# Patient Record
Sex: FEMALE | Employment: OTHER | URBAN - METROPOLITAN AREA
[De-identification: names, ages, dates, MRNs, and addresses within clinical notes are randomized per-mention and may not be internally consistent; named-entity substitution may affect disease eponyms.]

---

## 2018-03-21 ENCOUNTER — HOSPITAL ENCOUNTER (OUTPATIENT)
Dept: LAB | Age: 68
Discharge: HOME | End: 2018-03-21

## 2018-03-21 PROCEDURE — 99001 SPECIMEN HANDLING PT-LAB: CPT | Performed by: FAMILY MEDICINE

## 2020-04-22 ENCOUNTER — APPOINTMENT (OUTPATIENT)
Dept: GENERAL RADIOLOGY | Age: 70
DRG: 291 | End: 2020-04-22
Attending: EMERGENCY MEDICINE
Payer: MEDICARE

## 2020-04-22 ENCOUNTER — HOSPITAL ENCOUNTER (INPATIENT)
Age: 70
LOS: 3 days | Discharge: HOME HEALTH | DRG: 291 | End: 2020-04-28
Attending: EMERGENCY MEDICINE | Admitting: EMERGENCY MEDICINE
Payer: MEDICARE

## 2020-04-22 DIAGNOSIS — I50.9 ACUTE ON CHRONIC CONGESTIVE HEART FAILURE, UNSPECIFIED HEART FAILURE TYPE (HCC): Primary | ICD-10-CM

## 2020-04-22 DIAGNOSIS — E11.9 TYPE 2 DIABETES MELLITUS WITHOUT COMPLICATION, WITHOUT LONG-TERM CURRENT USE OF INSULIN (HCC): Chronic | ICD-10-CM

## 2020-04-22 DIAGNOSIS — Z20.822 SUSPECTED COVID-19 VIRUS INFECTION: ICD-10-CM

## 2020-04-22 LAB
ALBUMIN SERPL-MCNC: 3.5 G/DL (ref 3.4–5)
ALBUMIN/GLOB SERPL: 0.9 {RATIO} (ref 0.8–1.7)
ALP SERPL-CCNC: 99 U/L (ref 45–117)
ALT SERPL-CCNC: 18 U/L (ref 13–56)
ANION GAP SERPL CALC-SCNC: 9 MMOL/L (ref 3–18)
APPEARANCE UR: CLEAR
AST SERPL-CCNC: 24 U/L (ref 10–38)
BACTERIA URNS QL MICRO: NEGATIVE /HPF
BASOPHILS # BLD: 0 K/UL (ref 0–0.1)
BASOPHILS NFR BLD: 0 % (ref 0–2)
BILIRUB SERPL-MCNC: 0.5 MG/DL (ref 0.2–1)
BILIRUB UR QL: NEGATIVE
BNP SERPL-MCNC: 6916 PG/ML (ref 0–900)
BUN SERPL-MCNC: 43 MG/DL (ref 7–18)
BUN/CREAT SERPL: 18 (ref 12–20)
CALCIUM SERPL-MCNC: 8.2 MG/DL (ref 8.5–10.1)
CHLORIDE SERPL-SCNC: 104 MMOL/L (ref 100–111)
CO2 SERPL-SCNC: 27 MMOL/L (ref 21–32)
COLOR UR: YELLOW
CREAT SERPL-MCNC: 2.35 MG/DL (ref 0.6–1.3)
DIFFERENTIAL METHOD BLD: ABNORMAL
EOSINOPHIL # BLD: 0 K/UL (ref 0–0.4)
EOSINOPHIL NFR BLD: 0 % (ref 0–5)
ERYTHROCYTE [DISTWIDTH] IN BLOOD BY AUTOMATED COUNT: 14.3 % (ref 11.6–14.5)
GLOBULIN SER CALC-MCNC: 4.1 G/DL (ref 2–4)
GLUCOSE SERPL-MCNC: 210 MG/DL (ref 74–99)
GLUCOSE UR STRIP.AUTO-MCNC: NEGATIVE MG/DL
HCT VFR BLD AUTO: 30.8 % (ref 35–45)
HGB BLD-MCNC: 9.4 G/DL (ref 12–16)
HGB UR QL STRIP: ABNORMAL
KETONES UR QL STRIP.AUTO: NEGATIVE MG/DL
LEUKOCYTE ESTERASE UR QL STRIP.AUTO: ABNORMAL
LYMPHOCYTES # BLD: 0.7 K/UL (ref 0.9–3.6)
LYMPHOCYTES NFR BLD: 7 % (ref 21–52)
MAGNESIUM SERPL-MCNC: 2.7 MG/DL (ref 1.6–2.6)
MCH RBC QN AUTO: 27.6 PG (ref 24–34)
MCHC RBC AUTO-ENTMCNC: 30.5 G/DL (ref 31–37)
MCV RBC AUTO: 90.6 FL (ref 74–97)
MONOCYTES # BLD: 0.5 K/UL (ref 0.05–1.2)
MONOCYTES NFR BLD: 5 % (ref 3–10)
NEUTS SEG # BLD: 9.1 K/UL (ref 1.8–8)
NEUTS SEG NFR BLD: 88 % (ref 40–73)
NITRITE UR QL STRIP.AUTO: NEGATIVE
PH UR STRIP: 6 [PH] (ref 5–8)
PLATELET # BLD AUTO: 203 K/UL (ref 135–420)
PMV BLD AUTO: 10.8 FL (ref 9.2–11.8)
POTASSIUM SERPL-SCNC: 4.6 MMOL/L (ref 3.5–5.5)
PROT SERPL-MCNC: 7.6 G/DL (ref 6.4–8.2)
PROT UR STRIP-MCNC: 300 MG/DL
RBC # BLD AUTO: 3.4 M/UL (ref 4.2–5.3)
RBC #/AREA URNS HPF: NORMAL /HPF (ref 0–5)
SODIUM SERPL-SCNC: 140 MMOL/L (ref 136–145)
SP GR UR REFRACTOMETRY: 1.01 (ref 1–1.03)
TROPONIN I SERPL-MCNC: 0.03 NG/ML (ref 0–0.04)
UROBILINOGEN UR QL STRIP.AUTO: 0.2 EU/DL (ref 0.2–1)
WBC # BLD AUTO: 10.3 K/UL (ref 4.6–13.2)
WBC URNS QL MICRO: NORMAL /HPF (ref 0–4)

## 2020-04-22 PROCEDURE — 87449 NOS EACH ORGANISM AG IA: CPT

## 2020-04-22 PROCEDURE — 82550 ASSAY OF CK (CPK): CPT

## 2020-04-22 PROCEDURE — 86140 C-REACTIVE PROTEIN: CPT

## 2020-04-22 PROCEDURE — 85379 FIBRIN DEGRADATION QUANT: CPT

## 2020-04-22 PROCEDURE — 80053 COMPREHEN METABOLIC PANEL: CPT

## 2020-04-22 PROCEDURE — 83880 ASSAY OF NATRIURETIC PEPTIDE: CPT

## 2020-04-22 PROCEDURE — 87450 LEGIONELLA PNEUMOPHILA AG, URINE: CPT

## 2020-04-22 PROCEDURE — 85025 COMPLETE CBC W/AUTO DIFF WBC: CPT

## 2020-04-22 PROCEDURE — 84484 ASSAY OF TROPONIN QUANT: CPT

## 2020-04-22 PROCEDURE — 81001 URINALYSIS AUTO W/SCOPE: CPT

## 2020-04-22 PROCEDURE — 82728 ASSAY OF FERRITIN: CPT

## 2020-04-22 PROCEDURE — 83615 LACTATE (LD) (LDH) ENZYME: CPT

## 2020-04-22 PROCEDURE — 99285 EMERGENCY DEPT VISIT HI MDM: CPT

## 2020-04-22 PROCEDURE — 71045 X-RAY EXAM CHEST 1 VIEW: CPT

## 2020-04-22 PROCEDURE — 84145 PROCALCITONIN (PCT): CPT

## 2020-04-22 PROCEDURE — 83735 ASSAY OF MAGNESIUM: CPT

## 2020-04-22 PROCEDURE — 93005 ELECTROCARDIOGRAM TRACING: CPT

## 2020-04-22 PROCEDURE — 85610 PROTHROMBIN TIME: CPT

## 2020-04-23 ENCOUNTER — APPOINTMENT (OUTPATIENT)
Dept: GENERAL RADIOLOGY | Age: 70
DRG: 291 | End: 2020-04-23
Attending: STUDENT IN AN ORGANIZED HEALTH CARE EDUCATION/TRAINING PROGRAM
Payer: MEDICARE

## 2020-04-23 PROBLEM — I44.4 LEFT ANTERIOR FASCICULAR BLOCK: Status: ACTIVE | Noted: 2018-07-06

## 2020-04-23 PROBLEM — I25.10 CORONARY ARTERY DISEASE INVOLVING NATIVE CORONARY ARTERY OF NATIVE HEART WITHOUT ANGINA PECTORIS: Status: ACTIVE | Noted: 2018-07-06

## 2020-04-23 PROBLEM — E11.9 TYPE 2 DIABETES MELLITUS WITHOUT COMPLICATION, WITHOUT LONG-TERM CURRENT USE OF INSULIN (HCC): Chronic | Status: ACTIVE | Noted: 2018-07-06

## 2020-04-23 PROBLEM — I50.33 ACUTE ON CHRONIC DIASTOLIC CONGESTIVE HEART FAILURE (HCC): Status: ACTIVE | Noted: 2020-04-23

## 2020-04-23 PROBLEM — I10 HTN (HYPERTENSION), BENIGN: Chronic | Status: ACTIVE | Noted: 2018-07-06

## 2020-04-23 PROBLEM — I10 HTN (HYPERTENSION), BENIGN: Status: ACTIVE | Noted: 2018-07-06

## 2020-04-23 PROBLEM — E11.9 TYPE 2 DIABETES MELLITUS WITHOUT COMPLICATION, WITHOUT LONG-TERM CURRENT USE OF INSULIN (HCC): Status: ACTIVE | Noted: 2018-07-06

## 2020-04-23 PROBLEM — I50.32 CHRONIC DIASTOLIC CONGESTIVE HEART FAILURE (HCC): Status: ACTIVE | Noted: 2018-07-06

## 2020-04-23 PROBLEM — Z20.822 SUSPECTED COVID-19 VIRUS INFECTION: Status: ACTIVE | Noted: 2020-04-23

## 2020-04-23 PROBLEM — I50.9 CHF (CONGESTIVE HEART FAILURE) (HCC): Status: ACTIVE | Noted: 2020-04-23

## 2020-04-23 PROBLEM — R60.0 BILATERAL LOWER EXTREMITY EDEMA: Status: ACTIVE | Noted: 2018-07-06

## 2020-04-23 LAB
ALBUMIN SERPL-MCNC: 3.4 G/DL (ref 3.4–5)
ALBUMIN/GLOB SERPL: 0.9 {RATIO} (ref 0.8–1.7)
ALP SERPL-CCNC: 89 U/L (ref 45–117)
ALT SERPL-CCNC: 15 U/L (ref 13–56)
ANION GAP SERPL CALC-SCNC: 7 MMOL/L (ref 3–18)
APTT PPP: 35 SEC (ref 23–36.4)
AST SERPL-CCNC: 17 U/L (ref 10–38)
B PERT DNA SPEC QL NAA+PROBE: NOT DETECTED
BASOPHILS # BLD: 0 K/UL (ref 0–0.1)
BASOPHILS NFR BLD: 0 % (ref 0–2)
BILIRUB DIRECT SERPL-MCNC: 0.1 MG/DL (ref 0–0.2)
BILIRUB SERPL-MCNC: 0.6 MG/DL (ref 0.2–1)
BNP SERPL-MCNC: ABNORMAL PG/ML (ref 0–900)
BORDETELLA PARAPERTUSSIS PCR, BORPAR: NOT DETECTED
BUN SERPL-MCNC: 41 MG/DL (ref 7–18)
BUN/CREAT SERPL: 19 (ref 12–20)
C PNEUM DNA SPEC QL NAA+PROBE: NOT DETECTED
CALCIUM SERPL-MCNC: 8.3 MG/DL (ref 8.5–10.1)
CHLORIDE SERPL-SCNC: 103 MMOL/L (ref 100–111)
CK MB CFR SERPL CALC: 2 % (ref 0–4)
CK MB SERPL-MCNC: 1.3 NG/ML (ref 5–25)
CK SERPL-CCNC: 64 U/L (ref 26–192)
CO2 SERPL-SCNC: 28 MMOL/L (ref 21–32)
CREAT SERPL-MCNC: 2.15 MG/DL (ref 0.6–1.3)
CRP SERPL-MCNC: 0.4 MG/DL (ref 0–0.3)
CRP SERPL-MCNC: 0.5 MG/DL (ref 0–0.3)
D DIMER PPP FEU-MCNC: 2.93 UG/ML(FEU)
D DIMER PPP FEU-MCNC: 3.18 UG/ML(FEU)
DIFFERENTIAL METHOD BLD: ABNORMAL
EOSINOPHIL # BLD: 0 K/UL (ref 0–0.4)
EOSINOPHIL NFR BLD: 0 % (ref 0–5)
ERYTHROCYTE [DISTWIDTH] IN BLOOD BY AUTOMATED COUNT: 14 % (ref 11.6–14.5)
FERRITIN SERPL-MCNC: 93 NG/ML (ref 8–388)
FERRITIN SERPL-MCNC: 93 NG/ML (ref 8–388)
FLUAV H1 2009 PAND RNA SPEC QL NAA+PROBE: NOT DETECTED
FLUAV H1 RNA SPEC QL NAA+PROBE: NOT DETECTED
FLUAV H3 RNA SPEC QL NAA+PROBE: NOT DETECTED
FLUAV SUBTYP SPEC NAA+PROBE: NOT DETECTED
FLUBV RNA SPEC QL NAA+PROBE: NOT DETECTED
GLOBULIN SER CALC-MCNC: 3.9 G/DL (ref 2–4)
GLUCOSE BLD STRIP.AUTO-MCNC: 178 MG/DL (ref 70–110)
GLUCOSE BLD STRIP.AUTO-MCNC: 180 MG/DL (ref 70–110)
GLUCOSE BLD STRIP.AUTO-MCNC: 231 MG/DL (ref 70–110)
GLUCOSE SERPL-MCNC: 128 MG/DL (ref 74–99)
HADV DNA SPEC QL NAA+PROBE: NOT DETECTED
HBA1C MFR BLD: 6.1 % (ref 4.2–5.6)
HCOV 229E RNA SPEC QL NAA+PROBE: NOT DETECTED
HCOV HKU1 RNA SPEC QL NAA+PROBE: NOT DETECTED
HCOV NL63 RNA SPEC QL NAA+PROBE: NOT DETECTED
HCOV OC43 RNA SPEC QL NAA+PROBE: NOT DETECTED
HCT VFR BLD AUTO: 28.5 % (ref 35–45)
HGB BLD-MCNC: 8.8 G/DL (ref 12–16)
HMPV RNA SPEC QL NAA+PROBE: NOT DETECTED
HPIV1 RNA SPEC QL NAA+PROBE: NOT DETECTED
HPIV2 RNA SPEC QL NAA+PROBE: NOT DETECTED
HPIV3 RNA SPEC QL NAA+PROBE: NOT DETECTED
HPIV4 RNA SPEC QL NAA+PROBE: NOT DETECTED
INR PPP: 1.1 (ref 0.8–1.2)
INR PPP: 1.2 (ref 0.8–1.2)
L PNEUMO AG UR QL IA: NEGATIVE
LACTATE SERPL-SCNC: 1.8 MMOL/L (ref 0.4–2)
LDH SERPL L TO P-CCNC: 346 U/L (ref 81–234)
LYMPHOCYTES # BLD: 1.5 K/UL (ref 0.9–3.6)
LYMPHOCYTES NFR BLD: 16 % (ref 21–52)
M PNEUMO DNA SPEC QL NAA+PROBE: NOT DETECTED
MAGNESIUM SERPL-MCNC: 2.7 MG/DL (ref 1.6–2.6)
MCH RBC QN AUTO: 27.6 PG (ref 24–34)
MCHC RBC AUTO-ENTMCNC: 30.9 G/DL (ref 31–37)
MCV RBC AUTO: 89.3 FL (ref 74–97)
MONOCYTES # BLD: 0.8 K/UL (ref 0.05–1.2)
MONOCYTES NFR BLD: 9 % (ref 3–10)
NEUTS SEG # BLD: 6.9 K/UL (ref 1.8–8)
NEUTS SEG NFR BLD: 75 % (ref 40–73)
PHOSPHATE SERPL-MCNC: 3.5 MG/DL (ref 2.5–4.9)
PLATELET # BLD AUTO: 192 K/UL (ref 135–420)
PMV BLD AUTO: 10.6 FL (ref 9.2–11.8)
POTASSIUM SERPL-SCNC: 4 MMOL/L (ref 3.5–5.5)
PROCALCITONIN SERPL-MCNC: <0.05 NG/ML
PROT SERPL-MCNC: 7.3 G/DL (ref 6.4–8.2)
PROTHROMBIN TIME: 14.3 SEC (ref 11.5–15.2)
PROTHROMBIN TIME: 14.6 SEC (ref 11.5–15.2)
RBC # BLD AUTO: 3.19 M/UL (ref 4.2–5.3)
RSV RNA SPEC QL NAA+PROBE: NOT DETECTED
RV+EV RNA SPEC QL NAA+PROBE: NOT DETECTED
S PNEUM AG UR QL: NEGATIVE
SODIUM SERPL-SCNC: 138 MMOL/L (ref 136–145)
TROPONIN I SERPL-MCNC: 0.02 NG/ML (ref 0–0.04)
TROPONIN I SERPL-MCNC: 0.04 NG/ML (ref 0–0.04)
TROPONIN I SERPL-MCNC: NORMAL NG/ML (ref 0–0.04)
WBC # BLD AUTO: 9.2 K/UL (ref 4.6–13.2)

## 2020-04-23 PROCEDURE — 74011250637 HC RX REV CODE- 250/637: Performed by: PHYSICIAN ASSISTANT

## 2020-04-23 PROCEDURE — 83880 ASSAY OF NATRIURETIC PEPTIDE: CPT

## 2020-04-23 PROCEDURE — 71045 X-RAY EXAM CHEST 1 VIEW: CPT

## 2020-04-23 PROCEDURE — 83735 ASSAY OF MAGNESIUM: CPT

## 2020-04-23 PROCEDURE — 83605 ASSAY OF LACTIC ACID: CPT

## 2020-04-23 PROCEDURE — 96374 THER/PROPH/DIAG INJ IV PUSH: CPT

## 2020-04-23 PROCEDURE — 85379 FIBRIN DEGRADATION QUANT: CPT

## 2020-04-23 PROCEDURE — 99218 HC RM OBSERVATION: CPT

## 2020-04-23 PROCEDURE — 74011000250 HC RX REV CODE- 250: Performed by: EMERGENCY MEDICINE

## 2020-04-23 PROCEDURE — 96372 THER/PROPH/DIAG INJ SC/IM: CPT

## 2020-04-23 PROCEDURE — 82728 ASSAY OF FERRITIN: CPT

## 2020-04-23 PROCEDURE — 74011250636 HC RX REV CODE- 250/636: Performed by: STUDENT IN AN ORGANIZED HEALTH CARE EDUCATION/TRAINING PROGRAM

## 2020-04-23 PROCEDURE — 87040 BLOOD CULTURE FOR BACTERIA: CPT

## 2020-04-23 PROCEDURE — 96376 TX/PRO/DX INJ SAME DRUG ADON: CPT

## 2020-04-23 PROCEDURE — 85025 COMPLETE CBC W/AUTO DIFF WBC: CPT

## 2020-04-23 PROCEDURE — 74011250637 HC RX REV CODE- 250/637: Performed by: INTERNAL MEDICINE

## 2020-04-23 PROCEDURE — 80048 BASIC METABOLIC PNL TOTAL CA: CPT

## 2020-04-23 PROCEDURE — 86140 C-REACTIVE PROTEIN: CPT

## 2020-04-23 PROCEDURE — 82962 GLUCOSE BLOOD TEST: CPT

## 2020-04-23 PROCEDURE — 74011636637 HC RX REV CODE- 636/637: Performed by: PHYSICIAN ASSISTANT

## 2020-04-23 PROCEDURE — 74011000250 HC RX REV CODE- 250: Performed by: HOSPITALIST

## 2020-04-23 PROCEDURE — 93005 ELECTROCARDIOGRAM TRACING: CPT

## 2020-04-23 PROCEDURE — 84484 ASSAY OF TROPONIN QUANT: CPT

## 2020-04-23 PROCEDURE — 80076 HEPATIC FUNCTION PANEL: CPT

## 2020-04-23 PROCEDURE — 0100U RESPIRATORY PANEL,PCR,NASOPHARYNGEAL: CPT

## 2020-04-23 PROCEDURE — 85730 THROMBOPLASTIN TIME PARTIAL: CPT

## 2020-04-23 PROCEDURE — 36415 COLL VENOUS BLD VENIPUNCTURE: CPT

## 2020-04-23 PROCEDURE — 74011250637 HC RX REV CODE- 250/637: Performed by: HOSPITALIST

## 2020-04-23 PROCEDURE — 83036 HEMOGLOBIN GLYCOSYLATED A1C: CPT

## 2020-04-23 PROCEDURE — 96375 TX/PRO/DX INJ NEW DRUG ADDON: CPT

## 2020-04-23 PROCEDURE — 84100 ASSAY OF PHOSPHORUS: CPT

## 2020-04-23 PROCEDURE — 74011250636 HC RX REV CODE- 250/636: Performed by: HOSPITALIST

## 2020-04-23 PROCEDURE — 74011000250 HC RX REV CODE- 250: Performed by: PHYSICIAN ASSISTANT

## 2020-04-23 RX ORDER — DEXTROSE 50 % IN WATER (D50W) INTRAVENOUS SYRINGE
25-50 AS NEEDED
Status: DISCONTINUED | OUTPATIENT
Start: 2020-04-23 | End: 2020-04-28 | Stop reason: HOSPADM

## 2020-04-23 RX ORDER — FUROSEMIDE 10 MG/ML
40 INJECTION INTRAMUSCULAR; INTRAVENOUS ONCE
Status: COMPLETED | OUTPATIENT
Start: 2020-04-23 | End: 2020-04-23

## 2020-04-23 RX ORDER — ASCORBIC ACID 250 MG
500 TABLET ORAL 2 TIMES DAILY
Status: DISCONTINUED | OUTPATIENT
Start: 2020-04-23 | End: 2020-04-23

## 2020-04-23 RX ORDER — FUROSEMIDE 40 MG/1
40 TABLET ORAL 2 TIMES DAILY
Status: ON HOLD | COMMUNITY
End: 2020-04-27 | Stop reason: SDUPTHER

## 2020-04-23 RX ORDER — NITROGLYCERIN 40 MG/100ML
0-20 INJECTION INTRAVENOUS
Status: DISCONTINUED | OUTPATIENT
Start: 2020-04-23 | End: 2020-04-24

## 2020-04-23 RX ORDER — MORPHINE SULFATE 2 MG/ML
2 INJECTION, SOLUTION INTRAMUSCULAR; INTRAVENOUS
Status: DISCONTINUED | OUTPATIENT
Start: 2020-04-23 | End: 2020-04-28 | Stop reason: HOSPADM

## 2020-04-23 RX ORDER — BUMETANIDE 0.25 MG/ML
1 INJECTION INTRAMUSCULAR; INTRAVENOUS 2 TIMES DAILY
Status: DISCONTINUED | OUTPATIENT
Start: 2020-04-23 | End: 2020-04-23

## 2020-04-23 RX ORDER — CARVEDILOL 25 MG/1
25 TABLET ORAL 2 TIMES DAILY WITH MEALS
Status: DISCONTINUED | OUTPATIENT
Start: 2020-04-23 | End: 2020-04-28 | Stop reason: HOSPADM

## 2020-04-23 RX ORDER — SODIUM CHLORIDE 0.9 % (FLUSH) 0.9 %
5-40 SYRINGE (ML) INJECTION AS NEEDED
Status: DISCONTINUED | OUTPATIENT
Start: 2020-04-23 | End: 2020-04-28 | Stop reason: HOSPADM

## 2020-04-23 RX ORDER — ENALAPRILAT 1.25 MG/ML
0.26 INJECTION INTRAVENOUS
Status: COMPLETED | OUTPATIENT
Start: 2020-04-23 | End: 2020-04-23

## 2020-04-23 RX ORDER — LISINOPRIL 5 MG/1
5 TABLET ORAL DAILY
COMMUNITY
End: 2020-04-28

## 2020-04-23 RX ORDER — NALOXONE HYDROCHLORIDE 0.4 MG/ML
0.4 INJECTION, SOLUTION INTRAMUSCULAR; INTRAVENOUS; SUBCUTANEOUS AS NEEDED
Status: DISCONTINUED | OUTPATIENT
Start: 2020-04-23 | End: 2020-04-28 | Stop reason: HOSPADM

## 2020-04-23 RX ORDER — SODIUM CHLORIDE 0.9 % (FLUSH) 0.9 %
5-40 SYRINGE (ML) INJECTION EVERY 8 HOURS
Status: DISCONTINUED | OUTPATIENT
Start: 2020-04-23 | End: 2020-04-28 | Stop reason: HOSPADM

## 2020-04-23 RX ORDER — HYDRALAZINE HYDROCHLORIDE 50 MG/1
50 TABLET, FILM COATED ORAL 2 TIMES DAILY
Status: DISCONTINUED | OUTPATIENT
Start: 2020-04-23 | End: 2020-04-23

## 2020-04-23 RX ORDER — CITALOPRAM 10 MG/1
20 TABLET ORAL DAILY
Status: DISCONTINUED | OUTPATIENT
Start: 2020-04-23 | End: 2020-04-28 | Stop reason: HOSPADM

## 2020-04-23 RX ORDER — ASCORBIC ACID 250 MG
500 TABLET ORAL 2 TIMES DAILY
Status: DISCONTINUED | OUTPATIENT
Start: 2020-04-23 | End: 2020-04-28 | Stop reason: HOSPADM

## 2020-04-23 RX ORDER — AMLODIPINE BESYLATE 5 MG/1
5 TABLET ORAL DAILY
Status: DISCONTINUED | OUTPATIENT
Start: 2020-04-23 | End: 2020-04-27

## 2020-04-23 RX ORDER — PANTOPRAZOLE SODIUM 40 MG/1
40 TABLET, DELAYED RELEASE ORAL
Status: DISCONTINUED | OUTPATIENT
Start: 2020-04-23 | End: 2020-04-28 | Stop reason: HOSPADM

## 2020-04-23 RX ORDER — HYDRALAZINE HYDROCHLORIDE 50 MG/1
50 TABLET, FILM COATED ORAL 3 TIMES DAILY
Status: DISCONTINUED | OUTPATIENT
Start: 2020-04-23 | End: 2020-04-28

## 2020-04-23 RX ORDER — BUMETANIDE 0.25 MG/ML
1 INJECTION INTRAMUSCULAR; INTRAVENOUS 2 TIMES DAILY
Status: COMPLETED | OUTPATIENT
Start: 2020-04-23 | End: 2020-04-24

## 2020-04-23 RX ORDER — HEPARIN SODIUM 5000 [USP'U]/ML
5000 INJECTION, SOLUTION INTRAVENOUS; SUBCUTANEOUS EVERY 8 HOURS
Status: DISCONTINUED | OUTPATIENT
Start: 2020-04-23 | End: 2020-04-28 | Stop reason: HOSPADM

## 2020-04-23 RX ORDER — MAGNESIUM SULFATE 100 %
16 CRYSTALS MISCELLANEOUS AS NEEDED
Status: DISCONTINUED | OUTPATIENT
Start: 2020-04-23 | End: 2020-04-28 | Stop reason: HOSPADM

## 2020-04-23 RX ORDER — GABAPENTIN 100 MG/1
100 CAPSULE ORAL 2 TIMES DAILY
Status: ON HOLD | COMMUNITY
End: 2020-04-28 | Stop reason: SDUPTHER

## 2020-04-23 RX ORDER — ACETAMINOPHEN 325 MG/1
650 TABLET ORAL
Status: DISCONTINUED | OUTPATIENT
Start: 2020-04-23 | End: 2020-04-28 | Stop reason: HOSPADM

## 2020-04-23 RX ORDER — INSULIN LISPRO 100 [IU]/ML
INJECTION, SOLUTION INTRAVENOUS; SUBCUTANEOUS
Status: DISCONTINUED | OUTPATIENT
Start: 2020-04-23 | End: 2020-04-28 | Stop reason: HOSPADM

## 2020-04-23 RX ADMIN — BUMETANIDE 1 MG: 0.25 INJECTION INTRAMUSCULAR; INTRAVENOUS at 07:09

## 2020-04-23 RX ADMIN — HYDRALAZINE HYDROCHLORIDE 50 MG: 50 TABLET, FILM COATED ORAL at 15:51

## 2020-04-23 RX ADMIN — HEPARIN SODIUM 5000 UNITS: 5000 INJECTION INTRAVENOUS; SUBCUTANEOUS at 23:00

## 2020-04-23 RX ADMIN — HYDRALAZINE HYDROCHLORIDE 50 MG: 50 TABLET, FILM COATED ORAL at 08:28

## 2020-04-23 RX ADMIN — HEPARIN SODIUM 5000 UNITS: 5000 INJECTION INTRAVENOUS; SUBCUTANEOUS at 08:29

## 2020-04-23 RX ADMIN — ACETAMINOPHEN 650 MG: 325 TABLET ORAL at 15:51

## 2020-04-23 RX ADMIN — ACETAMINOPHEN 650 MG: 325 TABLET ORAL at 11:43

## 2020-04-23 RX ADMIN — FUROSEMIDE 40 MG: 10 INJECTION, SOLUTION INTRAMUSCULAR; INTRAVENOUS at 00:15

## 2020-04-23 RX ADMIN — INSULIN LISPRO 2 UNITS: 100 INJECTION, SOLUTION INTRAVENOUS; SUBCUTANEOUS at 16:30

## 2020-04-23 RX ADMIN — Medication 500 MG: at 21:25

## 2020-04-23 RX ADMIN — INSULIN LISPRO 2 UNITS: 100 INJECTION, SOLUTION INTRAVENOUS; SUBCUTANEOUS at 11:38

## 2020-04-23 RX ADMIN — CARVEDILOL 25 MG: 25 TABLET, FILM COATED ORAL at 08:29

## 2020-04-23 RX ADMIN — HEPARIN SODIUM 5000 UNITS: 5000 INJECTION INTRAVENOUS; SUBCUTANEOUS at 15:50

## 2020-04-23 RX ADMIN — ENALAPRILAT 0.26 MG: 1.25 INJECTION INTRAVENOUS at 05:19

## 2020-04-23 RX ADMIN — HYDRALAZINE HYDROCHLORIDE 50 MG: 50 TABLET, FILM COATED ORAL at 21:25

## 2020-04-23 RX ADMIN — AZITHROMYCIN MONOHYDRATE 500 MG: 500 INJECTION, POWDER, LYOPHILIZED, FOR SOLUTION INTRAVENOUS at 01:34

## 2020-04-23 RX ADMIN — PANTOPRAZOLE SODIUM 40 MG: 40 TABLET, DELAYED RELEASE ORAL at 15:51

## 2020-04-23 RX ADMIN — Medication 500 MG: at 11:38

## 2020-04-23 RX ADMIN — MULTIPLE VITAMINS W/ MINERALS TAB 1 TABLET: TAB at 11:38

## 2020-04-23 RX ADMIN — INSULIN LISPRO 4 UNITS: 100 INJECTION, SOLUTION INTRAVENOUS; SUBCUTANEOUS at 21:25

## 2020-04-23 RX ADMIN — CITALOPRAM HYDROBROMIDE 20 MG: 20 TABLET ORAL at 08:29

## 2020-04-23 RX ADMIN — Medication 10 ML: at 14:00

## 2020-04-23 RX ADMIN — Medication 10 ML: at 21:27

## 2020-04-23 RX ADMIN — PANTOPRAZOLE SODIUM 40 MG: 40 TABLET, DELAYED RELEASE ORAL at 08:29

## 2020-04-23 RX ADMIN — AMLODIPINE BESYLATE 5 MG: 5 TABLET ORAL at 11:46

## 2020-04-23 RX ADMIN — NITROGLYCERIN 10 MCG/MIN: 40 INJECTION INTRAVENOUS at 12:34

## 2020-04-23 RX ADMIN — BUMETANIDE 1 MG: 0.25 INJECTION INTRAMUSCULAR; INTRAVENOUS at 15:51

## 2020-04-23 RX ADMIN — Medication 10 ML: at 09:08

## 2020-04-23 RX ADMIN — CARVEDILOL 25 MG: 25 TABLET, FILM COATED ORAL at 15:51

## 2020-04-23 NOTE — PROGRESS NOTES
Reason for Admission:  Suspected Covid-19 Virus Infection [R68.89]  CHF (congestive heart failure) ([facility] [I50.9]                 RRAT Score:    N/A            Plan for utilizing home health:    TBD                      Likelihood of Readmission:   LOW                         Transition of Care Plan:  Return home            Initial assessment completed with patient. Cognitive status of patient: oriented to time, place, person and situation. Face sheet information confirmed:  yes. The patient designates friend, Luke Rubio [phone] to participate in her discharge plan and to receive any needed information. This patient lives in a single family home alone. Patient is able to navigate steps as needed. Prior to hospitalization, patient was considered to be independent with ADLs/IADLS : yes . Patient has a current ACP document on file: yes, on file with [facility]  The friend will be available to transport patient home upon discharge. The patient already has Orval Fuss, and  medical equipment available in the home. Patient is not currently active with home health. Patient has not stayed in a skilled nursing facility or rehab. This patient is on dialysis :no    Currently, the discharge plan is Home. The patient states that she can obtain her medications from the pharmacy, and take her medications as directed. Patient's current insurance is [employer]. Pt lives alone;  passed away a couple of years ago, has no children. Friend/neighbor, Luke Rubio [phone] will give her a ride home. Pt is being ruled out for COVID-19 so face to face assessment was not done; spoke via phone.                 Eber Banks RN - Outcomes Manager  [phone]

## 2020-04-23 NOTE — CONSULTS
[facility] - Consult Note    Consultation request by Joan Marinelli DO for advice/opinion related to evaluating Suspected Covid-19 Virus Infection [R68.89]  CHF (congestive heart failure) ([facility] [I50.9]    Date of  Admission: 4/22/2020 10:12 PM   Primary Care Physician:  Alexey Connell MD     Assessment:     Patient Active Problem List   Diagnosis Code    Acute on chronic diastolic congestive heart failure ([facility] I50.33    Suspected Covid-19 Virus Infection R68.89    Type 2 diabetes mellitus without complication, without long-term current use of insulin ([facility] E11.9    Left anterior fascicular block I44.4    HTN (hypertension), benign I10    Coronary artery disease involving native coronary artery of native heart without angina pectoris I25.10    Chronic diastolic congestive heart failure ([facility] I50.32    Bilateral lower extremity edema R60.0    BMI 35.0-35.9,adult Z68.35       -Presented with worsening shortness of breath in setting of HFpEF and possible Covid viral pneumonia.  -Acute on chronic HFpEF in setting of poorly controlled hypertension. EF 55-60% with aortic sclerosis, trace AI, trace MR, PASP 40 mmHg by echo 8/2019.  -Concern for Covid viral pneumonia, covid testing pending.  -Hypertensive urgency with SBP >200. Reports compliance but does not check her BP at home.  -Renal insult, unknown baseline. Suspect the patient does have a component of chronic kidney disease.  -Coronary disease reportedly diffuse medically managed, nuclear stress 3/2018 with small area of ischemia with EF 53%. -Diabetes mellitus. HgbA1c 6.1.  -Dyslipidemia. Previously on crestor.  -Chronic atypical LBBB. No acute EKG changes this admission. Primary cardiologist Dr. Morgan Adams at [facility]. Plan:     Continue diuresis as renal function allows, currently on bumex 1 mg IV BID. Needs further BP control. Will start nitro drip. Coreg resumed. Hydralazine resumed.    Hold home ace until renal function trended out. Can start norvasc if further BP control is needed. Will review echocardiogram once Covid ruled out. EF normal 8/2019. Continue infectious coverage and Covid rule out. Would resume ASA and statin as previously taking. History of Present Illness: This is a 71 y.o. female admitted for Suspected Covid-19 Virus Infection [R68.89]  CH[facility]congest[facility]) [facility] [I50.9]. Patient complains of:  SOB. Patient reports she has had progressively worsening SOB, orthopnea, VALENTE over the past month. She called PCP and took extra lasix yesterday. She reports good urine output but no improvement in symptoms. Patient reports recent chills but denies fever. Patient denies recent sick contacts. Patient has been trying to stay at home as much as possible over the past few weeks. Patient denies any CP, palp, syncope. Patient reports chronic orthopnea. Patients SBP >  200 on arrival. Patient reports compliance with medical therapy but galeano not check her BP at home. Cardiac risk factors: dyslipidemia, diabetes mellitus, hypertension      Review of Symptoms:  Except as stated above include:  Constitutional:  negative  Respiratory:  negative  Cardiovascular:  Reports SOB, orthopnea, VALENTE. Denies CP, palp, syncope.   Gastrointestinal: negative  Genitourinary:  negative  Musculoskeletal:  Negative  Neurological:  Negative  Dermatological:  Negative  Endocrinological: Negative  Psychological:  Negative       Past Medical History:     Past Medical History:   Diagnosis Date    Arthritis     Cancer ([facility]     [facility]     Diabetes ([facility]     Fracture of neck ([facility])     GERD (gastroesophageal reflux disease)     Goiter     Hiatal hernia     Hypertension     Uterine cancer ([facility])          Social History:     Social History     Socioeconomic History    Marital status:      Spouse name: Not on file    Number of children: Not on file    Years of education: Not on file    Highest education level: Not on file   Tobacco Use    Smoking status: Never Smoker   Substance and Sexual Activity    Alcohol use: No    Drug use: No        Family History:   No family history on file. Medications: Allergies   Allergen Reactions    Augmentin [Amoxicillin-Pot Clavulanate] Diarrhea    Clavulanic Acid Diarrhea    Levaquin [Levofloxacin] Other (comments)     Severe hypoglycemia          Current Facility-Administered Medications   Medication Dose Route Frequency    azithromycin (ZITHROMAX) 500 mg in  mL  500 mg IntraVENous Q24H    sodium chloride (NS) flush 5-40 mL  5-40 mL IntraVENous Q8H    sodium chloride (NS) flush 5-40 mL  5-40 mL IntraVENous PRN    acetaminophen (TYLENOL) tablet 650 mg  650 mg Oral Q4H PRN    morphine injection 2 mg  2 mg IntraVENous Q2H PRN    naloxone (NARCAN) injection 0.4 mg  0.4 mg IntraVENous PRN    heparin (porcine) injection 5,000 Units  5,000 Units SubCUTAneous Q8H    hydrALAZINE (APRESOLINE) tablet 50 mg  50 mg Oral BID    carvediloL (COREG) tablet 25 mg  25 mg Oral BID WITH MEALS    citalopram (CELEXA) tablet 20 mg  20 mg Oral DAILY    pantoprazole (PROTONIX) tablet 40 mg  40 mg Oral ACB&D    bumetanide (BUMEX) injection 1 mg  1 mg IntraVENous BID    multivitamin, tx-iron-ca-min (THERA-M w/ IRON) tablet 1 Tab  1 Tab Oral DAILY    insulin lispro (HUMALOG) injection   SubCUTAneous AC&HS    glucose chewable tablet 16 g  16 g Oral PRN    glucagon (GLUCAGEN) injection 1 mg  1 mg IntraMUSCular PRN    dextrose (D50W) injection syrg 12.5-25 g  25-50 mL IntraVENous PRN    ascorbic acid (vitamin C) (VITAMIN C) tablet 500 mg  500 mg Oral BID     Current Outpatient Medications   Medication Sig    furosemide (LASIX) 40 mg tablet Take 40 mg by mouth two (2) times a day.  gabapentin (NEURONTIN) 100 mg capsule Take 100 mg by mouth two (2) times a day.  lisinopriL (PRINIVIL, ZESTRIL) 5 mg tablet Take 5 mg by mouth daily.     empagliflozin-metFORMIN (SYNJARDY) 12.5-500 mg per tablet Take 1 Tab by mouth two (2) times daily (with meals).  esomeprazole (NEXIUM) 40 mg capsule Take 40 mg by mouth daily.  hydrALAZINE (APRESOLINE) 50 mg tablet Take 50 mg by mouth two (2) times a day.  carvedilol (COREG) 25 mg tablet Take 25 mg by mouth two (2) times daily (with meals).  citalopram (CELEXA) 20 mg tablet Take 20 mg by mouth daily.  cranberry extract (AZO CRANBERRY) 450 mg tab Take 2 Tabs by mouth daily.  calcium-cholecalciferol, d3, (CALCIUM 600 + D) 600-125 mg-unit tab Take 1 Tab by mouth two (2) times a day.  cholecalciferol (VITAMIN D3) 1,000 unit cap Take 5,000 Units by mouth daily.  cyanocobalamin (VITAMIN B-12) 1,000 mcg tablet Take 1,000 mcg by mouth two (2) times a day.  Biotin 2,500 mcg cap Take 1 Tab by mouth two (2) times a day.  vitamin a-vitamin c-vit e-min (OCUVITE) tablet Take 1 Tab by mouth daily.  loratadine (CLARITIN) 10 mg tablet Take 10 mg by mouth daily.  B.infantis-B.ani-B.long-B.bifi (PROBIOTIC 4X) 10-15 mg TbEC Take 1 Tab by mouth daily.  HYDROcodone-acetaminophen (NORCO) 5-325 mg per tablet Take 1-2 tablets PO every 4-6 hours as needed for pain control. If over the counter ibuprofen or acetaminophen was suggested, then only take the vicodin for pain not well controlled with the over the counter medication.          Physical Exam:     Visit Vitals  BP (!) 206/66   Pulse 63   Temp 98 °F (36.7 °C)   Resp 19   Ht 5' 5\" (1.651 m)   Wt 104.3 kg (230 lb)   SpO2 95%   BMI 38.27 kg/m²     BP Readings from Last 3 Encounters:   04/23/20 (!) 206/66   08/18/16 167/63   06/02/16 (!) 162/98     Pulse Readings from Last 3 Encounters:   04/23/20 63   08/18/16 60   06/02/16 62     Wt Readings from Last 3 Encounters:   04/22/20 104.3 kg (230 lb)   08/18/16 99.3 kg (219 lb)   06/02/16 103.4 kg (228 lb)       General:  alert, cooperative, no distress, appears stated age  Neck:  no JVD  Lungs:  Diminished breath sounds, bilateral rales and rhonchi heard lower third  Heart:  regular rate and rhythm, no appreciable murmur, distant heart sounds  Abdomen:  abdomen is soft edema  Extremities:   2-3+ bilateral pitting edema to the knees. Skin: Warm and dry.    Neuro: alert, oriented x3, affect appropriate  Psych: non focal     Data Review:     Recent Labs     04/23/20  0826 04/22/20  2309   WBC 9.2 10.3   HGB 8.8* 9.4*   HCT 28.5* 30.8*    203     Recent Labs     04/23/20  0826 04/22/20  2309    140   K 4.0 4.6    104   CO2 28 27   * 210*   BUN 41* 43*   CREA 2.15* 2.35*   CA 8.3* 8.2*   MG 2.7* 2.7*   PHOS 3.5  --    ALB 3.4 3.5   SGOT 17 24   ALT 15 18   INR 1.2 1.1       Results for orders placed or performed during the hospital encounter of 04/22/20   EKG, 12 LEAD, INITIAL   Result Value Ref Range    Ventricular Rate 73 BPM    Atrial Rate 73 BPM    P-R Interval 164 ms    QRS Duration 116 ms    Q-T Interval 416 ms    QTC Calculation (Bezet) 458 ms    Calculated P Axis 14 degrees    Calculated R Axis -33 degrees    Calculated T Axis 40 degrees    Diagnosis       Normal sinus rhythm  Left axis deviation  Left ventricular hypertrophy with QRS widening  Abnormal ECG  When compared with ECG of 12-AUG-2008 15:31,  No significant change was found         All Cardiac Markers in the last 24 hours:    Lab Results   Component Value Date/Time    CPK 64 04/22/2020 11:09 PM    CKMB 1.3 04/22/2020 11:09 PM    CKND1 2.0 04/22/2020 11:09 PM    TROIQ 0.04 04/23/2020 08:26 AM    TROIQ 0.03 04/22/2020 32:28 PM    TROIQ DUPLICATE REQUEST 81/26/5074 11:09 PM       Last Lipid:  No results found for: CHOL, CHOLX, CHLST, CHOLV, HDL, HDLP, LDL, LDLC, DLDLP, TGLX, TRIGL, TRIGP, CHHD, CHHDX    Signed By: MICHAEL Arzola     April 23, 2020      Yvette Hayward MD

## 2020-04-23 NOTE — ED NOTES
TRANSFER - OUT REPORT:    Verbal report given  on Rudy Silva  being transferred to  (unit) for routine progression of care       Report consisted of patients Situation, Background, Assessment and   Recommendations(SBAR). Information from the following report(s) SBAR was reviewed with the receiving nurse. Lines:   Peripheral IV 04/22/20 Right Antecubital (Active)   Site Assessment Clean, dry, & intact 4/22/2020 11:27 PM   Phlebitis Assessment 0 4/22/2020 11:27 PM   Infiltration Assessment 0 4/22/2020 11:27 PM   Dressing Status Clean, dry, & intact 4/22/2020 11:27 PM   Dressing Type Transparent 4/22/2020 11:27 PM   Hub Color/Line Status Pink;Flushed;Patent 4/22/2020 11:27 PM   Action Taken Blood drawn 4/22/2020 11:27 PM        Opportunity for questions and clarification was provided.       Patient transported with:   Registered Nurse

## 2020-04-23 NOTE — ACP (ADVANCE CARE PLANNING)
Chart reviewed, spoke via phone with patient. She states she has an ACD on file with [address] passed away a couple years ago. Does not have anyone to bring a copy here.   Hector Fitzpatrick RN - Outcomes Manager  [phone]

## 2020-04-23 NOTE — PROGRESS NOTES
[facility] Hospitalist Group  Progress Note    Patient: Casey Land Age: 71 y.o. : 1950 MR#: [id number] SSN: [id number]  Date: 2020     Subjective:   Patient seen in ED BED 16 in the presence of RN. She feels uncomfortable in bed. She endorses SOB at rest worse than usual, dry cough that has been going on for months worse than usual, LE edema bilaterally. Denies chest pain. She states she sleeps in a recliner at home. If she lays flat she gets SOB. Her cardiologist is Dr. Manfred Orourke at [facility]. Assessment/Plan:   Ms. Moise Orr came with SOB at rest and exertion and worsening LE edema and cough more than usual.     Consults- cardiology     1. Acute on chronic diastolic heart failure (ECHO is  EF 50%)  - probnp [id number]  - chest xray: Moderate diffuse increased interstitial markings bilaterally   - repeat chest xray: Slightly improved interstitial edema. 2. Hypertensive urgency   3. Acute on chronic SOB   4. Concern for COVID 19 viral pneumonia   - normal wbc, lymphopenia, ddimer 2.93, , lactic acid normal, procal <0.05, CK 64, ferritin 93, CRP 0.4  5. Acute renal insufficiency on CKD, unclear what is her baseline Cr   6. Type 2 DM   7. HTN   8. GERD     microbio- only one set of blood culture collected. resp PCR pending, emergent disease panel pending, urine Ags negative. - ED sent for emergent disease panel. Admit to 2S on keep on droplet plus precautions. On azithromycin. Can also start zinc and vitamin C. Monitor inflammatory markers. - Cardiology consulted. On bumex 1 mg IV BID now. Monitor I and O, daily wts, electrolytes. Fluid restrict. Repeat ECHO. monitor on tele. Resume home coreg and hydralazine. May need nitro drip if BP does not improve. - monitor renal function closely in the setting of elevated BUN Cr. Consider renal consult if no improvement. - ISS . addon A1c   - on home celexa   - she needs pt ot after covid ruled out.      Full code  dvt ppx- heparin sq   Gi ppx- PPI     Additional Notes:      Case discussed with:  [x]Patient  []Family  [x]Nursing  []Case Management  DVT Prophylaxis:  []Lovenox  [x]Hep SQ  []SCDs  []Coumadin   []On Heparin gtt    Objective:   VS:   Visit Vitals  /90   Pulse 67   Temp 98 °F (36.7 °C)   Resp 21   Ht 5' 5\" (1.651 m)   Wt 104.3 kg (230 lb)   SpO2 95%   BMI 38.27 kg/m²      Tmax/24hrs: Temp (24hrs), Av °F (36.7 °C), Min:98 °F (36.7 °C), Max:98 °F (36.7 °C)      Intake/Output Summary (Last 24 hours) at 2020 0930  Last data filed at 2020 8994  Gross per 24 hour   Intake    Output 800 ml   Net -800 ml       General: obese  woman  Sitting up in bed, mask on chin not covering mouth or nose,   NAD  Cardiovascular:  RRR  Pulmonary: on room air, no accessory muscle use, no  Cyanosis, respiratory effort WNL. There was no disposable stethoscope in the room - did not listen to lungs. GI:  +BS in all four quadrants, soft, non-tender  Extremities:  Bilateral LE edema pitting, no redness nor extra warmth   Neuro: awake, alert, ox3, moves all extremities, follows commands       Labs:    Recent Results (from the past 24 hour(s))   CBC WITH AUTOMATED DIFF    Collection Time: 20 11:09 PM   Result Value Ref Range    WBC 10.3 4.6 - 13.2 K/uL    RBC 3.40 (L) 4.20 - 5.30 M/uL    HGB 9.4 (L) 12.0 - 16.0 g/dL    HCT 30.8 (L) 35.0 - 45.0 %    MCV 90.6 74.0 - 97.0 FL    MCH 27.6 24.0 - 34.0 PG    MCHC 30.5 (L) 31.0 - 37.0 g/dL    RDW 14.3 11.6 - 14.5 %    PLATELET 279 417 - 418 K/uL    MPV 10.8 9.2 - 11.8 FL    NEUTROPHILS 88 (H) 40 - 73 %    LYMPHOCYTES 7 (L) 21 - 52 %    MONOCYTES 5 3 - 10 %    EOSINOPHILS 0 0 - 5 %    BASOPHILS 0 0 - 2 %    ABS. NEUTROPHILS 9.1 (H) 1.8 - 8.0 K/UL    ABS. LYMPHOCYTES 0.7 (L) 0.9 - 3.6 K/UL    ABS. MONOCYTES 0.5 0.05 - 1.2 K/UL    ABS. EOSINOPHILS 0.0 0.0 - 0.4 K/UL    ABS.  BASOPHILS 0.0 0.0 - 0.1 K/UL    DF AUTOMATED     METABOLIC PANEL, COMPREHENSIVE Collection Time: 04/22/20 11:09 PM   Result Value Ref Range    Sodium 140 136 - 145 mmol/L    Potassium 4.6 3.5 - 5.5 mmol/L    Chloride 104 100 - 111 mmol/L    CO2 27 21 - 32 mmol/L    Anion gap 9 3.0 - 18 mmol/L    Glucose 210 (H) 74 - 99 mg/dL    BUN 43 (H) 7.0 - 18 MG/DL    Creatinine 2.35 (H) 0.6 - 1.3 MG/DL    BUN/Creatinine ratio 18 12 - 20      GFR est AA 25 (L) >60 ml/min/1.73m2    GFR est non-AA 21 (L) >60 ml/min/1.73m2    Calcium 8.2 (L) 8.5 - 10.1 MG/DL    Bilirubin, total 0.5 0.2 - 1.0 MG/DL    ALT (SGPT) 18 13 - 56 U/L    AST (SGOT) 24 10 - 38 U/L    Alk.  phosphatase 99 45 - 117 U/L    Protein, total 7.6 6.4 - 8.2 g/dL    Albumin 3.5 3.4 - 5.0 g/dL    Globulin 4.1 (H) 2.0 - 4.0 g/dL    A-G Ratio 0.9 0.8 - 1.7     NT-PRO BNP    Collection Time: 04/22/20 11:09 PM   Result Value Ref Range    NT pro-BNP 6,916 (H) 0 - 900 PG/ML   TROPONIN I    Collection Time: 04/22/20 11:09 PM   Result Value Ref Range    Troponin-I, QT 0.03 0.0 - 0.045 NG/ML   MAGNESIUM    Collection Time: 04/22/20 11:09 PM   Result Value Ref Range    Magnesium 2.7 (H) 1.6 - 2.6 mg/dL   URINALYSIS W/ RFLX MICROSCOPIC    Collection Time: 04/22/20 11:09 PM   Result Value Ref Range    Color YELLOW      Appearance CLEAR      Specific gravity 1.014 1.005 - 1.030      pH (UA) 6.0 5.0 - 8.0      Protein 300 (A) NEG mg/dL    Glucose Negative NEG mg/dL    Ketone Negative NEG mg/dL    Bilirubin Negative NEG      Blood TRACE (A) NEG      Urobilinogen 0.2 0.2 - 1.0 EU/dL    Nitrites Negative NEG      Leukocyte Esterase TRACE (A) NEG     URINE MICROSCOPIC ONLY    Collection Time: 04/22/20 11:09 PM   Result Value Ref Range    WBC 5 to 10 0 - 4 /hpf    RBC 0 to 5 0 - 5 /hpf    Bacteria Negative NEG /hpf   CARDIAC PANEL,(CK, CKMB & TROPONIN)    Collection Time: 04/22/20 11:09 PM   Result Value Ref Range    CK 64 26 - 192 U/L    CK - MB 1.3 <3.6 ng/ml    CK-MB Index 2.0 0.0 - 4.0 %    Troponin-I, QT DUPLICATE REQUEST NG/ML   LEGIONELLA PNEUMOPHILA AG, URINE    Collection Time: 04/22/20 11:09 PM   Result Value Ref Range    Legionella Ag, urine Negative NEG     STREP PNEUMO AG, URINE    Collection Time: 04/22/20 11:09 PM   Result Value Ref Range    Strep pneumo Ag, urine Negative NEG     PROCALCITONIN    Collection Time: 04/22/20 11:09 PM   Result Value Ref Range    Procalcitonin <0.05 ng/mL   PROTHROMBIN TIME + INR    Collection Time: 04/22/20 11:09 PM   Result Value Ref Range    Prothrombin time 14.3 11.5 - 15.2 sec    INR 1.1 0.8 - 1.2     C REACTIVE PROTEIN, QT    Collection Time: 04/22/20 11:09 PM   Result Value Ref Range    C-Reactive protein 0.5 (H) 0 - 0.3 mg/dL   LD    Collection Time: 04/22/20 11:09 PM   Result Value Ref Range     (H) 81 - 234 U/L   D DIMER    Collection Time: 04/22/20 11:09 PM   Result Value Ref Range    D DIMER 2.93 (H) <0.46 ug/ml(FEU)   FERRITIN    Collection Time: 04/22/20 11:09 PM   Result Value Ref Range    Ferritin 93 8 - 388 NG/ML   EKG, 12 LEAD, INITIAL    Collection Time: 04/22/20 11:32 PM   Result Value Ref Range    Ventricular Rate 73 BPM    Atrial Rate 73 BPM    P-R Interval 164 ms    QRS Duration 116 ms    Q-T Interval 416 ms    QTC Calculation (Bezet) 458 ms    Calculated P Axis 14 degrees    Calculated R Axis -33 degrees    Calculated T Axis 40 degrees    Diagnosis       Normal sinus rhythm  Left axis deviation  Left ventricular hypertrophy with QRS widening  Abnormal ECG  When compared with ECG of 12-AUG-2008 15:31,  No significant change was found     CULTURE, BLOOD    Collection Time: 04/23/20  1:10 AM   Result Value Ref Range    Special Requests: NO SPECIAL REQUESTS      Culture result: NO GROWTH AFTER 7 HOURS     LACTIC ACID    Collection Time: 04/23/20  1:30 AM   Result Value Ref Range    Lactic acid 1.8 0.4 - 2.0 MMOL/L   METABOLIC PANEL, BASIC    Collection Time: 04/23/20  8:26 AM   Result Value Ref Range    Sodium 138 136 - 145 mmol/L    Potassium 4.0 3.5 - 5.5 mmol/L    Chloride 103 100 - 111 mmol/L    CO2 28 21 - 32 mmol/L    Anion gap 7 3.0 - 18 mmol/L    Glucose 128 (H) 74 - 99 mg/dL    BUN 41 (H) 7.0 - 18 MG/DL    Creatinine 2.15 (H) 0.6 - 1.3 MG/DL    BUN/Creatinine ratio 19 12 - 20      GFR est AA 28 (L) >60 ml/min/1.73m2    GFR est non-AA 23 (L) >60 ml/min/1.73m2    Calcium 8.3 (L) 8.5 - 10.1 MG/DL   C REACTIVE PROTEIN, QT    Collection Time: 04/23/20  8:26 AM   Result Value Ref Range    C-Reactive protein 0.4 (H) 0 - 0.3 mg/dL   HEPATIC FUNCTION PANEL    Collection Time: 04/23/20  8:26 AM   Result Value Ref Range    Protein, total 7.3 6.4 - 8.2 g/dL    Albumin 3.4 3.4 - 5.0 g/dL    Globulin 3.9 2.0 - 4.0 g/dL    A-G Ratio 0.9 0.8 - 1.7      Bilirubin, total 0.6 0.2 - 1.0 MG/DL    Bilirubin, direct 0.1 0.0 - 0.2 MG/DL    Alk. phosphatase 89 45 - 117 U/L    AST (SGOT) 17 10 - 38 U/L    ALT (SGPT) 15 13 - 56 U/L   FERRITIN    Collection Time: 04/23/20  8:26 AM   Result Value Ref Range    Ferritin 93 8 - 388 NG/ML   NT-PRO BNP    Collection Time: 04/23/20  8:26 AM   Result Value Ref Range    NT pro-BNP 14,264 (H) 0 - 900 PG/ML   PHOSPHORUS    Collection Time: 04/23/20  8:26 AM   Result Value Ref Range    Phosphorus 3.5 2.5 - 4.9 MG/DL   MAGNESIUM    Collection Time: 04/23/20  8:26 AM   Result Value Ref Range    Magnesium 2.7 (H) 1.6 - 2.6 mg/dL   TROPONIN I    Collection Time: 04/23/20  8:26 AM   Result Value Ref Range    Troponin-I, QT 0.04 0.0 - 0.045 NG/ML   CBC WITH AUTOMATED DIFF    Collection Time: 04/23/20  8:26 AM   Result Value Ref Range    WBC 9.2 4.6 - 13.2 K/uL    RBC 3.19 (L) 4.20 - 5.30 M/uL    HGB 8.8 (L) 12.0 - 16.0 g/dL    HCT 28.5 (L) 35.0 - 45.0 %    MCV 89.3 74.0 - 97.0 FL    MCH 27.6 24.0 - 34.0 PG    MCHC 30.9 (L) 31.0 - 37.0 g/dL    RDW 14.0 11.6 - 14.5 %    PLATELET 629 428 - 176 K/uL    MPV 10.6 9.2 - 11.8 FL    NEUTROPHILS 75 (H) 40 - 73 %    LYMPHOCYTES 16 (L) 21 - 52 %    MONOCYTES 9 3 - 10 %    EOSINOPHILS 0 0 - 5 %    BASOPHILS 0 0 - 2 %    ABS. NEUTROPHILS 6.9 1.8 - 8.0 K/UL    ABS. LYMPHOCYTES 1.5 0.9 - 3.6 K/UL    ABS. MONOCYTES 0.8 0.05 - 1.2 K/UL    ABS. EOSINOPHILS 0.0 0.0 - 0.4 K/UL    ABS.  BASOPHILS 0.0 0.0 - 0.1 K/UL    DF AUTOMATED         Signed By: MICHAEL Justin     April 23, 2020

## 2020-04-23 NOTE — H&P
History & Physical    Patient: Bernabe Belle MRN: [id number]  [facility]: [id number]    YOB: 1950  Age: 71 y.o. Sex: female      DOA: 4/22/2020       HPI:     Bernabe Belle is a 71 y.o. female with a history of type 2 diabetes without use of insulin, hypertension, hypercholesterolemia, chronic diastolic congestive heart failure, CAD, chronic lower extremity edema, and GERD. Patient has chronic shortness of breath and orthopnea. She is developed increased leg edema over her chronic swelling and has been taking extra Lasix per PCP. On 4/22/2020 the patient reported to [facility] emergency room with complaints of progressive shortness of breath and bilateral leg edema. In the emergency room, patient was afebrile 98, heart rate regular 64 elevated systolic blood pressure 181/10, respiratory rate 19 with a pulse ox of 95% on room air. WBC 10.3, hemoglobin 9.4, hematocrit 30.8, platelets 837 (patient's hemoglobin was 10.4 hematocrit 32.1 on 8/18/2016), sodium 140, potassium 4.6, chloride 104, CO2 27, blood glucose 210, BUN 43, creatinine 2.35, GFR 21, , procalcitonin less than 0.05, troponin 0 0.03, BNP 6916, ferritin 93, CRP 0.5. EKG with sinus rhythm and left ventricular hypertrophy with no acute ischemic changes. Chest x-ray with moderate to severe cardiomegaly and increased interstitial markings bilaterally. Patient received 40 mg of IV Lasix in the emergency room. ER with concern for elevated inflammatory markers and  bilateral interstitial markings on chest film, COVID-19 specimen was sent from the emergency room patient was placed on droplet +  precautions. Reviewed patient's status with Resident at 5 AM.   Patient presents as acute on chronic diastolic congestive heart failure not improved from 40 mg IV Lasix after 5 hours. Patient with persistent systolic hypertension [phone]   Additional therapeutics such as nitroglycerin infusion or paste with an Ace discussed with Resident and Dr Stormy Loja, who was not comfortable with adding nitrates at this time due to variable diastolic and administered 0.26 IV enalapril. Patient accepted for observation to telemetry unit, pending response to parenteral diuretics and nitrates. Past Medical History:   Diagnosis Date    Arthritis     Cancer ([facility] [address]     CHF (congestive heart failure) (HCC)     Diabetes ([facility] [address]     Fracture of neck (HCC)     GERD (gastroesophageal reflux disease)     Goiter     Hiatal hernia     Hypertension     Uterine cancer (HCC)        Past Surgical History:   Procedure Laterality Date    HX APPENDECTOMY      HX HYSTERECTOMY      HX KNEE REPLACEMENT Right     HX ORTHOPAEDIC      odontoid fracture repair with hardware placement.  HX PARTIAL THYROIDECTOMY         No family history on file. Social History     Socioeconomic History    Marital status:      Spouse name: Not on file    Number of children: Not on file    Years of education: Not on file    Highest education level: Not on file   Tobacco Use    Smoking status: Never Smoker   Substance and Sexual Activity    Alcohol use: No    Drug use: No       Prior to Admission medications    Medication Sig Start Date End Date Taking? Authorizing Provider   furosemide (LASIX) 40 mg tablet Take 40 mg by mouth two (2) times a day. Provider, Historical   gabapentin (NEURONTIN) 100 mg capsule Take 100 mg by mouth two (2) times a day. Provider, Historical   lisinopriL (PRINIVIL, ZESTRIL) 5 mg tablet Take 5 mg by mouth daily. Provider, Historical   empagliflozin-metFORMIN (SYNJARDY) 12.5-500 mg per tablet Take 1 Tab by mouth two (2) times daily (with meals). Larry Hall MD   esomeprazole (NEXIUM) 40 mg capsule Take 40 mg by mouth daily. Larry Hall MD   hydrALAZINE (APRESOLINE) 50 mg tablet Take 50 mg by mouth two (2) times a day.     Larry Hall MD   carvedilol (COREG) 25 mg tablet Take 25 mg by mouth two (2) times daily (with meals). Larry Hall MD   citalopram (CELEXA) 20 mg tablet Take 20 mg by mouth daily. Larry Hall MD   cranberry extract (AZO CRANBERRY) 450 mg tab Take 2 Tabs by mouth daily. Larry Hall MD   calcium-cholecalciferol, d3, (CALCIUM 600 + D) 600-125 mg-unit tab Take 1 Tab by mouth two (2) times a day. Larry Hall MD   cholecalciferol (VITAMIN D3) 1,000 unit cap Take 5,000 Units by mouth daily. Larry Hall MD   cyanocobalamin (VITAMIN B-12) 1,000 mcg tablet Take 1,000 mcg by mouth two (2) times a day. Larry Hall MD   Biotin 2,500 mcg cap Take 1 Tab by mouth two (2) times a day. Larry Hall MD   vitamin a-vitamin c-vit e-min (OCUVITE) tablet Take 1 Tab by mouth daily. Larry Hall MD   loratadine (CLARITIN) 10 mg tablet Take 10 mg by mouth daily. Larry Hall MD   B.infantis-B.ani-B.long-B.bifi (PROBIOTIC 4X) 10-15 mg TbEC Take 1 Tab by mouth daily. Larry Hall MD   HYDROcodone-acetaminophen (NORCO) 5-325 mg per tablet Take 1-2 tablets PO every 4-6 hours as needed for pain control. If over the counter ibuprofen or acetaminophen was suggested, then only take the vicodin for pain not well controlled with the over the counter medication.  8/18/16   Magalie Casarez, DO       Allergies   Allergen Reactions    Augmentin [Amoxicillin-Pot Clavulanate] Diarrhea    Clavulanic Acid Diarrhea    Levaquin [Levofloxacin] Other (comments)     Severe hypoglycemia       Review of systems  A 9 point review of systems was performed with pertinent positives as listed in the HPI  Additionally, patient denies fever chills or headache, denies increased cough or sputum  Denies nausea vomiting or diarrhea         Physical Exam:      Visit Vitals  /73   Pulse 62   Temp 98 °F (36.7 °C)   Resp 17   Ht 5' 5\" (1.651 m)   Wt 104.3 kg (230 lb)   SpO2 95%   BMI 38.27 kg/m²     Patient seen and examined in room 16 in the emergency room  Physical Exam:  Patient sitting up in bedside chair on arrival  Reporting orthopnea and increased arthritic pains when lays flat  GENERAL: fatigued, cooperative, mild distress  HEENT head atraumatic, pallor, facial symmetry, speech clear and goal-directed, no shortness of breath on conversation, conjunctiva clear, anicteric, no oral lesions, no JVD, no cervical adenopathy, cicatrix anterior lower neck-patient states partial thyroidectomy for goiter in the past; cicatrix posterior C-spine-patient states prior cervical fracture stabilization. Chest good airflow anteriorly, bilaterally, no audible wheezes, no rhonchi, fine bibasilar crackles  Heart rate 62, regular, no murmur detected  No supraclavicular adenopathy, no chest wall tenderness  Abdomen patient declines getting back up onto the litter from for evaluation of abdomen, limited evaluation demonstrates abdomen to be soft, bowel sounds positive, no tenderness elicited  Extremities patient moving all 4 extremities well,  Pitting edema ankles to knees, calves are nontender without areas of erythema or increased warmth, no cords detected, feet warm, no cyanosis or erythema, positive pulses    Lab/Data Review:  Labs: Results:       Chemistry Recent Labs     04/22/20 2309   *      K 4.6      CO2 27   BUN 43*   CREA 2.35*   CA 8.2*   AGAP 9   BUCR 18   AP 99   TP 7.6   ALB 3.5   GLOB 4.1*   AGRAT 0.9      CBC w/Diff Recent Labs     04/22/20 2309   WBC 10.3   RBC 3.40*   HGB 9.4*   HCT 30.8*      GRANS 88*   LYMPH 7*   EOS 0      Coagulation Recent Labs     04/22/20 2309   PTP 14.3   INR 1.1       Iron/Ferritin No results for input(s): IRON in the last 72 hours. No lab exists for component: TIBCCALC   BNP No results for input(s): BNPP in the last 72 hours.    Cardiac Enzymes Recent Labs     04/22/20 2309   CPK 64   CKND1 2.0      Liver Enzymes Recent Labs     04/22/20 2309   TP 7.6   ALB 3.5   AP 99   SGOT 24      Thyroid Studies No results found for: T4, T3U, TSH, TSHEXT       All Micro Results     Procedure Component Value Units Date/Time    CULTURE, BLOOD [[id number]] Collected:  04/23/20 0110    Order Status:  Completed Specimen:  Blood Updated:  04/23/20 0658     Special Requests: NO SPECIAL REQUESTS        Culture result: NO GROWTH AFTER 5 HOURS       RESPIRATORY PANEL,PCR,NASOPHARYNGEAL [[id number]] Collected:  04/23/20 0151    Order Status:  Completed Specimen:  NASOPHARYNGEAL SWAB Updated:  04/23/20 0158    EMERGENT DISEASE PANEL [[id number]] Collected:  04/23/20 0130    Order Status:  Completed Updated:  04/23/20 0141    STREP PNEUMO AG, URINE [[id number]] Collected:  04/22/20 2309    Order Status:  Completed Specimen:  Urine, random Updated:  04/23/20 0115     Strep pneumo Ag, urine Negative       LEGIONELLA PNEUMOPHILA AG, URINE [[id number]] Collected:  04/22/20 2309    Order Status:  Completed Specimen:  Urine, random Updated:  04/23/20 0115     Legionella Ag, urine Negative             Imaging Reviewed:  Status: Final result (Exam End: 4/22/2020 23:31) Provider Status: Open   Study Result        Examination: Portable AP chest      History: Shortness of breath     Comparison: August 12, 2008     Findings: Increased interstitial markings bilaterally. No significant pleural  effusion or pneumothorax. Progressive worsening cardiomegaly which is moderate  to severe in degree. Atherosclerosis. Partially C posterior fusion of the  cervical spine.     IMPRESSION  Impression:  1. Moderate diffuse increased interstitial markings bilaterally may represent  moderate interstitial edema given the significant cardiomegaly which has  progressed from prior comparison. Infection or fibrosis is within the  differential but not favored.      EKG 4/22/2020  Normal sinus rhythm   Left axis deviation   Left ventricular hypertrophy with QRS widening   Abnormal ECG   When compared with ECG of 12-AUG-2008 15:31,   No significant change was found       Assessment:   Principal Problem:    Acute on chronic diastolic congestive heart failure ([facility] [address] (4/23/2020)    Active Problems:    Suspected Covid-19 Virus Infection (4/23/2020)      Type 2 diabetes mellitus without complication, without long-term current use of insulin ([facility] [address] (7/6/2018)      HTN (hypertension), benign (7/6/2018)      Overview: Last Assessment & Plan:       Normotensive today in office      Bilateral lower extremity edema (7/6/2018)       Azotemia    Plan:   Place in observation in telemetry Covid 19 Unit on droplet plus precautions pending results  Daily weight  Hold Vasotec secondary to azotemia  Bumex 1 mg IV twice daily  Continue home Coreg and hydralazine  Cardiology was consulted from the emergency room  Add nitroglycerin if okay with cardiology  POC glucose with coverage    Advanced directives were discussed with the patient who wants full aggressive measures  Full code    Doron Saab DO  4/23/2020, 7:08 AM

## 2020-04-23 NOTE — ED PROVIDER NOTES
EMERGENCY DEPARTMENT HISTORY AND PHYSICAL EXAM    11:22 PM      Date: 4/22/2020  Patient Name: Clarence Jimenez    History of Presenting Illness     Chief Complaint   Patient presents with    Shortness of Breath       History Provided By: Patient  Location/Duration/Severity/Modifying factors   Ms Jc Alarcon has a history of HTN, CHF, T2DM, CKD and acid reflux. She comes in to the ED after about 1 week of worsening SOB on top of her baseline SOB. She says over the last week, she has had SOB even when walking around inside her house. It has worsened gradually and she denies any associated chest or back pain. She called her primary care doctor today who told her to take an extra lasix and since then she has had increased urinary frequency. She does report worsening leg swelling recently and denies any leg pain or skin changes. She denies fevers but says she maybe had some chills last night. She reports medication compliance with her diuretics and antihypertensives. She has had a mild cough over the past few months that has worsened but she attributed that to seasonal allergies as she was also having worsening rhinorrhea over that time. She has been to [address] [facility] recently but tried to keep a good distance from everyone while there. PCP: Jorge Lang MD    Current Facility-Administered Medications   Medication Dose Route Frequency Provider Last Rate Last Dose    azithromycin (ZITHROMAX) 500 mg in  mL  500 mg IntraVENous Q24H Pilar Perry MD   500 mg at 04/23/20 0134     Current Outpatient Medications   Medication Sig Dispense Refill    empagliflozin-metFORMIN (SYNJARDY) 12.5-500 mg per tablet Take 1 Tab by mouth two (2) times daily (with meals).  esomeprazole (NEXIUM) 40 mg capsule Take 40 mg by mouth daily.  hydrALAZINE (APRESOLINE) 50 mg tablet Take 50 mg by mouth two (2) times a day.  carvedilol (COREG) 25 mg tablet Take 25 mg by mouth two (2) times daily (with meals).       furosemide (LASIX) 40 mg tablet Take 40 mg by mouth daily.  citalopram (CELEXA) 20 mg tablet Take 20 mg by mouth daily.  magnesium oxide (MAG-OX) 400 mg tablet Take 800 mg by mouth daily.  cranberry extract (AZO CRANBERRY) 450 mg tab Take 2 Tabs by mouth daily.  calcium-cholecalciferol, d3, (CALCIUM 600 + D) 600-125 mg-unit tab Take 1 Tab by mouth two (2) times a day.  cholecalciferol (VITAMIN D3) 1,000 unit cap Take 5,000 Units by mouth daily.  cyanocobalamin (VITAMIN B-12) 1,000 mcg tablet Take 1,000 mcg by mouth two (2) times a day.  Biotin 2,500 mcg cap Take 1 Tab by mouth two (2) times a day.  vitamin a-vitamin c-vit e-min (OCUVITE) tablet Take 1 Tab by mouth daily.  loratadine (CLARITIN) 10 mg tablet Take 10 mg by mouth daily.  B.infantis-B.ani-B.long-B.bifi (PROBIOTIC 4X) 10-15 mg TbEC Take 1 Tab by mouth daily.  GLUCOSAMINE HCL/CHONDR WILSON A NA (OSTEO BI-FLEX PO) Take 1 Tab by mouth two (2) times a day.  HYDROcodone-acetaminophen (NORCO) 5-325 mg per tablet Take 1-2 tablets PO every 4-6 hours as needed for pain control. If over the counter ibuprofen or acetaminophen was suggested, then only take the vicodin for pain not well controlled with the over the counter medication. 10 Tab 0       Past History     Past Medical History:  Past Medical History:   Diagnosis Date    Arthritis     [facility] [address]     CHF (congestive heart failure) (HCC)     Diabetes ([facility] [address] [facility]     Fracture of neck (HCC)     GERD (gastroesophageal reflux disease)     Goiter     Hiatal hernia     Hypertension     Uterine cancer (HCC)        Past Surgical History:  Past Surgical History:   Procedure Laterality Date    HX APPENDECTOMY      HX HYSTERECTOMY      HX KNEE REPLACEMENT Right     HX ORTHOPAEDIC      odontoid fracture repair with hardware placement.  HX PARTIAL THYROIDECTOMY         Family History:  No family history on file.     Social History:  Social History Tobacco Use    Smoking status: Never Smoker   Substance Use Topics    Alcohol use: No    Drug use: No       Allergies: Allergies   Allergen Reactions    Augmentin [Amoxicillin-Pot Clavulanate] Diarrhea    Levaquin [Levofloxacin] Other (comments)     Severe hypoglycemia           Review of Systems     Review of Systems   Constitutional: Positive for chills. Negative for fever. HENT: Positive for rhinorrhea. Negative for sinus pain and sore throat. Eyes: Positive for redness. Negative for visual disturbance. Respiratory: Positive for cough and shortness of breath. Cardiovascular: Positive for leg swelling. Negative for chest pain. Gastrointestinal: Negative for abdominal distention, abdominal pain, constipation and diarrhea. Endocrine: Positive for polyuria. Negative for polydipsia. Musculoskeletal: Negative for joint swelling and myalgias. Skin: Negative for color change and rash. Allergic/Immunologic: Positive for environmental allergies. Neurological: Negative for dizziness and headaches. Physical Exam     Visit Vitals  /78   Pulse 64   Temp 98 °F (36.7 °C)   Resp 19   Ht 5' 5\" (1.651 m)   Wt 104.3 kg (230 lb)   SpO2 95%   BMI 38.27 kg/m²     Physical Exam  Constitutional:       General: She is not in acute distress. Appearance: She is obese. Cardiovascular:      Rate and Rhythm: Normal rate and regular rhythm. Pulmonary:      Comments: Slightly decreased aeration bilateral bases, faint crackles left lower lung field. No increased respiratory effort  Abdominal:      Palpations: Abdomen is soft. Tenderness: There is no abdominal tenderness. Musculoskeletal:      Right lower leg: She exhibits no tenderness. Edema present. Left lower leg: She exhibits no tenderness. Edema present. Skin:     Coloration: Skin is pale. Findings: No ecchymosis or erythema. Neurological:      Mental Status: She is alert.    Psychiatric:         Mood and Affect: Mood normal.         Behavior: Behavior normal.           Diagnostic Study Results     Labs -  Recent Results (from the past 12 hour(s))   CBC WITH AUTOMATED DIFF    Collection Time: 04/22/20 11:09 PM   Result Value Ref Range    WBC 10.3 4.6 - 13.2 K/uL    RBC 3.40 (L) 4.20 - 5.30 M/uL    HGB 9.4 (L) 12.0 - 16.0 g/dL    HCT 30.8 (L) 35.0 - 45.0 %    MCV 90.6 74.0 - 97.0 FL    MCH 27.6 24.0 - 34.0 PG    MCHC 30.5 (L) 31.0 - 37.0 g/dL    RDW 14.3 11.6 - 14.5 %    PLATELET 563 550 - 018 K/uL    MPV 10.8 9.2 - 11.8 FL    NEUTROPHILS 88 (H) 40 - 73 %    LYMPHOCYTES 7 (L) 21 - 52 %    MONOCYTES 5 3 - 10 %    EOSINOPHILS 0 0 - 5 %    BASOPHILS 0 0 - 2 %    ABS. NEUTROPHILS 9.1 (H) 1.8 - 8.0 K/UL    ABS. LYMPHOCYTES 0.7 (L) 0.9 - 3.6 K/UL    ABS. MONOCYTES 0.5 0.05 - 1.2 K/UL    ABS. EOSINOPHILS 0.0 0.0 - 0.4 K/UL    ABS. BASOPHILS 0.0 0.0 - 0.1 K/UL    DF AUTOMATED     METABOLIC PANEL, COMPREHENSIVE    Collection Time: 04/22/20 11:09 PM   Result Value Ref Range    Sodium 140 136 - 145 mmol/L    Potassium 4.6 3.5 - 5.5 mmol/L    Chloride 104 100 - 111 mmol/L    CO2 27 21 - 32 mmol/L    Anion gap 9 3.0 - 18 mmol/L    Glucose 210 (H) 74 - 99 mg/dL    BUN 43 (H) 7.0 - 18 MG/DL    Creatinine 2.35 (H) 0.6 - 1.3 MG/DL    BUN/Creatinine ratio 18 12 - 20      GFR est AA 25 (L) >60 ml/min/1.73m2    GFR est non-AA 21 (L) >60 ml/min/1.73m2    Calcium 8.2 (L) 8.5 - 10.1 MG/DL    Bilirubin, total 0.5 0.2 - 1.0 MG/DL    ALT (SGPT) 18 13 - 56 U/L    AST (SGOT) 24 10 - 38 U/L    Alk.  phosphatase 99 45 - 117 U/L    Protein, total 7.6 6.4 - 8.2 g/dL    Albumin 3.5 3.4 - 5.0 g/dL    Globulin 4.1 (H) 2.0 - 4.0 g/dL    A-G Ratio 0.9 0.8 - 1.7     NT-PRO BNP    Collection Time: 04/22/20 11:09 PM   Result Value Ref Range    NT pro-BNP 6,916 (H) 0 - 900 PG/ML   TROPONIN I    Collection Time: 04/22/20 11:09 PM   Result Value Ref Range    Troponin-I, QT 0.03 0.0 - 0.045 NG/ML   MAGNESIUM    Collection Time: 04/22/20 11:09 PM   Result Value Ref Range Magnesium 2.7 (H) 1.6 - 2.6 mg/dL   URINALYSIS W/ RFLX MICROSCOPIC    Collection Time: 04/22/20 11:09 PM   Result Value Ref Range    Color YELLOW      Appearance CLEAR      Specific gravity 1.014 1.005 - 1.030      pH (UA) 6.0 5.0 - 8.0      Protein 300 (A) NEG mg/dL    Glucose Negative NEG mg/dL    Ketone Negative NEG mg/dL    Bilirubin Negative NEG      Blood TRACE (A) NEG      Urobilinogen 0.2 0.2 - 1.0 EU/dL    Nitrites Negative NEG      Leukocyte Esterase TRACE (A) NEG     URINE MICROSCOPIC ONLY    Collection Time: 04/22/20 11:09 PM   Result Value Ref Range    WBC 5 to 10 0 - 4 /hpf    RBC 0 to 5 0 - 5 /hpf    Bacteria Negative NEG /hpf   CARDIAC PANEL,(CK, CKMB & TROPONIN)    Collection Time: 04/22/20 11:09 PM   Result Value Ref Range    CK 64 26 - 192 U/L    CK - MB 1.3 <3.6 ng/ml    CK-MB Index 2.0 0.0 - 4.0 %    Troponin-I, QT DUPLICATE REQUEST NG/ML   LEGIONELLA PNEUMOPHILA AG, URINE    Collection Time: 04/22/20 11:09 PM   Result Value Ref Range    Legionella Ag, urine Negative NEG     STREP PNEUMO AG, URINE    Collection Time: 04/22/20 11:09 PM   Result Value Ref Range    Strep pneumo Ag, urine Negative NEG     PROCALCITONIN    Collection Time: 04/22/20 11:09 PM   Result Value Ref Range    Procalcitonin <0.05 ng/mL   PROTHROMBIN TIME + INR    Collection Time: 04/22/20 11:09 PM   Result Value Ref Range    Prothrombin time 14.3 11.5 - 15.2 sec    INR 1.1 0.8 - 1.2     C REACTIVE PROTEIN, QT    Collection Time: 04/22/20 11:09 PM   Result Value Ref Range    C-Reactive protein 0.5 (H) 0 - 0.3 mg/dL   LD    Collection Time: 04/22/20 11:09 PM   Result Value Ref Range     (H) 81 - 234 U/L   D DIMER    Collection Time: 04/22/20 11:09 PM   Result Value Ref Range    D DIMER 2.93 (H) <0.46 ug/ml(FEU)   FERRITIN    Collection Time: 04/22/20 11:09 PM   Result Value Ref Range    Ferritin 93 8 - 388 NG/ML   EKG, 12 LEAD, INITIAL    Collection Time: 04/22/20 11:32 PM   Result Value Ref Range    Ventricular Rate 73 BPM    Atrial Rate 73 BPM    P-R Interval 164 ms    QRS Duration 116 ms    Q-T Interval 416 ms    QTC Calculation (Bezet) 458 ms    Calculated P Axis 14 degrees    Calculated R Axis -33 degrees    Calculated T Axis 40 degrees    Diagnosis       Normal sinus rhythm  Left axis deviation  Left ventricular hypertrophy with QRS widening  Abnormal ECG  When compared with ECG of 12-AUG-2008 15:31,  No significant change was found     LACTIC ACID    Collection Time: 04/23/20  1:30 AM   Result Value Ref Range    Lactic acid 1.8 0.4 - 2.0 MMOL/L       Radiologic Studies -   XR CHEST PORT    (Results Pending)   XR CHEST PORT    (Results Pending)         Medical Decision Making   I am the first provider for this patient. I reviewed the vital signs, available nursing notes, past medical history, past surgical history, family history and social history. Vital Signs-Reviewed the patient's vital signs. EKG: LV hypertrophy present on 2013 EKG    Records Reviewed: Nursing Notes (Time of Review: 11:22 PM)    ED Course: Progress Notes, Reevaluation, and Consults:  Came in with worsening of chronic SOB with exertion  BP went from ~350 systolic on admission to ~236 systolic  Pt was given home hydralazine and carvedilol  Pt was given 40 IV lasix  CXR showed diffuse bilateral opacity, Called [facility] liaison, ordered COVID testing and orders  Pt often found sitting up to help her breathing  Paged hospitalist for admission   Discussed case with Dr. Sarah George who accepted pt for observation     Provider Notes (Medical Decision Making):   MDM  Number of Diagnoses or Management Options  Diagnosis management comments: Likely CHF exacerbation, likely 2/2 poor BP control with BPs here close to 329 systolic. 1 week worsening SOB and leg swelling with hx of heart failure on lasix daily. Elevated BNP ~7,000, CXR showing diffuse bilateral opacity, afebrile, No CP, faint crackles on exam. Pt breathing better when sitting up.      Viral/bacterial pneumonia possible with bilateral opacity on chest XR, 88% neutrophils and vauge history of chills x1, less likely with no fevers, chonicity of cough. Will order COVID testing given cough, SOB and opacity on CXR    ACS possible given hx of HTN and CHF and worsening heart failure, less likely with no acute findings on EKG and negative cardiac panel x1. Low risk of PE given chronicity of symptoms, good oxygenation, no pain and no asymmetric extremity swelling. indicative of DVT. Procedures    Critical Care Time:       Diagnosis     Clinical Impression:   1. Acute on chronic congestive heart failure, unspecified heart failure type ([facility] [address]    2. Suspected Covid-19 Virus Infection        Disposition: Admit for observation    Follow-up Information    None          Patient's Medications   Start Taking    No medications on file   Continue Taking    B. INFANTIS-B. ANI-B. LONG-B.BIFI (PROBIOTIC 4X) 10-15 MG TBEC    Take 1 Tab by mouth daily. BIOTIN 2,500 MCG CAP    Take 1 Tab by mouth two (2) times a day. CALCIUM-CHOLECALCIFEROL, D3, (CALCIUM 600 + D) 600-125 MG-UNIT TAB    Take 1 Tab by mouth two (2) times a day. CARVEDILOL (COREG) 25 MG TABLET    Take 25 mg by mouth two (2) times daily (with meals). CHOLECALCIFEROL (VITAMIN D3) 1,000 UNIT CAP    Take 5,000 Units by mouth daily. CITALOPRAM (CELEXA) 20 MG TABLET    Take 20 mg by mouth daily. CRANBERRY EXTRACT (AZO CRANBERRY) 450 MG TAB    Take 2 Tabs by mouth daily. CYANOCOBALAMIN (VITAMIN B-12) 1,000 MCG TABLET    Take 1,000 mcg by mouth two (2) times a day. EMPAGLIFLOZIN-METFORMIN (SYNJARDY) 12.5-500 MG PER TABLET    Take 1 Tab by mouth two (2) times daily (with meals). ESOMEPRAZOLE (NEXIUM) 40 MG CAPSULE    Take 40 mg by mouth daily. FUROSEMIDE (LASIX) 40 MG TABLET    Take 40 mg by mouth daily. GLUCOSAMINE HCL/CHONDR WILSON A NA (OSTEO BI-FLEX PO)    Take 1 Tab by mouth two (2) times a day.     HYDRALAZINE (APRESOLINE) 50 MG TABLET    Take 50 mg by mouth two (2) times a day. HYDROCODONE-ACETAMINOPHEN (NORCO) 5-325 MG PER TABLET    Take 1-2 tablets PO every 4-6 hours as needed for pain control. If over the counter ibuprofen or acetaminophen was suggested, then only take the vicodin for pain not well controlled with the over the counter medication. LORATADINE (CLARITIN) 10 MG TABLET    Take 10 mg by mouth daily. MAGNESIUM OXIDE (MAG-OX) 400 MG TABLET    Take 800 mg by mouth daily. VITAMIN A-VITAMIN C-VIT E-MIN (OCUVITE) TABLET    Take 1 Tab by mouth daily. These Medications have changed    No medications on file   Stop Taking    No medications on file     Sia Manuel MD, PGY1  [address] [facility] Emergency Department        I have reviewed the chart and agree with the documentation recorded by the resident, including the assessment, treatment plan, and disposition. I performed a history and physical examination of the patient and discussed the management with the resident.        Jf Barrios MD

## 2020-04-23 NOTE — ROUTINE PROCESS
Bedside shift change report given to Betzy Long RN (oncoming nurse) by Tripp Bustamante RN (offgoing nurse). Report included the following information SBAR, Kardex, Intake/Output and Recent Results.

## 2020-04-23 NOTE — PROGRESS NOTES
Observation notice provided in writing to patient and/or caregiver as well as verbal explanation of the policy. Patients who are in outpatient status also receive the Observation notice. Pt is on droplet precautions so no face to face allowed. Copies of obs forms given to ER staff to bring in to patients room. Forms discussed via phone with patient.   Rudi Vital RN - Outcomes Manager  [phone]

## 2020-04-23 NOTE — PROGRESS NOTES
conducted an initial consultation and Spiritual Assessment for Rosalind Lockwood, who is a 71 y.o.,female. Patient's Primary Language is: [place]. According to the patient's EMR Shinto Affiliation is: Baldev [place].     The reason the Patient came to the hospital is:   Patient Active Problem List    Diagnosis Date Noted    Acute on chronic diastolic congestive heart failure ([facility] [address] 04/23/2020    Suspected Covid-19 Virus Infection 04/23/2020    Type 2 diabetes mellitus without complication, without long-term current use of insulin ([facility] [address] 07/06/2018    Left anterior fascicular block 07/06/2018    HTN (hypertension), benign 07/06/2018    Coronary artery disease involving native coronary artery of native heart without angina pectoris 07/06/2018    Chronic diastolic congestive heart failure ([facility] [address] 07/06/2018    Bilateral lower extremity edema 07/06/2018    BMI 35.0-35.9,adult 07/06/2018        The  provided the following Interventions:   was unable to visit with patient due to COVID-19 (rule out).  prayed outside of patient's room. Plan:  Chaplains will continue to follow and will provide pastoral care on an as needed/requested basis.  recommends bedside caregivers page  on duty if patient shows signs of acute spiritual or emotional distress.     [id number]   [phone]

## 2020-04-24 LAB
ALBUMIN SERPL-MCNC: 3.1 G/DL (ref 3.4–5)
ALBUMIN/GLOB SERPL: 0.9 {RATIO} (ref 0.8–1.7)
ALP SERPL-CCNC: 73 U/L (ref 45–117)
ALT SERPL-CCNC: 13 U/L (ref 13–56)
ANION GAP SERPL CALC-SCNC: 8 MMOL/L (ref 3–18)
AST SERPL-CCNC: 18 U/L (ref 10–38)
ATRIAL RATE: 70 BPM
ATRIAL RATE: 73 BPM
BASOPHILS # BLD: 0 K/UL (ref 0–0.1)
BASOPHILS NFR BLD: 0 % (ref 0–2)
BILIRUB DIRECT SERPL-MCNC: 0.1 MG/DL (ref 0–0.2)
BILIRUB SERPL-MCNC: 0.6 MG/DL (ref 0.2–1)
BNP SERPL-MCNC: ABNORMAL PG/ML (ref 0–900)
BUN SERPL-MCNC: 49 MG/DL (ref 7–18)
BUN/CREAT SERPL: 19 (ref 12–20)
CALCIUM SERPL-MCNC: 8 MG/DL (ref 8.5–10.1)
CALCULATED P AXIS, ECG09: -12 DEGREES
CALCULATED P AXIS, ECG09: 14 DEGREES
CALCULATED R AXIS, ECG10: -30 DEGREES
CALCULATED R AXIS, ECG10: -33 DEGREES
CALCULATED T AXIS, ECG11: 20 DEGREES
CALCULATED T AXIS, ECG11: 40 DEGREES
CHLORIDE SERPL-SCNC: 102 MMOL/L (ref 100–111)
CHOLEST SERPL-MCNC: 182 MG/DL
CK SERPL-CCNC: 58 U/L (ref 26–192)
CO2 SERPL-SCNC: 26 MMOL/L (ref 21–32)
CREAT SERPL-MCNC: 2.54 MG/DL (ref 0.6–1.3)
CRP SERPL-MCNC: 0.5 MG/DL (ref 0–0.3)
D DIMER PPP FEU-MCNC: 2.16 UG/ML(FEU)
DIAGNOSIS, 93000: NORMAL
DIAGNOSIS, 93000: NORMAL
DIFFERENTIAL METHOD BLD: ABNORMAL
EOSINOPHIL # BLD: 0 K/UL (ref 0–0.4)
EOSINOPHIL NFR BLD: 0 % (ref 0–5)
ERYTHROCYTE [DISTWIDTH] IN BLOOD BY AUTOMATED COUNT: 14.3 % (ref 11.6–14.5)
FERRITIN SERPL-MCNC: 87 NG/ML (ref 8–388)
GLOBULIN SER CALC-MCNC: 3.5 G/DL (ref 2–4)
GLUCOSE BLD STRIP.AUTO-MCNC: 131 MG/DL (ref 70–110)
GLUCOSE BLD STRIP.AUTO-MCNC: 185 MG/DL (ref 70–110)
GLUCOSE BLD STRIP.AUTO-MCNC: 202 MG/DL (ref 70–110)
GLUCOSE BLD STRIP.AUTO-MCNC: 237 MG/DL (ref 70–110)
GLUCOSE SERPL-MCNC: 118 MG/DL (ref 74–99)
HCT VFR BLD AUTO: 25.6 % (ref 35–45)
HDLC SERPL-MCNC: 41 MG/DL (ref 40–60)
HDLC SERPL: 4.4 {RATIO} (ref 0–5)
HGB BLD-MCNC: 7.9 G/DL (ref 12–16)
LDH SERPL L TO P-CCNC: 297 U/L (ref 81–234)
LDLC SERPL CALC-MCNC: 112.8 MG/DL (ref 0–100)
LIPID PROFILE,FLP: ABNORMAL
LYMPHOCYTES # BLD: 1.3 K/UL (ref 0.9–3.6)
LYMPHOCYTES NFR BLD: 17 % (ref 21–52)
MAGNESIUM SERPL-MCNC: 2.6 MG/DL (ref 1.6–2.6)
MCH RBC QN AUTO: 27.7 PG (ref 24–34)
MCHC RBC AUTO-ENTMCNC: 30.9 G/DL (ref 31–37)
MCV RBC AUTO: 89.8 FL (ref 74–97)
MONOCYTES # BLD: 0.6 K/UL (ref 0.05–1.2)
MONOCYTES NFR BLD: 8 % (ref 3–10)
NEUTS SEG # BLD: 5.7 K/UL (ref 1.8–8)
NEUTS SEG NFR BLD: 75 % (ref 40–73)
P-R INTERVAL, ECG05: 146 MS
P-R INTERVAL, ECG05: 164 MS
PLATELET # BLD AUTO: 189 K/UL (ref 135–420)
PMV BLD AUTO: 11.5 FL (ref 9.2–11.8)
POTASSIUM SERPL-SCNC: 4 MMOL/L (ref 3.5–5.5)
PROCALCITONIN SERPL-MCNC: <0.05 NG/ML
PROT SERPL-MCNC: 6.6 G/DL (ref 6.4–8.2)
Q-T INTERVAL, ECG07: 416 MS
Q-T INTERVAL, ECG07: 422 MS
QRS DURATION, ECG06: 114 MS
QRS DURATION, ECG06: 116 MS
QTC CALCULATION (BEZET), ECG08: 455 MS
QTC CALCULATION (BEZET), ECG08: 458 MS
RBC # BLD AUTO: 2.85 M/UL (ref 4.2–5.3)
SODIUM SERPL-SCNC: 136 MMOL/L (ref 136–145)
TRIGL SERPL-MCNC: 141 MG/DL (ref ?–150)
TROPONIN I SERPL-MCNC: <0.02 NG/ML (ref 0–0.04)
VENTRICULAR RATE, ECG03: 70 BPM
VENTRICULAR RATE, ECG03: 73 BPM
VLDLC SERPL CALC-MCNC: 28.2 MG/DL
WBC # BLD AUTO: 7.7 K/UL (ref 4.6–13.2)

## 2020-04-24 PROCEDURE — 74011636637 HC RX REV CODE- 636/637: Performed by: EMERGENCY MEDICINE

## 2020-04-24 PROCEDURE — 74011250636 HC RX REV CODE- 250/636: Performed by: HOSPITALIST

## 2020-04-24 PROCEDURE — 82550 ASSAY OF CK (CPK): CPT

## 2020-04-24 PROCEDURE — 83735 ASSAY OF MAGNESIUM: CPT

## 2020-04-24 PROCEDURE — 86140 C-REACTIVE PROTEIN: CPT

## 2020-04-24 PROCEDURE — 80076 HEPATIC FUNCTION PANEL: CPT

## 2020-04-24 PROCEDURE — 83880 ASSAY OF NATRIURETIC PEPTIDE: CPT

## 2020-04-24 PROCEDURE — 36415 COLL VENOUS BLD VENIPUNCTURE: CPT

## 2020-04-24 PROCEDURE — 74011250637 HC RX REV CODE- 250/637

## 2020-04-24 PROCEDURE — 74011636637 HC RX REV CODE- 636/637: Performed by: PHYSICIAN ASSISTANT

## 2020-04-24 PROCEDURE — 96372 THER/PROPH/DIAG INJ SC/IM: CPT

## 2020-04-24 PROCEDURE — 74011250637 HC RX REV CODE- 250/637: Performed by: PHYSICIAN ASSISTANT

## 2020-04-24 PROCEDURE — 80061 LIPID PANEL: CPT

## 2020-04-24 PROCEDURE — 84145 PROCALCITONIN (PCT): CPT

## 2020-04-24 PROCEDURE — 82728 ASSAY OF FERRITIN: CPT

## 2020-04-24 PROCEDURE — 99218 HC RM OBSERVATION: CPT

## 2020-04-24 PROCEDURE — 83615 LACTATE (LD) (LDH) ENZYME: CPT

## 2020-04-24 PROCEDURE — 74011250637 HC RX REV CODE- 250/637: Performed by: HOSPITALIST

## 2020-04-24 PROCEDURE — 85025 COMPLETE CBC W/AUTO DIFF WBC: CPT

## 2020-04-24 PROCEDURE — 85379 FIBRIN DEGRADATION QUANT: CPT

## 2020-04-24 PROCEDURE — 82962 GLUCOSE BLOOD TEST: CPT

## 2020-04-24 PROCEDURE — 74011250637 HC RX REV CODE- 250/637: Performed by: EMERGENCY MEDICINE

## 2020-04-24 PROCEDURE — 96376 TX/PRO/DX INJ SAME DRUG ADON: CPT

## 2020-04-24 PROCEDURE — 74011250637 HC RX REV CODE- 250/637: Performed by: INTERNAL MEDICINE

## 2020-04-24 PROCEDURE — 80048 BASIC METABOLIC PNL TOTAL CA: CPT

## 2020-04-24 PROCEDURE — 74011000250 HC RX REV CODE- 250: Performed by: HOSPITALIST

## 2020-04-24 RX ORDER — GUAIFENESIN 100 MG/5ML
LIQUID (ML) ORAL
Status: COMPLETED
Start: 2020-04-24 | End: 2020-04-24

## 2020-04-24 RX ORDER — ROSUVASTATIN CALCIUM 10 MG/1
5 TABLET, COATED ORAL
Status: DISCONTINUED | OUTPATIENT
Start: 2020-04-24 | End: 2020-04-28 | Stop reason: HOSPADM

## 2020-04-24 RX ORDER — GUAIFENESIN 100 MG/5ML
81 LIQUID (ML) ORAL DAILY
Status: DISCONTINUED | OUTPATIENT
Start: 2020-04-24 | End: 2020-04-28 | Stop reason: HOSPADM

## 2020-04-24 RX ORDER — ZINC SULFATE 50(220)MG
1 CAPSULE ORAL DAILY
Status: DISCONTINUED | OUTPATIENT
Start: 2020-04-25 | End: 2020-04-24

## 2020-04-24 RX ORDER — ASPIRIN 325 MG
TABLET ORAL
Status: DISPENSED
Start: 2020-04-24 | End: 2020-04-25

## 2020-04-24 RX ORDER — ZINC SULFATE 50(220)MG
1 CAPSULE ORAL DAILY
Status: DISCONTINUED | OUTPATIENT
Start: 2020-04-25 | End: 2020-04-26

## 2020-04-24 RX ORDER — CYANOCOBALAMIN (VITAMIN B-12) 500 MCG
2000 TABLET ORAL DAILY
Status: DISCONTINUED | OUTPATIENT
Start: 2020-04-25 | End: 2020-04-28 | Stop reason: HOSPADM

## 2020-04-24 RX ORDER — INSULIN GLARGINE 100 [IU]/ML
4 INJECTION, SOLUTION SUBCUTANEOUS
Status: DISCONTINUED | OUTPATIENT
Start: 2020-04-24 | End: 2020-04-28 | Stop reason: HOSPADM

## 2020-04-24 RX ORDER — ISOSORBIDE MONONITRATE 30 MG/1
30 TABLET, EXTENDED RELEASE ORAL DAILY
Status: DISCONTINUED | OUTPATIENT
Start: 2020-04-24 | End: 2020-04-28 | Stop reason: HOSPADM

## 2020-04-24 RX ADMIN — ROSUVASTATIN CALCIUM 5 MG: 10 TABLET, COATED ORAL at 22:04

## 2020-04-24 RX ADMIN — CARVEDILOL 25 MG: 25 TABLET, FILM COATED ORAL at 09:08

## 2020-04-24 RX ADMIN — PANTOPRAZOLE SODIUM 40 MG: 40 TABLET, DELAYED RELEASE ORAL at 09:08

## 2020-04-24 RX ADMIN — ACETAMINOPHEN 650 MG: 325 TABLET ORAL at 01:54

## 2020-04-24 RX ADMIN — Medication 10 ML: at 22:05

## 2020-04-24 RX ADMIN — HEPARIN SODIUM 5000 UNITS: 5000 INJECTION INTRAVENOUS; SUBCUTANEOUS at 17:06

## 2020-04-24 RX ADMIN — Medication 500 MG: at 22:04

## 2020-04-24 RX ADMIN — CITALOPRAM HYDROBROMIDE 20 MG: 20 TABLET ORAL at 09:08

## 2020-04-24 RX ADMIN — ACETAMINOPHEN 650 MG: 325 TABLET ORAL at 22:38

## 2020-04-24 RX ADMIN — MULTIPLE VITAMINS W/ MINERALS TAB 1 TABLET: TAB at 09:08

## 2020-04-24 RX ADMIN — BUMETANIDE 1 MG: 0.25 INJECTION INTRAMUSCULAR; INTRAVENOUS at 17:12

## 2020-04-24 RX ADMIN — AZITHROMYCIN MONOHYDRATE 500 MG: 500 INJECTION, POWDER, LYOPHILIZED, FOR SOLUTION INTRAVENOUS at 01:18

## 2020-04-24 RX ADMIN — PANTOPRAZOLE SODIUM 40 MG: 40 TABLET, DELAYED RELEASE ORAL at 17:06

## 2020-04-24 RX ADMIN — Medication: at 18:00

## 2020-04-24 RX ADMIN — HYDRALAZINE HYDROCHLORIDE 50 MG: 50 TABLET, FILM COATED ORAL at 22:05

## 2020-04-24 RX ADMIN — ISOSORBIDE MONONITRATE 30 MG: 30 TABLET, EXTENDED RELEASE ORAL at 11:35

## 2020-04-24 RX ADMIN — CARVEDILOL 25 MG: 25 TABLET, FILM COATED ORAL at 17:06

## 2020-04-24 RX ADMIN — AMLODIPINE BESYLATE 5 MG: 5 TABLET ORAL at 09:08

## 2020-04-24 RX ADMIN — HEPARIN SODIUM 5000 UNITS: 5000 INJECTION INTRAVENOUS; SUBCUTANEOUS at 06:15

## 2020-04-24 RX ADMIN — HYDRALAZINE HYDROCHLORIDE 50 MG: 50 TABLET, FILM COATED ORAL at 17:06

## 2020-04-24 RX ADMIN — HYDRALAZINE HYDROCHLORIDE 50 MG: 50 TABLET, FILM COATED ORAL at 09:08

## 2020-04-24 RX ADMIN — INSULIN LISPRO 4 UNITS: 100 INJECTION, SOLUTION INTRAVENOUS; SUBCUTANEOUS at 11:30

## 2020-04-24 RX ADMIN — BUMETANIDE 1 MG: 0.25 INJECTION INTRAMUSCULAR; INTRAVENOUS at 09:08

## 2020-04-24 RX ADMIN — HEPARIN SODIUM 5000 UNITS: 5000 INJECTION INTRAVENOUS; SUBCUTANEOUS at 22:06

## 2020-04-24 RX ADMIN — INSULIN LISPRO 2 UNITS: 100 INJECTION, SOLUTION INTRAVENOUS; SUBCUTANEOUS at 16:30

## 2020-04-24 RX ADMIN — INSULIN GLARGINE 4 UNITS: 100 INJECTION, SOLUTION SUBCUTANEOUS at 22:05

## 2020-04-24 RX ADMIN — Medication 10 ML: at 05:20

## 2020-04-24 RX ADMIN — Medication 500 MG: at 09:08

## 2020-04-24 RX ADMIN — ASPIRIN 81 MG CHEWABLE TABLET 81 MG: 81 TABLET CHEWABLE at 17:12

## 2020-04-24 RX ADMIN — INSULIN LISPRO 4 UNITS: 100 INJECTION, SOLUTION INTRAVENOUS; SUBCUTANEOUS at 22:23

## 2020-04-24 NOTE — PROGRESS NOTES
[facility] Hospitalist Group  Progress Note    Patient: Rudy Silva Age: 71 y.o. : 1950 MR#: [id number] SSN: [id number]  Date/Time: 2020    Subjective:     Patient sitting in bed in no apparent distress, awake, denies chest pain or shortness of breath, states that she feels better    Assessment/Plan:     Acute on chronic diastolic congestive heart failure exacerbation  COVID-19 PUI  Hypertension  Chronic kidney disease stage III  Type 2 diabetes    Plan  Continue IV Bumex, I's and O's  Cardiology on case  COVID-19 test is pending  Monitor renal function, nephrology consulted  Monitor sugars, sliding scale insulin  Resume aspirin and statin  Discussed with patient, tried to call patient's spouse at phone number listed in the chart, the number does not work    Case discussed with:  [x]Patient  []Family  [x]Nursing  []Case Management  DVT Prophylaxis:  []Lovenox  [x]Hep SQ  []SCDs  []Coumadin   []On Heparin gtt    Objective:   VS:   Visit Vitals  /65 (BP 1 Location: Left arm, BP Patient Position: At rest)   Pulse 64   Temp 97.9 °F (36.6 °C)   Resp 18   Ht 5' 5\" (1.651 m)   Wt 104.3 kg (230 lb)   SpO2 96%   BMI 38.27 kg/m²      Tmax/24hrs: Temp (24hrs), Av.7 °F (36.5 °C), Min:96.8 °F (36 °C), Max:98.6 °F (37 °C)    Input/Output:     Intake/Output Summary (Last 24 hours) at 2020 1643  Last data filed at 2020 0000  Gross per 24 hour   Intake 8.6 ml   Output 125 ml   Net -116.4 ml       General:  Awake, alert  Cardiovascular:  S1S2+, RRR  Pulmonary: Decreased breath sounds bilateral bases  GI:  Soft, BS+, NT, ND  Extremities:  + edema      Labs:    Recent Results (from the past 24 hour(s))   GLUCOSE, POC    Collection Time: 20  8:49 PM   Result Value Ref Range    Glucose (POC) 231 (H) 70 - 110 mg/dL   TROPONIN I    Collection Time: 20 11:48 PM   Result Value Ref Range    Troponin-I, QT <0.02 0.0 - 0.045 NG/ML   CBC WITH AUTOMATED DIFF    Collection Time: 04/24/20  2:34 AM   Result Value Ref Range    WBC 7.7 4.6 - 13.2 K/uL    RBC 2.85 (L) 4.20 - 5.30 M/uL    HGB 7.9 (L) 12.0 - 16.0 g/dL    HCT 25.6 (L) 35.0 - 45.0 %    MCV 89.8 74.0 - 97.0 FL    MCH 27.7 24.0 - 34.0 PG    MCHC 30.9 (L) 31.0 - 37.0 g/dL    RDW 14.3 11.6 - 14.5 %    PLATELET 605 235 - 428 K/uL    MPV 11.5 9.2 - 11.8 FL    NEUTROPHILS 75 (H) 40 - 73 %    LYMPHOCYTES 17 (L) 21 - 52 %    MONOCYTES 8 3 - 10 %    EOSINOPHILS 0 0 - 5 %    BASOPHILS 0 0 - 2 %    ABS. NEUTROPHILS 5.7 1.8 - 8.0 K/UL    ABS. LYMPHOCYTES 1.3 0.9 - 3.6 K/UL    ABS. MONOCYTES 0.6 0.05 - 1.2 K/UL    ABS. EOSINOPHILS 0.0 0.0 - 0.4 K/UL    ABS.  BASOPHILS 0.0 0.0 - 0.1 K/UL    DF AUTOMATED     METABOLIC PANEL, BASIC    Collection Time: 04/24/20  2:34 AM   Result Value Ref Range    Sodium 136 136 - 145 mmol/L    Potassium 4.0 3.5 - 5.5 mmol/L    Chloride 102 100 - 111 mmol/L    CO2 26 21 - 32 mmol/L    Anion gap 8 3.0 - 18 mmol/L    Glucose 118 (H) 74 - 99 mg/dL    BUN 49 (H) 7.0 - 18 MG/DL    Creatinine 2.54 (H) 0.6 - 1.3 MG/DL    BUN/Creatinine ratio 19 12 - 20      GFR est AA 23 (L) >60 ml/min/1.73m2    GFR est non-AA 19 (L) >60 ml/min/1.73m2    Calcium 8.0 (L) 8.5 - 10.1 MG/DL   MAGNESIUM    Collection Time: 04/24/20  2:34 AM   Result Value Ref Range    Magnesium 2.6 1.6 - 2.6 mg/dL   FERRITIN    Collection Time: 04/24/20  2:34 AM   Result Value Ref Range    Ferritin 87 8 - 388 NG/ML   CK    Collection Time: 04/24/20  2:34 AM   Result Value Ref Range    CK 58 26 - 192 U/L   D DIMER    Collection Time: 04/24/20  2:34 AM   Result Value Ref Range    D DIMER 2.16 (H) <0.46 ug/ml(FEU)   PROCALCITONIN    Collection Time: 04/24/20  2:34 AM   Result Value Ref Range    Procalcitonin <0.05 ng/mL   HEPATIC FUNCTION PANEL    Collection Time: 04/24/20  2:34 AM   Result Value Ref Range    Protein, total 6.6 6.4 - 8.2 g/dL    Albumin 3.1 (L) 3.4 - 5.0 g/dL    Globulin 3.5 2.0 - 4.0 g/dL    A-G Ratio 0.9 0.8 - 1.7      Bilirubin, total 0.6 0.2 - 1.0 MG/DL    Bilirubin, direct 0.1 0.0 - 0.2 MG/DL    Alk.  phosphatase 73 45 - 117 U/L    AST (SGOT) 18 10 - 38 U/L    ALT (SGPT) 13 13 - 56 U/L   LD    Collection Time: 04/24/20  2:34 AM   Result Value Ref Range     (H) 81 - 234 U/L   C REACTIVE PROTEIN, QT    Collection Time: 04/24/20  2:34 AM   Result Value Ref Range    C-Reactive protein 0.5 (H) 0 - 0.3 mg/dL   GLUCOSE, POC    Collection Time: 04/24/20  8:28 AM   Result Value Ref Range    Glucose (POC) 131 (H) 70 - 110 mg/dL   GLUCOSE, POC    Collection Time: 04/24/20 12:13 PM   Result Value Ref Range    Glucose (POC) 237 (H) 70 - 110 mg/dL     Additional Data Reviewed:      Signed By: Augustine Oliva MD     April 24, 2020

## 2020-04-24 NOTE — DIABETES MGMT
GLYCEMIC CONTROL PLAN OF CARE     Assessment/Recommendations:  Blood glucose fluctuating above targets. Consider addition of Lantus insulin 5 units daily. Addition of no concentrated sweets to current diet order. Will continue inpatient monitoring and intervention. Most recent blood glucose values:      Current A1C of 6.1% is equivalent to average blood glucose of 128 mg/dl over the past 2-3 months.     Current hospital diabetes medications:   Correctional Lispro insulin 4 times daily ACHS    Previous day's insulin requirements:   8 units of Lispro insulin     Home diabetes medications:  Synjardy two times daily with meals    Diet:  Cardiac, FR 1200 mL    Education:  ____Refer to Diabetes Education Record             __x__Education not indicated at this time      Latrell Morrell RD, CDE

## 2020-04-24 NOTE — ROUTINE PROCESS
Bedside and Verbal shift change report given to Nathan Vivas RN (oncoming nurse) by Pauline Espana RN 
 (offgoing nurse). Report included the following information SBAR, Kardex and MAR.

## 2020-04-24 NOTE — PROGRESS NOTES
[facility] - Progress Note  Admit Date: 4/22/2020    Assessment:     Hospital Problems  Date Reviewed: 4/23/2020          Codes Class Noted POA    * (Principal) Acute on chronic diastolic congestive heart failure (HCC) ICD-10-CM: I50.33  ICD-9-CM: 428.33, 428.0  4/23/2020 Yes        Suspected Covid-19 Virus Infection ICD-10-CM: R68.89  4/23/2020 Yes        Type 2 diabetes mellitus without complication, without long-term current use of insulin (HCC) (Chronic) ICD-10-CM: E11.9  ICD-9-CM: 250.00  7/6/2018 Yes        HTN (hypertension), benign (Chronic) ICD-10-CM: I10  ICD-9-CM: 401.1  7/6/2018 Yes    Overview Signed 4/23/2020  7:02 AM by Gabriella Osuna DO     Last Assessment & Plan:   Normotensive today in office             Bilateral lower extremity edema ICD-10-CM: R60.0  ICD-9-CM: 782.3  7/6/2018 Yes              -Presented with worsening shortness of breath in setting of HFpEF and possible Covid viral pneumonia.  -Acute on chronic HFpEF in setting of poorly controlled hypertension. EF 55-60% with aortic sclerosis, trace AI, trace MR, PASP 40 mmHg by echo 8/2019. Can check follow up echo once Covid ruled out.  -Concern for Covid viral pneumonia, covid testing pending.  -Hypertensive urgency with SBP >200. Reports compliance but does not check her BP at home.  -Renal insult, unknown baseline. Suspect the patient does have a component of chronic kidney disease.  -Coronary disease reportedly diffuse medically managed, nuclear stress 3/2018 with small area of ischemia with EF 53%. -Diabetes mellitus. HgbA1c 6.1.  -Dyslipidemia. Previously on crestor.  -Chronic atypical LBBB. No acute EKG changes this admission.  -Acute on chronic anemia.     Primary cardiologist Dr. Saintclair Bones at [facility]:     BP improving. Continue coreg, norvasc (started yesterday), hydralazine (increased). Start imdur and wean off nitro drip. Home ace on hold given renal function.   Need to follow I/Os closely, continued on bumex 1 mg BID today, will need to reassess renal function in AM.  Will review echocardiogram once Covid ruled out. EF normal 8/2019. Continue infectious coverage and Covid rule out. Continue to trend Hgb. Subjective:     Temp 100 noted overnight.     Objective:      Patient Vitals for the past 8 hrs:   Temp Pulse Resp BP SpO2   04/24/20 0848 98.6 °F (37 °C) 67 19 165/75 95 %   04/24/20 0400 98.6 °F (37 °C) 61 18 163/66 98 %         Patient Vitals for the past 96 hrs:   Weight   04/22/20 2300 104.3 kg (230 lb)                    Intake/Output Summary (Last 24 hours) at 4/24/2020 0953  Last data filed at 4/24/2020 0000  Gross per 24 hour   Intake 8.6 ml   Output 125 ml   Net -116.4 ml       Physical Exam:    Data Review:     Labs: Results:       Chemistry Recent Labs     04/24/20  0234 04/23/20  0826 04/22/20  2309   * 128* 210*    138 140   K 4.0 4.0 4.6    103 104   CO2 26 28 27   BUN 49* 41* 43*   CREA 2.54* 2.15* 2.35*   CA 8.0* 8.3* 8.2*   MG 2.6 2.7* 2.7*   PHOS  --  3.5  --    AGAP 8 7 9   BUCR 19 19 18   AP 73 89 99   TP 6.6 7.3 7.6   ALB 3.1* 3.4 3.5   GLOB 3.5 3.9 4.1*   AGRAT 0.9 0.9 0.9      CBC w/Diff Recent Labs     04/24/20  0234 04/23/20  0826 04/22/20  2309   WBC 7.7 9.2 10.3   RBC 2.85* 3.19* 3.40*   HGB 7.9* 8.8* 9.4*   HCT 25.6* 28.5* 30.8*    192 203   GRANS 75* 75* 88*   LYMPH 17* 16* 7*   EOS 0 0 0      Cardiac Enzymes Lab Results   Component Value Date/Time    CPK 58 04/24/2020 02:34 AM    TROIQ <0.02 04/23/2020 11:48 PM    TROIQ 0.02 04/23/2020 03:17 PM      Coagulation Recent Labs     04/23/20  0826 04/22/20  2309   PTP 14.6 14.3   INR 1.2 1.1   APTT 35.0  --        Lipid Panel No results found for: CHOL, CHOLPOCT, CHOLX, CHLST, CHOLV, 485209, HDL, HDLP, LDL, LDLC, DLDLP, 558252, VLDLC, VLDL, TGLX, TRIGL, TRIGP, TGLPOCT, CHHD, CHHDX   BNP No results found for: BNP, BNPP, XBNPT   Liver Enzymes Recent Labs     04/24/20  0234   TP 6.6   ALB 3.1*   AP 73   SGOT 18      Digoxin    Thyroid Studies No results found for: T4, T3U, TSH, TSHEXT       Signed By: MICHAEL Mirza     April 24, 2020

## 2020-04-24 NOTE — ROUTINE PROCESS
Bedside shift change report given to Scott Nova RN (oncoming nurse) by Maria Ines Valerio RN (offgoing nurse). Report included the following information SBAR, Kardex, Intake/Output and Recent Results.

## 2020-04-24 NOTE — CONSULTS
Consult Note      Consult requested by: Zackery Madrigal MD    ADMIT DATE: 4/22/2020    CONSULT DATE: April 24, 2020           Admission diagnosis: Acute on chronic diastolic congestive heart failure [facility])   Reason for Nephrology Consultation: OFE      Assessment:     #1 acute kidney injury on chronic kidney disease stage III/IV, unclear what patient's baseline is, but patient has been told by her primary that she does have chronic kidney disease. She also has significant proteinuria which goes in the favor of diabetic nephropathy as her underlying disease. Creatinine appears to be trending up in the setting of cardiorenal syndrome type I and II. Appears to have been diuresed and does seem to be comfortable at rest.  But has a lot of leg edema, I do feel she is still fluid overloaded. Her increase in creatinine today could be related with Vasotec given yesterday which I would try to avoid. Strict ins and outs have not been recorded. #2.  Acute on chronic diastolic CHF, has been diuresed with Bumex for the last 2 days, improved in terms of her breathing but still overall edematous. Echocardiogram once colitis ruled out.   #3 hypertensive urgency, blood pressures much better controlled, off nitro drip and on for p.o. meds, avoid Vasotec and ACE inhibitor held  #4 diabetes mellitus with complications, does have significant proteinuria  #5 acute on chronic anemia, check iron studies  #6 hypoalbuminemia    Recommendations:    #1 continue Bumex 1 mg twice daily  #2 strict ins and outs needed, daily weights  #3 avoid using ACE inhibitor's, has been held and avoid using Vasotec  #4 check renal ultrasound, check post void residuals   #5 quantify proteinuria  #6 avoid IV contrast or other nephrotoxins, NSAID's    Please call with questions,    Aimee Edmondson MD [facility]  Cell [phone]  Pager: [phone]      HPI: Stephanie Fay is a 71 y.o. female with history of diastolic congestive heart failure, diabetes mellitus type 2, hypertension which appears to be uncontrolled, history of coronary artery disease, bilateral lower extremity edema, obesity who presents with worsening shortness of breath, orthopnea, worsening leg swelling over the past 1 month. Patient presently was taking about 40 mg of Lasix every day, she increased her dose but did not seem to help. No history suggestive of chest pain palpitations syncope. On presentation to the emergency room patient was afebrile, no tachycardia, had hypertensive urgency with blood pressures in 167H systolic, saturating 06% on room air. Labs suggested white count of 10,300, hemoglobin of 9.4, platelets of 659,057, sodium of 140, potassium 4.6, chloride of 104, CO2 of 27, BUN of 43 and creatinine of 2.35, LDH of 346, procalcitonin less than 0.05, troponins of 0.03, BNP of 6916, ferritin of 93, CRP of 0.5. Chest x-ray showed moderate to severe cardiomegaly with increasing interstitial markings. Patient was started on nitro drip and diuresed with Bumex 1 mg IV twice daily. Her home medications Coreg and hydralazine were continued. ACE inhibitor was held. Patient did get a dose of Vasotec yesterday. COVID was signed out and is pending. Nephrology was consulted because with diuresis patient's creatinine appears to be trending up. Past Medical History:   Diagnosis Date    Arthritis     [facility] ([facility] [address]     CHF (congestive heart failure) (HCC)     Diabetes ([facility] [address]     Fracture of neck (HCC)     GERD (gastroesophageal reflux disease)     Goiter     Hiatal hernia     Hypertension     Uterine cancer (HCC)       Past Surgical History:   Procedure Laterality Date    HX APPENDECTOMY      HX HYSTERECTOMY      HX KNEE REPLACEMENT Right     HX ORTHOPAEDIC      odontoid fracture repair with hardware placement.      HX PARTIAL THYROIDECTOMY         Social History     Socioeconomic History    Marital status:      Spouse name: Not on file    Number of children: Not on file    Years of education: Not on file    Highest education level: Not on file   Occupational History    Not on file   Social Needs    Financial resource strain: Not on file    Food insecurity     Worry: Not on file     Inability: Not on file    Transportation needs     Medical: Not on file     Non-medical: Not on file   Tobacco Use    Smoking status: Never Smoker   Substance and Sexual Activity    Alcohol use: No    Drug use: No    Sexual activity: Not on file   Lifestyle    Physical activity     Days per week: Not on file     Minutes per session: Not on file    Stress: Not on file   Relationships    Social connections     Talks on phone: Not on file     Gets together: Not on file     Attends Muslim service: Not on file     Active member of club or organization: Not on file     Attends meetings of clubs or organizations: Not on file     Relationship status: Not on file    Intimate partner violence     Fear of current or ex partner: Not on file     Emotionally abused: Not on file     Physically abused: Not on file     Forced sexual activity: Not on file   Other Topics Concern    Not on file   Social History Narrative    Not on file       No family history on file. Allergies   Allergen Reactions    Augmentin [Amoxicillin-Pot Clavulanate] Diarrhea    Clavulanic Acid Diarrhea    Levaquin [Levofloxacin] Other (comments)     Severe hypoglycemia          Home Medications:     Medications Prior to Admission   Medication Sig    furosemide (LASIX) 40 mg tablet Take 40 mg by mouth two (2) times a day.  gabapentin (NEURONTIN) 100 mg capsule Take 100 mg by mouth two (2) times a day.  lisinopriL (PRINIVIL, ZESTRIL) 5 mg tablet Take 5 mg by mouth daily.  empagliflozin-metFORMIN (SYNJARDY) 12.5-500 mg per tablet Take 1 Tab by mouth two (2) times daily (with meals).  esomeprazole (NEXIUM) 40 mg capsule Take 40 mg by mouth daily.     hydrALAZINE (APRESOLINE) 50 mg tablet Take 50 mg by mouth two (2) times a day.  carvedilol (COREG) 25 mg tablet Take 25 mg by mouth two (2) times daily (with meals).  citalopram (CELEXA) 20 mg tablet Take 20 mg by mouth daily.  cranberry extract (AZO CRANBERRY) 450 mg tab Take 2 Tabs by mouth daily.  calcium-cholecalciferol, d3, (CALCIUM 600 + D) 600-125 mg-unit tab Take 1 Tab by mouth two (2) times a day.  cholecalciferol (VITAMIN D3) 1,000 unit cap Take 5,000 Units by mouth daily.  cyanocobalamin (VITAMIN B-12) 1,000 mcg tablet Take 1,000 mcg by mouth two (2) times a day.  Biotin 2,500 mcg cap Take 1 Tab by mouth two (2) times a day.  vitamin a-vitamin c-vit e-min (OCUVITE) tablet Take 1 Tab by mouth daily.  loratadine (CLARITIN) 10 mg tablet Take 10 mg by mouth daily.  B.infantis-B.ani-B.long-B.bifi (PROBIOTIC 4X) 10-15 mg TbEC Take 1 Tab by mouth daily.  HYDROcodone-acetaminophen (NORCO) 5-325 mg per tablet Take 1-2 tablets PO every 4-6 hours as needed for pain control. If over the counter ibuprofen or acetaminophen was suggested, then only take the vicodin for pain not well controlled with the over the counter medication.        Current Inpatient Medications:     Current Facility-Administered Medications   Medication Dose Route Frequency    isosorbide mononitrate ER (IMDUR) tablet 30 mg  30 mg Oral DAILY    [START ON 4/25/2020] cholecalciferol (VITAMIN D3) (400 Units /10 mcg) tablet 5 Tab  2,000 Units Oral DAILY    [START ON 4/25/2020] zinc sulfate (ZINCATE) 220 (50) mg capsule 1 Cap  1 Cap Oral DAILY    azithromycin (ZITHROMAX) 500 mg in  mL  500 mg IntraVENous Q24H    sodium chloride (NS) flush 5-40 mL  5-40 mL IntraVENous Q8H    sodium chloride (NS) flush 5-40 mL  5-40 mL IntraVENous PRN    acetaminophen (TYLENOL) tablet 650 mg  650 mg Oral Q4H PRN    morphine injection 2 mg  2 mg IntraVENous Q2H PRN    naloxone (NARCAN) injection 0.4 mg  0.4 mg IntraVENous PRN    heparin (porcine) injection 5,000 Units 5,000 Units SubCUTAneous Q8H    carvediloL (COREG) tablet 25 mg  25 mg Oral BID WITH MEALS    citalopram (CELEXA) tablet 20 mg  20 mg Oral DAILY    pantoprazole (PROTONIX) tablet 40 mg  40 mg Oral ACB&D    bumetanide (BUMEX) injection 1 mg  1 mg IntraVENous BID    insulin lispro (HUMALOG) injection   SubCUTAneous AC&HS    glucose chewable tablet 16 g  16 g Oral PRN    glucagon (GLUCAGEN) injection 1 mg  1 mg IntraMUSCular PRN    dextrose (D50W) injection syrg 12.5-25 g  25-50 mL IntraVENous PRN    ascorbic acid (vitamin C) (VITAMIN C) tablet 500 mg  500 mg Oral BID    hydrALAZINE (APRESOLINE) tablet 50 mg  50 mg Oral TID    amLODIPine (NORVASC) tablet 5 mg  5 mg Oral DAILY       Review of Systems:     No fever or chills. No sore throat. No cough or hemoptysis. No shortness of breath or chest pain. No nausea, vomiting, abdominal pain, melena or hematochezia. No constipation or diarrhea. No dysuria, no gross hematuria of voiding difficulties. No muscle aches. No skin changes. No dizziness or lightheadedness. No headaches. Physical Assessment:     Vitals:    04/24/20 0000 04/24/20 0400 04/24/20 0848 04/24/20 1229   BP: 175/70 163/66 165/75 148/65   Pulse: 68 61 67 64   Resp: 18 18 19 18   Temp: 96.8 °F (36 °C) 98.6 °F (37 °C) 98.6 °F (37 °C) 97.9 °F (36.6 °C)   SpO2: 97% 98% 95% 96%   Weight:       Height:         Last 3 Recorded Weights in this Encounter    04/22/20 2300   Weight: 104.3 kg (230 lb)     Admission weight: Weight: 104.3 kg (230 lb) (04/22/20 2300)      Intake/Output Summary (Last 24 hours) at 4/24/2020 1446  Last data filed at 4/24/2020 0000  Gross per 24 hour   Intake 8.6 ml   Output 125 ml   Net -116.4 ml       Patient is in no apparent distress. HEENT: mmm  Neck: no cervical lymphadenopathy or thyromegaly. Lungs: good air entry, clear to auscultation bilaterally. Cardiovascular system: S1, S2, regular rate and rhythm. No JVD  Abdomen: soft, non tender, non distended. BS+  Extremities: 2+ LE edema   Integumentary: skin is grossly intact. Neurologic: Alert, oriented time three. Data Review:    Labs: Results:       Chemistry Recent Labs     04/24/20 0234 04/23/20  0826 04/22/20  2309   * 128* 210*    138 140   K 4.0 4.0 4.6    103 104   CO2 26 28 27   BUN 49* 41* 43*   CREA 2.54* 2.15* 2.35*   CA 8.0* 8.3* 8.2*   AGAP 8 7 9   BUCR 19 19 18   AP 73 89 99   TP 6.6 7.3 7.6   ALB 3.1* 3.4 3.5   GLOB 3.5 3.9 4.1*   AGRAT 0.9 0.9 0.9   PHOS  --  3.5  --          CBC w/Diff Recent Labs     04/24/20 0234 04/23/20  0826 04/22/20 2309   WBC 7.7 9.2 10.3   RBC 2.85* 3.19* 3.40*   HGB 7.9* 8.8* 9.4*   HCT 25.6* 28.5* 30.8*    192 203   GRANS 75* 75* 88*   LYMPH 17* 16* 7*   EOS 0 0 0         Iron/Ferritin No results for input(s): IRON in the last 72 hours. No lab exists for component: TIBCCALC   PTH/VIT D No results for input(s): PTH in the last 72 hours.     No lab exists for component: VITD           Christy Beaver MD  4/24/2020  2:46 PM      April 24, 2020

## 2020-04-25 ENCOUNTER — APPOINTMENT (OUTPATIENT)
Dept: GENERAL RADIOLOGY | Age: 70
DRG: 291 | End: 2020-04-25
Attending: INTERNAL MEDICINE
Payer: MEDICARE

## 2020-04-25 ENCOUNTER — APPOINTMENT (OUTPATIENT)
Dept: NON INVASIVE DIAGNOSTICS | Age: 70
DRG: 291 | End: 2020-04-25
Attending: PHYSICIAN ASSISTANT
Payer: MEDICARE

## 2020-04-25 PROBLEM — I50.9 CHF (CONGESTIVE HEART FAILURE) (HCC): Status: ACTIVE | Noted: 2020-04-25

## 2020-04-25 LAB
ALBUMIN SERPL-MCNC: 3.2 G/DL (ref 3.4–5)
ALBUMIN/GLOB SERPL: 0.9 {RATIO} (ref 0.8–1.7)
ALP SERPL-CCNC: 67 U/L (ref 45–117)
ALT SERPL-CCNC: 13 U/L (ref 13–56)
ANION GAP SERPL CALC-SCNC: 10 MMOL/L (ref 3–18)
AST SERPL-CCNC: 14 U/L (ref 10–38)
BILIRUB DIRECT SERPL-MCNC: <0.1 MG/DL (ref 0–0.2)
BILIRUB SERPL-MCNC: 0.5 MG/DL (ref 0.2–1)
BNP SERPL-MCNC: 8527 PG/ML (ref 0–900)
BUN SERPL-MCNC: 55 MG/DL (ref 7–18)
BUN/CREAT SERPL: 19 (ref 12–20)
CALCIUM SERPL-MCNC: 7.7 MG/DL (ref 8.5–10.1)
CHLORIDE SERPL-SCNC: 102 MMOL/L (ref 100–111)
CK SERPL-CCNC: 49 U/L (ref 26–192)
CO2 SERPL-SCNC: 24 MMOL/L (ref 21–32)
CREAT SERPL-MCNC: 2.84 MG/DL (ref 0.6–1.3)
CRP SERPL-MCNC: 0.4 MG/DL (ref 0–0.3)
D DIMER PPP FEU-MCNC: 2 UG/ML(FEU)
EMERGENT DISEASE PANEL, EDPR: NOT DETECTED
FERRITIN SERPL-MCNC: 93 NG/ML (ref 8–388)
GLOBULIN SER CALC-MCNC: 3.4 G/DL (ref 2–4)
GLUCOSE BLD STRIP.AUTO-MCNC: 121 MG/DL (ref 70–110)
GLUCOSE BLD STRIP.AUTO-MCNC: 173 MG/DL (ref 70–110)
GLUCOSE BLD STRIP.AUTO-MCNC: 177 MG/DL (ref 70–110)
GLUCOSE BLD STRIP.AUTO-MCNC: 236 MG/DL (ref 70–110)
GLUCOSE SERPL-MCNC: 114 MG/DL (ref 74–99)
LDH SERPL L TO P-CCNC: 210 U/L (ref 81–234)
MAGNESIUM SERPL-MCNC: 2.6 MG/DL (ref 1.6–2.6)
POTASSIUM SERPL-SCNC: 4.2 MMOL/L (ref 3.5–5.5)
PROT SERPL-MCNC: 6.6 G/DL (ref 6.4–8.2)
SODIUM SERPL-SCNC: 136 MMOL/L (ref 136–145)

## 2020-04-25 PROCEDURE — 93308 TTE F-UP OR LMTD: CPT

## 2020-04-25 PROCEDURE — 36415 COLL VENOUS BLD VENIPUNCTURE: CPT

## 2020-04-25 PROCEDURE — 85379 FIBRIN DEGRADATION QUANT: CPT

## 2020-04-25 PROCEDURE — 83880 ASSAY OF NATRIURETIC PEPTIDE: CPT

## 2020-04-25 PROCEDURE — 74011250637 HC RX REV CODE- 250/637: Performed by: EMERGENCY MEDICINE

## 2020-04-25 PROCEDURE — 82550 ASSAY OF CK (CPK): CPT

## 2020-04-25 PROCEDURE — 74011250637 HC RX REV CODE- 250/637: Performed by: INTERNAL MEDICINE

## 2020-04-25 PROCEDURE — 74011250637 HC RX REV CODE- 250/637: Performed by: PHYSICIAN ASSISTANT

## 2020-04-25 PROCEDURE — 74011636637 HC RX REV CODE- 636/637: Performed by: EMERGENCY MEDICINE

## 2020-04-25 PROCEDURE — 83615 LACTATE (LD) (LDH) ENZYME: CPT

## 2020-04-25 PROCEDURE — 82728 ASSAY OF FERRITIN: CPT

## 2020-04-25 PROCEDURE — 71045 X-RAY EXAM CHEST 1 VIEW: CPT

## 2020-04-25 PROCEDURE — 80048 BASIC METABOLIC PNL TOTAL CA: CPT

## 2020-04-25 PROCEDURE — 82962 GLUCOSE BLOOD TEST: CPT

## 2020-04-25 PROCEDURE — 74011250637 HC RX REV CODE- 250/637: Performed by: HOSPITALIST

## 2020-04-25 PROCEDURE — 74011250636 HC RX REV CODE- 250/636: Performed by: HOSPITALIST

## 2020-04-25 PROCEDURE — 74011636637 HC RX REV CODE- 636/637: Performed by: PHYSICIAN ASSISTANT

## 2020-04-25 PROCEDURE — 74011000250 HC RX REV CODE- 250: Performed by: INTERNAL MEDICINE

## 2020-04-25 PROCEDURE — 86140 C-REACTIVE PROTEIN: CPT

## 2020-04-25 PROCEDURE — 83735 ASSAY OF MAGNESIUM: CPT

## 2020-04-25 PROCEDURE — 96376 TX/PRO/DX INJ SAME DRUG ADON: CPT

## 2020-04-25 PROCEDURE — 65660000000 HC RM CCU STEPDOWN

## 2020-04-25 PROCEDURE — 96372 THER/PROPH/DIAG INJ SC/IM: CPT

## 2020-04-25 PROCEDURE — 99218 HC RM OBSERVATION: CPT

## 2020-04-25 PROCEDURE — 80076 HEPATIC FUNCTION PANEL: CPT

## 2020-04-25 RX ORDER — BUMETANIDE 0.25 MG/ML
1 INJECTION INTRAMUSCULAR; INTRAVENOUS EVERY 12 HOURS
Status: DISCONTINUED | OUTPATIENT
Start: 2020-04-25 | End: 2020-04-27

## 2020-04-25 RX ADMIN — INSULIN LISPRO 4 UNITS: 100 INJECTION, SOLUTION INTRAVENOUS; SUBCUTANEOUS at 21:49

## 2020-04-25 RX ADMIN — AMLODIPINE BESYLATE 5 MG: 5 TABLET ORAL at 09:02

## 2020-04-25 RX ADMIN — Medication 10 ML: at 06:16

## 2020-04-25 RX ADMIN — ISOSORBIDE MONONITRATE 30 MG: 30 TABLET, EXTENDED RELEASE ORAL at 09:02

## 2020-04-25 RX ADMIN — ASPIRIN 81 MG CHEWABLE TABLET 81 MG: 81 TABLET CHEWABLE at 09:01

## 2020-04-25 RX ADMIN — HYDRALAZINE HYDROCHLORIDE 50 MG: 50 TABLET, FILM COATED ORAL at 17:03

## 2020-04-25 RX ADMIN — INSULIN GLARGINE 4 UNITS: 100 INJECTION, SOLUTION SUBCUTANEOUS at 21:48

## 2020-04-25 RX ADMIN — Medication 1 CAPSULE: at 09:02

## 2020-04-25 RX ADMIN — PANTOPRAZOLE SODIUM 40 MG: 40 TABLET, DELAYED RELEASE ORAL at 09:02

## 2020-04-25 RX ADMIN — CITALOPRAM HYDROBROMIDE 20 MG: 20 TABLET ORAL at 09:02

## 2020-04-25 RX ADMIN — CARVEDILOL 25 MG: 25 TABLET, FILM COATED ORAL at 09:02

## 2020-04-25 RX ADMIN — Medication 10 ML: at 21:59

## 2020-04-25 RX ADMIN — ROSUVASTATIN CALCIUM 5 MG: 10 TABLET, COATED ORAL at 22:01

## 2020-04-25 RX ADMIN — HEPARIN SODIUM 5000 UNITS: 5000 INJECTION INTRAVENOUS; SUBCUTANEOUS at 17:03

## 2020-04-25 RX ADMIN — AZITHROMYCIN MONOHYDRATE 500 MG: 500 INJECTION, POWDER, LYOPHILIZED, FOR SOLUTION INTRAVENOUS at 01:09

## 2020-04-25 RX ADMIN — INSULIN LISPRO 2 UNITS: 100 INJECTION, SOLUTION INTRAVENOUS; SUBCUTANEOUS at 12:23

## 2020-04-25 RX ADMIN — CHOLECALCIFEROL (VITAMIN D3) 10 MCG (400 UNIT) TABLET 5 TABLET: at 09:02

## 2020-04-25 RX ADMIN — PANTOPRAZOLE SODIUM 40 MG: 40 TABLET, DELAYED RELEASE ORAL at 17:03

## 2020-04-25 RX ADMIN — HEPARIN SODIUM 5000 UNITS: 5000 INJECTION INTRAVENOUS; SUBCUTANEOUS at 23:41

## 2020-04-25 RX ADMIN — BUMETANIDE 1 MG: 0.25 INJECTION INTRAMUSCULAR; INTRAVENOUS at 17:13

## 2020-04-25 RX ADMIN — ACETAMINOPHEN 650 MG: 325 TABLET ORAL at 17:15

## 2020-04-25 RX ADMIN — INSULIN LISPRO 2 UNITS: 100 INJECTION, SOLUTION INTRAVENOUS; SUBCUTANEOUS at 17:03

## 2020-04-25 RX ADMIN — HYDRALAZINE HYDROCHLORIDE 50 MG: 50 TABLET, FILM COATED ORAL at 21:47

## 2020-04-25 RX ADMIN — Medication 500 MG: at 21:47

## 2020-04-25 RX ADMIN — HEPARIN SODIUM 5000 UNITS: 5000 INJECTION INTRAVENOUS; SUBCUTANEOUS at 09:03

## 2020-04-25 RX ADMIN — Medication 500 MG: at 09:02

## 2020-04-25 RX ADMIN — CARVEDILOL 25 MG: 25 TABLET, FILM COATED ORAL at 17:02

## 2020-04-25 RX ADMIN — HYDRALAZINE HYDROCHLORIDE 50 MG: 50 TABLET, FILM COATED ORAL at 09:02

## 2020-04-25 NOTE — PROGRESS NOTES
[facility]  -  Progress Note      Patient: Tamra Chavez MRN: [id number]  SSN: [id number]    YOB: 1950  Age: 71 y.o. Sex: female      Admit Date: 4/22/2020    Assessment:     -Acute on chronic HFpEF in setting of poorly controlled hypertension. EF 55-60% with aortic sclerosis, trace AI, trace MR, PASP 40 mmHg by echo 8/2019. Patient still appears to be volume overloaded on my exam today. However, her NT proBNP level has improved. -Concern for Covid viral pneumonia, covid testing pending.  -Hypertensive urgency with SBP >200. Reports compliance but does not check her BP at home. Patient was initially started on IV nitroglycerin infusion but this has since been stopped. Her oral medications have been adjusted.  -Chronic kidney disease. Stage III-IV at baseline. Her renal indices have been worsening this admission. Nephrology is following.  -Coronary disease reportedly diffuse medically managed, nuclear stress 3/2018 with small area of ischemia with EF 53%. -Diabetes mellitus. HgbA1c 6.1.  -Dyslipidemia. Previously on crestor.  -Chronic atypical LBBB.  No acute EKG changes this admission.  -Acute on chronic anemia.     Primary cardiologist Dr. Fatou Flores at [facility]. Plan:      Diuretics were apparently stopped due to worsening renal failure. She still appears to be volume overloaded with significant leg swelling and bilateral pleural effusions. I will defer diuretic management to nephrology for now. Continue current blood pressure medication. Awaiting COVID-19 testing. Subjective:     Patient still with dyspnea on exertion, orthopnea and leg swelling.     Objective:      Patient Vitals for the past 8 hrs:   Temp Pulse Resp BP SpO2   04/25/20 1136 97.8 °F (36.6 °C) 60 20 118/50 96 %   04/25/20 0759 97.8 °F (36.6 °C) 67 19 154/66 96 %         Patient Vitals for the past 96 hrs:   Weight   04/25/20 0400 114.1 kg (251 lb 8 oz)   04/22/20 2300 104.3 kg (230 lb) Intake/Output Summary (Last 24 hours) at 4/25/2020 1258  Last data filed at 4/25/2020 0700  Gross per 24 hour   Intake 590 ml   Output 800 ml   Net -210 ml       Physical Exam:  General:  fatigued, cooperative, no distress, appears older than stated age  Neck: Unable to assess JVP  Lungs:  diminished breath sounds R base, L base  Heart:  regular rate and rhythm  Abdomen:  abdomen is soft without significant tenderness, masses, organomegaly or guarding  Extremities:  edema 2+ bilateral extremity to the knees    Data Review:     Labs: Results:       Chemistry Recent Labs     04/25/20  0308 04/24/20  0234 04/23/20  0826   * 118* 128*    136 138   K 4.2 4.0 4.0    102 103   CO2 24 26 28   BUN 55* 49* 41*   CREA 2.84* 2.54* 2.15*   CA 7.7* 8.0* 8.3*   MG 2.6 2.6 2.7*   PHOS  --   --  3.5   AGAP 10 8 7   BUCR 19 19 19   AP 67 73 89   TP 6.6 6.6 7.3   ALB 3.2* 3.1* 3.4   GLOB 3.4 3.5 3.9   AGRAT 0.9 0.9 0.9      CBC w/Diff Recent Labs     04/24/20  0234 04/23/20  0826 04/22/20  2309   WBC 7.7 9.2 10.3   RBC 2.85* 3.19* 3.40*   HGB 7.9* 8.8* 9.4*   HCT 25.6* 28.5* 30.8*    192 203   GRANS 75* 75* 88*   LYMPH 17* 16* 7*   EOS 0 0 0      Cardiac Enzymes Lab Results   Component Value Date/Time    CPK 49 04/25/2020 03:08 AM      Coagulation Recent Labs     04/23/20  0826 04/22/20  2309   PTP 14.6 14.3   INR 1.2 1.1   APTT 35.0  --        Lipid Panel Lab Results   Component Value Date/Time    Cholesterol, total 182 04/24/2020 02:34 AM    HDL Cholesterol 41 04/24/2020 02:34 AM    LDL, calculated 112.8 (H) 04/24/2020 02:34 AM    VLDL, calculated 28.2 04/24/2020 02:34 AM    Triglyceride 141 04/24/2020 02:34 AM    CHOL/HDL Ratio 4.4 04/24/2020 02:34 AM      BNP No results found for: BNP, BNPP, XBNPT   Liver Enzymes Recent Labs     04/25/20  0308   TP 6.6   ALB 3.2*   AP 67   SGOT 14      Digoxin    Thyroid Studies No results found for: T4, T3U, TSH, TSHEXT

## 2020-04-25 NOTE — ROUTINE PROCESS
Bedside and verbal report received from Adria Short (offgoing nurse). Report included the following information; SBAR, MAR, LABS, Intake/output, Kardex, and summary of care.

## 2020-04-25 NOTE — PROGRESS NOTES
In Patient Progress note      Admit Date: 4/22/2020    Impression:     #1 acute kidney injury on chronic kidney disease stage III/IV, unclear what patient's baseline is, but patient has been told by her primary that she does have chronic kidney disease. She also has significant proteinuria which goes in the favor of diabetic nephropathy as her underlying disease. Creatinine appears to be trending up in the setting of cardiorenal syndrome type I and II. Volume status appears to have improved   #2. Acute on chronic diastolic CHF, has been diuresed with Bumex for the last 2 days, improved in terms of her breathing but still overall edematous. Echocardiogram once colitis ruled out.   #3 hypertensive urgency, blood pressures much better controlled, off nitro drip and on for p.o. meds, avoid Vasotec and ACE inhibitor held  #4 diabetes mellitus with complications, does have significant proteinuria  #5 acute on chronic anemia, check iron studies  #6 hypoalbuminemia     Recommendations:     #1 continue Bumex 1 mg IV BID , anticipate switching to PO later today if CXR improved  #2 strict ins and outs needed, daily weights  #3 avoid using ACE inhibitor's, has been held and avoid using Vasotec  #4 quantify proteinuria  #5 avoid IV contrast or other nephrotoxins, NSAID's    F/U on CXR      Please call with questions,     Kimberly Stringer MD FASN  Cell [phone]  Pager: [phone]     Subjective:           Current Facility-Administered Medications:     isosorbide mononitrate ER (IMDUR) tablet 30 mg, 30 mg, Oral, DAILY, Clarissa Garcia PA, 30 mg at 04/25/20 0902    cholecalciferol (VITAMIN D3) (400 Units /10 mcg) tablet 5 Tab, 2,000 Units, Oral, DAILY, Geovany Carlos MD, 5 Tab at 04/25/20 0902    zinc sulfate (ZINCATE) 220 (50) mg capsule 1 Cap, 1 Cap, Oral, DAILY, January Valenzuela MD, 1 Cap at 04/25/20 0902    aspirin chewable tablet 81 mg, 81 mg, Oral, DAILY, Geovany Carlos MD, 81 mg at 04/25/20 0901   rosuvastatin (CRESTOR) tablet 5 mg, 5 mg, Oral, QHS, Geovany Carlos MD, 5 mg at 04/24/20 2204    insulin glargine (LANTUS) injection 4 Units, 4 Units, SubCUTAneous, QHS, Geovany Carlos MD, 4 Units at 04/24/20 2205    azithromycin (ZITHROMAX) 500 mg in  mL, 500 mg, IntraVENous, Q24H, Fifi Mt A, DO, 500 mg at 04/25/20 0109    sodium chloride (NS) flush 5-40 mL, 5-40 mL, IntraVENous, Q8H, Fifi Mt A, DO, 10 mL at 04/25/20 7119    sodium chloride (NS) flush 5-40 mL, 5-40 mL, IntraVENous, PRN, Reynaldo Salty, DO    acetaminophen (TYLENOL) tablet 650 mg, 650 mg, Oral, Q4H PRN, Katherene Salty, DO, 650 mg at 04/24/20 2238    morphine injection 2 mg, 2 mg, IntraVENous, Q2H PRN, Katherene Salty, DO    naloxone [facility]) injection 0.4 mg, 0.4 mg, IntraVENous, PRN, Shaggybert Mt A, DO    heparin (porcine) injection 5,000 Units, 5,000 Units, SubCUTAneous, Q8H, Katelynnherene Salty, DO, 5,000 Units at 04/25/20 5789    carvediloL (COREG) tablet 25 mg, 25 mg, Oral, BID WITH MEALS, Reynaldo Juarezs, DO, 25 mg at 04/25/20 0902    citalopram (CELEXA) tablet 20 mg, 20 mg, Oral, DAILY, Fifi Mt A, DO, 20 mg at 04/25/20 0902    pantoprazole (PROTONIX) tablet 40 mg, 40 mg, Oral, ACB&D, [facility], DO, 40 mg at 04/25/20 0902    insulin lispro (HUMALOG) injection, , SubCUTAneous, AC&HS, [place], [place], Stopped at 04/25/20 0730    glucose chewable tablet 16 g, 16 g, Oral, PRN, Nahed Tatum, Ani WYNN, PA    glucagon (GLUCAGEN) injection 1 mg, 1 mg, IntraMUSCular, PRN, Ani Serrano PA    dextrose (D50W) injection syrg 12.5-25 g, 25-50 mL, IntraVENous, PRN, Ani Serrano [place]    ascorbic acid (vitamin C) (VITAMIN C) tablet 500 mg, 500 mg, Oral, BID, Ani Rahman [place], 500 mg at 04/25/20 0902    hydrALAZINE (APRESOLINE) tablet 50 mg, 50 mg, Oral, TID, Isidro Pineda MD, 50 mg at 04/25/20 0902    amLODIPine (NORVASC) tablet 5 mg, 5 mg, Oral, DAILY, Isidro Pineda MD, 5 mg at 04/25/20 0902        Objective:     Visit Vitals  /66 (BP 1 Location: Left arm, BP Patient Position: At rest)   Pulse 67   Temp 97.8 °F (36.6 °C)   Resp 19   Ht 5' 5\" (1.651 m)   Wt 114.1 kg (251 lb 8 oz)   SpO2 96%   BMI 41.85 kg/m²         Intake/Output Summary (Last 24 hours) at 4/25/2020 1129  Last data filed at 4/25/2020 0700  Gross per 24 hour   Intake 590 ml   Output 1100 ml   Net -510 ml       Physical Exam:     Patient is in no apparent distress. HEENT: mmm  Neck: no cervical lymphadenopathy or thyromegaly. Lungs: good air entry, clear to auscultation bilaterally. Cardiovascular system: S1, S2, regular rate and rhythm. No JVD  Abdomen: soft, non tender, non distended. BS+  Extremities: 2+ LE edema   Integumentary: skin is grossly intact. Neurologic: Alert, oriented time three.     Data Review:    Recent Labs     04/24/20  0234   WBC 7.7   RBC 2.85*   HCT 25.6*   MCV 89.8   MCH 27.7   MCHC 30.9*   RDW 14.3     Recent Labs     04/25/20  0308 04/24/20  0234 04/23/20  0826 04/22/20  2309   BUN 55* 49* 41* 43*   CREA 2.84* 2.54* 2.15* 2.35*   CA 7.7* 8.0* 8.3* 8.2*   ALB 3.2* 3.1* 3.4 3.5   K 4.2 4.0 4.0 4.6    136 138 140    102 103 104   CO2 24 26 28 27   PHOS  --   --  3.5  --    * 118* 128* 210*       Giovany Andrew MD

## 2020-04-25 NOTE — PROGRESS NOTES
[facility]  Progress Note    Patient: Linsey Gupta Age: 71 y.o. : 1950 MR#: [id number] SSN: [id number]  Date/Time: 2020    Subjective:     Patient is sitting in bed in no apparent distress, denies chest pain, has some dyspnea on exertion    Assessment/Plan:     Acute on chronic diastolic congestive heart failure exacerbation  COVID-19 PUI  Hypertension  Chronic kidney disease stage III  Type 2 diabetes    Plan  IV Bumex, I's and O's  Cardiology and nephrology are following  COVID-19 test is negative, discontinue droplet plus precautions  Monitor renal function  Sliding scale insulin  Resume aspirin and statin  Discussed with patient, discussed with RN    Case discussed with:  [x]Patient  []Family  [x]Nursing  []Case Management  DVT Prophylaxis:  []Lovenox  [x]Hep SQ  []SCDs  []Coumadin   []On Heparin gtt    Objective:   VS:   Visit Vitals  /57 (BP 1 Location: Left arm, BP Patient Position: At rest)   Pulse 65   Temp 97.8 °F (36.6 °C)   Resp 18   Ht 5' 5\" (1.651 m)   Wt 113.9 kg (251 lb)   SpO2 98%   BMI 41.77 kg/m²      Tmax/24hrs: Temp (24hrs), Av.7 °F (36.5 °C), Min:96.3 °F (35.7 °C), Max:98.9 °F (37.2 °C)    Input/Output:     Intake/Output Summary (Last 24 hours) at 2020 1743  Last data filed at 2020 1340  Gross per 24 hour   Intake 830 ml   Output 800 ml   Net 30 ml       General:  Awake, alert  Cardiovascular:  S1S2+, RRR  Pulmonary: Decreased breath sounds bilateral bases  GI:  Soft, BS+, NT, ND  Extremities:  + edema        Labs:    Recent Results (from the past 24 hour(s))   GLUCOSE, POC    Collection Time: 20 10:17 PM   Result Value Ref Range    Glucose (POC) 202 (H) 70 - 559 mg/dL   METABOLIC PANEL, BASIC    Collection Time: 20  3:08 AM   Result Value Ref Range    Sodium 136 136 - 145 mmol/L    Potassium 4.2 3.5 - 5.5 mmol/L    Chloride 102 100 - 111 mmol/L    CO2 24 21 - 32 mmol/L    Anion gap 10 3.0 - 18 mmol/L Glucose 114 (H) 74 - 99 mg/dL    BUN 55 (H) 7.0 - 18 MG/DL    Creatinine 2.84 (H) 0.6 - 1.3 MG/DL    BUN/Creatinine ratio 19 12 - 20      GFR est AA 20 (L) >60 ml/min/1.73m2    GFR est non-AA 16 (L) >60 ml/min/1.73m2    Calcium 7.7 (L) 8.5 - 10.1 MG/DL   MAGNESIUM    Collection Time: 04/25/20  3:08 AM   Result Value Ref Range    Magnesium 2.6 1.6 - 2.6 mg/dL   CK    Collection Time: 04/25/20  3:08 AM   Result Value Ref Range    CK 49 26 - 192 U/L   D DIMER    Collection Time: 04/25/20  3:08 AM   Result Value Ref Range    D DIMER 2.00 (H) <0.46 ug/ml(FEU)   HEPATIC FUNCTION PANEL    Collection Time: 04/25/20  3:08 AM   Result Value Ref Range    Protein, total 6.6 6.4 - 8.2 g/dL    Albumin 3.2 (L) 3.4 - 5.0 g/dL    Globulin 3.4 2.0 - 4.0 g/dL    A-G Ratio 0.9 0.8 - 1.7      Bilirubin, total 0.5 0.2 - 1.0 MG/DL    Bilirubin, direct <0.1 0.0 - 0.2 MG/DL    Alk.  phosphatase 67 45 - 117 U/L    AST (SGOT) 14 10 - 38 U/L    ALT (SGPT) 13 13 - 56 U/L   LD    Collection Time: 04/25/20  3:08 AM   Result Value Ref Range     81 - 234 U/L   C REACTIVE PROTEIN, QT    Collection Time: 04/25/20  3:08 AM   Result Value Ref Range    C-Reactive protein 0.4 (H) 0 - 0.3 mg/dL   NT-PRO BNP    Collection Time: 04/25/20  3:08 AM   Result Value Ref Range    NT pro-BNP 8,527 (H) 0 - 900 PG/ML   FERRITIN    Collection Time: 04/25/20  3:08 AM   Result Value Ref Range    Ferritin 93 8 - 388 NG/ML   GLUCOSE, POC    Collection Time: 04/25/20  7:52 AM   Result Value Ref Range    Glucose (POC) 121 (H) 70 - 110 mg/dL   GLUCOSE, POC    Collection Time: 04/25/20 11:30 AM   Result Value Ref Range    Glucose (POC) 177 (H) 70 - 110 mg/dL   ECHO ADULT FOLLOW-UP OR LIMITED    Collection Time: 04/25/20  4:07 PM   Result Value Ref Range    Ao Root D 3.44 cm    LVIDd 5.03 3.9 - 5.3 cm    LVPWd 1.05 (A) 0.6 - 0.9 cm    LVIDs 3.74 cm    IVSd 1.11 (A) 0.6 - 0.9 cm    LV ES Vol A4C 48.6 mL    LV Ejection Fraction MOD 4C 62 %    LV Mass .3 (A) 67 - 162 g    LV Mass AL Index 110.8 43 - 95 g/m2    LV ED Vol A4C 126.3 mL    Left Atrium Major Axis 4.23 cm    Triscuspid Valve Regurgitation Peak Gradient 28.9 mmHg    TR Max Velocity 268.68 cm/s    LVED Vol Index A4C 58.0 mL/m2    LVES Vol Index A4C 22.3 mL/m2    Left Ventricular Fractional Shortening by 2D 88.316531394 %   GLUCOSE, POC    Collection Time: 04/25/20  4:29 PM   Result Value Ref Range    Glucose (POC) 173 (H) 70 - 110 mg/dL     Additional Data Reviewed:      Signed By: Paradise Peña MD     April 25, 2020

## 2020-04-25 NOTE — ROUTINE PROCESS
Bedside and Verbal shift change report given to Fabian Garrison RN (oncoming nurse) by Brittnee Virk RN 
 (offgoing nurse). Report included the following information SBAR, Kardex, Intake/Output and MAR.

## 2020-04-25 NOTE — PROGRESS NOTES
CXR looks much better  Still lots of edema , Continue IV bumex 1mg q12hrs     Please call with questions    Conrado Yu MD FASN  Cell [phone]  Pager: [phone]

## 2020-04-26 LAB
ALBUMIN SERPL-MCNC: 3.4 G/DL (ref 3.4–5)
ALBUMIN/GLOB SERPL: 1.1 {RATIO} (ref 0.8–1.7)
ALP SERPL-CCNC: 75 U/L (ref 45–117)
ALT SERPL-CCNC: 16 U/L (ref 13–56)
ANION GAP SERPL CALC-SCNC: 9 MMOL/L (ref 3–18)
AST SERPL-CCNC: 17 U/L (ref 10–38)
BASOPHILS # BLD: 0 K/UL (ref 0–0.1)
BASOPHILS NFR BLD: 0 % (ref 0–2)
BILIRUB DIRECT SERPL-MCNC: <0.1 MG/DL (ref 0–0.2)
BILIRUB SERPL-MCNC: 0.3 MG/DL (ref 0.2–1)
BNP SERPL-MCNC: 6496 PG/ML (ref 0–900)
BUN SERPL-MCNC: 60 MG/DL (ref 7–18)
BUN/CREAT SERPL: 19 (ref 12–20)
CALCIUM SERPL-MCNC: 7.7 MG/DL (ref 8.5–10.1)
CHLORIDE SERPL-SCNC: 103 MMOL/L (ref 100–111)
CK SERPL-CCNC: 49 U/L (ref 26–192)
CO2 SERPL-SCNC: 24 MMOL/L (ref 21–32)
CREAT SERPL-MCNC: 3.21 MG/DL (ref 0.6–1.3)
CRP SERPL-MCNC: 0.3 MG/DL (ref 0–0.3)
D DIMER PPP FEU-MCNC: 1.71 UG/ML(FEU)
DIFFERENTIAL METHOD BLD: ABNORMAL
ECHO AO ROOT DIAM: 3.44 CM
ECHO LA MAJOR AXIS: 4.23 CM
ECHO LV EDV A4C: 126.3 ML
ECHO LV EDV INDEX A4C: 58 ML/M2
ECHO LV EJECTION FRACTION A4C: 62 %
ECHO LV ESV A4C: 48.6 ML
ECHO LV ESV INDEX A4C: 22.3 ML/M2
ECHO LV INTERNAL DIMENSION DIASTOLIC: 5.03 CM (ref 3.9–5.3)
ECHO LV INTERNAL DIMENSION SYSTOLIC: 3.74 CM
ECHO LV IVSD: 1.11 CM (ref 0.6–0.9)
ECHO LV MASS 2D: 241.3 G (ref 67–162)
ECHO LV MASS INDEX 2D: 110.8 G/M2 (ref 43–95)
ECHO LV POSTERIOR WALL DIASTOLIC: 1.05 CM (ref 0.6–0.9)
ECHO TV REGURGITANT MAX VELOCITY: 268.68 CM/S
ECHO TV REGURGITANT PEAK GRADIENT: 28.9 MMHG
EOSINOPHIL # BLD: 0 K/UL (ref 0–0.4)
EOSINOPHIL NFR BLD: 0 % (ref 0–5)
ERYTHROCYTE [DISTWIDTH] IN BLOOD BY AUTOMATED COUNT: 14.3 % (ref 11.6–14.5)
FERRITIN SERPL-MCNC: 96 NG/ML (ref 8–388)
GLOBULIN SER CALC-MCNC: 3 G/DL (ref 2–4)
GLUCOSE BLD STRIP.AUTO-MCNC: 133 MG/DL (ref 70–110)
GLUCOSE BLD STRIP.AUTO-MCNC: 170 MG/DL (ref 70–110)
GLUCOSE BLD STRIP.AUTO-MCNC: 181 MG/DL (ref 70–110)
GLUCOSE BLD STRIP.AUTO-MCNC: 194 MG/DL (ref 70–110)
GLUCOSE SERPL-MCNC: 125 MG/DL (ref 74–99)
HCT VFR BLD AUTO: 25 % (ref 35–45)
HGB BLD-MCNC: 7.8 G/DL (ref 12–16)
LDH SERPL L TO P-CCNC: 227 U/L (ref 81–234)
LVFS 2D: 25.66 %
LYMPHOCYTES # BLD: 1.2 K/UL (ref 0.9–3.6)
LYMPHOCYTES NFR BLD: 20 % (ref 21–52)
MAGNESIUM SERPL-MCNC: 2.7 MG/DL (ref 1.6–2.6)
MCH RBC QN AUTO: 27.7 PG (ref 24–34)
MCHC RBC AUTO-ENTMCNC: 31.2 G/DL (ref 31–37)
MCV RBC AUTO: 88.7 FL (ref 74–97)
MONOCYTES # BLD: 0.4 K/UL (ref 0.05–1.2)
MONOCYTES NFR BLD: 7 % (ref 3–10)
NEUTS SEG # BLD: 4.3 K/UL (ref 1.8–8)
NEUTS SEG NFR BLD: 73 % (ref 40–73)
PLATELET # BLD AUTO: 173 K/UL (ref 135–420)
PMV BLD AUTO: 11.2 FL (ref 9.2–11.8)
POTASSIUM SERPL-SCNC: 4.4 MMOL/L (ref 3.5–5.5)
PROT SERPL-MCNC: 6.4 G/DL (ref 6.4–8.2)
RBC # BLD AUTO: 2.82 M/UL (ref 4.2–5.3)
SODIUM SERPL-SCNC: 136 MMOL/L (ref 136–145)
WBC # BLD AUTO: 5.9 K/UL (ref 4.6–13.2)

## 2020-04-26 PROCEDURE — 86140 C-REACTIVE PROTEIN: CPT

## 2020-04-26 PROCEDURE — 74011250637 HC RX REV CODE- 250/637: Performed by: PHYSICIAN ASSISTANT

## 2020-04-26 PROCEDURE — 85379 FIBRIN DEGRADATION QUANT: CPT

## 2020-04-26 PROCEDURE — 74011250636 HC RX REV CODE- 250/636: Performed by: HOSPITALIST

## 2020-04-26 PROCEDURE — 74011250637 HC RX REV CODE- 250/637: Performed by: INTERNAL MEDICINE

## 2020-04-26 PROCEDURE — 74011000250 HC RX REV CODE- 250: Performed by: INTERNAL MEDICINE

## 2020-04-26 PROCEDURE — 80048 BASIC METABOLIC PNL TOTAL CA: CPT

## 2020-04-26 PROCEDURE — 74011636637 HC RX REV CODE- 636/637: Performed by: EMERGENCY MEDICINE

## 2020-04-26 PROCEDURE — 85025 COMPLETE CBC W/AUTO DIFF WBC: CPT

## 2020-04-26 PROCEDURE — 83735 ASSAY OF MAGNESIUM: CPT

## 2020-04-26 PROCEDURE — 83615 LACTATE (LD) (LDH) ENZYME: CPT

## 2020-04-26 PROCEDURE — 74011250637 HC RX REV CODE- 250/637: Performed by: HOSPITALIST

## 2020-04-26 PROCEDURE — 74011250637 HC RX REV CODE- 250/637: Performed by: EMERGENCY MEDICINE

## 2020-04-26 PROCEDURE — 36415 COLL VENOUS BLD VENIPUNCTURE: CPT

## 2020-04-26 PROCEDURE — 82962 GLUCOSE BLOOD TEST: CPT

## 2020-04-26 PROCEDURE — 82550 ASSAY OF CK (CPK): CPT

## 2020-04-26 PROCEDURE — 83880 ASSAY OF NATRIURETIC PEPTIDE: CPT

## 2020-04-26 PROCEDURE — 82728 ASSAY OF FERRITIN: CPT

## 2020-04-26 PROCEDURE — 74011636637 HC RX REV CODE- 636/637: Performed by: PHYSICIAN ASSISTANT

## 2020-04-26 PROCEDURE — 65660000000 HC RM CCU STEPDOWN

## 2020-04-26 PROCEDURE — 80076 HEPATIC FUNCTION PANEL: CPT

## 2020-04-26 RX ORDER — SENNOSIDES 8.6 MG/1
1 TABLET ORAL DAILY
Status: DISCONTINUED | OUTPATIENT
Start: 2020-04-27 | End: 2020-04-26

## 2020-04-26 RX ORDER — SENNOSIDES 8.6 MG/1
1 TABLET ORAL DAILY
Status: DISCONTINUED | OUTPATIENT
Start: 2020-04-26 | End: 2020-04-28 | Stop reason: HOSPADM

## 2020-04-26 RX ORDER — POLYETHYLENE GLYCOL 3350 17 G/17G
17 POWDER, FOR SOLUTION ORAL DAILY
Status: DISCONTINUED | OUTPATIENT
Start: 2020-04-27 | End: 2020-04-28 | Stop reason: HOSPADM

## 2020-04-26 RX ADMIN — PANTOPRAZOLE SODIUM 40 MG: 40 TABLET, DELAYED RELEASE ORAL at 16:11

## 2020-04-26 RX ADMIN — Medication 10 ML: at 16:10

## 2020-04-26 RX ADMIN — HYDRALAZINE HYDROCHLORIDE 50 MG: 50 TABLET, FILM COATED ORAL at 16:11

## 2020-04-26 RX ADMIN — CARVEDILOL 25 MG: 25 TABLET, FILM COATED ORAL at 09:29

## 2020-04-26 RX ADMIN — AMLODIPINE BESYLATE 5 MG: 5 TABLET ORAL at 09:29

## 2020-04-26 RX ADMIN — SENNOSIDES 8.6 MG: 8.6 TABLET, FILM COATED ORAL at 17:15

## 2020-04-26 RX ADMIN — Medication 10 ML: at 10:14

## 2020-04-26 RX ADMIN — BUMETANIDE 1 MG: 0.25 INJECTION INTRAMUSCULAR; INTRAVENOUS at 09:30

## 2020-04-26 RX ADMIN — INSULIN GLARGINE 4 UNITS: 100 INJECTION, SOLUTION SUBCUTANEOUS at 22:23

## 2020-04-26 RX ADMIN — CARVEDILOL 25 MG: 25 TABLET, FILM COATED ORAL at 16:11

## 2020-04-26 RX ADMIN — ISOSORBIDE MONONITRATE 30 MG: 30 TABLET, EXTENDED RELEASE ORAL at 09:29

## 2020-04-26 RX ADMIN — CITALOPRAM HYDROBROMIDE 20 MG: 20 TABLET ORAL at 09:29

## 2020-04-26 RX ADMIN — PANTOPRAZOLE SODIUM 40 MG: 40 TABLET, DELAYED RELEASE ORAL at 09:29

## 2020-04-26 RX ADMIN — AZITHROMYCIN MONOHYDRATE 500 MG: 500 INJECTION, POWDER, LYOPHILIZED, FOR SOLUTION INTRAVENOUS at 00:51

## 2020-04-26 RX ADMIN — HEPARIN SODIUM 5000 UNITS: 5000 INJECTION INTRAVENOUS; SUBCUTANEOUS at 22:10

## 2020-04-26 RX ADMIN — ROSUVASTATIN CALCIUM 5 MG: 10 TABLET, COATED ORAL at 22:02

## 2020-04-26 RX ADMIN — HYDRALAZINE HYDROCHLORIDE 50 MG: 50 TABLET, FILM COATED ORAL at 09:29

## 2020-04-26 RX ADMIN — HYDRALAZINE HYDROCHLORIDE 50 MG: 50 TABLET, FILM COATED ORAL at 22:01

## 2020-04-26 RX ADMIN — HEPARIN SODIUM 5000 UNITS: 5000 INJECTION INTRAVENOUS; SUBCUTANEOUS at 09:30

## 2020-04-26 RX ADMIN — ACETAMINOPHEN 650 MG: 325 TABLET ORAL at 11:29

## 2020-04-26 RX ADMIN — INSULIN LISPRO 2 UNITS: 100 INJECTION, SOLUTION INTRAVENOUS; SUBCUTANEOUS at 11:28

## 2020-04-26 RX ADMIN — Medication 10 ML: at 22:02

## 2020-04-26 RX ADMIN — ASPIRIN 81 MG CHEWABLE TABLET 81 MG: 81 TABLET CHEWABLE at 09:29

## 2020-04-26 RX ADMIN — Medication 500 MG: at 22:07

## 2020-04-26 RX ADMIN — Medication 1 CAPSULE: at 09:29

## 2020-04-26 RX ADMIN — Medication 500 MG: at 09:29

## 2020-04-26 RX ADMIN — CHOLECALCIFEROL (VITAMIN D3) 10 MCG (400 UNIT) TABLET 5 TABLET: at 09:29

## 2020-04-26 RX ADMIN — ACETAMINOPHEN 650 MG: 325 TABLET ORAL at 19:47

## 2020-04-26 RX ADMIN — INSULIN LISPRO 2 UNITS: 100 INJECTION, SOLUTION INTRAVENOUS; SUBCUTANEOUS at 22:24

## 2020-04-26 RX ADMIN — HEPARIN SODIUM 5000 UNITS: 5000 INJECTION INTRAVENOUS; SUBCUTANEOUS at 16:10

## 2020-04-26 RX ADMIN — INSULIN LISPRO 2 UNITS: 100 INJECTION, SOLUTION INTRAVENOUS; SUBCUTANEOUS at 17:15

## 2020-04-26 NOTE — ROUTINE PROCESS
Received report on pt.from off going RN. Resting quietly in bed on rounds. Denies c/o pain or SOB at this time. No acute distress noted. Call bell at side. Will cont to monitor for any changes in status. 0930 sitting up in bed. Denies c/o pain. Still c/o some SOB with activity. 1030 c/o constipation. Will obtain order from MD.  
 
1300 remains in recliner. Encouraged pt to try to keep legs elevated. 1920 Bedside and Verbal shift change report given to Heide Mcfarlane (oncoming nurse) by Rosas Pleitez RN (offgoing nurse). Report given with Myriam SINGH and MAR.

## 2020-04-26 NOTE — PROGRESS NOTES
[facility] Hospitalist Group  Progress Note    Patient: Amado Martin Age: 71 y.o. : 1950 MR#: [id number] SSN: [id number]  Date/Time: 2020     Subjective:     Review of systems**    No CP   NO NVD  No SOB  NO Cough     Assessment/Plan:   1. Acute on chronic diastolic heart failure  2 CKD 3  3 pedal edema  4 anemia of chronic illness  5 COVID 19 infection ruled out  6 hypertension    COVID Negative - DC isolation / D?w nursing       Plan    -Diuretic is on hold due to worsening renal failure, management of diuretics per renal  -Follow-up with cardiology  -Monitor H&H periodically  -Monitor renal function while patient is on diuretics      Case discussed with:  []Patient  [] Family ( In room with patient )    []Nursing  []Case Management  DVT Prophylaxis:  []Lovenox  []Hep SQ  []SCDs  []Coumadin   []On Heparin gtt          Objective:   VS:   Visit Vitals  /59 (BP 1 Location: Left arm, BP Patient Position: Sitting)   Pulse 62   Temp 97.6 °F (36.4 °C)   Resp 20   Ht 5' 5\" (1.651 m)   Wt 114.4 kg (252 lb 4.8 oz)   SpO2 97%   BMI 41.98 kg/m²      Tmax/24hrs: Temp (24hrs), Av.5 °F (36.4 °C), Min:96.8 °F (36 °C), Max:98.5 °F (36.9 °C)  IOBRIEF    Intake/Output Summary (Last 24 hours) at 2020 1200  Last data filed at 2020 0321  Gross per 24 hour   Intake 240 ml   Output 400 ml   Net -160 ml       General:  Alert, cooperative, no acute distress . Cardiovascular: S1S2 - regular , No Murmur   Pulmonary: Equal expansion , No Use of accessory muscles , No Rales No Rhonchi    GI:  +BS in all four quadrants, soft, non-tender  Extremities:  2 + bilateral  edema; Neuro: Alert and oriented X 2.        Medications:   Current Facility-Administered Medications   Medication Dose Route Frequency    [Held by provider] bumetanide (BUMEX) injection 1 mg  1 mg IntraVENous Q12H    isosorbide mononitrate ER (IMDUR) tablet 30 mg  30 mg Oral DAILY    cholecalciferol (VITAMIN D3) (400 Units /10 mcg) tablet 5 Tab  2,000 Units Oral DAILY    zinc sulfate (ZINCATE) 220 (50) mg capsule 1 Cap  1 Cap Oral DAILY    aspirin chewable tablet 81 mg  81 mg Oral DAILY    rosuvastatin (CRESTOR) tablet 5 mg  5 mg Oral QHS    insulin glargine (LANTUS) injection 4 Units  4 Units SubCUTAneous QHS    azithromycin (ZITHROMAX) 500 mg in  mL  500 mg IntraVENous Q24H    sodium chloride (NS) flush 5-40 mL  5-40 mL IntraVENous Q8H    sodium chloride (NS) flush 5-40 mL  5-40 mL IntraVENous PRN    acetaminophen (TYLENOL) tablet 650 mg  650 mg Oral Q4H PRN    morphine injection 2 mg  2 mg IntraVENous Q2H PRN    naloxone (NARCAN) injection 0.4 mg  0.4 mg IntraVENous PRN    heparin (porcine) injection 5,000 Units  5,000 Units SubCUTAneous Q8H    carvediloL (COREG) tablet 25 mg  25 mg Oral BID WITH MEALS    citalopram (CELEXA) tablet 20 mg  20 mg Oral DAILY    pantoprazole (PROTONIX) tablet 40 mg  40 mg Oral ACB&D    insulin lispro (HUMALOG) injection   SubCUTAneous AC&HS    glucose chewable tablet 16 g  16 g Oral PRN    glucagon (GLUCAGEN) injection 1 mg  1 mg IntraMUSCular PRN    dextrose (D50W) injection syrg 12.5-25 g  25-50 mL IntraVENous PRN    ascorbic acid (vitamin C) (VITAMIN C) tablet 500 mg  500 mg Oral BID    hydrALAZINE (APRESOLINE) tablet 50 mg  50 mg Oral TID    amLODIPine (NORVASC) tablet 5 mg  5 mg Oral DAILY       Labs:    Recent Labs     04/26/20  0218 04/24/20  0234   WBC 5.9 7.7   HGB 7.8* 7.9*   HCT 25.0* 25.6*    189     Recent Labs     04/26/20  0218 04/25/20  0308 04/24/20  0234    136 136   K 4.4 4.2 4.0    102 102   CO2 24 24 26   * 114* 118*   BUN 60* 55* 49*   CREA 3.21* 2.84* 2.54*   CA 7.7* 7.7* 8.0*   MG 2.7* 2.6 2.6   ALB 3.4 3.2* 3.1*   SGOT 17 14 18   ALT 16 13 13         Disclaimer: Sections of this note are dictated utilizing voice recognition software, which may have resulted in some phonetic based errors in grammar and contents. Even though attempts were made to correct all the mistakes, some may have been missed, and remained in the body of the document. If questions arise, please contact our department.     Signed By: Karla Mckeon MD     April 26, 2020

## 2020-04-26 NOTE — ROUTINE PROCESS
Bedside and Verbal shift change report given to Raya Matos RN (oncoming nurse) by Zaire Kraus RN (offgoing nurse). Report included the following information SBAR, Kardex, Intake/Output, MAR and Recent Results. Patient alert and resting quietly in bed. Call bell and phone in reach.

## 2020-04-26 NOTE — PROGRESS NOTES
[facility]  -  Progress Note      Patient: Clara Escalona MRN: [id number]  SSN: [id number]    YOB: 1950  Age: 71 y.o. Sex: female      Admit Date: 4/22/2020    Assessment:     Hospital Problems  Date Reviewed: 4/23/2020          Codes Class Noted POA    CHF (congestive heart failure) ([facility] ICD-10-CM: I50.9  ICD-9-CM: 428.0  4/25/2020 Unknown        * (Principal) Acute on chronic diastolic congest[facility] ICD-10-CM: I50.33  ICD-9-CM: 428.33, 428.0  4/23/2020 Yes        Suspected Covid-19 Virus Infection ICD-10-CM: R68.89  4/23/2020 Yes        Type 2 diabetes mellitus without complication, without long-term current use of insulin (HCC) (Chronic) ICD-10-CM: E11.9  ICD-9-CM: 250.00  7/6/2018 Yes        HTN (hypertension), benign (Chronic) ICD-10-CM: I10  ICD-9-CM: 401.1  7/6/2018 Yes    Overview Signed 4/23/2020  7:02 AM by Jf Wick DO     Last Assessment & Plan:   Normotensive today in office             Bilateral lower extremity edema ICD-10-CM: R60.0  ICD-9-CM: 782.3  7/6/2018 Yes            -Presented with worsening shortness of breath in setting of HFpEF. Covid negative.  -Acute on chronic HFpEF in setting of poorly controlled hypertension. EF 55-60% with aortic sclerosis, trace AI, trace MR, PASP 40 mmHg by echo 8/2019.   -Hypertensive urgency with SBP >200. Reports compliance but does not check her BP at home.  -Chronic kidney disease Stage III-IV. Renal following and with Bumex 1mg q12 hour dosing.  -Coronary disease reportedly diffuse medically managed, nuclear stress 3/2018 with small area of ischemia with EF 53%. -Diabetes mellitus. HgbA1c 6.1.  -Dyslipidemia. Previously on Crestor.  -Chronic atypical LBBB.  No acute EKG changes this admission.  -Acute on chronic anemia.     Primary cardiologist Dr. Claudine Adams at [facility]. Plan:     Feeling well and breathing easier with less complaints despite only being -160cc's from yesterday.  Her edema to her legs is about the same  Creatinine up to 3.21 this am.  Renal following and will defer Bumex management to them. COVID negative  Continue with current BP management, which is stable. Subjective:     No new complaints. Objective:      Patient Vitals for the past 8 hrs:   Temp Pulse Resp BP SpO2   04/26/20 0904 98.5 °F (36.9 °C) 71 18 145/71 91 %   04/26/20 0401 96.8 °F (36 °C) 70 20 155/66 97 %         Patient Vitals for the past 96 hrs:   Weight   04/26/20 0425 114.4 kg (252 lb 4.8 oz)   04/25/20 1606 113.9 kg (251 lb)   04/25/20 0400 114.1 kg (251 lb 8 oz)   04/22/20 2300 104.3 kg (230 lb)         Intake/Output Summary (Last 24 hours) at 4/26/2020 1059  Last data filed at 4/26/2020 0321  Gross per 24 hour   Intake 240 ml   Output 400 ml   Net -160 ml       Physical Exam:  General:  alert, cooperative, no distress, appears stated age  Neck:  nontender, no JVD  Lungs:  clear to auscultation bilaterally  Heart:  regular rate and rhythm, S1, S2 normal, no murmur, click, rub or gallop  Extremities:  extremities normal, atraumatic, no cyanosis, 1-2+ edema    Data Review:     Labs: Results:       Chemistry Recent Labs     04/26/20 0218 04/25/20  0308 04/24/20  0234   * 114* 118*    136 136   K 4.4 4.2 4.0    102 102   CO2 24 24 26   BUN 60* 55* 49*   CREA 3.21* 2.84* 2.54*   CA 7.7* 7.7* 8.0*   MG 2.7* 2.6 2.6   AGAP 9 10 8   BUCR 19 19 19   AP 75 67 73   TP 6.4 6.6 6.6   ALB 3.4 3.2* 3.1*   GLOB 3.0 3.4 3.5   AGRAT 1.1 0.9 0.9      CBC w/Diff Recent Labs     04/26/20 0218 04/24/20  0234   WBC 5.9 7.7   RBC 2.82* 2.85*   HGB 7.8* 7.9*   HCT 25.0* 25.6*    189   GRANS 73 75*   LYMPH 20* 17*   EOS 0 0      Cardiac Enzymes Lab Results   Component Value Date/Time    CPK 49 04/26/2020 02:18 AM      Coagulation No results for input(s): PTP, INR, APTT, INREXT in the last 72 hours.     Lipid Panel Lab Results   Component Value Date/Time    Cholesterol, total 182 04/24/2020 02:34 AM    HDL Cholesterol 41 04/24/2020 02:34 AM    LDL, calculated 112.8 (H) 04/24/2020 02:34 AM    VLDL, calculated 28.2 04/24/2020 02:34 AM    Triglyceride 141 04/24/2020 02:34 AM    CHOL/HDL Ratio 4.4 04/24/2020 02:34 AM      BNP No results found for: BNP, BNPP, XBNPT   Liver Enzymes Recent Labs     04/26/20  0218   TP 6.4   ALB 3.4   AP 75   SGOT 17      Digoxin    Thyroid Studies No results found for: T4, T3U, TSH, TSHEXT

## 2020-04-26 NOTE — PROGRESS NOTES
In Patient Progress note      Admit Date: 4/22/2020    Impression:     #1 acute kidney injury on chronic kidney disease stage III/IV, baseline unclear, She also has significant proteinuria which goes in the favor of diabetic nephropathy as her underlying disease. Creatinine appears to be trending up in the setting of cardiorenal syndrome type I and II. Volume status appears to have improved and patient feels breathing back to baseline  #2.   Acute on chronic diastolic CHF, improved , CXR has improved , volume status better  #3 hypertensive urgency, blood pressures better , no need to control very aggressively in acute setting  #4 diabetes mellitus with complications, does have significant proteinuria  #5 acute on chronic anemia, check iron studies  #6 hypoalbuminemia  #7 constipation , wants a laxative , will add mirilax      Recommendations:  #1 hold diuretics as renal parameters slightly worse   #2 for lower extremity swelling needs local measures, have discussed with nursing about   Compression stockings   #2 strict ins and outs needed, daily weights, continue fluid restriction 1200 ml daily  #3 hold off  ACE inhibitor's, check renal panel in AM   #4 quantify proteinuria( still not send)  #5 avoid IV contrast or other nephrotoxins, NSAID's     Please call with questions,     Jazzy Jha MD FASN  Cell [phone]  Pager: [phone]     Subjective:     Denies SOB  Feels better  Does have exertional dyspnea   BP controlled       Current Facility-Administered Medications:     [Held by provider] bumetanide (BUMEX) injection 1 mg, 1 mg, IntraVENous, Q12H, Maxwell Vitale MD, 1 mg at 04/26/20 0930    isosorbide mononitrate ER (IMDUR) tablet 30 mg, 30 mg, Oral, DAILY, Clarissa Garcia PA, 30 mg at 04/26/20 0929    cholecalciferol (VITAMIN D3) (400 Units /10 mcg) tablet 5 Tab, 2,000 Units, Oral, DAILY, Geovany Carlos MD, 5 Tab at 04/26/20 0929    zinc sulfate (ZINCATE) 220 (50) mg capsule 1 Cap, 1 Cap, Oral, DAILY, Latesha Hollis MD, 1 Cap at 04/26/20 9043    aspirin chewable tablet 81 mg, 81 mg, Oral, DAILY, Geovany Carlos MD, 81 mg at 04/26/20 0929    rosuvastatin (CRESTOR) tablet 5 mg, 5 mg, Oral, QHS, Geovany Carlos MD, 5 mg at 04/25/20 2201    insulin glargine (LANTUS) injection 4 Units, 4 Units, SubCUTAneous, QHS, Geovany Carlos MD, 4 Units at 04/25/20 2148    azithromycin (ZITHROMAX) 500 mg in  mL, 500 mg, IntraVENous, Q24H, Lorretta Ion A, DO, 500 mg at 04/26/20 0051    sodium chloride (NS) flush 5-40 mL, 5-40 mL, IntraVENous, Q8H, VelCasandra Galdamez A, DO, 10 mL at 04/26/20 1014    sodium chloride (NS) flush 5-40 mL, 5-40 mL, IntraVENous, PRN, Aden Ash DO    acetaminophen (TYLENOL) tablet 650 mg, 650 mg, Oral, Q4H PRN, Aden Ash DO, 650 mg at 04/26/20 1129    morphine injection 2 mg, 2 mg, IntraVENous, Q2H PRN, Aden Ash DO    naloxone [facility]) injection 0.4 mg, 0.4 mg, IntraVENous, PRN, Lorretta Ion A, DO    heparin (porcine) injection 5,000 Units, 5,000 Units, SubCUTAneous, Q8H, Aden Ash DO, 5,000 Units at 04/26/20 0930    carvediloL (COREG) tablet 25 mg, 25 mg, Oral, BID WITH MEALS, Lorretta Ion A, DO, 25 mg at 04/26/20 6875    citalopram (CELEXA) tablet 20 mg, 20 mg, Oral, DAILY, Lorretta Ion A, DO, 20 mg at 04/26/20 8819    pantoprazole (PROTONIX) tablet 40 mg, 40 mg, Oral, ACB&D, Lorretta Ion A, DO, 40 mg at 04/26/20 0929    insulin lispro (HUMALOG) injection, , SubCUTAneous, AC&HS, Serenity Islas, 2 Units at 04/26/20 1128    glucose chewable tablet 16 g, 16 g, Oral, PRN, Ani Brower PA    glucagon (GLUCAGEN) injection 1 mg, 1 mg, IntraMUSCular, PRN, Ani Brower PA    dextrose (D50W) injection syrg 12.5-25 g, 25-50 mL, IntraVENous, PRN, Ani Brower [place]    ascorbic acid (vitamin C) (VITAMIN C) tablet 500 mg, 500 mg, Oral, BID, Ani Brower [place], 500 mg at 04/26/20 0929    hydrALAZINE (APRESOLINE) tablet 50 mg, 50 mg, Oral, TID, José Marks MD, 50 mg at 04/26/20 0929    amLODIPine (NORVASC) tablet 5 mg, 5 mg, Oral, DAILY, José Marks MD, 5 mg at 04/26/20 0084        Objective:     Visit Vitals  /59 (BP 1 Location: Left arm, BP Patient Position: Sitting)   Pulse 62   Temp 97.6 °F (36.4 °C)   Resp 20   Ht 5' 5\" (1.651 m)   Wt 114.4 kg (252 lb 4.8 oz)   SpO2 97%   BMI 41.98 kg/m²         Intake/Output Summary (Last 24 hours) at 4/26/2020 1224  Last data filed at 4/26/2020 0321  Gross per 24 hour   Intake 240 ml   Output 400 ml   Net -160 ml       Physical Exam:     Patient is in no apparent distress. HEENT: mmm  Neck: no cervical lymphadenopathy or thyromegaly. Lungs: good air entry, clear to auscultation bilaterally. Cardiovascular system: S1, S2, regular rate and rhythm. No JVD  Abdomen: soft, non tender, non distended. BS+  Extremities: 2+ LE edema   Neurologic: Alert, oriented time three.     Data Review:    Recent Labs     04/26/20  0218   WBC 5.9   RBC 2.82*   HCT 25.0*   MCV 88.7   MCH 27.7   MCHC 31.2   RDW 14.3     Recent Labs     04/26/20  0218 04/25/20  0308 04/24/20  0234   BUN 60* 55* 49*   CREA 3.21* 2.84* 2.54*   CA 7.7* 7.7* 8.0*   ALB 3.4 3.2* 3.1*   K 4.4 4.2 4.0    136 136    102 102   CO2 24 24 26   * 114* 118*       Nguyễn Thomas MD

## 2020-04-26 NOTE — PROGRESS NOTES
Problem: Falls - Risk of  Goal: *Absence of Falls  Description: Document Danevang Led Fall Risk and appropriate interventions in the flowsheet.   Note: Fall Risk Interventions:            Medication Interventions: Patient to call before getting OOB, Teach patient to arise slowly

## 2020-04-27 ENCOUNTER — HOSPITAL ENCOUNTER (OUTPATIENT)
Dept: ULTRASOUND IMAGING | Age: 70
Discharge: HOME | DRG: 291 | End: 2020-04-27
Attending: INTERNAL MEDICINE
Payer: MEDICARE

## 2020-04-27 ENCOUNTER — HOME HEALTH ADMISSION (OUTPATIENT)
Dept: HOME HEALTH SERVICES | Facility: HOME HEALTH | Age: 70
End: 2020-04-27
Payer: MEDICARE

## 2020-04-27 LAB
ALBUMIN SERPL-MCNC: 3.4 G/DL (ref 3.4–5)
ALBUMIN/GLOB SERPL: 1 {RATIO} (ref 0.8–1.7)
ALP SERPL-CCNC: 71 U/L (ref 45–117)
ALT SERPL-CCNC: 19 U/L (ref 13–56)
ANION GAP SERPL CALC-SCNC: 13 MMOL/L (ref 3–18)
AST SERPL-CCNC: 21 U/L (ref 10–38)
BILIRUB DIRECT SERPL-MCNC: 0.1 MG/DL (ref 0–0.2)
BILIRUB SERPL-MCNC: 0.3 MG/DL (ref 0.2–1)
BNP SERPL-MCNC: 4833 PG/ML (ref 0–900)
BUN SERPL-MCNC: 64 MG/DL (ref 7–18)
BUN/CREAT SERPL: 19 (ref 12–20)
CALCIUM SERPL-MCNC: 8.1 MG/DL (ref 8.5–10.1)
CHLORIDE SERPL-SCNC: 102 MMOL/L (ref 100–111)
CK SERPL-CCNC: 47 U/L (ref 26–192)
CO2 SERPL-SCNC: 20 MMOL/L (ref 21–32)
CREAT SERPL-MCNC: 3.45 MG/DL (ref 0.6–1.3)
CRP SERPL-MCNC: <0.3 MG/DL (ref 0–0.3)
D DIMER PPP FEU-MCNC: 1.64 UG/ML(FEU)
FERRITIN SERPL-MCNC: 93 NG/ML (ref 8–388)
GLOBULIN SER CALC-MCNC: 3.3 G/DL (ref 2–4)
GLUCOSE BLD STRIP.AUTO-MCNC: 124 MG/DL (ref 70–110)
GLUCOSE BLD STRIP.AUTO-MCNC: 169 MG/DL (ref 70–110)
GLUCOSE BLD STRIP.AUTO-MCNC: 180 MG/DL (ref 70–110)
GLUCOSE BLD STRIP.AUTO-MCNC: 197 MG/DL (ref 70–110)
GLUCOSE SERPL-MCNC: 140 MG/DL (ref 74–99)
LDH SERPL L TO P-CCNC: 210 U/L (ref 81–234)
MAGNESIUM SERPL-MCNC: 2.8 MG/DL (ref 1.6–2.6)
PHOSPHATE SERPL-MCNC: 6 MG/DL (ref 2.5–4.9)
POTASSIUM SERPL-SCNC: 4.5 MMOL/L (ref 3.5–5.5)
PROT SERPL-MCNC: 6.7 G/DL (ref 6.4–8.2)
SODIUM SERPL-SCNC: 135 MMOL/L (ref 136–145)

## 2020-04-27 PROCEDURE — 74011250637 HC RX REV CODE- 250/637: Performed by: INTERNAL MEDICINE

## 2020-04-27 PROCEDURE — 82550 ASSAY OF CK (CPK): CPT

## 2020-04-27 PROCEDURE — 97161 PT EVAL LOW COMPLEX 20 MIN: CPT

## 2020-04-27 PROCEDURE — 2W1LX6Z COMPRESSION OF RIGHT LOWER EXTREMITY USING PRESSURE DRESSING: ICD-10-PCS | Performed by: EMERGENCY MEDICINE

## 2020-04-27 PROCEDURE — 65660000000 HC RM CCU STEPDOWN

## 2020-04-27 PROCEDURE — 97165 OT EVAL LOW COMPLEX 30 MIN: CPT

## 2020-04-27 PROCEDURE — 76770 US EXAM ABDO BACK WALL COMP: CPT

## 2020-04-27 PROCEDURE — 29580 STRAPPING UNNA BOOT: CPT

## 2020-04-27 PROCEDURE — 82728 ASSAY OF FERRITIN: CPT

## 2020-04-27 PROCEDURE — 80048 BASIC METABOLIC PNL TOTAL CA: CPT

## 2020-04-27 PROCEDURE — 74011636637 HC RX REV CODE- 636/637: Performed by: PHYSICIAN ASSISTANT

## 2020-04-27 PROCEDURE — 84100 ASSAY OF PHOSPHORUS: CPT

## 2020-04-27 PROCEDURE — 74011250637 HC RX REV CODE- 250/637: Performed by: EMERGENCY MEDICINE

## 2020-04-27 PROCEDURE — 82962 GLUCOSE BLOOD TEST: CPT

## 2020-04-27 PROCEDURE — 2W1MX6Z COMPRESSION OF LEFT LOWER EXTREMITY USING PRESSURE DRESSING: ICD-10-PCS | Performed by: EMERGENCY MEDICINE

## 2020-04-27 PROCEDURE — 74011636637 HC RX REV CODE- 636/637: Performed by: EMERGENCY MEDICINE

## 2020-04-27 PROCEDURE — 85379 FIBRIN DEGRADATION QUANT: CPT

## 2020-04-27 PROCEDURE — 83735 ASSAY OF MAGNESIUM: CPT

## 2020-04-27 PROCEDURE — 74011250637 HC RX REV CODE- 250/637: Performed by: HOSPITALIST

## 2020-04-27 PROCEDURE — 74011250636 HC RX REV CODE- 250/636: Performed by: HOSPITALIST

## 2020-04-27 PROCEDURE — 86140 C-REACTIVE PROTEIN: CPT

## 2020-04-27 PROCEDURE — 74011250637 HC RX REV CODE- 250/637: Performed by: PHYSICIAN ASSISTANT

## 2020-04-27 PROCEDURE — 77010033678 HC OXYGEN DAILY

## 2020-04-27 PROCEDURE — 80076 HEPATIC FUNCTION PANEL: CPT

## 2020-04-27 PROCEDURE — 83880 ASSAY OF NATRIURETIC PEPTIDE: CPT

## 2020-04-27 PROCEDURE — 83615 LACTATE (LD) (LDH) ENZYME: CPT

## 2020-04-27 PROCEDURE — 36415 COLL VENOUS BLD VENIPUNCTURE: CPT

## 2020-04-27 RX ORDER — AMLODIPINE BESYLATE 10 MG/1
10 TABLET ORAL DAILY
Qty: 30 TAB | Refills: 0 | Status: SHIPPED
Start: 2020-04-28 | End: 2020-04-28 | Stop reason: SDUPTHER

## 2020-04-27 RX ORDER — GUAIFENESIN 100 MG/5ML
81 LIQUID (ML) ORAL DAILY
Qty: 30 TAB | Refills: 0 | Status: SHIPPED
Start: 2020-04-28

## 2020-04-27 RX ORDER — INSULIN GLARGINE 100 [IU]/ML
INJECTION, SOLUTION SUBCUTANEOUS
Qty: 1 VIAL | Refills: 0 | Status: ON HOLD
Start: 2020-04-27 | End: 2020-06-20

## 2020-04-27 RX ORDER — AZITHROMYCIN 250 MG/1
500 TABLET, FILM COATED ORAL DAILY
Status: DISCONTINUED | OUTPATIENT
Start: 2020-04-28 | End: 2020-04-28 | Stop reason: HOSPADM

## 2020-04-27 RX ORDER — ISOSORBIDE MONONITRATE 30 MG/1
30 TABLET, EXTENDED RELEASE ORAL DAILY
Qty: 30 TAB | Refills: 0 | Status: SHIPPED
Start: 2020-04-28

## 2020-04-27 RX ORDER — ROSUVASTATIN CALCIUM 5 MG/1
5 TABLET, COATED ORAL
Qty: 30 TAB | Refills: 0 | Status: SHIPPED
Start: 2020-04-27 | End: 2020-04-28 | Stop reason: SDUPTHER

## 2020-04-27 RX ORDER — AMLODIPINE BESYLATE 10 MG/1
10 TABLET ORAL DAILY
Status: DISCONTINUED | OUTPATIENT
Start: 2020-04-27 | End: 2020-04-28 | Stop reason: HOSPADM

## 2020-04-27 RX ORDER — FUROSEMIDE 40 MG/1
20 TABLET ORAL 2 TIMES DAILY
Qty: 30 TAB | Refills: 0 | Status: SHIPPED
Start: 2020-04-27 | End: 2020-04-28 | Stop reason: SDUPTHER

## 2020-04-27 RX ORDER — POLYETHYLENE GLYCOL 3350 17 G/17G
17 POWDER, FOR SOLUTION ORAL DAILY
Qty: 10 PACKET | Refills: 0 | Status: SHIPPED
Start: 2020-04-28 | End: 2020-04-28 | Stop reason: SDUPTHER

## 2020-04-27 RX ADMIN — CARVEDILOL 25 MG: 25 TABLET, FILM COATED ORAL at 16:45

## 2020-04-27 RX ADMIN — HYDRALAZINE HYDROCHLORIDE 50 MG: 50 TABLET, FILM COATED ORAL at 16:46

## 2020-04-27 RX ADMIN — ACETAMINOPHEN 650 MG: 325 TABLET ORAL at 21:56

## 2020-04-27 RX ADMIN — PANTOPRAZOLE SODIUM 40 MG: 40 TABLET, DELAYED RELEASE ORAL at 07:48

## 2020-04-27 RX ADMIN — INSULIN LISPRO 2 UNITS: 100 INJECTION, SOLUTION INTRAVENOUS; SUBCUTANEOUS at 21:59

## 2020-04-27 RX ADMIN — Medication 10 ML: at 16:41

## 2020-04-27 RX ADMIN — INSULIN LISPRO 2 UNITS: 100 INJECTION, SOLUTION INTRAVENOUS; SUBCUTANEOUS at 13:40

## 2020-04-27 RX ADMIN — ROSUVASTATIN CALCIUM 5 MG: 10 TABLET, COATED ORAL at 21:57

## 2020-04-27 RX ADMIN — HYDRALAZINE HYDROCHLORIDE 50 MG: 50 TABLET, FILM COATED ORAL at 10:18

## 2020-04-27 RX ADMIN — POLYETHYLENE GLYCOL 3350 17 G: 17 POWDER, FOR SOLUTION ORAL at 10:18

## 2020-04-27 RX ADMIN — AZITHROMYCIN MONOHYDRATE 500 MG: 500 INJECTION, POWDER, LYOPHILIZED, FOR SOLUTION INTRAVENOUS at 01:19

## 2020-04-27 RX ADMIN — Medication 500 MG: at 10:18

## 2020-04-27 RX ADMIN — PANTOPRAZOLE SODIUM 40 MG: 40 TABLET, DELAYED RELEASE ORAL at 16:46

## 2020-04-27 RX ADMIN — HEPARIN SODIUM 5000 UNITS: 5000 INJECTION INTRAVENOUS; SUBCUTANEOUS at 07:47

## 2020-04-27 RX ADMIN — HYDRALAZINE HYDROCHLORIDE 50 MG: 50 TABLET, FILM COATED ORAL at 22:02

## 2020-04-27 RX ADMIN — Medication 10 ML: at 22:05

## 2020-04-27 RX ADMIN — Medication 500 MG: at 21:57

## 2020-04-27 RX ADMIN — ISOSORBIDE MONONITRATE 30 MG: 30 TABLET, EXTENDED RELEASE ORAL at 10:18

## 2020-04-27 RX ADMIN — CITALOPRAM HYDROBROMIDE 20 MG: 20 TABLET ORAL at 10:18

## 2020-04-27 RX ADMIN — HEPARIN SODIUM 5000 UNITS: 5000 INJECTION INTRAVENOUS; SUBCUTANEOUS at 22:03

## 2020-04-27 RX ADMIN — INSULIN LISPRO 2 UNITS: 100 INJECTION, SOLUTION INTRAVENOUS; SUBCUTANEOUS at 16:40

## 2020-04-27 RX ADMIN — SENNOSIDES 8.6 MG: 8.6 TABLET, FILM COATED ORAL at 10:18

## 2020-04-27 RX ADMIN — INSULIN GLARGINE 4 UNITS: 100 INJECTION, SOLUTION SUBCUTANEOUS at 21:56

## 2020-04-27 RX ADMIN — ASPIRIN 81 MG CHEWABLE TABLET 81 MG: 81 TABLET CHEWABLE at 10:18

## 2020-04-27 RX ADMIN — CHOLECALCIFEROL (VITAMIN D3) 10 MCG (400 UNIT) TABLET 5 TABLET: at 10:18

## 2020-04-27 RX ADMIN — CARVEDILOL 25 MG: 25 TABLET, FILM COATED ORAL at 07:48

## 2020-04-27 RX ADMIN — AMLODIPINE BESYLATE 10 MG: 10 TABLET ORAL at 10:18

## 2020-04-27 NOTE — PROGRESS NOTES
[facility]  -  Progress Note      Patient: Rudy Silva MRN: [id number]  SSN: [id number]    YOB: 1950  Age: 71 y.o. Sex: female      Admit Date: 4/22/2020    Assessment:     Hospital Problems  Date Reviewed: 4/23/2020          Codes Class Noted POA    CHF (congestive heart failure) ([facility] ICD-10-CM: I50.9  ICD-9-CM: 428.0  4/25/2020 Unknown        * (Principal) Acute on chronic diastolic congestive heart failure ([facility] ICD-10-CM: I50.33  ICD-9-CM: 428.33, 428.0  4/23/2020 Yes        Suspected Covid-19 Virus Infection ICD-10-CM: R68.89  4/23/2020 Yes        Type 2 diabetes mellitus without complication, without long-term current use of insulin (HCC) (Chronic) ICD-10-CM: E11.9  ICD-9-CM: 250.00  7/6/2018 Yes        HTN (hypertension), benign (Chronic) ICD-10-CM: I10  ICD-9-CM: 401.1  7/6/2018 Yes    Overview Signed 4/23/2020  7:02 AM by Sekou Black DO     Last Assessment & Plan:   Normotensive today in office             Bilateral lower extremity edema ICD-10-CM: R60.0  ICD-9-CM: 782.3  7/6/2018 Yes            -Presented with worsening shortness of breath in setting of HFpEF. Covid negative.  -Acute on chronic HFpEF in setting of poorly controlled hypertension. EF 55-60% with aortic sclerosis, trace AI, trace MR, PASP 40 mmHg by echo 8/2019.   -Hypertensive urgency with SBP >200. Reports compliance but does not check her BP at home.  -Chronic kidney disease Stage III-IV. Renal following and with Bumex 1mg q12 hour dosing.  -Coronary disease reportedly diffuse medically managed, nuclear stress 3/2018 with small area of ischemia with EF 53%. -Diabetes mellitus. HgbA1c 6.1.  -Dyslipidemia. Previously on Crestor.  -Chronic atypical LBBB.  No acute EKG changes this admission.  -Acute on chronic anemia.     Primary cardiologist Dr. Maria Luz Lane at [facility]. Plan:     Her volume status is stable and continues to have the chronic leg edema.    Diuretics have been stopped and her creatinine has risen some. Renal following and will defer med management to them. No new recommendations at this time. Subjective:     Felt that her breathing is improving and near baseline.]  No pain reported    Objective:      Patient Vitals for the past 8 hrs:   Temp Pulse Resp BP SpO2   04/27/20 0741 98 °F (36.7 °C) 68 19 154/67 96 %   04/27/20 0421 97.2 °F (36.2 °C) 64 20 146/65 97 %         Patient Vitals for the past 96 hrs:   Weight   04/26/20 0425 114.4 kg (252 lb 4.8 oz)   04/25/20 1606 113.9 kg (251 lb)   04/25/20 0400 114.1 kg (251 lb 8 oz)         Intake/Output Summary (Last 24 hours) at 4/27/2020 1127  Last data filed at 4/27/2020 0900  Gross per 24 hour   Intake 320 ml   Output 500 ml   Net -180 ml       Physical Exam:  General:  alert, cooperative, no distress, appears stated age  Neck:  nontender, no JVD  Lungs:  clear to auscultation bilaterally  Heart:  regular rate and rhythm, S1, S2 normal, no murmur, click, rub or gallop  Extremities:  extremities normal, atraumatic, no cyanosis or edema    Data Review:     Labs: Results:       Chemistry Recent Labs     04/27/20  0052 04/26/20  0218 04/25/20  0308   * 125* 114*   * 136 136   K 4.5 4.4 4.2    103 102   CO2 20* 24 24   BUN 64* 60* 55*   CREA 3.45* 3.21* 2.84*   CA 8.1* 7.7* 7.7*   MG 2.8* 2.7* 2.6   PHOS 6.0*  --   --    AGAP 13 9 10   BUCR 19 19 19   AP 71 75 67   TP 6.7 6.4 6.6   ALB 3.4 3.4 3.2*   GLOB 3.3 3.0 3.4   AGRAT 1.0 1.1 0.9      CBC w/Diff Recent Labs     04/26/20  0218   WBC 5.9   RBC 2.82*   HGB 7.8*   HCT 25.0*      GRANS 73   LYMPH 20*   EOS 0      Cardiac Enzymes Lab Results   Component Value Date/Time    CPK 47 04/27/2020 12:52 AM      Coagulation No results for input(s): PTP, INR, APTT, INREXT in the last 72 hours.     Lipid Panel Lab Results   Component Value Date/Time    Cholesterol, total 182 04/24/2020 02:34 AM    HDL Cholesterol 41 04/24/2020 02:34 AM    LDL, calculated 112.8 (H) 04/24/2020 02:34 AM    VLDL, calculated 28.2 04/24/2020 02:34 AM    Triglyceride 141 04/24/2020 02:34 AM    CHOL/HDL Ratio 4.4 04/24/2020 02:34 AM      BNP No results found for: BNP, BNPP, XBNPT   Liver Enzymes Recent Labs     04/27/20  0052   TP 6.7   ALB 3.4   AP 71   SGOT 21      Digoxin    Thyroid Studies No results found for: T4, T3U, TSH, TSHEXT

## 2020-04-27 NOTE — PROGRESS NOTES
Problem: General Medical Care Plan  Goal: *Vital signs within specified parameters  Outcome: Progressing Towards Goal  Goal: *Labs within defined limits  Outcome: Progressing Towards Goal  Goal: *Absence of infection signs and symptoms  Outcome: Progressing Towards Goal  Goal: *Optimal pain control at patient's stated goal  Outcome: Progressing Towards Goal  Goal: *Skin integrity maintained  Outcome: Progressing Towards Goal  Goal: *Fluid volume balance  Outcome: Progressing Towards Goal  Goal: *Optimize nutritional status  Outcome: Progressing Towards Goal  Goal: *Anxiety reduced or absent  Outcome: Progressing Towards Goal  Goal: *Progressive mobility and function (eg: ADL's)  Outcome: Progressing Towards Goal     Problem: Patient Education: Go to Patient Education Activity  Goal: Patient/Family Education  Outcome: Progressing Towards Goal     Problem: Falls - Risk of  Goal: *Absence of Falls  Description: Document Elian Fall Risk and appropriate interventions in the flowsheet. Outcome: Progressing Towards Goal  Note: Fall Risk Interventions:            Medication Interventions: Evaluate medications/consider consulting pharmacy         History of Falls Interventions:  Investigate reason for fall         Problem: Patient Education: Go to Patient Education Activity  Goal: Patient/Family Education  Outcome: Progressing Towards Goal     Problem: Pain  Goal: *Control of Pain  Outcome: Progressing Towards Goal  Goal: *PALLIATIVE CARE:  Alleviation of Pain  Outcome: Progressing Towards Goal     Problem: Patient Education: Go to Patient Education Activity  Goal: Patient/Family Education  Outcome: Progressing Towards Goal     Problem: Patient Education: Go to Patient Education Activity  Goal: Patient/Family Education  Outcome: Progressing Towards Goal     Problem: Patient Education: Go to Patient Education Activity  Goal: Patient/Family Education  Outcome: Progressing Towards Goal     Problem: Heart Failure: Day 1  Goal: Off Pathway (Use only if patient is Off Pathway)  Outcome: Progressing Towards Goal  Goal: Activity/Safety  Outcome: Progressing Towards Goal  Goal: Consults, if ordered  Outcome: Progressing Towards Goal  Goal: Diagnostic Test/Procedures  Outcome: Progressing Towards Goal  Goal: Nutrition/Diet  Outcome: Progressing Towards Goal  Goal: Discharge Planning  Outcome: Progressing Towards Goal  Goal: Medications  Outcome: Progressing Towards Goal  Goal: Respiratory  Outcome: Progressing Towards Goal  Goal: Treatments/Interventions/Procedures  Outcome: Progressing Towards Goal  Goal: Psychosocial  Outcome: Progressing Towards Goal  Goal: *Oxygen saturation within defined limits  Outcome: Progressing Towards Goal  Goal: *Hemodynamically stable  Outcome: Progressing Towards Goal  Goal: *Optimal pain control at patient's stated goal  Outcome: Progressing Towards Goal  Goal: *Anxiety reduced or absent  Outcome: Progressing Towards Goal     Problem: Heart Failure: Day 2  Goal: Off Pathway (Use only if patient is Off Pathway)  Outcome: Progressing Towards Goal  Goal: Activity/Safety  Outcome: Progressing Towards Goal  Goal: Consults, if ordered  Outcome: Progressing Towards Goal  Goal: Diagnostic Test/Procedures  Outcome: Progressing Towards Goal  Goal: Nutrition/Diet  Outcome: Progressing Towards Goal  Goal: Discharge Planning  Outcome: Progressing Towards Goal  Goal: Medications  Outcome: Progressing Towards Goal  Goal: Respiratory  Outcome: Progressing Towards Goal  Goal: Treatments/Interventions/Procedures  Outcome: Progressing Towards Goal  Goal: Psychosocial  Outcome: Progressing Towards Goal  Goal: *Oxygen saturation within defined limits  Outcome: Progressing Towards Goal  Goal: *Hemodynamically stable  Outcome: Progressing Towards Goal  Goal: *Optimal pain control at patient's stated goal  Outcome: Progressing Towards Goal  Goal: *Anxiety reduced or absent  Outcome: Progressing Towards Goal  Goal: *Demonstrates progressive activity  Outcome: Progressing Towards Goal     Problem: Heart Failure: Day 3  Goal: Off Pathway (Use only if patient is Off Pathway)  Outcome: Progressing Towards Goal  Goal: Activity/Safety  Outcome: Progressing Towards Goal  Goal: Diagnostic Test/Procedures  Outcome: Progressing Towards Goal  Goal: Nutrition/Diet  Outcome: Progressing Towards Goal  Goal: Discharge Planning  Outcome: Progressing Towards Goal  Goal: Medications  Outcome: Progressing Towards Goal  Goal: Respiratory  Outcome: Progressing Towards Goal  Goal: Treatments/Interventions/Procedures  Outcome: Progressing Towards Goal  Goal: Psychosocial  Outcome: Progressing Towards Goal  Goal: *Oxygen saturation within defined limits  Outcome: Progressing Towards Goal  Goal: *Hemodynamically stable  Outcome: Progressing Towards Goal  Goal: *Optimal pain control at patient's stated goal  Outcome: Progressing Towards Goal  Goal: *Anxiety reduced or absent  Outcome: Progressing Towards Goal  Goal: *Demonstrates progressive activity  Outcome: Progressing Towards Goal     Problem: Heart Failure: Day 4  Goal: Off Pathway (Use only if patient is Off Pathway)  Outcome: Progressing Towards Goal  Goal: Activity/Safety  Outcome: Progressing Towards Goal  Goal: Diagnostic Test/Procedures  Outcome: Progressing Towards Goal  Goal: Nutrition/Diet  Outcome: Progressing Towards Goal  Goal: Discharge Planning  Outcome: Progressing Towards Goal  Goal: Medications  Outcome: Progressing Towards Goal  Goal: Respiratory  Outcome: Progressing Towards Goal  Goal: Treatments/Interventions/Procedures  Outcome: Progressing Towards Goal  Goal: Psychosocial  Outcome: Progressing Towards Goal  Goal: *Oxygen saturation within defined limits  Outcome: Progressing Towards Goal  Goal: *Hemodynamically stable  Outcome: Progressing Towards Goal  Goal: *Optimal pain control at patient's stated goal  Outcome: Progressing Towards Goal  Goal: *Anxiety reduced or absent  Outcome: Progressing Towards Goal  Goal: *Demonstrates progressive activity  Outcome: Progressing Towards Goal     Problem: Heart Failure: Day 5  Goal: Off Pathway (Use only if patient is Off Pathway)  Outcome: Progressing Towards Goal  Goal: Activity/Safety  Outcome: Progressing Towards Goal  Goal: Diagnostic Test/Procedures  Outcome: Progressing Towards Goal  Goal: Nutrition/Diet  Outcome: Progressing Towards Goal  Goal: Discharge Planning  Outcome: Progressing Towards Goal  Goal: Medications  Outcome: Progressing Towards Goal  Goal: Respiratory  Outcome: Progressing Towards Goal  Goal: Treatments/Interventions/Procedures  Outcome: Progressing Towards Goal  Goal: Psychosocial  Outcome: Progressing Towards Goal     Problem: Heart Failure: Discharge Outcomes  Goal: *Demonstrates ability to perform prescribed activity without shortness of breath or discomfort  Outcome: Progressing Towards Goal  Goal: *Left ventricular function assessment completed prior to or during stay, or planned for post-discharge  Outcome: Progressing Towards Goal  Goal: *ACEI prescribed if LVEF less than 40% and no contraindications or ARB prescribed  Outcome: Progressing Towards Goal  Goal: *Verbalizes understanding and describes prescribed diet  Outcome: Progressing Towards Goal  Goal: *Verbalizes understanding/describes prescribed medications  Outcome: Progressing Towards Goal  Goal: *Describes available resources and support systems  Description: (eg: Home Health, Palliative Care, Advanced Medical Directive)  Outcome: Progressing Towards Goal  Goal: *Describes smoking cessation resources  Outcome: Progressing Towards Goal  Goal: *Understands and describes signs and symptoms to report to providers(Stroke Metric)  Outcome: Progressing Towards Goal  Goal: *Describes/verbalizes understanding of follow-up/return appt  Description: (eg: to physicians, diabetes treatment coordinator, and other resources  Outcome: Progressing Towards Goal  Goal: *Describes importance of continuing daily weights and changes to report to physician  Outcome: Progressing Towards Goal

## 2020-04-27 NOTE — PROGRESS NOTES
Discharge planning    Met with patient sitting in recliner in room concerning discharge plan. The Plan for Transition of Care is related to the following treatment goals: Home with home health    The Patient and was provided with a choice of provider and agrees   with the discharge plan. [x] Yes [] No    Freedom of choice list was provided with basic dialogue that supports the patient's individualized plan of care/goals, treatment preferences and shares the quality data associated with the providers. [x] Yes [] No  Freedom choice obtained with verbal consent for [address] [place] [address] and referral sent and spoke with Nelson. Oxygen orders and clinical information faxed to [address] via [employer] and oxygen form manually faxed to [phone]. Spoke with Jemima at [address] ([phone])    Discharge order noted for today. [facility] health referral sent to [facility] agency. Met with patient and  agreeable to the transition plan today. Transport has been arranged with a friend. Patient's discharge summary and home health  orders have been forwarded to  [address] [place] [address] via [employer] Updated bedside RN, Adonay Silva,  to the transition plan.   Discharge information has been documented on the AVS.       BO Sanchez, RN  Pager # [phone]  Care Manager

## 2020-04-27 NOTE — PROGRESS NOTES
Discharge planning    Writer met with patient in room sitting in recliner. Patient stated \" When I leave here. I plan to work on moving back to [place]. All of my friends are there. I have no children. \"  Corey Cleveland will monitor for PT/OT notes to assist with discharge planning.     BO Light, RN  Pager # [phone]  Care Manager

## 2020-04-27 NOTE — ROUTINE PROCESS
Shift change report given to [facility], RN. Report included SBAR, Kardex, MAR, Recent Results, and Plan of Care.

## 2020-04-27 NOTE — WOUND CARE
Maya Gill, RN Registered Nurse Wound Care Wound Care Signed Date of Service:  04/27/20 1113 []Hide copied text []Airam for details Physical Exam 
Musculoskeletal:  
     Legs: 
 
  
Seen by wound care with bedside nurse,  per consulted request, bi-lateral lower legs assessed and compression application performed at this time. Lower extremity condition listed above noted during skin assessment. Treatment plan explained to bedside nurse and Pt agreeable to continue current treatment. At this time multi-layer compression wraps applied from mpj to popliteal space in compression manner. Lower extremity edema control and care education provided to pt at this time. Patient to elevate lower legs as much as possible. Plan of care reviewed with nursing. Will turn over care to nursing staff at this time. Wound care will manage compression wraps per protocol.  
Daniel November, Wound Care Department

## 2020-04-27 NOTE — DISCHARGE SUMMARY
Discharge Summary     Patient ID:  Rudy Silva  [id number]  71 y.o.  1950  Body mass index is 41.98 kg/m². PCP on record: Dorita Garzon MD    Admit date: 4/22/2020  Discharge date and time: 4/28/2020    Discharge Diagnoses:                                           1.  Acute on chronic diastolic heart failure  2 CKD 3  3 pedal edema  4 anemia of chronic illness  5 COVID 19 infection ruled out  6 hypertension  7 Morbid obesity       Patient was hypoxic on exertion , O2 prescribed . Consults: Cardiology and Nephrology          Hospital Course by problems:  Patient with history of shortness of breath leg edema, acute on chronic diastolic heart failure worsening renal function, further deterioration secondary to diuretic use, renal consultation Dr. Clara Cloud was consulted who suggested to hold off diuretics, patient will have some compromised renal function due to dehydration and Lasix use. Patient is being discharged home on oxygen for the same reason she will follow-up with Dr. Jody Rosa from dental spine point of view and follow-up with cardiology. Further dose adjustment of medication as an outpatient        Patient seen and examined by me on discharge day. Pertinent Findings:  Patient is Alert Awake and oriented   HEENT - NAD    RS - Clear , no rales no rhonchi   CVS - regular rhythm and rate acceptable    abd - benign, BS present , no Distension   EXT - 2 + edema , no calf tenderness   Neuro - alert and awake , grossly motor and sensory intact         Significant Diagnostic Studies:  Results   US RETROPERITONEUM COMP (Accession [id number]) (Order [id number])   Allergies      Not Specified: Augmentin [Amoxicillin-pot Clavulanate];   Clavulanic Acid;  Levaquin [Levofloxacin]   Exam Information     Status Exam Begun  Exam Ended    Final [99] 4/27/2020 12:16 4/27/2020 12:34   Result Information     Status: Final result (Exam End: 4/27/2020 12:34) Provider Status: Open   Study Result EXAM: ULTRASOUND RETROPERITONEAL COMPLETE/ KIDNEYS AND BLADDER     CLINICAL HISTORY/INDICATION:  Acute kidney injury on chronic kidney disease  stage III/4  , suspected Covid - 19, PCR test 2020 COVID 19 negative     COMPARISON: None.     TECHNIQUE: Real time transverse and sagittal images have been obtained. Color  flow used as needed.     FINDINGS:     The right kidney measures 10.7 x 5.2 x 5.3 cm. The echogenicity of the right  kidney is normal. There is no hydronephrosis. The left kidney measures 9.7 x 5.6 x 3.9 cm. The echogenicity of the left  kidney is normal. There is no hydronephrosis. Portions of the liver and spleen are demonstrated and are normal. The urinary  bladder is not adequately visualized.     IMPRESSION  IMPRESSION:     Negative. Imaging       [facility]   ECHO ADULT LIMITED W DOPPLER & COLOR   Order# [id number]   Reading physician: Viviana Hughes MD Ordering physician: Geremias Mitchell [place] Study date: 20   Patient Information     Patient Name  [facility] MRN  [id number] Sex  Female     1950 (75 y.o.)   Reason for Exam   Priority: Routine   Heart Failure, Left   Comments: covid rule out   Vitals     Weight Height BSA (calculated - sq m) BP Pulse (Heart Rate)   113.9 kg (251 lb) 5' 5\" (1.651 m) 2.28 sq meters     Interpretation Summary     Result status: Final result   · Normal cavity size, wall thickness and systolic function (ejection fraction normal). Estimated left ventricular ejection fraction is 55 - 60%. Visually measured ejection fraction. · Dilated left atrium. · Dilated right atrium. · Pulmonary arterial systolic pressure is 32 mmHg. Comparison Study Information     Prior Study     There is a prior study available for comparison. Prior study date: 2019. LVEF was 60%. Echo Findings     Left Ventricle Normal cavity size, wall thickness and systolic function (ejection fraction normal). The estimated ejection fraction is 55 - 60%. Visually measured ejection fraction. Unable to assess diastolic function. Left Atrium Dilated left atrium. Right Ventricle Normal cavity size and global systolic function. Right Atrium Dilated right atrium. Aortic Valve Trileaflet valve structure and no stenosis. Mild aortic valve sclerosis. Trace aortic valve regurgitation. Mitral Valve No stenosis. Mitral annular calcification. Trace regurgitation. Tricuspid Valve Normal valve structure and no stenosis. Trace regurgitation. Pulmonic Valve The pulmonic valve was not assessed. Pulmonic valve not well visualized. Aorta Normal aortic root, ascending aortic, and aortic arch. Pulmonary Artery Pulmonary arterial systolic pressure (PASP) is 32 mmHg. Pulmonary hypertension not suggested by Doppler findings. IVC/Hepatic Veins Inferior vena cava not well visualized. Normal central venous pressure (0-5 mmHg); IVC diameter is less than 21 mm and collapses more than 50% with respiration. Pericardium Normal pericardium and no evidence of pericardial effusion. TTE procedure Findings     TTE Procedure Information Image quality: adequate. The view(s) performed were parasternal, apical and subcostal. Color flow Doppler was performed and pulse wave and/or continuous wave Doppler was performed. No contrast was given.    Procedure Staff     Technologist/Clinician: Elena Keepers  Supporting Staff: None  Performing Physician/Midlevel: None     Exam Completion Date/Time: 4/25/20  4:14 PM   2D Volume Measurements      ESV ESV Index EDV EDV Index EF   LV A4C 48.6 mL       22.3 mL/m2       126.3 mL       58 mL/m2       62 %               2D/M-Mode Measurements     Dimensions   Measurement Value (Range)   LVIDs 3.74 cm      LVIDd 5.03 cm (3.9 - 5.3)      IVSd 1.11 cm (0.6 - 0.9)      LVPWd 1.05 cm (0.6 - 0.9)      LV Mass .3 g (67 - 162)      LV Mass AL Index 110.8 g/m2 (43 - 95)      Left Atrium Major Axis 4.23 cm                Pertinent Lab Data:  Recent Labs 04/26/20  0218   WBC 5.9   HGB 7.8*   HCT 25.0*        Recent Labs     04/28/20  0346 04/27/20  0052 04/26/20  0218   * 135* 136   K 4.0 4.5 4.4   CL 97* 102 103   CO2 22 20* 24   * 140* 125*   BUN 66* 64* 60*   CREA 3.31* 3.45* 3.21*   CA 8.2* 8.1* 7.7*   MG 2.5 2.8* 2.7*   PHOS  --  6.0*  --    ALB 3.2* 3.4 3.4   SGOT 23 21 17   ALT 20 19 16       DISCHARGE MEDICATIONS:   @  Current Discharge Medication List      START taking these medications    Details   amLODIPine (NORVASC) 10 mg tablet Take 1 Tab by mouth daily. Qty: 30 Tab, Refills: 0      polyethylene glycol (MIRALAX) 17 gram packet Take 1 Packet by mouth daily. Qty: 10 Packet, Refills: 0      rosuvastatin (CRESTOR) 5 mg tablet Take 1 Tab by mouth nightly. Qty: 30 Tab, Refills: 0      aspirin 81 mg chewable tablet Take 1 Tab by mouth daily. Qty: 30 Tab, Refills: 0      insulin glargine (LANTUS) 100 unit/mL injection 4 units SQ daily- watch for Hypoglycemia adjust dose per patient's blood glucose level  Qty: 1 Vial, Refills: 0      isosorbide mononitrate ER (IMDUR) 30 mg tablet Take 1 Tab by mouth daily. Qty: 30 Tab, Refills: 0         CONTINUE these medications which have CHANGED    Details   furosemide (LASIX) 40 mg tablet Take 1 Tab by mouth daily. Qty: 30 Tab, Refills: 0      gabapentin (NEURONTIN) 100 mg capsule Take 1 Cap by mouth two (2) times a day. Max Daily Amount: 200 mg. Qty: 60 Cap, Refills: 0    Associated Diagnoses: Type 2 diabetes mellitus without complication, without long-term current use of insulin (HCC)      hydrALAZINE (APRESOLINE) 100 mg tablet Take 1 Tab by mouth three (3) times daily. Qty: 90 Tab, Refills: 0         CONTINUE these medications which have NOT CHANGED    Details   esomeprazole (NEXIUM) 40 mg capsule Take 40 mg by mouth daily. carvedilol (COREG) 25 mg tablet Take 25 mg by mouth two (2) times daily (with meals). citalopram (CELEXA) 20 mg tablet Take 20 mg by mouth daily. cranberry extract (AZO CRANBERRY) 450 mg tab Take 2 Tabs by mouth daily. cholecalciferol (VITAMIN D3) 1,000 unit cap Take 5,000 Units by mouth daily. cyanocobalamin (VITAMIN B-12) 1,000 mcg tablet Take 1,000 mcg by mouth two (2) times a day. Biotin 2,500 mcg cap Take 1 Tab by mouth two (2) times a day. vitamin a-vitamin c-vit e-min (OCUVITE) tablet Take 1 Tab by mouth daily. loratadine (CLARITIN) 10 mg tablet Take 10 mg by mouth daily. B.infantis-B.ani-B.long-B.bifi (PROBIOTIC 4X) 10-15 mg TbEC Take 1 Tab by mouth daily. STOP taking these medications       lisinopriL (PRINIVIL, ZESTRIL) 5 mg tablet Comments:   Reason for Stopping:         empagliflozin-metFORMIN (SYNJARDY) 12.5-500 mg per tablet Comments:   Reason for Stopping:         calcium-cholecalciferol, d3, (CALCIUM 600 + D) 600-125 mg-unit tab Comments:   Reason for Stopping:         HYDROcodone-acetaminophen (NORCO) 5-325 mg per tablet Comments:   Reason for Stopping:                 My Recommended Diet, Activity, Wound Care, and follow-up labs are listed in the patient's Discharge Insturctions which I have personally completed and reviewed. Disposition:     [] Home with family     [x] HH PT/RN   [] SNF/NH   [] Inpatient Rehab/[facility]AR  Condition at Discharge:  Stable    Follow up with:   PCP : Janet Mota MD      Please follow-up tests/labs that are still pendin. None  2.    >30 minutes spent coordinating this discharge (review instructions/follow-up, prescriptions, preparing report for sign off)    Disclaimer: Sections of this note are dictated utilizing voice recognition software, which may have resulted in some phonetic based errors in grammar and contents. Even though attempts were made to correct all the mistakes, some may have been missed, and remained in the body of the document. If questions arise, please contact our department.     Signed:  Broderick Smith MD  2020  12:31 PM

## 2020-04-27 NOTE — PROGRESS NOTES
Problem: Mobility Impaired (Adult and Pediatric)  Goal: *Acute Goals and Plan of Care (Insert Text)  4/27/2020 1247 by Emily Pandey  Outcome: Resolved/Met     PHYSICAL THERAPY EVALUATION AND DISCHARGE    Patient: Alicja Porter (89 y.o. female)  Date: 4/27/2020  Primary Diagnosis: Suspected Covid-19 Virus Infection [R68.89]  CHF (congestive heart failure) (HCC) [I50.9]  CHF (congestive heart failure) (HCC) [I50.9]        Precautions:      WBAT  PLOF: Pt lives alone in a one story home with 3 JAMES, has multiple canes and a walker if needed, was mod ind PTA with ADLs and mobility, has a friend available to help if needed. ASSESSMENT :  Based on the objective data described below, the patient presents in recliner in [facility], cleared by nursing to participate. Pt seen with OT to maximize functional mobility and safety. Pt lives alone and was mod ind PTA. Her ROM is WFL and strength generally decreased. Pt is able to stand from her low recliner and ambulates to her toilet in her room where she is able to void and management her clothing, pericare, and hand hygiene all with supervision for safety. She is noted to reach for obstacles for support throughout the room as she walks and encourage her to use an AD when she returns home to encourage energy conservation as well as decrease risk of falls. Pt is currently at her functional baseline and does not have any acute PT needs nor does she have discharge needs at this time and will thus sign-off. Nursing notified and agreeable. Patient does not require further skilled intervention at this level of care. PLAN :  Recommendations and Planned Interventions:   No formal PT needs identified at this time. Discharge Recommendations: None  Further Equipment Recommendations for Discharge: N/A     SUBJECTIVE:   Patient stated I live with my dog.     OBJECTIVE DATA SUMMARY:     Past Medical History:   Diagnosis Date    Arthritis     Cancer ([facility] [address]     CHF (congestive heart failure) (HCC)     Diabetes ([facility] [address]     Fracture of neck (HCC)     GERD (gastroesophageal reflux disease)     Goiter     Hiatal hernia     Hypertension     Uterine cancer ([facility] [address]      Past Surgical History:   Procedure Laterality Date    HX APPENDECTOMY      HX HYSTERECTOMY      HX KNEE REPLACEMENT Right     HX ORTHOPAEDIC      odontoid fracture repair with hardware placement. HX PARTIAL THYROIDECTOMY       Barriers to Learning/Limitations: None  Compensate with: N/A  Home Situation:   Home Situation  Home Environment: Private residence  One/Two Story Residence: One story  Living Alone: No  Support Systems: Family member(s)  Patient Expects to be Discharged to[de-identified] Private residence  Current DME Used/Available at Home: None  Critical Behavior:  Neurologic State: Alert  Orientation Level: Appropriate for age  Cognition: Follows commands  Safety/Judgement: Fall prevention  Psychosocial  Patient Behaviors: Calm; Cooperative  Purposeful Interaction: Yes  Pt Identified Daily Priority: Clinical issues (comment)  Caritas Process: Nurture loving kindness;Enable yaw/hope;Establish trust;Nurture spiritual self;Teaching/learning; Attend basic human needs;Create healing environment;Supportive expression;Creative use of self  Caring Interventions: Reassure; Therapeutic modalities  Reassure: Caring rounds; Therapeutic listening; Informing; Acceptance; Instilling yaw and hope  Therapeutic Modalities: Humor; Intentional therapeutic touch                 Strength:    Strength: Generally decreased, functional    Tone & Sensation:   Tone: Normal    Sensation: Intact        Range Of Motion:  AROM: Within functional limits        Functional Mobility:  Bed Mobility:  Rolling: (NT up in chair )    Transfers:  Sit to Stand: Supervision  Stand to Sit: Supervision    Balance:   Sitting: Intact  Standing: With support  Standing - Static: Good  Standing - Dynamic : Fair    Ambulation/Gait Training:  Distance (ft): 30 Feet (ft)  Assistive Device: Other (comment)(none )  Ambulation - Level of Assistance: Supervision        Gait Abnormalities: Decreased step clearance        Base of Support: Widened     Speed/Madelaine: Slow  Step Length: Left shortened;Right shortened    Pain:  Pain level pre-treatment: 0/10   Pain level post-treatment: 0/10    Activity Tolerance:   Good    Please refer to the flowsheet for vital signs taken during this treatment. After treatment:   [x]         Patient left in no apparent distress sitting up in chair  []         Patient left in no apparent distress in bed  [x]         Call bell left within reach  [x]         Nursing notified  []         Caregiver present  []         Bed alarm activated  []         SCDs applied    COMMUNICATION/EDUCATION:   [x]         Role of Physical Therapy in the acute care setting. [x]         Fall prevention education was provided and the patient/caregiver indicated understanding. [x]         Patient/family have participated as able in goal setting and plan of care. [x]         Patient/family agree to work toward stated goals and plan of care. []         Patient understands intent and goals of therapy, but is neutral about his/her participation. []         Patient is unable to participate in goal setting/plan of care: ongoing with therapy staff.  []         Other:     Thank you for this referral.  Akash Shoulder   Time Calculation: 10 mins      Eval Complexity: History: LOW Complexity : Zero comorbidities / personal factors that will impact the outcome / POCExam:LOW Complexity : 1-2 Standardized tests and measures addressing body structure, function, activity limitation and / or participation in recreation  Presentation: LOW Complexity : Stable, uncomplicated  Clinical Decision Making:Low Complexity    Overall Complexity:LOW

## 2020-04-27 NOTE — ROUTINE PROCESS
[facility]. [facility] included SB[facility], Kardex, MAR, Recent Results, and Plan of Care. Pt in chair watching tv with call light in reach.

## 2020-04-27 NOTE — PROGRESS NOTES
In Patient Progress note      Admit Date: 4/22/2020    Impression:     #1 acute kidney injury on chronic kidney disease stage III/IV, baseline unclear,   She also has significant proteinuria which goes in the favor of diabetic nephropathy as her   underlying disease. OFE d/t cardiorenal syndrome type I and II. Volume status appears to have  improved and patient feels breathing back to baseline  #2. Acute on chronic diastolic CHF, improved , CXR has improved , volume status better  #3 hypertensive urgency, blood pressures better , no need to control very aggressively in acute setting  #4 diabetes mellitus with complications, does have significant proteinuria  #5 acute on chronic anemia, check iron studies  #6 hypoalbuminemia     Recommendations:  #1 hold diuretics, restart PO 20 mg daily in 48hrs ( takes 40 mg lasix at home) and   up titrate outpatient   #2 for lower extremity swelling needs local measures, have discussed with nursing about   Compression stockings /concepcion boots   #2 strict ins and outs needed, daily weights, continue fluid restriction 1200 ml daily  #3 hold off  ACE inhibitor's, check renal panel in AM   #4 quantify proteinuria( still not send?)  #5 avoid IV contrast or other nephrotoxins, NSAID's    Patient feels fine , slight uptrend in creatinine awaiting equilibration in her fluid shift   but will level off in 24-48 hrs . Schedule to start 20 mg PO lasix in 48 hrs , will get labs in  1 week and f/u in [place] office soon ( 3-4 weeks).  Ok to discharge from my standpoint  as risk for infections In the hospital outweighs the benefit     Please call with questions,     Lyn Camarillo MD [facility]  Cell [phone]  Pager: [phone]     Subjective:     Denies SOB  Feels better  Leg swelling , needs concepcion boots   BP controlled       Current Facility-Administered Medications:     amLODIPine (NORVASC) tablet 10 mg, 10 mg, Oral, DAILY, Maxwell Vitale MD, 10 mg at 04/27/20 1018    polyethylene glycol (MIRALAX) packet 17 g, 17 g, Oral, DAILY, Maxwell Vitale MD, 17 g at 04/27/20 1018    senna (SENOKOT) tablet 8.6 mg, 1 Tab, Oral, DAILY, Erika Miller MD, 8.6 mg at 04/27/20 1018    isosorbide mononitrate ER (IMDUR) tablet 30 mg, 30 mg, Oral, DAILY, Clarissa Garcia PA, 30 mg at 04/27/20 1018    cholecalciferol (VITAMIN D3) (400 Units /10 mcg) tablet 5 Tab, 2,000 Units, Oral, DAILY, Geovany Carlos MD, 5 Tab at 04/27/20 1018    aspirin chewable tablet 81 mg, 81 mg, Oral, DAILY, Geovany Carlos MD, 81 mg at 04/27/20 1018    rosuvastatin (CRESTOR) tablet 5 mg, 5 mg, Oral, QHS, Geovany Carlos MD, 5 mg at 04/26/20 2202    insulin glargine (LANTUS) injection 4 Units, 4 Units, SubCUTAneous, QHS, Geovany Carlos MD, 4 Units at 04/26/20 2223    azithromycin (ZITHROMAX) 500 mg in  mL, 500 mg, IntraVENous, Q24H, Casandra Be DO, 500 mg at 04/27/20 0119    sodium chloride (NS) flush 5-40 mL, 5-40 mL, IntraVENous, Q8H, Casandra Be DO, 10 mL at 04/26/20 2202    sodium chloride (NS) flush 5-40 mL, 5-40 mL, IntraVENous, PRN, Harley Ferro, DO    acetaminophen (TYLENOL) tablet 650 mg, 650 mg, Oral, Q4H PRN, Juan Lincoln A, DO, 650 mg at 04/26/20 1947    morphine injection 2 mg, 2 mg, IntraVENous, Q2H PRN, Harley Ferro, DO    naloxone [facility]) injection 0.4 mg, 0.4 mg, IntraVENous, PRN, Juan Salazar A, DO    heparin (porcine) injection 5,000 Units, 5,000 Units, SubCUTAneous, Q8H, Harley Ferro, DO, 5,000 Units at 04/27/20 0747    carvediloL (COREG) tablet 25 mg, 25 mg, Oral, BID WITH MEALS, Lynett Salazar A, DO, 25 mg at 04/27/20 0748    citalopram (CELEXA) tablet 20 mg, 20 mg, Oral, DAILY, Lynett Salazar A, DO, 20 mg at 04/27/20 1018    pantoprazole (PROTONIX) tablet 40 mg, 40 mg, Oral, ACB&D, Lynett [place] A, DO, 40 mg at 04/27/20 0748    insulin lispro (HUMALOG) injection, , SubCUTAneous, AC&HS, Rebekah WYNN, [address], Stopped at 04/27/20 0748    glucose chewable tablet 16 g, 16 g, Oral, PRN, Ani Luna PA    glucagon (GLUCAGEN) injection 1 mg, 1 mg, IntraMUSCular, PRN, Ani Luna PA    dextrose (D50W) injection syrg 12.5-25 g, 25-50 mL, IntraVENous, PRN, Ani Luna [place]    ascorbic acid (vitamin C) (VITAMIN C) tablet 500 mg, 500 mg, Oral, BID, Ani Luna PA, 500 mg at 04/27/20 1018    hydrALAZINE (APRESOLINE) tablet 50 mg, 50 mg, Oral, TID, Ronald Ramos MD, 50 mg at 04/27/20 1018        Objective:     Visit Vitals  /62 (BP 1 Location: Left arm, BP Patient Position: At rest)   Pulse 65   Temp 97.6 °F (36.4 °C)   Resp 20   Ht 5' 5\" (1.651 m)   Wt 114.4 kg (252 lb 4.8 oz)   SpO2 97%   BMI 41.98 kg/m²         Intake/Output Summary (Last 24 hours) at 4/27/2020 1147  Last data filed at 4/27/2020 0900  Gross per 24 hour   Intake 320 ml   Output 500 ml   Net -180 ml       Physical Exam:     Patient is in no apparent distress. HEENT: mmm  Neck: no cervical lymphadenopathy or thyromegaly. Lungs: good air entry, clear to auscultation bilaterally. Cardiovascular system: S1, S2, regular rate and rhythm. No JVD  Abdomen: soft, non tender, non distended. BS+  Extremities: 2+ LE edema   Neurologic: Alert, oriented time three.       Data Review:    Recent Labs     04/26/20 0218   WBC 5.9   RBC 2.82*   HCT 25.0*   MCV 88.7   MCH 27.7   MCHC 31.2   RDW 14.3     Recent Labs     04/27/20  0052 04/26/20  0218 04/25/20  0308   BUN 64* 60* 55*   CREA 3.45* 3.21* 2.84*   CA 8.1* 7.7* 7.7*   ALB 3.4 3.4 3.2*   K 4.5 4.4 4.2   * 136 136    103 102   CO2 20* 24 24   PHOS 6.0*  --   --    * 125* 114*       Yusra Peterson MD

## 2020-04-27 NOTE — PROGRESS NOTES
Patient unable to understand and/or complete the \"Important Message from [employer]". Reviewed document with patient's representative telephonically and addressed questions. A copy of the IMM letter left at bedside with patient. Original, with verbal signature noted, placed in patient's chart.

## 2020-04-27 NOTE — PROGRESS NOTES
Problem: Self Care Deficits Care Plan (Adult)  Goal: *Acute Goals and Plan of Care (Insert Text)  Outcome: Resolved/Met   OCCUPATIONAL THERAPY EVALUATION/DISCHARGE    Patient: Skye New (78 y.o. female)  Date: 4/27/2020  Primary Diagnosis: Suspected Covid-19 Virus Infection [R68.89]  CHF (congestive heart failure) ([facility]) [I50.9]  CHF (congestive heart failure) ([facility] [I50.9]        Precautions: Falls     PLOF: Pt reports she lives alone. Pt was (I) with basic self-care/ADLs, IADLs, and functional mobility without AD PTA. Pt has a walker, cane, shower chair, and grab bar in bathroom at home. ASSESSMENT AND RECOMMENDATIONS:  Pt cleared to participate in OT evaluation by Rn. Upon entering room, pt seated in recliner, alert, and agreeable to therapy session. Based on the objective data described below, the patient presents she is Mod(I) with most basic self-care/ADLs in sitting, requiring supervision for standing activities, including toilet transfers and grooming at sink level. Pt ambulated without AD, however pt seen grabbing onto bedrail and door for support. Pt may benefit from using an AD during functional transfers to decrease the risk of falls. Will defer to PT for further functional balance and mobility tasks. Pt reports she has a shower chair and grab bars at home. As pt presents she is at or near her baseline with basic self-care/ADLs, skilled acute occupational therapy is not indicated at this time. Discharge Recommendations: Home Health safety evaluation  Further Equipment Recommendations for Discharge: N/A; Pt reports she has a walker at home. SUBJECTIVE:   Patient stated Yanci Gulling I go back to the chair? \"    OBJECTIVE DATA SUMMARY:     Past Medical History:   Diagnosis Date    Arthritis     Cancer ([facility] [address]     CHF (congestive heart failure) ([facility] [address]     Diabetes ([facility] [address]     Fracture of neck (HCC)     GERD (gastroesophageal reflux disease)     Goiter     Hiatal hernia     Hypertension     Uterine cancer [facility])      Past Surgical History:   Procedure Laterality Date    HX APPENDECTOMY      HX HYSTERECTOMY      HX KNEE REPLACEMENT Right     HX ORTHOPAEDIC      odontoid fracture repair with hardware placement. HX PARTIAL THYROIDECTOMY       Barriers to Learning/Limitations: None  Compensate with: visual, verbal, tactile, kinesthetic cues/model    Home Situation:   Home Situation  Home Environment: Private residence  One/Two Story Residence: One story  Living Alone: No  Support Systems: Family member(s)  Patient Expects to be Discharged to[de-identified] Private residence  Current DME Used/Available at Home: None  []     Right hand dominant   []     Left hand dominant    Cognitive/Behavioral Status:  Neurologic State: Alert  Orientation Level: Appropriate for age  Cognition: Follows commands  Safety/Judgement: Fall prevention    Skin: Visible skin appeared intact  Edema: None noted      Coordination: BUE  Coordination: Within functional limits  Fine Motor Skills-Upper: Left Intact; Right Intact    Gross Motor Skills-Upper: Left Intact; Right Intact    Balance:  Sitting: Intact  Standing: Without support  Standing - Static: Good  Standing - Dynamic : Fair    Strength: BUE  Strength: Generally decreased, functional    Tone & Sensation: BUE  Tone: Normal  Sensation: Intact    Range of Motion: BUE  AROM: Within functional limits         Functional Mobility and Transfers for ADLs:  Bed Mobility:  Pt sitting in chair upon arrival.  Transfers:  Sit to Stand: Supervision  Stand to Sit: Supervision   Toilet Transfer : Supervision    ADL Assessment:  Feeding: Modified independent    Oral Facial Hygiene/Grooming: Modified Independent    Bathing: Supervision    Upper Body Dressing: Modified independent    Lower Body Dressing: Supervision    Toileting: Supervision      ADL Intervention:  Grooming  Grooming Assistance: Supervision  Washing Hands: Supervision    Toileting  Toileting Assistance: Supervision    Cognitive Retraining  Safety/Judgement: Fall prevention    Pain:  Pain level pre-treatment: 0/10   Pain level post-treatment: 0/10   Pain Intervention(s): Medication (see MAR); Rest, Ice, Repositioning  Response to intervention: Nurse notified, See doc flow    Activity Tolerance:   Good    Please refer to the flowsheet for vital signs taken during this treatment. After treatment:   [x]  Patient left in no apparent distress sitting up in chair  []  Patient left in no apparent distress in bed  [x]  Call bell left within reach  [x]  Nursing notified  []  Caregiver present  []  Bed alarm activated    COMMUNICATION/EDUCATION:   [x]      Role of Occupational Therapy in the acute care setting  [x]      Home safety education was provided and the patient/caregiver indicated understanding. [x]      Patient/family have participated as able and agree with findings and recommendations. []      Patient is unable to participate in plan of care at this time. Thank you for this referral.  Zhang , MS, OTR/L  Time Calculation: 10 mins      Eval Complexity: History: MEDIUM Complexity : Expanded review of history including physical, cognitive and psychosocial  history ; Examination: LOW Complexity : 1-3 performance deficits relating to physical, cognitive , or psychosocial skils that result in activity limitations and / or participation restrictions ;    Decision Making:LOW Complexity : No comorbidities that affect functional and no verbal or physical assistance needed to complete eval tasks

## 2020-04-27 NOTE — ROUTINE PROCESS
Received patient in a chair, sitting awake, alert and oriented. No complaints made, will continue to monitor. Report  Given by Merryl Baumgarten RN 
 
6:39 PM 
Patient still sitting in a chair, waiting for the oxygen . Patient complaints of abdominal discomfort. Patient was given miralax this am and scheduled protonix given. O2 applied at 2 lpm via nasal cannula. Di=ue medsications given. Gwen Kaplan

## 2020-04-28 ENCOUNTER — HOME CARE VISIT (OUTPATIENT)
Dept: HOME HEALTH SERVICES | Facility: HOME HEALTH | Age: 70
End: 2020-04-28
Payer: MEDICARE

## 2020-04-28 VITALS
BODY MASS INDEX: 42.03 KG/M2 | WEIGHT: 252.3 LBS | DIASTOLIC BLOOD PRESSURE: 68 MMHG | OXYGEN SATURATION: 100 % | RESPIRATION RATE: 18 BRPM | HEART RATE: 66 BPM | HEIGHT: 65 IN | TEMPERATURE: 98 F | SYSTOLIC BLOOD PRESSURE: 166 MMHG

## 2020-04-28 LAB
ALBUMIN SERPL-MCNC: 3.2 G/DL (ref 3.4–5)
ALBUMIN/GLOB SERPL: 0.9 {RATIO} (ref 0.8–1.7)
ALP SERPL-CCNC: 67 U/L (ref 45–117)
ALT SERPL-CCNC: 20 U/L (ref 13–56)
ANION GAP SERPL CALC-SCNC: 10 MMOL/L (ref 3–18)
AST SERPL-CCNC: 23 U/L (ref 10–38)
BILIRUB DIRECT SERPL-MCNC: 0.1 MG/DL (ref 0–0.2)
BILIRUB SERPL-MCNC: 0.6 MG/DL (ref 0.2–1)
BNP SERPL-MCNC: 6054 PG/ML (ref 0–900)
BUN SERPL-MCNC: 66 MG/DL (ref 7–18)
BUN/CREAT SERPL: 20 (ref 12–20)
CALCIUM SERPL-MCNC: 8.2 MG/DL (ref 8.5–10.1)
CHLORIDE SERPL-SCNC: 97 MMOL/L (ref 100–111)
CK SERPL-CCNC: 51 U/L (ref 26–192)
CO2 SERPL-SCNC: 22 MMOL/L (ref 21–32)
CREAT SERPL-MCNC: 3.31 MG/DL (ref 0.6–1.3)
CRP SERPL-MCNC: <0.3 MG/DL (ref 0–0.3)
D DIMER PPP FEU-MCNC: 1.59 UG/ML(FEU)
FERRITIN SERPL-MCNC: 95 NG/ML (ref 8–388)
GLOBULIN SER CALC-MCNC: 3.4 G/DL (ref 2–4)
GLUCOSE BLD STRIP.AUTO-MCNC: 129 MG/DL (ref 70–110)
GLUCOSE BLD STRIP.AUTO-MCNC: 160 MG/DL (ref 70–110)
GLUCOSE SERPL-MCNC: 112 MG/DL (ref 74–99)
LDH SERPL L TO P-CCNC: 205 U/L (ref 81–234)
MAGNESIUM SERPL-MCNC: 2.5 MG/DL (ref 1.6–2.6)
POTASSIUM SERPL-SCNC: 4 MMOL/L (ref 3.5–5.5)
PROT SERPL-MCNC: 6.6 G/DL (ref 6.4–8.2)
SODIUM SERPL-SCNC: 129 MMOL/L (ref 136–145)

## 2020-04-28 PROCEDURE — 80076 HEPATIC FUNCTION PANEL: CPT

## 2020-04-28 PROCEDURE — 82550 ASSAY OF CK (CPK): CPT

## 2020-04-28 PROCEDURE — 74011250637 HC RX REV CODE- 250/637: Performed by: EMERGENCY MEDICINE

## 2020-04-28 PROCEDURE — 80048 BASIC METABOLIC PNL TOTAL CA: CPT

## 2020-04-28 PROCEDURE — 74011636637 HC RX REV CODE- 636/637: Performed by: PHYSICIAN ASSISTANT

## 2020-04-28 PROCEDURE — 83615 LACTATE (LD) (LDH) ENZYME: CPT

## 2020-04-28 PROCEDURE — 74011250637 HC RX REV CODE- 250/637: Performed by: PHYSICIAN ASSISTANT

## 2020-04-28 PROCEDURE — 83735 ASSAY OF MAGNESIUM: CPT

## 2020-04-28 PROCEDURE — 82962 GLUCOSE BLOOD TEST: CPT

## 2020-04-28 PROCEDURE — 86140 C-REACTIVE PROTEIN: CPT

## 2020-04-28 PROCEDURE — 85379 FIBRIN DEGRADATION QUANT: CPT

## 2020-04-28 PROCEDURE — 74011250637 HC RX REV CODE- 250/637: Performed by: INTERNAL MEDICINE

## 2020-04-28 PROCEDURE — 83880 ASSAY OF NATRIURETIC PEPTIDE: CPT

## 2020-04-28 PROCEDURE — 74011250636 HC RX REV CODE- 250/636: Performed by: HOSPITALIST

## 2020-04-28 PROCEDURE — 82728 ASSAY OF FERRITIN: CPT

## 2020-04-28 PROCEDURE — 74011250637 HC RX REV CODE- 250/637: Performed by: HOSPITALIST

## 2020-04-28 PROCEDURE — 36415 COLL VENOUS BLD VENIPUNCTURE: CPT

## 2020-04-28 PROCEDURE — 77010033678 HC OXYGEN DAILY

## 2020-04-28 RX ORDER — ROSUVASTATIN CALCIUM 5 MG/1
5 TABLET, COATED ORAL
Qty: 30 TAB | Refills: 0 | Status: SHIPPED | OUTPATIENT
Start: 2020-04-28

## 2020-04-28 RX ORDER — AMLODIPINE BESYLATE 10 MG/1
10 TABLET ORAL DAILY
Qty: 30 TAB | Refills: 0 | Status: SHIPPED | OUTPATIENT
Start: 2020-04-28

## 2020-04-28 RX ORDER — POLYETHYLENE GLYCOL 3350 17 G/17G
17 POWDER, FOR SOLUTION ORAL DAILY
Qty: 10 PACKET | Refills: 0 | Status: SHIPPED | OUTPATIENT
Start: 2020-04-28

## 2020-04-28 RX ORDER — GABAPENTIN 100 MG/1
100 CAPSULE ORAL 2 TIMES DAILY
Qty: 60 CAP | Refills: 0 | Status: ON HOLD | OUTPATIENT
Start: 2020-04-28 | End: 2020-05-13 | Stop reason: SDUPTHER

## 2020-04-28 RX ORDER — HYDRALAZINE HYDROCHLORIDE 100 MG/1
100 TABLET, FILM COATED ORAL 3 TIMES DAILY
Qty: 90 TAB | Refills: 0 | Status: SHIPPED | OUTPATIENT
Start: 2020-04-28 | End: 2020-06-26

## 2020-04-28 RX ORDER — FUROSEMIDE 40 MG/1
40 TABLET ORAL DAILY
Qty: 30 TAB | Refills: 0 | Status: SHIPPED | OUTPATIENT
Start: 2020-04-28 | End: 2020-05-14

## 2020-04-28 RX ADMIN — CITALOPRAM HYDROBROMIDE 20 MG: 20 TABLET ORAL at 08:50

## 2020-04-28 RX ADMIN — HYDRALAZINE HYDROCHLORIDE 75 MG: 50 TABLET ORAL at 09:00

## 2020-04-28 RX ADMIN — AZITHROMYCIN MONOHYDRATE 500 MG: 250 TABLET ORAL at 08:50

## 2020-04-28 RX ADMIN — ASPIRIN 81 MG CHEWABLE TABLET 81 MG: 81 TABLET CHEWABLE at 08:50

## 2020-04-28 RX ADMIN — PANTOPRAZOLE SODIUM 40 MG: 40 TABLET, DELAYED RELEASE ORAL at 08:50

## 2020-04-28 RX ADMIN — Medication 500 MG: at 08:50

## 2020-04-28 RX ADMIN — ISOSORBIDE MONONITRATE 30 MG: 30 TABLET, EXTENDED RELEASE ORAL at 08:50

## 2020-04-28 RX ADMIN — ACETAMINOPHEN 650 MG: 325 TABLET ORAL at 04:15

## 2020-04-28 RX ADMIN — SENNOSIDES 8.6 MG: 8.6 TABLET, FILM COATED ORAL at 08:50

## 2020-04-28 RX ADMIN — CHOLECALCIFEROL (VITAMIN D3) 10 MCG (400 UNIT) TABLET 5 TABLET: at 08:50

## 2020-04-28 RX ADMIN — CARVEDILOL 25 MG: 25 TABLET, FILM COATED ORAL at 08:50

## 2020-04-28 RX ADMIN — HEPARIN SODIUM 5000 UNITS: 5000 INJECTION INTRAVENOUS; SUBCUTANEOUS at 08:50

## 2020-04-28 RX ADMIN — INSULIN LISPRO 2 UNITS: 100 INJECTION, SOLUTION INTRAVENOUS; SUBCUTANEOUS at 11:28

## 2020-04-28 RX ADMIN — POLYETHYLENE GLYCOL 3350 17 G: 17 POWDER, FOR SOLUTION ORAL at 08:51

## 2020-04-28 RX ADMIN — AMLODIPINE BESYLATE 10 MG: 10 TABLET ORAL at 08:50

## 2020-04-28 NOTE — PROGRESS NOTES
Bedside and Verbal shift change report given to Jimmie Goss (oncoming nurse) by Sabrina River   (offgoing nurse). Report included the following information SBAR, Kardex, Intake/Output and MAR.

## 2020-04-28 NOTE — PROGRESS NOTES
In Patient Progress note      Admit Date: 4/22/2020    Impression:     #1 acute kidney injury on chronic kidney disease stage III/IV, baseline unclear,   She also has significant proteinuria which goes in the favor of diabetic nephropathy as her   underlying disease. OFE d/t cardiorenal syndrome type I and II. Volume status appears to have  improved and patient feels breathing back to baseline  #2. Acute on chronic diastolic CHF, improved , CXR has improved , volume status better  #3 hypertensive urgency, blood pressures better , no need to control very aggressively in acute setting  #4 diabetes mellitus with complications, does have significant proteinuria  #5 acute on chronic anemia, check iron studies  #6 hypoalbuminemia     Recommendations:  #1 restart lasix 20 mg PO daily starting tomorrow. DIscussed with patient , she has 40 mg tablets   At home , she will take 1/2 tab daily until she sees me.    #2 for lower extremity swelling needs local measures, have discussed with nursing about   Compression stockings /concepcion boots   #2 strict ins and outs needed, daily weights, continue fluid restriction 1200 ml daily  #3 hold off  ACE inhibitor's, check renal panel in AM   #4 quantify proteinuria( still not send?)  #5 avoid IV contrast or other nephrotoxins, NSAID's     Ok to discharge from renal standpoint   Patient to f/u with Nephro     Please call with questions,     Kirt Jason MD FASN  Cell [phone]  Pager: [phone]     Subjective:     Denies SOB  Feels better  Leg swelling , needs concepcion boots   BP controlled       Current Facility-Administered Medications:     hydrALAZINE (APRESOLINE) tablet 75 mg, 75 mg, Oral, TID, Maxwell Vitale MD, 75 mg at 04/28/20 0900    amLODIPine (NORVASC) tablet 10 mg, 10 mg, Oral, DAILY, Maxwell Vitale MD, 10 mg at 04/28/20 0850    azithromycin (ZITHROMAX) tablet 500 mg, 500 mg, Oral, DAILY, Elena Hope MD, 500 mg at 04/28/20 0850    polyethylene glycol (MIRALAX) packet 17 g, 17 g, Oral, DAILY, Maxwell Vitale MD, 17 g at 04/28/20 0851    senna (SENOKOT) tablet 8.6 mg, 1 Tab, Oral, DAILY, Tomeka Tyler MD, 8.6 mg at 04/28/20 0850    isosorbide mononitrate ER (IMDUR) tablet 30 mg, 30 mg, Oral, DAILY, Clarissa Garcia PA, 30 mg at 04/28/20 0850    cholecalciferol (VITAMIN D3) (400 Units /10 mcg) tablet 5 Tab, 2,000 Units, Oral, DAILY, Geovany Carlos MD, 5 Tab at 04/28/20 0850    aspirin chewable tablet 81 mg, 81 mg, Oral, DAILY, Geovany Carlos MD, 81 mg at 04/28/20 0850    rosuvastatin (CRESTOR) tablet 5 mg, 5 mg, Oral, QHS, Geovany Carlos MD, 5 mg at 04/27/20 2157    insulin glargine (LANTUS) injection 4 Units, 4 Units, SubCUTAneous, QHS, Geovany Carlos MD, 4 Units at 04/27/20 2156    sodium chloride (NS) flush 5-40 mL, 5-40 mL, IntraVENous, Q8H, Casandra James DO, 10 mL at 04/27/20 2205    sodium chloride (NS) flush 5-40 mL, 5-40 mL, IntraVENous, PRN, Manoj April, DO    acetaminophen (TYLENOL) tablet 650 mg, 650 mg, Oral, Q4H PRN, Joanne Lakeshia A, DO, 650 mg at 04/28/20 0415    morphine injection 2 mg, 2 mg, IntraVENous, Q2H PRN, Manoj April, DO    naloxone [facility]) injection 0.4 mg, 0.4 mg, IntraVENous, PRN, Joanne Lakeshia A, DO    heparin (porcine) injection 5,000 Units, 5,000 Units, SubCUTAneous, Q8H, Manoj April, DO, 5,000 Units at 04/28/20 0850    carvediloL (COREG) tablet 25 mg, 25 mg, Oral, BID WITH MEALS, Joanne Glendale A, DO, 25 mg at 04/28/20 0850    citalopram (CELEXA) tablet 20 mg, 20 mg, Oral, DAILY, Joanne Glendale A, DO, 20 mg at 04/28/20 0850    pantoprazole (PROTONIX) tablet 40 mg, 40 mg, Oral, ACB&D, Joanne Glendale A, DO, 40 mg at 04/28/20 0850    insulin lispro (HUMALOG) injection, , SubCUTAneous, AC&HS, Ani Tiwari PA, 2 Units at 04/28/20 1128    glucose chewable tablet 16 g, 16 g, Oral, PRN, Ani Tiwari PA    glucagon (GLUCAGEN) injection 1 mg, 1 mg, IntraMUSCular, PRN, Serenity Sanders   dextrose (D50W) injection syrg 12.5-25 g, 25-50 mL, IntraVENous, PRN, Ani Moeller [place]    ascorbic acid (vitamin C) (VITAMIN C) tablet 500 mg, 500 mg, Oral, BID, Ani Moeller PA, 500 mg at 04/28/20 0850        Objective:     Visit Vitals  /68 (BP 1 Location: Left arm, BP Patient Position: At rest)   Pulse 66   Temp 98 °F (36.7 °C)   Resp 18   Ht 5' 5\" (1.651 m)   Wt 114.4 kg (252 lb 4.8 oz)   SpO2 100%   BMI 41.98 kg/m²         Intake/Output Summary (Last 24 hours) at 4/28/2020 1548  Last data filed at 4/28/2020 1212  Gross per 24 hour   Intake 1020 ml   Output 1600 ml   Net -580 ml       Physical Exam:     Patient is in no apparent distress. HEENT: mmm  Neck: no cervical lymphadenopathy or thyromegaly. Lungs: good air entry, clear to auscultation bilaterally. Cardiovascular system: S1, S2, regular rate and rhythm. No JVD  Abdomen: soft, non tender, non distended. BS+  Extremities: 2+ LE edema   Neurologic: Alert, oriented time three.       Data Review:    Recent Labs     04/26/20  0218   WBC 5.9   RBC 2.82*   HCT 25.0*   MCV 88.7   MCH 27.7   MCHC 31.2   RDW 14.3     Recent Labs     04/28/20  0346 04/27/20  0052 04/26/20  0218   BUN 66* 64* 60*   CREA 3.31* 3.45* 3.21*   CA 8.2* 8.1* 7.7*   ALB 3.2* 3.4 3.4   K 4.0 4.5 4.4   * 135* 136   CL 97* 102 103   CO2 22 20* 24   PHOS  --  6.0*  --    * 140* 125*       Rosanna Diaz MD

## 2020-04-28 NOTE — PROGRESS NOTES
[facility]  -  Progress Note      Patient: Rosalind Lockwood MRN: [id number]  SSN: [id number]    YOB: 1950  Age: 71 y.o. Sex: female      Admit Date: 4/22/2020    Assessment:     Hospital Problems  Date Reviewed: 4/23/2020          Codes Class Noted POA    CHF (congestive heart failure) ([facility] ICD-10-CM: I50.9  ICD-9-CM: 428.0  4/25/2020 Unknown        * (Principal) Acute on chronic diastolic congestive heart failure ([facility] ICD-10-CM: I50.33  ICD-9-CM: 428.33, 428.0  4/23/2020 Yes        Suspected Covid-19 Virus Infection ICD-10-CM: R68.89  4/23/2020 Yes        Type 2 diabetes mellitus without complication, without long-term current use of insulin (HCC) (Chronic) ICD-10-CM: E11.9  ICD-9-CM: 250.00  7/6/2018 Yes        HTN (hypertension), benign (Chronic) ICD-10-CM: I10  ICD-9-CM: 401.1  7/6/2018 Yes    Overview Signed 4/23/2020  7:02 AM by Hunter Price DO     Last Assessment & Plan:   Normotensive today in office             Bilateral lower extremity edema ICD-10-CM: R60.0  ICD-9-CM: 782.3  7/6/2018 Yes            -Presented with worsening shortness of breath in setting of HFpEF. Covid negative.  -Acute on chronic HFpEF in setting of poorly controlled hypertension. EF 55-60% with aortic sclerosis, trace AI, trace MR, PASP 40 mmHg by echo 8/2019.   -Hypertensive urgency with SBP >200. Reports compliance but does not check her BP at home. Blood pressure improved with increasing medical therapy. Initially on IV nitroglycerin infusion which has since been stopped. -Chronic kidney disease Stage III-IV. Renal following. Due to worsening renal function, diuretics have been stopped. -Coronary disease reportedly diffuse medically managed, nuclear stress 3/2018 with small area of ischemia with EF 53%.   -Diabetes mellitus. HgbA1c 6.1.  -Dyslipidemia. Previously on Crestor.  -Chronic atypical LBBB.  No acute EKG changes this admission.  -Acute on chronic anemia.     Primary cardiologist Dr. Twan De Anda at [facility]. Plan:     Agree with plan to restart furosemide at a lower dose tomorrow. Would continue her current antihypertensive regimen. Stable from a cardiac standpoint for discharge later today. She wishes to follow-up with me in the office. We will arrange for outpatient follow-up in 3 to 4 weeks. Subjective:     Patient still with chronic leg swelling and shortness of breath, though she is feeling a little better.     Objective:      Patient Vitals for the past 8 hrs:   Temp Pulse Resp BP SpO2   04/28/20 0834 98 °F (36.7 °C) 66 18 166/68 100 %   04/28/20 0407 98.2 °F (36.8 °C) 71 19 169/68 97 %         Patient Vitals for the past 96 hrs:   Weight   04/26/20 0425 114.4 kg (252 lb 4.8 oz)   04/25/20 1606 113.9 kg (251 lb)   04/25/20 0400 114.1 kg (251 lb 8 oz)         Intake/Output Summary (Last 24 hours) at 4/28/2020 1012  Last data filed at 4/28/2020 0847  Gross per 24 hour   Intake 700 ml   Output 1475 ml   Net -775 ml       Physical Exam:  General:  alert, cooperative, no distress, appears stated age  Neck:  no JVD  Lungs:  clear to auscultation bilaterally  Heart:  regular rate and rhythm  Abdomen:  abdomen is soft without significant tenderness, masses, organomegaly or guarding  Extremities:   2+ chronic appearing edema to the knees    Data Review:     Labs: Results:       Chemistry Recent Labs     04/28/20  0346 04/27/20  0052 04/26/20  0218   * 140* 125*   * 135* 136   K 4.0 4.5 4.4   CL 97* 102 103   CO2 22 20* 24   BUN 66* 64* 60*   CREA 3.31* 3.45* 3.21*   CA 8.2* 8.1* 7.7*   MG 2.5 2.8* 2.7*   PHOS  --  6.0*  --    AGAP 10 13 9   BUCR 20 19 19   AP 67 71 75   TP 6.6 6.7 6.4   ALB 3.2* 3.4 3.4   GLOB 3.4 3.3 3.0   AGRAT 0.9 1.0 1.1      CBC w/Diff Recent Labs     04/26/20  0218   WBC 5.9   RBC 2.82*   HGB 7.8*   HCT 25.0*      GRANS 73   LYMPH 20*   EOS 0      Cardiac Enzymes Lab Results   Component Value Date/Time    CPK 51 04/28/2020 03:46 AM Coagulation No results for input(s): PTP, INR, APTT, INREXT in the last 72 hours.     Lipid Panel Lab Results   Component Value Date/Time    Cholesterol, total 182 04/24/2020 02:34 AM    HDL Cholesterol 41 04/24/2020 02:34 AM    LDL, calculated 112.8 (H) 04/24/2020 02:34 AM    VLDL, calculated 28.2 04/24/2020 02:34 AM    Triglyceride 141 04/24/2020 02:34 AM    CHOL/HDL Ratio 4.4 04/24/2020 02:34 AM      BNP No results found for: BNP, BNPP, XBNPT   Liver Enzymes Recent Labs     04/28/20  0346   TP 6.6   ALB 3.2*   AP 67   SGOT 23      Digoxin    Thyroid Studies No results found for: T4, T3U, TSH, TSHEXT

## 2020-04-28 NOTE — HOME CARE
Discharge noted for today. [facility] will follow for SN, PT, and OT. Per , Gorge Michele, patient received oxygen prior to discharge.      Lyubov Sewell LPN   [facility] Liaison  [phone]
Discharge noted for today. Received home health referral for [facility] for SN, PT, and OT. Spoke with patient, explained services and answered all questions. Demographics verified. Order processed and emailed to central office. Patient has the following DME: cane, bath chair and rolling walker. Oxygen ordered by LENORA Coker [facility] Liaison
Noted that patient did not discharge yesterday. [id number] will continue to follow.     Venkat Cueva LPN   [facility] Liaison  [phone]
WBAT total hip protocol   protected weight bearing with walker at all times

## 2020-04-28 NOTE — DISCHARGE INSTRUCTIONS
DISCHARGE SUMMARY from Nurse    PATIENT INSTRUCTIONS:    After general anesthesia or intravenous sedation, for 24 hours or while taking prescription Narcotics:  · Limit your activities  · Do not drive and operate hazardous machinery  · Do not make important personal or business decisions  · Do  not drink alcoholic beverages  · If you have not urinated within 8 hours after discharge, please contact your surgeon on call. Report the following to your surgeon:  · Excessive pain, swelling, redness or odor of or around the surgical area  · Temperature over 100.5  · Nausea and vomiting lasting longer than 4 hours or if unable to take medications  · Any signs of decreased circulation or nerve impairment to extremity: change in color, persistent  numbness, tingling, coldness or increase pain  · Any questions    What to do at Home:  Recommended activity: Activity as tolerated,     If you experience any of the following symptoms Fever greater than 100.4,Nausea/Vomiting lasting more than 4 hours, Fainting , Dizziness , Chest Pain please follow up with Primary Care Provider or go to the nearest Emergency room. *  Please give a list of your current medications to your Primary Care Provider. *  Please update this list whenever your medications are discontinued, doses are      changed, or new medications (including over-the-counter products) are added. *  Please carry medication information at all times in case of emergency situations. These are general instructions for a healthy lifestyle:    No smoking/ No tobacco products/ Avoid exposure to second hand smoke  Surgeon General's Warning:  Quitting smoking now greatly reduces serious risk to your health.     Obesity, smoking, and sedentary lifestyle greatly increases your risk for illness    A healthy diet, regular physical exercise & weight monitoring are important for maintaining a healthy lifestyle    You may be retaining fluid if you have a history of heart failure or if you experience any of the following symptoms:  Weight gain of 3 pounds or more overnight or 5 pounds in a week, increased swelling in our hands or feet or shortness of breath while lying flat in bed. Please call your doctor as soon as you notice any of these symptoms; do not wait until your next office visit. The discharge information has been reviewed with the patient. The patient verbalized understanding. Discharge medications reviewed with the patient and appropriate educational materials and side effects teaching were provided. ____Patient armband removed and shredded  _______________________________________________________________________________________________________________________________  DISCHARGE SUMMARY from Nurse    PATIENT INSTRUCTIONS:    After general anesthesia or intravenous sedation, for 24 hours or while taking prescription Narcotics:  · Limit your activities  · Do not drive and operate hazardous machinery  · Do not make important personal or business decisions  · Do  not drink alcoholic beverages  · If you have not urinated within 8 hours after discharge, please contact your surgeon on call. Report the following to your surgeon:  · Excessive pain, swelling, redness or odor of or around the surgical area  · Temperature over 100.5  · Nausea and vomiting lasting longer than 4 hours or if unable to take medications  · Any signs of decreased circulation or nerve impairment to extremity: change in color, persistent  numbness, tingling, coldness or increase pain  · Any questions    What to do at Home:  Recommended activity: Activity as tolerated. If you experience any of the following symptoms fever, swelling, shortness of breath, chest pain or any other concerns, please follow up with PCP or go to the nearest ED. *  Please give a list of your current medications to your Primary Care Provider.     *  Please update this list whenever your medications are discontinued, doses are      changed, or new medications (including over-the-counter products) are added. *  Please carry medication information at all times in case of emergency situations. These are general instructions for a healthy lifestyle:    No smoking/ No tobacco products/ Avoid exposure to second hand smoke  Surgeon General's Warning:  Quitting smoking now greatly reduces serious risk to your health. Obesity, smoking, and sedentary lifestyle greatly increases your risk for illness    A healthy diet, regular physical exercise & weight monitoring are important for maintaining a healthy lifestyle    You may be retaining fluid if you have a history of heart failure or if you experience any of the following symptoms:  Weight gain of 3 pounds or more overnight or 5 pounds in a week, increased swelling in our hands or feet or shortness of breath while lying flat in bed. Please call your doctor as soon as you notice any of these symptoms; do not wait until your next office visit. The discharge information has been reviewed with the patient. The patient verbalized understanding. Discharge medications reviewed with the patient and appropriate educational materials and side effects teaching were provided. [employer] Activation    Thank you for requesting access to [employer] Please follow the instructions below to securely access and download your online medical record. [employer] allows you to send messages to your doctor, view your test results, renew your prescriptions, schedule appointments, and more. How Do I Sign Up? 1. In your internet browser, go to [email or web address]  2. Click on the First Time User? Click Here link in the Sign In box. You will be redirect to the New Member Sign Up page. 3. Enter your [employer] Access Code exactly as it appears below. You will not need to use this code after youve completed the sign-up process.  If you do not sign up before the expiration date, you must request a new code.    [employer] Access Code: [email or web address]  Expires: 2020  8:25 AM (This is the date your [employer] access code will )    4. Enter the last four digits of your Social Security Number (xxxx) and Date of Birth (mm/dd/yyyy) as indicated and click Submit. You will be taken to the next sign-up page. 5. Create a [employer] ID. This will be your [employer] login ID and cannot be changed, so think of one that is secure and easy to remember. 6. Create a [employer] password. You can change your password at any time. 7. Enter your Password Reset Question and Answer. This can be used at a later time if you forget your password. 8. Enter your e-mail address. You will receive e-mail notification when new information is available in [address] 9. Click Sign Up. You can now view and download portions of your medical record. 10. Click the Download Summary menu link to download a portable copy of your medical information. Additional Information    If you have questions, please visit the Frequently Asked Questions section of the [employer] website at [email or web address] Remember, [employer] is NOT to be used for urgent needs. For medical emergencies, dial 911. Patient armband removed and shreddedPatient Education   Patient Education        Acute Kidney Injury: Care Instructions  Your Care Instructions    Acute kidney injury (OFE) is a sudden decrease in kidney function. This can happen over a period of hours, days or, in some cases, weeks. OFE used to be called acute renal failure, but kidney failure doesn't always happen with OFE. Common causes of OFE are dehydration, blood loss, and medicines. When OFE happens, the kidneys have trouble removing waste and excess fluids from the body. The waste and fluids build up and become harmful. Kidney function may return to normal if the cause of OFE is treated quickly.  Your chance of a full recovery depends on what caused the problem, how quickly the cause was treated, and what other medical problems you have. A machine may be used to help your kidneys remove waste and fluids for a short period of time. This is called dialysis. Follow-up care is a key part of your treatment and safety. Be sure to make and go to all appointments, and call your doctor if you are having problems. It's also a good idea to know your test results and keep a list of the medicines you take. How can you care for yourself at home? · Talk to your doctor about how much fluid you should drink. · Eat a balanced diet. Talk to your doctor or a dietitian about what type of diet may be best for you. You may need to limit sodium, potassium, and phosphorus. · If you need dialysis, follow the instructions and schedule for dialysis that your doctor gives you. · Do not smoke. Smoking can make your condition worse. If you need help quitting, talk to your doctor about stop-smoking programs and medicines. These can increase your chances of quitting for good. · Do not drink alcohol. · Review all of your medicines with your doctor. Do not take any medicines, including nonsteroidal anti-inflammatory drugs (NSAIDs) such as ibuprofen (Advil, Motrin) or naproxen (Aleve), unless your doctor says it is safe for you to do so. · Make sure that anyone treating you for any health problem knows that you have had OFE. When should you call for help? Call 911 anytime you think you may need emergency care.  For example, call if:    · You passed out (lost consciousness).    Call your doctor now or seek immediate medical care if:    · You have new or worse nausea and vomiting.     · You have much less urine than normal, or you have no urine.     · You are feeling confused or cannot think clearly.     · You have new or more blood in your urine.     · You have new swelling.     · You are dizzy or lightheaded, or feel like you may faint.    Watch closely for changes in your health, and be sure to contact your doctor if:    · You do not get better as expected. Where can you learn more? Go to http://mechelle-darline.info/  Enter H997 in the search box to learn more about \"Acute Kidney Injury: Care Instructions. \"  Current as of: August 11, 2019Content Version: 12.4  © 7507-8230 [employer]. Care instructions adapted under license by [employer] (which disclaims liability or warranty for this information). If you have questions about a medical condition or this instruction, always ask your healthcare professional. [address] any warranty or liability for your use of this information. Advance Care Planning for Heart Failure: Care Instructions  Your Care Instructions    If you have heart failure, taking care of yourself will help you feel better and live longer. But the disease often gets worse over time. So it may be a good idea to plan for the future now while you are active and able to communicate your wishes. If you do this kind of planning, it does not mean that you are giving up. It's simply the best way to make sure that you get the care and treatment that you want. It can also make things much easier for your loved ones. What can you do to plan for the end of life? · Talk openly and honestly with your family and doctor. This is the best way to understand the decisions you will need to make as your health changes. Know that you can always change your mind. · Ask your doctor about common life-support treatments. These include tube feedings, breathing machines, and fluids given through a vein (IV). It may be easier to decide if you want them after you are sure you understand what they are. · Think about preparing written papers that state your wishes. These papers are called advance directives. If you do this, there will not be any confusion about your wishes.   · If you are near the end of your life and you have a heart device such as an implantable cardioverter defibrillator (ICD) or pacemaker, talk to your doctor about turning it off. Include this in your advance directive. · Ask a friend or family member to make decisions for you when you no longer can. Choose someone who knows you well and understands what makes life meaningful for you. Talk to this person about the kinds of treatments you want or don't want. Make sure this person understands your values and wishes. · You may decide to try life-supporting treatments for a limited time. This can help your doctor see if they will help. You may also decide that you want your doctor to do only certain things to keep you alive. It's important to be very clear about what you do and don't want. · Ask your doctor for an estimate of how long you may live. Your doctor may not always know. But he or she can tell you what usually happens with heart failure. Which papers should you prepare? Advance directives are legal papers that tell doctors how to care for you at the end of your life. They may include a living will and a durable power of . You don't need a  to write these papers. If you prepare these papers, be sure to give a copy to your doctor. It's also important to give a copy to a family member or close friend. · Consider a do-not-resuscitate order (DNR). This order asks that no extra treatments be done if your heart stops or you stop breathing. Extra treatments may include cardiopulmonary resuscitation (CPR), electrical shock to restart your heart, or a machine to breathe for you. If you decide to have a DNR order, ask your doctor to explain and write it. Place the order in your home where everyone can easily see it. · Think about a living will. It explains your wishes in case you are in a coma or cannot communicate. Living raya tell doctors to use or not use treatments to keep you alive.  You must have one or two witnesses or a notary in the room when you sign this form.  · Think about naming a person to make decisions about your care if you are not able to. This paper is called a durable power of . Most people ask a close friend or family member. · Ask your doctor or your state health department about advance directives. They may have forms for each of these types of papers. · All of these papers are simple to change. Tell your doctor what you want to change. Ask him or her to make a note in your file. Then give your family updated copies. Where can you learn more? Go to http://mechelle-darline.info/  Enter L823 in the search box to learn more about \"Advance Care Planning for Heart Failure: Care Instructions. \"  Current as of: December 15, 2019Content Version: 12.4  © 9911-4633 [employer]. Care instructions adapted under license by [employer] (which disclaims liability or warranty for this information). If you have questions about a medical condition or this instruction, always ask your healthcare professional. [address] any warranty or liability for your use of this information.            ___________________________________________________________________________________________________________________________________

## 2020-04-28 NOTE — PROGRESS NOTES
Discharge order noted for today. Pt has been accepted to [facility] agency. CM called and updated Eliane of pt's discharge today. Met with patient and is agreeable to the transition plan today. CM spoke with RN Princess Parents and confirmed oxygen delivery. Instructed nurse to call CM if pt cannot find a ride home. Patient's discharge summary and home health orders have been forwarded to [facility] via [facility]. Updated bedside RN, Princess Parents,  to the transition plan.   Discharge information has been documented on the AVS.       [address]  [phone]

## 2020-04-28 NOTE — SENIOR SERVICES NOTE
Spoke to [facility] staff regarding oxygen issue. The request has not been processed/under prrocessed. MD  and on call  are informed. Order to hold discharge order. Supervisor informed.

## 2020-04-28 NOTE — PROGRESS NOTES
CM received call from [address] rep who has been unsuccessful at reaching pt to discuss oxygen co-pay. CM provided rep with [phone] [place] phone number and suggested she reach out to her nurse to get in contact with the pt.      Aylin Javed RN BSN  Care Manager  [phone]

## 2020-04-28 NOTE — PROGRESS NOTES
CM re-faxed paperwork for home oxygen to [address] at [phone]. CM called [address] [phone] to check on the status and it is still being processed. They are unable to give an ETA currently, contact info was given to call CM once processed.     Gifty Rhoades RN BSN  Care Manager  [phone]

## 2020-04-29 ENCOUNTER — HOSPITAL ENCOUNTER (EMERGENCY)
Age: 70
Discharge: HOME | End: 2020-04-29
Attending: EMERGENCY MEDICINE
Payer: MEDICARE

## 2020-04-29 ENCOUNTER — TELEPHONE (OUTPATIENT)
Dept: CASE MANAGEMENT | Age: 70
End: 2020-04-29

## 2020-04-29 ENCOUNTER — APPOINTMENT (OUTPATIENT)
Dept: GENERAL RADIOLOGY | Age: 70
End: 2020-04-29
Attending: EMERGENCY MEDICINE
Payer: MEDICARE

## 2020-04-29 VITALS
SYSTOLIC BLOOD PRESSURE: 167 MMHG | RESPIRATION RATE: 19 BRPM | OXYGEN SATURATION: 97 % | WEIGHT: 252 LBS | DIASTOLIC BLOOD PRESSURE: 32 MMHG | TEMPERATURE: 96.9 F | BODY MASS INDEX: 41.93 KG/M2 | HEART RATE: 68 BPM

## 2020-04-29 DIAGNOSIS — I50.33 ACUTE ON CHRONIC DIASTOLIC CONGESTIVE HEART FAILURE (HCC): Primary | ICD-10-CM

## 2020-04-29 LAB
ALBUMIN SERPL-MCNC: 3.6 G/DL (ref 3.4–5)
ALBUMIN/GLOB SERPL: 0.9 {RATIO} (ref 0.8–1.7)
ALP SERPL-CCNC: 70 U/L (ref 45–117)
ALT SERPL-CCNC: 22 U/L (ref 13–56)
ANION GAP SERPL CALC-SCNC: 8 MMOL/L (ref 3–18)
AST SERPL-CCNC: 25 U/L (ref 10–38)
ATRIAL RATE: 69 BPM
BACTERIA SPEC CULT: NORMAL
BASOPHILS # BLD: 0 K/UL (ref 0–0.1)
BASOPHILS NFR BLD: 0 % (ref 0–2)
BILIRUB SERPL-MCNC: 0.6 MG/DL (ref 0.2–1)
BNP SERPL-MCNC: 8025 PG/ML (ref 0–900)
BUN SERPL-MCNC: 77 MG/DL (ref 7–18)
BUN/CREAT SERPL: 24 (ref 12–20)
CALCIUM SERPL-MCNC: 8.2 MG/DL (ref 8.5–10.1)
CALCULATED P AXIS, ECG09: 8 DEGREES
CALCULATED R AXIS, ECG10: -26 DEGREES
CALCULATED T AXIS, ECG11: 82 DEGREES
CHLORIDE SERPL-SCNC: 99 MMOL/L (ref 100–111)
CK MB CFR SERPL CALC: 1.3 % (ref 0–4)
CK MB SERPL-MCNC: 1.9 NG/ML (ref 5–25)
CK SERPL-CCNC: 151 U/L (ref 26–192)
CO2 SERPL-SCNC: 23 MMOL/L (ref 21–32)
CREAT SERPL-MCNC: 3.2 MG/DL (ref 0.6–1.3)
DIAGNOSIS, 93000: NORMAL
DIFFERENTIAL METHOD BLD: ABNORMAL
EOSINOPHIL # BLD: 0 K/UL (ref 0–0.4)
EOSINOPHIL NFR BLD: 0 % (ref 0–5)
ERYTHROCYTE [DISTWIDTH] IN BLOOD BY AUTOMATED COUNT: 14.2 % (ref 11.6–14.5)
GLOBULIN SER CALC-MCNC: 3.8 G/DL (ref 2–4)
GLUCOSE SERPL-MCNC: 166 MG/DL (ref 74–99)
HCT VFR BLD AUTO: 26.1 % (ref 35–45)
HGB BLD-MCNC: 8.5 G/DL (ref 12–16)
LYMPHOCYTES # BLD: 0.9 K/UL (ref 0.9–3.6)
LYMPHOCYTES NFR BLD: 10 % (ref 21–52)
MCH RBC QN AUTO: 27.8 PG (ref 24–34)
MCHC RBC AUTO-ENTMCNC: 32.6 G/DL (ref 31–37)
MCV RBC AUTO: 85.3 FL (ref 74–97)
MONOCYTES # BLD: 0.4 K/UL (ref 0.05–1.2)
MONOCYTES NFR BLD: 5 % (ref 3–10)
NEUTS SEG # BLD: 7.2 K/UL (ref 1.8–8)
NEUTS SEG NFR BLD: 85 % (ref 40–73)
P-R INTERVAL, ECG05: 166 MS
PLATELET # BLD AUTO: 198 K/UL (ref 135–420)
PMV BLD AUTO: 10.7 FL (ref 9.2–11.8)
POTASSIUM SERPL-SCNC: 4.1 MMOL/L (ref 3.5–5.5)
PROT SERPL-MCNC: 7.4 G/DL (ref 6.4–8.2)
Q-T INTERVAL, ECG07: 452 MS
QRS DURATION, ECG06: 132 MS
QTC CALCULATION (BEZET), ECG08: 484 MS
RBC # BLD AUTO: 3.06 M/UL (ref 4.2–5.3)
SERVICE CMNT-IMP: NORMAL
SODIUM SERPL-SCNC: 130 MMOL/L (ref 136–145)
TROPONIN I SERPL-MCNC: 0.02 NG/ML (ref 0–0.04)
TROPONIN I SERPL-MCNC: 0.02 NG/ML (ref 0–0.04)
VENTRICULAR RATE, ECG03: 69 BPM
WBC # BLD AUTO: 8.5 K/UL (ref 4.6–13.2)

## 2020-04-29 PROCEDURE — 71045 X-RAY EXAM CHEST 1 VIEW: CPT

## 2020-04-29 PROCEDURE — 96374 THER/PROPH/DIAG INJ IV PUSH: CPT

## 2020-04-29 PROCEDURE — 74011250636 HC RX REV CODE- 250/636: Performed by: EMERGENCY MEDICINE

## 2020-04-29 PROCEDURE — 80053 COMPREHEN METABOLIC PANEL: CPT

## 2020-04-29 PROCEDURE — 93005 ELECTROCARDIOGRAM TRACING: CPT

## 2020-04-29 PROCEDURE — 82550 ASSAY OF CK (CPK): CPT

## 2020-04-29 PROCEDURE — 83880 ASSAY OF NATRIURETIC PEPTIDE: CPT

## 2020-04-29 PROCEDURE — 99285 EMERGENCY DEPT VISIT HI MDM: CPT

## 2020-04-29 PROCEDURE — 84484 ASSAY OF TROPONIN QUANT: CPT

## 2020-04-29 PROCEDURE — 85025 COMPLETE CBC W/AUTO DIFF WBC: CPT

## 2020-04-29 RX ORDER — FUROSEMIDE 10 MG/ML
40 INJECTION INTRAMUSCULAR; INTRAVENOUS
Status: COMPLETED | OUTPATIENT
Start: 2020-04-29 | End: 2020-04-29

## 2020-04-29 RX ADMIN — FUROSEMIDE 40 MG: 10 INJECTION, SOLUTION INTRAMUSCULAR; INTRAVENOUS at 14:22

## 2020-04-29 NOTE — ED PROVIDER NOTES
EMERGENCY DEPARTMENT HISTORY AND PHYSICAL EXAM  This was created with voice recognition software and transcription errors may be present. 1:08 PM  Date: 4/29/2020  Patient Name: Mekhi Echols    History of Presenting Illness     Chief Complaint:    History Provided By:     HPI: Mekhi Echols is a 71 y.o. female with a past medical history of arthritis cancer CHF diabetes goiter hiatal hernia hypertension who presents with shortness of breath. Patient was recently discharged from the hospital on oxygen for her CHF. She states she ran out of her oxygen yesterday she became more short of breath today around 10 AM.  No associated chest pain she does have worsening leg swelling. No fever no chills no cough. No nausea or vomiting. PCP: Marli Perea MD      Past History     Past Medical History:  Past Medical History:   Diagnosis Date    Arthritis     Cancer ([address]     CHF (congestive heart failure) ([address]     Diabetes ([address]     Fracture of neck ([address]     GERD (gastroesophageal reflux disease)     Goiter     Hiatal hernia     Hypertension     Uterine cancer ([address]        Past Surgical History:  Past Surgical History:   Procedure Laterality Date    HX APPENDECTOMY      HX HYSTERECTOMY      HX KNEE REPLACEMENT Right     HX ORTHOPAEDIC      odontoid fracture repair with hardware placement.  HX PARTIAL THYROIDECTOMY         Family History:  No family history on file. Social History:  Social History     Tobacco Use    Smoking status: Never Smoker   Substance Use Topics    Alcohol use: No    Drug use: No       Allergies: Allergies   Allergen Reactions    Augmentin [Amoxicillin-Pot Clavulanate] Diarrhea    Clavulanic Acid Diarrhea    Levaquin [Levofloxacin] Other (comments)     Severe hypoglycemia         Review of Systems     Review of Systems   Respiratory: Positive for shortness of breath. Negative for wheezing and stridor. Cardiovascular: Negative for chest pain.    All other systems reviewed and are negative. 10 point review of systems otherwise negative unless noted in HPI. Physical Exam       Physical Exam  Constitutional:       Appearance: She is well-developed. HENT:      Head: Normocephalic and atraumatic. Eyes:      Pupils: Pupils are equal, round, and reactive to light. Neck:      Musculoskeletal: Normal range of motion and neck supple. Cardiovascular:      Rate and Rhythm: Normal rate and regular rhythm. Heart sounds: Normal heart sounds. No murmur. No friction rub. Pulmonary:      Effort: Pulmonary effort is normal. No respiratory distress. Breath sounds: Normal breath sounds. No wheezing. Abdominal:      General: There is no distension. Palpations: Abdomen is soft. Tenderness: There is no abdominal tenderness. There is no guarding or rebound. Musculoskeletal: Normal range of motion. Skin:     General: Skin is warm and dry. Neurological:      Mental Status: She is alert and oriented to person, place, and time. Psychiatric:         Behavior: Behavior normal.         Thought Content: Thought content normal.         Diagnostic Study Results     Vital Signs   Visit Vitals  /61 (BP 1 Location: Left arm, BP Patient Position: At rest;Sitting)   Pulse 72   Temp 96.9 °F (36.1 °C)   Resp 24   Wt 114.3 kg (252 lb)   SpO2 97%   BMI 41.93 kg/m²      EKG: Left axis nonspecific interventricular conduction delay no ST elevation or depression no hypertrophy. Unchanged from prior per Dr. Blaire Tierney   labs: trop neg x2.  baseline  Creatinine  Imaging: Pulmonary edema    Medical Decision Making     ED Course: Progress Notes, Reevaluation, and Consults:      Provider Notes (Medical Decision Making): Triage notes appreciated she was not wheezing for my examination. She has have increased leg swelling and a history of CHF. Her shortness of breath is improved now that she is on her baseline oxygen.   We will look for fluid overload given her history of heart failure her elevated blood pressure and her leg swelling. Has been given Lasix she is resting comfortably on her baseline 2 L of oxygen. She did not get a home concentrator. This was arranged today by Shemar Perla from case management. They will meet her there at 830 we will discharge her to meet them there. She understands the plan       Diagnosis     Clinical Impression: No diagnosis found. Disposition:    Patient's Medications   Start Taking    No medications on file   Continue Taking    AMLODIPINE (NORVASC) 10 MG TABLET    Take 1 Tab by mouth daily. ASPIRIN 81 MG CHEWABLE TABLET    Take 1 Tab by mouth daily. B.INFANTIS-B. ANI-B. LONG-B.BIFI (PROBIOTIC 4X) 10-15 MG TBEC    Take 1 Tab by mouth daily. BIOTIN 2,500 MCG CAP    Take 1 Tab by mouth two (2) times a day. CARVEDILOL (COREG) 25 MG TABLET    Take 25 mg by mouth two (2) times daily (with meals). CHOLECALCIFEROL (VITAMIN D3) 1,000 UNIT CAP    Take 5,000 Units by mouth daily. CITALOPRAM (CELEXA) 20 MG TABLET    Take 20 mg by mouth daily. CRANBERRY EXTRACT (AZO CRANBERRY) 450 MG TAB    Take 2 Tabs by mouth daily. CYANOCOBALAMIN (VITAMIN B-12) 1,000 MCG TABLET    Take 1,000 mcg by mouth two (2) times a day. ESOMEPRAZOLE (NEXIUM) 40 MG CAPSULE    Take 40 mg by mouth daily. FUROSEMIDE (LASIX) 40 MG TABLET    Take 1 Tab by mouth daily. GABAPENTIN (NEURONTIN) 100 MG CAPSULE    Take 1 Cap by mouth two (2) times a day. Max Daily Amount: 200 mg. HYDRALAZINE (APRESOLINE) 100 MG TABLET    Take 1 Tab by mouth three (3) times daily. INSULIN GLARGINE (LANTUS) 100 UNIT/ML INJECTION    4 units SQ daily- watch for Hypoglycemia adjust dose per patient's blood glucose level    ISOSORBIDE MONONITRATE ER (IMDUR) 30 MG TABLET    Take 1 Tab by mouth daily. LORATADINE (CLARITIN) 10 MG TABLET    Take 10 mg by mouth daily. POLYETHYLENE GLYCOL (MIRALAX) 17 GRAM PACKET    Take 1 Packet by mouth daily.     ROSUVASTATIN (CRESTOR) 5 MG TABLET    Take 1 Tab by mouth nightly. VITAMIN A-VITAMIN C-VIT E-MIN (OCUVITE) TABLET    Take 1 Tab by mouth daily.    These Medications have changed    No medications on file   Stop Taking    No medications on file

## 2020-04-29 NOTE — ED TRIAGE NOTES
Per EMS, Patient is from home. She is c/o sob after her 02 tank ran out. She was given a breathing treatment in route. Still has wheezing.

## 2020-04-29 NOTE — PROGRESS NOTES
Received call from ED, Dr. Valentine Bull to assist with getting pt oxygen. Pt received oxygen yesterday at discharge but apparently did not call [facility], to have concentrator delivered at discharge. Spoke with Delfino with [facility] and [address] can go to home this evening @ 8:30 to set up concentrator and also first bring pt a tank to the ED for pt to go home with. Spoke with pt's neighbor who will transport pt home and assist in [address] at the home.   Updated bedside RN, Rogelio Chan MSW, AC- [phone]

## 2020-04-29 NOTE — DISCHARGE INSTRUCTIONS
Patient Education        Heart Failure: Care Instructions  Your Care Instructions    Heart failure occurs when your heart does not pump as much blood as the body needs. Failure does not mean that the heart has stopped pumping but rather that it is not pumping as well as it should. Over time, this causes fluid buildup in your lungs and other parts of your body. Fluid buildup can cause shortness of breath, fatigue, swollen ankles, and other problems. By taking medicines regularly, reducing sodium (salt) in your diet, checking your weight every day, and making lifestyle changes, you can feel better and live longer. Follow-up care is a key part of your treatment and safety. Be sure to make and go to all appointments, and call your doctor if you are having problems. It's also a good idea to know your test results and keep a list of the medicines you take. How can you care for yourself at home? Medicines    · Be safe with medicines. Take your medicines exactly as prescribed. Call your doctor if you think you are having a problem with your medicine.     · Do not take any vitamins, over-the-counter medicine, or herbal products without talking to your doctor first. David Siuner not take ibuprofen (Advil or Motrin) and naproxen (Aleve) without talking to your doctor first. They could make your heart failure worse.     · You may take some of the following medicine. ? Angiotensin-converting enzyme inhibitors (ACEIs) or angiotensin II receptor blockers (ARBs) reduce the heart's workload, lower blood pressure, and reduce swelling. Taking an ACEI or ARB may lower your chance of needing to be hospitalized. ? Beta-blockers can slow heart rate, decrease blood pressure, and improve your condition. Taking a beta-blocker may lower your chance of needing to be hospitalized. ? Diuretics, also called water pills, reduce swelling.    You will get more details on the specific medicines your doctor prescribes.   Diet    · Your doctor may suggest that you limit sodium. Your doctor can tell you how much sodium is right for you. An example is less than 3,000 mg a day. This includes all the salt you eat in cooking or in packaged foods. People get most of their sodium from processed foods. Fast food and restaurant meals also tend to be very high in sodium.     · Ask your doctor how much liquid you can drink each day. You may have to limit liquids.    Weight    · Weigh yourself without clothing at the same time each day. Record your weight. Call your doctor if you have a sudden weight gain, such as more than 2 to 3 pounds in a day or 5 pounds in a week. (Your doctor may suggest a different range of weight gain.) A sudden weight gain may mean that your heart failure is getting worse.    Activity level    · Start light exercise (if your doctor says it is okay). Even if you can only do a small amount, exercise will help you get stronger, have more energy, and manage your weight and your stress. Walking is an easy way to get exercise. Start out by walking a little more than you did before. Bit by bit, increase the amount you walk.     · When you exercise, watch for signs that your heart is working too hard. You are pushing yourself too hard if you cannot talk while you are exercising. If you become short of breath or dizzy or have chest pain, stop, sit down, and rest.     · If you feel \"wiped out\" the day after you exercise, walk slower or for a shorter distance until you can work up to a better pace.     · Get enough rest at night. Sleeping with 1 or 2 pillows under your upper body and head may help you breathe easier.    Lifestyle changes    · Do not smoke. Smoking can make a heart condition worse. If you need help quitting, talk to your doctor about stop-smoking programs and medicines. These can increase your chances of quitting for good.  Quitting smoking may be the most important step you can take to protect your heart.     · Limit alcohol to 2 drinks a day for men and 1 drink a day for women. Too much alcohol can cause health problems.     · Avoid getting sick from colds and the flu. Get a pneumococcal vaccine shot. If you have had one before, ask your doctor whether you need another dose. Get a flu shot each year. If you must be around people with colds or the flu, wash your hands often. When should you call for help? Call 911 if you have symptoms of sudden heart failure such as:    · You have severe trouble breathing.     · You cough up pink, foamy mucus.     · You have a new irregular or rapid heartbeat.    Call your doctor now or seek immediate medical care if:    · You have new or increased shortness of breath.     · You are dizzy or lightheaded, or you feel like you may faint.     · You have sudden weight gain, such as more than 2 to 3 pounds in a day or 5 pounds in a week. (Your doctor may suggest a different range of weight gain.)     · You have increased swelling in your legs, ankles, or feet.     · You are suddenly so tired or weak that you cannot do your usual activities.    Watch closely for changes in your health, and be sure to contact your doctor if you develop new symptoms. Where can you learn more? Go to http://mechelle-darline.info/  Enter N273 in the search box to learn more about \"Heart Failure: Care Instructions. \"  Current as of: December 15, 2019Content Version: 12.4  © 4286-2174 [employer]. Care instructions adapted under license by [employer] (which disclaims liability or warranty for this information). If you have questions about a medical condition or this instruction, always ask your healthcare professional. [address] any warranty or liability for your use of this information.

## 2020-04-29 NOTE — ED NOTES
Patient is going to call neighbor to bring her home and I will update Shirley Cruz with case management.

## 2020-04-30 ENCOUNTER — HOME CARE VISIT (OUTPATIENT)
Dept: SCHEDULING | Facility: HOME HEALTH | Age: 70
End: 2020-04-30
Payer: MEDICARE

## 2020-04-30 ENCOUNTER — HOME CARE VISIT (OUTPATIENT)
Dept: HOME HEALTH SERVICES | Facility: HOME HEALTH | Age: 70
End: 2020-04-30
Payer: MEDICARE

## 2020-04-30 ENCOUNTER — APPOINTMENT (OUTPATIENT)
Dept: GENERAL RADIOLOGY | Age: 70
DRG: 291 | End: 2020-04-30
Attending: STUDENT IN AN ORGANIZED HEALTH CARE EDUCATION/TRAINING PROGRAM
Payer: MEDICARE

## 2020-04-30 ENCOUNTER — HOSPITAL ENCOUNTER (INPATIENT)
Age: 70
LOS: 14 days | Discharge: NURSING, REHAB OR LONG-TERM CARE | DRG: 291 | End: 2020-05-14
Attending: EMERGENCY MEDICINE | Admitting: INTERNAL MEDICINE
Payer: MEDICARE

## 2020-04-30 DIAGNOSIS — T68.XXXA HYPOTHERMIA, INITIAL ENCOUNTER: ICD-10-CM

## 2020-04-30 DIAGNOSIS — A41.9 SEPSIS WITH ACUTE ORGAN DYSFUNCTION, DUE TO UNSPECIFIED ORGANISM, UNSPECIFIED TYPE, UNSPECIFIED WHETHER SEPTIC SHOCK PRESENT (HCC): ICD-10-CM

## 2020-04-30 DIAGNOSIS — Z20.822 SUSPECTED COVID-19 VIRUS INFECTION: Primary | ICD-10-CM

## 2020-04-30 DIAGNOSIS — R65.20 SEPSIS WITH ACUTE ORGAN DYSFUNCTION, DUE TO UNSPECIFIED ORGANISM, UNSPECIFIED TYPE, UNSPECIFIED WHETHER SEPTIC SHOCK PRESENT (HCC): ICD-10-CM

## 2020-04-30 DIAGNOSIS — N18.6 END STAGE CHRONIC KIDNEY DISEASE (HCC): ICD-10-CM

## 2020-04-30 DIAGNOSIS — I50.9 ACUTE ON CHRONIC CONGESTIVE HEART FAILURE, UNSPECIFIED HEART FAILURE TYPE (HCC): ICD-10-CM

## 2020-04-30 DIAGNOSIS — E11.9 TYPE 2 DIABETES MELLITUS WITHOUT COMPLICATION, WITHOUT LONG-TERM CURRENT USE OF INSULIN (HCC): Chronic | ICD-10-CM

## 2020-04-30 PROBLEM — R65.10 SIRS (SYSTEMIC INFLAMMATORY RESPONSE SYNDROME) (HCC): Status: ACTIVE | Noted: 2020-04-30

## 2020-04-30 PROBLEM — J96.90 RESPIRATORY FAILURE (HCC): Status: ACTIVE | Noted: 2020-04-30

## 2020-04-30 LAB
ALBUMIN SERPL-MCNC: 3.4 G/DL (ref 3.4–5)
ALBUMIN/GLOB SERPL: 1 {RATIO} (ref 0.8–1.7)
ALP SERPL-CCNC: 74 U/L (ref 45–117)
ALT SERPL-CCNC: 22 U/L (ref 13–56)
ANION GAP SERPL CALC-SCNC: 9 MMOL/L (ref 3–18)
AST SERPL-CCNC: 20 U/L (ref 10–38)
BASOPHILS # BLD: 0 K/UL (ref 0–0.1)
BASOPHILS NFR BLD: 0 % (ref 0–2)
BILIRUB SERPL-MCNC: 0.5 MG/DL (ref 0.2–1)
BNP SERPL-MCNC: 9903 PG/ML (ref 0–900)
BUN SERPL-MCNC: 84 MG/DL (ref 7–18)
BUN/CREAT SERPL: 31 (ref 12–20)
CALCIUM SERPL-MCNC: 8 MG/DL (ref 8.5–10.1)
CHLORIDE SERPL-SCNC: 101 MMOL/L (ref 100–111)
CK MB CFR SERPL CALC: 2.7 % (ref 0–4)
CK MB SERPL-MCNC: 2.2 NG/ML (ref 5–25)
CK SERPL-CCNC: 82 U/L (ref 26–192)
CO2 SERPL-SCNC: 24 MMOL/L (ref 21–32)
CREAT SERPL-MCNC: 2.74 MG/DL (ref 0.6–1.3)
CRP SERPL-MCNC: <0.3 MG/DL (ref 0–0.3)
D DIMER PPP FEU-MCNC: 1.53 UG/ML(FEU)
DIFFERENTIAL METHOD BLD: ABNORMAL
EOSINOPHIL # BLD: 0 K/UL (ref 0–0.4)
EOSINOPHIL NFR BLD: 0 % (ref 0–5)
ERYTHROCYTE [DISTWIDTH] IN BLOOD BY AUTOMATED COUNT: 14.2 % (ref 11.6–14.5)
FERRITIN SERPL-MCNC: 116 NG/ML (ref 8–388)
GLOBULIN SER CALC-MCNC: 3.5 G/DL (ref 2–4)
GLUCOSE BLD STRIP.AUTO-MCNC: 164 MG/DL (ref 70–110)
GLUCOSE BLD STRIP.AUTO-MCNC: 193 MG/DL (ref 70–110)
GLUCOSE SERPL-MCNC: 211 MG/DL (ref 74–99)
HCT VFR BLD AUTO: 25.3 % (ref 35–45)
HGB BLD-MCNC: 8.4 G/DL (ref 12–16)
IRON SATN MFR SERPL: 13 % (ref 20–50)
IRON SERPL-MCNC: 42 UG/DL (ref 50–175)
LACTATE SERPL-SCNC: 0.6 MMOL/L (ref 0.4–2)
LDH SERPL L TO P-CCNC: 207 U/L (ref 81–234)
LYMPHOCYTES # BLD: 0.4 K/UL (ref 0.9–3.6)
LYMPHOCYTES NFR BLD: 5 % (ref 21–52)
MCH RBC QN AUTO: 28.3 PG (ref 24–34)
MCHC RBC AUTO-ENTMCNC: 33.2 G/DL (ref 31–37)
MCV RBC AUTO: 85.2 FL (ref 74–97)
MONOCYTES # BLD: 0.4 K/UL (ref 0.05–1.2)
MONOCYTES NFR BLD: 5 % (ref 3–10)
NEUTS SEG # BLD: 7.2 K/UL (ref 1.8–8)
NEUTS SEG NFR BLD: 90 % (ref 40–73)
PLATELET # BLD AUTO: 190 K/UL (ref 135–420)
PMV BLD AUTO: 11.1 FL (ref 9.2–11.8)
POTASSIUM SERPL-SCNC: 4.2 MMOL/L (ref 3.5–5.5)
PROCALCITONIN SERPL-MCNC: 0.1 NG/ML
PROT SERPL-MCNC: 6.9 G/DL (ref 6.4–8.2)
RBC # BLD AUTO: 2.97 M/UL (ref 4.2–5.3)
SODIUM SERPL-SCNC: 134 MMOL/L (ref 136–145)
TIBC SERPL-MCNC: 319 UG/DL (ref 250–450)
TROPONIN I SERPL-MCNC: 0.03 NG/ML (ref 0–0.04)
WBC # BLD AUTO: 7.9 K/UL (ref 4.6–13.2)

## 2020-04-30 PROCEDURE — 0100U RESPIRATORY PANEL,PCR,NASOPHARYNGEAL: CPT

## 2020-04-30 PROCEDURE — 82550 ASSAY OF CK (CPK): CPT

## 2020-04-30 PROCEDURE — 74011250637 HC RX REV CODE- 250/637: Performed by: PHYSICIAN ASSISTANT

## 2020-04-30 PROCEDURE — 99285 EMERGENCY DEPT VISIT HI MDM: CPT

## 2020-04-30 PROCEDURE — 400013 HH SOC

## 2020-04-30 PROCEDURE — 74011250636 HC RX REV CODE- 250/636: Performed by: PHYSICIAN ASSISTANT

## 2020-04-30 PROCEDURE — 93005 ELECTROCARDIOGRAM TRACING: CPT

## 2020-04-30 PROCEDURE — 83615 LACTATE (LD) (LDH) ENZYME: CPT

## 2020-04-30 PROCEDURE — 82962 GLUCOSE BLOOD TEST: CPT

## 2020-04-30 PROCEDURE — 74011636637 HC RX REV CODE- 636/637: Performed by: PHYSICIAN ASSISTANT

## 2020-04-30 PROCEDURE — 82728 ASSAY OF FERRITIN: CPT

## 2020-04-30 PROCEDURE — 74011250636 HC RX REV CODE- 250/636: Performed by: STUDENT IN AN ORGANIZED HEALTH CARE EDUCATION/TRAINING PROGRAM

## 2020-04-30 PROCEDURE — 74011000258 HC RX REV CODE- 258: Performed by: STUDENT IN AN ORGANIZED HEALTH CARE EDUCATION/TRAINING PROGRAM

## 2020-04-30 PROCEDURE — 80053 COMPREHEN METABOLIC PANEL: CPT

## 2020-04-30 PROCEDURE — 84145 PROCALCITONIN (PCT): CPT

## 2020-04-30 PROCEDURE — 85025 COMPLETE CBC W/AUTO DIFF WBC: CPT

## 2020-04-30 PROCEDURE — 87040 BLOOD CULTURE FOR BACTERIA: CPT

## 2020-04-30 PROCEDURE — 86140 C-REACTIVE PROTEIN: CPT

## 2020-04-30 PROCEDURE — 77030040830 HC CATH URETH FOL MDII -A

## 2020-04-30 PROCEDURE — 87450 LEGIONELLA PNEUMOPHILA AG, URINE: CPT

## 2020-04-30 PROCEDURE — 94762 N-INVAS EAR/PLS OXIMTRY CONT: CPT

## 2020-04-30 PROCEDURE — 83605 ASSAY OF LACTIC ACID: CPT

## 2020-04-30 PROCEDURE — G0299 HHS/HOSPICE OF RN EA 15 MIN: HCPCS

## 2020-04-30 PROCEDURE — 87449 NOS EACH ORGANISM AG IA: CPT

## 2020-04-30 PROCEDURE — 81001 URINALYSIS AUTO W/SCOPE: CPT

## 2020-04-30 PROCEDURE — 71045 X-RAY EXAM CHEST 1 VIEW: CPT

## 2020-04-30 PROCEDURE — 87086 URINE CULTURE/COLONY COUNT: CPT

## 2020-04-30 PROCEDURE — 77010033678 HC OXYGEN DAILY

## 2020-04-30 PROCEDURE — 3331090001 HH PPS REVENUE CREDIT

## 2020-04-30 PROCEDURE — 65660000000 HC RM CCU STEPDOWN

## 2020-04-30 PROCEDURE — 85379 FIBRIN DEGRADATION QUANT: CPT

## 2020-04-30 PROCEDURE — 96365 THER/PROPH/DIAG IV INF INIT: CPT

## 2020-04-30 PROCEDURE — 83540 ASSAY OF IRON: CPT

## 2020-04-30 PROCEDURE — 3331090002 HH PPS REVENUE DEBIT

## 2020-04-30 PROCEDURE — 96375 TX/PRO/DX INJ NEW DRUG ADDON: CPT

## 2020-04-30 PROCEDURE — 83880 ASSAY OF NATRIURETIC PEPTIDE: CPT

## 2020-04-30 PROCEDURE — 74011250636 HC RX REV CODE- 250/636: Performed by: EMERGENCY MEDICINE

## 2020-04-30 RX ORDER — INSULIN LISPRO 100 [IU]/ML
INJECTION, SOLUTION INTRAVENOUS; SUBCUTANEOUS
Status: DISCONTINUED | OUTPATIENT
Start: 2020-04-30 | End: 2020-05-14 | Stop reason: HOSPADM

## 2020-04-30 RX ORDER — AMLODIPINE BESYLATE 10 MG/1
10 TABLET ORAL DAILY
Status: DISCONTINUED | OUTPATIENT
Start: 2020-05-01 | End: 2020-05-14 | Stop reason: HOSPADM

## 2020-04-30 RX ORDER — MAGNESIUM SULFATE 100 %
16 CRYSTALS MISCELLANEOUS AS NEEDED
Status: DISCONTINUED | OUTPATIENT
Start: 2020-04-30 | End: 2020-05-14 | Stop reason: HOSPADM

## 2020-04-30 RX ORDER — FUROSEMIDE 10 MG/ML
40 INJECTION INTRAMUSCULAR; INTRAVENOUS EVERY 12 HOURS
Status: DISCONTINUED | OUTPATIENT
Start: 2020-04-30 | End: 2020-05-04

## 2020-04-30 RX ORDER — DEXTROSE 50 % IN WATER (D50W) INTRAVENOUS SYRINGE
25-50 AS NEEDED
Status: DISCONTINUED | OUTPATIENT
Start: 2020-04-30 | End: 2020-05-14 | Stop reason: HOSPADM

## 2020-04-30 RX ORDER — FUROSEMIDE 10 MG/ML
40 INJECTION INTRAMUSCULAR; INTRAVENOUS
Status: COMPLETED | OUTPATIENT
Start: 2020-04-30 | End: 2020-04-30

## 2020-04-30 RX ORDER — CITALOPRAM 20 MG/1
20 TABLET, FILM COATED ORAL DAILY
Status: DISCONTINUED | OUTPATIENT
Start: 2020-05-01 | End: 2020-05-14 | Stop reason: HOSPADM

## 2020-04-30 RX ORDER — ZINC SULFATE 50(220)MG
1 CAPSULE ORAL DAILY
Status: DISCONTINUED | OUTPATIENT
Start: 2020-05-01 | End: 2020-05-09

## 2020-04-30 RX ORDER — HEPARIN SODIUM 5000 [USP'U]/ML
5000 INJECTION, SOLUTION INTRAVENOUS; SUBCUTANEOUS EVERY 8 HOURS
Status: DISCONTINUED | OUTPATIENT
Start: 2020-04-30 | End: 2020-05-02

## 2020-04-30 RX ORDER — GABAPENTIN 100 MG/1
100 CAPSULE ORAL 2 TIMES DAILY
Status: DISCONTINUED | OUTPATIENT
Start: 2020-04-30 | End: 2020-04-30

## 2020-04-30 RX ORDER — LANOLIN ALCOHOL/MO/W.PET/CERES
1 CREAM (GRAM) TOPICAL EVERY OTHER DAY
Status: DISCONTINUED | OUTPATIENT
Start: 2020-05-01 | End: 2020-05-14 | Stop reason: HOSPADM

## 2020-04-30 RX ORDER — INSULIN GLARGINE 100 [IU]/ML
4 INJECTION, SOLUTION SUBCUTANEOUS
Status: DISCONTINUED | OUTPATIENT
Start: 2020-04-30 | End: 2020-05-09

## 2020-04-30 RX ORDER — POLYETHYLENE GLYCOL 3350 17 G/17G
17 POWDER, FOR SOLUTION ORAL
Status: DISCONTINUED | OUTPATIENT
Start: 2020-04-30 | End: 2020-05-12

## 2020-04-30 RX ORDER — ROSUVASTATIN CALCIUM 10 MG/1
5 TABLET, COATED ORAL
Status: DISCONTINUED | OUTPATIENT
Start: 2020-04-30 | End: 2020-05-14 | Stop reason: HOSPADM

## 2020-04-30 RX ORDER — CHOLECALCIFEROL (VITAMIN D3) 125 MCG
5000 CAPSULE ORAL DAILY
Status: DISCONTINUED | OUTPATIENT
Start: 2020-05-01 | End: 2020-05-07

## 2020-04-30 RX ORDER — AZITHROMYCIN 250 MG/1
500 TABLET, FILM COATED ORAL
Status: DISCONTINUED | OUTPATIENT
Start: 2020-04-30 | End: 2020-04-30

## 2020-04-30 RX ORDER — CARVEDILOL 25 MG/1
25 TABLET ORAL 2 TIMES DAILY WITH MEALS
Status: DISCONTINUED | OUTPATIENT
Start: 2020-04-30 | End: 2020-05-11

## 2020-04-30 RX ORDER — SODIUM CHLORIDE 0.9 % (FLUSH) 0.9 %
5-10 SYRINGE (ML) INJECTION AS NEEDED
Status: DISCONTINUED | OUTPATIENT
Start: 2020-04-30 | End: 2020-05-14 | Stop reason: HOSPADM

## 2020-04-30 RX ORDER — POLYETHYLENE GLYCOL 3350 17 G/17G
17 POWDER, FOR SOLUTION ORAL DAILY
Status: DISCONTINUED | OUTPATIENT
Start: 2020-05-01 | End: 2020-04-30

## 2020-04-30 RX ORDER — GUAIFENESIN 100 MG/5ML
81 LIQUID (ML) ORAL DAILY
Status: DISCONTINUED | OUTPATIENT
Start: 2020-05-01 | End: 2020-05-14 | Stop reason: HOSPADM

## 2020-04-30 RX ORDER — LANOLIN ALCOHOL/MO/W.PET/CERES
1000 CREAM (GRAM) TOPICAL 2 TIMES DAILY
Status: DISCONTINUED | OUTPATIENT
Start: 2020-04-30 | End: 2020-05-14 | Stop reason: HOSPADM

## 2020-04-30 RX ORDER — ASCORBIC ACID 250 MG
500 TABLET ORAL 2 TIMES DAILY
Status: DISCONTINUED | OUTPATIENT
Start: 2020-04-30 | End: 2020-05-09

## 2020-04-30 RX ORDER — PANTOPRAZOLE SODIUM 40 MG/1
40 TABLET, DELAYED RELEASE ORAL
Status: DISCONTINUED | OUTPATIENT
Start: 2020-05-01 | End: 2020-05-14 | Stop reason: HOSPADM

## 2020-04-30 RX ORDER — HYDRALAZINE HYDROCHLORIDE 50 MG/1
100 TABLET, FILM COATED ORAL 3 TIMES DAILY
Status: DISCONTINUED | OUTPATIENT
Start: 2020-04-30 | End: 2020-05-14 | Stop reason: HOSPADM

## 2020-04-30 RX ORDER — ACETAMINOPHEN 325 MG/1
650 TABLET ORAL
Status: DISCONTINUED | OUTPATIENT
Start: 2020-04-30 | End: 2020-05-14 | Stop reason: HOSPADM

## 2020-04-30 RX ADMIN — PIPERACILLIN AND TAZOBACTAM 4.5 G: 4; .5 INJECTION, POWDER, LYOPHILIZED, FOR SOLUTION INTRAVENOUS; PARENTERAL at 13:09

## 2020-04-30 RX ADMIN — HYDRALAZINE HYDROCHLORIDE 100 MG: 50 TABLET, FILM COATED ORAL at 17:07

## 2020-04-30 RX ADMIN — INSULIN LISPRO 2 UNITS: 100 INJECTION, SOLUTION INTRAVENOUS; SUBCUTANEOUS at 17:34

## 2020-04-30 RX ADMIN — HEPARIN SODIUM 5000 UNITS: 5000 INJECTION INTRAVENOUS; SUBCUTANEOUS at 17:03

## 2020-04-30 RX ADMIN — Medication 500 MG: at 17:35

## 2020-04-30 RX ADMIN — HEPARIN SODIUM 5000 UNITS: 5000 INJECTION INTRAVENOUS; SUBCUTANEOUS at 22:50

## 2020-04-30 RX ADMIN — HYDRALAZINE HYDROCHLORIDE 100 MG: 50 TABLET, FILM COATED ORAL at 22:48

## 2020-04-30 RX ADMIN — INSULIN LISPRO 2 UNITS: 100 INJECTION, SOLUTION INTRAVENOUS; SUBCUTANEOUS at 22:47

## 2020-04-30 RX ADMIN — FUROSEMIDE 40 MG: 10 INJECTION, SOLUTION INTRAMUSCULAR; INTRAVENOUS at 22:48

## 2020-04-30 RX ADMIN — CARVEDILOL 25 MG: 25 TABLET, FILM COATED ORAL at 17:07

## 2020-04-30 RX ADMIN — SODIUM CHLORIDE 2500 MG: 9 INJECTION, SOLUTION INTRAVENOUS at 15:21

## 2020-04-30 RX ADMIN — CYANOCOBALAMIN TAB 1000 MCG 1000 MCG: 1000 TAB at 17:35

## 2020-04-30 RX ADMIN — INSULIN GLARGINE 4 UNITS: 100 INJECTION, SOLUTION SUBCUTANEOUS at 22:47

## 2020-04-30 RX ADMIN — FUROSEMIDE 40 MG: 10 INJECTION, SOLUTION INTRAMUSCULAR; INTRAVENOUS at 13:05

## 2020-04-30 RX ADMIN — ROSUVASTATIN CALCIUM 5 MG: 10 TABLET, FILM COATED ORAL at 22:48

## 2020-04-30 NOTE — ROUTINE PROCESS
Assumed patient care. Report received from [facility]. 11:50 PM - Noted patient has not voided since start of shift. Pure wick in place but no output. Patient stated she feels like need to urinate but hard to come out. Bladder scan done with 775 cc noted. Call placed to hospitalist, received call from Dr. Patricia Lundberg, orders received for ramos catheter insertion. Ramos catheter placed using sterile technique. Obtained yellow colored urine that is hazy in appearance. All ordered urine studies collected and sent to lab.  
 
7:55 AM - Bedside shift change report given to [facility] (oncoming nurse) by Fortino Nelson RN (offgoing nurse). Report given with SBAR, Kardex, Intake/Output, MAR and Recent Results.

## 2020-04-30 NOTE — ROUTINE PROCESS
TRANSFER - IN REPORT: 
 
Verbal report received from Clarissa RN(name) on Yessica Balls  being received from ED(unit) for routine progression of care Report consisted of patients Situation, Background, Assessment and  
Recommendations(SBAR). Information from the following report(s) Kardex, ED Summary and MAR was reviewed with the receiving nurse. Opportunity for questions and clarification was provided. Assessment will be completed upon patients arrival to unit and care will be assumed.

## 2020-04-30 NOTE — Clinical Note
TRANSFER - OUT REPORT:     Verbal report given to: Lucy Gordon RN. Report consisted of patient's Situation, Background, Assessment and   Recommendations(SBAR). Opportunity for questions and clarification was provided. Patient transported with a Registered Nurse. Patient transported to: [address]

## 2020-04-30 NOTE — PROGRESS NOTES
Liaison for COVID Testing    Received call from Dr. Zeinab Stout regarding requested outreach to [address] [place] [address]) given concern for coronavirus infection. Online [facility] request form completed. Winifred Conrad does meet criteria for priority testing through [address] [place] [address]. In accordance with these recommendations coronavirus testing sent to [address] [place] [address] results under send out test emergent disease panel and associated reference number for test completion is [id number]. [employer] form completed and faxed to [facility], receipt confirmed at 1402.     Ashleigh Reynolds [facility]

## 2020-04-30 NOTE — ED NOTES
TRANSFER - OUT REPORT:    Verbal report given to Urszula RN(name) on [employer]  being transferred to [address] [place] [address](unit) for routine progression of care       Report consisted of patients Situation, Background, Assessment and   Recommendations(SBAR). Information from the following report(s) SBAR was reviewed with the receiving nurse. Lines:   Peripheral IV 04/30/20 Left Hand (Active)        Opportunity for questions and clarification was provided.       Patient transported with:   O2 @ 4 liters

## 2020-04-30 NOTE — ROUTINE PROCESS
Patient admitted from ED  per  stretcher with chief complaints of shortness of breATH accompanied by transporter. Geneva Powell Patient isoriented to time, place, person and situation. Oxygen on at 4 lpm via nasal cannula. Vanco infusing Not in acute distress, will monitor. 8:27 PM 
Bedside shift change report given to [address] (oncoming nurse) by [address] Lloyd (offgoing nurse). Report included the following information Kardex, ED Summary, Intake/Output, MAR, Med Rec Status and Cardiac Rhythm sb.

## 2020-04-30 NOTE — H&P
Hospitalist Admission History and Physical    NAME:  Terrie Toth   :   1950   MRN:   [id number]     PCP:  Kyrie Melissa MD  Date/Time:  2020 2:23 PM  Subjective:   CHIEF COMPLAINT:  SOB    HISTORY OF PRESENT ILLNESS:     Ms. Haydee Callaway is a 70 yo  female with a past medical history of CKD stage 3/4, diastolic congestive heart failure, type 2 DM, HTN who presents from home with SOB. The history/ROS is very limited as pt is weak, tired and still SOB at the time of my interview; she nods or shakes her heads to questions, is not very verbal at the moment. Per chart review, she was worsening SOB and has been using 4L NC which is more than her usual which is 2L NC. She denies fever, chills, headaches, myalgias, chest pain, cough, abd pain, n/v, diarrhea, dysuria. She currently is SOB per pt but does not appear to be in resp distress when I saw her and with good oxygen saturation. I spoke to her neighbor on the phone Ms. Odin Ybarra, who is the only contact in our system- for the past 2 days since discharge from [facility], pt has been weak, not eating, and saying \"I dont feel good\". The home health nurse was at the patient's home this morning and decided to call EMS. To note, pt was recently admitted to [facility] on  for SOB and LE edema consistent with acute on chronic diastolic CHF and OFE on CKD. She was followed by Dr Ferny Manzo and Dr Sammye Curling. She was discharged to home on  with oxygen. The next day, , she came back to ED for SOB. The  arranged for home concentrator and she was discharged from the ED. She is back to ED today. ED course:   Vital signs: 95.5 F, HR 58, RR 33, 96% on 4L, /105   Labs remarkable for: hgb 8.4, proBNP 9903, troponin neg    Imaging: chest xray Mildly enlarged cardiac silhouette with suspected interstitial infiltrate/edema.   EKG:   Medications received lasix 40 mg IV, vanc, zosyn   Procedures performed:  None     REVIEW OF SYSTEMS:     [x] Unable to obtain  ROS due to patient weak, tired, SOB, not very communicative,   []mental status change  []sedated   []intubated   []Total of 12 systems reviewed as follows: She denies fever, chills, headaches, myalgias, chest pain, cough, abd pain, n/v, diarrhea, dysuria. # Past medical history: see above in HPI  # Past surgical history:defer  # Family history: defer   # Medications: I have reviewed medications from most recent discharge summary on 4/28/2020  # Allergies:  Per chart review - augmentin, clavulanic acid, levaquin. # Social history: patient lives alone with dog. She is a  and has no children. Her neighbor says she may have family in [place]. # Specialists:   # Family Contact: [phone] neighbor Ms. Halie Amaya     Past Medical History:   Diagnosis Date    Arthritis     Cancer [facility])     [facility]     Diabetes ([facility]     Fracture of neck (HCC)     GERD (gastroesophageal reflux disease)     Goiter     Hiatal hernia     Hypertension     Uterine cancer (HCC)         Past Surgical History:   Procedure Laterality Date    HX APPENDECTOMY      HX HYSTERECTOMY      HX KNEE REPLACEMENT Right     HX ORTHOPAEDIC      odontoid fracture repair with hardware placement.  HX PARTIAL THYROIDECTOMY         Social History     Tobacco Use    Smoking status: Never Smoker   Substance Use Topics    Alcohol use: No        No family history on file. Allergies   Allergen Reactions    Augmentin [Amoxicillin-Pot Clavulanate] Diarrhea    Clavulanic Acid Diarrhea    Levaquin [Levofloxacin] Other (comments)     Severe hypoglycemia          Prior to Admission Medications   Prescriptions Last Dose Informant Patient Reported? Taking? B.infantis-B.ani-B.long-B.bifi (PROBIOTIC 4X) 10-15 mg TbEC   Yes No   Sig: Take 1 Tab by mouth daily. Biotin 2,500 mcg cap   Yes No   Sig: Take 1 Tab by mouth two (2) times a day.    amLODIPine (NORVASC) 10 mg tablet   No No   Sig: Take 1 Tab by mouth daily. aspirin 81 mg chewable tablet   No No   Sig: Take 1 Tab by mouth daily. carvedilol (COREG) 25 mg tablet   Yes No   Sig: Take 25 mg by mouth two (2) times daily (with meals). cholecalciferol (VITAMIN D3) 1,000 unit cap   Yes No   Sig: Take 5,000 Units by mouth daily. citalopram (CELEXA) 20 mg tablet   Yes No   Sig: Take 20 mg by mouth daily. cranberry extract (AZO CRANBERRY) 450 mg tab   Yes No   Sig: Take 2 Tabs by mouth daily. cyanocobalamin (VITAMIN B-12) 1,000 mcg tablet   Yes No   Sig: Take 1,000 mcg by mouth two (2) times a day. esomeprazole (NEXIUM) 40 mg capsule   Yes No   Sig: Take 40 mg by mouth daily. furosemide (LASIX) 40 mg tablet   No No   Sig: Take 1 Tab by mouth daily. gabapentin (NEURONTIN) 100 mg capsule   No No   Sig: Take 1 Cap by mouth two (2) times a day. Max Daily Amount: 200 mg.   hydrALAZINE (APRESOLINE) 100 mg tablet   No No   Sig: Take 1 Tab by mouth three (3) times daily. insulin glargine (LANTUS) 100 unit/mL injection   No No   Si units SQ daily- watch for Hypoglycemia adjust dose per patient's blood glucose level   isosorbide mononitrate ER (IMDUR) 30 mg tablet   No No   Sig: Take 1 Tab by mouth daily. loratadine (CLARITIN) 10 mg tablet   Yes No   Sig: Take 10 mg by mouth daily. polyethylene glycol (MIRALAX) 17 gram packet   No No   Sig: Take 1 Packet by mouth daily. rosuvastatin (CRESTOR) 5 mg tablet   No No   Sig: Take 1 Tab by mouth nightly. vitamin a-vitamin c-vit e-min (OCUVITE) tablet   Yes No   Sig: Take 1 Tab by mouth daily.       Facility-Administered Medications: None       Objective:   VITALS:    Visit Vitals  Pulse (!) 58   Temp 95.5 °F (35.3 °C)   Resp (!) 33   Ht 5' 5\" (1.651 m)   Wt 118.3 kg (260 lb 14.4 oz)   SpO2 96%   BMI 43.42 kg/m²     Temp (24hrs), Av.5 °F (35.3 °C), Min:95.5 °F (35.3 °C), Max:95.5 °F (35.3 °C)      PHYSICAL EXAM:   General:    Obese  woman laying in bed with face mask on, no acute distress, appears stated age. Head:   Normocephalic, without obvious abnormality  Eyes:   Conjunctivae clear, anicteric sclerae, pupils are equal  Nose:  Defer   Throat:    Defer   Back:    Defer   Lungs: On nasal cannula. Defer auscultation, I could not find disposable stethoscope in pt's room. Will speak to ED MD   Chest wall:  No tenderness or deformity. No Accessory muscle use. Heart:   Regular rate and rhythm;  no murmur, rub or gallop. Abdomen:   Soft, non-tender. Not distended. Bowel sounds normal. No masses  Extremities: Bilateral LE pitting edema, there are ACE bandage wrapped around both legs   Skin:     No rashes or lesions. Not Jaundiced  Psych:  Good insight. Not depressed, anxious or agitated. Neurologic: Awake. Nods yes or no. Follows commands. Moving arms and legs. + bear hugger, skin feels warm all over. LAB DATA REVIEWED:    No components found for: [place]  Recent Labs     04/30/20  1241 04/29/20  1236 04/28/20  0346   * 130* 129*   K 4.2 4.1 4.0    99* 97*   CO2 24 23 22   BUN 84* 77* 66*   CREA 2.74* 3.20* 3.31*   * 166* 112*   CA 8.0* 8.2* 8.2*   ALB 3.4 3.6 3.2*   WBC 7.9 8.5  --    HGB 8.4* 8.5*  --    HCT 25.3* 26.1*  --     198  --        IMAGING RESULTS:   []  I have personally reviewed the actual   []CXR  []CT scan    CXR:   XR Results (most recent):  Results from Hospital Encounter encounter on 04/30/20   XR CHEST PORT    Narrative EXAMINATION: Chest single view    INDICATION: Shortness of breath    COMPARISON: 4/29/2020    FINDINGS: Single frontal view of the chest obtained. Mildly enlarged cardiac  silhouette. Prominent perihilar and beyond pulmonary interstitium. No confluent  consolidation. No evidence of pneumothorax. No obvious acute osseous findings. Cervical spine fixation hardware noted. Impression IMPRESSION:    Mildly enlarged cardiac silhouette with suspected interstitial infiltrate/edema.              Assessment/Plan:      Consults: Nephrology Dr Amisha Luis, Cardiology     1. Acute on chronic hypoxic respiratory failure with tachypnea (uses 2LPM NC at home)   - continue supplemental oxygen. Keep HOB elevated. Aspiration precautions. 2. SIRS POA (Hypothermia and tachypnea)   - lactic acid normal   - resume empiric abxs vanc and zosyn for now. - Follow blood cultures. Check UA and urine culture. 3. Hypothermia  - for hypothermia, continue bear hugger while in ED. If pt continues to be hypothermic, consider ICU bed because bear hugger can not be on medical floor nor stepdown. 4. Acute on chronic diastolic congestive heart failure - proBNP 9903, Estimated left ventricular ejection fraction is 55 - 60%. Visually measured ejection fraction per ECHO 4/25/2020  - will consult Cardiology- Dr Samara Valerio team will see in AM   - gentle diuresis with IV lasix. - continue compression stockings   - fluid restriction 1200 cc daily. Monitor electrolytes and renal function closely. Daily wts. Strict I/Os   - resume home coreg  5. Concern for COVID 19 pneumonia - normal WBC, lymphopenia, CK normal   - patient was tested for COVID 19 on 4/23- she was NEGATIVE.   - ED has sent for repeat emergent disease panel. Admit to 2S and keep on droplet plus precautions  - I have low suspicion for covid 19 but she will need to be tested if she is to be considered a candidate for rehab  - addon inflammatory markers. Check resp pcr panel. addon procal.   - start zinc and vitamin C.   6. CKD stage 3/4: last Cr was 3.20 on 4/29, on admission today Cr 2.74  - will consult Nephrology Dr Amisha Luis . Avoid nephrotoxic agents. 7. Type 2 DM with neuropathy A1c 6.1% in 4/23/2020  - ISS. resume home dose of lantus 4 units nightly and gabapentin  8.  chronic anemia   -  addon iron levels. 9. HTN  - resume home norvasc, hydralazine   10. HLD   - resume home crestor   11. Chronic atypical LBBB   12. CAD   - resume home asa and statin   12.  Deconditioned  - neighbor says she thinks pt needs rehab. She lives home alone, no support system except for the neighbor, too weak to care for self    - will need PT OT when covid ruled out.    13. Obesity   - lifestyle modification and nutrition consult when covid ruled out     Code status: FULL CODE   DVT/GI prophylaxis: heparin SQ, PPI   Diet: diabetic diet, fluid restrict   Disposition: tbd   __________________________________________________  Risk of deterioration:  []Low    [x]Moderate  []High    Care Plan discussed with:    [x]Patient   []Family    []ED Care Manager  []ED Doc   [x]Specialist :Dr James Hernandez, Dr Loraine Sin   Total Time Coordinating Admission:  60    minutes    []Total Critical Care Time:     ___________________________________________________  Admitting Physician: MICHAEL Uriostegui

## 2020-04-30 NOTE — Clinical Note
Right chest and right neck prepped with ChloraPrep and draped. Wet prep solution applied at: 1122. Wet prep solution dried at: 1125. Wet prep elapsed drying time: 3 mins.

## 2020-04-30 NOTE — ED PROVIDER NOTES
EMERGENCY DEPARTMENT HISTORY AND PHYSICAL EXAM    12:00 PM      Date: (Not on file)  Patient Name: Sarah Lang    History of Presenting Illness     No chief complaint on file. History Provided By: Patient  Location/Duration/Severity/Modifying factors   HPI    Patient is a 71year old F with PMH of HTN, HFpEF, Diabetes who presents with shortness of breath. History is limited given patients dyspnea. States she was admitted to the hospital recently for CHF exacerbation. She came to the ED yesterday for shortness of breath however was discharged home after she improved. Pt states she has been using home oxygen sometimes 3-4 L. She has been taking her lasix as prescribed. When she felt short of breath this morning she took lasix 40 mg. Patient denies any chest pain, fevers, chills, coughing, or abdominal pain. She is unable to say if she has had worsening lower extremity edema. PCP: Kristy Flores MD    Current Outpatient Medications   Medication Sig Dispense Refill    amLODIPine (NORVASC) 10 mg tablet Take 1 Tab by mouth daily. 30 Tab 0    polyethylene glycol (MIRALAX) 17 gram packet Take 1 Packet by mouth daily. 10 Packet 0    rosuvastatin (CRESTOR) 5 mg tablet Take 1 Tab by mouth nightly. 30 Tab 0    furosemide (LASIX) 40 mg tablet Take 1 Tab by mouth daily. 30 Tab 0    gabapentin (NEURONTIN) 100 mg capsule Take 1 Cap by mouth two (2) times a day. Max Daily Amount: 200 mg. 60 Cap 0    hydrALAZINE (APRESOLINE) 100 mg tablet Take 1 Tab by mouth three (3) times daily. 90 Tab 0    aspirin 81 mg chewable tablet Take 1 Tab by mouth daily. 30 Tab 0    insulin glargine (LANTUS) 100 unit/mL injection 4 units SQ daily- watch for Hypoglycemia adjust dose per patient's blood glucose level 1 Vial 0    isosorbide mononitrate ER (IMDUR) 30 mg tablet Take 1 Tab by mouth daily. 30 Tab 0    esomeprazole (NEXIUM) 40 mg capsule Take 40 mg by mouth daily.       carvedilol (COREG) 25 mg tablet Take 25 mg by mouth two (2) times daily (with meals).  citalopram (CELEXA) 20 mg tablet Take 20 mg by mouth daily.  cranberry extract (AZO CRANBERRY) 450 mg tab Take 2 Tabs by mouth daily.  cholecalciferol (VITAMIN D3) 1,000 unit cap Take 5,000 Units by mouth daily.  cyanocobalamin (VITAMIN B-12) 1,000 mcg tablet Take 1,000 mcg by mouth two (2) times a day.  Biotin 2,500 mcg cap Take 1 Tab by mouth two (2) times a day.  vitamin a-vitamin c-vit e-min (OCUVITE) tablet Take 1 Tab by mouth daily.  loratadine (CLARITIN) 10 mg tablet Take 10 mg by mouth daily.  B.infantis-B.ani-B.long-B.bifi (PROBIOTIC 4X) 10-15 mg TbEC Take 1 Tab by mouth daily. Past History     Past Medical History:  Past Medical History:   Diagnosis Date    Arthritis     [facility]     CHF [facility]congestive heart failure) [facility]     Diabetes ([facility] [address] [facility]     Fracture of neck (HCC)     GERD (gastroesophageal reflux disease)     Goiter     Hiatal hernia     Hypertension     Uterine cancer (HCC)        Past Surgical History:  Past Surgical History:   Procedure Laterality Date    HX APPENDECTOMY      HX HYSTERECTOMY      HX KNEE REPLACEMENT Right     HX ORTHOPAEDIC      odontoid fracture repair with hardware placement.  HX PARTIAL THYROIDECTOMY         Family History:  No family history on file. Social History:  Social History     Tobacco Use    Smoking status: Never Smoker   Substance Use Topics    Alcohol use: No    Drug use: No       Allergies: Allergies   Allergen Reactions    Augmentin [Amoxicillin-Pot Clavulanate] Diarrhea    Clavulanic Acid Diarrhea    Levaquin [Levofloxacin] Other (comments)     Severe hypoglycemia           Review of Systems     Review of Systems   Unable to perform ROS: Severe respiratory distress         Physical Exam   There were no vitals taken for this visit.     Physical Exam  Constitutional:       Comments: Chronically ill appearing obese female, dyspneic    HENT:      Head: Normocephalic and atraumatic. Eyes:      General: No scleral icterus. Extraocular Movements: Extraocular movements intact. Cardiovascular:      Rate and Rhythm: Bradycardia present. Heart sounds: No murmur. No gallop. Pulmonary:      Effort: Respiratory distress present. Comments: Speaking in short sentences, rales in bases to mid lung fields bilaterally, no wheezes or rhonchi   Abdominal:      General: Bowel sounds are normal.      Palpations: Abdomen is soft. Tenderness: There is no abdominal tenderness. There is no guarding or rebound. Hernia: No hernia is present. Musculoskeletal:      Right lower leg: Edema present. Left lower leg: Edema present. Skin:     General: Skin is warm. Capillary Refill: Capillary refill takes less than 2 seconds. Neurological:      General: No focal deficit present. Psychiatric:         Mood and Affect: Mood normal.           Diagnostic Study Results     Labs -  No results found for this or any previous visit (from the past 12 hour(s)). Radiologic Studies -   No orders to display         Medical Decision Making   I am the first provider for this patient. I reviewed the vital signs, available nursing notes, past medical history, past surgical history, family history and social history. Vital Signs-Reviewed the patient's vital signs. EKG: sinus rhythm, without ST elevations or depressions    Records Reviewed: Old Medical Records and Previous electrocardiograms (Time of Review: 12:00 PM)    ED Course: Progress Notes, Reevaluation, and Consults:         Provider Notes (Medical Decision Making):   MDM    DDx: sepsis, ACS, CHF, pneumonia, UTI, bronchitis     Patient is a 71year old F with PMH of HTN, HFpEF, Diabetes who presents with shortness of breath. Patient had recent admission for acute on chronic CHF exacerbation discharged on 4/22/20. Presented to ED yesterday for sob however was stable for d/c home.  She reports feeling more short of breath this am. Upon EMS arrival she was hypoxic to 80s on her O2 2 L. Sats improved to 96-97% when she was placed on 10 L non-re breather which is where she was on arrival to ED. Her vital signs showed rectal temperature of 95.2. Given hypoxia and tachypnea sepsis bundle started with exception of fluids. Patient has known history of CHF and discussed with her that benefits and risks of fluid administration and prefers not to receive fluids. Patient was placed on Fox hugger. Emergent disease panel ordered and droplet plus isolation ordered. She was dyspneic on examination with rales in bilateral lung fields. EXG showed sinus rhythm. Troponin 0.03. BNP 9k. CXR showed bilateral edema. She was given 40 IV lasix. 2:30 PM Sepsis reassessment performed  Patient was weaned to 5 L NC and sating well. She reported improvement in her breathing. Still with crackles in bases bilaterally. Decision was made for admission given concern for sepsis and CHF exacerbation. Spoke with Dr. Sean Maza who accepts patient for admission to step down with ICU bed given Fox huggar requirement. ATTENDING ATTESTATION:  Patient with hypothermia, possible sepsis and CHF exacerbation. I personally saw and examined the patient. I have reviewed and agree with the residents findings, including all diagnostic interpretations, and plans as written. I was present during the key portions of separately billed procedures. Felisha Villarreal MD      Procedures    Critical Care Time: >40 minutes       Diagnosis     Clinical Impression:   1) Sepsis   2) Acute on chronic CHF exacerbation     Disposition: Admission     Patient's Medications   Start Taking    No medications on file   Continue Taking    AMLODIPINE (NORVASC) 10 MG TABLET    Take 1 Tab by mouth daily. ASPIRIN 81 MG CHEWABLE TABLET    Take 1 Tab by mouth daily. B.INFANTIS-B. ANI-B. LONG-B.BIFI (PROBIOTIC 4X) 10-15 MG TBEC    Take 1 Tab by mouth daily.     BIOTIN 2,500 MCG CAP    Take 1 Tab by mouth two (2) times a day. CARVEDILOL (COREG) 25 MG TABLET    Take 25 mg by mouth two (2) times daily (with meals). CHOLECALCIFEROL (VITAMIN D3) 1,000 UNIT CAP    Take 5,000 Units by mouth daily. CITALOPRAM (CELEXA) 20 MG TABLET    Take 20 mg by mouth daily. CRANBERRY EXTRACT (AZO CRANBERRY) 450 MG TAB    Take 2 Tabs by mouth daily. CYANOCOBALAMIN (VITAMIN B-12) 1,000 MCG TABLET    Take 1,000 mcg by mouth two (2) times a day. ESOMEPRAZOLE (NEXIUM) 40 MG CAPSULE    Take 40 mg by mouth daily. FUROSEMIDE (LASIX) 40 MG TABLET    Take 1 Tab by mouth daily. GABAPENTIN (NEURONTIN) 100 MG CAPSULE    Take 1 Cap by mouth two (2) times a day. Max Daily Amount: 200 mg. HYDRALAZINE (APRESOLINE) 100 MG TABLET    Take 1 Tab by mouth three (3) times daily. INSULIN GLARGINE (LANTUS) 100 UNIT/ML INJECTION    4 units SQ daily- watch for Hypoglycemia adjust dose per patient's blood glucose level    ISOSORBIDE MONONITRATE ER (IMDUR) 30 MG TABLET    Take 1 Tab by mouth daily. LORATADINE (CLARITIN) 10 MG TABLET    Take 10 mg by mouth daily. POLYETHYLENE GLYCOL (MIRALAX) 17 GRAM PACKET    Take 1 Packet by mouth daily. ROSUVASTATIN (CRESTOR) 5 MG TABLET    Take 1 Tab by mouth nightly. VITAMIN A-VITAMIN C-VIT E-MIN (OCUVITE) TABLET    Take 1 Tab by mouth daily. These Medications have changed    No medications on file   Stop Taking    No medications on file     Disclaimer: Sections of this note are dictated using utilizing voice recognition software. Minor typographical errors may be present. If questions arise, please do not hesitate to contact me or call our department.

## 2020-04-30 NOTE — Clinical Note
TRANSFER - IN REPORT:     Verbal report received from: Gerald Preciado RN. Report consisted of patient's Situation, Background, Assessment and   Recommendations(SBAR). Opportunity for questions and clarification was provided. Assessment completed upon patient's arrival to unit and care assumed. Patient transported with a Registered Nurse.

## 2020-05-01 ENCOUNTER — HOME CARE VISIT (OUTPATIENT)
Dept: SCHEDULING | Facility: HOME HEALTH | Age: 70
End: 2020-05-01
Payer: MEDICARE

## 2020-05-01 ENCOUNTER — HOME CARE VISIT (OUTPATIENT)
Dept: HOME HEALTH SERVICES | Facility: HOME HEALTH | Age: 70
End: 2020-05-01
Payer: MEDICARE

## 2020-05-01 VITALS
SYSTOLIC BLOOD PRESSURE: 146 MMHG | HEART RATE: 70 BPM | TEMPERATURE: 97.2 F | DIASTOLIC BLOOD PRESSURE: 78 MMHG | OXYGEN SATURATION: 88 % | RESPIRATION RATE: 22 BRPM

## 2020-05-01 LAB
ALBUMIN SERPL-MCNC: 3.4 G/DL (ref 3.4–5)
ALBUMIN/GLOB SERPL: 1 {RATIO} (ref 0.8–1.7)
ALP SERPL-CCNC: 62 U/L (ref 45–117)
ALT SERPL-CCNC: 21 U/L (ref 13–56)
ANION GAP SERPL CALC-SCNC: 9 MMOL/L (ref 3–18)
APPEARANCE UR: CLEAR
AST SERPL-CCNC: 22 U/L (ref 10–38)
ATRIAL RATE: 66 BPM
B PERT DNA SPEC QL NAA+PROBE: NOT DETECTED
BACTERIA URNS QL MICRO: NEGATIVE /HPF
BASOPHILS # BLD: 0 K/UL (ref 0–0.1)
BASOPHILS NFR BLD: 0 % (ref 0–2)
BILIRUB DIRECT SERPL-MCNC: 0.2 MG/DL (ref 0–0.2)
BILIRUB SERPL-MCNC: 0.5 MG/DL (ref 0.2–1)
BILIRUB UR QL: NEGATIVE
BORDETELLA PARAPERTUSSIS PCR, BORPAR: NOT DETECTED
BUN SERPL-MCNC: 85 MG/DL (ref 7–18)
BUN/CREAT SERPL: 31 (ref 12–20)
C PNEUM DNA SPEC QL NAA+PROBE: NOT DETECTED
CALCIUM SERPL-MCNC: 8.1 MG/DL (ref 8.5–10.1)
CALCULATED P AXIS, ECG09: 2 DEGREES
CALCULATED R AXIS, ECG10: -23 DEGREES
CALCULATED T AXIS, ECG11: 44 DEGREES
CHLORIDE SERPL-SCNC: 103 MMOL/L (ref 100–111)
CK SERPL-CCNC: 60 U/L (ref 26–192)
CO2 SERPL-SCNC: 24 MMOL/L (ref 21–32)
COLOR UR: YELLOW
CREAT SERPL-MCNC: 2.78 MG/DL (ref 0.6–1.3)
CRP SERPL-MCNC: <0.3 MG/DL (ref 0–0.3)
D DIMER PPP FEU-MCNC: 1.25 UG/ML(FEU)
DIAGNOSIS, 93000: NORMAL
DIFFERENTIAL METHOD BLD: ABNORMAL
EOSINOPHIL # BLD: 0 K/UL (ref 0–0.4)
EOSINOPHIL NFR BLD: 0 % (ref 0–5)
EPITH CASTS URNS QL MICRO: ABNORMAL /LPF (ref 0–5)
ERYTHROCYTE [DISTWIDTH] IN BLOOD BY AUTOMATED COUNT: 14.5 % (ref 11.6–14.5)
FERRITIN SERPL-MCNC: 109 NG/ML (ref 8–388)
FLUAV H1 2009 PAND RNA SPEC QL NAA+PROBE: NOT DETECTED
FLUAV H1 RNA SPEC QL NAA+PROBE: NOT DETECTED
FLUAV H3 RNA SPEC QL NAA+PROBE: NOT DETECTED
FLUAV SUBTYP SPEC NAA+PROBE: NOT DETECTED
FLUBV RNA SPEC QL NAA+PROBE: NOT DETECTED
GLOBULIN SER CALC-MCNC: 3.3 G/DL (ref 2–4)
GLUCOSE BLD STRIP.AUTO-MCNC: 116 MG/DL (ref 70–110)
GLUCOSE BLD STRIP.AUTO-MCNC: 150 MG/DL (ref 70–110)
GLUCOSE BLD STRIP.AUTO-MCNC: 187 MG/DL (ref 70–110)
GLUCOSE BLD STRIP.AUTO-MCNC: 233 MG/DL (ref 70–110)
GLUCOSE SERPL-MCNC: 106 MG/DL (ref 74–99)
GLUCOSE UR STRIP.AUTO-MCNC: NEGATIVE MG/DL
HADV DNA SPEC QL NAA+PROBE: NOT DETECTED
HCOV 229E RNA SPEC QL NAA+PROBE: NOT DETECTED
HCOV HKU1 RNA SPEC QL NAA+PROBE: NOT DETECTED
HCOV NL63 RNA SPEC QL NAA+PROBE: NOT DETECTED
HCOV OC43 RNA SPEC QL NAA+PROBE: NOT DETECTED
HCT VFR BLD AUTO: 24.4 % (ref 35–45)
HGB BLD-MCNC: 7.8 G/DL (ref 12–16)
HGB UR QL STRIP: NEGATIVE
HMPV RNA SPEC QL NAA+PROBE: NOT DETECTED
HPIV1 RNA SPEC QL NAA+PROBE: NOT DETECTED
HPIV2 RNA SPEC QL NAA+PROBE: NOT DETECTED
HPIV3 RNA SPEC QL NAA+PROBE: NOT DETECTED
HPIV4 RNA SPEC QL NAA+PROBE: NOT DETECTED
HYALINE CASTS URNS QL MICRO: ABNORMAL /LPF (ref 0–2)
KETONES UR QL STRIP.AUTO: NEGATIVE MG/DL
L PNEUMO AG UR QL IA: NEGATIVE
LDH SERPL L TO P-CCNC: 186 U/L (ref 81–234)
LEUKOCYTE ESTERASE UR QL STRIP.AUTO: NEGATIVE
LYMPHOCYTES # BLD: 0.8 K/UL (ref 0.9–3.6)
LYMPHOCYTES NFR BLD: 11 % (ref 21–52)
M PNEUMO DNA SPEC QL NAA+PROBE: NOT DETECTED
MAGNESIUM SERPL-MCNC: 2.7 MG/DL (ref 1.6–2.6)
MCH RBC QN AUTO: 27.6 PG (ref 24–34)
MCHC RBC AUTO-ENTMCNC: 32 G/DL (ref 31–37)
MCV RBC AUTO: 86.2 FL (ref 74–97)
MONOCYTES # BLD: 0.7 K/UL (ref 0.05–1.2)
MONOCYTES NFR BLD: 10 % (ref 3–10)
MUCOUS THREADS URNS QL MICRO: ABNORMAL /LPF
NEUTS SEG # BLD: 5.7 K/UL (ref 1.8–8)
NEUTS SEG NFR BLD: 79 % (ref 40–73)
NITRITE UR QL STRIP.AUTO: NEGATIVE
P-R INTERVAL, ECG05: 166 MS
PH UR STRIP: 5 [PH] (ref 5–8)
PLATELET # BLD AUTO: 188 K/UL (ref 135–420)
PMV BLD AUTO: 10.9 FL (ref 9.2–11.8)
POTASSIUM SERPL-SCNC: 4.3 MMOL/L (ref 3.5–5.5)
PROT SERPL-MCNC: 6.7 G/DL (ref 6.4–8.2)
PROT UR STRIP-MCNC: 100 MG/DL
Q-T INTERVAL, ECG07: 472 MS
QRS DURATION, ECG06: 130 MS
QTC CALCULATION (BEZET), ECG08: 494 MS
RBC # BLD AUTO: 2.83 M/UL (ref 4.2–5.3)
RBC #/AREA URNS HPF: ABNORMAL /HPF (ref 0–5)
RSV RNA SPEC QL NAA+PROBE: NOT DETECTED
RV+EV RNA SPEC QL NAA+PROBE: NOT DETECTED
S PNEUM AG UR QL: NEGATIVE
SODIUM SERPL-SCNC: 136 MMOL/L (ref 136–145)
SP GR UR REFRACTOMETRY: 1.01 (ref 1–1.03)
UROBILINOGEN UR QL STRIP.AUTO: 0.2 EU/DL (ref 0.2–1)
VANCOMYCIN SERPL-MCNC: 20.7 UG/ML (ref 5–40)
VENTRICULAR RATE, ECG03: 66 BPM
WBC # BLD AUTO: 7.2 K/UL (ref 4.6–13.2)
WBC URNS QL MICRO: ABNORMAL /HPF (ref 0–4)

## 2020-05-01 PROCEDURE — 83615 LACTATE (LD) (LDH) ENZYME: CPT

## 2020-05-01 PROCEDURE — 97165 OT EVAL LOW COMPLEX 30 MIN: CPT

## 2020-05-01 PROCEDURE — 80202 ASSAY OF VANCOMYCIN: CPT

## 2020-05-01 PROCEDURE — 85379 FIBRIN DEGRADATION QUANT: CPT

## 2020-05-01 PROCEDURE — 74011636637 HC RX REV CODE- 636/637: Performed by: PHYSICIAN ASSISTANT

## 2020-05-01 PROCEDURE — 82962 GLUCOSE BLOOD TEST: CPT

## 2020-05-01 PROCEDURE — 82728 ASSAY OF FERRITIN: CPT

## 2020-05-01 PROCEDURE — 36592 COLLECT BLOOD FROM PICC: CPT

## 2020-05-01 PROCEDURE — 36415 COLL VENOUS BLD VENIPUNCTURE: CPT

## 2020-05-01 PROCEDURE — 97162 PT EVAL MOD COMPLEX 30 MIN: CPT

## 2020-05-01 PROCEDURE — 3331090002 HH PPS REVENUE DEBIT

## 2020-05-01 PROCEDURE — 82550 ASSAY OF CK (CPK): CPT

## 2020-05-01 PROCEDURE — 77030027138 HC INCENT SPIROMETER -A

## 2020-05-01 PROCEDURE — 74011250636 HC RX REV CODE- 250/636: Performed by: STUDENT IN AN ORGANIZED HEALTH CARE EDUCATION/TRAINING PROGRAM

## 2020-05-01 PROCEDURE — 74011250637 HC RX REV CODE- 250/637: Performed by: PHYSICIAN ASSISTANT

## 2020-05-01 PROCEDURE — 97535 SELF CARE MNGMENT TRAINING: CPT

## 2020-05-01 PROCEDURE — 65660000000 HC RM CCU STEPDOWN

## 2020-05-01 PROCEDURE — 85025 COMPLETE CBC W/AUTO DIFF WBC: CPT

## 2020-05-01 PROCEDURE — 83735 ASSAY OF MAGNESIUM: CPT

## 2020-05-01 PROCEDURE — 74011250637 HC RX REV CODE- 250/637: Performed by: INTERNAL MEDICINE

## 2020-05-01 PROCEDURE — 74011250636 HC RX REV CODE- 250/636: Performed by: PHYSICIAN ASSISTANT

## 2020-05-01 PROCEDURE — 80076 HEPATIC FUNCTION PANEL: CPT

## 2020-05-01 PROCEDURE — 3331090001 HH PPS REVENUE CREDIT

## 2020-05-01 PROCEDURE — 86140 C-REACTIVE PROTEIN: CPT

## 2020-05-01 PROCEDURE — 77010033678 HC OXYGEN DAILY

## 2020-05-01 PROCEDURE — 80048 BASIC METABOLIC PNL TOTAL CA: CPT

## 2020-05-01 RX ORDER — VANCOMYCIN HYDROCHLORIDE
1250 ONCE
Status: COMPLETED | OUTPATIENT
Start: 2020-05-01 | End: 2020-05-02

## 2020-05-01 RX ADMIN — INSULIN LISPRO 2 UNITS: 100 INJECTION, SOLUTION INTRAVENOUS; SUBCUTANEOUS at 16:22

## 2020-05-01 RX ADMIN — CYANOCOBALAMIN TAB 1000 MCG 1000 MCG: 1000 TAB at 10:03

## 2020-05-01 RX ADMIN — AMLODIPINE BESYLATE 10 MG: 10 TABLET ORAL at 10:02

## 2020-05-01 RX ADMIN — HYDRALAZINE HYDROCHLORIDE 100 MG: 50 TABLET, FILM COATED ORAL at 21:51

## 2020-05-01 RX ADMIN — INSULIN LISPRO 4 UNITS: 100 INJECTION, SOLUTION INTRAVENOUS; SUBCUTANEOUS at 11:30

## 2020-05-01 RX ADMIN — ASPIRIN 81 MG CHEWABLE TABLET 81 MG: 81 TABLET CHEWABLE at 10:02

## 2020-05-01 RX ADMIN — INSULIN GLARGINE 4 UNITS: 100 INJECTION, SOLUTION SUBCUTANEOUS at 22:00

## 2020-05-01 RX ADMIN — CARVEDILOL 25 MG: 25 TABLET, FILM COATED ORAL at 16:24

## 2020-05-01 RX ADMIN — Medication 5000 UNITS: at 10:02

## 2020-05-01 RX ADMIN — HEPARIN SODIUM 5000 UNITS: 5000 INJECTION INTRAVENOUS; SUBCUTANEOUS at 22:34

## 2020-05-01 RX ADMIN — FUROSEMIDE 40 MG: 10 INJECTION, SOLUTION INTRAMUSCULAR; INTRAVENOUS at 10:02

## 2020-05-01 RX ADMIN — HYDRALAZINE HYDROCHLORIDE 100 MG: 50 TABLET, FILM COATED ORAL at 10:01

## 2020-05-01 RX ADMIN — HYDRALAZINE HYDROCHLORIDE 100 MG: 50 TABLET, FILM COATED ORAL at 16:23

## 2020-05-01 RX ADMIN — PANTOPRAZOLE SODIUM 40 MG: 40 TABLET, DELAYED RELEASE ORAL at 16:24

## 2020-05-01 RX ADMIN — CYANOCOBALAMIN TAB 1000 MCG 1000 MCG: 1000 TAB at 17:29

## 2020-05-01 RX ADMIN — Medication 1 CAPSULE: at 10:02

## 2020-05-01 RX ADMIN — CARVEDILOL 25 MG: 25 TABLET, FILM COATED ORAL at 10:01

## 2020-05-01 RX ADMIN — VANCOMYCIN HYDROCHLORIDE 1250 MG: 10 INJECTION, POWDER, LYOPHILIZED, FOR SOLUTION INTRAVENOUS at 22:28

## 2020-05-01 RX ADMIN — HEPARIN SODIUM 5000 UNITS: 5000 INJECTION INTRAVENOUS; SUBCUTANEOUS at 16:22

## 2020-05-01 RX ADMIN — Medication 500 MG: at 17:29

## 2020-05-01 RX ADMIN — FERROUS SULFATE TAB 325 MG (65 MG ELEMENTAL FE) 325 MG: 325 (65 FE) TAB at 10:02

## 2020-05-01 RX ADMIN — FUROSEMIDE 40 MG: 10 INJECTION, SOLUTION INTRAMUSCULAR; INTRAVENOUS at 21:50

## 2020-05-01 RX ADMIN — Medication 500 MG: at 10:02

## 2020-05-01 RX ADMIN — CITALOPRAM HYDROBROMIDE 20 MG: 20 TABLET ORAL at 10:02

## 2020-05-01 RX ADMIN — PANTOPRAZOLE SODIUM 40 MG: 40 TABLET, DELAYED RELEASE ORAL at 10:01

## 2020-05-01 RX ADMIN — ROSUVASTATIN CALCIUM 5 MG: 10 TABLET, FILM COATED ORAL at 21:51

## 2020-05-01 RX ADMIN — HEPARIN SODIUM 5000 UNITS: 5000 INJECTION INTRAVENOUS; SUBCUTANEOUS at 10:01

## 2020-05-01 NOTE — PROGRESS NOTES
Patient is not available to be assessed at this time. Due to COVID-19 (Rule Out).       [id number]   [phone]

## 2020-05-01 NOTE — PROGRESS NOTES
Problem: Falls - Risk of  Goal: *Absence of Falls  Description: Document Lida Ortiz Fall Risk and appropriate interventions in the flowsheet. Outcome: Progressing Towards Goal  Note: Fall Risk Interventions:  Mobility Interventions: Bed/chair exit alarm, Communicate number of staff needed for ambulation/transfer, OT consult for ADLs, PT Consult for mobility concerns, PT Consult for assist device competence         Medication Interventions: Bed/chair exit alarm, Evaluate medications/consider consulting pharmacy, Patient to call before getting OOB, Teach patient to arise slowly    Elimination Interventions: Bed/chair exit alarm, Call light in reach, Patient to call for help with toileting needs              Problem: Patient Education: Go to Patient Education Activity  Goal: Patient/Family Education  Outcome: Progressing Towards Goal     Problem: Pressure Injury - Risk of  Goal: *Prevention of pressure injury  Description: Document Toney Scale and appropriate interventions in the flowsheet. Outcome: Progressing Towards Goal  Note: Pressure Injury Interventions:  Sensory Interventions: Assess need for specialty bed, Discuss PT/OT consult with provider, Keep linens dry and wrinkle-free, Turn and reposition approx. every two hours (pillows and wedges if needed)    Moisture Interventions: Apply protective barrier, creams and emollients, Assess need for specialty bed    Activity Interventions: Assess need for specialty bed    Mobility Interventions: PT/OT evaluation, Pressure redistribution bed/mattress (bed type), HOB 30 degrees or less, Turn and reposition approx.  every two hours(pillow and wedges)    Nutrition Interventions: Document food/fluid/supplement intake    Friction and Shear Interventions: Apply protective barrier, creams and emollients, HOB 30 degrees or less, Lift team/patient mobility team                Problem: Patient Education: Go to Patient Education Activity  Goal: Patient/Family Education  Outcome: Progressing Towards Goal     Problem: Patient Education: Go to Patient Education Activity  Goal: Patient/Family Education  Outcome: Progressing Towards Goal     Problem: Heart Failure: Day 2  Goal: Off Pathway (Use only if patient is Off Pathway)  Outcome: Progressing Towards Goal  Goal: Activity/Safety  Outcome: Progressing Towards Goal  Goal: Consults, if ordered  Outcome: Progressing Towards Goal  Goal: Diagnostic Test/Procedures  Outcome: Progressing Towards Goal  Goal: Nutrition/Diet  Outcome: Progressing Towards Goal  Goal: Discharge Planning  Outcome: Progressing Towards Goal  Goal: Medications  Outcome: Progressing Towards Goal  Goal: Respiratory  Outcome: Progressing Towards Goal  Goal: Treatments/Interventions/Procedures  Outcome: Progressing Towards Goal  Goal: Psychosocial  Outcome: Progressing Towards Goal  Goal: *Oxygen saturation within defined limits  Outcome: Progressing Towards Goal  Goal: *Hemodynamically stable  Outcome: Progressing Towards Goal  Goal: *Optimal pain control at patient's stated goal  Outcome: Progressing Towards Goal  Goal: *Anxiety reduced or absent  Outcome: Progressing Towards Goal  Goal: *Demonstrates progressive activity  Outcome: Progressing Towards Goal     Problem: Heart Failure: Day 3  Goal: Off Pathway (Use only if patient is Off Pathway)  Outcome: Progressing Towards Goal  Goal: Activity/Safety  Outcome: Progressing Towards Goal  Goal: Diagnostic Test/Procedures  Outcome: Progressing Towards Goal  Goal: Nutrition/Diet  Outcome: Progressing Towards Goal  Goal: Discharge Planning  Outcome: Progressing Towards Goal  Goal: Medications  Outcome: Progressing Towards Goal  Goal: Respiratory  Outcome: Progressing Towards Goal  Goal: Treatments/Interventions/Procedures  Outcome: Progressing Towards Goal  Goal: Psychosocial  Outcome: Progressing Towards Goal  Goal: *Oxygen saturation within defined limits  Outcome: Progressing Towards Goal  Goal: *Hemodynamically stable  Outcome: Progressing Towards Goal  Goal: *Optimal pain control at patient's stated goal  Outcome: Progressing Towards Goal  Goal: *Anxiety reduced or absent  Outcome: Progressing Towards Goal  Goal: *Demonstrates progressive activity  Outcome: Progressing Towards Goal     Problem: Heart Failure: Day 4  Goal: Off Pathway (Use only if patient is Off Pathway)  Outcome: Progressing Towards Goal  Goal: Activity/Safety  Outcome: Progressing Towards Goal  Goal: Diagnostic Test/Procedures  Outcome: Progressing Towards Goal  Goal: Nutrition/Diet  Outcome: Progressing Towards Goal  Goal: Discharge Planning  Outcome: Progressing Towards Goal  Goal: Medications  Outcome: Progressing Towards Goal  Goal: Respiratory  Outcome: Progressing Towards Goal  Goal: Treatments/Interventions/Procedures  Outcome: Progressing Towards Goal  Goal: Psychosocial  Outcome: Progressing Towards Goal  Goal: *Oxygen saturation within defined limits  Outcome: Progressing Towards Goal  Goal: *Hemodynamically stable  Outcome: Progressing Towards Goal  Goal: *Optimal pain control at patient's stated goal  Outcome: Progressing Towards Goal  Goal: *Anxiety reduced or absent  Outcome: Progressing Towards Goal  Goal: *Demonstrates progressive activity  Outcome: Progressing Towards Goal     Problem: Heart Failure: Day 5  Goal: Off Pathway (Use only if patient is Off Pathway)  Outcome: Progressing Towards Goal  Goal: Activity/Safety  Outcome: Progressing Towards Goal  Goal: Diagnostic Test/Procedures  Outcome: Progressing Towards Goal  Goal: Nutrition/Diet  Outcome: Progressing Towards Goal  Goal: Discharge Planning  Outcome: Progressing Towards Goal  Goal: Medications  Outcome: Progressing Towards Goal  Goal: Respiratory  Outcome: Progressing Towards Goal  Goal: Treatments/Interventions/Procedures  Outcome: Progressing Towards Goal  Goal: Psychosocial  Outcome: Progressing Towards Goal     Problem: Heart Failure: Discharge Outcomes  Goal: *Demonstrates ability to perform prescribed activity without shortness of breath or discomfort  Outcome: Progressing Towards Goal  Goal: *Left ventricular function assessment completed prior to or during stay, or planned for post-discharge  Outcome: Progressing Towards Goal  Goal: *ACEI prescribed if LVEF less than 40% and no contraindications or ARB prescribed  Outcome: Progressing Towards Goal  Goal: *Verbalizes understanding and describes prescribed diet  Outcome: Progressing Towards Goal  Goal: *Verbalizes understanding/describes prescribed medications  Outcome: Progressing Towards Goal  Goal: *Describes available resources and support systems  Description: (eg: Home Health, Palliative Care, [employer] Directive)  Outcome: Progressing Towards Goal  Goal: *Describes smoking cessation resources  Outcome: Progressing Towards Goal  Goal: *Understands and describes signs and symptoms to report to providers(Stroke Metric)  Outcome: Progressing Towards Goal  Goal: *Describes/verbalizes understanding of follow-up/return appt  Description: (eg: to physicians, diabetes treatment coordinator, and other resources  Outcome: Progressing Towards Goal  Goal: *Describes importance of continuing daily weights and changes to report to physician  Outcome: Progressing Towards Goal

## 2020-05-01 NOTE — CONSULTS
Consult Note    Consult requested by: Rohith Mccoy MD    ADMIT DATE: 4/30/2020    CONSULT DATE: May 1, 2020             Admission diagnosis: SOB  Reason for Nephrology Consultation: OFE, CKD    Assessment and plan:    #1 Acute kidney injury on chronic kidney disease stage III. Patient's creatinine appears to be stabilized around 2.7. Volume status does look overloaded. Agree with diuresing with 40 mg IV twice daily, fluid restriction of 1200 mL   #2 acute on chronic diastolic CHF. This may be exacerbated by placement of Unna boots and mobilization of lot of lower extremity fluid into her intravascular space. Patient's 40 mg p.o. daily of Lasix did not appear to be enough for her exacerbation, therefore she had significant symptoms. Diuresis should help  #3 hypertension  #4 dyslipidemia  #5 hypothermia/SIRS on empirical antibiotics per internal medicine. #6 pending COVID-19    Plan:   #1  Continue Lasix 40 mg IV twice daily  #2 strict ins and outs, fluid restrict 1200 mm  #3 follow cardiology recommendations  #4 monitor Vanco levels  #5 avoid nephrotoxins renally dose medications    Please call with questions,    Oscar Liao MD [facility]  Cell [phone]  Pager: [phone]    HPI:    Patient is a 77-year-old female with chronic kidney disease stage III, diastolic congestive heart failure, type 2 diabetes, hypertension presented to the hospital with shortness of breath. Patient was recently discharged from the hospital on the 22nd for shortness of breath and lower extremity edema consistent with acute on chronic diastolic CHF. Patient also had a OFE and therefore her diastolic and her diuretics were held for a few days. Patient was recommended to start her diuretics the next day after discharge at half the dose. On discharge she was also fitted Unna boots for her lower extremity swelling.   Subsequently the next day patient had shortness of breath came to the emergency room where she had she was given breathing treatments and sent home. Patient took 40 mg daily of Lasix for the next day continued to have shortness of breath therefore came into the emergency room. On presentation patient was hypothermic, normotensive, labs suggestive of elevated BNP at 9900, hemoglobin of 8.4, troponins were negative. Chest x-ray was indicative of cardiomegaly possible interstitial edema. .  Patient was diuresed with 40 mg IV twice daily. Feels better today         Past Medical History:   Diagnosis Date    Arthritis     Cancer ([facility] [address]     CHF (congestive heart failure) (HCC)     Diabetes ([facility] [address]     Fracture of neck (HCC)     GERD (gastroesophageal reflux disease)     Goiter     Hiatal hernia     Hypertension     Uterine cancer (HCC)       Past Surgical History:   Procedure Laterality Date    HX APPENDECTOMY      HX HYSTERECTOMY      HX KNEE REPLACEMENT Right     HX ORTHOPAEDIC      odontoid fracture repair with hardware placement.      HX PARTIAL THYROIDECTOMY         Social History     Socioeconomic History    Marital status:      Spouse name: Not on file    Number of children: Not on file    Years of education: Not on file    Highest education level: Not on file   Occupational History    Not on file   Social Needs    Financial resource strain: Not on file    Food insecurity     Worry: Not on file     Inability: Not on file    Transportation needs     Medical: Not on file     Non-medical: Not on file   Tobacco Use    Smoking status: Never Smoker   Substance and Sexual Activity    Alcohol use: No    Drug use: No    Sexual activity: Not on file   Lifestyle    Physical activity     Days per week: Not on file     Minutes per session: Not on file    Stress: Not on file   Relationships    Social connections     Talks on phone: Not on file     Gets together: Not on file     Attends Congregational service: Not on file     Active member of club or organization: Not on file     Attends meetings of clubs or organizations: Not on file     Relationship status: Not on file    Intimate partner violence     Fear of current or ex partner: Not on file     Emotionally abused: Not on file     Physically abused: Not on file     Forced sexual activity: Not on file   Other Topics Concern    Not on file   Social History Narrative    Not on file       No family history on file. Allergies   Allergen Reactions    Augmentin [Amoxicillin-Pot Clavulanate] Diarrhea    Clavulanic Acid Diarrhea    Levaquin [Levofloxacin] Other (comments)     Severe hypoglycemia          Home Medications:     Medications Prior to Admission   Medication Sig    amLODIPine (NORVASC) 10 mg tablet Take 1 Tab by mouth daily.  polyethylene glycol (MIRALAX) 17 gram packet Take 1 Packet by mouth daily.  rosuvastatin (CRESTOR) 5 mg tablet Take 1 Tab by mouth nightly.  furosemide (LASIX) 40 mg tablet Take 1 Tab by mouth daily.  gabapentin (NEURONTIN) 100 mg capsule Take 1 Cap by mouth two (2) times a day. Max Daily Amount: 200 mg.  hydrALAZINE (APRESOLINE) 100 mg tablet Take 1 Tab by mouth three (3) times daily.  aspirin 81 mg chewable tablet Take 1 Tab by mouth daily.  isosorbide mononitrate ER (IMDUR) 30 mg tablet Take 1 Tab by mouth daily.  esomeprazole (NEXIUM) 40 mg capsule Take 40 mg by mouth daily.  carvedilol (COREG) 25 mg tablet Take 25 mg by mouth two (2) times daily (with meals).  citalopram (CELEXA) 20 mg tablet Take 20 mg by mouth daily.  cranberry extract (AZO CRANBERRY) 450 mg tab Take 2 Tabs by mouth daily.  cholecalciferol (VITAMIN D3) 1,000 unit cap Take 5,000 Units by mouth daily.  cyanocobalamin (VITAMIN B-12) 1,000 mcg tablet Take 1,000 mcg by mouth two (2) times a day.  Biotin 2,500 mcg cap Take 1 Tab by mouth two (2) times a day.  vitamin a-vitamin c-vit e-min (OCUVITE) tablet Take 1 Tab by mouth daily.  loratadine (CLARITIN) 10 mg tablet Take 10 mg by mouth daily.     B.infantis-B.ani-B.long-B.bifi (PROBIOTIC 4X) 10-15 mg TbEC Take 1 Tab by mouth daily.  insulin glargine (LANTUS) 100 unit/mL injection 4 units SQ daily- watch for Hypoglycemia adjust dose per patient's blood glucose level       Current Inpatient Medications:     Current Facility-Administered Medications   Medication Dose Route Frequency    sodium chloride (NS) flush 5-10 mL  5-10 mL IntraVENous PRN    VANCOMYCIN INFORMATION NOTE   Other Rx Dosing/Monitoring    amLODIPine (NORVASC) tablet 10 mg  10 mg Oral DAILY    aspirin chewable tablet 81 mg  81 mg Oral DAILY    carvediloL (COREG) tablet 25 mg  25 mg Oral BID WITH MEALS    cholecalciferol (VITAMIN D3) capsule 5,000 Units  5,000 Units Oral DAILY    citalopram (CELEXA) tablet 20 mg  20 mg Oral DAILY    cyanocobalamin tablet 1,000 mcg  1,000 mcg Oral BID    pantoprazole (PROTONIX) tablet 40 mg  40 mg Oral ACB&D    hydrALAZINE (APRESOLINE) tablet 100 mg  100 mg Oral TID    rosuvastatin (CRESTOR) tablet 5 mg  5 mg Oral QHS    furosemide (LASIX) injection 40 mg  40 mg IntraVENous Q12H    heparin (porcine) injection 5,000 Units  5,000 Units SubCUTAneous Q8H    ascorbic acid (vitamin C) (VITAMIN C) tablet 500 mg  500 mg Oral BID    zinc sulfate (ZINCATE) 220 (50) mg capsule 1 Cap  1 Cap Oral DAILY    insulin lispro (HUMALOG) injection   SubCUTAneous AC&HS    glucose chewable tablet 16 g  16 g Oral PRN    glucagon (GLUCAGEN) injection 1 mg  1 mg IntraMUSCular PRN    dextrose (D50W) injection syrg 12.5-25 g  25-50 mL IntraVENous PRN    insulin glargine (LANTUS) injection 4 Units  4 Units SubCUTAneous QHS    polyethylene glycol (MIRALAX) packet 17 g  17 g Oral DAILY PRN    acetaminophen (TYLENOL) tablet 650 mg  650 mg Oral Q4H PRN    ferrous sulfate tablet 325 mg  1 Tab Oral EVERY OTHER DAY       Review of Systems:   No sore throat. No cough or hemoptysis. No nausea, vomiting, abdominal pain, melena or hematochezia. No constipation or diarrhea. No dysuria, no gross hematuria of voiding difficulties. No ankle swelling, no joint paints. No muscle aches. No skin changes. No dizziness  or lightheadedness. No headaches. Physical Assessment:     Vitals:    05/01/20 0400 05/01/20 0556 05/01/20 0740 05/01/20 1255   BP: 157/62  165/64 154/68   Pulse: 64  64 64   Resp: 20 21 20   Temp: 97.1 °F (36.2 °C)  98 °F (36.7 °C) 98.2 °F (36.8 °C)   SpO2: 97%  98% 98%   Weight:  114.5 kg (252 lb 6.4 oz)     Height:         Last 3 Recorded Weights in this Encounter    04/30/20 1210 05/01/20 0556   Weight: 118.3 kg (260 lb 14.4 oz) 114.5 kg (252 lb 6.4 oz)     Admission weight: Weight: 118.3 kg (260 lb 14.4 oz) (04/30/20 1210)      Intake/Output Summary (Last 24 hours) at 5/1/2020 1417  Last data filed at 5/1/2020 1100  Gross per 24 hour   Intake 1260 ml   Output 1100 ml   Net 160 ml       Patient is in no apparent distress. HEENT: mmm  Neck: no cervical lymphadenopathy or thyromegaly. Lungs: good air entry, clear to auscultation bilaterally. Cardiovascular system: S1, S2, regular rate and rhythm. Abdomen: soft, non tender, non distended. Extremities: no edema  Integumentary: skin is grossly intact. Neurologic: Alert, oriented time three.      Data Review:    Labs: Results:       Chemistry Recent Labs     05/01/20  0320 04/30/20  1241 04/29/20  1236   * 211* 166*    134* 130*   K 4.3 4.2 4.1    101 99*   CO2 24 24 23   BUN 85* 84* 77*   CREA 2.78* 2.74* 3.20*   CA 8.1* 8.0* 8.2*   AGAP 9 9 8   BUCR 31* 31* 24*   AP 62 74 70   TP 6.7 6.9 7.4   ALB 3.4 3.4 3.6   GLOB 3.3 3.5 3.8   AGRAT 1.0 1.0 0.9         CBC w/Diff Recent Labs     05/01/20  0320 04/30/20  1241 04/29/20  1236   WBC 7.2 7.9 8.5   RBC 2.83* 2.97* 3.06*   HGB 7.8* 8.4* 8.5*   HCT 24.4* 25.3* 26.1*    190 198   GRANS 79* 90* 85*   LYMPH 11* 5* 10*   EOS 0 0 0         Iron/Ferritin Recent Labs     04/30/20  1241   IRON 42*      PTH/VIT D No results for input(s): PTH in the last 72 hours.     No lab exists for component: ANASTACIO Diaz MD  5/1/2020  2:17 PM      May 1, 2020

## 2020-05-01 NOTE — PROGRESS NOTES
Problem: Mobility Impaired (Adult and Pediatric)  Goal: *Acute Goals and Plan of Care (Insert Text)  Description: Physical Therapy Goals  Initiated 5/1/2020 and to be accomplished within 7 day(s)  1. Patient will move from supine to sit and sit to supine , scoot up and down and roll side to side in bed with supervision/set-up. 2.  Patient will transfer from bed to chair and chair to bed with SBA  using the least restrictive device. 3.  Patient will perform sit to stand with SBA. 4.  Patient will ambulate with supervision/set-up for 25 feet with the least restrictive device. 5.  Patient will ascend/descend 3 stairs with 1 handrail(s) with minimal assistance/contact guard assist.    PLOF: Pt lives alone in a one story home with 3 JAMES, has canes and a walker if needed, can ask a neighbor for assistance if needed. Pt was recently eval/dc from acute pt due to being mod ind/ind but pt reports that it was hard to take care of herself on her own before she came to the hospital this admission. Outcome: Progressing Towards Goal     PHYSICAL THERAPY EVALUATION    Patient: Shelli Dominguez (54 y.o. female)  Date: 5/1/2020  Primary Diagnosis: Sepsis ([facility] [address] [A41.9]        Precautions:   Aspiration, Fall, Skin  WBAT      ASSESSMENT :  Based on the objective data described below, the patient presents up in chair, cleared by nursing to participate. Pt had previously worked with OT who assisted her to chair and now pt declining to even just stand up from chair 2/2 being too fatigued. Pt was on 4L of O2 at 96% with HR of 66 bpm at rest. Pt reports that prior to coming back to the hospital (she was discharged the previous day) it was getting increasingly difficult to take care of her self due to having a hard time breathing. Pt shows decreased ROM and strength at chair level evaluation due to declining any further mobility.  Pt is a good candidate for skilled PT in the acute setting for maximizing functional mobility and strength and will continue per POC. Recommend HH PT with 24/7 assist vs SNF pending progress. Pt left in chair with all needs met. Patient will benefit from skilled intervention to address the above impairments. Patient's rehabilitation potential is considered to be Good  Factors which may influence rehabilitation potential include:   []         None noted  []         Mental ability/status  []         Medical condition  [x]         Home/family situation and support systems  []         Safety awareness  []         Pain tolerance/management  []         Other:      PLAN :  Recommendations and Planned Interventions:   [x]           Bed Mobility Training             [x]    Neuromuscular Re-Education  [x]           Transfer Training                   []    Orthotic/Prosthetic Training  [x]           Gait Training                          []    Modalities  [x]           Therapeutic Exercises           []    Edema Management/Control  [x]           Therapeutic Activities            [x]    Family Training/Education  [x]           Patient Education  []           Other (comment):    Frequency/Duration: Patient will be followed by physical therapy 1-2 times per day/4-7 days per week to address goals. Discharge Recommendations: Home Health vs [facility]  Further Equipment Recommendations for Discharge: N/A     SUBJECTIVE:   Patient stated Hershal Louie we do this another day? I'm too tired.     OBJECTIVE DATA SUMMARY:     Past Medical History:   Diagnosis Date    Arthritis     Cancer ([facility] [address]     CHF (congestive heart failure) ([facility] [address]     Diabetes ([facility] [address]     Fracture of neck (HCC)     GERD (gastroesophageal reflux disease)     Goiter     Hiatal hernia     Hypertension     Uterine cancer ([facility] [address]      Past Surgical History:   Procedure Laterality Date    HX APPENDECTOMY      HX HYSTERECTOMY      HX KNEE REPLACEMENT Right     HX ORTHOPAEDIC      odontoid fracture repair with hardware placement.      HX PARTIAL THYROIDECTOMY Barriers to Learning/Limitations: None  Compensate with: N/A  Home Situation:  Home Situation  Home Environment: Private residence  # Steps to Enter: 3  One/Two Story Residence: One story  Living Alone: Yes  Support Systems: Friends \ neighbors  Patient Expects to be Discharged to[de-identified] Private residence  Current DME Used/Available at Home: [place], straight, Walker  Tub or Shower Type: Tub/Shower combination  Critical Behavior:  Neurologic State: Alert  Orientation Level: Oriented to person;Oriented to place;Oriented to situation  Cognition: Follows commands  Safety/Judgement: Fall prevention  Psychosocial  Patient Behaviors: Calm; Cooperative                 Strength:    Strength: Generally decreased, functional       Tone & Sensation:   Tone: Normal    Sensation: Intact        Range Of Motion:  AROM: Within functional limits    Functional Mobility:  Bed Mobility:  Rolling: (NT up in chair )  Supine to Sit: Minimum assistance     Scooting: Contact guard assistance  Transfers:  Sit to Stand: (unable to assess 2/2 pt refusing )  Stand to Sit: Contact guard assistance(patient observed plopping down, vcs given for safety)    Balance:   Sitting: Intact  Standing: Impaired; With support  Standing - Static: Fair  Standing - Dynamic : Fair    Pain:  Pain level pre-treatment: 0/10   Pain level post-treatment: 0/10       Activity Tolerance:   Fair     Please refer to the flowsheet for vital signs taken during this treatment. After treatment:   [x]         Patient left in no apparent distress sitting up in chair  []         Patient left in no apparent distress in bed  [x]         Call bell left within reach  [x]         Nursing notified  []         Caregiver present  []         Bed alarm activated  []         SCDs applied    COMMUNICATION/EDUCATION:   [x]         Role of Physical Therapy in the acute care setting. [x]         Fall prevention education was provided and the patient/caregiver indicated understanding.   [x] Patient/family have participated as able in goal setting and plan of care. []         Patient/family agree to work toward stated goals and plan of care. []         Patient understands intent and goals of therapy, but is neutral about his/her participation. []         Patient is unable to participate in goal setting/plan of care: ongoing with therapy staff.  []         Other:     Thank you for this referral.  Walt Fink   Time Calculation: 12 mins      Eval Complexity: History: MEDIUM  Complexity : 1-2 comorbidities / personal factors will impact the outcome/ POC Exam:MEDIUM Complexity : 3 Standardized tests and measures addressing body structure, function, activity limitation and / or participation in recreation  Presentation: MEDIUM Complexity : Evolving with changing characteristics  Clinical Decision Making:Medium Complexity    Overall Complexity:MEDIUM

## 2020-05-01 NOTE — PROGRESS NOTES
[facility]  Progress Note    Patient: Tamra Chavez Age: 71 y.o. : 1950 MR#: [id number] SSN: [id number]  Date/Time: 2020    Subjective:     Pt reports having some improvement in her SOB. Assessment/Plan:   71 y o female w/ h/o CKD and CHF re admitted soon after discharge after presenting w/ cc of SOB.     -Acute on chronic respiratory failure  -Acute on chronic diastolic heart failure on lasix IV  -COVID PUI, low suspicion, nl inflammatory markers    HISTORY OF:  -HFpEF  -T2DM A1c of 6.1 on lantus, corrective insulin  -CAD  -HTN  -CKD III-stable    PLAN:  -Diuresis per cardiology  -Monitor COVID   -Continue to monitor renal function and electrolytes    Additional Notes:      Case discussed with:  [x]Patient  []Family  []Nursing  []Case Management  DVT Prophylaxis:  []Lovenox  [x]Hep SQ  []SCDs  []Coumadin   []On Heparin gtt    Objective:   VS:   Visit Vitals  /58 (BP 1 Location: Right arm, BP Patient Position: At rest)   Pulse 60   Temp 98.9 °F (37.2 °C)   Resp 22   Ht 5' 5\" (1.651 m)   Wt 114.5 kg (252 lb 6.4 oz)   SpO2 97%   Breastfeeding No   BMI 42.00 kg/m²      Tmax/24hrs: Temp (24hrs), Av.9 °F (36.6 °C), Min:97.1 °F (36.2 °C), Max:98.9 °F (37.2 °C)    Input/Output:     Intake/Output Summary (Last 24 hours) at 2020 1633  Last data filed at 2020 1100  Gross per 24 hour   Intake 760 ml   Output 1100 ml   Net -340 ml       General:  Alert, awake, in NAD  Cardiovascular:  2+edema  Pulmonary:  No increased WOB  GI:  Soft, nt, nd  Extremities:No edema    Additional:      Labs:    Recent Results (from the past 24 hour(s))   GLUCOSE, POC    Collection Time: 20  5:18 PM   Result Value Ref Range    Glucose (POC) 193 (H) 70 - 110 mg/dL   GLUCOSE, POC    Collection Time: 20  9:35 PM   Result Value Ref Range    Glucose (POC) 164 (H) 70 - 110 mg/dL   RESPIRATORY PANEL,PCR,NASOPHARYNGEAL    Collection Time: 20 11:12 PM   Result Value Ref Range    Adenovirus Not detected NOTD      Coronavirus 229E Not detected NOTD      Coronavirus HKU1 Not detected NOTD      Coronavirus CVNL63 Not detected NOTD      Coronavirus OC43 Not detected NOTD      Metapneumovirus Not detected NOTD      Rhinovirus and Enterovirus Not detected NOTD      Influenza A Not detected NOTD      Influenza A, subtype H1 Not detected NOTD      Influenza A, subtype H3 Not detected NOTD      INFLUENZA A H1N1 PCR Not detected NOTD      Influenza B Not detected NOTD      Parainfluenza 1 Not detected NOTD      Parainfluenza 2 Not detected NOTD      Parainfluenza 3 Not detected NOTD      Parainfluenza virus 4 Not detected NOTD      RSV by PCR Not detected NOTD      B. parapertussis, PCR Not detected NOTD      Bordetella pertussis - PCR Not detected NOTD      Chlamydophila pneumoniae DNA, QL, PCR Not detected NOTD      Mycoplasma pneumoniae DNA, QL, PCR Not detected NOTD     URINALYSIS W/ RFLX MICROSCOPIC    Collection Time: 04/30/20 11:38 PM   Result Value Ref Range    Color YELLOW      Appearance CLEAR      Specific gravity 1.014 1.005 - 1.030      pH (UA) 5.0 5.0 - 8.0      Protein 100 (A) NEG mg/dL    Glucose Negative NEG mg/dL    Ketone Negative NEG mg/dL    Bilirubin Negative NEG      Blood Negative NEG      Urobilinogen 0.2 0.2 - 1.0 EU/dL    Nitrites Negative NEG      Leukocyte Esterase Negative NEG     LEGIONELLA PNEUMOPHILA AG, URINE    Collection Time: 04/30/20 11:38 PM   Result Value Ref Range    Legionella Ag, urine Negative NEG     STREP PNEUMO AG, URINE    Collection Time: 04/30/20 11:38 PM   Result Value Ref Range    Strep pneumo Ag, urine Negative NEG     URINE MICROSCOPIC ONLY    Collection Time: 04/30/20 11:38 PM   Result Value Ref Range    WBC 0 to 3 0 - 4 /hpf    RBC NONE 0 - 5 /hpf    Epithelial cells 1+ 0 - 5 /lpf    Bacteria Negative NEG /hpf    Mucus 1+ (A) NEG /lpf    Hyaline cast 0 to 3 0 - 2 /lpf   CBC WITH AUTOMATED DIFF    Collection Time: 05/01/20  3:20 AM   Result Value Ref Range    WBC 7.2 4.6 - 13.2 K/uL    RBC 2.83 (L) 4.20 - 5.30 M/uL    HGB 7.8 (L) 12.0 - 16.0 g/dL    HCT 24.4 (L) 35.0 - 45.0 %    MCV 86.2 74.0 - 97.0 FL    MCH 27.6 24.0 - 34.0 PG    MCHC 32.0 31.0 - 37.0 g/dL    RDW 14.5 11.6 - 14.5 %    PLATELET 822 855 - 021 K/uL    MPV 10.9 9.2 - 11.8 FL    NEUTROPHILS 79 (H) 40 - 73 %    LYMPHOCYTES 11 (L) 21 - 52 %    MONOCYTES 10 3 - 10 %    EOSINOPHILS 0 0 - 5 %    BASOPHILS 0 0 - 2 %    ABS. NEUTROPHILS 5.7 1.8 - 8.0 K/UL    ABS. LYMPHOCYTES 0.8 (L) 0.9 - 3.6 K/UL    ABS. MONOCYTES 0.7 0.05 - 1.2 K/UL    ABS. EOSINOPHILS 0.0 0.0 - 0.4 K/UL    ABS. BASOPHILS 0.0 0.0 - 0.1 K/UL    DF AUTOMATED     METABOLIC PANEL, BASIC    Collection Time: 05/01/20  3:20 AM   Result Value Ref Range    Sodium 136 136 - 145 mmol/L    Potassium 4.3 3.5 - 5.5 mmol/L    Chloride 103 100 - 111 mmol/L    CO2 24 21 - 32 mmol/L    Anion gap 9 3.0 - 18 mmol/L    Glucose 106 (H) 74 - 99 mg/dL    BUN 85 (H) 7.0 - 18 MG/DL    Creatinine 2.78 (H) 0.6 - 1.3 MG/DL    BUN/Creatinine ratio 31 (H) 12 - 20      GFR est AA 20 (L) >60 ml/min/1.73m2    GFR est non-AA 17 (L) >60 ml/min/1.73m2    Calcium 8.1 (L) 8.5 - 10.1 MG/DL   MAGNESIUM    Collection Time: 05/01/20  3:20 AM   Result Value Ref Range    Magnesium 2.7 (H) 1.6 - 2.6 mg/dL   HEPATIC FUNCTION PANEL    Collection Time: 05/01/20  3:20 AM   Result Value Ref Range    Protein, total 6.7 6.4 - 8.2 g/dL    Albumin 3.4 3.4 - 5.0 g/dL    Globulin 3.3 2.0 - 4.0 g/dL    A-G Ratio 1.0 0.8 - 1.7      Bilirubin, total 0.5 0.2 - 1.0 MG/DL    Bilirubin, direct 0.2 0.0 - 0.2 MG/DL    Alk.  phosphatase 62 45 - 117 U/L    AST (SGOT) 22 10 - 38 U/L    ALT (SGPT) 21 13 - 56 U/L   D DIMER    Collection Time: 05/01/20  3:20 AM   Result Value Ref Range    D DIMER 1.25 (H) <0.46 ug/ml(FEU)   FERRITIN    Collection Time: 05/01/20  3:20 AM   Result Value Ref Range    Ferritin 109 8 - 388 NG/ML   CK    Collection Time: 05/01/20  3:20 AM   Result Value Ref Range    CK 60 26 - 192 U/L   LD    Collection Time: 05/01/20  3:20 AM   Result Value Ref Range     81 - 234 U/L   C REACTIVE PROTEIN, QT    Collection Time: 05/01/20  3:20 AM   Result Value Ref Range    C-Reactive protein <0.3 0 - 0.3 mg/dL   VANCOMYCIN, RANDOM    Collection Time: 05/01/20  3:20 AM   Result Value Ref Range    Vancomycin, random 20.7 5.0 - 40.0 UG/ML   GLUCOSE, POC    Collection Time: 05/01/20  7:45 AM   Result Value Ref Range    Glucose (POC) 116 (H) 70 - 110 mg/dL   GLUCOSE, POC    Collection Time: 05/01/20 12:52 PM   Result Value Ref Range    Glucose (POC) 233 (H) 70 - 110 mg/dL   GLUCOSE, POC    Collection Time: 05/01/20  4:03 PM   Result Value Ref Range    Glucose (POC) 187 (H) 70 - 110 mg/dL     Additional Data Reviewed:      Signed By: Scott Valladares MD     May 1, 2020 4:33 PM

## 2020-05-01 NOTE — CONSULTS
[facility] - Consult Note    Consultation request by Anneliese Savage MD for advice/opinion related to evaluating Sepsis [facility]) [A41.9]    Date of  Admission: 4/30/2020 12:00 PM   Primary Care Physician:  Hugo Layton MD     Assessment:     Patient Active Problem List   Diagnosis Code    Acute on chronic diastolic congestive heart failure (HCC) I50.33    Suspected Covid-19 Virus Infection R68.89    Type 2 diabetes mellitus without complication, without long-term current use of insulin ([address] E11.9    Left anterior fascicular block I44.4    HTN (hypertension), benign I10    Coronary artery disease involving native coronary artery of native heart without angina pectoris I25.10    Chronic diastolic congestive heart failure (HCC) I50.32    Bilateral lower extremity edema R60.0    BMI 35.0-35.9,adult Z68.35    CHF (congestive heart failure) ([facility]) I50.9    Sepsis ([address] A41.9    Respiratory failure ([address] J96.90    SIRS (systemic inflammatory response syndrome) ([facility]) R65.10       -Acute on chronic respiratory failure. Multifactorial in setting of below. Second ER visit since discharge home on oxygen with similar presentation from [facility] 4/28/20.  -Sepsis/SIRS, presented with hypothermia.  -Concern for Covid pneumonia, Covid testing pending.  -Acute on chronic HFpEF. EF 55-60% by echo 4/25/20.  -Chronic kidney disease. Stable.  -Acute on chronic anemia. Continue to trend.  -Hypertension. Elevated on admission.  -Diabetes mellitus. HgbA1c 6.1.  -Dyslipidemia. On statin.  -Coronary disease reportedly diffuse medically managed, nuclear stress 3/2018 with small area of ischemia with EF 53%  -Chronic atypical LBBB. Primary cardiologist Dr. Bert Whitney at [facility], now plans to follow up with Dr. Alexandra Self. Plan:     Continue supportive care, antibiotics and Covid rule out. Continue diuresis with IV lasix as renal function allows.   Will consider renal artery doppler once Covid ruled out if not done recently. Continue BP control with coreg, hydralazine, norvasc. Restart imdur. Continue ASA, statin. History of Present Illness: This is a 71 y.o. female admitted for Sepsis ([facility] [A41.9]. Patient complains of:  SOB. Patient recently discharged home after admission for CHF and PNA. Patient was ruled out for Covid during previous admission. Patient had two ER visits since discharge. Patient is admitted with PNA and CHF. Patient was hypothermic. Patient is being ruled out for Covid and sepsis. Patient without CP or palpitations. Patient with chronic edema. Cardiac risk factors: dyslipidemia, diabetes mellitus, hypertension      Review of Symptoms:  Except as stated above include:  Constitutional:  negative  Respiratory:  negative  Cardiovascular:  C/o SOB and VALENTE. Gastrointestinal: negative  Genitourinary:  negative  Musculoskeletal:  Negative  Neurological:  Negative  Dermatological:  Negative  Endocrinological: Negative  Psychological:  Negative         Past Medical History:     Past Medical History:   Diagnosis Date    Arthritis     Cancer ([facility]     CHF (congestive heart failure) (HCC)     Diabetes ([facility]     [facility] of [facility]     GERD (gastroesophageal reflux disease)     Goiter     Hiatal hernia     Hypertension     Uterine cancer ([facility]          Social History:     Social History     Socioeconomic History    Marital status:      Spouse name: Not on file    Number of children: Not on file    Years of education: Not on file    Highest education level: Not on file   Tobacco Use    Smoking status: Never Smoker   Substance and Sexual Activity    Alcohol use: No    Drug use: No        Family History:   No family history on file. Medications:      Allergies   Allergen Reactions    Augmentin [Amoxicillin-Pot Clavulanate] Diarrhea    Clavulanic Acid Diarrhea    Levaquin [Levofloxacin] Other (comments)     Severe hypoglycemia          Current Facility-Administered Medications   Medication Dose Route Frequency    sodium chloride (NS) flush 5-10 mL  5-10 mL IntraVENous PRN    VANCOMYCIN INFORMATION NOTE   Other Rx Dosing/Monitoring    amLODIPine (NORVASC) tablet 10 mg  10 mg Oral DAILY    aspirin chewable tablet 81 mg  81 mg Oral DAILY    carvediloL (COREG) tablet 25 mg  25 mg Oral BID WITH MEALS    cholecalciferol (VITAMIN D3) capsule 5,000 Units  5,000 Units Oral DAILY    citalopram (CELEXA) tablet 20 mg  20 mg Oral DAILY    cyanocobalamin tablet 1,000 mcg  1,000 mcg Oral BID    pantoprazole (PROTONIX) tablet 40 mg  40 mg Oral ACB&D    hydrALAZINE (APRESOLINE) tablet 100 mg  100 mg Oral TID    rosuvastatin (CRESTOR) tablet 5 mg  5 mg Oral QHS    furosemide (LASIX) injection 40 mg  40 mg IntraVENous Q12H    heparin (porcine) injection 5,000 Units  5,000 Units SubCUTAneous Q8H    ascorbic acid (vitamin C) (VITAMIN C) tablet 500 mg  500 mg Oral BID    zinc sulfate (ZINCATE) 220 (50) mg capsule 1 Cap  1 Cap Oral DAILY    insulin lispro (HUMALOG) injection   SubCUTAneous AC&HS    glucose chewable tablet 16 g  16 g Oral PRN    glucagon (GLUCAGEN) injection 1 mg  1 mg IntraMUSCular PRN    dextrose (D50W) injection syrg 12.5-25 g  25-50 mL IntraVENous PRN    insulin glargine (LANTUS) injection 4 Units  4 Units SubCUTAneous QHS    polyethylene glycol (MIRALAX) packet 17 g  17 g Oral DAILY PRN    acetaminophen (TYLENOL) tablet 650 mg  650 mg Oral Q4H PRN    ferrous sulfate tablet 325 mg  1 Tab Oral EVERY OTHER DAY         Physical Exam:     Visit Vitals  /62 (BP 1 Location: Right arm, BP Patient Position: At rest)   Pulse 64   Temp 97.1 °F (36.2 °C)   Resp 20   Ht 5' 5\" (1.651 m)   Wt 114.5 kg (252 lb 6.4 oz)   SpO2 97%   Breastfeeding No   BMI 42.00 kg/m²     BP Readings from Last 3 Encounters:   05/01/20 157/62   04/29/20 (!) 167/32   04/28/20 166/68     Pulse Readings from Last 3 Encounters:   05/01/20 64   04/29/20 68   04/28/20 66     Wt Readings from Last 3 Encounters:   05/01/20 114.5 kg (252 lb 6.4 oz)   04/29/20 114.3 kg (252 lb)   04/26/20 114.4 kg (252 lb 4.8 oz)       General:  alert, cooperative, no distress, appears stated age  Neck:  no JVD  Lungs:  clear to auscultation bilaterally  Heart:  regular rate and rhythm  Abdomen:  abdomen is soft without significant tenderness, masses, organomegaly or guarding  Extremities:  extremities normal, atraumatic, no cyanosis 2+ edema  Skin: Warm and dry.  no hyperpigmentation, vitiligo, or suspicious lesions  Neuro: alert, oriented x3, affect appropriate  Psych: non focal     Data Review:     Recent Labs     05/01/20  0320 04/30/20  1241 04/29/20  1236   WBC 7.2 7.9 8.5   HGB 7.8* 8.4* 8.5*   HCT 24.4* 25.3* 26.1*    190 198     Recent Labs     05/01/20  0320 04/30/20  1241 04/29/20  1236    134* 130*   K 4.3 4.2 4.1    101 99*   CO2 24 24 23   * 211* 166*   BUN 85* 84* 77*   CREA 2.78* 2.74* 3.20*   CA 8.1* 8.0* 8.2*   MG 2.7*  --   --    ALB 3.4 3.4 3.6   SGOT 22 20 25   ALT 21 22 22       Results for orders placed or performed during the hospital encounter of 04/30/20   EKG, 12 LEAD, INITIAL   Result Value Ref Range    Ventricular Rate 66 BPM    Atrial Rate 66 BPM    P-R Interval 166 ms    QRS Duration 130 ms    Q-T Interval 472 ms    QTC Calculation (Bezet) 494 ms    Calculated P Axis 2 degrees    Calculated R Axis -23 degrees    Calculated T Axis 44 degrees    Diagnosis       Sinus rhythm with occasional premature ventricular complexes  Left bundle branch block  Abnormal ECG  When compared with ECG of 29-APR-2020 12:46,  premature ventricular complexes are now present         All Cardiac Markers in the last 24 hours:    Lab Results   Component Value Date/Time    CPK 60 05/01/2020 03:20 AM    CPK 82 04/30/2020 12:41 PM    CKMB 2.2 04/30/2020 12:41 PM    CKND1 2.7 04/30/2020 12:41 PM    TROIQ 0.03 04/30/2020 12:41 PM       Last Lipid:    Lab Results   Component Value Date/Time Cholesterol, total 182 04/24/2020 02:34 AM    HDL Cholesterol 41 04/24/2020 02:34 AM    LDL, calculated 112.8 (H) 04/24/2020 02:34 AM    Triglyceride 141 04/24/2020 02:34 AM    CHOL/HDL Ratio 4.4 04/24/2020 02:34 AM       Signed By: Bernice Gutiérrez PA     May 1, 2020

## 2020-05-01 NOTE — PROGRESS NOTES
Problem: Falls - Risk of  Goal: *Absence of Falls  Description: Document Zachery Hopkins Fall Risk and appropriate interventions in the flowsheet. Outcome: Progressing Towards Goal  Note: Fall Risk Interventions:  Mobility Interventions: Assess mobility with egress test, Communicate number of staff needed for ambulation/transfer, Patient to call before getting OOB         Medication Interventions: Evaluate medications/consider consulting pharmacy, Patient to call before getting OOB    Elimination Interventions: Patient to call for help with toileting needs              Problem: Patient Education: Go to Patient Education Activity  Goal: Patient/Family Education  Outcome: Progressing Towards Goal     Problem: Pressure Injury - Risk of  Goal: *Prevention of pressure injury  Description: Document Toney Scale and appropriate interventions in the flowsheet. Outcome: Progressing Towards Goal  Note: Pressure Injury Interventions:  Sensory Interventions: Assess changes in LOC, Assess need for specialty bed, Avoid rigorous massage over bony prominences, Keep linens dry and wrinkle-free, Maintain/enhance activity level, Minimize linen layers, Pressure redistribution bed/mattress (bed type), Use 30-degree side-lying position    Moisture Interventions: Maintain skin hydration (lotion/cream), Moisture barrier, Internal/External urinary devices, Absorbent underpads    Activity Interventions: Pressure redistribution bed/mattress(bed type)    Mobility Interventions: HOB 30 degrees or less, Pressure redistribution bed/mattress (bed type), Turn and reposition approx.  every two hours(pillow and wedges)    Nutrition Interventions: Document food/fluid/supplement intake    Friction and Shear Interventions: HOB 30 degrees or less, Sit at 90-degree angle                Problem: Patient Education: Go to Patient Education Activity  Goal: Patient/Family Education  Outcome: Progressing Towards Goal     Problem: Patient Education: Go to Patient Education Activity  Goal: Patient/Family Education  Outcome: Progressing Towards Goal     Problem: Heart Failure: Day 1  Goal: Off Pathway (Use only if patient is Off Pathway)  Outcome: Progressing Towards Goal  Goal: Activity/Safety  Outcome: Progressing Towards Goal  Goal: Consults, if ordered  Outcome: Progressing Towards Goal  Goal: Diagnostic Test/Procedures  Outcome: Progressing Towards Goal  Goal: Nutrition/Diet  Outcome: Progressing Towards Goal  Goal: Discharge Planning  Outcome: Progressing Towards Goal  Goal: Medications  Outcome: Progressing Towards Goal  Goal: Respiratory  Outcome: Progressing Towards Goal  Goal: Treatments/Interventions/Procedures  Outcome: Progressing Towards Goal  Goal: Psychosocial  Outcome: Progressing Towards Goal  Goal: *Oxygen saturation within defined limits  Outcome: Progressing Towards Goal  Goal: *Hemodynamically stable  Outcome: Progressing Towards Goal  Goal: *Optimal pain control at patient's stated goal  Outcome: Progressing Towards Goal  Goal: *Anxiety reduced or absent  Outcome: Progressing Towards Goal     Problem: Pain  Goal: *Control of Pain  Outcome: Progressing Towards Goal     Problem: Patient Education: Go to Patient Education Activity  Goal: Patient/Family Education  Outcome: Progressing Towards Goal     Problem: Diabetes Self-Management  Goal: *Disease process and treatment process  Description: Define diabetes and identify own type of diabetes; list 3 options for treating diabetes. Outcome: Progressing Towards Goal  Goal: *Incorporating nutritional management into lifestyle  Description: Describe effect of type, amount and timing of food on blood glucose; list 3 methods for planning meals. Outcome: Progressing Towards Goal  Goal: *Incorporating physical activity into lifestyle  Description: State effect of exercise on blood glucose levels.   Outcome: Progressing Towards Goal  Goal: *Developing strategies to promote health/change behavior  Description: Define the ABC's of diabetes; identify appropriate screenings, schedule and personal plan for screenings. Outcome: Progressing Towards Goal  Goal: *Using medications safely  Description: State effect of diabetes medications on diabetes; name diabetes medication taking, action and side effects. Outcome: Progressing Towards Goal  Goal: *Monitoring blood glucose, interpreting and using results  Description: Identify recommended blood glucose targets  and personal targets. Outcome: Progressing Towards Goal  Goal: *Prevention, detection, treatment of acute complications  Description: List symptoms of hyper- and hypoglycemia; describe how to treat low blood sugar and actions for lowering  high blood glucose level. Outcome: Progressing Towards Goal  Goal: *Prevention, detection and treatment of chronic complications  Description: Define the natural course of diabetes and describe the relationship of blood glucose levels to long term complications of diabetes.   Outcome: Progressing Towards Goal  Goal: *Developing strategies to address psychosocial issues  Description: Describe feelings about living with diabetes; identify support needed and support network  Outcome: Progressing Towards Goal  Goal: *Sick day guidelines  Outcome: Progressing Towards Goal  Goal: *Patient Specific Goal (EDIT GOAL, INSERT TEXT)  Outcome: Progressing Towards Goal     Problem: Patient Education: Go to Patient Education Activity  Goal: Patient/Family Education  Outcome: Progressing Towards Goal     Problem: Breathing Pattern - Ineffective  Goal: *Absence of hypoxia  Outcome: Progressing Towards Goal  Goal: *Use of effective breathing techniques  Outcome: Progressing Towards Goal     Problem: Patient Education: Go to Patient Education Activity  Goal: Patient/Family Education  Outcome: Progressing Towards Goal

## 2020-05-01 NOTE — PROGRESS NOTES
This writer called patient's room several times to assess. Patient not answering phone.     BO Gutierrez, RN  Pager # [phone]  Care Manager

## 2020-05-01 NOTE — PROGRESS NOTES
Problem: Self Care Deficits Care Plan (Adult)  Goal: *Acute Goals and Plan of Care (Insert Text)  Description: Occupational Therapy Goals  Initiated 5/1/2020 within 7 day(s). 1.  Patient will perform bed mobility in preparation for selfcare with supervision/set-up. 2.  Patient will perform grooming standing for 4-7 minutes with modified independence and F+ balance. 3.  Patient will perform lower body dressing with supervision/set-up using AE prn.  4.  Patient will perform toilet transfers with supervision/set-up. 5.  Patient will perform all aspects of toileting with supervision/set-up. 6.  Patient will participate in upper extremity therapeutic exercise/activities with modified independence for 8 minutes. 7.  Patient will utilize energy conservation techniques during functional activities with verbal cues. Prior Level of Function: Patient was modified independent with self-care and used a RW for functional mobility PTA. Outcome: Progressing Towards Goal      OCCUPATIONAL THERAPY EVALUATION    Patient: Duglas Hough (61 y.o. female)  Date: 5/1/2020  Primary Diagnosis: Sepsis ([facility] [A41.9]  Precautions:  Aspiration, Fall, Skin      ASSESSMENT :  Patient cleared to participate in OT evaluation by RN. Upon entering the room, patient was supine in bed, alert, and agreeable to participate in OT evaluation on 4L O2. Patient educated on energy conservation techniques, pursed lip breathing, and self pacing during daily activities as she was observed fatigued this session s/p bed mobility. Patient O2 97%. Patient will benefit from AE training to conserve energy and energy conservation handout next session. During the evaluation, the patient presented with decreased BUE strength, however BUEs present to be still functional. Patient demonstrated fair dynamic standing balance and verbalized understanding of not standing without a RW for safety, which was used during the evaluation.  Based on the objective data described below, the patient presented with decreased independence, decreased strength, decreased endurance, decreased functional balance, and decreased functional mobility, which impede pt's function with basic self-care/ADL tasks. Patient will benefit from skilled intervention to address the above impairments. Patient's rehabilitation potential is considered to be Good  Factors which may influence rehabilitation potential include:   []             None noted  [x]             Mental ability/status  [x]             Medical condition  [x]             Home/family situation and support systems  [x]             Safety awareness  []             Pain tolerance/management  []             Other:      PLAN :  Recommendations and Planned Interventions:   [x]               Self Care Training                  [x]      Therapeutic Activities  [x]               Functional Mobility Training   []      Cognitive Retraining  [x]               Therapeutic Exercises           [x]      Endurance Activities  [x]               Balance Training                    [x]      Neuromuscular Re-Education  []               Visual/Perceptual Training     [x]      Home Safety Training  [x]               Patient Education                   [x]      Family Training/Education  []               Other (comment):    Frequency/Duration: Patient will be followed by occupational therapy 1-2 times per day/4-7 days per week to address goals.   Discharge Recommendations: Home Health with 24/7 Assistance  Further Equipment Recommendations for Discharge: Patient has all DME     SUBJECTIVE:   Patient stated I haven't been up yet    OBJECTIVE DATA SUMMARY:     Past Medical History:   Diagnosis Date    Arthritis     [facility]     CHF (congestive heart failure) ([facility]     Diabetes ([facility]     Fracture of neck ([facility]     GERD (gastroesophageal reflux disease)     Goiter     Hiatal hernia     Hypertension     Uterine cancer ([facility]      Past Surgical History:   Procedure Laterality Date    HX APPENDECTOMY      HX HYSTERECTOMY      HX KNEE REPLACEMENT Right     HX ORTHOPAEDIC      odontoid fracture repair with hardware placement.  HX PARTIAL THYROIDECTOMY       Barriers to Learning/Limitations: None  Compensate with: visual, verbal, tactile, kinesthetic cues/model    Home Situation:   Home Situation  Home Environment: Private residence  # Steps to Enter: 3  One/Two Story Residence: One story  Living Alone: Yes  Support Systems: Friends \ neighbors  Patient Expects to be Discharged to[de-identified] Private residence  Current DME Used/Available at Home: None  Tub or Shower Type: Tub/Shower combination  []  Right hand dominant   []  Left hand dominant    Cognitive/Behavioral Status:  Neurologic State: Alert  Orientation Level: Oriented to person;Oriented to place;Oriented to situation  Cognition: Follows commands  Safety/Judgement: Fall prevention    Skin: Intact  Edema: None noted    Vision/Perceptual:      Acuity: Within Defined Limits         Coordination: BUE     Fine Motor Skills-Upper: Left Intact; Right Intact    Gross Motor Skills-Upper: Left Intact; Right Intact    Balance:  Sitting: Intact  Standing: Impaired; With support  Standing - Static: Fair  Standing - Dynamic : Fair    Strength: BUE  Strength: Generally decreased, functional       Tone & Sensation: BUE  Tone: Normal  Sensation: Intact    Range of Motion: BUE  AROM: Within functional limits      Functional Mobility and Transfers for ADLs:  Bed Mobility:  Supine to Sit: Minimum assistance  Scooting: Contact guard assistance    Transfers:  Sit to Stand: Contact guard assistance  Stand to Sit: Contact guard assistance(patient observed plopping down, vcs given for safety)    ADL Assessment:   Feeding: Modified independent    Oral Facial Hygiene/Grooming: Modified Independent    Bathing: Moderate assistance    Upper Body Dressing: Stand-by assistance    Lower Body Dressing:  Moderate assistance    Toileting: Minimum assistance       ADL Intervention:    Upper Body Dressing Assistance  Dressing Assistance: Stand-by assistance  Hospital Gown: Stand-by assistance    Lower Body Dressing Assistance  Dressing Assistance: Moderate assistance  Socks: Moderate assistance(patient reports fatigue)  Position Performed: Seated edge of bed      Cognitive Retraining  Safety/Judgement: Fall prevention    Pain:  Pain level pre-treatment: 0/10   Pain level post-treatment: 0/10   Pain Intervention(s): Medication (see MAR); Response to intervention: Nurse notified, See doc flow    Activity Tolerance:   Fair    Please refer to the flowsheet for vital signs taken during this treatment. After treatment:   [x] Patient left in no apparent distress sitting up in chair  [] Patient left in no apparent distress in bed  [x] Call bell left within reach  [x] Nursing notified  [] Caregiver present  [] Bed alarm activated    COMMUNICATION/EDUCATION:   [x] Role of Occupational Therapy in the acute care setting  [x] Home safety education was provided and the patient/caregiver indicated understanding. [x] Patient/family have participated as able in goal setting and plan of care. [x] Patient/family agree to work toward stated goals and plan of care. [] Patient understands intent and goals of therapy, but is neutral about his/her participation. [] Patient is unable to participate in goal setting and plan of care. Thank you for this referral.  Sam Smith OTR/L  Time Calculation: 23 mins    Eval Complexity: History: MEDIUM Complexity : Expanded review of history including physical, cognitive and psychosocial  history ; Examination: MEDIUM Complexity : 3-5 performance deficits relating to physical, cognitive , or psychosocial skils that result in activity limitations and / or participation restrictions; Decision Making:MEDIUM Complexity : Patient may present with comorbidities that affect occupational performnce. Miniml to moderate modification of tasks or assistance (eg, physical or verbal ) with assesment(s) is necessary to enable patient to complete evaluation

## 2020-05-01 NOTE — HOME CARE
Patient active to [facility] for SN, PT, and OT - wound care/unna boots. Resumption of care order needed prior to discharge. [facility] will continue to follow.      Will Lee LPN   [facility] Liaison  [phone]

## 2020-05-01 NOTE — ROUTINE PROCESS
Received patient in a chair, sitting, awake, alert and oriented. oxygen on at 4 lpm via nasal cannula No complaints made, will continue to monitor. Report  Given by Claudia Chavarria RN.

## 2020-05-02 ENCOUNTER — HOME CARE VISIT (OUTPATIENT)
Dept: HOME HEALTH SERVICES | Facility: HOME HEALTH | Age: 70
End: 2020-05-02
Payer: MEDICARE

## 2020-05-02 LAB
ANION GAP SERPL CALC-SCNC: 6 MMOL/L (ref 3–18)
BACTERIA SPEC CULT: NORMAL
BUN SERPL-MCNC: 88 MG/DL (ref 7–18)
BUN/CREAT SERPL: 31 (ref 12–20)
CALCIUM SERPL-MCNC: 7.9 MG/DL (ref 8.5–10.1)
CHLORIDE SERPL-SCNC: 102 MMOL/L (ref 100–111)
CK SERPL-CCNC: 52 U/L (ref 26–192)
CO2 SERPL-SCNC: 25 MMOL/L (ref 21–32)
CREAT SERPL-MCNC: 2.88 MG/DL (ref 0.6–1.3)
CREAT UR-MCNC: 121 MG/DL (ref 30–125)
CRP SERPL-MCNC: <0.3 MG/DL (ref 0–0.3)
D DIMER PPP FEU-MCNC: 0.97 UG/ML(FEU)
FERRITIN SERPL-MCNC: 105 NG/ML (ref 8–388)
GLUCOSE BLD STRIP.AUTO-MCNC: 130 MG/DL (ref 70–110)
GLUCOSE BLD STRIP.AUTO-MCNC: 189 MG/DL (ref 70–110)
GLUCOSE BLD STRIP.AUTO-MCNC: 191 MG/DL (ref 70–110)
GLUCOSE BLD STRIP.AUTO-MCNC: 208 MG/DL (ref 70–110)
GLUCOSE SERPL-MCNC: 159 MG/DL (ref 74–99)
LDH SERPL L TO P-CCNC: 193 U/L (ref 81–234)
MICROALBUMIN UR-MCNC: 50.5 MG/DL (ref 0–3)
MICROALBUMIN/CREAT UR-RTO: 417 MG/G (ref 0–30)
POTASSIUM SERPL-SCNC: 3.9 MMOL/L (ref 3.5–5.5)
SERVICE CMNT-IMP: NORMAL
SODIUM SERPL-SCNC: 133 MMOL/L (ref 136–145)
VANCOMYCIN SERPL-MCNC: 31.2 UG/ML (ref 5–40)

## 2020-05-02 PROCEDURE — 82550 ASSAY OF CK (CPK): CPT

## 2020-05-02 PROCEDURE — 82043 UR ALBUMIN QUANTITATIVE: CPT

## 2020-05-02 PROCEDURE — 74011636637 HC RX REV CODE- 636/637: Performed by: PHYSICIAN ASSISTANT

## 2020-05-02 PROCEDURE — 74011250637 HC RX REV CODE- 250/637: Performed by: PHYSICIAN ASSISTANT

## 2020-05-02 PROCEDURE — 74011250636 HC RX REV CODE- 250/636: Performed by: HOSPITALIST

## 2020-05-02 PROCEDURE — 86140 C-REACTIVE PROTEIN: CPT

## 2020-05-02 PROCEDURE — 83615 LACTATE (LD) (LDH) ENZYME: CPT

## 2020-05-02 PROCEDURE — 74011250636 HC RX REV CODE- 250/636: Performed by: PHYSICIAN ASSISTANT

## 2020-05-02 PROCEDURE — 80048 BASIC METABOLIC PNL TOTAL CA: CPT

## 2020-05-02 PROCEDURE — 82962 GLUCOSE BLOOD TEST: CPT

## 2020-05-02 PROCEDURE — 65660000000 HC RM CCU STEPDOWN

## 2020-05-02 PROCEDURE — 80202 ASSAY OF VANCOMYCIN: CPT

## 2020-05-02 PROCEDURE — 85379 FIBRIN DEGRADATION QUANT: CPT

## 2020-05-02 PROCEDURE — 3331090002 HH PPS REVENUE DEBIT

## 2020-05-02 PROCEDURE — 77010033678 HC OXYGEN DAILY

## 2020-05-02 PROCEDURE — 82728 ASSAY OF FERRITIN: CPT

## 2020-05-02 PROCEDURE — 36415 COLL VENOUS BLD VENIPUNCTURE: CPT

## 2020-05-02 PROCEDURE — 3331090001 HH PPS REVENUE CREDIT

## 2020-05-02 RX ORDER — HEPARIN SODIUM 5000 [USP'U]/ML
5000 INJECTION, SOLUTION INTRAVENOUS; SUBCUTANEOUS EVERY 8 HOURS
Status: DISCONTINUED | OUTPATIENT
Start: 2020-05-02 | End: 2020-05-14 | Stop reason: HOSPADM

## 2020-05-02 RX ADMIN — CITALOPRAM HYDROBROMIDE 20 MG: 20 TABLET ORAL at 09:43

## 2020-05-02 RX ADMIN — HYDRALAZINE HYDROCHLORIDE 100 MG: 50 TABLET, FILM COATED ORAL at 21:40

## 2020-05-02 RX ADMIN — CARVEDILOL 25 MG: 25 TABLET, FILM COATED ORAL at 09:43

## 2020-05-02 RX ADMIN — Medication 5000 UNITS: at 09:43

## 2020-05-02 RX ADMIN — CYANOCOBALAMIN TAB 1000 MCG 1000 MCG: 1000 TAB at 21:40

## 2020-05-02 RX ADMIN — CARVEDILOL 25 MG: 25 TABLET, FILM COATED ORAL at 21:40

## 2020-05-02 RX ADMIN — FUROSEMIDE 40 MG: 10 INJECTION, SOLUTION INTRAMUSCULAR; INTRAVENOUS at 21:42

## 2020-05-02 RX ADMIN — ROSUVASTATIN CALCIUM 5 MG: 10 TABLET, FILM COATED ORAL at 21:40

## 2020-05-02 RX ADMIN — INSULIN LISPRO 2 UNITS: 100 INJECTION, SOLUTION INTRAVENOUS; SUBCUTANEOUS at 13:07

## 2020-05-02 RX ADMIN — Medication 500 MG: at 21:40

## 2020-05-02 RX ADMIN — FUROSEMIDE 40 MG: 10 INJECTION, SOLUTION INTRAMUSCULAR; INTRAVENOUS at 09:43

## 2020-05-02 RX ADMIN — ASPIRIN 81 MG CHEWABLE TABLET 81 MG: 81 TABLET CHEWABLE at 09:43

## 2020-05-02 RX ADMIN — INSULIN GLARGINE 4 UNITS: 100 INJECTION, SOLUTION SUBCUTANEOUS at 21:42

## 2020-05-02 RX ADMIN — HEPARIN SODIUM 5000 UNITS: 5000 INJECTION INTRAVENOUS; SUBCUTANEOUS at 21:40

## 2020-05-02 RX ADMIN — ACETAMINOPHEN 650 MG: 325 TABLET ORAL at 10:35

## 2020-05-02 RX ADMIN — CYANOCOBALAMIN TAB 1000 MCG 1000 MCG: 1000 TAB at 09:43

## 2020-05-02 RX ADMIN — PANTOPRAZOLE SODIUM 40 MG: 40 TABLET, DELAYED RELEASE ORAL at 09:43

## 2020-05-02 RX ADMIN — INSULIN LISPRO 2 UNITS: 100 INJECTION, SOLUTION INTRAVENOUS; SUBCUTANEOUS at 16:05

## 2020-05-02 RX ADMIN — Medication 1 CAPSULE: at 09:43

## 2020-05-02 RX ADMIN — HYDRALAZINE HYDROCHLORIDE 100 MG: 50 TABLET, FILM COATED ORAL at 09:43

## 2020-05-02 RX ADMIN — INSULIN LISPRO 4 UNITS: 100 INJECTION, SOLUTION INTRAVENOUS; SUBCUTANEOUS at 21:48

## 2020-05-02 RX ADMIN — Medication 500 MG: at 09:43

## 2020-05-02 RX ADMIN — HEPARIN SODIUM 5000 UNITS: 5000 INJECTION INTRAVENOUS; SUBCUTANEOUS at 16:05

## 2020-05-02 RX ADMIN — AMLODIPINE BESYLATE 10 MG: 10 TABLET ORAL at 09:43

## 2020-05-02 RX ADMIN — HEPARIN SODIUM 5000 UNITS: 5000 INJECTION INTRAVENOUS; SUBCUTANEOUS at 09:43

## 2020-05-02 RX ADMIN — ACETAMINOPHEN 650 MG: 325 TABLET ORAL at 01:24

## 2020-05-02 RX ADMIN — HYDRALAZINE HYDROCHLORIDE 100 MG: 50 TABLET, FILM COATED ORAL at 16:05

## 2020-05-02 RX ADMIN — PANTOPRAZOLE SODIUM 40 MG: 40 TABLET, DELAYED RELEASE ORAL at 21:40

## 2020-05-02 NOTE — PROGRESS NOTES
[facility] - Progress Note  Admit Date: 4/30/2020    Assessment:     -Acute on chronic respiratory failure. Multifactorial in setting of below. Recurrent ER visits since last discharge.    -Sepsis/SIRS, presented with hypothermia.  -Concern for Covid pneumonia, Covid testing pending.  -Acute on chronic HFpEF. EF 55-60% by echo 4/25/20.  -Chronic kidney disease. Stable.  -Acute on chronic anemia. Continue to trend.  -Hypertension. Elevated on admission.  -Diabetes mellitus. HgbA1c 6.1.  -Dyslipidemia. On statin.  -Coronary disease reportedly diffuse medically managed, nuclear stress 3/2018 with small area of ischemia with EF 53%  -Chronic atypical LBBB.     Primary cardiologist Dr. Mike Navas at [facility], now plans to follow up with Dr. Johnny Buenrostro. Plan:     Continue lasix IV today, Cr slightly up to 2.8 but reasonable from my standpoint if ok with nephrology. Subjective:     No new complaints.      Objective:      Patient Vitals for the past 8 hrs:   Temp Pulse Resp BP SpO2   05/02/20 0753 97.1 °F (36.2 °C) (!) 56 20 149/61 100 %   05/02/20 0500 96.8 °F (36 °C) (!) 58 20 139/60 99 %   05/02/20 0109 96.5 °F (35.8 °C) 62 20 154/61 98 %         Patient Vitals for the past 96 hrs:   Weight   05/02/20 0555 115.1 kg (253 lb 12.8 oz)   05/01/20 0556 114.5 kg (252 lb 6.4 oz)   04/30/20 1210 118.3 kg (260 lb 14.4 oz)                    Intake/Output Summary (Last 24 hours) at 5/2/2020 0835  Last data filed at 5/2/2020 0551  Gross per 24 hour   Intake 600 ml   Output 1225 ml   Net -625 ml       Physical Exam:  General:  alert, cooperative, no distress, appears stated age  Neck:  nontender  Lungs:  clear to auscultation bilaterally  Heart:  regular rate and rhythm, S1, S2 normal, no murmur, click, rub or gallop  Abdomen:  abdomen is soft without significant tenderness, masses, organomegaly or guarding  Extremities:  extremities LE edema    Data Review:     Labs: Results:       Chemistry Recent Labs 05/02/20  0139 05/01/20  0320 04/30/20  1241 04/29/20  1236   * 106* 211* 166*   * 136 134* 130*   K 3.9 4.3 4.2 4.1    103 101 99*   CO2 25 24 24 23   BUN 88* 85* 84* 77*   CREA 2.88* 2.78* 2.74* 3.20*   CA 7.9* 8.1* 8.0* 8.2*   MG  --  2.7*  --   --    AGAP 6 9 9 8   BUCR 31* 31* 31* 24*   AP  --  62 74 70   TP  --  6.7 6.9 7.4   ALB  --  3.4 3.4 3.6   GLOB  --  3.3 3.5 3.8   AGRAT  --  1.0 1.0 0.9      CBC w/Diff Recent Labs     05/01/20 0320 04/30/20  1241 04/29/20  1236   WBC 7.2 7.9 8.5   RBC 2.83* 2.97* 3.06*   HGB 7.8* 8.4* 8.5*   HCT 24.4* 25.3* 26.1*    190 198   GRANS 79* 90* 85*   LYMPH 11* 5* 10*   EOS 0 0 0      Cardiac Enzymes Lab Results   Component Value Date/Time    CPK 52 05/02/2020 01:39 AM      Coagulation No results for input(s): PTP, INR, APTT, INREXT in the last 72 hours.     Lipid Panel Lab Results   Component Value Date/Time    Cholesterol, total 182 04/24/2020 02:34 AM    HDL Cholesterol 41 04/24/2020 02:34 AM    LDL, calculated 112.8 (H) 04/24/2020 02:34 AM    VLDL, calculated 28.2 04/24/2020 02:34 AM    Triglyceride 141 04/24/2020 02:34 AM    CHOL/HDL Ratio 4.4 04/24/2020 02:34 AM      BNP No results found for: BNP, BNPP, XBNPT   Liver Enzymes Recent Labs     05/01/20  0320   TP 6.7   ALB 3.4   AP 62   SGOT 22      Digoxin    Thyroid Studies No results found for: T4, T3U, TSH, TSHEXT       Signed By: Allegra Wall MD     May 2, 2020

## 2020-05-02 NOTE — PROGRESS NOTES
RENAL PROGRESS NOTE        Skye New         Assessment/Plan:     · OFE on CKD 3-4 in a setting of ADHFpEF. Scr is about the same, continue to monitor with diuresis. · CKD 3-4 is presumably due to dm. Check urine protein. Check pth. · ADHFpEF/volume overload. Continue lasix. · HTN, stable. · Anemia of ckd/tra. On oral iron. Subjective:  Patient complaints off: Feels week. SOB is better but hasn't been out of bed. No CP/N/V. Appetite is fair.        Patient Active Problem List   Diagnosis Code    Acute on chronic diastolic congestive heart failure ([facility] I50.33    Suspected Covid-19 Virus Infection R68.89    Type 2 diabetes mellitus without complication, without long-term current use of insulin ([facility] [address] [facility] E11.9    Left anterior fascicular block I44.4    HTN (hypertension), benign I10    Coronary artery disease involving native coronary artery of native heart without angina pectoris I25.10    Chronic diastolic congestive heart failure (HCC) I50.32    Bilateral lower extremity edema R60.0    BMI 35.0-35.9,adult Z68.35    CHF (congestive heart failure) ([facility]) I50.9    Sepsis ([facility] [address] A41.9    Respiratory failure ([facility]) J96.90    SIRS (systemic inflammatory response syndrome) ([facility] [address] R65.10       Current Facility-Administered Medications   Medication Dose Route Frequency Provider Last Rate Last Dose    heparin (porcine) injection 5,000 Units  5,000 Units SubCUTAneous Q8H Nela Alarcon MD   5,000 Units at 05/02/20 1605    sodium chloride (NS) flush 5-10 mL  5-10 mL IntraVENous PRN Bethanie Norwood MD        VANCOMYCIN INFORMATION NOTE   Other Rx Dosing/Monitoring Bethanie Norwood MD        amLODIPine (NORVASC) tablet 10 mg  10 mg Oral DAILY Oriana WYNN [place]   10 mg at 05/02/20 5351    aspirin chewable tablet 81 mg  81 mg Oral DAILY Serenity Sanders   33 mg at 05/02/20 0943    carvediloL (COREG) tablet 25 mg  25 mg Oral BID WITH MEALS Serenity Maurice   25 mg at 05/02/20 1292    cholecalciferol (VITAMIN D3) capsule 5,000 Units  5,000 Units Oral DAILY MICHAEL Maurice   5,000 Units at 05/02/20 3529    citalopram (CELEXA) tablet 20 mg  20 mg Oral DAILY Serenity Maurice   20 mg at 05/02/20 4629    cyanocobalamin tablet 1,000 mcg  1,000 mcg Oral BID MICHAEL Maurice   1,000 mcg at 05/02/20 1158    pantoprazole (PROTONIX) tablet 40 mg  40 mg Oral ACB&D MICHAEL Maurice   40 mg at 05/02/20 3657    hydrALAZINE (APRESOLINE) tablet 100 mg  100 mg Oral TID MICHAEL Maurice   100 mg at 05/02/20 1605    rosuvastatin (CRESTOR) tablet 5 mg  5 mg Oral QHS Serenity Maurice   5 mg at 05/01/20 2151    furosemide (LASIX) injection 40 mg  40 mg IntraVENous Q12H Serenity Maurice   40 mg at 05/02/20 4120    ascorbic acid (vitamin C) (VITAMIN C) tablet 500 mg  500 mg Oral BID MICHAEL Maurice   500 mg at 05/02/20 5933    zinc sulfate (ZINCATE) 220 (50) mg capsule 1 Cap  1 Cap Oral DAILY Serenity Maurice   1 Cap at 05/02/20 1087    insulin lispro (HUMALOG) injection   SubCUTAneous AC&HS Serenity Maurice   2 Units at 05/02/20 1605    glucose chewable tablet 16 g  16 g Oral PRN MICHAEL Maurice        glucagon (GLUCAGEN) injection 1 mg  1 mg IntraMUSCular PRN MICHAEL Maurice        dextrose (D50W) injection syrg 12.5-25 g  25-50 mL IntraVENous PRN Serenity Maurice        insulin glargine (LANTUS) injection 4 Units  4 Units SubCUTAneous QHS Serenity Maurice   4 Units at 05/01/20 2200    polyethylene glycol (MIRALAX) packet 17 g  17 g Oral DAILY PRN MICHAEL Maurice        acetaminophen (TYLENOL) tablet 650 mg  650 mg Oral Q4H PRN MICHAEL Maurice   650 mg at 05/02/20 1035    ferrous sulfate tablet 325 mg  1 Tab Oral EVERY OTHER DAY Orlando Rogers MD   325 mg at 05/01/20 1002       Objective  Vitals:    05/02/20 0753 05/02/20 8340 05/02/20 1157 05/02/20 1541   BP: 149/61  157/65 122/54   Pulse: (!) 56  60 (!) 54   Resp: 20 20 20   Temp: 97.1 °F (36.2 °C)  97 °F (36.1 °C) 97.6 °F (36.4 °C)   SpO2: 100% 98% 97% 96%   Weight:       Height:             Intake/Output Summary (Last 24 hours) at 5/2/2020 1617  Last data filed at 5/2/2020 0943  Gross per 24 hour   Intake 520 ml   Output 1225 ml   Net -705 ml           Admission weight: Weight: 118.3 kg (260 lb 14.4 oz) (04/30/20 1210)  Last Weight Metrics:  Weight Loss Metrics 5/2/2020 4/30/2020 4/29/2020 4/26/2020 8/18/2016 6/2/2016   Today's Wt 253 lb 12.8 oz - 252 lb 252 lb 4.8 oz 219 lb 228 lb   BMI - 42.23 kg/m2 41.93 kg/m2 41.98 kg/m2 36.44 kg/m2 37.94 kg/m2             Physical Assessment:     General: NAD, alert and oriented. Neck: No jvd. LUNGS: Clear to Auscultation, No rales, rhonchi or wheezes. CVS EXM: S1, S2  RRR, no murmurs/gallops/rubs. Abdomen: soft, non tender. Lower Extremities:  1+ edema. Lab    CBC w/Diff Recent Labs     05/01/20  0320 04/30/20  1241   WBC 7.2 7.9   RBC 2.83* 2.97*   HGB 7.8* 8.4*   HCT 24.4* 25.3*    190   GRANS 79* 90*   LYMPH 11* 5*   EOS 0 0        Chemistry Recent Labs     05/02/20  0139 05/01/20  0320 04/30/20  1241   * 106* 211*   * 136 134*   K 3.9 4.3 4.2    103 101   CO2 25 24 24   BUN 88* 85* 84*   CREA 2.88* 2.78* 2.74*   CA 7.9* 8.1* 8.0*   AGAP 6 9 9   BUCR 31* 31* 31*   AP  --  62 74   TP  --  6.7 6.9   ALB  --  3.4 3.4   GLOB  --  3.3 3.5   AGRAT  --  1.0 1.0         Lab Results   Component Value Date/Time    Iron 42 (L) 04/30/2020 12:41 PM    TIBC 319 04/30/2020 12:41 PM    Iron % saturation 13 (L) 04/30/2020 12:41 PM    Ferritin 105 05/02/2020 01:39 AM      Lab Results   Component Value Date/Time    Calcium 7.9 (L) 05/02/2020 01:39 AM    Phosphorus 6.0 (H) 04/27/2020 12:52 AM        Hui Barker M.D.   [facility]  Phone

## 2020-05-02 NOTE — PROGRESS NOTES
CM tried to call patient several times in her room to complete an initial assessment, no answer. Patient on Isolation Precautions. Emergency contact listed is a neighbor. Per CM Chart Review:  Patient lives alone in a single family home with 3 steps to enter. Patient has a [address], [address], and [employer] equipment at home. Patient will need Home Health Resumption of Care order for [facility]. Patient may need a ride home at time of discharge. Patient's insurance is [employer].     Morgan Saunders RN  Case Management [phone]

## 2020-05-02 NOTE — PROGRESS NOTES
6044  Verbal bedside report rec'd from [facility]. Patient resting quietly in bed. Resp even & unlabored. No acute distress noted. [address] per Dr Elizabeth Jung  Bedside and Verbal shift change report given to Clarissa GAO (oncoming nurse) by Leland Saucedo (offgoing nurse). Report included the following information SBAR, Kardex, MAR and Recent Results.

## 2020-05-02 NOTE — ROUTINE PROCESS
Assumed patient care. Report received from Obinna Tomas. 7:56 AM - Bedside shift change report given to Obinna Marin (oncoming nurse) by Janet Menjivar RN (offgoing nurse). Report given with SBAR, Kardex, Intake/Output, MAR and Recent Results.

## 2020-05-03 ENCOUNTER — HOME CARE VISIT (OUTPATIENT)
Dept: HOME HEALTH SERVICES | Facility: HOME HEALTH | Age: 70
End: 2020-05-03
Payer: MEDICARE

## 2020-05-03 LAB
CALCIUM SERPL-MCNC: 8 MG/DL (ref 8.5–10.1)
CK SERPL-CCNC: 37 U/L (ref 26–192)
CRP SERPL-MCNC: <0.3 MG/DL (ref 0–0.3)
D DIMER PPP FEU-MCNC: 0.93 UG/ML(FEU)
FERRITIN SERPL-MCNC: 97 NG/ML (ref 8–388)
GLUCOSE BLD STRIP.AUTO-MCNC: 127 MG/DL (ref 70–110)
GLUCOSE BLD STRIP.AUTO-MCNC: 188 MG/DL (ref 70–110)
GLUCOSE BLD STRIP.AUTO-MCNC: 193 MG/DL (ref 70–110)
LDH SERPL L TO P-CCNC: 168 U/L (ref 81–234)
PTH-INTACT SERPL-MCNC: 147.7 PG/ML (ref 18.4–88)
VANCOMYCIN SERPL-MCNC: 20.9 UG/ML (ref 5–40)

## 2020-05-03 PROCEDURE — 82728 ASSAY OF FERRITIN: CPT

## 2020-05-03 PROCEDURE — 80202 ASSAY OF VANCOMYCIN: CPT

## 2020-05-03 PROCEDURE — 74011250637 HC RX REV CODE- 250/637: Performed by: PHYSICIAN ASSISTANT

## 2020-05-03 PROCEDURE — 74011250636 HC RX REV CODE- 250/636: Performed by: PHYSICIAN ASSISTANT

## 2020-05-03 PROCEDURE — 86140 C-REACTIVE PROTEIN: CPT

## 2020-05-03 PROCEDURE — 85379 FIBRIN DEGRADATION QUANT: CPT

## 2020-05-03 PROCEDURE — 74011250636 HC RX REV CODE- 250/636: Performed by: HOSPITALIST

## 2020-05-03 PROCEDURE — 65660000000 HC RM CCU STEPDOWN

## 2020-05-03 PROCEDURE — 82962 GLUCOSE BLOOD TEST: CPT

## 2020-05-03 PROCEDURE — 74011250637 HC RX REV CODE- 250/637: Performed by: INTERNAL MEDICINE

## 2020-05-03 PROCEDURE — 3331090002 HH PPS REVENUE DEBIT

## 2020-05-03 PROCEDURE — 82550 ASSAY OF CK (CPK): CPT

## 2020-05-03 PROCEDURE — 36415 COLL VENOUS BLD VENIPUNCTURE: CPT

## 2020-05-03 PROCEDURE — 74011636637 HC RX REV CODE- 636/637: Performed by: PHYSICIAN ASSISTANT

## 2020-05-03 PROCEDURE — 77030038269 HC DRN EXT URIN PURWCK BARD -A

## 2020-05-03 PROCEDURE — 83970 ASSAY OF PARATHORMONE: CPT

## 2020-05-03 PROCEDURE — 83615 LACTATE (LD) (LDH) ENZYME: CPT

## 2020-05-03 PROCEDURE — 3331090001 HH PPS REVENUE CREDIT

## 2020-05-03 RX ORDER — VANCOMYCIN HYDROCHLORIDE
1250
Status: DISCONTINUED | OUTPATIENT
Start: 2020-05-03 | End: 2020-05-04

## 2020-05-03 RX ADMIN — ROSUVASTATIN CALCIUM 5 MG: 10 TABLET, FILM COATED ORAL at 20:45

## 2020-05-03 RX ADMIN — FUROSEMIDE 40 MG: 10 INJECTION, SOLUTION INTRAMUSCULAR; INTRAVENOUS at 10:05

## 2020-05-03 RX ADMIN — POLYETHYLENE GLYCOL 3350 17 G: 17 POWDER, FOR SOLUTION ORAL at 12:05

## 2020-05-03 RX ADMIN — CYANOCOBALAMIN TAB 1000 MCG 1000 MCG: 1000 TAB at 10:05

## 2020-05-03 RX ADMIN — CARVEDILOL 25 MG: 25 TABLET, FILM COATED ORAL at 20:42

## 2020-05-03 RX ADMIN — PANTOPRAZOLE SODIUM 40 MG: 40 TABLET, DELAYED RELEASE ORAL at 10:05

## 2020-05-03 RX ADMIN — Medication 5000 UNITS: at 10:05

## 2020-05-03 RX ADMIN — FUROSEMIDE 40 MG: 10 INJECTION, SOLUTION INTRAMUSCULAR; INTRAVENOUS at 20:43

## 2020-05-03 RX ADMIN — INSULIN LISPRO 2 UNITS: 100 INJECTION, SOLUTION INTRAVENOUS; SUBCUTANEOUS at 12:05

## 2020-05-03 RX ADMIN — INSULIN LISPRO 2 UNITS: 100 INJECTION, SOLUTION INTRAVENOUS; SUBCUTANEOUS at 17:49

## 2020-05-03 RX ADMIN — HYDRALAZINE HYDROCHLORIDE 100 MG: 50 TABLET, FILM COATED ORAL at 10:05

## 2020-05-03 RX ADMIN — ACETAMINOPHEN 650 MG: 325 TABLET ORAL at 10:05

## 2020-05-03 RX ADMIN — Medication 1 CAPSULE: at 10:05

## 2020-05-03 RX ADMIN — HEPARIN SODIUM 5000 UNITS: 5000 INJECTION INTRAVENOUS; SUBCUTANEOUS at 10:05

## 2020-05-03 RX ADMIN — HYDRALAZINE HYDROCHLORIDE 100 MG: 50 TABLET, FILM COATED ORAL at 20:44

## 2020-05-03 RX ADMIN — FERROUS SULFATE TAB 325 MG (65 MG ELEMENTAL FE) 325 MG: 325 (65 FE) TAB at 10:05

## 2020-05-03 RX ADMIN — CARVEDILOL 25 MG: 25 TABLET, FILM COATED ORAL at 10:05

## 2020-05-03 RX ADMIN — HYDRALAZINE HYDROCHLORIDE 100 MG: 50 TABLET, FILM COATED ORAL at 15:26

## 2020-05-03 RX ADMIN — HEPARIN SODIUM 5000 UNITS: 5000 INJECTION INTRAVENOUS; SUBCUTANEOUS at 15:26

## 2020-05-03 RX ADMIN — Medication 500 MG: at 20:42

## 2020-05-03 RX ADMIN — AMLODIPINE BESYLATE 10 MG: 10 TABLET ORAL at 10:05

## 2020-05-03 RX ADMIN — CITALOPRAM HYDROBROMIDE 20 MG: 20 TABLET ORAL at 10:05

## 2020-05-03 RX ADMIN — ACETAMINOPHEN 650 MG: 325 TABLET ORAL at 01:14

## 2020-05-03 RX ADMIN — PANTOPRAZOLE SODIUM 40 MG: 40 TABLET, DELAYED RELEASE ORAL at 20:44

## 2020-05-03 RX ADMIN — HEPARIN SODIUM 5000 UNITS: 5000 INJECTION INTRAVENOUS; SUBCUTANEOUS at 20:43

## 2020-05-03 RX ADMIN — ASPIRIN 81 MG CHEWABLE TABLET 81 MG: 81 TABLET CHEWABLE at 10:05

## 2020-05-03 RX ADMIN — VANCOMYCIN HYDROCHLORIDE 1250 MG: 10 INJECTION, POWDER, LYOPHILIZED, FOR SOLUTION INTRAVENOUS at 10:06

## 2020-05-03 RX ADMIN — CYANOCOBALAMIN TAB 1000 MCG 1000 MCG: 1000 TAB at 20:43

## 2020-05-03 RX ADMIN — Medication 500 MG: at 10:05

## 2020-05-03 NOTE — PROGRESS NOTES
Problem: Falls - Risk of  Goal: *Absence of Falls  Description: Document Vianca Cunningham Fall Risk and appropriate interventions in the flowsheet. Outcome: Progressing Towards Goal  Note: Fall Risk Interventions:  Mobility Interventions: Communicate number of staff needed for ambulation/transfer, PT Consult for mobility concerns, PT Consult for assist device competence, OT consult for ADLs         Medication Interventions: Evaluate medications/consider consulting pharmacy, Utilize gait belt for transfers/ambulation    Elimination Interventions: Call light in reach, Patient to call for help with toileting needs              Problem: Patient Education: Go to Patient Education Activity  Goal: Patient/Family Education  Outcome: Progressing Towards Goal     Problem: Pressure Injury - Risk of  Goal: *Prevention of pressure injury  Description: Document Toney Scale and appropriate interventions in the flowsheet. Outcome: Progressing Towards Goal  Note: Pressure Injury Interventions:  Sensory Interventions: Maintain/enhance activity level, Keep linens dry and wrinkle-free, Assess changes in LOC, Minimize linen layers, Pressure redistribution bed/mattress (bed type), Sit a 90-degree angle/use footstool if needed, Turn and reposition approx.  every two hours (pillows and wedges if needed), Use 30-degree side-lying position    Moisture Interventions: Absorbent underpads, Maintain skin hydration (lotion/cream)    Activity Interventions: Pressure redistribution bed/mattress(bed type), Increase time out of bed, PT/OT evaluation    Mobility Interventions: HOB 30 degrees or less, Pressure redistribution bed/mattress (bed type), PT/OT evaluation    Nutrition Interventions: Document food/fluid/supplement intake    Friction and Shear Interventions: HOB 30 degrees or less, Minimize layers                Problem: Patient Education: Go to Patient Education Activity  Goal: Patient/Family Education  Outcome: Progressing Towards Goal Problem: Patient Education: Go to Patient Education Activity  Goal: Patient/Family Education  Outcome: Progressing Towards Goal     Problem: Heart Failure: Day 3  Goal: Off Pathway (Use only if patient is Off Pathway)  Outcome: Progressing Towards Goal  Goal: Activity/Safety  Outcome: Progressing Towards Goal  Goal: Diagnostic Test/Procedures  Outcome: Progressing Towards Goal  Goal: Nutrition/Diet  Outcome: Progressing Towards Goal  Goal: Discharge Planning  Outcome: Progressing Towards Goal  Goal: Medications  Outcome: Progressing Towards Goal  Goal: Respiratory  Outcome: Progressing Towards Goal  Goal: Treatments/Interventions/Procedures  Outcome: Progressing Towards Goal  Goal: Psychosocial  Outcome: Progressing Towards Goal  Goal: *Oxygen saturation within defined limits  Outcome: Progressing Towards Goal  Goal: *Hemodynamically stable  Outcome: Progressing Towards Goal  Goal: *Optimal pain control at patient's stated goal  Outcome: Progressing Towards Goal  Goal: *Anxiety reduced or absent  Outcome: Progressing Towards Goal  Goal: *Demonstrates progressive activity  Outcome: Progressing Towards Goal     Problem: Pain  Goal: *Control of Pain  Outcome: Progressing Towards Goal     Problem: Patient Education: Go to Patient Education Activity  Goal: Patient/Family Education  Outcome: Progressing Towards Goal     Problem: Diabetes Self-Management  Goal: *Disease process and treatment process  Description: Define diabetes and identify own type of diabetes; list 3 options for treating diabetes. Outcome: Progressing Towards Goal  Goal: *Incorporating nutritional management into lifestyle  Description: Describe effect of type, amount and timing of food on blood glucose; list 3 methods for planning meals. Outcome: Progressing Towards Goal  Goal: *Incorporating physical activity into lifestyle  Description: State effect of exercise on blood glucose levels.   Outcome: Progressing Towards Goal  Goal: *Developing strategies to promote health/change behavior  Description: Define the ABC's of diabetes; identify appropriate screenings, schedule and personal plan for screenings. Outcome: Progressing Towards Goal  Goal: *Using medications safely  Description: State effect of diabetes medications on diabetes; name diabetes medication taking, action and side effects. Outcome: Progressing Towards Goal  Goal: *Monitoring blood glucose, interpreting and using results  Description: Identify recommended blood glucose targets  and personal targets. Outcome: Progressing Towards Goal  Goal: *Prevention, detection, treatment of acute complications  Description: List symptoms of hyper- and hypoglycemia; describe how to treat low blood sugar and actions for lowering  high blood glucose level. Outcome: Progressing Towards Goal  Goal: *Prevention, detection and treatment of chronic complications  Description: Define the natural course of diabetes and describe the relationship of blood glucose levels to long term complications of diabetes.   Outcome: Progressing Towards Goal  Goal: *Developing strategies to address psychosocial issues  Description: Describe feelings about living with diabetes; identify support needed and support network  Outcome: Progressing Towards Goal  Goal: *Sick day guidelines  Outcome: Progressing Towards Goal  Goal: *Patient Specific Goal (EDIT GOAL, INSERT TEXT)  Outcome: Progressing Towards Goal     Problem: Patient Education: Go to Patient Education Activity  Goal: Patient/Family Education  Outcome: Progressing Towards Goal     Problem: Breathing Pattern - Ineffective  Goal: *Absence of hypoxia  Outcome: Progressing Towards Goal  Goal: *Use of effective breathing techniques  Outcome: Progressing Towards Goal     Problem: Patient Education: Go to Patient Education Activity  Goal: Patient/Family Education  Outcome: Progressing Towards Goal     Problem: Patient Education: Go to Patient Education Activity  Goal: Patient/Family Education  Outcome: Progressing Towards Goal     Problem: Patient Education: Go to Patient Education Activity  Goal: Patient/Family Education  Outcome: Progressing Towards Goal

## 2020-05-03 NOTE — ROUTINE PROCESS
Received report from [place]. Pt AAOx3, NAD, breathing non labored, on O2 NC at 2L, HOB up. IV site clean, dry and intact. Pt's  due to void; ramos cath removed at 1530 per report. pt w/ externa cath on. Bed at the lowest level on lock position, call bell w/i reach. Bed alarm on. Bladder scan done when pt has not voided for 8H; result 389 cc. Referred to MD and ordered ramos cath in-done. Pt claimed of having some difficulty breathing and some palpitations. VSS. Lungs diminished at the bases on auscultation. 12 lead ECG done. MD saw pt-see orders. PCXR done. Extra Lasix dose given per order. ABG ordered. Pt's for  Doppler study of BLE in the morning. Bedside and Verbal shift change report given to [facility] (oncoming nurse) by me (offgoing nurse).  Report included the following information SBAR, Kardex, Intake/Output, MAR, Recent Results and Cardiac Rhythm SR.

## 2020-05-03 NOTE — PROGRESS NOTES
[facility] Hospitalist Group  Progress Note    Patient: Tamra Chavez Age: 71 y.o. : 1950 MR#: [id number] SSN: [id number]  Date/Time: 2020    Subjective:     Pt seen & evaluated , lying in bed, NAD     Assessment/Plan:   71 y o female w/ h/o CKD and CHF re admitted soon after discharge after presenting w/ cc of SOB.     -Acute on chronic respiratory failure  -Acute on chronic diastolic heart failure on lasix IV  -COVID negative     HISTORY OF:   -HFpEF  -T2DM A1c of 6.1 on lantus, corrective insulin  -CAD  -HTN  -CKD III-stable     PLAN:  -Diuresis per cardiology - continue IV lasix , creatinine slightly elevated to 2.8 , Nephrology on board   - COVID 19 negative   -Continue to monitor renal function and electrolytes    Additional Notes:      Case discussed with:  [x]Patient  []Family  []Nursing  []Case Management  DVT Prophylaxis:  []Lovenox  [x]Hep SQ  []SCDs  []Coumadin   []On Heparin gtt    Objective:   VS:   Visit Vitals  /59 (BP 1 Location: Left arm, BP Patient Position: At rest)   Pulse 60   Temp 97.2 °F (36.2 °C)   Resp 20   Ht 5' 5\" (1.651 m)   Wt 115.1 kg (253 lb 12.8 oz)   SpO2 97%   Breastfeeding No   BMI 42.23 kg/m²      Tmax/24hrs: Temp (24hrs), Av °F (36.1 °C), Min:96.5 °F (35.8 °C), Max:97.6 °F (36.4 °C)    Input/Output:     Intake/Output Summary (Last 24 hours) at 20208  Last data filed at 2020 1655  Gross per 24 hour   Intake 480 ml   Output 750 ml   Net -270 ml       General:  Alert, awake, in NAD  Cardiovascular:  2+edema  Pulmonary:  No increased WOB  GI:  Soft, nt, nd  Extremities:No edema    Additional:      Labs:    Recent Results (from the past 24 hour(s))   GLUCOSE, POC    Collection Time: 20 10:26 PM   Result Value Ref Range    Glucose (POC) 150 (H) 70 - 110 mg/dL   D DIMER    Collection Time: 05/02/20  1:39 AM   Result Value Ref Range    D DIMER 0.97 (H) <0.46 ug/ml(FEU)   FERRITIN    Collection Time: 20  1:39 AM Result Value Ref Range    Ferritin 105 8 - 388 NG/ML   CK    Collection Time: 05/02/20  1:39 AM   Result Value Ref Range    CK 52 26 - 192 U/L   LD    Collection Time: 05/02/20  1:39 AM   Result Value Ref Range     81 - 234 U/L   C REACTIVE PROTEIN, QT    Collection Time: 05/02/20  1:39 AM   Result Value Ref Range    C-Reactive protein <0.3 0 - 0.3 mg/dL   VANCOMYCIN, RANDOM    Collection Time: 05/02/20  1:39 AM   Result Value Ref Range    Vancomycin, random 31.2 5.0 - 69.3 UG/ML   METABOLIC PANEL, BASIC    Collection Time: 05/02/20  1:39 AM   Result Value Ref Range    Sodium 133 (L) 136 - 145 mmol/L    Potassium 3.9 3.5 - 5.5 mmol/L    Chloride 102 100 - 111 mmol/L    CO2 25 21 - 32 mmol/L    Anion gap 6 3.0 - 18 mmol/L    Glucose 159 (H) 74 - 99 mg/dL    BUN 88 (H) 7.0 - 18 MG/DL    Creatinine 2.88 (H) 0.6 - 1.3 MG/DL    BUN/Creatinine ratio 31 (H) 12 - 20      GFR est AA 20 (L) >60 ml/min/1.73m2    GFR est non-AA 16 (L) >60 ml/min/1.73m2    Calcium 7.9 (L) 8.5 - 10.1 MG/DL   GLUCOSE, POC    Collection Time: 05/02/20  7:56 AM   Result Value Ref Range    Glucose (POC) 130 (H) 70 - 110 mg/dL   GLUCOSE, POC    Collection Time: 05/02/20 11:59 AM   Result Value Ref Range    Glucose (POC) 189 (H) 70 - 110 mg/dL   GLUCOSE, POC    Collection Time: 05/02/20  3:46 PM   Result Value Ref Range    Glucose (POC) 191 (H) 70 - 110 mg/dL   MICROALBUMIN, UR, RAND W/ MICROALB/CREAT RATIO    Collection Time: 05/02/20  4:55 PM   Result Value Ref Range    Microalbumin,urine random 50.50 (H) 0 - 3.0 MG/DL    Creatinine, urine 121.00 30 - 125 mg/dL    Microalbumin/Creat ratio (mg/g creat) 417 (H) 0 - 30 mg/g   GLUCOSE, POC    Collection Time: 05/02/20  9:35 PM   Result Value Ref Range    Glucose (POC) 208 (H) 70 - 110 mg/dL     Additional Data Reviewed:      Signed By: Rossy Ventura MD     May 2, 2020

## 2020-05-03 NOTE — PROGRESS NOTES
[facility] - Progress Note  Admit Date: 4/30/2020    Assessment:     -Acute on chronic respiratory failure. Multifactorial in setting of below. Recurrent ER visits since last discharge.    -Sepsis/SIRS, presented with hypothermia.  -Concern for Covid pneumonia, Covid testing pending.  -Acute on chronic HFpEF. EF 55-60% by echo 4/25/20.  -Chronic kidney disease. Stable.  -Acute on chronic anemia. Continue to trend.  -Hypertension. Elevated on admission.  -Diabetes mellitus. HgbA1c 6.1.  -Dyslipidemia. On statin.  -Coronary disease reportedly diffuse medically managed, nuclear stress 3/2018 with small area of ischemia with EF 53%  -Chronic atypical LBBB.     Primary cardiologist Dr. Morgan Adams at [facility], now plans to follow up with Dr. Juan Wilkinson. Plan:     Continue IV lasix as Cr tolerates, encourage OOB. Subjective:     Back pain, minimal dyspnea today but not out of bed.     Objective:      Patient Vitals for the past 8 hrs:   Temp Pulse Resp BP SpO2   05/03/20 0759 97.6 °F (36.4 °C) 63 20 146/59 97 %   05/03/20 0444 97.8 °F (36.6 °C) 61 20 149/59 96 %         Patient Vitals for the past 96 hrs:   Weight   05/03/20 0444 117.2 kg (258 lb 6.4 oz)   05/02/20 0555 115.1 kg (253 lb 12.8 oz)   05/01/20 0556 114.5 kg (252 lb 6.4 oz)   04/30/20 1210 118.3 kg (260 lb 14.4 oz)                    Intake/Output Summary (Last 24 hours) at 5/3/2020 1031  Last data filed at 5/3/2020 0453  Gross per 24 hour   Intake 600 ml   Output 650 ml   Net -50 ml       Physical Exam:  General:  alert, cooperative, no distress, appears stated age  Neck:  nontender  Lungs:  Decreased bases  Heart:  regular rate and rhythm, S1, S2 normal, no murmur, click, rub or gallop  Abdomen:  abdomen is soft without significant tenderness, masses, organomegaly or guarding  Extremities:  extremities + LE edema    Data Review:     Labs: Results:       Chemistry Recent Labs     05/03/20  0337 05/02/20  0139 05/01/20  0320 04/30/20  1249 GLU  --  159* 106* 211*   NA  --  133* 136 134*   K  --  3.9 4.3 4.2   CL  --  102 103 101   CO2  --  25 24 24   BUN  --  88* 85* 84*   CREA  --  2.88* 2.78* 2.74*   CA 8.0* 7.9* 8.1* 8.0*   MG  --   --  2.7*  --    AGAP  --  6 9 9   BUCR  --  31* 31* 31*   AP  --   --  62 74   TP  --   --  6.7 6.9   ALB  --   --  3.4 3.4   GLOB  --   --  3.3 3.5   AGRAT  --   --  1.0 1.0      CBC w/Diff Recent Labs     05/01/20  0320 04/30/20  1241   WBC 7.2 7.9   RBC 2.83* 2.97*   HGB 7.8* 8.4*   HCT 24.4* 25.3*    190   GRANS 79* 90*   LYMPH 11* 5*   EOS 0 0      Cardiac Enzymes Lab Results   Component Value Date/Time    CPK 37 05/03/2020 03:37 AM      Coagulation No results for input(s): PTP, INR, APTT, INREXT in the last 72 hours.     Lipid Panel Lab Results   Component Value Date/Time    Cholesterol, total 182 04/24/2020 02:34 AM    HDL Cholesterol 41 04/24/2020 02:34 AM    LDL, calculated 112.8 (H) 04/24/2020 02:34 AM    VLDL, calculated 28.2 04/24/2020 02:34 AM    Triglyceride 141 04/24/2020 02:34 AM    CHOL/HDL Ratio 4.4 04/24/2020 02:34 AM      BNP No results found for: BNP, BNPP, XBNPT   Liver Enzymes Recent Labs     05/01/20  0320   TP 6.7   ALB 3.4   AP 62   SGOT 22      Digoxin    Thyroid Studies No results found for: T4, T3U, TSH, TSHEXT       Signed By: Claudetta Batter, MD     May 3, 2020

## 2020-05-03 NOTE — PROGRESS NOTES
[facility]  Progress Note    Patient: Mekhi Echols Age: 71 y.o. : 1950 MR#: [id number] SSN: [id number]  Date/Time: 5/3/2020    Subjective:     Pt seen & evaluated , lying in bed, NAD     Assessment/Plan:   71 y o female w/ h/o CKD and CHF re admitted soon after discharge after presenting w/ cc of SOB.     -Acute on chronic respiratory failure  -Acute on chronic diastolic heart failure on lasix IV  -COVID negative     HISTORY OF:   -HFpEF  -T2DM A1c of 6.1 on lantus, corrective insulin  -CAD  -HTN  -CKD III-stable     PLAN:  -Diuresis per cardiology - continue IV lasix , creatinine slightly elevated to 2.8 , Nephrology on board  , check labs in AM   - COVID 19 negative   -Continue to monitor renal function and electrolytes    Additional Notes:      Case discussed with:  [x]Patient  []Family  []Nursing  []Case Management  DVT Prophylaxis:  []Lovenox  [x]Hep SQ  []SCDs  []Coumadin   []On Heparin gtt    Objective:   VS:   Visit Vitals  /60 (BP 1 Location: Left arm, BP Patient Position: At rest)   Pulse 62   Temp 96.7 °F (35.9 °C)   Resp 20   Ht 5' 5\" (1.651 m)   Wt 117.2 kg (258 lb 6.4 oz)   SpO2 96%   Breastfeeding No   BMI 43.00 kg/m²      Tmax/24hrs: Temp (24hrs), Av.3 °F (36.3 °C), Min:96.7 °F (35.9 °C), Max:97.8 °F (36.6 °C)    Input/Output:     Intake/Output Summary (Last 24 hours) at 5/3/2020 1515  Last data filed at 5/3/2020 1356  Gross per 24 hour   Intake 730 ml   Output 1150 ml   Net -420 ml       General:  Alert, awake, in NAD  Cardiovascular:  2+edema  Pulmonary:  No increased WOB  GI:  Soft, nt, nd  Extremities:No edema    Additional:      Labs:    Recent Results (from the past 24 hour(s))   GLUCOSE, POC    Collection Time: 20  3:46 PM   Result Value Ref Range    Glucose (POC) 191 (H) 70 - 110 mg/dL   MICROALBUMIN, UR, RAND W/ MICROALB/CREAT RATIO    Collection Time: 20  4:55 PM   Result Value Ref Range    Microalbumin,urine random 50.50 (H) 0 - 3.0 MG/DL    Creatinine, urine 121.00 30 - 125 mg/dL    Microalbumin/Creat ratio (mg/g creat) 417 (H) 0 - 30 mg/g   GLUCOSE, POC    Collection Time: 05/02/20  9:35 PM   Result Value Ref Range    Glucose (POC) 208 (H) 70 - 110 mg/dL   D DIMER    Collection Time: 05/03/20  3:37 AM   Result Value Ref Range    D DIMER 0.93 (H) <0.46 ug/ml(FEU)   FERRITIN    Collection Time: 05/03/20  3:37 AM   Result Value Ref Range    Ferritin 97 8 - 388 NG/ML   CK    Collection Time: 05/03/20  3:37 AM   Result Value Ref Range    CK 37 26 - 192 U/L   LD    Collection Time: 05/03/20  3:37 AM   Result Value Ref Range     81 - 234 U/L   C REACTIVE PROTEIN, QT    Collection Time: 05/03/20  3:37 AM   Result Value Ref Range    C-Reactive protein <0.3 0 - 0.3 mg/dL   VANCOMYCIN, RANDOM    Collection Time: 05/03/20  3:37 AM   Result Value Ref Range    Vancomycin, random 20.9 5.0 - 40.0 UG/ML   PTH INTACT    Collection Time: 05/03/20  3:37 AM   Result Value Ref Range    Calcium 8.0 (L) 8.5 - 10.1 MG/DL    PTH, Intact 147.7 (H) 18.4 - 88.0 pg/mL   GLUCOSE, POC    Collection Time: 05/03/20  7:37 AM   Result Value Ref Range    Glucose (POC) 127 (H) 70 - 110 mg/dL   GLUCOSE, POC    Collection Time: 05/03/20 11:41 AM   Result Value Ref Range    Glucose (POC) 193 (H) 70 - 110 mg/dL     Additional Data Reviewed:      Signed By: Sandrita Llanos MD     May 3, 2020

## 2020-05-03 NOTE — PROGRESS NOTES
RENAL PROGRESS NOTE        Tamra Mahajanomid         Assessment/Plan:     · OFE on CKD 3-4 in a setting of ADHFpEF. Scr is up slightly, continue to monitor with diuresis. · CKD 3-4 with non nephrotic proteinuria  is presumably due to dm. PTH is pending. · ADHFpEF/volume overload. Continue lasix. · HTN, stable. · Anemia of ckd/tar. On oral iron. Subjective:  Patient complaints off: Feels week. SOB is better but hasn't been out of bed. Not getting pt. No CP/N/V. Appetite is fair.        Patient Active Problem List   Diagnosis Code    Acute on chronic diastolic congestive heart failure ([facility] I50.33    Suspected Covid-19 Virus Infection R68.89    Type 2 diabetes mellitus without complication, without long-term current use of insulin ([facility] [facility] E11.9    Left anterior fascicular block I44.4    HTN (hypertension), benign I10    Coronary artery disease involving native coronary artery of native heart without angina pectoris I25.10    Chronic diastolic congestive heart failure (HCC) I50.32    Bilateral lower extremity edema R60.0    BMI 35.0-35.9,adult Z68.35    CHF (congestive heart failure) ([facility]) I50.9    Sepsis ([facility] [address] [facility] A41.9    Respiratory failure ([facility]) J96.90    SIRS (systemic inflammatory response syndrome) ([facility]) R65.10       Current Facility-Administered Medications   Medication Dose Route Frequency Provider Last Rate Last Dose    vancomycin (VANCOCIN) 1250 mg in  ml infusion  1,250 mg IntraVENous Q36H Aaliyah Zee .7 mL/hr at 05/03/20 1006 1,250 mg at 05/03/20 1006    heparin (porcine) injection 5,000 Units  5,000 Units SubCUTAneous Q8H Aaliyah Zee MD   5,000 Units at 05/03/20 1005    sodium chloride (NS) flush 5-10 mL  5-10 mL IntraVENous PRN Ana Lilia Smith MD        amLODIPine (NORVASC) tablet 10 mg  10 mg Oral DAILY [place], [place] 10 mg at 05/03/20 1005    aspirin chewable tablet 81 mg  81 mg Oral DAILY Quinn Brownema   81 mg at 05/03/20 1005    carvediloL (COREG) tablet 25 mg  25 mg Oral BID WITH MEALS Serenity Browne   25 mg at 05/03/20 1005    cholecalciferol (VITAMIN D3) capsule 5,000 Units  5,000 Units Oral DAILY MICHAEL Browne   5,000 Units at 05/03/20 1005    citalopram (CELEXA) tablet 20 mg  20 mg Oral DAILY Ani Christianson PA   20 mg at 05/03/20 1005    cyanocobalamin tablet 1,000 mcg  1,000 mcg Oral BID MICHAEL Browne   1,000 mcg at 05/03/20 1005    pantoprazole (PROTONIX) tablet 40 mg  40 mg Oral ACB&D MICHAEL Browne   40 mg at 05/03/20 1005    hydrALAZINE (APRESOLINE) tablet 100 mg  100 mg Oral TID MICHAEL Browne   100 mg at 05/03/20 1005    rosuvastatin (CRESTOR) tablet 5 mg  5 mg Oral QHS Ani Christianson [place]   5 mg at 05/02/20 2140    furosemide (LASIX) injection 40 mg  40 mg IntraVENous Q12H Ani Christianson [place]   40 mg at 05/03/20 1005    ascorbic acid (vitamin C) (VITAMIN C) tablet 500 mg  500 mg Oral BID Ani Christianson PA   500 mg at 05/03/20 1005    zinc sulfate (ZINCATE) 220 (50) mg capsule 1 Cap  1 Cap Oral DAILY MICHAEL Browne   1 Cap at 05/03/20 1005    insulin lispro (HUMALOG) injection   SubCUTAneous AC&HS Serenity Browne   2 Units at 05/03/20 1205    glucose chewable tablet 16 g  16 g Oral PRN MICHAEL Browne        glucagon (GLUCAGEN) injection 1 mg  1 mg IntraMUSCular PRN MICHAEL Browne        dextrose (D50W) injection syrg 12.5-25 g  25-50 mL IntraVENous PRN Ani Christianson [place]        insulin glargine (LANTUS) injection 4 Units  4 Units SubCUTAneous QHS Serenity Browne   4 Units at 05/02/20 2142    polyethylene glycol (MIRALAX) packet 17 g  17 g Oral DAILY PRN MICHAEL Browne   17 g at 05/03/20 1205    acetaminophen (TYLENOL) tablet 650 mg  650 mg Oral Q4H PRN MICHAEL Browne   650 mg at 05/03/20 1005    ferrous sulfate tablet 325 mg 1 Tab Oral EVERY OTHER DAY Carol Ruiz MD   325 mg at 05/03/20 1005       Objective  Vitals:    05/03/20 0007 05/03/20 0444 05/03/20 0759 05/03/20 1142   BP: 133/61 149/59 146/59 138/60   Pulse: (!) 58 61 63 62   Resp: 20 20 20 20   Temp: 97.1 °F (36.2 °C) 97.8 °F (36.6 °C) 97.6 °F (36.4 °C) 96.7 °F (35.9 °C)   SpO2: 97% 96% 97% 96%   Weight:  117.2 kg (258 lb 6.4 oz)     Height:             Intake/Output Summary (Last 24 hours) at 5/3/2020 1348  Last data filed at 5/3/2020 0453  Gross per 24 hour   Intake 360 ml   Output 650 ml   Net -290 ml           Admission weight: Weight: 118.3 kg (260 lb 14.4 oz) (04/30/20 1210)  Last Weight Metrics:  Weight Loss Metrics 5/3/2020 4/30/2020 4/29/2020 4/26/2020 8/18/2016 6/2/2016   Today's Wt 258 lb 6.4 oz - 252 lb 252 lb 4.8 oz 219 lb 228 lb   BMI - 43 kg/m2 41.93 kg/m2 41.98 kg/m2 36.44 kg/m2 37.94 kg/m2             Physical Assessment:     General: NAD, alert and oriented. Neck: No jvd. LUNGS: Clear to Auscultation, No rales, rhonchi or wheezes. CVS EXM: S1, S2  RRR, no murmurs/gallops/rubs. Abdomen: soft, non tender. Lower Extremities:  1+ edema.        Lab    CBC w/Diff Recent Labs     05/01/20  0320   WBC 7.2   RBC 2.83*   HGB 7.8*   HCT 24.4*      GRANS 79*   LYMPH 11*   EOS 0        Chemistry Recent Labs     05/03/20  0337 05/02/20  0139 05/01/20  0320   GLU  --  159* 106*   NA  --  133* 136   K  --  3.9 4.3   CL  --  102 103   CO2  --  25 24   BUN  --  88* 85*   CREA  --  2.88* 2.78*   CA 8.0* 7.9* 8.1*   AGAP  --  6 9   BUCR  --  31* 31*   AP  --   --  62   TP  --   --  6.7   ALB  --   --  3.4   GLOB  --   --  3.3   AGRAT  --   --  1.0         Lab Results   Component Value Date/Time    Iron 42 (L) 04/30/2020 12:41 PM    TIBC 319 04/30/2020 12:41 PM    Iron % saturation 13 (L) 04/30/2020 12:41 PM    Ferritin 97 05/03/2020 03:37 AM      Lab Results   Component Value Date/Time    Calcium 8.0 (L) 05/03/2020 03:37 AM    Phosphorus 6.0 (H) 04/27/2020 12:52 AM Alexis Swenson M.D.   [facility]  Phone

## 2020-05-03 NOTE — ROUTINE PROCESS
Bedside and Verbal shift change report given to Valerie Sotelo RN (oncoming nurse) by SIMA Roman (offgoing nurse). Report included the following information SBAR, Kardex, ED Summary, Procedure Summary, Intake/Output, MAR, Recent Results and Cardiac Rhythm NSR.

## 2020-05-03 NOTE — PROGRESS NOTES
0745 
Bedside verbal report rec'd form Clarissa GAO. Patient resting quietly in bed. No acute distress noted. [address] Patient to be transferred to #[id number] due to COVID-. Attempted  to call report. Nurse in other patient room and will return call. [id number] Telephone report given to Heidi Shaw (oncoming nurse) by Geni Looney (offgoing nurse). Report included the following information SBAR, Kardex, MAR and Recent Results.

## 2020-05-04 ENCOUNTER — APPOINTMENT (OUTPATIENT)
Dept: VASCULAR SURGERY | Age: 70
DRG: 291 | End: 2020-05-04
Attending: HOSPITALIST
Payer: MEDICARE

## 2020-05-04 ENCOUNTER — APPOINTMENT (OUTPATIENT)
Dept: GENERAL RADIOLOGY | Age: 70
DRG: 291 | End: 2020-05-04
Attending: HOSPITALIST
Payer: MEDICARE

## 2020-05-04 ENCOUNTER — HOME CARE VISIT (OUTPATIENT)
Dept: HOME HEALTH SERVICES | Facility: HOME HEALTH | Age: 70
End: 2020-05-04
Payer: MEDICARE

## 2020-05-04 LAB
ANION GAP SERPL CALC-SCNC: 10 MMOL/L (ref 3–18)
ARTERIAL PATENCY WRIST A: YES
ATRIAL RATE: 65 BPM
BASE DEFICIT BLD-SCNC: 1 MMOL/L
BASOPHILS # BLD: 0 K/UL (ref 0–0.1)
BASOPHILS NFR BLD: 0 % (ref 0–2)
BDY SITE: ABNORMAL
BUN SERPL-MCNC: 86 MG/DL (ref 7–18)
BUN/CREAT SERPL: 29 (ref 12–20)
CALCIUM SERPL-MCNC: 8 MG/DL (ref 8.5–10.1)
CALCULATED P AXIS, ECG09: 27 DEGREES
CALCULATED R AXIS, ECG10: -25 DEGREES
CALCULATED T AXIS, ECG11: 49 DEGREES
CHLORIDE SERPL-SCNC: 99 MMOL/L (ref 100–111)
CK MB CFR SERPL CALC: 2.7 % (ref 0–4)
CK MB CFR SERPL CALC: 3.1 % (ref 0–4)
CK MB CFR SERPL CALC: 3.9 % (ref 0–4)
CK MB SERPL-MCNC: 1.3 NG/ML (ref 5–25)
CK MB SERPL-MCNC: 1.5 NG/ML (ref 5–25)
CK MB SERPL-MCNC: 1.9 NG/ML (ref 5–25)
CK SERPL-CCNC: 48 U/L (ref 26–192)
CK SERPL-CCNC: 49 U/L (ref 26–192)
CK SERPL-CCNC: 49 U/L (ref 26–192)
CO2 SERPL-SCNC: 23 MMOL/L (ref 21–32)
CREAT SERPL-MCNC: 2.98 MG/DL (ref 0.6–1.3)
CRP SERPL-MCNC: <0.3 MG/DL (ref 0–0.3)
D DIMER PPP FEU-MCNC: 1.12 UG/ML(FEU)
DIAGNOSIS, 93000: NORMAL
DIFFERENTIAL METHOD BLD: ABNORMAL
EMERGENT DISEASE PANEL, EDPR: NOT DETECTED
EOSINOPHIL # BLD: 0 K/UL (ref 0–0.4)
EOSINOPHIL NFR BLD: 0 % (ref 0–5)
ERYTHROCYTE [DISTWIDTH] IN BLOOD BY AUTOMATED COUNT: 14 % (ref 11.6–14.5)
FERRITIN SERPL-MCNC: 102 NG/ML (ref 8–388)
GAS FLOW.O2 O2 DELIVERY SYS: ABNORMAL L/MIN
GAS FLOW.O2 SETTING OXYMISER: 2.5 L/M
GLUCOSE BLD STRIP.AUTO-MCNC: 139 MG/DL (ref 70–110)
GLUCOSE BLD STRIP.AUTO-MCNC: 147 MG/DL (ref 70–110)
GLUCOSE BLD STRIP.AUTO-MCNC: 158 MG/DL (ref 70–110)
GLUCOSE BLD STRIP.AUTO-MCNC: 158 MG/DL (ref 70–110)
GLUCOSE SERPL-MCNC: 116 MG/DL (ref 74–99)
HCO3 BLD-SCNC: 24.3 MMOL/L (ref 22–26)
HCT VFR BLD AUTO: 23.7 % (ref 35–45)
HGB BLD-MCNC: 7.9 G/DL (ref 12–16)
LDH SERPL L TO P-CCNC: 184 U/L (ref 81–234)
LYMPHOCYTES # BLD: 0.9 K/UL (ref 0.9–3.6)
LYMPHOCYTES NFR BLD: 13 % (ref 21–52)
MAGNESIUM SERPL-MCNC: 2.3 MG/DL (ref 1.6–2.6)
MCH RBC QN AUTO: 28.5 PG (ref 24–34)
MCHC RBC AUTO-ENTMCNC: 33.3 G/DL (ref 31–37)
MCV RBC AUTO: 85.6 FL (ref 74–97)
MONOCYTES # BLD: 0.3 K/UL (ref 0.05–1.2)
MONOCYTES NFR BLD: 4 % (ref 3–10)
NEUTS SEG # BLD: 5.4 K/UL (ref 1.8–8)
NEUTS SEG NFR BLD: 83 % (ref 40–73)
P-R INTERVAL, ECG05: 186 MS
PCO2 BLD: 43 MMHG (ref 35–45)
PH BLD: 7.36 [PH] (ref 7.35–7.45)
PHOSPHATE SERPL-MCNC: 5.2 MG/DL (ref 2.5–4.9)
PLATELET # BLD AUTO: 178 K/UL (ref 135–420)
PMV BLD AUTO: 11.4 FL (ref 9.2–11.8)
PO2 BLD: 72 MMHG (ref 80–100)
POTASSIUM SERPL-SCNC: 3.9 MMOL/L (ref 3.5–5.5)
PROCALCITONIN SERPL-MCNC: <0.05 NG/ML
Q-T INTERVAL, ECG07: 446 MS
QRS DURATION, ECG06: 128 MS
QTC CALCULATION (BEZET), ECG08: 463 MS
RBC # BLD AUTO: 2.77 M/UL (ref 4.2–5.3)
SAO2 % BLD: 94 % (ref 92–97)
SERVICE CMNT-IMP: ABNORMAL
SODIUM SERPL-SCNC: 132 MMOL/L (ref 136–145)
SPECIMEN TYPE: ABNORMAL
TROPONIN I SERPL-MCNC: 0.02 NG/ML (ref 0–0.04)
TROPONIN I SERPL-MCNC: 0.03 NG/ML (ref 0–0.04)
TROPONIN I SERPL-MCNC: 0.03 NG/ML (ref 0–0.04)
VENTRICULAR RATE, ECG03: 65 BPM
WBC # BLD AUTO: 6.6 K/UL (ref 4.6–13.2)

## 2020-05-04 PROCEDURE — 86140 C-REACTIVE PROTEIN: CPT

## 2020-05-04 PROCEDURE — 84100 ASSAY OF PHOSPHORUS: CPT

## 2020-05-04 PROCEDURE — 74011250637 HC RX REV CODE- 250/637: Performed by: PHYSICIAN ASSISTANT

## 2020-05-04 PROCEDURE — 65660000000 HC RM CCU STEPDOWN

## 2020-05-04 PROCEDURE — 71045 X-RAY EXAM CHEST 1 VIEW: CPT

## 2020-05-04 PROCEDURE — 74011250636 HC RX REV CODE- 250/636: Performed by: PHYSICIAN ASSISTANT

## 2020-05-04 PROCEDURE — 93970 EXTREMITY STUDY: CPT

## 2020-05-04 PROCEDURE — 85379 FIBRIN DEGRADATION QUANT: CPT

## 2020-05-04 PROCEDURE — 74011250636 HC RX REV CODE- 250/636: Performed by: HOSPITALIST

## 2020-05-04 PROCEDURE — 36600 WITHDRAWAL OF ARTERIAL BLOOD: CPT

## 2020-05-04 PROCEDURE — 82962 GLUCOSE BLOOD TEST: CPT

## 2020-05-04 PROCEDURE — 74011636637 HC RX REV CODE- 636/637: Performed by: PHYSICIAN ASSISTANT

## 2020-05-04 PROCEDURE — 80048 BASIC METABOLIC PNL TOTAL CA: CPT

## 2020-05-04 PROCEDURE — 82550 ASSAY OF CK (CPK): CPT

## 2020-05-04 PROCEDURE — 74011250636 HC RX REV CODE- 250/636: Performed by: INTERNAL MEDICINE

## 2020-05-04 PROCEDURE — 85025 COMPLETE CBC W/AUTO DIFF WBC: CPT

## 2020-05-04 PROCEDURE — 82803 BLOOD GASES ANY COMBINATION: CPT

## 2020-05-04 PROCEDURE — 83735 ASSAY OF MAGNESIUM: CPT

## 2020-05-04 PROCEDURE — 74011250637 HC RX REV CODE- 250/637: Performed by: INTERNAL MEDICINE

## 2020-05-04 PROCEDURE — 84145 PROCALCITONIN (PCT): CPT

## 2020-05-04 PROCEDURE — 36415 COLL VENOUS BLD VENIPUNCTURE: CPT

## 2020-05-04 PROCEDURE — 93005 ELECTROCARDIOGRAM TRACING: CPT

## 2020-05-04 PROCEDURE — 77030040830 HC CATH URETH FOL MDII -A

## 2020-05-04 PROCEDURE — 82728 ASSAY OF FERRITIN: CPT

## 2020-05-04 PROCEDURE — 77010033678 HC OXYGEN DAILY

## 2020-05-04 PROCEDURE — 3331090001 HH PPS REVENUE CREDIT

## 2020-05-04 PROCEDURE — 3331090002 HH PPS REVENUE DEBIT

## 2020-05-04 PROCEDURE — 83615 LACTATE (LD) (LDH) ENZYME: CPT

## 2020-05-04 RX ORDER — LORATADINE 10 MG/1
10 TABLET ORAL
Status: DISCONTINUED | OUTPATIENT
Start: 2020-05-04 | End: 2020-05-14 | Stop reason: HOSPADM

## 2020-05-04 RX ORDER — FUROSEMIDE 40 MG/1
40 TABLET ORAL 2 TIMES DAILY
Status: DISCONTINUED | OUTPATIENT
Start: 2020-05-04 | End: 2020-05-05

## 2020-05-04 RX ORDER — FUROSEMIDE 10 MG/ML
40 INJECTION INTRAMUSCULAR; INTRAVENOUS ONCE
Status: COMPLETED | OUTPATIENT
Start: 2020-05-04 | End: 2020-05-04

## 2020-05-04 RX ORDER — ONDANSETRON 2 MG/ML
4 INJECTION INTRAMUSCULAR; INTRAVENOUS
Status: DISCONTINUED | OUTPATIENT
Start: 2020-05-04 | End: 2020-05-14 | Stop reason: HOSPADM

## 2020-05-04 RX ADMIN — LORATADINE 10 MG: 10 TABLET ORAL at 11:34

## 2020-05-04 RX ADMIN — CYANOCOBALAMIN TAB 1000 MCG 1000 MCG: 1000 TAB at 08:35

## 2020-05-04 RX ADMIN — FUROSEMIDE 40 MG: 10 INJECTION, SOLUTION INTRAMUSCULAR; INTRAVENOUS at 04:10

## 2020-05-04 RX ADMIN — ACETAMINOPHEN 650 MG: 325 TABLET ORAL at 09:06

## 2020-05-04 RX ADMIN — HYDRALAZINE HYDROCHLORIDE 100 MG: 50 TABLET, FILM COATED ORAL at 15:00

## 2020-05-04 RX ADMIN — HEPARIN SODIUM 5000 UNITS: 5000 INJECTION INTRAVENOUS; SUBCUTANEOUS at 08:36

## 2020-05-04 RX ADMIN — AMLODIPINE BESYLATE 10 MG: 10 TABLET ORAL at 08:35

## 2020-05-04 RX ADMIN — FUROSEMIDE 40 MG: 10 INJECTION, SOLUTION INTRAMUSCULAR; INTRAVENOUS at 08:36

## 2020-05-04 RX ADMIN — ACETAMINOPHEN 650 MG: 325 TABLET ORAL at 15:00

## 2020-05-04 RX ADMIN — HEPARIN SODIUM 5000 UNITS: 5000 INJECTION INTRAVENOUS; SUBCUTANEOUS at 15:00

## 2020-05-04 RX ADMIN — INSULIN GLARGINE 4 UNITS: 100 INJECTION, SOLUTION SUBCUTANEOUS at 01:05

## 2020-05-04 RX ADMIN — CARVEDILOL 25 MG: 25 TABLET, FILM COATED ORAL at 08:35

## 2020-05-04 RX ADMIN — ASPIRIN 81 MG CHEWABLE TABLET 81 MG: 81 TABLET CHEWABLE at 08:35

## 2020-05-04 RX ADMIN — Medication 500 MG: at 08:35

## 2020-05-04 RX ADMIN — PANTOPRAZOLE SODIUM 40 MG: 40 TABLET, DELAYED RELEASE ORAL at 08:35

## 2020-05-04 RX ADMIN — INSULIN LISPRO 2 UNITS: 100 INJECTION, SOLUTION INTRAVENOUS; SUBCUTANEOUS at 01:04

## 2020-05-04 RX ADMIN — ONDANSETRON 4 MG: 2 INJECTION INTRAMUSCULAR; INTRAVENOUS at 11:34

## 2020-05-04 RX ADMIN — INSULIN LISPRO 2 UNITS: 100 INJECTION, SOLUTION INTRAVENOUS; SUBCUTANEOUS at 12:30

## 2020-05-04 RX ADMIN — FUROSEMIDE 40 MG: 40 TABLET ORAL at 17:58

## 2020-05-04 RX ADMIN — Medication 1 CAPSULE: at 08:35

## 2020-05-04 RX ADMIN — CITALOPRAM HYDROBROMIDE 20 MG: 20 TABLET ORAL at 08:35

## 2020-05-04 RX ADMIN — HYDRALAZINE HYDROCHLORIDE 100 MG: 50 TABLET, FILM COATED ORAL at 08:35

## 2020-05-04 RX ADMIN — Medication 5000 UNITS: at 09:00

## 2020-05-04 NOTE — ROUTINE PROCESS
Patient arrived by bed to vascular lab on 3 L /min via nasal cannula. Before I could even begin the exam she c/o increasing SOB and nausea. Transportation requested to take her back to her room.

## 2020-05-04 NOTE — PROGRESS NOTES
Bedside shift change report given to [address] [place] [address] [address] (oncoming nurse) by Annika Fraga RN (offgoing nurse). Report included the following information SBAR, Kardex and MAR.

## 2020-05-04 NOTE — PROGRESS NOTES
Problem: Falls - Risk of  Goal: *Absence of Falls  Description: Document Danne Lobe Fall Risk and appropriate interventions in the flowsheet. Outcome: Progressing Towards Goal  Note: Fall Risk Interventions:  Mobility Interventions: Assess mobility with egress test, Bed/chair exit alarm, Communicate number of staff needed for ambulation/transfer, OT consult for ADLs, Patient to call before getting OOB, PT Consult for mobility concerns, PT Consult for assist device competence, Strengthening exercises (ROM-active/passive)         Medication Interventions: Assess postural VS orthostatic hypotension, Bed/chair exit alarm, Evaluate medications/consider consulting pharmacy, Patient to call before getting OOB    Elimination Interventions: Bed/chair exit alarm, Call light in reach, Patient to call for help with toileting needs, Toileting schedule/hourly rounds              Problem: Patient Education: Go to Patient Education Activity  Goal: Patient/Family Education  Outcome: Progressing Towards Goal     Problem: Pressure Injury - Risk of  Goal: *Prevention of pressure injury  Description: Document Toney Scale and appropriate interventions in the flowsheet. Outcome: Progressing Towards Goal  Note: Pressure Injury Interventions:  Sensory Interventions: Assess changes in LOC, Assess need for specialty bed, Discuss PT/OT consult with provider, Float heels, Keep linens dry and wrinkle-free, Maintain/enhance activity level, Minimize linen layers, Monitor skin under medical devices, Pad between skin to skin, Pressure redistribution bed/mattress (bed type), Turn and reposition approx.  every two hours (pillows and wedges if needed)    Moisture Interventions: Absorbent underpads, Apply protective barrier, creams and emollients, Assess need for specialty bed, Check for incontinence Q2 hours and as needed, Internal/External urinary devices, Limit adult briefs, Maintain skin hydration (lotion/cream), Minimize layers, Moisture barrier, Offer toileting Q_hr    Activity Interventions: Assess need for specialty bed, Pressure redistribution bed/mattress(bed type), PT/OT evaluation    Mobility Interventions: Assess need for specialty bed, Chair cushion, HOB 30 degrees or less, Pressure redistribution bed/mattress (bed type), PT/OT evaluation, Turn and reposition approx.  every two hours(pillow and wedges)    Nutrition Interventions: Document food/fluid/supplement intake    Friction and Shear Interventions: Apply protective barrier, creams and emollients, Foam dressings/transparent film/skin sealants, HOB 30 degrees or less, Minimize layers, Transferring/repositioning devices                Problem: Patient Education: Go to Patient Education Activity  Goal: Patient/Family Education  Outcome: Progressing Towards Goal     Problem: Patient Education: Go to Patient Education Activity  Goal: Patient/Family Education  Outcome: Progressing Towards Goal     Problem: Heart Failure: Day 4  Goal: Off Pathway (Use only if patient is Off Pathway)  Outcome: Progressing Towards Goal  Goal: Activity/Safety  Outcome: Progressing Towards Goal  Goal: Diagnostic Test/Procedures  Outcome: Progressing Towards Goal  Goal: Nutrition/Diet  Outcome: Progressing Towards Goal  Goal: Discharge Planning  Outcome: Progressing Towards Goal  Goal: Medications  Outcome: Progressing Towards Goal  Goal: Respiratory  Outcome: Progressing Towards Goal  Goal: Treatments/Interventions/Procedures  Outcome: Progressing Towards Goal  Goal: Psychosocial  Outcome: Progressing Towards Goal  Goal: *Oxygen saturation within defined limits  Outcome: Progressing Towards Goal  Goal: *Hemodynamically stable  Outcome: Progressing Towards Goal  Goal: *Optimal pain control at patient's stated goal  Outcome: Progressing Towards Goal  Goal: *Anxiety reduced or absent  Outcome: Progressing Towards Goal  Goal: *Demonstrates progressive activity  Outcome: Progressing Towards Goal     Problem: Pain  Goal: *Control of Pain  Outcome: Progressing Towards Goal     Problem: Patient Education: Go to Patient Education Activity  Goal: Patient/Family Education  Outcome: Progressing Towards Goal     Problem: Diabetes Self-Management  Goal: *Disease process and treatment process  Description: Define diabetes and identify own type of diabetes; list 3 options for treating diabetes. Outcome: Progressing Towards Goal  Goal: *Incorporating nutritional management into lifestyle  Description: Describe effect of type, amount and timing of food on blood glucose; list 3 methods for planning meals. Outcome: Progressing Towards Goal  Goal: *Incorporating physical activity into lifestyle  Description: State effect of exercise on blood glucose levels. Outcome: Progressing Towards Goal  Goal: *Developing strategies to promote health/change behavior  Description: Define the ABC's of diabetes; identify appropriate screenings, schedule and personal plan for screenings. Outcome: Progressing Towards Goal  Goal: *Using medications safely  Description: State effect of diabetes medications on diabetes; name diabetes medication taking, action and side effects. Outcome: Progressing Towards Goal  Goal: *Monitoring blood glucose, interpreting and using results  Description: Identify recommended blood glucose targets  and personal targets. Outcome: Progressing Towards Goal  Goal: *Prevention, detection, treatment of acute complications  Description: List symptoms of hyper- and hypoglycemia; describe how to treat low blood sugar and actions for lowering  high blood glucose level. Outcome: Progressing Towards Goal  Goal: *Prevention, detection and treatment of chronic complications  Description: Define the natural course of diabetes and describe the relationship of blood glucose levels to long term complications of diabetes.   Outcome: Progressing Towards Goal  Goal: *Developing strategies to address psychosocial issues  Description: Describe feelings about living with diabetes; identify support needed and support network  Outcome: Progressing Towards Goal  Goal: *Sick day guidelines  Outcome: Progressing Towards Goal  Goal: *Patient Specific Goal (EDIT GOAL, INSERT TEXT)  Outcome: Progressing Towards Goal     Problem: Patient Education: Go to Patient Education Activity  Goal: Patient/Family Education  Outcome: Progressing Towards Goal     Problem: Breathing Pattern - Ineffective  Goal: *Absence of hypoxia  Outcome: Progressing Towards Goal  Goal: *Use of effective breathing techniques  Outcome: Progressing Towards Goal  Goal: *PALLIATIVE CARE:  Alleviation of Dyspnea  Outcome: Progressing Towards Goal     Problem: Patient Education: Go to Patient Education Activity  Goal: Patient/Family Education  Outcome: Progressing Towards Goal     Problem: Patient Education: Go to Patient Education Activity  Goal: Patient/Family Education  Outcome: Progressing Towards Goal     Problem: Patient Education: Go to Patient Education Activity  Goal: Patient/Family Education  Outcome: Progressing Towards Goal

## 2020-05-04 NOTE — PROGRESS NOTES
C/c Short of breath  \" palpitations and short of breath\"  Patient Vitals for the past 12 hrs:   Temp Pulse Resp BP SpO2   05/04/20 0403 97.7 °F (36.5 °C) 64 18 144/55 96 %   05/04/20 0046 97.6 °F (36.4 °C) 63 20 152/62 95 %   05/03/20 2040 97.6 °F (36.4 °C) 63 20 145/64 94 %   05/03/20 1654 97.3 °F (36.3 °C) (!) 57 20 124/64 96 %   Patient has been seen and examined  She is alert and cooperative  Heart rate regular, coarse breath sounds  No edema    pneumonia vs PE  CKD- hold off on CT  Check dopplers and ABG  Called Dr Seema Preciado, Pulmonary, to request consult today

## 2020-05-04 NOTE — PROGRESS NOTES
In Patient Progress note    Admit Date: 4/30/2020    Impression:     #1 Acute kidney injury on chronic kidney disease stage III. Creatinine slowly trending up , agree with switching to PO lasix tomorrow    #2 Acute on chronic diastolic CHF. Improved breathing , lying flat no distress  ABG noted , CXR noted improved pulm congestion   #3 hypertension, stable   #4 dyslipidemia  #5 hypothermia/SIRS on empirical antibiotics per internal medicine. #6 pending COVID-19  #7 leg edema , getting duplex studies today      Plan:   #1 switch to PO 40 mg PO BID   #2 strict ins and outs, fluid restrict 1200 mm  #3 follow cardiology recommendations  #4 AB per primary   #5 avoid nephrotoxins renally dose medications     Please call with questions,     Sonal Gamboa MD FASN  Cell [phone]  Pager: [phone]    Subjective:     Post nasal drainage   Allergies ?   Says breathing not optimal   Lying flat no distress        Current Facility-Administered Medications:     ondansetron (ZOFRAN) injection 4 mg, 4 mg, IntraVENous, Q6H PRN, Poornima Bliss MD    loratadine (CLARITIN) tablet 10 mg, 10 mg, Oral, DAILY PRN, Poornima Bliss MD    vancomycin (VANCOCIN) 1250 mg in  ml infusion, 1,250 mg, IntraVENous, Q36H, Jaskaran Mittal MD, Last Rate: 166.7 mL/hr at 05/03/20 1006, 1,250 mg at 05/03/20 1006    heparin (porcine) injection 5,000 Units, 5,000 Units, SubCUTAneous, Q8H, Jaskaran Mittal MD, 5,000 Units at 05/04/20 0836    sodium chloride (NS) flush 5-10 mL, 5-10 mL, IntraVENous, PRN, Suzanna Blankenship MD    amLODIPine (NORVASC) tablet 10 mg, 10 mg, Oral, DAILY, Ani Gerber, [address], 10 mg at 05/04/20 0199    aspirin chewable tablet 81 mg, 81 mg, Oral, DAILY, MICHAEL Wing 81 mg at 05/04/20 0835    carvediloL (COREG) tablet 25 mg, 25 mg, Oral, BID WITH MEALS, Ani Gerber, [address], 25 mg at 05/04/20 7228    cholecalciferol (VITAMIN D3) capsule 5,000 Units, 5,000 Units, Oral, DAILY, Shreya WYNN, [address], 5,000 Units at 05/04/20 0900    citalopram (CELEXA) tablet 20 mg, 20 mg, Oral, DAILY, Ani Barr [place], 20 mg at 05/04/20 9121    cyanocobalamin tablet 1,000 mcg, 1,000 mcg, Oral, BID, YuvalAni Haq [place], 1,000 mcg at 05/04/20 0835    pantoprazole (PROTONIX) tablet 40 mg, 40 mg, Oral, ACB&D, Ani Barr, PA, 40 mg at 05/04/20 1738    hydrALAZINE (APRESOLINE) tablet 100 mg, 100 mg, Oral, TID, Ani Barr PA, 100 mg at 05/04/20 2549    rosuvastatin (CRESTOR) tablet 5 mg, 5 mg, Oral, QHS, RahmanAni [place], 5 mg at 05/03/20 2045    furosemide (LASIX) injection 40 mg, 40 mg, IntraVENous, Q12H, Ani Barr [place], 40 mg at 05/04/20 1727    ascorbic acid (vitamin C) (VITAMIN C) tablet 500 mg, 500 mg, Oral, BID, Ani Barr PA, 500 mg at 05/04/20 0835    zinc sulfate (ZINCATE) 220 (50) mg capsule 1 Cap, 1 Cap, Oral, DAILY, [facility], 1 Cap at 05/04/20 0835    insulin lispro (HUMALOG) injection, , SubCUTAneous, AC&HS, Carmell Vaughan, [place], Stopped at 05/04/20 0730    glucose chewable tablet 16 g, 16 g, Oral, PRN, Ani Barr PA    glucagon (GLUCAGEN) injection 1 mg, 1 mg, IntraMUSCular, PRN, Ani Barr PA    dextrose (D50W) injection syrg 12.5-25 g, 25-50 mL, IntraVENous, PRN, Ani Barr PA    insulin glargine (LANTUS) injection 4 Units, 4 Units, SubCUTAneous, QHS, Ani Rahman [place], 4 Units at 05/04/20 0105    polyethylene glycol (MIRALAX) packet 17 g, 17 g, Oral, DAILY PRN, Ani Barr PA, 17 g at 05/03/20 1205    acetaminophen (TYLENOL) tablet 650 mg, 650 mg, Oral, Q4H PRN, Ani Barr PA, 650 mg at 05/04/20 0906    ferrous sulfate tablet 325 mg, 1 Tab, Oral, EVERY OTHER DAY, Selina Meadows MD, 325 mg at 05/03/20 1005        Objective:     Visit Vitals  /60   Pulse 65   Temp 97.6 °F (36.4 °C)   Resp 18   Ht 5' 5\" (1.651 m)   Wt 116 kg (255 lb 12.8 oz)   SpO2 95%   Breastfeeding No   BMI 42.57 kg/m²         Intake/Output Summary (Last 24 hours) at 5/4/2020 1053  Last data filed at 5/4/2020 0926  Gross per 24 hour   Intake 460 ml   Output 1750 ml   Net -1290 ml       Physical Exam:     General: NAD, alert and oriented. Neck: No jvd. LUNGS: Clear to Auscultation, No rales, rhonchi or wheezes. CVS EXM: S1, S2  RRR, no murmurs/gallops/rubs. Abdomen: soft, non tender. Lower Extremities:  1+ edema.        Data Review:    Recent Labs     05/04/20  0515   WBC 6.6   RBC 2.77*   HCT 23.7*   MCV 85.6   MCH 28.5   MCHC 33.3   RDW 14.0     Recent Labs     05/04/20  0515 05/03/20  0337 05/02/20  0139   BUN 86*  --  88*   CREA 2.98*  --  2.88*   CA 8.0* 8.0* 7.9*   K 3.9  --  3.9   *  --  133*   CL 99*  --  102   CO2 23  --  25   PHOS 5.2*  --   --    *  --  159*       Giovany Andrew MD

## 2020-05-04 NOTE — PROGRESS NOTES
Problem: Falls - Risk of  Goal: *Absence of Falls  Description: Document Darrian Hernandez Fall Risk and appropriate interventions in the flowsheet. Outcome: Progressing Towards Goal  Note: Fall Risk Interventions:  Mobility Interventions: Bed/chair exit alarm         Medication Interventions: Teach patient to arise slowly    Elimination Interventions: Bed/chair exit alarm, Call light in reach              Problem: Patient Education: Go to Patient Education Activity  Goal: Patient/Family Education  Outcome: Progressing Towards Goal     Problem: Pressure Injury - Risk of  Goal: *Prevention of pressure injury  Description: Document Toney Scale and appropriate interventions in the flowsheet.   Outcome: Progressing Towards Goal  Note: Pressure Injury Interventions:  Sensory Interventions: Assess changes in LOC    Moisture Interventions: Internal/External urinary devices    Activity Interventions: Pressure redistribution bed/mattress(bed type)    Mobility Interventions: Pressure redistribution bed/mattress (bed type)    Nutrition Interventions: Document food/fluid/supplement intake    Friction and Shear Interventions: Minimize layers                Problem: Patient Education: Go to Patient Education Activity  Goal: Patient/Family Education  Outcome: Progressing Towards Goal     Problem: Patient Education: Go to Patient Education Activity  Goal: Patient/Family Education  Outcome: Progressing Towards Goal     Problem: Heart Failure: Day 4  Goal: Off Pathway (Use only if patient is Off Pathway)  Outcome: Progressing Towards Goal  Goal: Activity/Safety  Outcome: Progressing Towards Goal  Goal: Diagnostic Test/Procedures  Outcome: Progressing Towards Goal  Goal: Nutrition/Diet  Outcome: Progressing Towards Goal  Goal: Discharge Planning  Outcome: Progressing Towards Goal  Goal: Medications  Outcome: Progressing Towards Goal  Goal: Respiratory  Outcome: Progressing Towards Goal  Goal: Treatments/Interventions/Procedures  Outcome: Progressing Towards Goal  Goal: Psychosocial  Outcome: Progressing Towards Goal  Goal: *Oxygen saturation within defined limits  Outcome: Progressing Towards Goal  Goal: *Hemodynamically stable  Outcome: Progressing Towards Goal  Goal: *Optimal pain control at patient's stated goal  Outcome: Progressing Towards Goal  Goal: *Anxiety reduced or absent  Outcome: Progressing Towards Goal  Goal: *Demonstrates progressive activity  Outcome: Progressing Towards Goal     Problem: Heart Failure: Day 5  Goal: Off Pathway (Use only if patient is Off Pathway)  Outcome: Progressing Towards Goal  Goal: Activity/Safety  Outcome: Progressing Towards Goal  Goal: Diagnostic Test/Procedures  Outcome: Progressing Towards Goal  Goal: Nutrition/Diet  Outcome: Progressing Towards Goal  Goal: Discharge Planning  Outcome: Progressing Towards Goal  Goal: Medications  Outcome: Progressing Towards Goal  Goal: Respiratory  Outcome: Progressing Towards Goal  Goal: Treatments/Interventions/Procedures  Outcome: Progressing Towards Goal  Goal: Psychosocial  Outcome: Progressing Towards Goal     Problem: Heart Failure: Discharge Outcomes  Goal: *Demonstrates ability to perform prescribed activity without shortness of breath or discomfort  Outcome: Progressing Towards Goal  Goal: *Left ventricular function assessment completed prior to or during stay, or planned for post-discharge  Outcome: Progressing Towards Goal  Goal: *ACEI prescribed if LVEF less than 40% and no contraindications or ARB prescribed  Outcome: Progressing Towards Goal  Goal: *Verbalizes understanding and describes prescribed diet  Outcome: Progressing Towards Goal  Goal: *Verbalizes understanding/describes prescribed medications  Outcome: Progressing Towards Goal  Goal: *Describes available resources and support systems  Description: (eg: Home Health, Palliative Care, Advanced Medical Directive)  Outcome: Progressing Towards Goal  Goal: *Describes smoking cessation resources  Outcome: Progressing Towards Goal  Goal: *Understands and describes signs and symptoms to report to providers(Stroke Metric)  Outcome: Progressing Towards Goal  Goal: *Describes/verbalizes understanding of follow-up/return appt  Description: (eg: to physicians, diabetes treatment coordinator, and other resources  Outcome: Progressing Towards Goal  Goal: *Describes importance of continuing daily weights and changes to report to physician  Outcome: Progressing Towards Goal     Problem: Pain  Goal: *Control of Pain  Outcome: Progressing Towards Goal     Problem: Patient Education: Go to Patient Education Activity  Goal: Patient/Family Education  Outcome: Progressing Towards Goal     Problem: Diabetes Self-Management  Goal: *Disease process and treatment process  Description: Define diabetes and identify own type of diabetes; list 3 options for treating diabetes. Outcome: Progressing Towards Goal  Goal: *Incorporating nutritional management into lifestyle  Description: Describe effect of type, amount and timing of food on blood glucose; list 3 methods for planning meals. Outcome: Progressing Towards Goal  Goal: *Incorporating physical activity into lifestyle  Description: State effect of exercise on blood glucose levels. Outcome: Progressing Towards Goal  Goal: *Developing strategies to promote health/change behavior  Description: Define the ABC's of diabetes; identify appropriate screenings, schedule and personal plan for screenings. Outcome: Progressing Towards Goal  Goal: *Using medications safely  Description: State effect of diabetes medications on diabetes; name diabetes medication taking, action and side effects. Outcome: Progressing Towards Goal  Goal: *Monitoring blood glucose, interpreting and using results  Description: Identify recommended blood glucose targets  and personal targets.   Outcome: Progressing Towards Goal  Goal: *Prevention, detection, treatment of acute complications  Description: List symptoms of hyper- and hypoglycemia; describe how to treat low blood sugar and actions for lowering  high blood glucose level. Outcome: Progressing Towards Goal  Goal: *Prevention, detection and treatment of chronic complications  Description: Define the natural course of diabetes and describe the relationship of blood glucose levels to long term complications of diabetes.   Outcome: Progressing Towards Goal  Goal: *Developing strategies to address psychosocial issues  Description: Describe feelings about living with diabetes; identify support needed and support network  Outcome: Progressing Towards Goal  Goal: *Sick day guidelines  Outcome: Progressing Towards Goal  Goal: *Patient Specific Goal (EDIT GOAL, INSERT TEXT)  Outcome: Progressing Towards Goal     Problem: Patient Education: Go to Patient Education Activity  Goal: Patient/Family Education  Outcome: Progressing Towards Goal     Problem: Breathing Pattern - Ineffective  Goal: *Absence of hypoxia  Outcome: Progressing Towards Goal  Goal: *Use of effective breathing techniques  Outcome: Progressing Towards Goal  Goal: *PALLIATIVE CARE:  Alleviation of Dyspnea  Outcome: Progressing Towards Goal     Problem: Patient Education: Go to Patient Education Activity  Goal: Patient/Family Education  Outcome: Progressing Towards Goal     Problem: Patient Education: Go to Patient Education Activity  Goal: Patient/Family Education  Outcome: Progressing Towards Goal     Problem: Patient Education: Go to Patient Education Activity  Goal: Patient/Family Education  Outcome: Progressing Towards Goal

## 2020-05-04 NOTE — CONSULTS
[facility]. Pulmonary, Critical Care, and Sleep Medicine    Initial Patient Consult    Name: Winifred Conrad MRN: [id number]   : 1950 Hospital: [facility]   Date: 2020        IMPRESSION:   · Acute on chronic hypoxic respiratory failure - baseline oxygen use 2-3 L NC  · Acute on chronic diastolic CHF exacerbation  · DMT2  · CAD  · HTN  · OFE on CKD IIIb - likely cardiorenal - nephrology following       Patient Active Problem List   Diagnosis Code    Acute on chronic diastolic congestive heart failure (HCC) I50.33    Suspected Covid-19 Virus Infection R68.89    Type 2 diabetes mellitus without complication, without long-term current use of insulin ([address] E11.9    Left anterior fascicular block I44.4    HTN (hypertension), benign I10    Coronary artery disease involving native coronary artery of native heart without angina pectoris I25.10    Chronic diastolic congestive heart failure (HCC) I50.32    Bilateral lower extremity edema R60.0    BMI 35.0-35.9,adult Z68.35    CHF (congestive heart failure) ([facility]) I50.9    Sepsis ([address] A41.9    Respiratory failure ([address] J96.90    SIRS (systemic inflammatory response syndrome) ([address] R65.10      RECOMMENDATIONS:   · Wean oxygen as able  · Continue diuresis, may need PO diuretics on DC. A baseline level of oxygen use  · Aspiration precautions  · Assess home Oxygen needs at discharge. Cards followup. · OT, PT, OOB and ambulate  · DVT, PUD prophylaxis     Subjective: This patient has been seen and evaluated at the request of Dr. Julian Manzanares for hypoxic respiratory failure. Patient is a 71 y.o. female with h/o dCHF, DMT2, CKD, obesity CAD, HTN who presented with acute on chronic shortness of breath. She was addmited on  and has been followed by cardiology and nephrology and treated with aggressive intravenous diuresis. She was tested for COVId and was found to be negative.  Pulmonary is asked to evaluated for shortness of breath. Past Medical History:   Diagnosis Date    Arthritis     Cancer ([facility]     CHF (congestive heart failure) (HCC)     Diabetes ([facility]     Fracture of neck (HCC)     GERD (gastroesophageal reflux disease)     Goiter     Hiatal hernia     Hypertension     Uterine cancer (HCC)       Past Surgical History:   Procedure Laterality Date    HX APPENDECTOMY      HX HYSTERECTOMY      HX KNEE REPLACEMENT Right     HX ORTHOPAEDIC      odontoid fracture repair with hardware placement.  HX PARTIAL THYROIDECTOMY        Prior to Admission medications    Medication Sig Start Date End Date Taking? Authorizing Provider   amLODIPine (NORVASC) 10 mg tablet Take 1 Tab by mouth daily. 4/28/20  Yes Sherrine Baumgarten, MD   polyethylene glycol (MIRALAX) 17 gram packet Take 1 Packet by mouth daily. 4/28/20  Yes Sherrine Baumgarten, MD   rosuvastatin (CRESTOR) 5 mg tablet Take 1 Tab by mouth nightly. 4/28/20  Yes Sherrine Baumgarten, MD   furosemide (LASIX) 40 mg tablet Take 1 Tab by mouth daily. 4/28/20  Yes Sherrine Baumgarten, MD   gabapentin (NEURONTIN) 100 mg capsule Take 1 Cap by mouth two (2) times a day. Max Daily Amount: 200 mg. 4/28/20  Yes Sherrine Baumgarten, MD   hydrALAZINE (APRESOLINE) 100 mg tablet Take 1 Tab by mouth three (3) times daily. 4/28/20  Yes Sherrine Baumgarten, MD   aspirin 81 mg chewable tablet Take 1 Tab by mouth daily. 4/28/20  Yes Sherrine Baumgarten, MD   isosorbide mononitrate ER (IMDUR) 30 mg tablet Take 1 Tab by mouth daily. 4/28/20  Yes Sherrine Baumgarten, MD   esomeprazole (NEXIUM) 40 mg capsule Take 40 mg by mouth daily. Yes Isabel, MD Larry   carvedilol (COREG) 25 mg tablet Take 25 mg by mouth two (2) times daily (with meals). Yes Isabel, MD Larry   citalopram (CELEXA) 20 mg tablet Take 20 mg by mouth daily. Yes Isabel, MD Larry   cranberry extract (AZO CRANBERRY) 450 mg tab Take 2 Tabs by mouth daily.    Yes Isabel, MD Larry   cholecalciferol (VITAMIN D3) 1,000 unit cap Take 5,000 Units by mouth daily. Yes Isabel, MD Larry   cyanocobalamin (VITAMIN B-12) 1,000 mcg tablet Take 1,000 mcg by mouth two (2) times a day. Yes Isabel, MD Larry   Biotin 2,500 mcg cap Take 1 Tab by mouth two (2) times a day. Yes Other, MD Larry   vitamin a-vitamin c-vit e-min (OCUVITE) tablet Take 1 Tab by mouth daily. Yes Other, MD Larry   loratadine (CLARITIN) 10 mg tablet Take 10 mg by mouth daily. Yes Isabel, MD Larry   B.infantis-B.ani-B.long-B.bifi (PROBIOTIC 4X) 10-15 mg TbEC Take 1 Tab by mouth daily. Yes Isabel, MD Larry   insulin glargine (LANTUS) 100 unit/mL injection 4 units SQ daily- watch for Hypoglycemia adjust dose per patient's blood glucose level 4/27/20   Jada Torres MD     Allergies   Allergen Reactions    Augmentin [Amoxicillin-Pot Clavulanate] Diarrhea    Clavulanic Acid Diarrhea    Levaquin [Levofloxacin] Other (comments)     Severe hypoglycemia        Social History     Tobacco Use    Smoking status: Never Smoker   Substance Use Topics    Alcohol use: No      No family history on file.      Current Facility-Administered Medications   Medication Dose Route Frequency    loratadine (CLARITIN) tablet 10 mg  10 mg Oral DAILY    vancomycin (VANCOCIN) 1250 mg in  ml infusion  1,250 mg IntraVENous Q36H    heparin (porcine) injection 5,000 Units  5,000 Units SubCUTAneous Q8H    amLODIPine (NORVASC) tablet 10 mg  10 mg Oral DAILY    aspirin chewable tablet 81 mg  81 mg Oral DAILY    carvediloL (COREG) tablet 25 mg  25 mg Oral BID WITH MEALS    cholecalciferol (VITAMIN D3) capsule 5,000 Units  5,000 Units Oral DAILY    citalopram (CELEXA) tablet 20 mg  20 mg Oral DAILY    cyanocobalamin tablet 1,000 mcg  1,000 mcg Oral BID    pantoprazole (PROTONIX) tablet 40 mg  40 mg Oral ACB&D    hydrALAZINE (APRESOLINE) tablet 100 mg  100 mg Oral TID    rosuvastatin (CRESTOR) tablet 5 mg  5 mg Oral QHS    furosemide (LASIX) injection 40 mg  40 mg IntraVENous Q12H    ascorbic acid (vitamin C) (VITAMIN C) tablet 500 mg  500 mg Oral BID    zinc sulfate (ZINCATE) 220 (50) mg capsule 1 Cap  1 Cap Oral DAILY    insulin lispro (HUMALOG) injection   SubCUTAneous AC&HS    insulin glargine (LANTUS) injection 4 Units  4 Units SubCUTAneous QHS    ferrous sulfate tablet 325 mg  1 Tab Oral EVERY OTHER DAY       Review of Systems:  Pertinent items are noted in HPI. ROS    Objective:   Vital Signs:    Visit Vitals  /60   Pulse 65   Temp 97.6 °F (36.4 °C)   Resp 18   Ht 5' 5\" (1.651 m)   Wt 116 kg (255 lb 12.8 oz)   SpO2 95%   Breastfeeding No   BMI 42.57 kg/m²       O2 Device: Nasal cannula   O2 Flow Rate (L/min): 2 l/min   Temp (24hrs), Av.4 °F (36.3 °C), Min:96.7 °F (35.9 °C), Max:97.7 °F (36.5 °C)       Intake/Output:   Last shift:       07 -  190  In: -   Out: 450 [Urine:450]  Last 3 shifts:  190 -  0700  In: 6528 [P.O.:820; I.V.:250]  Out: 1550 [Urine:1550]    Intake/Output Summary (Last 24 hours) at 2020 5507  Last data filed at 2020 0910  Gross per 24 hour   Intake 710 ml   Output 1750 ml   Net -1040 ml      Physical Exam:   General:  Alert, cooperative, no distress, appears stated age. Head:  Normocephalic, without obvious abnormality, atraumatic. Eyes:  Conjunctivae/corneas clear. ANicteric   Nose: Nares normal. Mucosa normal. No drainage or sinus tenderness. Throat: Lips, mucosa dry. NO thrush;    Neck: Supple, symmetrical, trachea midline, no adenopathy, thyroid: no enlargment/tenderness/nodules, no carotid bruit and no JVD. No crepitus   Back:   Symmetric, no curvature, no spine tenderness or flank pain   Lungs:   Bilateral auscultation with inspiratory and exp crackles   Chest wall:  No tenderness or deformity. NO CREPITUS   Heart:  Regular rate and rhythm, S1, S2 normal, no murmur, click, rub or gallop. Abdomen:   Soft, non-tender. Bowel sounds normal. No masses,  No organomegaly. No paradox   Extremities: normal, atraumatic, no cyanosis or edema. Pulses: 1-2+ and symmetric all extremities. Skin: Skin color, texture, turgor normal. No rashes or lesions   Lymph nodes: Cervical, supraclavicular, and axillary nodes normal.   Neurologic: Grossly nonfocal          Data review:   Labs:  Recent Results (from the past 24 hour(s))   GLUCOSE, POC    Collection Time: 05/03/20 11:41 AM   Result Value Ref Range    Glucose (POC) 193 (H) 70 - 110 mg/dL   GLUCOSE, POC    Collection Time: 05/03/20  4:48 PM   Result Value Ref Range    Glucose (POC) 188 (H) 70 - 110 mg/dL   GLUCOSE, POC    Collection Time: 05/04/20  1:03 AM   Result Value Ref Range    Glucose (POC) 158 (H) 70 - 110 mg/dL   EKG, 12 LEAD, SUBSEQUENT    Collection Time: 05/04/20  1:18 AM   Result Value Ref Range    Ventricular Rate 65 BPM    Atrial Rate 65 BPM    P-R Interval 186 ms    QRS Duration 128 ms    Q-T Interval 446 ms    QTC Calculation (Bezet) 463 ms    Calculated P Axis 27 degrees    Calculated R Axis -25 degrees    Calculated T Axis 49 degrees    Diagnosis       Sinus rhythm with occasional premature ventricular complexes  Left ventricular hypertrophy with QRS widening  Abnormal ECG  When compared with ECG of 30-APR-2020 13:21,  Left bundle branch block is no longer present     D DIMER    Collection Time: 05/04/20  5:15 AM   Result Value Ref Range    D DIMER 1.12 (H) <0.46 ug/ml(FEU)   FERRITIN    Collection Time: 05/04/20  5:15 AM   Result Value Ref Range    Ferritin 102 8 - 388 NG/ML   CBC WITH AUTOMATED DIFF    Collection Time: 05/04/20  5:15 AM   Result Value Ref Range    WBC 6.6 4.6 - 13.2 K/uL    RBC 2.77 (L) 4.20 - 5.30 M/uL    HGB 7.9 (L) 12.0 - 16.0 g/dL    HCT 23.7 (L) 35.0 - 45.0 %    MCV 85.6 74.0 - 97.0 FL    MCH 28.5 24.0 - 34.0 PG    MCHC 33.3 31.0 - 37.0 g/dL    RDW 14.0 11.6 - 14.5 %    PLATELET 915 112 - 607 K/uL    MPV 11.4 9.2 - 11.8 FL    NEUTROPHILS 83 (H) 40 - 73 %    LYMPHOCYTES 13 (L) 21 - 52 %    MONOCYTES 4 3 - 10 %    EOSINOPHILS 0 0 - 5 %    BASOPHILS 0 0 - 2 %    ABS. NEUTROPHILS 5.4 1.8 - 8.0 K/UL    ABS. LYMPHOCYTES 0.9 0.9 - 3.6 K/UL    ABS. MONOCYTES 0.3 0.05 - 1.2 K/UL    ABS. EOSINOPHILS 0.0 0.0 - 0.4 K/UL    ABS.  BASOPHILS 0.0 0.0 - 0.1 K/UL    DF AUTOMATED     CARDIAC PANEL,(CK, CKMB & TROPONIN)    Collection Time: 05/04/20  5:15 AM   Result Value Ref Range    CK 49 26 - 192 U/L    CK - MB 1.5 <3.6 ng/ml    CK-MB Index 3.1 0.0 - 4.0 %    Troponin-I, QT 0.02 0.0 - 5.807 NG/ML   METABOLIC PANEL, BASIC    Collection Time: 05/04/20  5:15 AM   Result Value Ref Range    Sodium 132 (L) 136 - 145 mmol/L    Potassium 3.9 3.5 - 5.5 mmol/L    Chloride 99 (L) 100 - 111 mmol/L    CO2 23 21 - 32 mmol/L    Anion gap 10 3.0 - 18 mmol/L    Glucose 116 (H) 74 - 99 mg/dL    BUN 86 (H) 7.0 - 18 MG/DL    Creatinine 2.98 (H) 0.6 - 1.3 MG/DL    BUN/Creatinine ratio 29 (H) 12 - 20      GFR est AA 19 (L) >60 ml/min/1.73m2    GFR est non-AA 16 (L) >60 ml/min/1.73m2    Calcium 8.0 (L) 8.5 - 10.1 MG/DL   C REACTIVE PROTEIN, QT    Collection Time: 05/04/20  5:15 AM   Result Value Ref Range    C-Reactive protein <0.3 0 - 0.3 mg/dL   LD    Collection Time: 05/04/20  5:15 AM   Result Value Ref Range     81 - 234 U/L   MAGNESIUM    Collection Time: 05/04/20  5:15 AM   Result Value Ref Range    Magnesium 2.3 1.6 - 2.6 mg/dL   PHOSPHORUS    Collection Time: 05/04/20  5:15 AM   Result Value Ref Range    Phosphorus 5.2 (H) 2.5 - 4.9 MG/DL   POC G3    Collection Time: 05/04/20  7:39 AM   Result Value Ref Range    Device: NASAL CANNULA      Flow rate (POC) 2.5 L/M    pH (POC) 7.361 7.35 - 7.45      pCO2 (POC) 43.0 35.0 - 45.0 MMHG    pO2 (POC) 72 (L) 80 - 100 MMHG    HCO3 (POC) 24.3 22 - 26 MMOL/L    sO2 (POC) 94 92 - 97 %    Base deficit (POC) 1 mmol/L    Allens test (POC) YES      Site RIGHT RADIAL      Specimen type (POC) ARTERIAL      Performed by [address] [place] [address], [facility]    Collection Time: 05/04/20  8:20 AM   Result Value Ref Range    Glucose (POC) 139 (H) 70 - 110 mg/dL       PFT Results (Last 48 hours)    None        Echo Results  (Last 48 hours)    None        Imaging:  I have personally reviewed the patients radiographs and have reviewed the reports:  CXR Results  (Last 48 hours)               05/04/20 0356  XR CHEST PORT Final result    Impression:  IMPRESSION:       1. Improvement in aeration with interval resolution of the pulmonary   interstitial edema with vascular engorgement pattern seen on the 04/30/20 study. 2.  Subtle residual hazy appearance at the left lung base region and in the   right infrahilar region, suggestive of atelectatic changes vs. infiltrate. 3.  Moderate cardiac silhouette enlargement. Narrative:  EXAM:  Chest Portable. INDICATION:  Shortness of breath, CHF        COMPARISON:  04/30/20        TECHNIQUE:  Portable AP chest study       FINDINGS:        - The inspiratory lung volumes remain decreased. - Minimal subtle hazy appearance is noted in the left lower lung zone. - Streaky densities in the right infrahilar region involving the retrocardiac   area. Suggestive of residual infiltrate or atelectatic changes. - The pulmonary interstitial edema with vascular engorgement demonstrated on the   04/30/20 study has cleared in the interval.   - No pneumothorax is detected. - Redemonstration of moderate cardiac silhouette enlargement. CT Results  (Last 48 hours)    None            High complexity decision making was performed during the evaluation of this patient at high risk for decompensation with multiple organ involvement     Above mentioned total time spent on reviewing the case/medical record/data/notes/EMR/patient examination/documentation/coordinating care with nurse/consultants, exclusive of procedures with complex decision making performed and > 50% time spent in face to face evaluation.      Eduar Guerrero MD

## 2020-05-04 NOTE — PROGRESS NOTES
[facility] - Progress Note  Admit Date: 4/30/2020    Assessment:     -Acute on chronic respiratory failure. Multifactorial in setting of below. Recurrent ER visits since last discharge.    -Sepsis/SIRS, presented with hypothermia.  -Concern for Covid pneumonia, Covid testing pending.  -Acute on chronic HFpEF. EF 55-60% by echo 4/25/20.  -Chronic kidney disease. Stable.  -Acute on chronic anemia. Continue to trend.  -Hypertension. Elevated on admission.  -Diabetes mellitus. HgbA1c 6.1.  -Dyslipidemia. On statin.  -Coronary disease reportedly diffuse medically managed, nuclear stress 3/2018 with small area of ischemia with EF 53%  -Chronic atypical LBBB.     Primary cardiologist Dr. Marlin Cain at [facility], now plans to follow up with Dr. Beltran Elliott.       Plan:     Cr continues to rise, 2.9 today from 2.8 yesterday, switch to oral tomorrow if ok with nephrology. Consider PT/OT to see. Subjective:     No new complaints.      Objective:      Patient Vitals for the past 8 hrs:   Temp Pulse Resp BP SpO2   05/04/20 0800 97.6 °F (36.4 °C) 65 18 153/60 95 %   05/04/20 0403 97.7 °F (36.5 °C) 64 18 144/55 96 %         Patient Vitals for the past 96 hrs:   Weight   05/04/20 0403 116 kg (255 lb 12.8 oz)   05/03/20 0444 117.2 kg (258 lb 6.4 oz)   05/02/20 0555 115.1 kg (253 lb 12.8 oz)   05/01/20 0556 114.5 kg (252 lb 6.4 oz)   04/30/20 1210 118.3 kg (260 lb 14.4 oz)                    Intake/Output Summary (Last 24 hours) at 5/4/2020 1001  Last data filed at 5/4/2020 1427  Gross per 24 hour   Intake 830 ml   Output 1750 ml   Net -920 ml       Physical Exam:  General:  alert, cooperative, no distress, appears stated age  Neck:  nontender  Lungs:  clear to auscultation bilaterally  Heart:  regular rate and rhythm, S1, S2 normal, no murmur, click, rub or gallop  Abdomen:  abdomen is soft without significant tenderness, masses, organomegaly or guarding  Extremities:  extremities normal, atraumatic, no cyanosis or edema    Data Review:     Labs: Results:       Chemistry Recent Labs     05/04/20  0515 05/03/20  0337 05/02/20  0139   *  --  159*   *  --  133*   K 3.9  --  3.9   CL 99*  --  102   CO2 23  --  25   BUN 86*  --  88*   CREA 2.98*  --  2.88*   CA 8.0* 8.0* 7.9*   MG 2.3  --   --    PHOS 5.2*  --   --    AGAP 10  --  6   BUCR 29*  --  31*      CBC w/Diff Recent Labs     05/04/20  0515   WBC 6.6   RBC 2.77*   HGB 7.9*   HCT 23.7*      GRANS 83*   LYMPH 13*   EOS 0      Cardiac Enzymes Lab Results   Component Value Date/Time    CPK 48 05/04/2020 08:42 AM    CPK 49 05/04/2020 05:15 AM    CKMB 1.3 05/04/2020 08:42 AM    CKMB 1.5 05/04/2020 05:15 AM    CKND1 2.7 05/04/2020 08:42 AM    CKND1 3.1 05/04/2020 05:15 AM    TROIQ 0.03 05/04/2020 08:42 AM    TROIQ 0.02 05/04/2020 05:15 AM      Coagulation No results for input(s): PTP, INR, APTT, INREXT in the last 72 hours. Lipid Panel Lab Results   Component Value Date/Time    Cholesterol, total 182 04/24/2020 02:34 AM    HDL Cholesterol 41 04/24/2020 02:34 AM    LDL, calculated 112.8 (H) 04/24/2020 02:34 AM    VLDL, calculated 28.2 04/24/2020 02:34 AM    Triglyceride 141 04/24/2020 02:34 AM    CHOL/HDL Ratio 4.4 04/24/2020 02:34 AM      BNP No results found for: BNP, BNPP, XBNPT   Liver Enzymes No results for input(s): TP, ALB, TBIL, AP, SGOT, GPT in the last 72 hours.     No lab exists for component: DBIL   Digoxin    Thyroid Studies No results found for: T4, T3U, TSH, TSHEXT       Signed By: Aye Link MD     May 4, 2020

## 2020-05-04 NOTE — PROGRESS NOTES
[facility]  Progress Note    Patient: Amado Martin Age: 71 y.o. : 1950 MR#: [id number] SSN: [id number]  Date/Time: 2020    Subjective:     Pt c/o feeling more short of breath. Assessment/Plan:   71 y o female w/ h/o CKD and CHF re admitted soon after discharge after presenting w/ cc of SOB.    -Acute on chronic respiratory failure, pt w/ reports of acute worsening of SOB overnight.  Has had extensive w/u including ABG, CXR which showed improved findings, no evidence of pna, ?PE, doubt overall given absence of tachycardia, negative PVL's, EKG and cardiac enzymes not c/w acs  -Acute on chronic diastolic heart failure on lasix switched to PO  -COVID testing negative     HISTORY OF:  -HFpEF  -T2DM A1c of 6.1 on lantus, corrective insulin  -CAD  -HTN  -CKD III-creat w/ some trend upwards     PLAN:  -Diuresis per cardiology  -Continue to monitor renal function and electrolytes  -PT/OT    Additional Notes:      Case discussed with:  [x]Patient  []Family  [x]Nursing  []Case Management  DVT Prophylaxis:  []Lovenox  []Hep SQ  []SCDs  []Coumadin   []On Heparin gtt    Objective:   VS:   Visit Vitals  /51 (BP 1 Location: Left arm, BP Patient Position: At rest)   Pulse 62   Temp 96.8 °F (36 °C)   Resp 18   Ht 5' 5\" (1.651 m)   Wt 116 kg (255 lb 12.8 oz)   SpO2 95%   Breastfeeding No   BMI 42.57 kg/m²      Tmax/24hrs: Temp (24hrs), Av.4 °F (36.3 °C), Min:96.8 °F (36 °C), Max:97.7 °F (36.5 °C)    Input/Output:     Intake/Output Summary (Last 24 hours) at 2020 1453  Last data filed at 2020 0926  Gross per 24 hour   Intake 460 ml   Output 1250 ml   Net -790 ml       General:  Alert, awake, in NAD  Cardiovascular:  Rrr, no murmurs  Pulmonary: ctab   GI:  Soft, nt, nd  Extremities: 2+edema   Additional:      Labs:    Recent Results (from the past 24 hour(s))   GLUCOSE, POC    Collection Time: 20  4:48 PM   Result Value Ref Range    Glucose (POC) 188 (H) 70 - 110 mg/dL   GLUCOSE, POC    Collection Time: 05/04/20  1:03 AM   Result Value Ref Range    Glucose (POC) 158 (H) 70 - 110 mg/dL   EKG, 12 LEAD, SUBSEQUENT    Collection Time: 05/04/20  1:18 AM   Result Value Ref Range    Ventricular Rate 65 BPM    Atrial Rate 65 BPM    P-R Interval 186 ms    QRS Duration 128 ms    Q-T Interval 446 ms    QTC Calculation (Bezet) 463 ms    Calculated P Axis 27 degrees    Calculated R Axis -25 degrees    Calculated T Axis 49 degrees    Diagnosis       Sinus rhythm with occasional premature ventricular complexes  Left ventricular hypertrophy with QRS widening  Abnormal ECG  When compared with ECG of 30-APR-2020 13:21,  Left bundle branch block is no longer present  Confirmed by Sandro Monte MD, --- ([id number]) on 5/4/2020 10:23:52 AM     D DIMER    Collection Time: 05/04/20  5:15 AM   Result Value Ref Range    D DIMER 1.12 (H) <0.46 ug/ml(FEU)   FERRITIN    Collection Time: 05/04/20  5:15 AM   Result Value Ref Range    Ferritin 102 8 - 388 NG/ML   CBC WITH AUTOMATED DIFF    Collection Time: 05/04/20  5:15 AM   Result Value Ref Range    WBC 6.6 4.6 - 13.2 K/uL    RBC 2.77 (L) 4.20 - 5.30 M/uL    HGB 7.9 (L) 12.0 - 16.0 g/dL    HCT 23.7 (L) 35.0 - 45.0 %    MCV 85.6 74.0 - 97.0 FL    MCH 28.5 24.0 - 34.0 PG    MCHC 33.3 31.0 - 37.0 g/dL    RDW 14.0 11.6 - 14.5 %    PLATELET 242 190 - 050 K/uL    MPV 11.4 9.2 - 11.8 FL    NEUTROPHILS 83 (H) 40 - 73 %    LYMPHOCYTES 13 (L) 21 - 52 %    MONOCYTES 4 3 - 10 %    EOSINOPHILS 0 0 - 5 %    BASOPHILS 0 0 - 2 %    ABS. NEUTROPHILS 5.4 1.8 - 8.0 K/UL    ABS. LYMPHOCYTES 0.9 0.9 - 3.6 K/UL    ABS. MONOCYTES 0.3 0.05 - 1.2 K/UL    ABS. EOSINOPHILS 0.0 0.0 - 0.4 K/UL    ABS.  BASOPHILS 0.0 0.0 - 0.1 K/UL    DF AUTOMATED     CARDIAC PANEL,(CK, CKMB & TROPONIN)    Collection Time: 05/04/20  5:15 AM   Result Value Ref Range    CK 49 26 - 192 U/L    CK - MB 1.5 <3.6 ng/ml    CK-MB Index 3.1 0.0 - 4.0 %    Troponin-I, QT 0.02 0.0 - 8.911 NG/ML   METABOLIC PANEL, BASIC    Collection Time: 05/04/20  5:15 AM   Result Value Ref Range    Sodium 132 (L) 136 - 145 mmol/L    Potassium 3.9 3.5 - 5.5 mmol/L    Chloride 99 (L) 100 - 111 mmol/L    CO2 23 21 - 32 mmol/L    Anion gap 10 3.0 - 18 mmol/L    Glucose 116 (H) 74 - 99 mg/dL    BUN 86 (H) 7.0 - 18 MG/DL    Creatinine 2.98 (H) 0.6 - 1.3 MG/DL    BUN/Creatinine ratio 29 (H) 12 - 20      GFR est AA 19 (L) >60 ml/min/1.73m2    GFR est non-AA 16 (L) >60 ml/min/1.73m2    Calcium 8.0 (L) 8.5 - 10.1 MG/DL   C REACTIVE PROTEIN, QT    Collection Time: 05/04/20  5:15 AM   Result Value Ref Range    C-Reactive protein <0.3 0 - 0.3 mg/dL   LD    Collection Time: 05/04/20  5:15 AM   Result Value Ref Range     81 - 234 U/L   MAGNESIUM    Collection Time: 05/04/20  5:15 AM   Result Value Ref Range    Magnesium 2.3 1.6 - 2.6 mg/dL   PHOSPHORUS    Collection Time: 05/04/20  5:15 AM   Result Value Ref Range    Phosphorus 5.2 (H) 2.5 - 4.9 MG/DL   POC G3    Collection Time: 05/04/20  7:39 AM   Result Value Ref Range    Device: NASAL CANNULA      Flow rate (POC) 2.5 L/M    pH (POC) 7.361 7.35 - 7.45      pCO2 (POC) 43.0 35.0 - 45.0 MMHG    pO2 (POC) 72 (L) 80 - 100 MMHG    HCO3 (POC) 24.3 22 - 26 MMOL/L    sO2 (POC) 94 92 - 97 %    Base deficit (POC) 1 mmol/L    Allens test (POC) YES      Site RIGHT RADIAL      Specimen type (POC) ARTERIAL      Performed by [address] [place] [address]    Collection Time: 05/04/20  8:20 AM   Result Value Ref Range    Glucose (POC) 139 (H) 70 - 110 mg/dL   CARDIAC PANEL,(CK, CKMB & TROPONIN)    Collection Time: 05/04/20  8:42 AM   Result Value Ref Range    CK 48 26 - 192 U/L    CK - MB 1.3 <3.6 ng/ml    CK-MB Index 2.7 0.0 - 4.0 %    Troponin-I, QT 0.03 0.0 - 0.045 NG/ML   GLUCOSE, POC    Collection Time: 05/04/20 11:33 AM   Result Value Ref Range    Glucose (POC) 158 (H) 70 - 110 mg/dL   PROCALCITONIN    Collection Time: 05/04/20 11:47 AM   Result Value Ref Range    Procalcitonin <0.05 ng/mL Additional Data Reviewed:      Signed By: Arun Dailey MD     May 4, 2020 2:53 PM

## 2020-05-05 LAB
ANION GAP SERPL CALC-SCNC: 8 MMOL/L (ref 3–18)
BUN SERPL-MCNC: 94 MG/DL (ref 7–18)
BUN/CREAT SERPL: 30 (ref 12–20)
CALCIUM SERPL-MCNC: 8.2 MG/DL (ref 8.5–10.1)
CHLORIDE SERPL-SCNC: 98 MMOL/L (ref 100–111)
CO2 SERPL-SCNC: 24 MMOL/L (ref 21–32)
CREAT SERPL-MCNC: 3.15 MG/DL (ref 0.6–1.3)
GLUCOSE BLD STRIP.AUTO-MCNC: 120 MG/DL (ref 70–110)
GLUCOSE BLD STRIP.AUTO-MCNC: 137 MG/DL (ref 70–110)
GLUCOSE BLD STRIP.AUTO-MCNC: 141 MG/DL (ref 70–110)
GLUCOSE BLD STRIP.AUTO-MCNC: 151 MG/DL (ref 70–110)
GLUCOSE BLD STRIP.AUTO-MCNC: 163 MG/DL (ref 70–110)
GLUCOSE SERPL-MCNC: 99 MG/DL (ref 74–99)
POTASSIUM SERPL-SCNC: 3.9 MMOL/L (ref 3.5–5.5)
SODIUM SERPL-SCNC: 130 MMOL/L (ref 136–145)
VANCOMYCIN SERPL-MCNC: 22.4 UG/ML (ref 5–40)

## 2020-05-05 PROCEDURE — 82962 GLUCOSE BLOOD TEST: CPT

## 2020-05-05 PROCEDURE — 36415 COLL VENOUS BLD VENIPUNCTURE: CPT

## 2020-05-05 PROCEDURE — 74011250636 HC RX REV CODE- 250/636: Performed by: HOSPITALIST

## 2020-05-05 PROCEDURE — 3331090002 HH PPS REVENUE DEBIT

## 2020-05-05 PROCEDURE — 65660000000 HC RM CCU STEPDOWN

## 2020-05-05 PROCEDURE — 80202 ASSAY OF VANCOMYCIN: CPT

## 2020-05-05 PROCEDURE — 74011250637 HC RX REV CODE- 250/637: Performed by: PHYSICIAN ASSISTANT

## 2020-05-05 PROCEDURE — 74011636637 HC RX REV CODE- 636/637: Performed by: PHYSICIAN ASSISTANT

## 2020-05-05 PROCEDURE — 74011250637 HC RX REV CODE- 250/637: Performed by: INTERNAL MEDICINE

## 2020-05-05 PROCEDURE — 97530 THERAPEUTIC ACTIVITIES: CPT

## 2020-05-05 PROCEDURE — 3331090001 HH PPS REVENUE CREDIT

## 2020-05-05 PROCEDURE — 80048 BASIC METABOLIC PNL TOTAL CA: CPT

## 2020-05-05 RX ORDER — FUROSEMIDE 20 MG/1
20 TABLET ORAL 2 TIMES DAILY
Status: DISCONTINUED | OUTPATIENT
Start: 2020-05-05 | End: 2020-05-06

## 2020-05-05 RX ADMIN — CARVEDILOL 25 MG: 25 TABLET, FILM COATED ORAL at 23:19

## 2020-05-05 RX ADMIN — CYANOCOBALAMIN TAB 1000 MCG 1000 MCG: 1000 TAB at 08:11

## 2020-05-05 RX ADMIN — FERROUS SULFATE TAB 325 MG (65 MG ELEMENTAL FE) 325 MG: 325 (65 FE) TAB at 09:08

## 2020-05-05 RX ADMIN — HYDRALAZINE HYDROCHLORIDE 100 MG: 50 TABLET, FILM COATED ORAL at 08:11

## 2020-05-05 RX ADMIN — CARVEDILOL 25 MG: 25 TABLET, FILM COATED ORAL at 00:41

## 2020-05-05 RX ADMIN — AMLODIPINE BESYLATE 10 MG: 10 TABLET ORAL at 08:12

## 2020-05-05 RX ADMIN — FUROSEMIDE 20 MG: 20 TABLET ORAL at 17:33

## 2020-05-05 RX ADMIN — HEPARIN SODIUM 5000 UNITS: 5000 INJECTION INTRAVENOUS; SUBCUTANEOUS at 08:16

## 2020-05-05 RX ADMIN — ROSUVASTATIN CALCIUM 5 MG: 10 TABLET, FILM COATED ORAL at 00:41

## 2020-05-05 RX ADMIN — Medication 5000 UNITS: at 08:12

## 2020-05-05 RX ADMIN — ASPIRIN 81 MG CHEWABLE TABLET 81 MG: 81 TABLET CHEWABLE at 08:12

## 2020-05-05 RX ADMIN — Medication 1 CAPSULE: at 08:12

## 2020-05-05 RX ADMIN — FUROSEMIDE 40 MG: 40 TABLET ORAL at 08:12

## 2020-05-05 RX ADMIN — HYDRALAZINE HYDROCHLORIDE 100 MG: 50 TABLET, FILM COATED ORAL at 23:18

## 2020-05-05 RX ADMIN — INSULIN LISPRO 2 UNITS: 100 INJECTION, SOLUTION INTRAVENOUS; SUBCUTANEOUS at 00:45

## 2020-05-05 RX ADMIN — HYDRALAZINE HYDROCHLORIDE 100 MG: 50 TABLET, FILM COATED ORAL at 00:41

## 2020-05-05 RX ADMIN — Medication 500 MG: at 08:12

## 2020-05-05 RX ADMIN — INSULIN GLARGINE 4 UNITS: 100 INJECTION, SOLUTION SUBCUTANEOUS at 00:45

## 2020-05-05 RX ADMIN — CYANOCOBALAMIN TAB 1000 MCG 1000 MCG: 1000 TAB at 00:41

## 2020-05-05 RX ADMIN — CYANOCOBALAMIN TAB 1000 MCG 1000 MCG: 1000 TAB at 23:18

## 2020-05-05 RX ADMIN — PANTOPRAZOLE SODIUM 40 MG: 40 TABLET, DELAYED RELEASE ORAL at 08:11

## 2020-05-05 RX ADMIN — CITALOPRAM HYDROBROMIDE 20 MG: 20 TABLET ORAL at 08:12

## 2020-05-05 RX ADMIN — Medication 500 MG: at 00:41

## 2020-05-05 RX ADMIN — HEPARIN SODIUM 5000 UNITS: 5000 INJECTION INTRAVENOUS; SUBCUTANEOUS at 23:33

## 2020-05-05 RX ADMIN — PANTOPRAZOLE SODIUM 40 MG: 40 TABLET, DELAYED RELEASE ORAL at 00:41

## 2020-05-05 RX ADMIN — HEPARIN SODIUM 5000 UNITS: 5000 INJECTION INTRAVENOUS; SUBCUTANEOUS at 00:41

## 2020-05-05 RX ADMIN — INSULIN LISPRO 2 UNITS: 100 INJECTION, SOLUTION INTRAVENOUS; SUBCUTANEOUS at 12:54

## 2020-05-05 RX ADMIN — Medication 500 MG: at 23:18

## 2020-05-05 RX ADMIN — HYDRALAZINE HYDROCHLORIDE 100 MG: 50 TABLET, FILM COATED ORAL at 15:22

## 2020-05-05 RX ADMIN — ROSUVASTATIN CALCIUM 5 MG: 10 TABLET, FILM COATED ORAL at 23:19

## 2020-05-05 RX ADMIN — PANTOPRAZOLE SODIUM 40 MG: 40 TABLET, DELAYED RELEASE ORAL at 23:18

## 2020-05-05 RX ADMIN — HEPARIN SODIUM 5000 UNITS: 5000 INJECTION INTRAVENOUS; SUBCUTANEOUS at 15:23

## 2020-05-05 RX ADMIN — INSULIN GLARGINE 4 UNITS: 100 INJECTION, SOLUTION SUBCUTANEOUS at 23:32

## 2020-05-05 RX ADMIN — CARVEDILOL 25 MG: 25 TABLET, FILM COATED ORAL at 08:12

## 2020-05-05 NOTE — PROGRESS NOTES
[facility]  -  Progress Note      Patient: Casey Land MRN: [id number]  SSN: [id number]    YOB: 1950  Age: 71 y.o. Sex: female      Admit Date: 4/30/2020    Assessment:     -Acute on chronic respiratory failure. Multifactorial in setting of below. Recurrent ER visits since last discharge.    -Sepsis/SIRS, presented with hypothermia.  -Concern for Covid pneumonia, Covid testing pending.  -Acute on chronic HFpEF. EF 55-60% by echo 4/25/20.  -Chronic kidney disease. Stable.  -Acute on chronic anemia. Continue to trend.  -Hypertension. Elevated on admission.  -Diabetes mellitus. HgbA1c 6.1.  -Dyslipidemia. On statin.  -Coronary disease reportedly diffuse medically managed, nuclear stress 3/2018 with small area of ischemia with EF 53%  -Chronic atypical LBBB.     Primary cardiologist Dr. Manfred Orourke at [facility], now plans to follow up with Dr. Feng Watts:     -Decreasing PO Lasix to 20 mg BID per nephrology team with rising Cr, appreciate ongoing assistance of nephrology team in regards to volume management.  -Continued on Amlodipine, Coreg, PO Hydralazine.  -Continued on ASA, Crestor. Subjective:     No new complaints. States feeling a little better.     Objective:      Patient Vitals for the past 8 hrs:   Temp Pulse Resp BP SpO2   05/05/20 0716 97.8 °F (36.6 °C) 62 18 124/62 98 %   05/05/20 0501 97.6 °F (36.4 °C) 65 17 125/65 98 %         Patient Vitals for the past 96 hrs:   Weight   05/04/20 0403 255 lb 12.8 oz (116 kg)   05/03/20 0444 258 lb 6.4 oz (117.2 kg)   05/02/20 0555 253 lb 12.8 oz (115.1 kg)         Intake/Output Summary (Last 24 hours) at 5/5/2020 1131  Last data filed at 5/5/2020 0908  Gross per 24 hour   Intake 480 ml   Output 500 ml   Net -20 ml       Physical Exam:  General:  alert, cooperative, no distress, appears stated age  Neck:  supple  Lungs:  clear to auscultation bilaterally to anterolateral lung fields  Heart:  Regular rate and rhythm  Abdomen:  abdomen is soft without significant tenderness, masses, organomegaly or guarding  Extremities:  Atraumatic, + edema to lower extremities    Data Review:     Labs: Results:       Chemistry Recent Labs     05/05/20  0518 05/04/20  0515 05/03/20  0337   GLU 99 116*  --    * 132*  --    K 3.9 3.9  --    CL 98* 99*  --    CO2 24 23  --    BUN 94* 86*  --    CREA 3.15* 2.98*  --    CA 8.2* 8.0* 8.0*   MG  --  2.3  --    PHOS  --  5.2*  --    AGAP 8 10  --    BUCR 30* 29*  --       CBC w/Diff Recent Labs     05/04/20 0515   WBC 6.6   RBC 2.77*   HGB 7.9*   HCT 23.7*      GRANS 83*   LYMPH 13*   EOS 0      Cardiac Enzymes Lab Results   Component Value Date/Time    CPK 49 05/04/2020 03:33 PM    CKMB 1.9 05/04/2020 03:33 PM    CKND1 3.9 05/04/2020 03:33 PM    TROIQ 0.03 05/04/2020 03:33 PM      Lipid Panel Lab Results   Component Value Date/Time    Cholesterol, total 182 04/24/2020 02:34 AM    HDL Cholesterol 41 04/24/2020 02:34 AM    LDL, calculated 112.8 (H) 04/24/2020 02:34 AM    VLDL, calculated 28.2 04/24/2020 02:34 AM    Triglyceride 141 04/24/2020 02:34 AM    CHOL/HDL Ratio 4.4 04/24/2020 02:34 AM

## 2020-05-05 NOTE — ROUTINE PROCESS
Report given to [place] RN, including SBAR, [facility], and Kardex. Patient alert and oriented, vitals within normal limits, no apparent distress.

## 2020-05-05 NOTE — PROGRESS NOTES
Problem: Falls - Risk of  Goal: *Absence of Falls  Description: Document Jim Luis Alfredo Fall Risk and appropriate interventions in the flowsheet. Outcome: Progressing Towards Goal  Note: Fall Risk Interventions:  Mobility Interventions: Bed/chair exit alarm         Medication Interventions: Teach patient to arise slowly    Elimination Interventions: Call light in reach              Problem: Patient Education: Go to Patient Education Activity  Goal: Patient/Family Education  Outcome: Progressing Towards Goal     Problem: Pressure Injury - Risk of  Goal: *Prevention of pressure injury  Description: Document Toney Scale and appropriate interventions in the flowsheet.   Outcome: Progressing Towards Goal  Note: Pressure Injury Interventions:  Sensory Interventions: Assess need for specialty bed    Moisture Interventions: Maintain skin hydration (lotion/cream)    Activity Interventions: Pressure redistribution bed/mattress(bed type)    Mobility Interventions: Pressure redistribution bed/mattress (bed type)    Nutrition Interventions: Document food/fluid/supplement intake    Friction and Shear Interventions: Apply protective barrier, creams and emollients                Problem: Patient Education: Go to Patient Education Activity  Goal: Patient/Family Education  Outcome: Progressing Towards Goal     Problem: Patient Education: Go to Patient Education Activity  Goal: Patient/Family Education  Outcome: Progressing Towards Goal     Problem: Heart Failure: Day 4  Goal: Off Pathway (Use only if patient is Off Pathway)  Outcome: Progressing Towards Goal  Goal: Activity/Safety  Outcome: Progressing Towards Goal  Goal: Diagnostic Test/Procedures  Outcome: Progressing Towards Goal  Goal: Nutrition/Diet  Outcome: Progressing Towards Goal  Goal: Discharge Planning  Outcome: Progressing Towards Goal  Goal: Medications  Outcome: Progressing Towards Goal  Goal: Respiratory  Outcome: Progressing Towards Goal  Goal: Treatments/Interventions/Procedures  Outcome: Progressing Towards Goal  Goal: Psychosocial  Outcome: Progressing Towards Goal  Goal: *Oxygen saturation within defined limits  Outcome: Progressing Towards Goal  Goal: *Hemodynamically stable  Outcome: Progressing Towards Goal  Goal: *Optimal pain control at patient's stated goal  Outcome: Progressing Towards Goal  Goal: *Anxiety reduced or absent  Outcome: Progressing Towards Goal  Goal: *Demonstrates progressive activity  Outcome: Progressing Towards Goal     Problem: Heart Failure: Day 5  Goal: Off Pathway (Use only if patient is Off Pathway)  Outcome: Progressing Towards Goal  Goal: Activity/Safety  Outcome: Progressing Towards Goal  Goal: Diagnostic Test/Procedures  Outcome: Progressing Towards Goal  Goal: Nutrition/Diet  Outcome: Progressing Towards Goal  Goal: Discharge Planning  Outcome: Progressing Towards Goal  Goal: Medications  Outcome: Progressing Towards Goal  Goal: Respiratory  Outcome: Progressing Towards Goal  Goal: Treatments/Interventions/Procedures  Outcome: Progressing Towards Goal  Goal: Psychosocial  Outcome: Progressing Towards Goal     Problem: Heart Failure: Discharge Outcomes  Goal: *Demonstrates ability to perform prescribed activity without shortness of breath or discomfort  Outcome: Progressing Towards Goal  Goal: *Left ventricular function assessment completed prior to or during stay, or planned for post-discharge  Outcome: Progressing Towards Goal  Goal: *ACEI prescribed if LVEF less than 40% and no contraindications or ARB prescribed  Outcome: Progressing Towards Goal  Goal: *Verbalizes understanding and describes prescribed diet  Outcome: Progressing Towards Goal  Goal: *Verbalizes understanding/describes prescribed medications  Outcome: Progressing Towards Goal  Goal: *Describes available resources and support systems  Description: (eg: Home Health, Palliative Care, Advanced Medical Directive)  Outcome: Progressing Towards Goal  Goal: *Describes smoking cessation resources  Outcome: Progressing Towards Goal  Goal: *Understands and describes signs and symptoms to report to providers(Stroke Metric)  Outcome: Progressing Towards Goal  Goal: *Describes/verbalizes understanding of follow-up/return appt  Description: (eg: to physicians, diabetes treatment coordinator, and other resources  Outcome: Progressing Towards Goal  Goal: *Describes importance of continuing daily weights and changes to report to physician  Outcome: Progressing Towards Goal     Problem: Pain  Goal: *Control of Pain  Outcome: Progressing Towards Goal     Problem: Patient Education: Go to Patient Education Activity  Goal: Patient/Family Education  Outcome: Progressing Towards Goal     Problem: Diabetes Self-Management  Goal: *Disease process and treatment process  Description: Define diabetes and identify own type of diabetes; list 3 options for treating diabetes. Outcome: Progressing Towards Goal  Goal: *Incorporating nutritional management into lifestyle  Description: Describe effect of type, amount and timing of food on blood glucose; list 3 methods for planning meals. Outcome: Progressing Towards Goal  Goal: *Incorporating physical activity into lifestyle  Description: State effect of exercise on blood glucose levels. Outcome: Progressing Towards Goal  Goal: *Developing strategies to promote health/change behavior  Description: Define the ABC's of diabetes; identify appropriate screenings, schedule and personal plan for screenings. Outcome: Progressing Towards Goal  Goal: *Using medications safely  Description: State effect of diabetes medications on diabetes; name diabetes medication taking, action and side effects. Outcome: Progressing Towards Goal  Goal: *Monitoring blood glucose, interpreting and using results  Description: Identify recommended blood glucose targets  and personal targets.   Outcome: Progressing Towards Goal  Goal: *Prevention, detection, treatment of acute complications  Description: List symptoms of hyper- and hypoglycemia; describe how to treat low blood sugar and actions for lowering  high blood glucose level. Outcome: Progressing Towards Goal  Goal: *Prevention, detection and treatment of chronic complications  Description: Define the natural course of diabetes and describe the relationship of blood glucose levels to long term complications of diabetes.   Outcome: Progressing Towards Goal  Goal: *Developing strategies to address psychosocial issues  Description: Describe feelings about living with diabetes; identify support needed and support network  Outcome: Progressing Towards Goal  Goal: *Sick day guidelines  Outcome: Progressing Towards Goal  Goal: *Patient Specific Goal (EDIT GOAL, INSERT TEXT)  Outcome: Progressing Towards Goal     Problem: Patient Education: Go to Patient Education Activity  Goal: Patient/Family Education  Outcome: Progressing Towards Goal     Problem: Breathing Pattern - Ineffective  Goal: *Absence of hypoxia  Outcome: Progressing Towards Goal  Goal: *Use of effective breathing techniques  Outcome: Progressing Towards Goal  Goal: *PALLIATIVE CARE:  Alleviation of Dyspnea  Outcome: Progressing Towards Goal     Problem: Patient Education: Go to Patient Education Activity  Goal: Patient/Family Education  Outcome: Progressing Towards Goal     Problem: Patient Education: Go to Patient Education Activity  Goal: Patient/Family Education  Outcome: Progressing Towards Goal     Problem: Patient Education: Go to Patient Education Activity  Goal: Patient/Family Education  Outcome: Progressing Towards Goal

## 2020-05-05 NOTE — PROGRESS NOTES
Problem: Falls - Risk of  Goal: *Absence of Falls  Description: Document Clydene Bone Fall Risk and appropriate interventions in the flowsheet. Outcome: Progressing Towards Goal  Note: Fall Risk Interventions:  Mobility Interventions: Bed/chair exit alarm         Medication Interventions: Bed/chair exit alarm    Elimination Interventions: Call light in reach              Problem: Patient Education: Go to Patient Education Activity  Goal: Patient/Family Education  Outcome: Progressing Towards Goal     Problem: Pressure Injury - Risk of  Goal: *Prevention of pressure injury  Description: Document Toney Scale and appropriate interventions in the flowsheet.   Outcome: Progressing Towards Goal  Note: Pressure Injury Interventions:  Sensory Interventions: Assess changes in LOC    Moisture Interventions: Internal/External urinary devices    Activity Interventions: Pressure redistribution bed/mattress(bed type)    Mobility Interventions: Pressure redistribution bed/mattress (bed type)    Nutrition Interventions: Document food/fluid/supplement intake    Friction and Shear Interventions: Minimize layers                Problem: Patient Education: Go to Patient Education Activity  Goal: Patient/Family Education  Outcome: Progressing Towards Goal     Problem: Patient Education: Go to Patient Education Activity  Goal: Patient/Family Education  Outcome: Progressing Towards Goal     Problem: Heart Failure: Day 4  Goal: Off Pathway (Use only if patient is Off Pathway)  Outcome: Progressing Towards Goal  Goal: Activity/Safety  Outcome: Progressing Towards Goal  Goal: Diagnostic Test/Procedures  Outcome: Progressing Towards Goal  Goal: Nutrition/Diet  Outcome: Progressing Towards Goal  Goal: Discharge Planning  Outcome: Progressing Towards Goal  Goal: Medications  Outcome: Progressing Towards Goal  Goal: Respiratory  Outcome: Progressing Towards Goal  Goal: Treatments/Interventions/Procedures  Outcome: Progressing Towards Goal  Goal: Psychosocial  Outcome: Progressing Towards Goal  Goal: *Oxygen saturation within defined limits  Outcome: Progressing Towards Goal  Goal: *Hemodynamically stable  Outcome: Progressing Towards Goal  Goal: *Optimal pain control at patient's stated goal  Outcome: Progressing Towards Goal  Goal: *Anxiety reduced or absent  Outcome: Progressing Towards Goal  Goal: *Demonstrates progressive activity  Outcome: Progressing Towards Goal     Problem: Heart Failure: Day 5  Goal: Off Pathway (Use only if patient is Off Pathway)  Outcome: Progressing Towards Goal  Goal: Activity/Safety  Outcome: Progressing Towards Goal  Goal: Diagnostic Test/Procedures  Outcome: Progressing Towards Goal  Goal: Nutrition/Diet  Outcome: Progressing Towards Goal  Goal: Discharge Planning  Outcome: Progressing Towards Goal  Goal: Medications  Outcome: Progressing Towards Goal  Goal: Respiratory  Outcome: Progressing Towards Goal  Goal: Treatments/Interventions/Procedures  Outcome: Progressing Towards Goal  Goal: Psychosocial  Outcome: Progressing Towards Goal     Problem: Heart Failure: Discharge Outcomes  Goal: *Demonstrates ability to perform prescribed activity without shortness of breath or discomfort  Outcome: Progressing Towards Goal  Goal: *Left ventricular function assessment completed prior to or during stay, or planned for post-discharge  Outcome: Progressing Towards Goal  Goal: *ACEI prescribed if LVEF less than 40% and no contraindications or ARB prescribed  Outcome: Progressing Towards Goal  Goal: *Verbalizes understanding and describes prescribed diet  Outcome: Progressing Towards Goal  Goal: *Verbalizes understanding/describes prescribed medications  Outcome: Progressing Towards Goal  Goal: *Describes available resources and support systems  Description: (eg: Home Health, Palliative Care, Advanced Medical Directive)  Outcome: Progressing Towards Goal  Goal: *Describes smoking cessation resources  Outcome: Progressing Towards Goal  Goal: *Understands and describes signs and symptoms to report to providers(Stroke Metric)  Outcome: Progressing Towards Goal  Goal: *Describes/verbalizes understanding of follow-up/return appt  Description: (eg: to physicians, diabetes treatment coordinator, and other resources  Outcome: Progressing Towards Goal  Goal: *Describes importance of continuing daily weights and changes to report to physician  Outcome: Progressing Towards Goal     Problem: Pain  Goal: *Control of Pain  Outcome: Progressing Towards Goal     Problem: Patient Education: Go to Patient Education Activity  Goal: Patient/Family Education  Outcome: Progressing Towards Goal     Problem: Diabetes Self-Management  Goal: *Disease process and treatment process  Description: Define diabetes and identify own type of diabetes; list 3 options for treating diabetes. Outcome: Progressing Towards Goal  Goal: *Incorporating nutritional management into lifestyle  Description: Describe effect of type, amount and timing of food on blood glucose; list 3 methods for planning meals. Outcome: Progressing Towards Goal  Goal: *Incorporating physical activity into lifestyle  Description: State effect of exercise on blood glucose levels. Outcome: Progressing Towards Goal  Goal: *Developing strategies to promote health/change behavior  Description: Define the ABC's of diabetes; identify appropriate screenings, schedule and personal plan for screenings. Outcome: Progressing Towards Goal  Goal: *Using medications safely  Description: State effect of diabetes medications on diabetes; name diabetes medication taking, action and side effects. Outcome: Progressing Towards Goal  Goal: *Monitoring blood glucose, interpreting and using results  Description: Identify recommended blood glucose targets  and personal targets.   Outcome: Progressing Towards Goal  Goal: *Prevention, detection, treatment of acute complications  Description: List symptoms of hyper- and hypoglycemia; describe how to treat low blood sugar and actions for lowering  high blood glucose level. Outcome: Progressing Towards Goal  Goal: *Prevention, detection and treatment of chronic complications  Description: Define the natural course of diabetes and describe the relationship of blood glucose levels to long term complications of diabetes.   Outcome: Progressing Towards Goal  Goal: *Developing strategies to address psychosocial issues  Description: Describe feelings about living with diabetes; identify support needed and support network  Outcome: Progressing Towards Goal  Goal: *Sick day guidelines  Outcome: Progressing Towards Goal  Goal: *Patient Specific Goal (EDIT GOAL, INSERT TEXT)  Outcome: Progressing Towards Goal     Problem: Patient Education: Go to Patient Education Activity  Goal: Patient/Family Education  Outcome: Progressing Towards Goal     Problem: Breathing Pattern - Ineffective  Goal: *Absence of hypoxia  Outcome: Progressing Towards Goal  Goal: *Use of effective breathing techniques  Outcome: Progressing Towards Goal  Goal: *PALLIATIVE CARE:  Alleviation of Dyspnea  Outcome: Progressing Towards Goal     Problem: Patient Education: Go to Patient Education Activity  Goal: Patient/Family Education  Outcome: Progressing Towards Goal     Problem: Patient Education: Go to Patient Education Activity  Goal: Patient/Family Education  Outcome: Progressing Towards Goal     Problem: Patient Education: Go to Patient Education Activity  Goal: Patient/Family Education  Outcome: Progressing Towards Goal

## 2020-05-05 NOTE — PROGRESS NOTES
New PT order received and chart reviewed. Patient is currently on PT caseload. Will follow up as schedule permits. Thank you for this referral. Will acknowledge new order.     April Reina PT, DPT

## 2020-05-05 NOTE — PROGRESS NOTES
New OT order received and chart reviewed. Patient evaluated and is currently on caseload; will acknowledge the order.   Thank you for the referral.  Luna Hughes MS OTR/L

## 2020-05-05 NOTE — ROUTINE PROCESS
Assumed care of patient. Bedside verbal report received from [id number], RN including SBAR, MAR, and Kardex. Vitals within normal limits. Assessment completed, patient in no apparent distress.

## 2020-05-05 NOTE — PROGRESS NOTES
[facility]. Pulmonary, Critical Care, and Sleep Medicine    Initial Patient Consult    Name: Gabo Barclay MRN: [id number]   : 1950 Hospital: [facility]   Date: 2020        IMPRESSION:   · Acute on chronic hypoxic respiratory failure - baseline oxygen use 2-3 L NC  · Acute on chronic diastolic CHF exacerbation  · DMT2  · CAD  · HTN  · OFE on CKD IIIb - likely cardiorenal - nephrology following       Patient Active Problem List   Diagnosis Code    Acute on chronic diastolic congestive heart failure (HCC) I50.33    Suspected Covid-19 Virus Infection R68.89    Type 2 diabetes mellitus without complication, without long-term current use of insulin ([address] E11.9    Left anterior fascicular block I44.4    HTN (hypertension), benign I10    Coronary artery disease involving native coronary artery of native heart without angina pectoris I25.10    Chronic diastolic congestive heart failure (HCC) I50.32    Bilateral lower extremity edema R60.0    BMI 35.0-35.9,adult Z68.35    CHF (congestive heart failure) ([facility]) I50.9    Sepsis ([address] A41.9    Respiratory failure ([address] J96.90    SIRS (systemic inflammatory response syndrome) ([facility]) R65.10      RECOMMENDATIONS:   · At baseline level of oxygen  · Consider holding diuretics given ofe - defer to nephrology  · Aspiration precautions  · Assess home Oxygen needs at discharge. Cards followup. · OT, PT, OOB and ambulate  · DVT, PUD prophylaxis     Subjective: This patient has been seen and evaluated at the request of Dr. Blanco Kelly for hypoxic respiratory failure. Patient is a 71 y.o. female with h/o dCHF, DMT2, CKD, obesity CAD, HTN who presented with acute on chronic shortness of breath. She was addmited on  and has been followed by cardiology and nephrology and treated with aggressive intravenous diuresis. She was tested for COVId and was found to be negative. Pulmonary is asked to evaluated for shortness of breath. Last 24 hours  -Complains of SOB but is currently at baseline level of oxygen requirement  -Got PVL of LE done which were negative  -Feels much better today than before      Objective:   Vital Signs:    Visit Vitals  /62 (BP 1 Location: Right arm, BP Patient Position: At rest)   Pulse 62   Temp 97.8 °F (36.6 °C)   Resp 18   Ht 5' 5\" (1.651 m)   Wt 116 kg (255 lb 12.8 oz)   SpO2 98%   Breastfeeding No   BMI 42.57 kg/m²       O2 Device: Nasal cannula   O2 Flow Rate (L/min): 2 l/min   Temp (24hrs), Av.3 °F (36.3 °C), Min:96.5 °F (35.8 °C), Max:97.8 °F (36.6 °C)       Intake/Output:   Last shift:      701 - 1900  In: -   Out: 500 [Urine:500]  Last 3 shifts: 1901 -  0700  In: 700 [P.O.:700]  Out: 1150 [Urine:1150]    Intake/Output Summary (Last 24 hours) at 2020 0811  Last data filed at 2020 0716  Gross per 24 hour   Intake 360 ml   Output 950 ml   Net -590 ml      Physical Exam:   General:  Alert, cooperative, no distress, appears stated age. Head:  Normocephalic, without obvious abnormality, atraumatic. Eyes:  Conjunctivae/corneas clear. ANicteric   Nose: Nares normal. Mucosa normal. No drainage or sinus tenderness. Throat: Lips, mucosa dry. NO thrush;    Neck: Supple, symmetrical, trachea midline, no adenopathy, thyroid: no enlargment/tenderness/nodules, no carotid bruit and no JVD. No crepitus   Back:   Symmetric, no curvature, no spine tenderness or flank pain   Lungs:   Bilateral auscultation with inspiratory and exp crackles   Chest wall:  No tenderness or deformity. NO CREPITUS   Heart:  Regular rate and rhythm, S1, S2 normal, no murmur, click, rub or gallop. Abdomen:   Soft, non-tender. Bowel sounds normal. No masses,  No organomegaly. No paradox   Extremities: normal, atraumatic, no cyanosis or edema. Pulses: 1-2+ and symmetric all extremities.    Skin: Skin color, texture, turgor normal. No rashes or lesions   Lymph nodes: Cervical, supraclavicular, and axillary nodes normal.   Neurologic: Grossly nonfocal          Data review:   Labs:  Recent Results (from the past 24 hour(s))   GLUCOSE, POC    Collection Time: 05/04/20  8:20 AM   Result Value Ref Range    Glucose (POC) 139 (H) 70 - 110 mg/dL   CARDIAC PANEL,(CK, CKMB & TROPONIN)    Collection Time: 05/04/20  8:42 AM   Result Value Ref Range    CK 48 26 - 192 U/L    CK - MB 1.3 <3.6 ng/ml    CK-MB Index 2.7 0.0 - 4.0 %    Troponin-I, QT 0.03 0.0 - 0.045 NG/ML   GLUCOSE, POC    Collection Time: 05/04/20 11:33 AM   Result Value Ref Range    Glucose (POC) 158 (H) 70 - 110 mg/dL   PROCALCITONIN    Collection Time: 05/04/20 11:47 AM   Result Value Ref Range    Procalcitonin <0.05 ng/mL   CARDIAC PANEL,(CK, CKMB & TROPONIN)    Collection Time: 05/04/20  3:33 PM   Result Value Ref Range    CK 49 26 - 192 U/L    CK - MB 1.9 <3.6 ng/ml    CK-MB Index 3.9 0.0 - 4.0 %    Troponin-I, QT 0.03 0.0 - 0.045 NG/ML   GLUCOSE, POC    Collection Time: 05/04/20  5:23 PM   Result Value Ref Range    Glucose (POC) 147 (H) 70 - 110 mg/dL   GLUCOSE, POC    Collection Time: 05/05/20 12:45 AM   Result Value Ref Range    Glucose (POC) 163 (H) 70 - 127 mg/dL   METABOLIC PANEL, BASIC    Collection Time: 05/05/20  5:18 AM   Result Value Ref Range    Sodium 130 (L) 136 - 145 mmol/L    Potassium 3.9 3.5 - 5.5 mmol/L    Chloride 98 (L) 100 - 111 mmol/L    CO2 24 21 - 32 mmol/L    Anion gap 8 3.0 - 18 mmol/L    Glucose 99 74 - 99 mg/dL    BUN 94 (H) 7.0 - 18 MG/DL    Creatinine 3.15 (H) 0.6 - 1.3 MG/DL    BUN/Creatinine ratio 30 (H) 12 - 20      GFR est AA 18 (L) >60 ml/min/1.73m2    GFR est non-AA 15 (L) >60 ml/min/1.73m2    Calcium 8.2 (L) 8.5 - 10.1 MG/DL   VANCOMYCIN, RANDOM    Collection Time: 05/05/20  5:18 AM   Result Value Ref Range    Vancomycin, random 22.4 5.0 - 40.0 UG/ML   GLUCOSE, POC    Collection Time: 05/05/20  7:47 AM   Result Value Ref Range    Glucose (POC) 120 (H) 70 - 110 mg/dL       PFT Results  (Last 48 hours)    None Echo Results  (Last 48 hours)    None        Imaging:  I have personally reviewed the patients radiographs and have reviewed the reports:  CXR Results  (Last 48 hours)               05/04/20 0356  XR CHEST PORT Final result    Impression:  IMPRESSION:       1. Improvement in aeration with interval resolution of the pulmonary   interstitial edema with vascular engorgement pattern seen on the 04/30/20 study. 2.  Subtle residual hazy appearance at the left lung base region and in the   right infrahilar region, suggestive of atelectatic changes vs. infiltrate. 3.  Moderate cardiac silhouette enlargement. Narrative:  EXAM:  Chest Portable. INDICATION:  Shortness of breath, CHF        COMPARISON:  04/30/20        TECHNIQUE:  Portable AP chest study       FINDINGS:        - The inspiratory lung volumes remain decreased. - Minimal subtle hazy appearance is noted in the left lower lung zone. - Streaky densities in the right infrahilar region involving the retrocardiac   area. Suggestive of residual infiltrate or atelectatic changes. - The pulmonary interstitial edema with vascular engorgement demonstrated on the   04/30/20 study has cleared in the interval.   - No pneumothorax is detected. - Redemonstration of moderate cardiac silhouette enlargement. CT Results  (Last 48 hours)    None            High complexity decision making was performed during the evaluation of this patient at high risk for decompensation with multiple organ involvement     Above mentioned total time spent on reviewing the case/medical record/data/notes/EMR/patient examination/documentation/coordinating care with nurse/consultants, exclusive of procedures with complex decision making performed and > 50% time spent in face to face evaluation.      Rhea Godinez MD

## 2020-05-05 NOTE — PROGRESS NOTES
[facility]  Progress Note    Patient: Jenny Carter Age: 71 y.o. : 1950 MR#: [id number] SSN: [id number]  Date/Time: 2020    Subjective:     Pt reports her breathing has improved. Assessment/Plan:   71 y o female w/ h/o CKD and CHF re admitted soon after discharge after presenting w/ cc of SOB.    -Acute on chronic respiratory failure at baseline 02 need  -Acute on chronic diastolic heart failure on lasix switched to PO  -COVID testing negative   -Acute on chronic renal failure, in setting of diuresis, nephrology following.     HISTORY OF:  -HFpEF  -T2DM A1c of 6.1 on lantus, corrective insulin  -CAD  -HTN  -CKD III-creat w/ some trend upwards     PLAN:  -Diuresis per cardiology  -Continue to monitor renal function and electrolytes  -PT/OT    Dispo: Awaiting stability of renal function, awaiting PT eval.  Additional Notes:      Case discussed with:  [x]Patient  []Family  [x]Nursing  []Case Management  DVT Prophylaxis:  []Lovenox  []Hep SQ  []SCDs  []Coumadin   []On Heparin gtt    Objective:   VS:   Visit Vitals  /58 (BP 1 Location: Right arm, BP Patient Position: Sitting)   Pulse (!) 52   Temp 96.8 °F (36 °C)   Resp 18   Ht 5' 5\" (1.651 m)   Wt 116 kg (255 lb 12.8 oz)   SpO2 97%   Breastfeeding No   BMI 42.57 kg/m²      Tmax/24hrs: Temp (24hrs), Av.4 °F (36.3 °C), Min:96.8 °F (36 °C), Max:97.8 °F (36.6 °C)    Input/Output:     Intake/Output Summary (Last 24 hours) at 2020 1622  Last data filed at 2020 1254  Gross per 24 hour   Intake 504 ml   Output 500 ml   Net 4 ml       General:  Alert, awake, in NAD  Cardiovascular:  Rrr, no murmurs  Pulmonary: ctab   GI:  Soft, nt, nd  Extremities: 2+edema   Additional:      Labs:    Recent Results (from the past 24 hour(s))   GLUCOSE, POC    Collection Time: 20  5:23 PM   Result Value Ref Range    Glucose (POC) 147 (H) 70 - 110 mg/dL   GLUCOSE, POC    Collection Time: 20 12:45 AM   Result Value Ref Range    Glucose (POC) 163 (H) 70 - 361 mg/dL   METABOLIC PANEL, BASIC    Collection Time: 05/05/20  5:18 AM   Result Value Ref Range    Sodium 130 (L) 136 - 145 mmol/L    Potassium 3.9 3.5 - 5.5 mmol/L    Chloride 98 (L) 100 - 111 mmol/L    CO2 24 21 - 32 mmol/L    Anion gap 8 3.0 - 18 mmol/L    Glucose 99 74 - 99 mg/dL    BUN 94 (H) 7.0 - 18 MG/DL    Creatinine 3.15 (H) 0.6 - 1.3 MG/DL    BUN/Creatinine ratio 30 (H) 12 - 20      GFR est AA 18 (L) >60 ml/min/1.73m2    GFR est non-AA 15 (L) >60 ml/min/1.73m2    Calcium 8.2 (L) 8.5 - 10.1 MG/DL   VANCOMYCIN, RANDOM    Collection Time: 05/05/20  5:18 AM   Result Value Ref Range    Vancomycin, random 22.4 5.0 - 40.0 UG/ML   GLUCOSE, POC    Collection Time: 05/05/20  7:47 AM   Result Value Ref Range    Glucose (POC) 120 (H) 70 - 110 mg/dL   GLUCOSE, POC    Collection Time: 05/05/20 11:34 AM   Result Value Ref Range    Glucose (POC) 151 (H) 70 - 110 mg/dL     Additional Data Reviewed:      Signed By: Bebe Barry MD     May 5, 2020 2:53 PM

## 2020-05-05 NOTE — PROGRESS NOTES
Problem: Mobility Impaired (Adult and Pediatric)  Goal: *Acute Goals and Plan of Care (Insert Text)  Description: Physical Therapy Goals  Initiated 5/1/2020 and to be accomplished within 7 day(s)  1. Patient will move from supine to sit and sit to supine , scoot up and down and roll side to side in bed with supervision/set-up. 2.  Patient will transfer from bed to chair and chair to bed with SBA  using the least restrictive device. 3.  Patient will perform sit to stand with SBA. 4.  Patient will ambulate with supervision/set-up for 25 feet with the least restrictive device. 5.  Patient will ascend/descend 3 stairs with 1 handrail(s) with minimal assistance/contact guard assist.    PLOF: Pt lives alone in a one story home with 3 JAMES, has canes and a walker if needed, can ask a neighbor for assistance if needed. Pt was recently eval/dc from acute pt due to being mod ind/ind but pt reports that it was hard to take care of herself on her own before she came to the hospital this admission. Outcome: Progressing Towards Goal    PHYSICAL THERAPY TREATMENT    Patient: Terrie Toth (23 y.o. female)  Date: 5/5/2020  Diagnosis: Sepsis ([facility] [address] [address] [A41.9]   <principal problem not specified>       Precautions: Aspiration, Fall, Skin      ASSESSMENT:  RN cleared pt for skilled PT treatment. Patient semi reclined in bed agreeable to PT session. Patient states her goal is to get to the chair. Patient mobilizes to the EOB with min A and HOB elevated 40 deg to prepare for OOB transfer. She demos good sitting balance, but c/o dizziness. Pt with stable BP of 139/58 at EOB, sat for about 10 minutes until symptom began to subside. Educated pt on BLE there-ex to increase strength for mobility. Patient continues to report mild dizziness, but able to safely perform SPT using RW with CGA from bed to chair.  Cues provided for hand placement during sit to stand; min A to descend into chair as pt did not reach her arms back for the arm rests. Patient left comfortably in recliner with all need addressed. Will progress towards gait next session. Progression toward goals:   [x]      Improving appropriately and progressing toward goals  []      Improving slowly and progressing toward goals  []      Not making progress toward goals and plan of care will be adjusted     PLAN:  Patient continues to benefit from skilled intervention to address the above impairments. Continue treatment per established plan of care. Discharge Recommendations:  Home Health vs rehab  Further Equipment Recommendations for Discharge:  shower chair and rolling walker     SUBJECTIVE:   Patient stated I want to get to the chair .     OBJECTIVE DATA SUMMARY:   Critical Behavior:  Neurologic State: Alert  Orientation Level: Oriented X4  Cognition: Follows commands  Safety/Judgement: Fall prevention  Functional Mobility Training:  Bed Mobility:  Supine to Sit: Minimum assistance  Scooting: Contact guard assistance    Transfers:  Sit to Stand: Minimum assistance  Stand to Sit: Minimum assistance  Bed to Chair: Contact guard assistance(using RW)    Balance:  Sitting: Intact  Standing: With support  Standing - Static: Good  Standing - Dynamic : Fair       Therapeutic Exercises:         EXERCISE   Sets   Reps   Active Active Assist   Passive Self ROM   Comments   Ankle Pumps 1 20  [] [] [] []    Quad Sets/Glut Sets    [] [] [] [] Hold for 5 secs   Hamstring Sets   [] [] [] []    Short Arc Quads   [] [] [] []    Heel Slides   [] [] [] []    Straight Leg Raises   [] [] [] []    Hip Add   [] [] [] [] Hold for 5 secs, w/ pillow squeeze   Long Arc Quads 1 10 [x] [] [] []    Seated Marching   [] [] [] []    Standing Marching   [] [] [] []       [] [] [] []        Pain:  Pain level pre-treatment: 0/10  Pain level post-treatment: 0/10       Activity Tolerance:   Fair  Please refer to the flowsheet for vital signs taken during this treatment.   After treatment:   [x] Patient left in no apparent distress sitting up in recliner  [] Patient left in no apparent distress in bed  [x] Call bell left within reach  [x] Nursing notified  [] Caregiver present  [] Bed alarm activated  [] SCDs applied      COMMUNICATION/EDUCATION:   [x]         Role of Physical Therapy in the acute care setting. [x]         Fall prevention education was provided and the patient/caregiver indicated understanding. [x]         Patient/family have participated as able in working toward goals and plan of care. []         Patient/family agree to work toward stated goals and plan of care. []         Patient understands intent and goals of therapy, but is neutral about his/her participation.   []         Patient is unable to participate in stated goals/plan of care: ongoing with therapy staff.  []         Other:        Shirley Soto, PT   Time Calculation: 30 mins

## 2020-05-05 NOTE — PROGRESS NOTES
In Patient Progress note    Admit Date: 4/30/2020    Impression:   #1 Acute kidney injury on chronic kidney disease stage III. Creatinine slightly higher but I think with cardiorenal syndrome we may need to allow her creatinine to  Be slightly higher to keep her functional and improve respiratory status , decreasing her lasix dose some. #2 Acute on chronic diastolic CHF. Improved breathing , lying flat no distress  ABG noted , CXR noted improved pulm congestion   #3 hypertension, stable   #4 dyslipidemia  #5 hypothermia/SIRS on empirical antibiotics per internal medicine.   #6 COVID-19 -ve   #7 leg edema , duplex neg for DVT     Plan:   #1 Lasix 20 mg BID   #2 strict ins and outs, fluid restrict 1200 mm  #3 follow cardiology recommendations  #4 AB per primary   #5 avoid nephrotoxins renally dose medications     Please call with questions,     Jeovany Calderon MD [facility]  Cell [phone]  Pager: [phone]    Subjective:     Feels better  Lying flat no distress      Current Facility-Administered Medications:     furosemide (LASIX) tablet 20 mg, 20 mg, Oral, BID, Maxwell Vitale MD    ondansetron [facility]) injection 4 mg, 4 mg, IntraVENous, Q6H PRN, Fabiola Shen MD, 4 mg at 05/04/20 1134    loratadine (CLARITIN) tablet 10 mg, 10 mg, Oral, DAILY PRN, Fabiola Shen MD, 10 mg at 05/04/20 1134    VANCOMYCIN INFORMATION NOTE, , Other, Rx Dosing/Monitoring, Herminia Montemayor MD    heparin (porcine) injection 5,000 Units, 5,000 Units, SubCUTAneous, Q8H, Fransisco Miller MD, 5,000 Units at 05/05/20 0816    sodium chloride (NS) flush 5-10 mL, 5-10 mL, IntraVENous, PRN, Herminia Montemayor MD    amLODIPine (NORVASC) tablet 10 mg, 10 mg, Oral, DAILY, Ani Mancera T, [place], 10 mg at 05/05/20 6001    aspirin chewable tablet 81 mg, 81 mg, Oral, DAILY, Larence Maurice T, [place], 81 mg at 05/05/20 0391    carvediloL (COREG) tablet 25 mg, 25 mg, Oral, BID WITH MEALS, Ani Mancera, [place], 25 mg at 05/05/20 2015   cholecalciferol (VITAMIN D3) capsule 5,000 Units, 5,000 Units, Oral, DAILY, [place], [place], 5,000 Units at 05/05/20 8152    citalopram (CELEXA) tablet 20 mg, 20 mg, Oral, DAILY, [facility] Serenity WYNN, 20 mg at 05/05/20 5112    cyanocobalamin tablet 1,000 mcg, 1,000 mcg, Oral, BID, [facility], 1,000 mcg at 05/05/20 7149    pantoprazole (PROTONIX) tablet 40 mg, 40 mg, Oral, ACB&D, [facility] Center[facility]i, PA, 40 mg at 05/05/20 8808    hydrALAZINE (APRESOLINE) tablet 100 mg, 100 mg, Oral, TID, DenilsonAni Christensen, PA, 100 mg at 05/05/20 8347    rosuvastatin (CRESTOR) tablet 5 mg, 5 mg, Oral, QHS, [facility] [place], 5 mg at 05/05/20 0041    ascorbic acid (vitamin C) (VITAMIN C) tablet 500 mg, 500 mg, Oral, BID, DenilsonAni Christensen, PA, 500 mg at 05/05/20 5130    zinc sulfate (ZINCATE) 220 (50) mg capsule 1 Cap, 1 Cap, Oral, DAILY, [place] President T, [place], 1 Cap at 05/05/20 9338    insulin lispro (HUMALOG) injection, , SubCUTAneous, AC&HS, [place] [facility] [facility], Stopped at 05/05/20 0730    glucose chewable tablet 16 g, 16 g, Oral, PRN, Ani Alvarado PA    glucagon (GLUCAGEN) injection 1 mg, 1 mg, IntraMUSCular, PRN, Ani Alvarado PA    dextrose (D50W) injection syrg 12.5-25 g, 25-50 mL, IntraVENous, PRN, Ani Alvarado, PA    insulin glargine (LANTUS) injection 4 Units, 4 Units, SubCUTAneous, QHS, RahmanAni dailey [place], 4 Units at 05/05/20 0045    polyethylene glycol (MIRALAX) packet 17 g, 17 g, Oral, DAILY PRN, Ani Alvarado PA, 17 g at 05/03/20 1205    acetaminophen (TYLENOL) tablet 650 mg, 650 mg, Oral, Q4H PRN, Ani Alvarado PA, 650 mg at 05/04/20 1500    ferrous sulfate tablet 325 mg, 1 Tab, Oral, EVERY OTHER DAY, Coco Cardenas MD, 325 mg at 05/05/20 0908        Objective:     Visit Vitals  /62 (BP 1 Location: Right arm, BP Patient Position: At rest)   Pulse 62   Temp 97.8 °F (36.6 °C)   Resp 18   Ht 5' 5\" (1.651 m)   Wt 116 kg (255 lb 12.8 oz)   SpO2 98%   Breastfeeding No   BMI 42.57 kg/m² Intake/Output Summary (Last 24 hours) at 5/5/2020 0942  Last data filed at 5/5/2020 0908  Gross per 24 hour   Intake 480 ml   Output 500 ml   Net -20 ml       Physical Exam:     General: NAD, alert and oriented. Neck: No jvd. LUNGS: Clear to Auscultation, No rales, rhonchi or wheezes. CVS EXM: S1, S2  RRR, no murmurs/gallops/rubs. Abdomen: soft, non tender. Lower Extremities:  1+ edema.        Data Review:    Recent Labs     05/04/20  0515   WBC 6.6   RBC 2.77*   HCT 23.7*   MCV 85.6   MCH 28.5   MCHC 33.3   RDW 14.0     Recent Labs     05/05/20  0518 05/04/20  0515 05/03/20  0337   BUN 94* 86*  --    CREA 3.15* 2.98*  --    CA 8.2* 8.0* 8.0*   K 3.9 3.9  --    * 132*  --    CL 98* 99*  --    CO2 24 23  --    PHOS  --  5.2*  --    GLU 99 116*  --        Yamileth Davis MD

## 2020-05-05 NOTE — PROGRESS NOTES
Bedside shift change report given to Keesha Mullins RN (oncoming nurse) by Nereida Rebolledo RN (offgoing nurse). Report included the following information SBAR, Kardex and MAR.

## 2020-05-06 ENCOUNTER — APPOINTMENT (OUTPATIENT)
Dept: GENERAL RADIOLOGY | Age: 70
DRG: 291 | End: 2020-05-06
Attending: INTERNAL MEDICINE
Payer: MEDICARE

## 2020-05-06 LAB
ANION GAP SERPL CALC-SCNC: 8 MMOL/L (ref 3–18)
BACTERIA SPEC CULT: NORMAL
BACTERIA SPEC CULT: NORMAL
BNP SERPL-MCNC: 7539 PG/ML (ref 0–900)
BUN SERPL-MCNC: 90 MG/DL (ref 7–18)
BUN/CREAT SERPL: 27 (ref 12–20)
CALCIUM SERPL-MCNC: 8 MG/DL (ref 8.5–10.1)
CHLORIDE SERPL-SCNC: 96 MMOL/L (ref 100–111)
CO2 SERPL-SCNC: 24 MMOL/L (ref 21–32)
CREAT SERPL-MCNC: 3.37 MG/DL (ref 0.6–1.3)
GLUCOSE BLD STRIP.AUTO-MCNC: 113 MG/DL (ref 70–110)
GLUCOSE BLD STRIP.AUTO-MCNC: 117 MG/DL (ref 70–110)
GLUCOSE BLD STRIP.AUTO-MCNC: 124 MG/DL (ref 70–110)
GLUCOSE BLD STRIP.AUTO-MCNC: 126 MG/DL (ref 70–110)
GLUCOSE SERPL-MCNC: 104 MG/DL (ref 74–99)
POTASSIUM SERPL-SCNC: 4.3 MMOL/L (ref 3.5–5.5)
SERVICE CMNT-IMP: NORMAL
SERVICE CMNT-IMP: NORMAL
SODIUM SERPL-SCNC: 128 MMOL/L (ref 136–145)
VANCOMYCIN SERPL-MCNC: 20.4 UG/ML (ref 5–40)

## 2020-05-06 PROCEDURE — 74011250636 HC RX REV CODE- 250/636: Performed by: HOSPITALIST

## 2020-05-06 PROCEDURE — 80202 ASSAY OF VANCOMYCIN: CPT

## 2020-05-06 PROCEDURE — 74011250636 HC RX REV CODE- 250/636: Performed by: INTERNAL MEDICINE

## 2020-05-06 PROCEDURE — 83880 ASSAY OF NATRIURETIC PEPTIDE: CPT

## 2020-05-06 PROCEDURE — 80048 BASIC METABOLIC PNL TOTAL CA: CPT

## 2020-05-06 PROCEDURE — 74011250637 HC RX REV CODE- 250/637: Performed by: INTERNAL MEDICINE

## 2020-05-06 PROCEDURE — 77010033678 HC OXYGEN DAILY

## 2020-05-06 PROCEDURE — 82962 GLUCOSE BLOOD TEST: CPT

## 2020-05-06 PROCEDURE — 74011250637 HC RX REV CODE- 250/637: Performed by: PHYSICIAN ASSISTANT

## 2020-05-06 PROCEDURE — 65660000000 HC RM CCU STEPDOWN

## 2020-05-06 PROCEDURE — 74011636637 HC RX REV CODE- 636/637: Performed by: PHYSICIAN ASSISTANT

## 2020-05-06 PROCEDURE — 36415 COLL VENOUS BLD VENIPUNCTURE: CPT

## 2020-05-06 PROCEDURE — 3331090002 HH PPS REVENUE DEBIT

## 2020-05-06 PROCEDURE — 71045 X-RAY EXAM CHEST 1 VIEW: CPT

## 2020-05-06 PROCEDURE — 3331090001 HH PPS REVENUE CREDIT

## 2020-05-06 RX ORDER — FUROSEMIDE 10 MG/ML
20 INJECTION INTRAMUSCULAR; INTRAVENOUS 2 TIMES DAILY
Status: DISCONTINUED | OUTPATIENT
Start: 2020-05-06 | End: 2020-05-10

## 2020-05-06 RX ORDER — FUROSEMIDE 20 MG/1
20 TABLET ORAL DAILY
Status: DISCONTINUED | OUTPATIENT
Start: 2020-05-07 | End: 2020-05-06

## 2020-05-06 RX ORDER — MIRTAZAPINE 15 MG/1
15 TABLET, FILM COATED ORAL
Status: DISCONTINUED | OUTPATIENT
Start: 2020-05-06 | End: 2020-05-14 | Stop reason: HOSPADM

## 2020-05-06 RX ADMIN — CYANOCOBALAMIN TAB 1000 MCG 1000 MCG: 1000 TAB at 09:17

## 2020-05-06 RX ADMIN — Medication 1 CAPSULE: at 09:17

## 2020-05-06 RX ADMIN — INSULIN GLARGINE 4 UNITS: 100 INJECTION, SOLUTION SUBCUTANEOUS at 23:11

## 2020-05-06 RX ADMIN — ROSUVASTATIN CALCIUM 5 MG: 10 TABLET, FILM COATED ORAL at 23:10

## 2020-05-06 RX ADMIN — HEPARIN SODIUM 5000 UNITS: 5000 INJECTION INTRAVENOUS; SUBCUTANEOUS at 18:26

## 2020-05-06 RX ADMIN — MIRTAZAPINE 15 MG: 15 TABLET, FILM COATED ORAL at 23:11

## 2020-05-06 RX ADMIN — Medication 5000 UNITS: at 09:17

## 2020-05-06 RX ADMIN — HYDRALAZINE HYDROCHLORIDE 100 MG: 50 TABLET, FILM COATED ORAL at 09:17

## 2020-05-06 RX ADMIN — PANTOPRAZOLE SODIUM 40 MG: 40 TABLET, DELAYED RELEASE ORAL at 09:17

## 2020-05-06 RX ADMIN — ACETAMINOPHEN 650 MG: 325 TABLET ORAL at 09:27

## 2020-05-06 RX ADMIN — Medication 500 MG: at 09:18

## 2020-05-06 RX ADMIN — FUROSEMIDE 20 MG: 10 INJECTION, SOLUTION INTRAMUSCULAR; INTRAVENOUS at 18:25

## 2020-05-06 RX ADMIN — FUROSEMIDE 20 MG: 20 TABLET ORAL at 09:17

## 2020-05-06 RX ADMIN — HYDRALAZINE HYDROCHLORIDE 100 MG: 50 TABLET, FILM COATED ORAL at 23:10

## 2020-05-06 RX ADMIN — HEPARIN SODIUM 5000 UNITS: 5000 INJECTION INTRAVENOUS; SUBCUTANEOUS at 09:18

## 2020-05-06 RX ADMIN — PANTOPRAZOLE SODIUM 40 MG: 40 TABLET, DELAYED RELEASE ORAL at 23:10

## 2020-05-06 RX ADMIN — AMLODIPINE BESYLATE 10 MG: 10 TABLET ORAL at 09:17

## 2020-05-06 RX ADMIN — CYANOCOBALAMIN TAB 1000 MCG 1000 MCG: 1000 TAB at 23:10

## 2020-05-06 RX ADMIN — ONDANSETRON 4 MG: 2 INJECTION INTRAMUSCULAR; INTRAVENOUS at 09:27

## 2020-05-06 RX ADMIN — HEPARIN SODIUM 5000 UNITS: 5000 INJECTION INTRAVENOUS; SUBCUTANEOUS at 23:10

## 2020-05-06 RX ADMIN — LORATADINE 10 MG: 10 TABLET ORAL at 09:27

## 2020-05-06 RX ADMIN — CARVEDILOL 25 MG: 25 TABLET, FILM COATED ORAL at 23:10

## 2020-05-06 RX ADMIN — CARVEDILOL 25 MG: 25 TABLET, FILM COATED ORAL at 09:17

## 2020-05-06 RX ADMIN — ASPIRIN 81 MG CHEWABLE TABLET 81 MG: 81 TABLET CHEWABLE at 09:17

## 2020-05-06 RX ADMIN — Medication 500 MG: at 23:10

## 2020-05-06 RX ADMIN — CITALOPRAM HYDROBROMIDE 20 MG: 20 TABLET ORAL at 09:17

## 2020-05-06 NOTE — PROGRESS NOTES
In Patient Progress note    Admit Date: 4/30/2020    Impression:   #1 Acute kidney injury on chronic kidney disease stage III. Creatinine slightly higher but with cardiorenal syndrome we may need to allow her creatinine to  be slightly higher to keep her functional and improve respiratory status , holding lasix   #2 Acute on chronic diastolic CHF. Improved breathing , lying flat no distress  ABG noted , CXR noted improved pulm congestion   #3 hypertension, stable   #4 dyslipidemia  #5 hypothermia/SIRS on empirical antibiotics per internal medicine.   #6 COVID-19 -ve   #7 leg edema , duplex neg for DVT  #8 hyponatremia in setting of CHF      Plan:   #1 holding lasix as worsening creat and hyponatremia   #2 check CXR and BNP   #3 strict ins and outs, fluid restrict 1000 mm  #4 encourage po intake , may need appetite stimulant   #5 follow cardiology recommendations  #6 AB per primary   #7 avoid nephrotoxins renally dose medications     Please call with questions,     Tony Caicedo MD FASN  Cell [phone]  Pager: [phone]    Subjective:     Poor appetite   Says she is nauseous   O2 2 lit   Subjectively breathing not better, exertional dyspnea        Current Facility-Administered Medications:     [START ON 5/7/2020] furosemide (LASIX) tablet 20 mg, 20 mg, Oral, DAILY, Maxwell Vitale MD    ondansetron [facility]) injection 4 mg, 4 mg, IntraVENous, Q6H PRN, Lorena Serrano MD, 4 mg at 05/06/20 3410    loratadine (CLARITIN) tablet 10 mg, 10 mg, Oral, DAILY PRN, Lorena Serrano MD, 10 mg at 05/06/20 4400    VANCOMYCIN INFORMATION NOTE, , Other, Rx Dosing/Monitoring, Ana Laura Nieves MD    heparin (porcine) injection 5,000 Units, 5,000 Units, SubCUTAneous, Q8H, Cheyenne Rubio MD, 5,000 Units at 05/06/20 1222    sodium chloride (NS) flush 5-10 mL, 5-10 mL, IntraVENous, PRN, Ana Laura Nieves MD    amLODIPine (NORVASC) tablet 10 mg, 10 mg, Oral, DAILY, Ani Henry [place], 10 mg at 05/06/20 0066   aspirin chewable tablet 81 mg, 81 mg, Oral, DAILY, [facility], 81 mg at 05/06/20 1073    carvediloL (COREG) tablet 25 mg, 25 mg, Oral, BID WITH MEALS, Ani Iglesias [place], 25 mg at 05/06/20 6278    cholecalciferol (VITAMIN D3) capsule 5,000 Units, 5,000 Units, Oral, DAILY, [facility] Cont[facility], 5,000 Units at 05/06/20 3417    citalopram (CELEXA) tablet 20 mg, 20 mg, Oral, DAILY, Ani Iglesias [place], 20 mg at 05/06/20 2353    cyanocobalamin tablet 1,000 mcg, 1,000 mcg, Oral, BID, Ani Iglesias [place], 1,000 mcg at 05/06/20 9402    pantoprazole (PROTONIX) tablet 40 mg, 40 mg, Oral, ACB&D, Ani Iglesias, PA, 40 mg at 05/06/20 1899    hydrALAZINE (APRESOLINE) tablet 100 mg, 100 mg, Oral, TID, Ani Iglesias, PA, 100 mg at 05/06/20 7048    rosuvastatin (CRESTOR) tablet 5 mg, 5 mg, Oral, QHS, RahmanAni [place], 5 mg at 05/05/20 2319    ascorbic acid (vitamin C) (VITAMIN C) tablet 500 mg, 500 mg, Oral, BID, Ani Iglesias, PA, 500 mg at 05/06/20 2130    zinc sulfate (ZINCATE) 220 (50) mg capsule 1 Cap, 1 Cap, Oral, DAILY, Serenity Smiley, 1 Cap at 05/06/20 0142    insulin lispro (HUMALOG) injection, , SubCUTAneous, AC&HS, Serenity Perry, Stopped at 05/05/20 1630    glucose chewable tablet 16 g, 16 g, Oral, PRN, Ani Iglesias PA    glucagon (GLUCAGEN) injection 1 mg, 1 mg, IntraMUSCular, PRN, Ani Iglesias PA    dextrose (D50W) injection syrg 12.5-25 g, 25-50 mL, IntraVENous, PRN, Ani Iglesias PA    insulin glargine (LANTUS) injection 4 Units, 4 Units, SubCUTAneous, QHS, Ani Rahman [place], 4 Units at 05/05/20 2332    polyethylene glycol (MIRALAX) packet 17 g, 17 g, Oral, DAILY PRN, Ani Iglesias PA, 17 g at 05/03/20 1205    acetaminophen (TYLENOL) tablet 650 mg, 650 mg, Oral, Q4H PRN, Ani Iglesias PA, 650 mg at 05/06/20 6325    ferrous sulfate tablet 325 mg, 1 Tab, Oral, EVERY OTHER DAY, Danny Christie MD, 325 mg at 05/05/20 0908        Objective:     Visit Vitals  BP 149/64 (BP 1 Location: Right arm, BP Patient Position: At rest)   Pulse 62   Temp 98 °F (36.7 °C)   Resp 14   Ht 5' 5\" (1.651 m)   Wt 116 kg (255 lb 12.8 oz)   SpO2 95%   Breastfeeding No   BMI 42.57 kg/m²         Intake/Output Summary (Last 24 hours) at 5/6/2020 1018  Last data filed at 5/6/2020 0527  Gross per 24 hour   Intake 264 ml   Output 550 ml   Net -286 ml       Physical Exam:     General: NAD, alert and oriented. Neck: No jvd. LUNGS: Clear to Auscultation, No rales, rhonchi or wheezes. CVS EXM: S1, S2  RRR, no murmurs/gallops/rubs. Abdomen: soft, non tender. Lower Extremities: 1+ edema.        Data Review:    Recent Labs     05/04/20  0515   WBC 6.6   RBC 2.77*   HCT 23.7*   MCV 85.6   MCH 28.5   MCHC 33.3   RDW 14.0     Recent Labs     05/06/20  0228 05/05/20  0518 05/04/20  0515   BUN 90* 94* 86*   CREA 3.37* 3.15* 2.98*   CA 8.0* 8.2* 8.0*   K 4.3 3.9 3.9   * 130* 132*   CL 96* 98* 99*   CO2 24 24 23   PHOS  --   --  5.2*   * 99 116*       Nguyễn Thomas MD

## 2020-05-06 NOTE — PROGRESS NOTES
Physical Therapy Note: Attempted to see pt for skilled PT session at [phone]. Pt declines, states she is nauseous. RN Kei Zavala made aware; pt is not due for another Zofran until around 3:30. She continues to decline at 1433, despite education provided on the importance of mobility. Will follow up as schedule permits.     Justina Brennan PT, DPT

## 2020-05-06 NOTE — PROGRESS NOTES
conducted an initial consultation and Spiritual Assessment for Rosalind Lockwood, who is a 71 y.o.,female. Patients Primary Language is: [place]. According to the patients EMR Quaker Affiliation is: Gnosticist.     The reason the Patient came to the hospital is:   Patient Active Problem List    Diagnosis Date Noted    Sepsis ([address] 04/30/2020    Respiratory failure ([address] 04/30/2020    SIRS (systemic inflammatory response syndrome) ([facility] [address] [address] 04/30/2020    CHF (congestive heart failure) ([address] 04/25/2020    Acute on chronic diastolic congestive heart failure ([facility] [address] 04/23/2020    Suspected Covid-19 Virus Infection 04/23/2020    Type 2 diabetes mellitus without complication, without long-term current use of insulin ([facility] [address] [address] 07/06/2018    Left anterior fascicular block 07/06/2018    HTN (hypertension), benign 07/06/2018    Coronary artery disease involving native coronary artery of native heart without angina pectoris 07/06/2018    Chronic diastolic congestive heart failure ([facility] [address] [address] 07/06/2018    Bilateral lower extremity edema 07/06/2018    BMI 35.0-35.9,adult 07/06/2018        The  provided the following Interventions:  Initiated a relationship of care and support. Explored issues of yaw, belief, spirituality and Orthodoxy/ritual needs while hospitalized. Listened empathically. Provided chaplaincy education. Provided information about Spiritual Care Services. Offered prayer and assurance of continued prayers on patient's behalf. Chart reviewed. The following outcomes where achieved:  Patient shared limited information about both their medical narrative and spiritual journey/beliefs. Patient processed feeling about current hospitalization. Patient expressed gratitude for 's visit. Assessment:  Patient does not have any Orthodoxy/cultural needs that will affect patients preferences in health care.   There are no spiritual or Orthodoxy issues which require intervention at this time. Plan:  Chaplains will continue to follow and will provide pastoral care on an as needed/requested basis.  recommends bedside caregivers page  on duty if patient shows signs of acute spiritual or emotional distress.     [id number] [facility]   [phone]

## 2020-05-06 NOTE — PROGRESS NOTES
[facility]  -  Progress Note      Patient: Alicja Porter MRN: [id number]  SSN: [id number]    YOB: 1950  Age: 71 y.o. Sex: female      Admit Date: 4/30/2020    Assessment:     -Acute on chronic respiratory failure. Multifactorial in setting of below. Recurrent ER visits since last discharge.    -Sepsis/SIRS, presented with hypothermia.  -Acute on chronic HFpEF. EF 55-60% by echo 4/25/20.  -Chronic kidney disease. Stable.  -Acute on chronic anemia. Continue to trend.  -Hypertension. Elevated on admission.  -Diabetes mellitus. HgbA1c 6.1.  -Dyslipidemia. On statin.  -Coronary disease reportedly diffuse medically managed, nuclear stress 3/2018 with small area of ischemia with EF 53%  -Chronic atypical LBBB. -COVID negative 4/30/2020.     Primary cardiologist Dr. Janessa Barbosa at [facility], now plans to follow up with Dr. Felix Gist:     -Cr slightly increased today, appreciate ongoing assistance of nephrology team in regards to volume management.    -Continued on Amlodipine, Coreg, Hydralazine, ASA, Crestor.  -Increase mobility as tolerated. Subjective:     Reports shortness of breath, nausea. No chest/abdominal pain, vomiting or diarrhea.     Objective:      Patient Vitals for the past 8 hrs:   Temp Pulse Resp BP SpO2   05/06/20 0830 98 °F (36.7 °C) 62 14 149/64 95 %   05/06/20 0522 98.1 °F (36.7 °C) 62 16 146/57 96 %         Patient Vitals for the past 96 hrs:   Weight   05/04/20 0403 255 lb 12.8 oz (116 kg)   05/03/20 0444 258 lb 6.4 oz (117.2 kg)         Intake/Output Summary (Last 24 hours) at 5/6/2020 1104  Last data filed at 5/6/2020 7954  Gross per 24 hour   Intake 264 ml   Output 550 ml   Net -286 ml       Physical Exam:  General:  alert, cooperative, no distress, appears stated age  Neck:  supple  Lungs:  clear to auscultation bilaterally to anterolateral lung fields  Heart:  Regular rate and rhythm  Abdomen:  abdomen is soft without significant tenderness, masses, organomegaly or guarding  Extremities:  Atraumatic, trace-1+ edema     Data Review:     Labs: Results:       Chemistry Recent Labs     05/06/20  0228 05/05/20  0518 05/04/20  0515   * 99 116*   * 130* 132*   K 4.3 3.9 3.9   CL 96* 98* 99*   CO2 24 24 23   BUN 90* 94* 86*   CREA 3.37* 3.15* 2.98*   CA 8.0* 8.2* 8.0*   MG  --   --  2.3   PHOS  --   --  5.2*   AGAP 8 8 10   BUCR 27* 30* 29*      CBC w/Diff Recent Labs     05/04/20  0515   WBC 6.6   RBC 2.77*   HGB 7.9*   HCT 23.7*      GRANS 83*   LYMPH 13*   EOS 0      Lipid Panel Lab Results   Component Value Date/Time    Cholesterol, total 182 04/24/2020 02:34 AM    HDL Cholesterol 41 04/24/2020 02:34 AM    LDL, calculated 112.8 (H) 04/24/2020 02:34 AM    VLDL, calculated 28.2 04/24/2020 02:34 AM    Triglyceride 141 04/24/2020 02:34 AM    CHOL/HDL Ratio 4.4 04/24/2020 02:34 AM

## 2020-05-06 NOTE — PROGRESS NOTES
Received SBAR from [employer]. Pt AOx4, Tele box #[id number] SR, RA, John draining, pt in bed/bed in low position, wheels locked. 1900-Bedside and Verbal shift change report given to Zita Fox (oncoming nurse) by [place] RN (offgoing nurse). Report included the following information SBAR, Kardex and MAR.

## 2020-05-06 NOTE — WOUND CARE
Physical Exam  
Room 461: pt states she has had her compression wraps on since last Thursday & they are too tight now. Compression wraps removed, no wounds noted, no weeping or drainage noted, discussed pt can start looking for & using compression socks upon discharge from hospital. 
Discussed with primary nurse Ki Dewitt. Will turn over care to nursing staff at this time.  
Gamaliel ZEPEDAN, RN, [facility], [address]

## 2020-05-06 NOTE — PROGRESS NOTES
Discharge planning:    Patient is slowly progressing towards discharge. PT is recommending home health vs. Rehab. This writer notified Eliza Doll, with ARU, to evaluate patient for potential admission to ARU. This writer will continue to monitor for discharge planning to ensure a safe discharge from [place]. Raymundo Shook MSW  Care Manager  Pager#: [phone]

## 2020-05-06 NOTE — PROGRESS NOTES
Reviewed patients CXR with persistent mild-mod pulm edema.   BNP elevated at 7539  Needs continuing gentle diuresis 20 mg IV  BID   Creatinine elevated , but her renal functions appear to be worsening   In setting of cardiorenal syndrome and not really from a new OFE     Please call with questions,    Malou Garcia MD FASN  Cell [phone]  Pager: [phone]

## 2020-05-06 NOTE — PROGRESS NOTES
[facility]  Progress Note    Patient: Amanda Be Age: 71 y.o. : 1950 MR#: [id number] SSN: [id number]  Date/Time: 2020    Subjective:     Pt reports she feels so so, complains of nausea and feeling hot. Reports she feels depressed. Assessment/Plan:   71 y o female w/ h/o CKD and CHF re admitted soon after discharge after presenting w/ cc of SOB.    -Acute on chronic respiratory failure at baseline 02 need  -Acute on chronic diastolic heart failure on lasix switched to PO, creat trending up w/ diuresis  -COVID testing negative   -Acute on chronic renal failure, in setting of diuresis, nephrology following.  -Hyponatremia in setting of above  -Deconditioned state  -Depressed mood    HISTORY OF:  -HFpEF  -T2DM A1c of 6.1 on lantus, corrective insulin  -CAD  -HTN  -CKD III-creat w/ some trend upwards     PLAN:  -Diuresis per cardiology, nephrology  -Continue to monitor renal function and electrolytes  -PT/OT  -Start remeron     Dispo: Awaiting stability of renal function, looking into rehab placement as advised by PT.   Additional Notes:      Case discussed with:  [x]Patient  []Family  [x]Nursing  []Case Management  DVT Prophylaxis:  []Lovenox  [x]Hep SQ  []SCDs  []Coumadin   []On Heparin gtt    Objective:   VS:   Visit Vitals  /63 (BP 1 Location: Right arm, BP Patient Position: At rest)   Pulse (!) 52   Temp 97.4 °F (36.3 °C)   Resp 17   Ht 5' 5\" (1.651 m)   Wt 116 kg (255 lb 12.8 oz)   SpO2 93%   Breastfeeding No   BMI 42.57 kg/m²      Tmax/24hrs: Temp (24hrs), Av.5 °F (36.4 °C), Min:96.8 °F (36 °C), Max:98.1 °F (36.7 °C)    Input/Output:     Intake/Output Summary (Last 24 hours) at 2020 1411  Last data filed at 2020 1339  Gross per 24 hour   Intake 720 ml   Output 550 ml   Net 170 ml       General:  Alert, awake, in NAD  Cardiovascular:  Rrr, no murmurs  Pulmonary: ctab   GI:  Soft, nt, nd  Extremities: 2+edema   Additional:      Labs: Recent Results (from the past 24 hour(s))   GLUCOSE, POC    Collection Time: 05/05/20  4:30 PM   Result Value Ref Range    Glucose (POC) 141 (H) 70 - 110 mg/dL   GLUCOSE, POC    Collection Time: 05/05/20 11:25 PM   Result Value Ref Range    Glucose (POC) 137 (H) 70 - 370 mg/dL   METABOLIC PANEL, BASIC    Collection Time: 05/06/20  2:28 AM   Result Value Ref Range    Sodium 128 (L) 136 - 145 mmol/L    Potassium 4.3 3.5 - 5.5 mmol/L    Chloride 96 (L) 100 - 111 mmol/L    CO2 24 21 - 32 mmol/L    Anion gap 8 3.0 - 18 mmol/L    Glucose 104 (H) 74 - 99 mg/dL    BUN 90 (H) 7.0 - 18 MG/DL    Creatinine 3.37 (H) 0.6 - 1.3 MG/DL    BUN/Creatinine ratio 27 (H) 12 - 20      GFR est AA 16 (L) >60 ml/min/1.73m2    GFR est non-AA 14 (L) >60 ml/min/1.73m2    Calcium 8.0 (L) 8.5 - 10.1 MG/DL   VANCOMYCIN, RANDOM    Collection Time: 05/06/20  2:28 AM   Result Value Ref Range    Vancomycin, random 20.4 5.0 - 40.0 UG/ML   NT-PRO BNP    Collection Time: 05/06/20  2:28 AM   Result Value Ref Range    NT pro-BNP 7,539 (H) 0 - 900 PG/ML   GLUCOSE, POC    Collection Time: 05/06/20  8:27 AM   Result Value Ref Range    Glucose (POC) 117 (H) 70 - 110 mg/dL   GLUCOSE, POC    Collection Time: 05/06/20 12:00 PM   Result Value Ref Range    Glucose (POC) 124 (H) 70 - 110 mg/dL     Additional Data Reviewed:      Signed By: Britton Carr MD     May 6, 2020 2:53 PM

## 2020-05-07 LAB
ANION GAP SERPL CALC-SCNC: 8 MMOL/L (ref 3–18)
BUN SERPL-MCNC: 97 MG/DL (ref 7–18)
BUN/CREAT SERPL: 30 (ref 12–20)
CALCIUM SERPL-MCNC: 8.3 MG/DL (ref 8.5–10.1)
CHLORIDE SERPL-SCNC: 95 MMOL/L (ref 100–111)
CO2 SERPL-SCNC: 24 MMOL/L (ref 21–32)
CREAT SERPL-MCNC: 3.25 MG/DL (ref 0.6–1.3)
GLUCOSE BLD STRIP.AUTO-MCNC: 105 MG/DL (ref 70–110)
GLUCOSE BLD STRIP.AUTO-MCNC: 108 MG/DL (ref 70–110)
GLUCOSE BLD STRIP.AUTO-MCNC: 113 MG/DL (ref 70–110)
GLUCOSE BLD STRIP.AUTO-MCNC: 118 MG/DL (ref 70–110)
GLUCOSE BLD STRIP.AUTO-MCNC: 120 MG/DL (ref 70–110)
GLUCOSE BLD STRIP.AUTO-MCNC: 137 MG/DL (ref 70–110)
GLUCOSE SERPL-MCNC: 89 MG/DL (ref 74–99)
HBV SURFACE AB SER QL IA: NEGATIVE
HBV SURFACE AB SERPL IA-ACNC: <3.1 MIU/ML
HBV SURFACE AG SER QL: <0.1 INDEX
HBV SURFACE AG SER QL: NEGATIVE
HEP BS AB COMMENT,HBSAC: ABNORMAL
POTASSIUM SERPL-SCNC: 4.3 MMOL/L (ref 3.5–5.5)
SODIUM SERPL-SCNC: 127 MMOL/L (ref 136–145)
VANCOMYCIN SERPL-MCNC: 18.9 UG/ML (ref 5–40)

## 2020-05-07 PROCEDURE — 0JH63XZ INSERTION OF TUNNELED VASCULAR ACCESS DEVICE INTO CHEST SUBCUTANEOUS TISSUE AND FASCIA, PERCUTANEOUS APPROACH: ICD-10-PCS | Performed by: SURGERY

## 2020-05-07 PROCEDURE — C1750 CATH, HEMODIALYSIS,LONG-TERM: HCPCS | Performed by: SURGERY

## 2020-05-07 PROCEDURE — 77030018719 HC DRSG PTCH ANTIMIC J&J -A: Performed by: SURGERY

## 2020-05-07 PROCEDURE — 82962 GLUCOSE BLOOD TEST: CPT

## 2020-05-07 PROCEDURE — 80202 ASSAY OF VANCOMYCIN: CPT

## 2020-05-07 PROCEDURE — 74011250636 HC RX REV CODE- 250/636: Performed by: SURGERY

## 2020-05-07 PROCEDURE — 5A1D70Z PERFORMANCE OF URINARY FILTRATION, INTERMITTENT, LESS THAN 6 HOURS PER DAY: ICD-10-PCS | Performed by: INTERNAL MEDICINE

## 2020-05-07 PROCEDURE — 74011250637 HC RX REV CODE- 250/637: Performed by: INTERNAL MEDICINE

## 2020-05-07 PROCEDURE — 77030013744: Performed by: SURGERY

## 2020-05-07 PROCEDURE — 77030039266 HC ADH SKN EXOFIN S2SG -A: Performed by: SURGERY

## 2020-05-07 PROCEDURE — 90935 HEMODIALYSIS ONE EVALUATION: CPT

## 2020-05-07 PROCEDURE — 99152 MOD SED SAME PHYS/QHP 5/>YRS: CPT | Performed by: SURGERY

## 2020-05-07 PROCEDURE — 36558 INSERT TUNNELED CV CATH: CPT | Performed by: SURGERY

## 2020-05-07 PROCEDURE — 87340 HEPATITIS B SURFACE AG IA: CPT

## 2020-05-07 PROCEDURE — 74011250636 HC RX REV CODE- 250/636: Performed by: INTERNAL MEDICINE

## 2020-05-07 PROCEDURE — 86706 HEP B SURFACE ANTIBODY: CPT

## 2020-05-07 PROCEDURE — 74011636637 HC RX REV CODE- 636/637: Performed by: PHYSICIAN ASSISTANT

## 2020-05-07 PROCEDURE — 74011250637 HC RX REV CODE- 250/637: Performed by: PHYSICIAN ASSISTANT

## 2020-05-07 PROCEDURE — 65660000000 HC RM CCU STEPDOWN

## 2020-05-07 PROCEDURE — 80048 BASIC METABOLIC PNL TOTAL CA: CPT

## 2020-05-07 PROCEDURE — 74011250636 HC RX REV CODE- 250/636: Performed by: STUDENT IN AN ORGANIZED HEALTH CARE EDUCATION/TRAINING PROGRAM

## 2020-05-07 PROCEDURE — 74011250636 HC RX REV CODE- 250/636: Performed by: HOSPITALIST

## 2020-05-07 PROCEDURE — 02H633Z INSERTION OF INFUSION DEVICE INTO RIGHT ATRIUM, PERCUTANEOUS APPROACH: ICD-10-PCS | Performed by: SURGERY

## 2020-05-07 PROCEDURE — 77030002933 HC SUT MCRYL J&J -A: Performed by: SURGERY

## 2020-05-07 PROCEDURE — 36415 COLL VENOUS BLD VENIPUNCTURE: CPT

## 2020-05-07 PROCEDURE — 77030002986 HC SUT PROL J&J -A: Performed by: SURGERY

## 2020-05-07 PROCEDURE — 3331090001 HH PPS REVENUE CREDIT

## 2020-05-07 PROCEDURE — 74011000250 HC RX REV CODE- 250: Performed by: SURGERY

## 2020-05-07 PROCEDURE — 3331090002 HH PPS REVENUE DEBIT

## 2020-05-07 DEVICE — PRECISION CHRONIC CATHETER SPORT PACK,SYMMETRICAL TIP, TAL VENATRAC STYLET,14.5 FR/CH (4.8 MM) X 19 CM
Type: IMPLANTABLE DEVICE | Status: FUNCTIONAL
Brand: PALINDROME

## 2020-05-07 RX ORDER — HEPARIN SODIUM 5000 [USP'U]/ML
INJECTION, SOLUTION INTRAVENOUS; SUBCUTANEOUS AS NEEDED
Status: DISCONTINUED | OUTPATIENT
Start: 2020-05-07 | End: 2020-05-07 | Stop reason: HOSPADM

## 2020-05-07 RX ORDER — LIDOCAINE HYDROCHLORIDE 10 MG/ML
INJECTION, SOLUTION EPIDURAL; INFILTRATION; INTRACAUDAL; PERINEURAL AS NEEDED
Status: DISCONTINUED | OUTPATIENT
Start: 2020-05-07 | End: 2020-05-07 | Stop reason: HOSPADM

## 2020-05-07 RX ORDER — MIDAZOLAM HYDROCHLORIDE 1 MG/ML
INJECTION, SOLUTION INTRAMUSCULAR; INTRAVENOUS AS NEEDED
Status: DISCONTINUED | OUTPATIENT
Start: 2020-05-07 | End: 2020-05-07 | Stop reason: HOSPADM

## 2020-05-07 RX ORDER — DOXERCALCIFEROL 4 UG/2ML
4 INJECTION INTRAVENOUS
Status: DISCONTINUED | OUTPATIENT
Start: 2020-05-07 | End: 2020-05-13

## 2020-05-07 RX ORDER — FENTANYL CITRATE 50 UG/ML
INJECTION, SOLUTION INTRAMUSCULAR; INTRAVENOUS AS NEEDED
Status: DISCONTINUED | OUTPATIENT
Start: 2020-05-07 | End: 2020-05-07 | Stop reason: HOSPADM

## 2020-05-07 RX ADMIN — HYDRALAZINE HYDROCHLORIDE 100 MG: 50 TABLET, FILM COATED ORAL at 09:40

## 2020-05-07 RX ADMIN — PANTOPRAZOLE SODIUM 40 MG: 40 TABLET, DELAYED RELEASE ORAL at 09:41

## 2020-05-07 RX ADMIN — INSULIN GLARGINE 4 UNITS: 100 INJECTION, SOLUTION SUBCUTANEOUS at 21:40

## 2020-05-07 RX ADMIN — MIRTAZAPINE 15 MG: 15 TABLET, FILM COATED ORAL at 21:35

## 2020-05-07 RX ADMIN — FUROSEMIDE 20 MG: 10 INJECTION, SOLUTION INTRAMUSCULAR; INTRAVENOUS at 17:51

## 2020-05-07 RX ADMIN — HYDRALAZINE HYDROCHLORIDE 100 MG: 50 TABLET, FILM COATED ORAL at 17:51

## 2020-05-07 RX ADMIN — Medication 500 MG: at 09:39

## 2020-05-07 RX ADMIN — Medication 5000 UNITS: at 09:41

## 2020-05-07 RX ADMIN — Medication 1 CAPSULE: at 09:42

## 2020-05-07 RX ADMIN — CARVEDILOL 25 MG: 25 TABLET, FILM COATED ORAL at 09:39

## 2020-05-07 RX ADMIN — CITALOPRAM HYDROBROMIDE 20 MG: 20 TABLET ORAL at 09:41

## 2020-05-07 RX ADMIN — FUROSEMIDE 20 MG: 10 INJECTION, SOLUTION INTRAMUSCULAR; INTRAVENOUS at 09:41

## 2020-05-07 RX ADMIN — CYANOCOBALAMIN TAB 1000 MCG 1000 MCG: 1000 TAB at 20:33

## 2020-05-07 RX ADMIN — ACETAMINOPHEN 650 MG: 325 TABLET ORAL at 09:48

## 2020-05-07 RX ADMIN — PANTOPRAZOLE SODIUM 40 MG: 40 TABLET, DELAYED RELEASE ORAL at 20:33

## 2020-05-07 RX ADMIN — ROSUVASTATIN CALCIUM 5 MG: 10 TABLET, FILM COATED ORAL at 20:33

## 2020-05-07 RX ADMIN — SODIUM CHLORIDE 1000 MG: 900 INJECTION, SOLUTION INTRAVENOUS at 15:40

## 2020-05-07 RX ADMIN — HEPARIN SODIUM 5000 UNITS: 5000 INJECTION INTRAVENOUS; SUBCUTANEOUS at 20:36

## 2020-05-07 RX ADMIN — Medication 500 MG: at 20:33

## 2020-05-07 RX ADMIN — CARVEDILOL 25 MG: 25 TABLET, FILM COATED ORAL at 20:33

## 2020-05-07 RX ADMIN — LORATADINE 10 MG: 10 TABLET ORAL at 09:48

## 2020-05-07 RX ADMIN — CYANOCOBALAMIN TAB 1000 MCG 1000 MCG: 1000 TAB at 09:40

## 2020-05-07 RX ADMIN — AMLODIPINE BESYLATE 10 MG: 10 TABLET ORAL at 09:40

## 2020-05-07 RX ADMIN — HYDRALAZINE HYDROCHLORIDE 100 MG: 50 TABLET, FILM COATED ORAL at 20:33

## 2020-05-07 RX ADMIN — FERROUS SULFATE TAB 325 MG (65 MG ELEMENTAL FE) 325 MG: 325 (65 FE) TAB at 09:48

## 2020-05-07 RX ADMIN — ONDANSETRON 4 MG: 2 INJECTION INTRAMUSCULAR; INTRAVENOUS at 11:04

## 2020-05-07 RX ADMIN — ASPIRIN 81 MG CHEWABLE TABLET 81 MG: 81 TABLET CHEWABLE at 09:40

## 2020-05-07 NOTE — DIALYSIS
ACUTE HEMODIALYSIS FLOW SHEET    HEMODIALYSIS ORDERS: Physician: Dr. Hebert Crest: Iris   Duration: 3 hr   BFR: 250   DFR: 500   Dialysate:  Temp 36-37*C   K+  3.0    Ca+ 2.5   Na 138   Bicarb 35   Wt Readings from Last 1 Encounters:   05/04/20 116 kg (255 lb 12.8 oz)    Patient Chart [x]   Unable to Obtain []  Dry weight/UF Goal: 1500 ml    Heparin []  Bolus    Units    [] Hourly    Units    []None       Pre BP:   138/53    Pulse:  54   Respirations: 16    Temperature:  97.3 ax    Tx: NSS    ml/Bolus   [x] N/A   Labs: []  Pre  []  Post:   [x] N/A   Additional Orders(medications, blood products, hypotension management): [] Yes   [x] No     [x]  [facility] Consent Verified     CATHETER ACCESS:  []N/A   [x]Right   []Left   []IJ   []Fem  [x]Chest wall  []TransHepatic   [x] First use X-ray verified     [x]Tunnel    [] Non Tunneled   [x]No S/S infection  []Redness  []Drainage []Cultured []Swelling []Pain   [x]Medical Aseptic Prep Utilized   []Dressing Changed  [x] Biopatch  Date: 4/7/2020   []Clotted   [x]Patent   Flows: [x]Good  []Poor  []Reversed   If access problem,  notified: []Yes    [x]N/A        GRAFT/FISTULA ACCESS:   [x]N/A     []Right     []Left     []UE     []LE   []AVG   []AVF       []Medical Aseptic Prep Utilized   []No S/S infection  []Redness  []Drainage [] Cultured  [] Swelling  [] Pain  Bruit:   [] Strong    [] Weak       Thrill :   [] Strong    [] Weak     Needle Gauge: 15   Length: 1 inch   If access problem,  notified: []Yes     []N/A          GENERAL ASSESSMENT:    LUNGS:  Rate 16   SaO2%      [] Clear  [x] Coarse  [] Crackles  [] Wheezing                                                [x] Diminished     Location : []RLL   []LLL    []RUL  []KATIE   Cough:      []Productive  []Dry  [x]N/A   Respirations:  [x]Easy  []Labored   Therapy:  []RA  O2 Type:  [x]NC  Mask: []   NRB    [] BiPaP  Flow: 4 l/min                   [] Ventilator  [] Intubated  [] Trach     CARDIAC: [x] Regular [] Irregular   [] Pericardial Rub            []  Monitored  [] Bedside  [] Remotely monitored     EDEMA: [] None   [x]Generalized  [] Pitting [] 1+   [] 2 +   [] 3+    [] 4+  [] Pedal    SKIN:   [x] Warm  [] Hot     [] Cold   [x] Dry     [] Pale   [] Diaphoretic                  [] Flushed  [] Jaundiced  [] Cyanotic     LOC:    [] Alert      [x]Oriented:    [x] Person     [] Place  []Time               [] Confused  [x] Lethargic  [] Medicated  [] Non-responsive   GI / ABDOMEN:                     [] Flat    [] Distended    [x] Soft    [] Firm   []  Obese                   [] Diarrhea   [] FMS [x] Bowel Sounds  [] Nausea  [] Vomiting     / URINE ASSESSMENT:                   [] Voiding    [] Oliguria  [] Anuria   [x]  Lopez                  [] Incontinent  []  Incontinent Brief   []  PureWick     PAIN:  [x] 0 []1  []2   []3   []4   []5   []6   []7   []8   []9   []10            Scale 0-10  Action/Follow Up:    MOBILITY:  [x] Bed    [] Stretcher      All Vitals and Treatment Details on Attached [address]: [facility]          Room # CCL/PL    [] Routine         [x] 1st Time Acute    [] Urgent      [] Stat            [x] Acute Room   []  Bedside    [] ICU/CCU     [] ER   Isolation Precautions:  [x] Dialysis    There are currently no Active Isolations     ALLERGIES:     Allergies   Allergen Reactions    Augmentin [Amoxicillin-Pot Clavulanate] Diarrhea    Clavulanic Acid Diarrhea    Levaquin [Levofloxacin] Other (comments)     Severe hypoglycemia        Code Status:  Full Code     Hepatitis Status   2nd RN check :  TO   Lab Results   Component Value Date/Time    Hepatitis B surface Ag <0.10 05/07/2020 05:08 AM    Hepatitis B surface Ab <3.10 (L) 05/07/2020 05:08 AM        Current Labs:      Lab Results   Component Value Date/Time    WBC 6.6 05/04/2020 05:15 AM    HGB 7.9 (L) 05/04/2020 05:15 AM    HCT 23.7 (L) 05/04/2020 05:15 AM    PLATELET 034 71/57/0001 05:15 AM    MCV 85.6 05/04/2020 05:15 AM     Lab Results   Component Value Date/Time    Sodium 127 (L) 05/07/2020 05:08 AM    Potassium 4.3 05/07/2020 05:08 AM    Chloride 95 (L) 05/07/2020 05:08 AM    CO2 24 05/07/2020 05:08 AM    Anion gap 8 05/07/2020 05:08 AM    Glucose 89 05/07/2020 05:08 AM    BUN 97 (H) 05/07/2020 05:08 AM    Creatinine 3.25 (H) 05/07/2020 05:08 AM    BUN/Creatinine ratio 30 (H) 05/07/2020 05:08 AM    GFR est AA 17 (L) 05/07/2020 05:08 AM    GFR est non-AA 14 (L) 05/07/2020 05:08 AM    Calcium 8.3 (L) 05/07/2020 05:08 AM          DIET:  DIET NPO     PRIMARY NURSE REPORT:   Pre Dialysis: Janessa Barbosa RN     Time: 1200      EDUCATION:    [x] Patient [] Other           Knowledge Basis: []None [x]Minimal [] Substantial   Barriers to learning  [x]N/A   [] Access Care     [] S&S of infection  [] Fluid Management  [] K+   [x] Procedural    []Albumin   [] Medications   [] Tx Options   [] Transplant   [] Diet   [] Other   Teaching Tools:  [x] Explain  [] Demo  [] Handouts [] Video  Patient response: [x] Verbalized understanding  [] Teach back  [] Return demonstration   [] Requires follow up      [x]Time Out/Safety Check  [x] Extracorporeal Circuit Tested for integrity       RO/HEMODIALYSIS MACHINE SAFETY CHECKS  Before each treatment:                                     [address] [place] [address]                                     [x] Unit Machine # 9 with centralized RO                                                                            Alarm Test:  Pass time 1245            [x] RO/Machine Log Complete    Machine Temp    36*C             Dialysate: pH  7.4    Conductivity: Meter 14.0     HD Machine  13.8      TCD: 13.8  Dialyzer Lot # [id number]     Blood Tubing Lot # [id number]     Saline Lot # -[id number]     CHLORINE TESTING-Before each treatment and every 4 hours    Total Chlorine: [x] less than 0.1 ppm  Initial Time Check: 1300       4 Hr/2nd Check Time: 1700   (if greater than 0.1 ppm from Primary then every 30 minutes from Secondary) TREATMENT INITIATION  with Dialysis Precautions:   [x] All Connections Secured              [x] Saline Line Double Clamped   [x] Venous Parameters Set               [x] Arterial Parameters Set    [x] Prime Given 250ml NSS              [x]Air Foam Detector Engaged      Treatment Initiation Note:  0434  Pt arrived to dialysis unit via bed with O2 4L n/c. Pt very sleepy but arouses easily. 1300  Rt tunneled chest wall CVC intact, patent and flushes easily. Dialysis initiated without dificulty. During Treatment Notes:  1400  Vascular access visible with arterial and venous line connections intact. Pt resting comfortably. 1500  Vascular access visible with arterial and venous line connections intact. Pt resting comfortably. 1600  Vascular access visible with arterial and venous line connections intact. Pt resting comfortably. Medication Dose Volume Route Time Rich Nurse, Title   Vancomycin 1000mg 250 NSS HD 1500 Danny Moya RN     Post Assessment  Dialyzer Cleared:   [] Good  [x] Fair  [] Poor  Blood processed:  48.2 L  UF Removed:  1500 Ml  Post Wt: 114.5  kg  Post /65   Pulse  58 Resp  16  Temp 97.3 Lungs: [] Clear [] Course  [] Crackles                 []  Wheezing   [x] Diminished   Post Tx Vascular Access: [x] N/A        Cardiac :[x] Regular   [] Irregular   Rhythm:  [] Monitored   [] Not Monitored    CVC Catheter: [] N/A  Locking solution: Heparin 1:1000 U  Arterial port 1.6 ml   Venous port 1.6 ml   Edema:  [] None  [x] Generalized                     Skin:[x] Warm  [x] Dry [] Diaphoretic               [] Flushed  [] Pale [] Cyanotic Pain:  [x]0  []1 []2  []3 []4  []5  []6  []7 []8  []9  []10     Post Treatment Note:   8323  Pt tolerated dialysis well. Dialysis catheter intact, patent and heplocked as noted above.      POST TREATMENT PRIMARY NURSE HANDOFF REPORT:   Post Dialysis: Vaughn Frias RN                Time:  1318       Abbreviations: AVG-arterial venous graft, AVF-arterial venous fistula, IJ-Internal Jugular, Subcl-Subclavian, Fem-Femoral, Tx-treatment, AP/HR-apical heart rate, VSS- Vital Signs Stable, CVC- Central Venous Catheter, DFR-dialysate flow rate, BFR-blood flow rate, AP-arterial pressure, -venous pressure, UF-ultrafiltrate, TMP-transmembrane pressure, Austyn-Venous, Art-Arterial, RO-Reverse Osmosis

## 2020-05-07 NOTE — PROGRESS NOTES
In Patient Progress note    Admit Date: 4/30/2020    Impression:   #1 Acute kidney injury on chronic kidney disease stage III. Now with worsening renal parameters , BUN ~ 95   Developed diuretic resistance and worsening cardiorenal physiology , CXR yesterday still with pulmonary congestion   Overall edematous and persistent hypoxia , this is her second admission for the same   Discussed with patient regarding temp v/s long term dialysis and she is agreeable to it  I think getting fluid off and solute management is going to help her   Noted cardiology note from yesterday as well  Consulting vascular to place a TDC and start HD   #2 Acute on chronic diastolic CHF. CXR with persistent pulm edema , CHF  #3 hypertension, volume off should help  #4 dyslipidemia  #5 hypothermia/SIRS on empirical antibiotics per internal medicine.   #6 COVID-19 -ve   #7 leg edema , duplex neg for DVT  #8 hyponatremia in setting of CHF , this is indicative of worsening cardiorenal physiology      Plan:   #1 consult vascular to place a TDC  #2 HD today , UF ~ 1500 ml as tolerated   #3 lasix 40 mg IV BID   #4 strict ins and outs, fluid restrict 1200 mm  #4 encourage po intake , may need appetite stimulant   #5 follow cardiology recommendations  #6  avoid nephrotoxins renally dose medications  #7 add epogen and hecterol     Please call with questions,     Chel Mims MD FASN  Cell [phone]  Pager: [phone]    Subjective:     Poor appetite   O2 2 lit   Still with persistent RUDOLPH , pulm edema            Current Facility-Administered Medications:     mirtazapine (REMERON) tablet 15 mg, 15 mg, Oral, QHS, Felix Perez MD, 15 mg at 05/06/20 2311    furosemide (LASIX) injection 20 mg, 20 mg, IntraVENous, BID, Maxwell Vitale MD, 20 mg at 05/06/20 1825    ondansetron (ZOFRAN) injection 4 mg, 4 mg, IntraVENous, Q6H PRN, Felix Perez MD, 4 mg at 05/06/20 0927    loratadine (CLARITIN) tablet 10 mg, 10 mg, Oral, DAILY PRN, Michael Leigh Baeza MD, 10 mg at 05/06/20 1874    VANCOMYCIN INFORMATION NOTE, , Other, Rx Dosing/Monitoring, Cedrick Gupta MD    heparin (porcine) injection 5,000 Units, 5,000 Units, SubCUTAneous, Q8H, Raciel Caba MD, 5,000 Units at 05/06/20 2310    sodium chloride (NS) flush 5-10 mL, 5-10 mL, IntraVENous, PRN, Cedrick Gupta MD    amLODIPine (NORVASC) tablet 10 mg, 10 mg, Oral, DAILY, Ani Barr, [place], 10 mg at 05/06/20 4422    aspirin chewable tablet 81 mg, 81 mg, Oral, DAILY, Michael WYNN [place], 81 mg at 05/06/20 2547    carvediloL (COREG) tablet 25 mg, 25 mg, Oral, BID WITH MEALS, Ani Barr [place], 25 mg at 05/06/20 2310    cholecalciferol (VITAMIN D3) capsule 5,000 Units, 5,000 Units, Oral, DAILY, Michael Uribe T, [place], 5,000 Units at 05/06/20 1762    citalopram (CELEXA) tablet 20 mg, 20 mg, Oral, DAILY, Ani Barr [place], 20 mg at 05/06/20 4392    cyanocobalamin tablet 1,000 mcg, 1,000 mcg, Oral, BID, Ani Barr, PA, 1,000 mcg at 05/06/20 2310    pantoprazole (PROTONIX) tablet 40 mg, 40 mg, Oral, ACB&D, Ani Barr, PA, 40 mg at 05/06/20 2310    hydrALAZINE (APRESOLINE) tablet 100 mg, 100 mg, Oral, TID, Ani Barr PA, 100 mg at 05/06/20 2310    rosuvastatin (CRESTOR) tablet 5 mg, 5 mg, Oral, QHS, RahmanAni [place], 5 mg at 05/06/20 2310    ascorbic acid (vitamin C) (VITAMIN C) tablet 500 mg, 500 mg, Oral, BID, Ani Barr PA, 500 mg at 05/06/20 2310    zinc sulfate (ZINCATE) 220 (50) mg capsule 1 Cap, 1 Cap, Oral, DAILY, Sandy CreekQuinn Vangma, 1 Cap at 05/06/20 6129    insulin lispro (HUMALOG) injection, , SubCUTAneous, AC&HS, Carmell [place], [place], Stopped at 05/05/20 1630    glucose chewable tablet 16 g, 16 g, Oral, PRN, Ani Barr PA    glucagon (GLUCAGEN) injection 1 mg, 1 mg, IntraMUSCular, PRN, Ani Barr PA    dextrose (D50W) injection syrg 12.5-25 g, 25-50 mL, IntraVENous, PRN, Ani Barr PA    insulin glargine (LANTUS) injection 4 Units, 4 Units, SubCUTAneous, QHS, [place]yomaira Segura T, [address], 4 Units at 05/06/20 2311    polyethylene glycol (MIRALAX) packet 17 g, 17 g, Oral, DAILY PRN, Ani Moeller PA, 17 g at 05/03/20 1205    acetaminophen (TYLENOL) tablet 650 mg, 650 mg, Oral, Q4H PRN, Ani Moeller PA, 650 mg at 05/06/20 6548    ferrous sulfate tablet 325 mg, 1 Tab, Oral, EVERY OTHER DAY, Manolo De Jesus MD, 325 mg at 05/05/20 0908        Objective:     Visit Vitals  /57 (BP 1 Location: Right arm, BP Patient Position: At rest)   Pulse 65   Temp 97.2 °F (36.2 °C)   Resp 16   Ht 5' 5\" (1.651 m)   Wt 116 kg (255 lb 12.8 oz)   SpO2 95%   Breastfeeding No   BMI 42.57 kg/m²         Intake/Output Summary (Last 24 hours) at 5/7/2020 0934  Last data filed at 5/6/2020 1339  Gross per 24 hour   Intake 240 ml   Output 100 ml   Net 140 ml       Physical Exam:     General: NAD, alert and oriented. Neck: No jvd. LUNGS: decreased in bases, rales+  CVS EXM: S1, S2  RRR, no murmurs/gallops/rubs. Abdomen: soft, non tender. Lower Extremities: 1+ edema. Data Review:    No results for input(s): WBC, RBC, HCT, MCV, MCH, MCHC, RDW, HCTEXT, HCTEXT in the last 72 hours.     No lab exists for component: HEMOGLOBIN, PLATELET, MPV  Recent Labs     05/07/20  0508 05/06/20  0228 05/05/20  0518   BUN 97* 90* 94*   CREA 3.25* 3.37* 3.15*   CA 8.3* 8.0* 8.2*   K 4.3 4.3 3.9   * 128* 130*   CL 95* 96* 98*   CO2 24 24 24   GLU 89 104* 99       Rosanna Diaz MD

## 2020-05-07 NOTE — PROGRESS NOTES
[facility]  -  Progress Note      Patient: Antionette Bardales MRN: [id number]  SSN: [id number]    YOB: 1950  Age: 71 y.o. Sex: female      Admit Date: 4/30/2020    Assessment:     -Acute on chronic respiratory failure. Multifactorial in setting of below. Recurrent ER visits since last discharge.    -Sepsis/SIRS, presented with hypothermia.  -Acute on chronic HFpEF. EF 55-60% by echo 4/25/20. -OFE on CKD. Nephrology following.  -Acute on chronic anemia. Continue to trend.  -Hypertension. Elevated on admission.  -Diabetes mellitus. HgbA1c 6.1.  -Dyslipidemia. On statin.  -Coronary disease reportedly diffuse medically managed, nuclear stress 3/2018 with small area of ischemia with EF 53%  -Chronic atypical LBBB. -COVID negative 4/30/2020.     Primary cardiologist Dr. Mike Navas at [facility] [place], now plans to follow up with Dr. Juanita Oneal:     -Telemetry reviewed, rhythm remains sinus with occasional PVC's. -Appreciate ongoing assistance of nephrology team in regards to volume management, plan to start dialysis. -Vascular surgery consulted, plan to place [facility] [place], operated by [facility] today. Subjective:     Reports shortness of breath, intermittent palpitations. No chest pain.     Objective:      Patient Vitals for the past 8 hrs:   Temp Pulse Resp BP SpO2   05/07/20 0730 97.2 °F (36.2 °C) 65 16 151/57 95 %   05/07/20 0357 97.2 °F (36.2 °C) 78 15 135/55 95 %         Patient Vitals for the past 96 hrs:   Weight   05/04/20 0403 255 lb 12.8 oz (116 kg)         Intake/Output Summary (Last 24 hours) at 5/7/2020 1004  Last data filed at 5/6/2020 1339  Gross per 24 hour   Intake 240 ml   Output 100 ml   Net 140 ml       Physical Exam:  General:  alert, cooperative, no distress, appears stated age  Neck:  supple  Lungs:  clear to auscultation bilaterally to anterolateral lung fields  Heart:  Regular rate and rhythm  Abdomen:  abdomen is soft without significant tenderness, masses, organomegaly or guarding  Extremities:  Atraumatic, trace-1+ edema     Data Review:     Labs: Results:       Chemistry Recent Labs     05/07/20  0508 05/06/20  0228 05/05/20  0518   GLU 89 104* 99   * 128* 130*   K 4.3 4.3 3.9   CL 95* 96* 98*   CO2 24 24 24   BUN 97* 90* 94*   CREA 3.25* 3.37* 3.15*   CA 8.3* 8.0* 8.2*   AGAP 8 8 8   BUCR 30* 27* 30*      Lipid Panel Lab Results   Component Value Date/Time    Cholesterol, total 182 04/24/2020 02:34 AM    HDL Cholesterol 41 04/24/2020 02:34 AM    LDL, calculated 112.8 (H) 04/24/2020 02:34 AM    VLDL, calculated 28.2 04/24/2020 02:34 AM    Triglyceride 141 04/24/2020 02:34 AM    CHOL/HDL Ratio 4.4 04/24/2020 02:34 AM

## 2020-05-07 NOTE — PROGRESS NOTES
Pt not seen for skilled OT due to:  []  Nausea/vomiting  []  Eating  []  Pain  []  Pt lethargic  [x]  Off Unit x2 attempts  Will f/u later as schedule allows. Thank you.   Pete Viera MS OTR/L

## 2020-05-07 NOTE — ROUTINE PROCESS
Bedside and Verbal shift change report given to Hermes Paiz RN (oncoming nurse) by Ольга Del Cid RN (offgoing nurse). Report included the following information SBAR, Kardex, ED Summary, OR Summary, Procedure Summary, Intake/Output, MAR, Recent Results and Cardiac Rhythm NSR.

## 2020-05-07 NOTE — PROGRESS NOTES
PT orders received, chart reviewed. Pt unable to participate with PT due to:  []  Nausea/vomiting  []  Eating  []  Pain  []  Pt lethargic  [x]  Off Unit for dialysis     Will f/u later as patient's schedule allows. Thank you.   Makayla Denton, PT PT, DPT

## 2020-05-07 NOTE — PROGRESS NOTES
NUTRITION    Nutrition Screen      RECOMMENDATIONS / PLAN:     - Resume po diet and change to Renal diet, with fluid restriction of 1000 mL  - Add supplement: Nepro BID (to be included with fluid restriction)  - Continue appetite stimulant   - Continue RD inpatient monitoring and evaluation. NUTRITION INTERVENTIONS & DIAGNOSIS:     - Meals/snacks: NPO; resume diet  - Medical food supplement therapy: initiate   - Nutrition related medication management: remeron  - Collaboration and referral of nutrition care: pt and nutrition plan to resume po diet discussed with Dr Shahida Nieves. Verbal order obtained to resume diet    Nutrition Diagnosis: Inadequate oral intake related to poor appetite as evidenced by poor meal intake since admission. Increased nutrient needs (protein and energy) related to pt with increased nutrient expenditure as evidenced by pt started on HD 3 times weekly. ASSESSMENT:     Pt off unit at time of visit. Was NPO today; s/p procedure. Okay to resume diet per MD. Poor appetite; started on appetite stimulant. Impaired renal function; started on HD. Noted weight gain PTA; pt edematous. On fluid restriction of 1000 mL.     Nutritional intake adequate to meet patients estimated nutritional needs:  No    Diet: DIET NPO      Food Allergies:  None known   Current Appetite: Poor per chart documentation  Appetite/meal intake prior to admission: Unknown  Feeding Limitations:  [] Swallowing difficulty    [] Chewing difficulty    [] Other:  Current Meal Intake:   Patient Vitals for the past 100 hrs:   % Diet Eaten   05/07/20 1000 0 %   05/06/20 1339 10 %   05/06/20 0930 15 %   05/05/20 1741 5 %   05/05/20 1254 50 %   05/05/20 0908 10 %   05/04/20 1803 25 %   05/04/20 1230 0 %   05/04/20 0926 10 %   05/03/20 1905 0 %       BM: 5/3  Skin Integrity:  No pressure injury or wound noted  Edema:   [] No     [x] Yes   Pertinent Medications: Reviewed: ascorbic acid, cyanocobalamin, hectorol, ferrous sulfate, furosemide, lantus 4 units, SSI, remeron, ondansetron, pantoprazole, rosuvastatin, zinc sulfate    Recent Labs     05/07/20  0508 05/06/20  0228 05/05/20  0518   * 128* 130*   K 4.3 4.3 3.9   CL 95* 96* 98*   CO2 24 24 24   GLU 89 104* 99   BUN 97* 90* 94*   CREA 3.25* 3.37* 3.15*   CA 8.3* 8.0* 8.2*       Intake/Output Summary (Last 24 hours) at 5/7/2020 1603  Last data filed at 5/7/2020 1032  Gross per 24 hour   Intake 180 ml   Output 1150 ml   Net -970 ml       Anthropometrics:  Ht Readings from Last 1 Encounters:   04/30/20 5' 5\" (1.651 m)     Last 3 Recorded Weights in this Encounter    05/02/20 0555 05/03/20 0444 05/04/20 0403   Weight: 115.1 kg (253 lb 12.8 oz) 117.2 kg (258 lb 6.4 oz) 116 kg (255 lb 12.8 oz)     Body mass index is 42.57 kg/m². Weight History:  +36 lb x 3.5 years PTA per chart hx. Noted pt edematous; question if part of wt gain related to fluid retention    Weight Metrics 5/4/2020 4/30/2020 4/29/2020 4/26/2020 8/18/2016 6/2/2016   Weight 255 lb 12.8 oz - 252 lb 252 lb 4.8 oz 219 lb 228 lb   BMI - 42.57 kg/m2 41.93 kg/m2 41.98 kg/m2 36.44 kg/m2 37.94 kg/m2        Admitting Diagnosis: Sepsis ([facility] [address] [A41.9]  Pertinent PMHx: cancer, CHF, DM, GERD, hiatal hernia, goiter, uterine cancer    Education Needs:        [x] None identified  [] Identified - Not appropriate at this time  []  Identified and addressed - refer to education log  Learning Limitations:   [x] None identified  [] Identified    Cultural, Rastafarian & ethnic food preferences:  [x] None identified    [] Identified and addressed     ESTIMATED NUTRITION NEEDS:     Calories: 3849-7462 kcal (25-35 kcal/kg) based on  [] Actual BW      [x] SBW: 80 kg  Protein:  gm (1.2-1.5 gm/kg) based on  [] Actual BW      [x] SBW   Fluid: 750-1500 mL/day     MONITORING & EVALUATION:     Nutrition Goal(s):   - PO nutrition intake will meet >75% of patient estimated nutritional needs within the next 7 days.    Outcome: New/Initial goal     Monitoring: [x] Food and nutrient intake   [x] Food and nutrient administration  [x] Comparative standards   [x] Nutrition-focused physical findings   [x] Anthropometric Measurements   [x] Treatment/therapy   [x] Biochemical data, medical tests, and procedures        Previous Recommendations (for follow-up assessments only):  Not Applicable     Discharge Planning: No nutritional discharge needs at this time. Participated in care planning, discharge planning, & interdisciplinary rounds as appropriate.       Leroy Gonzalez RD  Pager: [phone]

## 2020-05-07 NOTE — PROGRESS NOTES
10:20 AM  TRANSFER - IN REPORT:    Verbal report received from SIMA Roman (name) on Brannon Bah  being received from 5N (unit) for ordered procedure      Report consisted of patients Situation, Background, Assessment and   Recommendations(SBAR). Information from the following report(s) SBAR, ED Summary, Procedure Summary, Intake/Output, MAR and Recent Results was reviewed with the receiving nurse. Opportunity for questions and clarification was provided. Assessment completed upon patients arrival to unit and care assumed.

## 2020-05-07 NOTE — CONSULTS
Surgery Consult      Patient: Brandon Bass MRN: [id number]  CSN: [id number]      YOB: 1950    Age: 71 y.o. Sex: female      DOA: 4/30/2020       HPI:     Brandon Bass is a 71 y.o. female who presents with acute on chronic renal failure with CHF. She is in need of dialysis. Right handed no previous dialysis. Past Medical History:   Diagnosis Date    Arthritis     Cancer ([facility] [address]     CHF (congestive heart failure) (HCC)     Diabetes ([facility] [address]     Fracture of neck (HCC)     GERD (gastroesophageal reflux disease)     Goiter     Hiatal hernia     Hypertension     Uterine cancer (HCC)        Past Surgical History:   Procedure Laterality Date    HX APPENDECTOMY      HX HYSTERECTOMY      HX KNEE REPLACEMENT Right     HX ORTHOPAEDIC      odontoid fracture repair with hardware placement.  HX PARTIAL THYROIDECTOMY         No family history on file. Social History     Socioeconomic History    Marital status:      Spouse name: Not on file    Number of children: Not on file    Years of education: Not on file    Highest education level: Not on file   Tobacco Use    Smoking status: Never Smoker   Substance and Sexual Activity    Alcohol use: No    Drug use: No       Prior to Admission medications    Medication Sig Start Date End Date Taking? Authorizing Provider   amLODIPine (NORVASC) 10 mg tablet Take 1 Tab by mouth daily. 4/28/20  Yes Luis E Marx MD   polyethylene glycol (MIRALAX) 17 gram packet Take 1 Packet by mouth daily. 4/28/20  Yes Luis E Marx MD   rosuvastatin (CRESTOR) 5 mg tablet Take 1 Tab by mouth nightly. 4/28/20  Yes Luis E Marx MD   furosemide (LASIX) 40 mg tablet Take 1 Tab by mouth daily. 4/28/20  Yes Luis E Marx MD   gabapentin (NEURONTIN) 100 mg capsule Take 1 Cap by mouth two (2) times a day.  Max Daily Amount: 200 mg. 4/28/20  Yes Luis E Marx MD   hydrALAZINE (APRESOLINE) 100 mg tablet Take 1 Tab by mouth three (3) times daily. 4/28/20  Yes Millicent Hinson MD   aspirin 81 mg chewable tablet Take 1 Tab by mouth daily. 4/28/20  Yes Millicent Hinson MD   isosorbide mononitrate ER (IMDUR) 30 mg tablet Take 1 Tab by mouth daily. 4/28/20  Yes Millicent Hinson MD   esomeprazole (NEXIUM) 40 mg capsule Take 40 mg by mouth daily. Yes Isabel, MD Larry   carvedilol (COREG) 25 mg tablet Take 25 mg by mouth two (2) times daily (with meals). Yes Isabel, MD Larry   citalopram (CELEXA) 20 mg tablet Take 20 mg by mouth daily. Yes Isabel, MD Larry   cranberry extract (AZO CRANBERRY) 450 mg tab Take 2 Tabs by mouth daily. Yes Isabel, MD Larry   cholecalciferol (VITAMIN D3) 1,000 unit cap Take 5,000 Units by mouth daily. Yes Isabel, MD Larry   cyanocobalamin (VITAMIN B-12) 1,000 mcg tablet Take 1,000 mcg by mouth two (2) times a day. Yes Larry Hall MD   Biotin 2,500 mcg cap Take 1 Tab by mouth two (2) times a day. Yes Isabel, MD Larry   vitamin a-vitamin c-vit e-min (OCUVITE) tablet Take 1 Tab by mouth daily. Yes Isabel, MD Larry   loratadine (CLARITIN) 10 mg tablet Take 10 mg by mouth daily. Yes Isabel, MD Larry   B.infantis-B.ani-B.long-B.bifi (PROBIOTIC 4X) 10-15 mg TbEC Take 1 Tab by mouth daily. Yes Isabel, MD Larry   OXYGEN-AIR DELIVERY SYSTEMS Take 2 L by inhalation daily.     Provider, Historical   insulin glargine (LANTUS) 100 unit/mL injection 4 units SQ daily- watch for Hypoglycemia adjust dose per patient's blood glucose level 4/27/20   Millicent Hinson MD       Allergies   Allergen Reactions    Augmentin [Amoxicillin-Pot Clavulanate] Diarrhea    Clavulanic Acid Diarrhea    Levaquin [Levofloxacin] Other (comments)     Severe hypoglycemia         Physical Exam:      Visit Vitals  /57 (BP 1 Location: Right arm, BP Patient Position: At rest)   Pulse 65   Temp 97.2 °F (36.2 °C)   Resp 16   Ht 5' 5\" (1.651 m)   Wt 255 lb 12.8 oz (116 kg)   SpO2 95%   Breastfeeding No   BMI 42.57 kg/m²       GENERAL: alert, cooperative, no distress, appears stated age, EYE: negative findings: anicteric sclera and EOMI, THROAT & NECK: normal, no erythema or exudates noted. and mucous membranes moist, LUNG: clear to auscultation bilaterally, HEART: regular rate and rhythm, S1, S2 normal, no murmur, click, rub or gallop, ABDOMEN: soft, non-tender. Bowel sounds normal. No masses,  no organomegaly, EXTREMITIES:  extremities normal, atraumatic, no cyanosis, edema 1+ bilaterally, NEUROLOGIC: negative findings: alert, oriented x3  speech normal in context and clarity  memory intact grossly  cranial nerves II-XII intact  no involuntary movements - tremors    ROS:  Constitutional: negative  Eyes: negative  Ears, nose, mouth, throat, and face: negative  Cardiovascular: negative  Gastrointestinal: negative  Genitourinary:negative  Hematologic/lymphatic: negative  Musculoskeletal:negative  Neurological: negative  Behavioral/Psych: negative  Endocrine: negative  Allergic/Immunologic: negative  Unless otherwise mentioned in the HPI. Data Review:    CBC:   Lab Results   Component Value Date/Time    WBC 6.6 05/04/2020 05:15 AM    RBC 2.77 (L) 05/04/2020 05:15 AM    HGB 7.9 (L) 05/04/2020 05:15 AM    HCT 23.7 (L) 05/04/2020 05:15 AM    PLATELET 831 47/94/8947 05:15 AM      BMP:   Lab Results   Component Value Date/Time    Glucose 89 05/07/2020 05:08 AM    Sodium 127 (L) 05/07/2020 05:08 AM    Potassium 4.3 05/07/2020 05:08 AM    Chloride 95 (L) 05/07/2020 05:08 AM    CO2 24 05/07/2020 05:08 AM    BUN 97 (H) 05/07/2020 05:08 AM    Creatinine 3.25 (H) 05/07/2020 05:08 AM    Calcium 8.3 (L) 05/07/2020 05:08 AM     Coagulation:   Lab Results   Component Value Date/Time    Prothrombin time 14.6 04/23/2020 08:26 AM    INR 1.2 04/23/2020 08:26 AM    aPTT 35.0 04/23/2020 08:26 AM         Assessment/Plan     70 y/o female with acute on chronic renal failure. --will place dialysis catheter  --Please call with any further questions or concerns.     Active Problems: Sepsis ([facility] [address] (4/30/2020)      Respiratory failure ([facility] [address] (4/30/2020)      [facility] (4/30/2020)        Stephanie Ramos MD  May 7, 2020

## 2020-05-07 NOTE — PROGRESS NOTES
[facility]  Progress Note    Patient: Jung Wolf Age: 71 y.o. : 1950 MR#: [id number] SSN: [id number]  Date/Time: 2020    Subjective:     Patient was seen in dialysis. Denies chest and abdominal pain. No nausea or vomiting. States she is not hungry. Assessment/Plan:   71 y o female w/ h/o CKD and CHF re admitted soon after discharge after presenting w/ cc of SOB.    -Acute on chronic respiratory failure at baseline 02 need  -Acute on chronic diastolic heart failure. Compensated currently. -COVID testing negative   -Acute on chronic renal failure. -Hyponatremia in setting of above  -Deconditioned state  -Depressed mood   HISTORY OF:  -HFpEF  -T2DM A1c of 6.1 on lantus, corrective insulin  -CAD  -HTN  -CKD III-creat w/ some trend upwards     PLAN:    Continue current management  Patient has been started on dialysis  Discussed with nephrologist      Case discussed with:  [x]Patient  []Family  [x]Nursing  []Case Management  DVT Prophylaxis:  []Lovenox  [x]Hep SQ  []SCDs  []Coumadin   []On Heparin gtt    Objective:   VS:   Visit Vitals  /55   Pulse (!) 52   Temp 97.5 °F (36.4 °C)   Resp 16   Ht 5' 5\" (1.651 m)   Wt 116 kg (255 lb 12.8 oz)   SpO2 99%   Breastfeeding No   BMI 42.57 kg/m²      Tmax/24hrs: Temp (24hrs), Av.5 °F (36.4 °C), Min:97.2 °F (36.2 °C), Max:97.8 °F (36.6 °C)    Input/Output:     Intake/Output Summary (Last 24 hours) at 2020 1453  Last data filed at 2020 1032  Gross per 24 hour   Intake 180 ml   Output 1150 ml   Net -970 ml       General:  Alert, awake, in NAD  Cardiovascular: Regular rate and rhythm.  Right sided upper chest catheter in situ   Pulmonary: Clear to auscultation bilaterally  GI: Soft nontender and bowel sounds present  Extremities: 2+ pedal edema   Additional:      Labs:    Recent Results (from the past 24 hour(s))   GLUCOSE, POC    Collection Time: 20  3:54 PM   Result Value Ref Range Glucose (POC) 126 (H) 70 - 110 mg/dL   GLUCOSE, POC    Collection Time: 05/06/20 10:43 PM   Result Value Ref Range    Glucose (POC) 113 (H) 70 - 110 mg/dL   VANCOMYCIN, RANDOM    Collection Time: 05/07/20  5:08 AM   Result Value Ref Range    Vancomycin, random 18.9 5.0 - 24.5 UG/ML   METABOLIC PANEL, BASIC    Collection Time: 05/07/20  5:08 AM   Result Value Ref Range    Sodium 127 (L) 136 - 145 mmol/L    Potassium 4.3 3.5 - 5.5 mmol/L    Chloride 95 (L) 100 - 111 mmol/L    CO2 24 21 - 32 mmol/L    Anion gap 8 3.0 - 18 mmol/L    Glucose 89 74 - 99 mg/dL    BUN 97 (H) 7.0 - 18 MG/DL    Creatinine 3.25 (H) 0.6 - 1.3 MG/DL    BUN/Creatinine ratio 30 (H) 12 - 20      GFR est AA 17 (L) >60 ml/min/1.73m2    GFR est non-AA 14 (L) >60 ml/min/1.73m2    Calcium 8.3 (L) 8.5 - 10.1 MG/DL   HEP B SURFACE AB    Collection Time: 05/07/20  5:08 AM   Result Value Ref Range    Hepatitis B surface Ab <3.10 (L) >10.0 mIU/mL    Hep B surface Ab Interp. Negative (A) POS      Hep B surface Ab comment        Samples with a  value of 10 mIU/mL or greater are considered positive (protective immunity) in accordance with the CDC guidelines. HEP B SURFACE AG    Collection Time: 05/07/20  5:08 AM   Result Value Ref Range    Hepatitis B surface Ag <0.10 <1.00 Index    Hep B surface Ag Interp.  Negative NEG     GLUCOSE, POC    Collection Time: 05/07/20  5:58 AM   Result Value Ref Range    Glucose (POC) 118 (H) 70 - 110 mg/dL   GLUCOSE, POC    Collection Time: 05/07/20  8:15 AM   Result Value Ref Range    Glucose (POC) 113 (H) 70 - 110 mg/dL   GLUCOSE, POC    Collection Time: 05/07/20 11:12 AM   Result Value Ref Range    Glucose (POC) 108 70 - 110 mg/dL   GLUCOSE, POC    Collection Time: 05/07/20 12:38 PM   Result Value Ref Range    Glucose (POC) 120 (H) 70 - 110 mg/dL     Additional Data Reviewed:      Signed By: Marielos Khoury MD     May 7, 2020

## 2020-05-07 NOTE — PROGRESS NOTES
TRANSFER - IN REPORT:    Verbal report received from [place] (name) on Antionette Bardales  being received from Cath Lab (unit) for routine post - op      Report consisted of patients Situation, Background, Assessment and   Recommendations(SBAR). Information from the following report(s) SBAR, ED Summary, Procedure Summary, Intake/Output, MAR and Recent Results was reviewed with the receiving nurse. Opportunity for questions and clarification was provided. Assessment completed upon patients arrival to unit and care assumed.

## 2020-05-08 LAB
ANION GAP SERPL CALC-SCNC: 8 MMOL/L (ref 3–18)
BASOPHILS # BLD: 0 K/UL (ref 0–0.1)
BASOPHILS NFR BLD: 0 % (ref 0–2)
BUN SERPL-MCNC: 63 MG/DL (ref 7–18)
BUN/CREAT SERPL: 27 (ref 12–20)
CALCIUM SERPL-MCNC: 8 MG/DL (ref 8.5–10.1)
CHLORIDE SERPL-SCNC: 98 MMOL/L (ref 100–111)
CO2 SERPL-SCNC: 26 MMOL/L (ref 21–32)
CREAT SERPL-MCNC: 2.34 MG/DL (ref 0.6–1.3)
DIFFERENTIAL METHOD BLD: ABNORMAL
EOSINOPHIL # BLD: 0 K/UL (ref 0–0.4)
EOSINOPHIL NFR BLD: 0 % (ref 0–5)
ERYTHROCYTE [DISTWIDTH] IN BLOOD BY AUTOMATED COUNT: 13.9 % (ref 11.6–14.5)
GLUCOSE BLD STRIP.AUTO-MCNC: 104 MG/DL (ref 70–110)
GLUCOSE BLD STRIP.AUTO-MCNC: 134 MG/DL (ref 70–110)
GLUCOSE BLD STRIP.AUTO-MCNC: 155 MG/DL (ref 70–110)
GLUCOSE BLD STRIP.AUTO-MCNC: 94 MG/DL (ref 70–110)
GLUCOSE SERPL-MCNC: 103 MG/DL (ref 74–99)
HCT VFR BLD AUTO: 25 % (ref 35–45)
HGB BLD-MCNC: 8.2 G/DL (ref 12–16)
LYMPHOCYTES # BLD: 0.7 K/UL (ref 0.9–3.6)
LYMPHOCYTES NFR BLD: 10 % (ref 21–52)
MCH RBC QN AUTO: 28 PG (ref 24–34)
MCHC RBC AUTO-ENTMCNC: 32.8 G/DL (ref 31–37)
MCV RBC AUTO: 85.3 FL (ref 74–97)
MONOCYTES # BLD: 0.6 K/UL (ref 0.05–1.2)
MONOCYTES NFR BLD: 9 % (ref 3–10)
NEUTS SEG # BLD: 5.9 K/UL (ref 1.8–8)
NEUTS SEG NFR BLD: 81 % (ref 40–73)
PLATELET # BLD AUTO: 179 K/UL (ref 135–420)
PMV BLD AUTO: 12.2 FL (ref 9.2–11.8)
POTASSIUM SERPL-SCNC: 4.4 MMOL/L (ref 3.5–5.5)
RBC # BLD AUTO: 2.93 M/UL (ref 4.2–5.3)
SODIUM SERPL-SCNC: 132 MMOL/L (ref 136–145)
WBC # BLD AUTO: 7.2 K/UL (ref 4.6–13.2)

## 2020-05-08 PROCEDURE — 74011250637 HC RX REV CODE- 250/637: Performed by: INTERNAL MEDICINE

## 2020-05-08 PROCEDURE — 85025 COMPLETE CBC W/AUTO DIFF WBC: CPT

## 2020-05-08 PROCEDURE — 3331090002 HH PPS REVENUE DEBIT

## 2020-05-08 PROCEDURE — 90935 HEMODIALYSIS ONE EVALUATION: CPT

## 2020-05-08 PROCEDURE — 74011250636 HC RX REV CODE- 250/636: Performed by: INTERNAL MEDICINE

## 2020-05-08 PROCEDURE — 74011636637 HC RX REV CODE- 636/637: Performed by: PHYSICIAN ASSISTANT

## 2020-05-08 PROCEDURE — 36415 COLL VENOUS BLD VENIPUNCTURE: CPT

## 2020-05-08 PROCEDURE — 82962 GLUCOSE BLOOD TEST: CPT

## 2020-05-08 PROCEDURE — 80048 BASIC METABOLIC PNL TOTAL CA: CPT

## 2020-05-08 PROCEDURE — 3331090001 HH PPS REVENUE CREDIT

## 2020-05-08 PROCEDURE — 74011250637 HC RX REV CODE- 250/637: Performed by: PHYSICIAN ASSISTANT

## 2020-05-08 PROCEDURE — 65660000000 HC RM CCU STEPDOWN

## 2020-05-08 PROCEDURE — 74011250636 HC RX REV CODE- 250/636: Performed by: HOSPITALIST

## 2020-05-08 RX ADMIN — CYANOCOBALAMIN TAB 1000 MCG 1000 MCG: 1000 TAB at 22:02

## 2020-05-08 RX ADMIN — CARVEDILOL 25 MG: 25 TABLET, FILM COATED ORAL at 22:02

## 2020-05-08 RX ADMIN — ASPIRIN 81 MG CHEWABLE TABLET 81 MG: 81 TABLET CHEWABLE at 09:12

## 2020-05-08 RX ADMIN — AMLODIPINE BESYLATE 10 MG: 10 TABLET ORAL at 13:18

## 2020-05-08 RX ADMIN — ROSUVASTATIN CALCIUM 5 MG: 10 TABLET, FILM COATED ORAL at 22:02

## 2020-05-08 RX ADMIN — Medication 1 CAPSULE: at 09:12

## 2020-05-08 RX ADMIN — MIRTAZAPINE 15 MG: 15 TABLET, FILM COATED ORAL at 22:02

## 2020-05-08 RX ADMIN — Medication 500 MG: at 09:12

## 2020-05-08 RX ADMIN — PANTOPRAZOLE SODIUM 40 MG: 40 TABLET, DELAYED RELEASE ORAL at 09:12

## 2020-05-08 RX ADMIN — CARVEDILOL 25 MG: 25 TABLET, FILM COATED ORAL at 13:18

## 2020-05-08 RX ADMIN — DOXERCALCIFEROL 4 MCG: 4 INJECTION, SOLUTION INTRAVENOUS at 22:02

## 2020-05-08 RX ADMIN — HEPARIN SODIUM 5000 UNITS: 5000 INJECTION INTRAVENOUS; SUBCUTANEOUS at 22:03

## 2020-05-08 RX ADMIN — PANTOPRAZOLE SODIUM 40 MG: 40 TABLET, DELAYED RELEASE ORAL at 22:02

## 2020-05-08 RX ADMIN — HYDRALAZINE HYDROCHLORIDE 100 MG: 50 TABLET, FILM COATED ORAL at 16:40

## 2020-05-08 RX ADMIN — FUROSEMIDE 20 MG: 10 INJECTION, SOLUTION INTRAMUSCULAR; INTRAVENOUS at 17:17

## 2020-05-08 RX ADMIN — HEPARIN SODIUM 5000 UNITS: 5000 INJECTION INTRAVENOUS; SUBCUTANEOUS at 16:40

## 2020-05-08 RX ADMIN — CYANOCOBALAMIN TAB 1000 MCG 1000 MCG: 1000 TAB at 09:12

## 2020-05-08 RX ADMIN — CITALOPRAM HYDROBROMIDE 20 MG: 20 TABLET ORAL at 09:11

## 2020-05-08 RX ADMIN — INSULIN GLARGINE 4 UNITS: 100 INJECTION, SOLUTION SUBCUTANEOUS at 22:03

## 2020-05-08 RX ADMIN — INSULIN LISPRO 2 UNITS: 100 INJECTION, SOLUTION INTRAVENOUS; SUBCUTANEOUS at 16:39

## 2020-05-08 RX ADMIN — DOXERCALCIFEROL 4 MCG: 4 INJECTION, SOLUTION INTRAVENOUS at 12:27

## 2020-05-08 RX ADMIN — FUROSEMIDE 20 MG: 10 INJECTION, SOLUTION INTRAMUSCULAR; INTRAVENOUS at 09:11

## 2020-05-08 RX ADMIN — HYDRALAZINE HYDROCHLORIDE 100 MG: 50 TABLET, FILM COATED ORAL at 22:02

## 2020-05-08 RX ADMIN — HYDRALAZINE HYDROCHLORIDE 100 MG: 50 TABLET, FILM COATED ORAL at 13:18

## 2020-05-08 RX ADMIN — Medication 500 MG: at 22:02

## 2020-05-08 NOTE — PROGRESS NOTES
[facility] - Progress Note  Admit Date: 4/30/2020    Assessment:     -Acute on chronic respiratory failure. Multifactorial in setting of below. Recurrent ER visits since last discharge.  Improving.  -Sepsis/SIRS, presented with hypothermia. Improved. -Acute on chronic HFpEF. EF 55-60% by echo 4/25/20. -OFE on CKD. Started on HD this admission.  -Acute on chronic anemia.  -Hypertension. Elevated on admission.  -Diabetes mellitus. HgbA1c 6.1.  -Dyslipidemia. On statin.  -Coronary disease reportedly diffuse medically managed, nuclear stress 3/2018 with small area of ischemia with EF 53%  -Chronic atypical LBBB. -COVID negative 4/30/2020.     Primary cardiologist Dr. Ruy Scott at [facility], now plans to follow up with Dr. Magda Restrepo     Plan:     Seen on dialysis and getting -2L today, should help with BP. If remains elevated tomorrow will consider ACEI or nitrates. Subjective:     Seen on HD, no chest pain/dyspnea. Objective:      Patient Vitals for the past 8 hrs:   Temp Pulse Resp BP SpO2   05/08/20 1145  63  172/68    05/08/20 1130  62  165/69    05/08/20 1115  62  170/76    05/08/20 1100  63  162/65    05/08/20 1045  62  149/68    05/08/20 1030  62  169/78    05/08/20 1015  62  162/76    05/08/20 1000  63  163/60    05/08/20 0945  63  168/63    05/08/20 0940  64  170/58    05/08/20 0930 98 °F (36.7 °C) 66 16 151/61    05/08/20 0745 97.6 °F (36.4 °C) 65 18 199/63 94 %   05/08/20 0441 98.8 °F (37.1 °C) 60 17 155/64 95 %         No data found.                  Intake/Output Summary (Last 24 hours) at 5/8/2020 1218  Last data filed at 5/8/2020 2625  Gross per 24 hour   Intake 720 ml   Output 2050 ml   Net -1330 ml       Physical Exam:  General:  alert, cooperative, no distress, appears stated age  Neck:  nontender  Lungs:  decreased  Heart:  regular rate and rhythm, S1, S2 normal, no murmur, click, rub or gallop  Abdomen:  abdomen is soft without significant tenderness, masses, organomegaly or guarding  Extremities:  extremities normal, atraumatic, no cyanosis or edema    Data Review:     Labs: Results:       Chemistry Recent Labs     05/08/20  0200 05/07/20  0508 05/06/20  0228   * 89 104*   * 127* 128*   K 4.4 4.3 4.3   CL 98* 95* 96*   CO2 26 24 24   BUN 63* 97* 90*   CREA 2.34* 3.25* 3.37*   CA 8.0* 8.3* 8.0*   AGAP 8 8 8   BUCR 27* 30* 27*      CBC w/Diff Recent Labs     05/08/20  0200   WBC 7.2   RBC 2.93*   HGB 8.2*   HCT 25.0*      GRANS 81*   LYMPH 10*   EOS 0      Cardiac Enzymes No results found for: CPK, CK, CKMMB, CKMB, RCK3, CKMBT, CKNDX, CKND1, ANDER, TROPT, TROIQ, MINH, TROPT, TNIPOC, BNP, BNPP   Coagulation No results for input(s): PTP, INR, APTT, INREXT in the last 72 hours. Lipid Panel Lab Results   Component Value Date/Time    Cholesterol, total 182 04/24/2020 02:34 AM    HDL Cholesterol 41 04/24/2020 02:34 AM    LDL, calculated 112.8 (H) 04/24/2020 02:34 AM    VLDL, calculated 28.2 04/24/2020 02:34 AM    Triglyceride 141 04/24/2020 02:34 AM    CHOL/HDL Ratio 4.4 04/24/2020 02:34 AM      BNP No results found for: BNP, BNPP, XBNPT   Liver Enzymes No results for input(s): TP, ALB, TBIL, AP, SGOT, GPT in the last 72 hours.     No lab exists for component: DBIL   Digoxin    Thyroid Studies No results found for: T4, T3U, TSH, TSHEXT       Signed By: Madalyn Baron MD     May 8, 2020

## 2020-05-08 NOTE — PROGRESS NOTES
In Patient Progress note    Admit Date: 4/30/2020    Impression:     #1 Acute kidney injury on chronic kidney disease stage III v/s likely progressed to ESRD especially in the setting of   Severe diastolic CHF/ cardiorenal syndrome /severe azotemia, patient developed  diuretic resistance and worsening   cardiorenal physiology , Overall edematous and persistent hypoxia , multiple admissions  Patient agreeable to starting HD , had #1 treatment yesterday , tolerated , TDC in place   #2 Acute on chronic diastolic CHF. CXR with persistent pulm edema , CHF  #3 hypertension, volume off should help  #4 dyslipidemia  #5 hypothermia/SIRS on empirical antibiotics per internal medicine.   #6 COVID-19 -ve   #7 leg edema , duplex neg for DVT  #8 hyponatremia in setting of CHF , this is indicative of worsening cardiorenal physiology      Plan:   #1 HD#2 today , UF ~ 2000 ml as tolerated   #2 lasix 40 mg PO daily  #3 strict ins and outs, fluid restrict 1200 mm  #4 encourage po intake, increase protein in diet  #5 follow cardiology recommendations  #6  avoid nephrotoxins renally dose medications  #7 continue epogen and hecterol    Outpatient dialysis placement in process   Significant deconditioning , will need PT/OT   still volume overload , needs volume management  Lives by herself , no family in this area , will need home health /transportation setup for dialysis    Please call with questions,     Bindu Ya MD [facility]  Cell [phone]  Pager: [phone]    Subjective:     Breathing better  Poor appetite   O2 2 lit   Still with persistent RUDOLPH      Current Facility-Administered Medications:     epoetin johnathon-epbx (RETACRIT) injection 10,000 Units, 10,000 Units, IntraVENous, Q TUE, THU & SAT, Maxwell Vitale MD    doxercalciferoL (HECTOROL) 4 mcg/2 mL injection 4 mcg, 4 mcg, IntraVENous, Q MON, WED & FRI, Maxwell Vitale MD    mirtazapine (REMERON) tablet 15 mg, 15 mg, Oral, QHS, Jailyn Barnett MD, 15 mg at 05/07/20 2135    furosemide (LASIX) injection 20 mg, 20 mg, IntraVENous, BID, Maxwell Vitale MD, 20 mg at 05/07/20 1751    ondansetron (ZOFRAN) injection 4 mg, 4 mg, IntraVENous, Q6H PRN, Maynor Manning MD, 4 mg at 05/07/20 1104    loratadine (CLARITIN) tablet 10 mg, 10 mg, Oral, DAILY PRN, Maynor Manning MD, 10 mg at 05/07/20 0948    VANCOMYCIN INFORMATION NOTE, , Other, Rx Dosing/Monitoring, Orlin Pearson MD    heparin (porcine) injection 5,000 Units, 5,000 Units, SubCUTAneous, Q8H, Joana Quintanilla MD, 5,000 Units at 05/07/20 2036    sodium chloride (NS) flush 5-10 mL, 5-10 mL, IntraVENous, PRN, Orlin Pearson MD    amLODIPine (NORVASC) tablet 10 mg, 10 mg, Oral, DAILY, Ani Meade, PA, 10 mg at 05/07/20 0940    aspirin chewable tablet 81 mg, 81 mg, Oral, DAILY, Brittnay Pedro [place]NN, PA, 81 mg at 05/07/20 0940    carvediloL (COREG) tablet 25 mg, 25 mg, Oral, BID WITH MEALS, Ani Meade [place], 25 mg at 05/07/20 2033    citalopram (CELEXA) tablet 20 mg, 20 mg, Oral, DAILY, Ani Meade PA, 20 mg at 05/07/20 0941    cyanocobalamin tablet 1,000 mcg, 1,000 mcg, Oral, BID, Ani Meade PA, 1,000 mcg at 05/07/20 2033    pantoprazole (PROTONIX) tablet 40 mg, 40 mg, Oral, ACB&D, Ani Meade, PA, 40 mg at 05/07/20 2033    hydrALAZINE (APRESOLINE) tablet 100 mg, 100 mg, Oral, TID, Ani Meade, PA, 100 mg at 05/07/20 2033    rosuvastatin (CRESTOR) tablet 5 mg, 5 mg, Oral, QHS, Ani Rahman [place], 5 mg at 05/07/20 2033    ascorbic acid (vitamin C) (VITAMIN C) tablet 500 mg, 500 mg, Oral, BID, Ani Meade [place], 500 mg at 05/07/20 2033    zinc sulfate (ZINCATE) 220 (50) mg capsule 1 Cap, 1 Cap, Oral, DAILY, Serenity Martinez, 1 Cap at 05/07/20 0928    insulin lispro (HUMALOG) injection, , SubCUTAneous, AC&HS, Ani Meade [place], Stopped at 05/05/20 1630    glucose chewable tablet 16 g, 16 g, Oral, PRN, Ani Meade, PA    glucagon (GLUCAGEN) injection 1 mg, 1 mg, IntraMUSCular, PRN, Ani Singh PA    dextrose (D50W) injection syrg 12.5-25 g, 25-50 mL, IntraVENous, PRN, Ani Singh PA    insulin glargine (LANTUS) injection 4 Units, 4 Units, SubCUTAneous, QHS, Dona Slick [place], [address], 4 Units at 05/07/20 2140    polyethylene glycol (MIRALAX) packet 17 g, 17 g, Oral, DAILY PRN, Ani Singh PA, 17 g at 05/03/20 1205    acetaminophen (TYLENOL) tablet 650 mg, 650 mg, Oral, Q4H PRN, Ani Snigh PA, 650 mg at 05/07/20 1052    ferrous sulfate tablet 325 mg, 1 Tab, Oral, EVERY OTHER DAY, Solo Bey MD, 325 mg at 05/07/20 0948        Objective:     Visit Vitals  /63 (BP 1 Location: Right arm, BP Patient Position: At rest)   Pulse 65   Temp 97.6 °F (36.4 °C)   Resp 18   Ht 5' 5\" (1.651 m)   Wt 116 kg (255 lb 12.8 oz)   SpO2 94%   Breastfeeding No   BMI 42.57 kg/m²         Intake/Output Summary (Last 24 hours) at 5/8/2020 0904  Last data filed at 5/8/2020 0630  Gross per 24 hour   Intake 540 ml   Output 3200 ml   Net -2660 ml       Physical Exam:     General: NAD, alert and oriented. Neck: No jvd. LUNGS: decreased in bases, rales+  CVS EXM: S1, S2  RRR, no murmurs/gallops/rubs. Abdomen: soft, non tender. Lower Extremities: 1+ edema. Rt IJ TDC       Data Review:    No results for input(s): WBC, RBC, HCT, MCV, MCH, MCHC, RDW, HCTEXT, HCTEXT in the last 72 hours.     No lab exists for component: HEMOGLOBIN, PLATELET, MPV  Recent Labs     05/08/20  0200 05/07/20  0508 05/06/20  0228   BUN 63* 97* 90*   CREA 2.34* 3.25* 3.37*   CA 8.0* 8.3* 8.0*   K 4.4 4.3 4.3   * 127* 128*   CL 98* 95* 96*   CO2 26 24 24   * 89 104*       Jose Enrique Vazquez MD

## 2020-05-08 NOTE — ROUTINE PROCESS
Bedside and Verbal shift change report given to Jaimee Butler RN (oncoming nurse) by Diana Malhotra RN (offgoing nurse). Report included the following information SBAR, Kardex, ED Summary, OR Summary, Procedure Summary, Intake/Output, MAR, Recent Results and Cardiac Rhythm NSR.

## 2020-05-08 NOTE — INTERDISCIPLINARY ROUNDS
Interdisciplinary Round Note Patient Information: Patient Information: Michi [place] 461/01 Reason for Admission: [facility]epsis ([facility] [A41.9] Attending Provider:   Erika Miller MD 
 Past Medical History:  
Past Medical History:  
Diagnosis Date  Arthritis  Cancer ([facility]  CHF (congestive heart failure) ([facility]  Diabetes ([facility]  Fracture of neck ([facility]  GERD (gastroesophageal reflux disease)  Goiter  Hiatal hernia  Hypertension  Uterine cancer ([facility] Hospital day: 8 Estimated discharge date: TBD 
RRAT Score: Medium Risk 25 Total Score 3 Patient Length of Stay (>5 days = 3) 4 IP Visits Last 12 Months (1-3=4, 4=9, >4=11) 5 Pt. Coverage (Medicare=5 , Medicaid, or Self-Pay=4) 6 Charlson Comorbidity Score (Age + Comorbid Conditions) Criteria that do not apply:  
 Has Seen PCP in Last 6 Months (Yes=3, No=0) . Living with Significant Other. Assisted Living. LTAC. SNF. or  
Rehab Goals for Today: Dialysis VITAL SIGNS Vitals:  
 05/08/20 1215 05/08/20 1230 05/08/20 1240 05/08/20 1255 BP: 174/74 181/74 175/76 179/70 Pulse: 63 63 63 66 Resp:    19 Temp:    97.7 °F (36.5 °C) TempSrc:    Oral  
SpO2:      
Weight:      
Height:      
 
 
 
 Lines, Drains, & Airways [REMOVED] Peripheral IV 04/30/20 Left Hand-Site Assessment: Painful Hemodialysis Access 05/07/20-Site Assessment: Clean, dry, & intact Peripheral IV 05/07/20 Posterior;Right Hand-Site Assessment: Clean, dry, & intact VTE Prophylaxis Sequential/Intermittent Compression Device: (Coban on) Graduated Compression Stockings: Bilateral 
     Patient Refused VTE Prophylaxis: Yes Intake and Output:  
05/06 1901 - 05/08 0700 In: 660 [P.O.:660] Out: 3200 [Urine:1700] 05/08 0701 - 05/08 1900 In: 240 [P.O.:240] Out: 2000  Current Diet: DIET RENAL Regular; FR 1000ML DIET NUTRITIONAL SUPPLEMENTS Lunch, Dinner; Orthocon Abdominal  
Last Bowel Movement Date: 05/03/20 Stool Appearance: Formed Abdominal Assessment: Constipation Appetite: Poor Bowel Sounds: Active Recent Glucose Results:  
Lab Results Component Value Date/Time  (H) 05/08/2020 02:00 AM  
 GLUCPOC 94 05/08/2020 01:14 PM  
 GLUCPOC 104 05/08/2020 06:32 AM  
 GLUCPOC 137 (H) 05/07/2020 09:38 PM  
 
 
  
IV Antibiotics? NO Activity Level: Activity Level: Up with Assistance Needs assistance with ADLs: YES 
PT Consult Status: YES Current Immunizations: There is no immunization history on file for this patient. Recommendations:  
Discharge Disposition: SNF 
 
COVID status: NEGATIVE, but will need a new COVID test to go to a SNF. Will the patient require COVID testing prior to discharge for placement?: YES Medication Reconciliation Completed: NO 
 
Cardiac/Pulmonary Rehab Ordered:  NO 
 
Needs for Discharge: COVID test, Community Dialysis unit arranged and SNF bed. Recommendations from IDR team: PT/OT evaluation and recommendations. Raymundo Hawthorne. MSW Care Manager Pager#: [phone]

## 2020-05-08 NOTE — DIALYSIS
RICH        ACUTE HEMODIALYSIS FLOW SHEET      HEMODIALYSIS ORDERS: Physician: Iam     Dialyzer: revaclear   Duration: 3 hr  BFR: 250-300   DFR: 500   Dialysate:  Temp 36-37*C  K+   3   Ca+  2.5 Na 138 Bicarb 35   Weight:    Wt Readings from Last 3 Encounters:   05/04/20 116 kg (255 lb 12.8 oz)   04/29/20 114.3 kg (252 lb)   04/26/20 114.4 kg (252 lb 4.8 oz)        UF Goal: 2000ml  Access CVL     Heparin []  Bolus      Units    [] Hourly       Units    [x]None    Catheter locking solution Heparin   Pre BP:   151/61    Pulse:     66       Respirations: 16  Temperature:   98   Labs: Pre        Post:        [x] N/A   Additional Orders(medications, blood products, hypotension management):       [x] hectorol     [x] Rich Consent Verified     CATHETER ACCESS: []N/A   [x]Right   []Left   [x]IJ     []Fem   []Transhepatic   [] First use X-ray verified     [x]Permanent                [] Temporary   [x]No S/S infection  []Redness  []Drainage []Cultured []Swelling []Pain   [x]Medical Aseptic Prep Utilized   [x]Dressing Changed  [] Biopatch  Date:       []Clotted   [x]Patent   Flows: [x]Good  []Poor  []Reversed   If access problem,  notified: []Yes    []N/A  Date:           GRAFT/FISTULA ACCESS:  [x]N/A     []Right     []Left     []UE     []LE                 GENERAL ASSESSMENT:      LUNGS:  Rate 16 SaO2% 98        [] N/A    [x] Clear  [] Coarse  [] Crackles  [] Wheezing        [] Diminished     Location : []RLL   []LLL    []RUL  []KATIE     Cough: []Productive  []Dry  [x]N/A   Respirations:  [x]Easy  []Labored     Therapy:   []RA  [x]NC 3 l/min    Mask: []NRB []Venti       O2%                  []Ventilator  []Intubated  [] Trach  [] BiPaP     CARDIAC: [x]Regular      [] Irregular   [] Pericardial Rub  [] JVD        []  Monitored  [] Bedside  [] Remotely monitored [] N/A  Rhythm:      EDEMA: [] None  [x]Generalized  [] Pitting [] 1    [] 2    [] 3    [] 4                 [] Facial  [] Pedal  [x]  UE  [x] LE SKIN:   [x] Warm  [] Hot     [] Cold   [x] Dry     [] Pale   [] Diaphoretic                  [] Flushed  [] Jaundiced  [] Cyanotic  [] Rash  [] Weeping     LOC:    [x] Alert      [x]Oriented:    [x] Person     [x] Place  [x]Time               [] Confused  [] Lethargic  [] Medicated  [] Non-responsive     GI / ABDOMEN:  [] Flat    [] Distended    [x] Soft    [] Firm   [x]  Obese                             [] Diarrhea  [x] Bowel Sounds  [] Nausea  [] Vomiting       / URINE ASSESSMENT:[x] Voiding   [] Oliguria  [] Anuria   [x]  Lopez     [] Incontinent    []  Incontinent Brief      []  Bathroom Privileges       PAIN: [x] 0 []1  []2   []3   []4   []5   []6   []7   []8   []9   []10              Scale 0-10  Action/Follow Up:      MOBILITY:  [] Amb    [] Amb/Assist    [x] Bed    [] Wheelchair  [] Stretcher      All Vitals and Treatment Details on Attached [employer] Drive: [facility]          Room # 461/01      [] 1st Time Acute  [] Stat  [x] Routine  [] Urgent     [x] Acute Room  []  Bedside  [] ICU/CCU  [] ER   Isolation Precautions:   There are currently no Active Isolations      Special Considerations:         [] Blood Consent Verified [x]N/A     ALLERGIES:   Allergies   Allergen Reactions    Augmentin [Amoxicillin-Pot Clavulanate] Diarrhea    Clavulanic Acid Diarrhea    Levaquin [Levofloxacin] Other (comments)     Severe hypoglycemia                 Code Status:Full Code        Hepatitis Status:                   Lab Results   Component Value Date/Time    Hepatitis B surface Ag <0.10 05/07/2020 05:08 AM    Hepatitis B surface Ab <3.10 (L) 05/07/2020 05:08 AM                     Current Labs:   Lab Results   Component Value Date/Time    Sodium 132 (L) 05/08/2020 02:00 AM    Potassium 4.4 05/08/2020 02:00 AM    Chloride 98 (L) 05/08/2020 02:00 AM    CO2 26 05/08/2020 02:00 AM    Anion gap 8 05/08/2020 02:00 AM    Glucose 103 (H) 05/08/2020 02:00 AM    BUN 63 (H) 05/08/2020 02:00 AM    Creatinine 2.34 (H) 05/08/2020 02:00 AM    BUN/Creatinine ratio 27 (H) 05/08/2020 02:00 AM    GFR est AA 25 (L) 05/08/2020 02:00 AM    GFR est non-AA 21 (L) 05/08/2020 02:00 AM    Calcium 8.0 (L) 05/08/2020 02:00 AM      Lab Results   Component Value Date/Time    WBC 7.2 05/08/2020 02:00 AM    HGB 8.2 (L) 05/08/2020 02:00 AM    HCT 25.0 (L) 05/08/2020 02:00 AM    PLATELET 166 88/47/2536 02:00 AM    MCV 85.3 05/08/2020 02:00 AM                                                                                     DIET: DIET RENAL  DIET NUTRITIONAL SUPPLEMENTS       PRIMARY NURSE REPORT: First initial/Last name/Title      Pre Dialysis: Sheela Dumont RN    Time: 0900      EDUCATION:    [x] Patient [] Other         Knowledge Basis: []None [x]Minimal [] Substantial   Barriers to learning  []N/A   [x] Access Care     [] S&S of infection     [] Fluid Management     []K+     [x]Procedural    []Albumin     [x] Medications     [] Tx Options     [] Transplant     [] Diet     [x] Other   Teaching Tools:  [x] Explain  [] Demo  [] Handouts [] Video  Patient response:   [x] Verbalized understanding  [] Teach back  [] Return demonstration [x] Requires follow up   Inappropriate due to            [x] Time Out/Safety Check  [x]Extracorporeal Circuit Tested for integrity       RO/HEMODIALYSIS MACHINE SAFETY CHECKS  Before each treatment:     Machine Number:                   [facility]                                  [x] Unit Machine # 9 with centralized RO  Alarm Test:  Pass time 0912               [x] RO/Machine Log Complete      Temp    36.5             Dialysate: pH  7.2 Conductivity: Meter   14     HD Machine   13.9                  TCD: 13.8  Dialyzer Lot # [id number]            Blood Tubing Lot # [id number]          Saline Lot #  [id number]      CHLORINE TESTING-Before each treatment and every 4 hours    Total Chlorine: [x] less than 0.1 ppm  Time: 0900 4 Hr/2nd Check Time: NA   (if greater than 0.1 ppm from Primary then every 30 minutes from Secondary)     TREATMENT INITIATION  with Dialysis Precautions:   [x] All Connections Secured                 [x] Saline Line Double Clamped   [x] Venous Parameters Set                  [x] Arterial Parameters Set    [x] Prime Given 250ml                          [x]Air Foam Detector Engaged      Treatment Initiation Note:  pt arrived via transport in bed, pt is A&O, pt is on 3LNCO2, no S/S of distress, VSS. tx started with out complications. CVC no S/S of complications   During Treatment Notes:  1000: pt resting in bed, pt states she is good, No S/S of distress, face and access in view. 1030: pt resting in bed, pt states she is good, No S/S of distress, face and access in view. 1100: pt resting in bed, pt states she is good, No S/S of distress, face and access in view. 1130: pt resting in bed, pt states she is good, No S/S of distress, face and access in view. 1200: pt resting in bed, pt states she is good, No S/S of distress, face and access in view. 1230: pt resting in bed, pt states she is good, No S/S of distress, face and access in view. Medication Dose Volume Route Time [facility] name Title   Hectorol 4mcg 2ml Dialysis 1200 Kimberly Dejesus RN                 Post Assessment:   Dialyzer Cleared: [x] Good [] Fair  [] Poor  Blood processed:  52.6 L  UF Removed  2000 Ml  POst BP:   179/70       Pulse: 66        Respirations: 19  Temperature: 97.7 Lungs:     [x] Clear      [] Course         [] Crackles    [] Wheezing         [] Diminished   Post Tx Vascular Access:      N/A Cardiac:   [x] Regular   [] Irregular   [] Monitor  [] N/A      Rhythm:       Catheter:   Locking solution: Heparin 1:1000   Art. 1.6  Austyn. 1.6      Skin:   Pain:    [x] Warm  [x] Dry [] Diaphoretic    [] Flushed    [] Pale [] Cyanotic [x]0  []1  []2   []3  []4   []5   []6   []7   []8   []9   []10     Post Treatment Note:    Pt leaving via transport, pt tolerated the tx, pt states they are good, no S/S of distress.       POST TREATMENT PRIMARY NURSE HANDOFF REPORT:     First initial/Last name/Title         Post Dialysis: Hugo Nieto RN  Time:  1300     Abbreviations: AVG-arterial venous graft, AVF-arterial venous fistula, IJ-Internal Jugular, Subcl-Subclavian, Fem-Femoral, Tx-treatment, AP/HR-apical heart rate, DFR-dialysate flow rate, BFR-blood flow rate, AP-arterial pressure, -venous pressure, UF-ultrafiltrate, TMP-transmembrane pressure, Austyn-Venous, Art-Arterial, RO-Reverse Osmosis

## 2020-05-08 NOTE — ROUTINE PROCESS
Bedside and Verbal shift change report given to Samia Li RN (oncoming nurse) by Nav Chung RN (offgoing nurse). Report included the following information SBAR, Kardex and MAR.

## 2020-05-08 NOTE — PROGRESS NOTES
[facility] Hospitalist Group  Progress Note    Patient: Micki Elder Age: 71 y.o. : 1950 MR#: [id number] SSN: [id number]  Date/Time: 2020    Subjective:     Patient was seen in dialysis. Denies chest and abdominal pain. No nausea or vomiting. States she is not hungry. Assessment/Plan:   71 y o female w/ h/o CKD and CHF re admitted soon after discharge after presenting w/ cc of SOB.    -Acute on chronic respiratory failure at baseline 02 need  -Acute on chronic diastolic heart failure. Compensated currently. -COVID testing negative - 2020   -Acute on chronic renal failure. -Hyponatremia in setting of above  -Deconditioned state  -Depressed mood   HISTORY OF:  -HFpEF  -T2DM A1c of 6.1 on lantus, corrective insulin  -CAD  -HTN  -CKD III-creat w/ some trend upwards     PLAN:    - improving on HD   - no SOB  - D/W Renal - continue HD   - monitor BP and if Permitted then ACEI     Case discussed with:  [x]Patient  []Family  [x]Nursing  []Case Management  DVT Prophylaxis:  []Lovenox  [x]Hep SQ  []SCDs  []Coumadin   []On Heparin gtt    Objective:   VS:   Visit Vitals  /70   Pulse 66   Temp 97.7 °F (36.5 °C) (Oral)   Resp 19   Ht 5' 5\" (1.651 m)   Wt 116 kg (255 lb 12.8 oz)   SpO2 94%   Breastfeeding No   BMI 42.57 kg/m²      Tmax/24hrs: Temp (24hrs), Av.9 °F (36.6 °C), Min:97.3 °F (36.3 °C), Max:98.8 °F (37.1 °C)    Input/Output:     Intake/Output Summary (Last 24 hours) at 2020 1507  Last data filed at 2020 1240  Gross per 24 hour   Intake 720 ml   Output 4050 ml   Net -3330 ml       General:  Alert, awake, in NAD  Cardiovascular: Regular rate and rhythm.  Right sided upper chest catheter in situ   Pulmonary: Clear to auscultation bilaterally  GI: Soft nontender and bowel sounds present  Extremities: 2+ pedal edema   Additional:      Labs:    Recent Results (from the past 24 hour(s))   GLUCOSE, POC    Collection Time: 20  5:01 PM   Result Value Ref Range    Glucose (POC) 105 70 - 110 mg/dL   GLUCOSE, POC    Collection Time: 05/07/20  9:38 PM   Result Value Ref Range    Glucose (POC) 137 (H) 70 - 109 mg/dL   METABOLIC PANEL, BASIC    Collection Time: 05/08/20  2:00 AM   Result Value Ref Range    Sodium 132 (L) 136 - 145 mmol/L    Potassium 4.4 3.5 - 5.5 mmol/L    Chloride 98 (L) 100 - 111 mmol/L    CO2 26 21 - 32 mmol/L    Anion gap 8 3.0 - 18 mmol/L    Glucose 103 (H) 74 - 99 mg/dL    BUN 63 (H) 7.0 - 18 MG/DL    Creatinine 2.34 (H) 0.6 - 1.3 MG/DL    BUN/Creatinine ratio 27 (H) 12 - 20      GFR est AA 25 (L) >60 ml/min/1.73m2    GFR est non-AA 21 (L) >60 ml/min/1.73m2    Calcium 8.0 (L) 8.5 - 10.1 MG/DL   CBC WITH AUTOMATED DIFF    Collection Time: 05/08/20  2:00 AM   Result Value Ref Range    WBC 7.2 4.6 - 13.2 K/uL    RBC 2.93 (L) 4.20 - 5.30 M/uL    HGB 8.2 (L) 12.0 - 16.0 g/dL    HCT 25.0 (L) 35.0 - 45.0 %    MCV 85.3 74.0 - 97.0 FL    MCH 28.0 24.0 - 34.0 PG    MCHC 32.8 31.0 - 37.0 g/dL    RDW 13.9 11.6 - 14.5 %    PLATELET 433 084 - 483 K/uL    MPV 12.2 (H) 9.2 - 11.8 FL    NEUTROPHILS 81 (H) 40 - 73 %    LYMPHOCYTES 10 (L) 21 - 52 %    MONOCYTES 9 3 - 10 %    EOSINOPHILS 0 0 - 5 %    BASOPHILS 0 0 - 2 %    ABS. NEUTROPHILS 5.9 1.8 - 8.0 K/UL    ABS. LYMPHOCYTES 0.7 (L) 0.9 - 3.6 K/UL    ABS. MONOCYTES 0.6 0.05 - 1.2 K/UL    ABS. EOSINOPHILS 0.0 0.0 - 0.4 K/UL    ABS.  BASOPHILS 0.0 0.0 - 0.1 K/UL    DF AUTOMATED     GLUCOSE, POC    Collection Time: 05/08/20  6:32 AM   Result Value Ref Range    Glucose (POC) 104 70 - 110 mg/dL   GLUCOSE, POC    Collection Time: 05/08/20  1:14 PM   Result Value Ref Range    Glucose (POC) 94 70 - 110 mg/dL   GLUCOSE, POC    Collection Time: 05/08/20  3:04 PM   Result Value Ref Range    Glucose (POC) 155 (H) 70 - 110 mg/dL     Additional Data Reviewed:      Signed By: Gabriela Tee MD     May 8, 2020

## 2020-05-08 NOTE — PROGRESS NOTES
Discharge planning:    Patient is a new dialysis patient. She will be set up with her community dialysis unit, days and times. Patient also reports that she does not feel safe returning home. This writer has started the referral process to find patient a bed (SNF to LTC) potentially. [employer] evaluated patient for Medicaid and patient is over resources for Medicaid. This writer will continue to monitor for discharge planning to ensure a safe discharge from [facility]. Raymundo De Jesus MSW  Care Manager  Pager#: [phone]

## 2020-05-09 PROBLEM — J96.20 ACUTE ON CHRONIC RESPIRATORY FAILURE (HCC): Status: ACTIVE | Noted: 2020-05-09

## 2020-05-09 LAB
ANION GAP SERPL CALC-SCNC: 6 MMOL/L (ref 3–18)
BASOPHILS # BLD: 0 K/UL (ref 0–0.1)
BASOPHILS NFR BLD: 0 % (ref 0–2)
BUN SERPL-MCNC: 39 MG/DL (ref 7–18)
BUN/CREAT SERPL: 18 (ref 12–20)
CALCIUM SERPL-MCNC: 7.9 MG/DL (ref 8.5–10.1)
CHLORIDE SERPL-SCNC: 102 MMOL/L (ref 100–111)
CO2 SERPL-SCNC: 28 MMOL/L (ref 21–32)
CREAT SERPL-MCNC: 2.15 MG/DL (ref 0.6–1.3)
DIFFERENTIAL METHOD BLD: ABNORMAL
EOSINOPHIL # BLD: 0 K/UL (ref 0–0.4)
EOSINOPHIL NFR BLD: 0 % (ref 0–5)
ERYTHROCYTE [DISTWIDTH] IN BLOOD BY AUTOMATED COUNT: 14 % (ref 11.6–14.5)
GLUCOSE BLD STRIP.AUTO-MCNC: 100 MG/DL (ref 70–110)
GLUCOSE BLD STRIP.AUTO-MCNC: 90 MG/DL (ref 70–110)
GLUCOSE BLD STRIP.AUTO-MCNC: 90 MG/DL (ref 70–110)
GLUCOSE SERPL-MCNC: 95 MG/DL (ref 74–99)
HCT VFR BLD AUTO: 23.7 % (ref 35–45)
HGB BLD-MCNC: 7.7 G/DL (ref 12–16)
LYMPHOCYTES # BLD: 0.8 K/UL (ref 0.9–3.6)
LYMPHOCYTES NFR BLD: 11 % (ref 21–52)
MCH RBC QN AUTO: 28.1 PG (ref 24–34)
MCHC RBC AUTO-ENTMCNC: 32.5 G/DL (ref 31–37)
MCV RBC AUTO: 86.5 FL (ref 74–97)
MONOCYTES # BLD: 0.7 K/UL (ref 0.05–1.2)
MONOCYTES NFR BLD: 10 % (ref 3–10)
NEUTS SEG # BLD: 6.2 K/UL (ref 1.8–8)
NEUTS SEG NFR BLD: 79 % (ref 40–73)
PLATELET # BLD AUTO: 153 K/UL (ref 135–420)
PMV BLD AUTO: 11.6 FL (ref 9.2–11.8)
POTASSIUM SERPL-SCNC: 4 MMOL/L (ref 3.5–5.5)
RBC # BLD AUTO: 2.74 M/UL (ref 4.2–5.3)
SODIUM SERPL-SCNC: 136 MMOL/L (ref 136–145)
VANCOMYCIN SERPL-MCNC: 15.5 UG/ML (ref 5–40)
WBC # BLD AUTO: 7.7 K/UL (ref 4.6–13.2)

## 2020-05-09 PROCEDURE — 3331090001 HH PPS REVENUE CREDIT

## 2020-05-09 PROCEDURE — 85025 COMPLETE CBC W/AUTO DIFF WBC: CPT

## 2020-05-09 PROCEDURE — 74011250636 HC RX REV CODE- 250/636: Performed by: INTERNAL MEDICINE

## 2020-05-09 PROCEDURE — 87635 SARS-COV-2 COVID-19 AMP PRB: CPT

## 2020-05-09 PROCEDURE — 74011250636 HC RX REV CODE- 250/636: Performed by: HOSPITALIST

## 2020-05-09 PROCEDURE — 36415 COLL VENOUS BLD VENIPUNCTURE: CPT

## 2020-05-09 PROCEDURE — 80202 ASSAY OF VANCOMYCIN: CPT

## 2020-05-09 PROCEDURE — 77010033678 HC OXYGEN DAILY

## 2020-05-09 PROCEDURE — 90935 HEMODIALYSIS ONE EVALUATION: CPT

## 2020-05-09 PROCEDURE — 74011250637 HC RX REV CODE- 250/637: Performed by: PHYSICIAN ASSISTANT

## 2020-05-09 PROCEDURE — 82962 GLUCOSE BLOOD TEST: CPT

## 2020-05-09 PROCEDURE — 65660000000 HC RM CCU STEPDOWN

## 2020-05-09 PROCEDURE — 3331090002 HH PPS REVENUE DEBIT

## 2020-05-09 PROCEDURE — 80048 BASIC METABOLIC PNL TOTAL CA: CPT

## 2020-05-09 PROCEDURE — 74011250637 HC RX REV CODE- 250/637: Performed by: INTERNAL MEDICINE

## 2020-05-09 RX ORDER — VANCOMYCIN HYDROCHLORIDE
1250
Status: DISPENSED | OUTPATIENT
Start: 2020-05-09 | End: 2020-05-09

## 2020-05-09 RX ORDER — ISOSORBIDE MONONITRATE 30 MG/1
30 TABLET, EXTENDED RELEASE ORAL DAILY
Status: DISCONTINUED | OUTPATIENT
Start: 2020-05-09 | End: 2020-05-14 | Stop reason: HOSPADM

## 2020-05-09 RX ADMIN — ISOSORBIDE MONONITRATE 30 MG: 30 TABLET, EXTENDED RELEASE ORAL at 14:34

## 2020-05-09 RX ADMIN — HYDRALAZINE HYDROCHLORIDE 100 MG: 50 TABLET, FILM COATED ORAL at 14:34

## 2020-05-09 RX ADMIN — MIRTAZAPINE 15 MG: 15 TABLET, FILM COATED ORAL at 21:15

## 2020-05-09 RX ADMIN — CARVEDILOL 25 MG: 25 TABLET, FILM COATED ORAL at 21:15

## 2020-05-09 RX ADMIN — FUROSEMIDE 20 MG: 10 INJECTION, SOLUTION INTRAMUSCULAR; INTRAVENOUS at 17:51

## 2020-05-09 RX ADMIN — CYANOCOBALAMIN TAB 1000 MCG 1000 MCG: 1000 TAB at 21:15

## 2020-05-09 RX ADMIN — HYDRALAZINE HYDROCHLORIDE 100 MG: 50 TABLET, FILM COATED ORAL at 21:14

## 2020-05-09 RX ADMIN — ACETAMINOPHEN 650 MG: 325 TABLET ORAL at 21:14

## 2020-05-09 RX ADMIN — HEPARIN SODIUM 5000 UNITS: 5000 INJECTION INTRAVENOUS; SUBCUTANEOUS at 21:15

## 2020-05-09 RX ADMIN — HEPARIN SODIUM 5000 UNITS: 5000 INJECTION INTRAVENOUS; SUBCUTANEOUS at 14:34

## 2020-05-09 RX ADMIN — FUROSEMIDE 20 MG: 10 INJECTION, SOLUTION INTRAMUSCULAR; INTRAVENOUS at 09:17

## 2020-05-09 RX ADMIN — PANTOPRAZOLE SODIUM 40 MG: 40 TABLET, DELAYED RELEASE ORAL at 21:14

## 2020-05-09 RX ADMIN — ROSUVASTATIN CALCIUM 5 MG: 10 TABLET, FILM COATED ORAL at 21:15

## 2020-05-09 NOTE — PROGRESS NOTES
[phone] [id number] first attempt OT tx: nursing reports pt off floor for HD. . OT to follow. 1500 Second attempt: pt presents lying in bed, asleep, awakens with verbal stimuli, declining participation in OT tx intervention d/t fatigue s/p HD-nursing notified.      Cisco Alejandra Boone 87 OTR/L  [phone]

## 2020-05-09 NOTE — PROGRESS NOTES
[facility] - Progress Note  Admit Date: 4/30/2020    Assessment:     Hospital Problems  Date Reviewed: 4/23/2020          Codes Class Noted POA    Sepsis [facility]) ICD-10-CM: A41.9  ICD-9-CM: 038.9, 995.91  4/30/2020 Unknown        Respiratory failure ([address] ICD-10-CM: J96.90  ICD-9-CM: 518.81  4/30/2020 Unknown        SIRS (systemic inflammatory response syndrome) (HCC) ICD-10-CM: R65.10  ICD-9-CM: 995.90  4/30/2020 Unknown              -Acute on chronic respiratory failure. Multifactorial in setting of below. Recurrent ER visits since last discharge.  Improving.  -Sepsis/SIRS, presented with hypothermia. Improved. -Acute on chronic HFpEF. EF 55-60% by echo 4/25/20. -OFE on CKD.  Started on HD this admission.  -Acute on chronic anemia.  -Hypertension. Elevated on admission.  -Diabetes mellitus. HgbA1c 6.1.  -Dyslipidemia. On statin.  -Coronary disease reportedly diffuse medically managed, nuclear stress 3/2018 with small area of ischemia with EF 53%  -Chronic atypical LBBB. -COVID negative 4/30/2020.     Primary cardiologist Dr. Rayray Chung at [facility], now plans to follow up with Dr. Yudi Bowen:     Patient seen in HD, volume status improving. Continue volume management per nephrology with HD and IV lasix. Continue BP control, currently on coreg, norvasc, hydralazine. Avoiding nephrotoxic medications per nephrology, will start imdur. Subjective:     Seen in HD, no complaints, remains on 3 liters    Objective:      Patient Vitals for the past 8 hrs:   Temp Pulse Resp BP SpO2   05/09/20 0728 98.7 °F (37.1 °C) 63 14 161/63 98 %   05/09/20 0546 98.6 °F (37 °C) 66 14 174/69 98 %         No data found.                  Intake/Output Summary (Last 24 hours) at 5/9/2020 1030  Last data filed at 5/9/2020 0936  Gross per 24 hour   Intake 0 ml   Output 2650 ml   Net -2650 ml       Physical Exam:  General:  fatigued, cooperative  Neck:  no JVD  Lungs:  clear to auscultation anterior  Heart:  regular rate and rhythm  Abdomen:  abdomen is soft obese  Extremities:  extremities normal, atraumatic, no cyanosis 1+ edema    Data Review:     Labs: Results:       Chemistry Recent Labs     05/09/20  0144 05/08/20  0200 05/07/20  0508   GLU 95 103* 89    132* 127*   K 4.0 4.4 4.3    98* 95*   CO2 28 26 24   BUN 39* 63* 97*   CREA 2.15* 2.34* 3.25*   CA 7.9* 8.0* 8.3*   AGAP 6 8 8   BUCR 18 27* 30*      CBC w/Diff Recent Labs     05/09/20  0144 05/08/20  0200   WBC 7.7 7.2   RBC 2.74* 2.93*   HGB 7.7* 8.2*   HCT 23.7* 25.0*    179   GRANS 79* 81*   LYMPH 11* 10*   EOS 0 0      Cardiac Enzymes No results found for: CPK, CK, CKMMB, CKMB, RCK3, CKMBT, CKNDX, CKND1, ANDER, TROPT, TROIQ, MINH, TROPT, TNIPOC, BNP, BNPP   Coagulation No results for input(s): PTP, INR, APTT, INREXT in the last 72 hours. Lipid Panel Lab Results   Component Value Date/Time    Cholesterol, total 182 04/24/2020 02:34 AM    HDL Cholesterol 41 04/24/2020 02:34 AM    LDL, calculated 112.8 (H) 04/24/2020 02:34 AM    VLDL, calculated 28.2 04/24/2020 02:34 AM    Triglyceride 141 04/24/2020 02:34 AM    CHOL/HDL Ratio 4.4 04/24/2020 02:34 AM      BNP No results found for: BNP, BNPP, XBNPT   Liver Enzymes No results for input(s): TP, ALB, TBIL, AP, SGOT, GPT in the last 72 hours.     No lab exists for component: DBIL   Digoxin    Thyroid Studies No results found for: T4, T3U, TSH, TSHEXT       Signed By: MICHAEL Hook     May 9, 2020

## 2020-05-09 NOTE — PROGRESS NOTES
In Patient Progress note    Admit Date: 4/30/2020    Impression:     #1 Acute kidney injury on chronic kidney disease stage III v/s likely progressed to ESRD especially in the setting of   Severe diastolic CHF/ cardiorenal syndrome /severe azotemia, patient developed  diuretic resistance and worsening   cardiorenal physiology , Overall edematous and persistent hypoxia , multiple admissions  Patient agreeable to starting HD , had #1 treatment yesterday , tolerated , TDC in place   #2 Acute on chronic diastolic CHF. CXR with persistent pulm edema , CHF  #3 hypertension, volume off should help  #4 dyslipidemia  #5 hypothermia/SIRS on empirical antibiotics per internal medicine.   #6 COVID-19 -ve   #7 leg edema , duplex neg for DVT  #8 hyponatremia in setting of CHF , this is indicative of worsening cardiorenal physiology      Plan:   #1 IUF today , goal ~ 2500ml   #2 strict ins and outs, fluid restrict 1200 mm  #3 encourage po intake, increase protein in diet  #4 follow cardiology recommendations  #5  avoid nephrotoxins renally dose medications  #6 continue epogen and hecterol    Patient does have outpatient chair time , [address] MWF 5:30 AM starting 5/11   Significant deconditioning , will need PT/OT   still volume overload , needs volume management  Lives by herself , no family in this area , will need home health /transportation setup for dialysis    Please call with questions,     Shari Webb MD [facility]  Cell [phone]  Pager: [phone]    Subjective:     Breathing better  O2 2 lit   Still with persistent RUDOLPH      Current Facility-Administered Medications:     vancomycin (VANCOCIN) 1250 mg in  ml infusion, 1,250 mg, IntraVENous, DIALYSIS ONE TIME DOSE, Eunice Canales MD    epoetin johnathon-epbx (RETACRIT) injection 10,000 Units, 10,000 Units, IntraVENous, Q TUE, THU & SAT, Bic, Dada Dia MD    doxercalciferoL (HECTOROL) 4 mcg/2 mL injection 4 mcg, 4 mcg, IntraVENous, Q MON, WED & FRI, Jared Linares MD, 4 mcg at 05/08/20 2202    mirtazapine (REMERON) tablet 15 mg, 15 mg, Oral, QHS, Andrew Berg MD, 15 mg at 05/08/20 2202    furosemide (LASIX) injection 20 mg, 20 mg, IntraVENous, BID, Bichu, Maxwell PRADO MD, 20 mg at 05/09/20 0917    ondansetron (ZOFRAN) injection 4 mg, 4 mg, IntraVENous, Q6H PRN, Andrew Berg MD, 4 mg at 05/07/20 1104    loratadine (CLARITIN) tablet 10 mg, 10 mg, Oral, DAILY PRN, Andrew Berg MD, 10 mg at 05/07/20 0948    VANCOMYCIN INFORMATION NOTE, , Other, Rx Dosing/Monitoring, John Yadav MD    heparin (porcine) injection 5,000 Units, 5,000 Units, SubCUTAneous, Q8H, Felipe Valadez MD, Stopped at 05/09/20 1008    sodium chloride (NS) flush 5-10 mL, 5-10 mL, IntraVENous, PRN, John Yadav MD    amLODIPine (NORVASC) tablet 10 mg, 10 mg, Oral, DAILY, Jus Done, Quinn Beardma, Stopped at 05/09/20 1008    aspirin chewable tablet 81 mg, 81 mg, Oral, DAILY, [facility] Rustam [place], Stopped at 05/09/20 1008    carvediloL (COREG) tablet 25 mg, 25 mg, Oral, BID WITH MEALS, Jus Done, Quinn Beardma, Stopped at 05/09/20 1008    citalopram (CELEXA) tablet 20 mg, 20 mg, Oral, DAILY, Jus Done, Serenity Beard, Stopped at 05/09/20 1008    cyanocobalamin tablet 1,000 mcg, 1,000 mcg, Oral, BID, Jus Done, Serenity Beard, Stopped at 05/09/20 0900    pantoprazole (PROTONIX) tablet 40 mg, 40 mg, Oral, ACB&D, Dru Rustam [place], Stopped at 05/09/20 1008    hydrALAZINE (APRESOLINE) tablet 100 mg, 100 mg, Oral, TID, JusAni Armendariz PA, Stopped at 05/09/20 1008    rosuvastatin (CRESTOR) tablet 5 mg, 5 mg, Oral, QHS, Ani Rahman [place], 5 mg at 05/08/20 2202    ascorbic acid (vitamin C) (VITAMIN C) tablet 500 mg, 500 mg, Oral, BID, Ani Walls [place], Stopped at 05/09/20 1008    zinc sulfate (ZINCATE) 220 (50) mg capsule 1 Cap, 1 Cap, Oral, DAILY, Ani Rahman [place], Stopped at 05/09/20 0900    insulin lispro (HUMALOG) injection, , SubCUTAneous, AC&HS, [facility] Serenity [place], Stopped at 05/08/20 2200   glucose chewable tablet 16 g, 16 g, Oral, PRN, Ani Wilkinson PA    glucagon (GLUCAGEN) injection 1 mg, 1 mg, IntraMUSCular, PRN, Ani Wilkinson PA    dextrose (D50W) injection syrg 12.5-25 g, 25-50 mL, IntraVENous, PRN, Ani Wilkinson PA    insulin glargine (LANTUS) injection 4 Units, 4 Units, SubCUTAneous, QHS, [facility], [address], 4 Units at 05/08/20 2203    polyethylene glycol (MIRALAX) packet 17 g, 17 g, Oral, DAILY PRN, [facility] Scanlon [facility], 17 g at 05/03/20 1205    acetaminophen (TYLENOL) tablet 650 mg, 650 mg, Oral, Q4H PRN, [facility] WashingtonAni PA, 650 mg at 05/07/20 4026    ferrous sulfate tablet 325 mg, 1 Tab, Oral, EVERY OTHER DAY, Frank Garber MD, Stopped at 05/09/20 0900        Objective:     Visit Vitals  /63 (BP 1 Location: Right arm, BP Patient Position: At rest)   Pulse 63   Temp 98.7 °F (37.1 °C)   Resp 14   Ht 5' 5\" (1.651 m)   Wt 116 kg (255 lb 12.8 oz)   SpO2 98%   Breastfeeding No   BMI 42.57 kg/m²         Intake/Output Summary (Last 24 hours) at 5/9/2020 1031  Last data filed at 5/9/2020 0936  Gross per 24 hour   Intake 0 ml   Output 2650 ml   Net -2650 ml       Physical Exam:     General: NAD, alert and oriented. Neck: No jvd. LUNGS: decreased in bases, rales+  CVS EXM: S1, S2  RRR, no murmurs/gallops/rubs. Abdomen: soft, non tender. Lower Extremities: 1+ edema.    Rt IJ TDC     Data Review:    Recent Labs     05/09/20  0144   WBC 7.7   RBC 2.74*   HCT 23.7*   MCV 86.5   MCH 28.1   MCHC 32.5   RDW 14.0     Recent Labs     05/09/20  0144 05/08/20  0200 05/07/20  0508   BUN 39* 63* 97*   CREA 2.15* 2.34* 3.25*   CA 7.9* 8.0* 8.3*   K 4.0 4.4 4.3    132* 127*    98* 95*   CO2 28 26 24   GLU 95 103* 89       Pina Hidalgo MD

## 2020-05-09 NOTE — PROGRESS NOTES
1930: Assumed care of pt. Report received. All questions answered. 2100: Pt resting in bed with eyes closed. No needs noted at this time. Will continue to monitor    0000: Pt continues to rest in bed with eyes closed. No needs noted at this time. Will continue to monitor    0500: Pt continues to rest in bed in NAD. No complaints of pain noted.  Will continue monitor pt    0700: Report given to Mathew Fontanez

## 2020-05-09 NOTE — PROGRESS NOTES
[facility] Hospitalist Group  Progress Note    Patient: Shelli Dominguez Age: 71 y.o. : 1950 MR#: [id number] SSN: [id number]  Date/Time: 2020    Subjective:     Seen in room   Lethargic, almost somnolent in bed toda y   Cannot state her name   Responds only with yes/no and grunts   C/o extreme fatigue     Declined OT today; pt needs eval for disposition planning     Assessment/Plan:   Shelli Dominguez is a 72 y/o female with a PMHx of CKD stage III, diastolic CHF, chronic hypoxic respiratory failure, anemia, Type II DM, HTN, HLD and obesity who is admitted for acute on chronic respiratory failure, acute on chronic diastolic CHF and OFE on CKD Stage III that has progressed to ESRD. 1. Acute on chronic respiratory failure   2. Acute on chronic diastolic heart failure  3. OFE on CKD stage III that has progressed to ESRD   4. Hyponatremia in setting of above  5. Deconditioned state  6. Depressed mood  7. Type II DM- HbA1c 6.1  8. CAD  9. HTN  10. HLD     Will obtain repeat COVID-19 testing as pt will likely need SNF vs LTC placement and needs negative test within 5 days of placement; pt also needs PT/OT eval and rec's for placement; repeat testing is only for placement at SNF/LTC      COVID-19 testing negative on 20   HD per nephrology   IV lasix per nephrology   Pt is set up with outpatient dialysis clinic  Stop lantus, cont SSI w/accuchecks   Strict I&O's, fluid restriction   BP control- amlodipine, carvedilol, hydralazine; start imdur   Cont other home meds   PT, OT   Needs HH, transportation set-up for dialysis       Case discussed with:  [x]Patient  []Family  [x]Nursing  []Case Management  DVT Prophylaxis:  []Lovenox  [x]Hep SQ  []SCDs  []Coumadin   []On Heparin gtt  Diet: Renal w/fluid restriction   Code Status: FULL   Disposition: pending PT/OT eval; likely SNF vs LTC       PAVEL Leslie, DO   May 9, 2020        Objective:   VS:   Visit Vitals  /63 (BP 1 Location: Right arm, BP Patient Position: At rest)   Pulse 63   Temp 98.7 °F (37.1 °C)   Resp 14   Ht 5' 5\" (1.651 m)   Wt 116 kg (255 lb 12.8 oz)   SpO2 98%   Breastfeeding No   BMI 42.57 kg/m²      Tmax/24hrs: Temp (24hrs), Av.1 °F (36.7 °C), Min:97.6 °F (36.4 °C), Max:98.7 °F (37.1 °C)    Input/Output:     Intake/Output Summary (Last 24 hours) at 2020 1713  Last data filed at 2020 0936  Gross per 24 hour   Intake 0 ml   Output 400 ml   Net -400 ml       General:  Lethargic, extreme fatigue, awake, in NAD  Cardiovascular: Regular rate and rhythm.  Right sided upper chest catheter in situ   Pulmonary: Clear to auscultation bilaterally  GI: Soft nontender and bowel sounds present  Extremities: 2+ pedal edema     Labs:    Recent Results (from the past 24 hour(s))   GLUCOSE, POC    Collection Time: 20  9:42 PM   Result Value Ref Range    Glucose (POC) 134 (H) 70 - 663 mg/dL   METABOLIC PANEL, BASIC    Collection Time: 20  1:44 AM   Result Value Ref Range    Sodium 136 136 - 145 mmol/L    Potassium 4.0 3.5 - 5.5 mmol/L    Chloride 102 100 - 111 mmol/L    CO2 28 21 - 32 mmol/L    Anion gap 6 3.0 - 18 mmol/L    Glucose 95 74 - 99 mg/dL    BUN 39 (H) 7.0 - 18 MG/DL    Creatinine 2.15 (H) 0.6 - 1.3 MG/DL    BUN/Creatinine ratio 18 12 - 20      GFR est AA 28 (L) >60 ml/min/1.73m2    GFR est non-AA 23 (L) >60 ml/min/1.73m2    Calcium 7.9 (L) 8.5 - 10.1 MG/DL   VANCOMYCIN, RANDOM    Collection Time: 20  1:44 AM   Result Value Ref Range    Vancomycin, random 15.5 5.0 - 40.0 UG/ML   CBC WITH AUTOMATED DIFF    Collection Time: 20  1:44 AM   Result Value Ref Range    WBC 7.7 4.6 - 13.2 K/uL    RBC 2.74 (L) 4.20 - 5.30 M/uL    HGB 7.7 (L) 12.0 - 16.0 g/dL    HCT 23.7 (L) 35.0 - 45.0 %    MCV 86.5 74.0 - 97.0 FL    MCH 28.1 24.0 - 34.0 PG    MCHC 32.5 31.0 - 37.0 g/dL    RDW 14.0 11.6 - 14.5 %    PLATELET 367 903 - 917 K/uL    MPV 11.6 9.2 - 11.8 FL    NEUTROPHILS 79 (H) 40 - 73 %    LYMPHOCYTES 11 (L) 21 - 52 %    MONOCYTES 10 3 - 10 %    EOSINOPHILS 0 0 - 5 %    BASOPHILS 0 0 - 2 %    ABS. NEUTROPHILS 6.2 1.8 - 8.0 K/UL    ABS. LYMPHOCYTES 0.8 (L) 0.9 - 3.6 K/UL    ABS. MONOCYTES 0.7 0.05 - 1.2 K/UL    ABS. EOSINOPHILS 0.0 0.0 - 0.4 K/UL    ABS.  BASOPHILS 0.0 0.0 - 0.1 K/UL    DF AUTOMATED     GLUCOSE, POC    Collection Time: 05/09/20  6:07 AM   Result Value Ref Range    Glucose (POC) 100 70 - 110 mg/dL   GLUCOSE, POC    Collection Time: 05/09/20  4:32 PM   Result Value Ref Range    Glucose (POC) 90 70 - 110 mg/dL

## 2020-05-09 NOTE — PROGRESS NOTES
Bedside shift change report given to Femi Schafer RN (oncoming nurse) by Ginette Newby RN (offgoing nurse). Report included the following information SBAR, Kardex and MAR.

## 2020-05-10 LAB
GLUCOSE BLD STRIP.AUTO-MCNC: 101 MG/DL (ref 70–110)
GLUCOSE BLD STRIP.AUTO-MCNC: 110 MG/DL (ref 70–110)
GLUCOSE BLD STRIP.AUTO-MCNC: 112 MG/DL (ref 70–110)
GLUCOSE BLD STRIP.AUTO-MCNC: 128 MG/DL (ref 70–110)

## 2020-05-10 PROCEDURE — 3331090002 HH PPS REVENUE DEBIT

## 2020-05-10 PROCEDURE — 65660000000 HC RM CCU STEPDOWN

## 2020-05-10 PROCEDURE — 74011250637 HC RX REV CODE- 250/637: Performed by: PHYSICIAN ASSISTANT

## 2020-05-10 PROCEDURE — 74011250636 HC RX REV CODE- 250/636: Performed by: INTERNAL MEDICINE

## 2020-05-10 PROCEDURE — 82962 GLUCOSE BLOOD TEST: CPT

## 2020-05-10 PROCEDURE — 74011250636 HC RX REV CODE- 250/636: Performed by: HOSPITALIST

## 2020-05-10 PROCEDURE — 3331090001 HH PPS REVENUE CREDIT

## 2020-05-10 RX ADMIN — CARVEDILOL 25 MG: 25 TABLET, FILM COATED ORAL at 08:55

## 2020-05-10 RX ADMIN — PANTOPRAZOLE SODIUM 40 MG: 40 TABLET, DELAYED RELEASE ORAL at 08:55

## 2020-05-10 RX ADMIN — POLYETHYLENE GLYCOL 3350 17 G: 17 POWDER, FOR SOLUTION ORAL at 11:35

## 2020-05-10 RX ADMIN — HEPARIN SODIUM 5000 UNITS: 5000 INJECTION INTRAVENOUS; SUBCUTANEOUS at 15:53

## 2020-05-10 RX ADMIN — HEPARIN SODIUM 5000 UNITS: 5000 INJECTION INTRAVENOUS; SUBCUTANEOUS at 09:30

## 2020-05-10 RX ADMIN — HYDRALAZINE HYDROCHLORIDE 100 MG: 50 TABLET, FILM COATED ORAL at 15:53

## 2020-05-10 RX ADMIN — ASPIRIN 81 MG CHEWABLE TABLET 81 MG: 81 TABLET CHEWABLE at 08:55

## 2020-05-10 RX ADMIN — CITALOPRAM HYDROBROMIDE 20 MG: 20 TABLET ORAL at 08:55

## 2020-05-10 RX ADMIN — AMLODIPINE BESYLATE 10 MG: 10 TABLET ORAL at 08:55

## 2020-05-10 RX ADMIN — ISOSORBIDE MONONITRATE 30 MG: 30 TABLET, EXTENDED RELEASE ORAL at 08:55

## 2020-05-10 RX ADMIN — CYANOCOBALAMIN TAB 1000 MCG 1000 MCG: 1000 TAB at 08:55

## 2020-05-10 RX ADMIN — EPOETIN ALFA-EPBX 10000 UNITS: 10000 INJECTION, SOLUTION INTRAVENOUS; SUBCUTANEOUS at 00:39

## 2020-05-10 RX ADMIN — FUROSEMIDE 20 MG: 10 INJECTION, SOLUTION INTRAMUSCULAR; INTRAVENOUS at 08:55

## 2020-05-10 RX ADMIN — HYDRALAZINE HYDROCHLORIDE 100 MG: 50 TABLET, FILM COATED ORAL at 08:55

## 2020-05-10 NOTE — PROGRESS NOTES
In Patient Progress note    Admit Date: 4/30/2020    Impression:     #1 Acute kidney injury on chronic kidney disease stage III v/s likely progressed to ESRD especially in the setting of   Severe diastolic CHF/ cardiorenal syndrome /severe azotemia, patient developed  diuretic resistance and worsening   cardiorenal physiology , Overall edematous and persistent hypoxia , multiple admissions  Patient agreeable to starting HD , had #1 treatment yesterday , tolerated , TDC in place   #2 Acute on chronic diastolic CHF. CXR with persistent pulm edema , CHF  #3 hypertension, volume off should help  #4 dyslipidemia  #5 hypothermia/SIRS on empirical antibiotics per internal medicine.   #6 COVID-19 -ve   #7 leg edema , duplex neg for DVT  #8 hyponatremia in setting of CHF , this is indicative of worsening cardiorenal physiology      Plan:   #1 tolerating HD well , next HD tomorrow   #2 strict ins and outs, fluid restrict 1200 mm  #3 encourage po intake, increase protein in diet  #4 follow cardiology recommendations  #5  avoid nephrotoxins renally dose medications  #6 continue epogen and hecterol    Patient does have outpatient chair time , [address]  MWF 5:30 AM starting 5/11   Significant deconditioning , will need PT/OT   still volume overload , needs volume management  Lives by herself , no family in this area , will need home health /transportation setup for dialysis    Please call with questions,     Dana Carvalho MD [facility]  Cell [phone]  Pager: [phone]    Subjective:     Breathing better  O2 2 lit   Still with persistent RUDOLPH      Current Facility-Administered Medications:     isosorbide mononitrate ER (IMDUR) tablet 30 mg, 30 mg, Oral, DAILY, Clarissa Garcia PA, 30 mg at 05/10/20 0855    epoetin johnathon-epbx (RETACRIT) injection 10,000 Units, 10,000 Units, IntraVENous, Q TUE, THU & SAT, Bic, Nava Smith MD, 10,000 Units at 05/10/20 0039    doxercalciferoL (HECTOROL) 4 mcg/2 mL injection 4 mcg, 4 mcg, IntraVENous, Q MON, WED & Michelet Benito MD, 4 mcg at 05/08/20 2202    mirtazapine (REMERON) tablet 15 mg, 15 mg, Oral, QHS, Tamera Jimenez MD, 15 mg at 05/09/20 2115    ondansetron (ZOFRAN) injection 4 mg, 4 mg, IntraVENous, Q6H PRN, Tamera Jimenez MD, 4 mg at 05/07/20 1104    loratadine (CLARITIN) tablet 10 mg, 10 mg, Oral, DAILY PRN, Tamera Jimenez MD, 10 mg at 05/07/20 0948    VANCOMYCIN INFORMATION NOTE, , Other, Rx Dosing/Monitoring, Juana Moralez MD    heparin (porcine) injection 5,000 Units, 5,000 Units, SubCUTAneous, Q8H, Denia Carreon MD, 5,000 Units at 05/10/20 0930    sodium chloride (NS) flush 5-10 mL, 5-10 mL, IntraVENous, PRN, Juana Moralez MD    amLODIPine (NORVASC) tablet 10 mg, 10 mg, Oral, DAILY, Lorrane Proffer, Suong T, [address], 10 mg at 05/10/20 7380    aspirin chewable tablet 81 mg, 81 mg, Oral, DAILY, Lorrane Profmimi Suong T, PA, 81 mg at 05/10/20 0855    carvediloL (COREG) tablet 25 mg, 25 mg, Oral, BID WITH MEALS, Lorrane Proffer, Suong T, [address], 25 mg at 05/10/20 0855    citalopram (CELEXA) tablet 20 mg, 20 mg, Oral, DAILY, Lorrane Proffer, Suong T, PA, 20 mg at 05/10/20 1491    cyanocobalamin tablet 1,000 mcg, 1,000 mcg, Oral, BID, Lorrane ProfValencia leong TIANNA, PA, 1,000 mcg at 05/10/20 0855    pantoprazole (PROTONIX) tablet 40 mg, 40 mg, Oral, ACB&D, Lorrane Proffer, Suong T, PA, 40 mg at 05/10/20 0855    hydrALAZINE (APRESOLINE) tablet 100 mg, 100 mg, Oral, TID, Lorrane Ani Padron PA, 100 mg at 05/10/20 0855    rosuvastatin (CRESTOR) tablet 5 mg, 5 mg, Oral, QHS, RahmanAni, [address], 5 mg at 05/09/20 2115    insulin lispro (HUMALOG) injection, , SubCUTAneous, AC&HS, Zakiya Dewitt, [address], Stopped at 05/08/20 2200    glucose chewable tablet 16 g, 16 g, Oral, PRN, Lorrane Ani Padron PA    glucagon (GLUCAGEN) injection 1 mg, 1 mg, IntraMUSCular, PRN, Lorrane Ani Padron PA    dextrose (D50W) injection syrg 12.5-25 g, 25-50 mL, IntraVENous, PRN, Lorrane Ani Padron PA    polyethylene glycol (MIRALAX) packet 17 g, 17 g, Oral, DAILY PRN, MICHAEL Sanders, 17 g at 05/10/20 1135    acetaminophen (TYLENOL) tablet 650 mg, 650 mg, Oral, Q4H PRN, MICHAEL Sanders, 650 mg at 05/09/20 2114    ferrous sulfate tablet 325 mg, 1 Tab, Oral, EVERY OTHER DAY, Triston Link MD, Stopped at 05/09/20 0900        Objective:     Visit Vitals  /79 (BP 1 Location: Right arm, BP Patient Position: At rest)   Pulse 67   Temp 98 °F (36.7 °C)   Resp 17   Ht 5' 5\" (1.651 m)   Wt 108.6 kg (239 lb 6.4 oz)   SpO2 96%   Breastfeeding No   BMI 39.84 kg/m²         Intake/Output Summary (Last 24 hours) at 5/10/2020 1344  Last data filed at 5/10/2020 1323  Gross per 24 hour   Intake 0 ml   Output 725 ml   Net -725 ml       Physical Exam:     General: NAD, alert and oriented. Neck: No jvd. LUNGS: decreased in bases, rales+  CVS EXM: S1, S2  RRR, no murmurs/gallops/rubs. Abdomen: soft, non tender. Lower Extremities: 1+ edema.    Rt IJ TDC     Data Review:    Recent Labs     05/09/20  0144   WBC 7.7   RBC 2.74*   HCT 23.7*   MCV 86.5   MCH 28.1   MCHC 32.5   RDW 14.0     Recent Labs     05/09/20  0144 05/08/20  0200   BUN 39* 63*   CREA 2.15* 2.34*   CA 7.9* 8.0*   K 4.0 4.4    132*    98*   CO2 28 26   GLU 95 103*       Nguyễn Thomas MD

## 2020-05-10 NOTE — PROGRESS NOTES
Problem: Falls - Risk of  Goal: *Absence of Falls  Description: Document Starleen Freeze Fall Risk and appropriate interventions in the flowsheet. Outcome: Progressing Towards Goal  Note: Fall Risk Interventions:  Mobility Interventions: Bed/chair exit alarm, Patient to call before getting OOB, Utilize walker, cane, or other assistive device         Medication Interventions: Bed/chair exit alarm, Patient to call before getting OOB, Teach patient to arise slowly    Elimination Interventions: Bed/chair exit alarm, Call light in reach              Problem: Patient Education: Go to Patient Education Activity  Goal: Patient/Family Education  Outcome: Progressing Towards Goal     Problem: Pressure Injury - Risk of  Goal: *Prevention of pressure injury  Description: Document Toney Scale and appropriate interventions in the flowsheet.   Outcome: Progressing Towards Goal  Note: Pressure Injury Interventions:  Sensory Interventions: Keep linens dry and wrinkle-free    Moisture Interventions: Internal/External urinary devices    Activity Interventions: Increase time out of bed, Pressure redistribution bed/mattress(bed type), PT/OT evaluation    Mobility Interventions: Pressure redistribution bed/mattress (bed type), PT/OT evaluation    Nutrition Interventions: Document food/fluid/supplement intake    Friction and Shear Interventions: Minimize layers                Problem: Patient Education: Go to Patient Education Activity  Goal: Patient/Family Education  Outcome: Progressing Towards Goal     Problem: Patient Education: Go to Patient Education Activity  Goal: Patient/Family Education  Outcome: Progressing Towards Goal     Problem: Heart Failure: Day 4  Goal: Off Pathway (Use only if patient is Off Pathway)  Outcome: Progressing Towards Goal  Goal: Activity/Safety  Outcome: Progressing Towards Goal  Goal: Diagnostic Test/Procedures  Outcome: Progressing Towards Goal  Goal: Nutrition/Diet  Outcome: Progressing Towards Goal  Goal: Discharge Planning  Outcome: Progressing Towards Goal  Goal: Medications  Outcome: Progressing Towards Goal  Goal: Respiratory  Outcome: Progressing Towards Goal  Goal: Treatments/Interventions/Procedures  Outcome: Progressing Towards Goal  Goal: Psychosocial  Outcome: Progressing Towards Goal  Goal: *Oxygen saturation within defined limits  Outcome: Progressing Towards Goal  Goal: *Hemodynamically stable  Outcome: Progressing Towards Goal  Goal: *Optimal pain control at patient's stated goal  Outcome: Progressing Towards Goal  Goal: *Anxiety reduced or absent  Outcome: Progressing Towards Goal  Goal: *Demonstrates progressive activity  Outcome: Progressing Towards Goal     Problem: Heart Failure: Day 5  Goal: Off Pathway (Use only if patient is Off Pathway)  Outcome: Progressing Towards Goal  Goal: Activity/Safety  Outcome: Progressing Towards Goal  Goal: Diagnostic Test/Procedures  Outcome: Progressing Towards Goal  Goal: Nutrition/Diet  Outcome: Progressing Towards Goal  Goal: Discharge Planning  Outcome: Progressing Towards Goal  Goal: Medications  Outcome: Progressing Towards Goal  Goal: Respiratory  Outcome: Progressing Towards Goal  Goal: Treatments/Interventions/Procedures  Outcome: Progressing Towards Goal  Goal: Psychosocial  Outcome: Progressing Towards Goal     Problem: Heart Failure: Discharge Outcomes  Goal: *Demonstrates ability to perform prescribed activity without shortness of breath or discomfort  Outcome: Progressing Towards Goal  Goal: *Left ventricular function assessment completed prior to or during stay, or planned for post-discharge  Outcome: Progressing Towards Goal  Goal: *ACEI prescribed if LVEF less than 40% and no contraindications or ARB prescribed  Outcome: Progressing Towards Goal  Goal: *Verbalizes understanding and describes prescribed diet  Outcome: Progressing Towards Goal  Goal: *Verbalizes understanding/describes prescribed medications  Outcome: Progressing Towards Goal  Goal: *Describes available resources and support systems  Description: (eg: Home Health, Palliative Care, Advanced Medical Directive)  Outcome: Progressing Towards Goal  Goal: *Describes smoking cessation resources  Outcome: Progressing Towards Goal  Goal: *Understands and describes signs and symptoms to report to providers(Stroke Metric)  Outcome: Progressing Towards Goal  Goal: *Describes/verbalizes understanding of follow-up/return appt  Description: (eg: to physicians, diabetes treatment coordinator, and other resources  Outcome: Progressing Towards Goal  Goal: *Describes importance of continuing daily weights and changes to report to physician  Outcome: Progressing Towards Goal     Problem: Pain  Goal: *Control of Pain  Outcome: Progressing Towards Goal     Problem: Patient Education: Go to Patient Education Activity  Goal: Patient/Family Education  Outcome: Progressing Towards Goal     Problem: Diabetes Self-Management  Goal: *Disease process and treatment process  Description: Define diabetes and identify own type of diabetes; list 3 options for treating diabetes. Outcome: Progressing Towards Goal  Goal: *Incorporating nutritional management into lifestyle  Description: Describe effect of type, amount and timing of food on blood glucose; list 3 methods for planning meals. Outcome: Progressing Towards Goal  Goal: *Incorporating physical activity into lifestyle  Description: State effect of exercise on blood glucose levels. Outcome: Progressing Towards Goal  Goal: *Developing strategies to promote health/change behavior  Description: Define the ABC's of diabetes; identify appropriate screenings, schedule and personal plan for screenings. Outcome: Progressing Towards Goal  Goal: *Using medications safely  Description: State effect of diabetes medications on diabetes; name diabetes medication taking, action and side effects.   Outcome: Progressing Towards Goal  Goal: *Monitoring blood glucose, interpreting and using results  Description: Identify recommended blood glucose targets  and personal targets. Outcome: Progressing Towards Goal  Goal: *Prevention, detection, treatment of acute complications  Description: List symptoms of hyper- and hypoglycemia; describe how to treat low blood sugar and actions for lowering  high blood glucose level. Outcome: Progressing Towards Goal  Goal: *Prevention, detection and treatment of chronic complications  Description: Define the natural course of diabetes and describe the relationship of blood glucose levels to long term complications of diabetes.   Outcome: Progressing Towards Goal  Goal: *Developing strategies to address psychosocial issues  Description: Describe feelings about living with diabetes; identify support needed and support network  Outcome: Progressing Towards Goal  Goal: *Sick day guidelines  Outcome: Progressing Towards Goal  Goal: *Patient Specific Goal (EDIT GOAL, INSERT TEXT)  Outcome: Progressing Towards Goal     Problem: Patient Education: Go to Patient Education Activity  Goal: Patient/Family Education  Outcome: Progressing Towards Goal     Problem: Breathing Pattern - Ineffective  Goal: *Absence of hypoxia  Outcome: Progressing Towards Goal  Goal: *Use of effective breathing techniques  Outcome: Progressing Towards Goal  Goal: *PALLIATIVE CARE:  Alleviation of Dyspnea  Outcome: Progressing Towards Goal     Problem: Patient Education: Go to Patient Education Activity  Goal: Patient/Family Education  Outcome: Progressing Towards Goal     Problem: Patient Education: Go to Patient Education Activity  Goal: Patient/Family Education  Outcome: Progressing Towards Goal     Problem: Patient Education: Go to Patient Education Activity  Goal: Patient/Family Education  Outcome: Progressing Towards Goal     Problem: Nutrition Deficit  Goal: *Optimize nutritional status  Outcome: Progressing Towards Goal

## 2020-05-10 NOTE — PROGRESS NOTES
Bedside shift change report given to Diogenes Moya RN (oncoming nurse) by Nereida Rebolledo RN (offgoing nurse). Report included the following information SBAR, Kardex and MAR.

## 2020-05-10 NOTE — PROGRESS NOTES
[facility] Hospitalist Group  Progress Note    Patient: Clarence Jimenez Age: 71 y.o. : 1950 MR#: [id number] SSN: [id number]  Date/Time: 5/10/2020    Subjective:     Seen in room   More awake today, answers some questions   Has a very flat affect   C/o extreme fatigue     Declined OT today; pt needs eval for disposition planning     Assessment/Plan:   Clarence Jimenez is a 72 y/o female with a PMHx of CKD stage III, diastolic CHF, chronic hypoxic respiratory failure, anemia, Type II DM, HTN, HLD and obesity who is admitted for acute on chronic respiratory failure, acute on chronic diastolic CHF and OFE on CKD Stage III that has progressed to ESRD. 1. Acute on chronic respiratory failure   2. Acute on chronic diastolic heart failure  3. OFE on CKD stage III that has progressed to ESRD   4. Hyponatremia   5. Deconditioned state  6. Depressed mood  7. Type II DM- HbA1c 6.1  8. CAD  9. HTN  10. HLD     Repeat COVID-19 testing sent to [facility] on ,  as pt will likely need SNF vs LTC placement and needs negative test within 5 days of placement; pt also needs PT/OT eval and rec's for placement; repeat testing is only for placement at SNF/LTC      COVID-19 testing negative on 20   HD per nephrology   IV lasix per nephrology   Pt is set up with outpatient dialysis clinic  Stop lantus, cont SSI w/accuchecks   Strict I&O's, fluid restriction   BP control- amlodipine, carvedilol, hydralazine, imdur   Cont other home meds   PT, OT   Needs , transportation set-up for dialysis       Case discussed with:  [x]Patient  []Family  [x]Nursing  []Case Management  DVT Prophylaxis:  []Lovenox  [x]Hep SQ  []SCDs  []Coumadin   []On Heparin gtt  Diet: Renal w/fluid restriction   Code Status: FULL   Disposition: pending PT/OT eval; likely SNF vs LTC       PAVEL Schwarz DO   May 10, 2020        Objective:   VS:   Visit Vitals  /79 (BP 1 Location: Right arm, BP Patient Position: At rest) Pulse 67   Temp 98 °F (36.7 °C)   Resp 17   Ht 5' 5\" (1.651 m)   Wt 108.6 kg (239 lb 6.4 oz)   SpO2 96%   Breastfeeding No   BMI 39.84 kg/m²      Tmax/24hrs: Temp (24hrs), Av °F (36.7 °C), Min:96.7 °F (35.9 °C), Max:100.1 °F (37.8 °C)    Input/Output:     Intake/Output Summary (Last 24 hours) at 5/10/2020 1605  Last data filed at 5/10/2020 1323  Gross per 24 hour   Intake 0 ml   Output 725 ml   Net -725 ml       General:  Lethargic, extreme fatigue, awake, in NAD  Cardiovascular: Regular rate and rhythm.  Right sided upper chest catheter in situ   Pulmonary: Clear to auscultation bilaterally  GI: Soft nontender and bowel sounds present  Extremities: 2+ pedal edema     Labs:    Recent Results (from the past 24 hour(s))   GLUCOSE, POC    Collection Time: 20  4:32 PM   Result Value Ref Range    Glucose (POC) 90 70 - 110 mg/dL   GLUCOSE, POC    Collection Time: 20  9:03 PM   Result Value Ref Range    Glucose (POC) 90 70 - 110 mg/dL   GLUCOSE, POC    Collection Time: 05/10/20  8:53 AM   Result Value Ref Range    Glucose (POC) 112 (H) 70 - 110 mg/dL   GLUCOSE, POC    Collection Time: 05/10/20 11:35 AM   Result Value Ref Range    Glucose (POC) 101 70 - 110 mg/dL

## 2020-05-10 NOTE — DIALYSIS
ACUTE HEMODIALYSIS FLOW SHEET    HEMODIALYSIS ORDERS: Physician: Dr. Joy Arredondo: Iris   Duration: 3 hr   BFR: 300   DFR: 300   Dialysate:  Temp 36-37*C   K+  3   Ca+ 2.5   Na 142   Bicarb 30   Wt Readings from Last 1 Encounters:   05/04/20 116 kg (255 lb 12.8 oz)    Patient Chart [x]   Unable to Obtain []  Dry weight/UF Goal: 2500 ml    Heparin []  Bolus    Units    [] Hourly    Units    [x]None       Pre BP:   197/57    Pulse:  64   Respirations: 16    Temperature:  98.6    Tx: NSS    ml/Bolus   [x] N/A   Labs: []  Pre  []  Post:   [x] N/A   Additional Orders(medications, blood products, hypotension management): [x] Yes   [] No     [x]  [facility] Consent Verified     CATHETER ACCESS:  []N/A   [x]Right   []Left   []IJ   []Fem  [x]Chest wall  []TransHepatic   [] First use X-ray verified     [x]Tunnel    [] Non Tunneled   [x]No S/S infection  []Redness  []Drainage []Cultured []Swelling []Pain   [x]Medical Aseptic Prep Utilized   []Dressing Changed  [x] Biopatch  Date: 5/8/2020   []Clotted   [x]Patent   Flows: [x]Good  []Poor  []Reversed   If access problem,  notified: []Yes    [x]N/A        GRAFT/FISTULA ACCESS:   [x]N/A     []Right     []Left     []UE     []LE   []AVG   []AVF       []Medical Aseptic Prep Utilized   []No S/S infection  []Redness  []Drainage [] Cultured  [] Swelling  [] Pain  Bruit:   [] Strong    [] Weak       Thrill :   [] Strong    [] Weak     Needle Gauge: 15   Length: 1 inch   If access problem,  notified: []Yes     []N/A          GENERAL ASSESSMENT:    LUNGS:  Rate 16   SaO2%      [] Clear  [x] Coarse  [] Crackles  [] Wheezing                                                [x] Diminished     Location : []RLL   []LLL    []RUL  []KATIE   Cough:      []Productive  []Dry  [x]N/A   Respirations:  [x]Easy  []Labored   Therapy:  []RA  O2 Type:  [x]NC  Mask: []   NRB    [] BiPaP  Flow: 2 l/min                   [] Ventilator  [] Intubated  [] Trach     CARDIAC: [x] Regular [] Irregular   [] Pericardial Rub            []  Monitored  [] Bedside  [] Remotely monitored     EDEMA: [] None   [x]Generalized  [] Pitting [] 1+   [] 2 +   [] 3+    [] 4+  [] Pedal    SKIN:   [x] Warm  [] Hot     [] Cold   [x] Dry     [] Pale   [] Diaphoretic                  [] Flushed  [] Jaundiced  [] Cyanotic     LOC:    [] Alert      [x]Oriented:    [x] Person     [] Place  []Time               [] Confused  [x] Lethargic  [] Medicated  [] Non-responsive   GI / ABDOMEN:                     [] Flat    [] Distended    [x] Soft    [] Firm   []  Obese                   [] Diarrhea   [] FMS [x] Bowel Sounds  [] Nausea  [] Vomiting     / URINE ASSESSMENT:                   [] Voiding    [] Oliguria  [] Anuria   [x]  Lopez                  [] Incontinent  []  Incontinent Brief   []  PureWick     PAIN:  [x] 0 []1  []2   []3   []4   []5   []6   []7   []8   []9   []10            Scale 0-10  Action/Follow Up:    MOBILITY:  [x] Bed    [] Stretcher      All Vitals and Treatment Details on Attached [address]: [facility]          Room # 461/01    [x] Routine         [] 1st Time Acute    [] Urgent      [] Stat            [x] Acute Room   []  Bedside    [] ICU/CCU     [] ER   Isolation Precautions:  [x] Dialysis    Droplet Plus     ALLERGIES:     Allergies   Allergen Reactions    Augmentin [Amoxicillin-Pot Clavulanate] Diarrhea    Clavulanic Acid Diarrhea    Levaquin [Levofloxacin] Other (comments)     Severe hypoglycemia        Code Status:  Full Code     Hepatitis Status   2nd RN check :     Lab Results   Component Value Date/Time    Hepatitis B surface Ag <0.10 05/07/2020 05:08 AM    Hepatitis B surface Ab <3.10 (L) 05/07/2020 05:08 AM        Current Labs:      Lab Results   Component Value Date/Time    WBC 7.7 05/09/2020 01:44 AM    HGB 7.7 (L) 05/09/2020 01:44 AM    HCT 23.7 (L) 05/09/2020 01:44 AM    PLATELET 405 64/97/2511 01:44 AM    MCV 86.5 05/09/2020 01:44 AM     Lab Results   Component Value Date/Time Sodium 136 05/09/2020 01:44 AM    Potassium 4.0 05/09/2020 01:44 AM    Chloride 102 05/09/2020 01:44 AM    CO2 28 05/09/2020 01:44 AM    Anion gap 6 05/09/2020 01:44 AM    Glucose 95 05/09/2020 01:44 AM    BUN 39 (H) 05/09/2020 01:44 AM    Creatinine 2.15 (H) 05/09/2020 01:44 AM    BUN/Creatinine ratio 18 05/09/2020 01:44 AM    GFR est AA 28 (L) 05/09/2020 01:44 AM    GFR est non-AA 23 (L) 05/09/2020 01:44 AM    Calcium 7.9 (L) 05/09/2020 01:44 AM          DIET:  DIET RENAL  DIET NUTRITIONAL SUPPLEMENTS     PRIMARY NURSE REPORT:   Pre Dialysis: Kwadwo Guerra RN     Time: 0930      EDUCATION:    [x] Patient [] Other           Knowledge Basis: []None [x]Minimal [] Substantial   Barriers to learning  [x]N/A   [] Access Care     [] S&S of infection  [] Fluid Management  [] K+   [x] Procedural    []Albumin   [] Medications   [] Tx Options   [] Transplant   [] Diet   [] Other   Teaching Tools:  [x] Explain  [] Demo  [] Handouts [] Video  Patient response: [x] Verbalized understanding  [] Teach back  [] Return demonstration   [x] Requires follow up      [x]Time Out/Safety Check  [x] Extracorporeal Circuit Tested for integrity       RO/HEMODIALYSIS MACHINE SAFETY CHECKS  Before each treatment:                                     [address] [place] [address]                                     [x] Unit Machine # 6 with centralized RO                                                                             Alarm Test:  Pass time 0945            [x] RO/Machine Log Complete    Machine Temp    36*C             Dialysate: pH  7.4    Conductivity: Meter 14.0     HD Machine  14      TCD: 13.8  Dialyzer Lot # [id number]     Blood Tubing Lot # [id number]     Saline Lot # -[id number]     CHLORINE TESTING-Before each treatment and every 4 hours    Total Chlorine: [x] less than 0.1 ppm  Initial Time Check: 0900       4 Hr/2nd Check Time: 1300   (if greater than 0.1 ppm from Primary then every 30 minutes from Secondary)     TREATMENT INITIATION  with Dialysis Precautions:   [x] All Connections Secured              [x] Saline Line Double Clamped   [x] Venous Parameters Set               [x] Arterial Parameters Set    [x] Prime Given 250ml NSS              [x]Air Foam Detector Engaged      Treatment Initiation Note:  1000  Pt received to dialysis unit very lethargic and does not answer simple question appropriately. O2 2Ln/c/  1015  Rt TCD intact, patent and flushes easily. Dialysis initiated without difficulty. Dr Adelaida Guerra notified     During Treatment Notes:  1100  Vascular access visible with arterial and venous line connections intact. Pt resting comfortably. 1200  Vascular access visible with arterial and venous line connections intact. Pt resting comfortably. 1300  Vascular access visible with arterial and venous line connections intact. Pt resting comfortably. Medication Dose Volume Route Time Rich Nurse, Title   Vancomycin 1250 mg 250 NSS HD  1230 Fran Avila RN     Post Assessment  Dialyzer Cleared:   [] Good  [x] Fair  [] Poor  Blood processed:  49 L  UF Removed:  2500 Ml  Post Wt: 113.5  kg  Post /65   Pulse  58 Resp  16  Temp 98.5 Lungs: [] Clear [x] Course  [] Crackles                 [x]  Wheezing   [] Diminished   Post Tx Vascular Access: [x] N/A       Cardiac :[x] Regular   [] Irregular   Rhythm:  [] Monitored   [] Not Monitored    CVC Catheter: [] N/A  Locking solution: Heparin 1:1000 U  Arterial port 1.6 ml   Venous port 1.6 ml   Edema:  [] None  [x] Generalized                     Skin:[x] Warm  [x] Dry [] Diaphoretic               [] Flushed  [] Pale [] Cyanotic Pain:  [x]0  []1 []2  []3 []4  []5  []6  []7 []8  []9  []10     Post Treatment Note:   1330  Pt tolerated dialysis well. Dialysis catheter intact, patent and heplocked as noted above.      POST TREATMENT PRIMARY NURSE HANDOFF REPORT:   Post Dialysis: Yuliet Poon RN                Time:  1400       Abbreviations: AVG-arterial venous graft, AVF-arterial venous fistula, IJ-Internal Jugular, Subcl-Subclavian, Fem-Femoral, Tx-treatment, AP/HR-apical heart rate, VSS- Vital Signs Stable, CVC- Central Venous Catheter, DFR-dialysate flow rate, BFR-blood flow rate, AP-arterial pressure, -venous pressure, UF-ultrafiltrate, TMP-transmembrane pressure, Austyn-Venous, Art-Arterial, RO-Reverse Osmosis

## 2020-05-11 DIAGNOSIS — Z99.2 ESRD (END STAGE RENAL DISEASE) ON DIALYSIS (HCC): Primary | ICD-10-CM

## 2020-05-11 DIAGNOSIS — N18.6 ESRD (END STAGE RENAL DISEASE) ON DIALYSIS (HCC): Primary | ICD-10-CM

## 2020-05-11 LAB
ANION GAP SERPL CALC-SCNC: 6 MMOL/L (ref 3–18)
BASOPHILS # BLD: 0 K/UL (ref 0–0.1)
BASOPHILS NFR BLD: 0 % (ref 0–2)
BUN SERPL-MCNC: 35 MG/DL (ref 7–18)
BUN/CREAT SERPL: 14 (ref 12–20)
CALCIUM SERPL-MCNC: 8.1 MG/DL (ref 8.5–10.1)
CHLORIDE SERPL-SCNC: 104 MMOL/L (ref 100–111)
CO2 SERPL-SCNC: 26 MMOL/L (ref 21–32)
CREAT SERPL-MCNC: 2.57 MG/DL (ref 0.6–1.3)
DIFFERENTIAL METHOD BLD: ABNORMAL
EOSINOPHIL # BLD: 0 K/UL (ref 0–0.4)
EOSINOPHIL NFR BLD: 0 % (ref 0–5)
ERYTHROCYTE [DISTWIDTH] IN BLOOD BY AUTOMATED COUNT: 14.1 % (ref 11.6–14.5)
GLUCOSE BLD STRIP.AUTO-MCNC: 105 MG/DL (ref 70–110)
GLUCOSE BLD STRIP.AUTO-MCNC: 131 MG/DL (ref 70–110)
GLUCOSE BLD STRIP.AUTO-MCNC: 189 MG/DL (ref 70–110)
GLUCOSE SERPL-MCNC: 97 MG/DL (ref 74–99)
HCT VFR BLD AUTO: 23.7 % (ref 35–45)
HGB BLD-MCNC: 7.5 G/DL (ref 12–16)
LYMPHOCYTES # BLD: 0.9 K/UL (ref 0.9–3.6)
LYMPHOCYTES NFR BLD: 10 % (ref 21–52)
MCH RBC QN AUTO: 28.2 PG (ref 24–34)
MCHC RBC AUTO-ENTMCNC: 31.6 G/DL (ref 31–37)
MCV RBC AUTO: 89.1 FL (ref 74–97)
MONOCYTES # BLD: 0.7 K/UL (ref 0.05–1.2)
MONOCYTES NFR BLD: 8 % (ref 3–10)
NEUTS SEG # BLD: 7.2 K/UL (ref 1.8–8)
NEUTS SEG NFR BLD: 82 % (ref 40–73)
PLATELET # BLD AUTO: 150 K/UL (ref 135–420)
PMV BLD AUTO: 12.7 FL (ref 9.2–11.8)
POTASSIUM SERPL-SCNC: 4.2 MMOL/L (ref 3.5–5.5)
RBC # BLD AUTO: 2.66 M/UL (ref 4.2–5.3)
SODIUM SERPL-SCNC: 136 MMOL/L (ref 136–145)
VANCOMYCIN SERPL-MCNC: 13.7 UG/ML (ref 5–40)
WBC # BLD AUTO: 8.8 K/UL (ref 4.6–13.2)

## 2020-05-11 PROCEDURE — 74011250637 HC RX REV CODE- 250/637: Performed by: INTERNAL MEDICINE

## 2020-05-11 PROCEDURE — 74011250636 HC RX REV CODE- 250/636: Performed by: STUDENT IN AN ORGANIZED HEALTH CARE EDUCATION/TRAINING PROGRAM

## 2020-05-11 PROCEDURE — 74011250636 HC RX REV CODE- 250/636: Performed by: HOSPITALIST

## 2020-05-11 PROCEDURE — 74011250636 HC RX REV CODE- 250/636: Performed by: INTERNAL MEDICINE

## 2020-05-11 PROCEDURE — 3331090001 HH PPS REVENUE CREDIT

## 2020-05-11 PROCEDURE — 74011250637 HC RX REV CODE- 250/637: Performed by: PHYSICIAN ASSISTANT

## 2020-05-11 PROCEDURE — 74011636637 HC RX REV CODE- 636/637: Performed by: PHYSICIAN ASSISTANT

## 2020-05-11 PROCEDURE — 90935 HEMODIALYSIS ONE EVALUATION: CPT

## 2020-05-11 PROCEDURE — 74011250637 HC RX REV CODE- 250/637: Performed by: FAMILY MEDICINE

## 2020-05-11 PROCEDURE — 65660000000 HC RM CCU STEPDOWN

## 2020-05-11 PROCEDURE — 36415 COLL VENOUS BLD VENIPUNCTURE: CPT

## 2020-05-11 PROCEDURE — 80048 BASIC METABOLIC PNL TOTAL CA: CPT

## 2020-05-11 PROCEDURE — 85025 COMPLETE CBC W/AUTO DIFF WBC: CPT

## 2020-05-11 PROCEDURE — 82962 GLUCOSE BLOOD TEST: CPT

## 2020-05-11 PROCEDURE — 3331090002 HH PPS REVENUE DEBIT

## 2020-05-11 PROCEDURE — 80202 ASSAY OF VANCOMYCIN: CPT

## 2020-05-11 RX ORDER — CARVEDILOL 12.5 MG/1
12.5 TABLET ORAL 2 TIMES DAILY WITH MEALS
Status: DISCONTINUED | OUTPATIENT
Start: 2020-05-11 | End: 2020-05-14 | Stop reason: HOSPADM

## 2020-05-11 RX ADMIN — PANTOPRAZOLE SODIUM 40 MG: 40 TABLET, DELAYED RELEASE ORAL at 09:46

## 2020-05-11 RX ADMIN — ISOSORBIDE MONONITRATE 30 MG: 30 TABLET, EXTENDED RELEASE ORAL at 16:27

## 2020-05-11 RX ADMIN — ASPIRIN 81 MG CHEWABLE TABLET 81 MG: 81 TABLET CHEWABLE at 09:46

## 2020-05-11 RX ADMIN — CYANOCOBALAMIN TAB 1000 MCG 1000 MCG: 1000 TAB at 22:20

## 2020-05-11 RX ADMIN — CYANOCOBALAMIN TAB 1000 MCG 1000 MCG: 1000 TAB at 00:00

## 2020-05-11 RX ADMIN — AMLODIPINE BESYLATE 10 MG: 10 TABLET ORAL at 16:27

## 2020-05-11 RX ADMIN — ROSUVASTATIN CALCIUM 5 MG: 10 TABLET, FILM COATED ORAL at 00:03

## 2020-05-11 RX ADMIN — ONDANSETRON 4 MG: 2 INJECTION INTRAMUSCULAR; INTRAVENOUS at 09:53

## 2020-05-11 RX ADMIN — CITALOPRAM HYDROBROMIDE 20 MG: 20 TABLET ORAL at 09:45

## 2020-05-11 RX ADMIN — HEPARIN SODIUM 5000 UNITS: 5000 INJECTION INTRAVENOUS; SUBCUTANEOUS at 16:28

## 2020-05-11 RX ADMIN — CARVEDILOL 25 MG: 25 TABLET, FILM COATED ORAL at 00:00

## 2020-05-11 RX ADMIN — INSULIN LISPRO 2 UNITS: 100 INJECTION, SOLUTION INTRAVENOUS; SUBCUTANEOUS at 22:24

## 2020-05-11 RX ADMIN — MIRTAZAPINE 15 MG: 15 TABLET, FILM COATED ORAL at 00:01

## 2020-05-11 RX ADMIN — HEPARIN SODIUM 5000 UNITS: 5000 INJECTION INTRAVENOUS; SUBCUTANEOUS at 00:01

## 2020-05-11 RX ADMIN — FERROUS SULFATE TAB 325 MG (65 MG ELEMENTAL FE) 325 MG: 325 (65 FE) TAB at 09:53

## 2020-05-11 RX ADMIN — HYDRALAZINE HYDROCHLORIDE 100 MG: 50 TABLET, FILM COATED ORAL at 00:01

## 2020-05-11 RX ADMIN — CYANOCOBALAMIN TAB 1000 MCG 1000 MCG: 1000 TAB at 09:46

## 2020-05-11 RX ADMIN — ROSUVASTATIN CALCIUM 5 MG: 10 TABLET, FILM COATED ORAL at 22:19

## 2020-05-11 RX ADMIN — DOXERCALCIFEROL 4 MCG: 4 INJECTION, SOLUTION INTRAVENOUS at 12:29

## 2020-05-11 RX ADMIN — HYDRALAZINE HYDROCHLORIDE 100 MG: 50 TABLET, FILM COATED ORAL at 16:28

## 2020-05-11 RX ADMIN — HYDRALAZINE HYDROCHLORIDE 100 MG: 50 TABLET, FILM COATED ORAL at 22:20

## 2020-05-11 RX ADMIN — HEPARIN SODIUM 5000 UNITS: 5000 INJECTION INTRAVENOUS; SUBCUTANEOUS at 22:23

## 2020-05-11 RX ADMIN — CARVEDILOL 12.5 MG: 12.5 TABLET, FILM COATED ORAL at 22:20

## 2020-05-11 RX ADMIN — ACETAMINOPHEN 650 MG: 325 TABLET ORAL at 09:53

## 2020-05-11 RX ADMIN — MIRTAZAPINE 15 MG: 15 TABLET, FILM COATED ORAL at 22:20

## 2020-05-11 RX ADMIN — PANTOPRAZOLE SODIUM 40 MG: 40 TABLET, DELAYED RELEASE ORAL at 22:19

## 2020-05-11 RX ADMIN — SODIUM CHLORIDE 1000 MG: 900 INJECTION, SOLUTION INTRAVENOUS at 12:30

## 2020-05-11 RX ADMIN — PANTOPRAZOLE SODIUM 40 MG: 40 TABLET, DELAYED RELEASE ORAL at 00:01

## 2020-05-11 RX ADMIN — ACETAMINOPHEN 650 MG: 325 TABLET ORAL at 00:00

## 2020-05-11 NOTE — PROGRESS NOTES
In Patient Progress note    Admit Date: 4/30/2020    Impression:     #1 Acute kidney injury on chronic kidney disease stage III v/s likely progressed to ESRD especially in the setting of   Severe diastolic CHF/ cardiorenal syndrome /severe azotemia, patient developed  diuretic resistance and worsening   cardiorenal physiology , volume status has improved with HD , TDC in place   #2 Acute on chronic diastolic CHF. CXR with persistent pulm edema , CHF  #3 hypertension, volume off should help  #4 dyslipidemia  #5 hypothermia/SIRS on empirical antibiotics per internal medicine.   #6 COVID-19 -ve   #7 leg edema , duplex neg for DVT  #8 hyponatremia in setting of CHF , this is indicative of worsening cardiorenal physiology      Plan:   #1 HD today   #2 strict ins and outs, fluid restrict 1200 mm  #3 encourage po intake, increase protein in diet, may need appetite stimulant   #4 follow cardiology recommendations  #5  avoid nephrotoxins renally dose medications  #6 continue epogen and hecterol    Patient does have outpatient chair time , [facility]  MWF 5:30 AM starting 5/11   Significant deconditioning , will need PT/OT   Lives by herself , no family in this area , will need home health /transportation setup for dialysis    Please call with questions,     Aimee Edmondson MD FASN  Cell [phone]  Pager: [phone]    Subjective:     Nauseated this AM  O2 2 lit   Breathing better  Edema better      Current Facility-Administered Medications:     vancomycin (VANCOCIN) 1,000 mg in 0.9% sodium chloride (MBP/ADV) 250 mL adv, 1,000 mg, IntraVENous, DIALYSIS ONE TIME DOSE, Jose Francisco Christianson MD    carvediloL (COREG) tablet 12.5 mg, 12.5 mg, Oral, BID WITH MEALS, Padmini Ro DO    isosorbide mononitrate ER (IMDUR) tablet 30 mg, 30 mg, Oral, DAILY, Clarissa Garcia PA, Stopped at 05/11/20 0945    epoetin johnathon-epbx (RETACRIT) injection 10,000 Units, 10,000 Units, IntraVENous, Q TUE, THU & SAT, Josep Olivares MD, 10,000 Units at 05/10/20 0039    doxercalciferoL (HECTOROL) 4 mcg/2 mL injection 4 mcg, 4 mcg, IntraVENous, Q MON, WED & FRI, [facility], 4 mcg at 05/08/20 2202    mirtazapine (REMERON) tablet 15 mg, 15 mg, Oral, QHS, Ashley Latham MD, 15 mg at 05/11/20 0001    ondansetron (ZOFRAN) injection 4 mg, 4 mg, IntraVENous, Q6H PRN, Ashley Latham MD, 4 mg at 05/11/20 0953    loratadine (CLARITIN) tablet 10 mg, 10 mg, Oral, DAILY PRN, Ashley Latham MD, 10 mg at 05/07/20 0948    VANCOMYCIN INFORMATION NOTE, , Other, Rx Dosing/Monitoring, Rinku Moon MD    heparin (porcine) injection 5,000 Units, 5,000 Units, SubCUTAneous, Q8H, Mirza Frey MD, Stopped at 05/11/20 0944    sodium chloride (NS) flush 5-10 mL, 5-10 mL, IntraVENous, PRN, Rinku Moon MD    amLODIPine (NORVASC) tablet 10 mg, 10 mg, Oral, DAILY, Ani Chi [place], Stopped at 05/11/20 0945    aspirin chewable tablet 81 mg, 81 mg, Oral, DAILY, Fide Ann WYNN, PA, 81 mg at 05/11/20 0946    citalopram (CELEXA) tablet 20 mg, 20 mg, Oral, DAILY, Ani Chi, PA, 20 mg at 05/11/20 0945    cyanocobalamin tablet 1,000 mcg, 1,000 mcg, Oral, BID, Ani Chi, PA, 1,000 mcg at 05/11/20 0946    pantoprazole (PROTONIX) tablet 40 mg, 40 mg, Oral, ACB&D, Ani Chi, PA, 40 mg at 05/11/20 0946    hydrALAZINE (APRESOLINE) tablet 100 mg, 100 mg, Oral, TID, Quinn Mauricema, Stopped at 05/11/20 0946    rosuvastatin (CRESTOR) tablet 5 mg, 5 mg, Oral, QHS, Ani Rahman, PA, 5 mg at 05/11/20 0003    insulin lispro (HUMALOG) injection, , SubCUTAneous, AC&HS, [place], [place], Stopped at 05/08/20 2200    glucose chewable tablet 16 g, 16 g, Oral, PRN, Ani Chi PA    glucagon (GLUCAGEN) injection 1 mg, 1 mg, IntraMUSCular, PRN, Ani Chi PA    dextrose (D50W) injection syrg 12.5-25 g, 25-50 mL, IntraVENous, PRN, Ani Chi PA    polyethylene glycol (MIRALAX) packet 17 g, 17 g, Oral, DAILY PRN, Anil Lee, MICHAEL Beard, 17 g at 05/10/20 1135    acetaminophen (TYLENOL) tablet 650 mg, 650 mg, Oral, Q4H PRN, Anil Lee, MICHAEL Beard, 650 mg at 05/11/20 9270    ferrous sulfate tablet 325 mg, 1 Tab, Oral, EVERY OTHER DAY, Orlando Rogers MD, 325 mg at 05/11/20 0953        Objective:     Visit Vitals  /49   Pulse 61   Temp 97.5 °F (36.4 °C) (Oral)   Resp 18   Ht 5' 5\" (1.651 m)   Wt 108 kg (238 lb)   SpO2 99%   Breastfeeding No   BMI 39.61 kg/m²         Intake/Output Summary (Last 24 hours) at 5/11/2020 1145  Last data filed at 5/11/2020 0930  Gross per 24 hour   Intake 240 ml   Output 300 ml   Net -60 ml       Physical Exam:     General: NAD, alert and oriented. Neck: No jvd. LUNGS: decreased in bases, rales+  CVS EXM: S1, S2  RRR, no murmurs/gallops/rubs. Abdomen: soft, non tender. Lower Extremities: 1+ edema.    Rt IJ TDC     Data Review:    Recent Labs     05/11/20  0328   WBC 8.8   RBC 2.66*   HCT 23.7*   MCV 89.1   MCH 28.2   MCHC 31.6   RDW 14.1     Recent Labs     05/11/20  0328 05/09/20  0144   BUN 35* 39*   CREA 2.57* 2.15*   CA 8.1* 7.9*   K 4.2 4.0    136    102   CO2 26 28   GLU 97 95       Nimco Monroe MD

## 2020-05-11 NOTE — ROUTINE PROCESS
Bedside and Verbal shift change report given to Kwadwo Montgomery (oncoming nurse) by Kevyn Cyr RN (offgoing nurse). Report included the following information SBAR, Kardex, ED Summary, OR Summary, Procedure Summary, Intake/Output, MAR, Recent Results and Cardiac Rhythm NSR.

## 2020-05-11 NOTE — PROGRESS NOTES
ACUTE HEMODIALYSIS FLOW SHEET    HEMODIALYSIS ORDERS: Physician: Dr. Erika Salvador     Dialyzer: Revaclear   Duration: 3 hr  BFR: 300   DFR: 500   Dialysate:  Temp 36.    K+   3    Ca+  2.5   Na 142   Bicarb 30   Wt Readings from Last 1 Encounters:   05/11/20 108 kg (238 lb)    Patient Chart [x]   Unable to Obtain []  Dry weight/UF Goal: 1000 ml  Access RIJ CVC     Heparin []  Bolus    Units    [] Hourly    Units    [x]None      Catheter locking solution Heparin 1:1000   Pre BP:   144/55    Pulse:  59   Respirations: 18    Temperature:  98.3    Tx: NSS   ml/Bolus   [x] N/A   Labs: []  Pre  []  Post:   [x] N/A   Additional Orders(medications, blood products, hypotension management): [] Yes   [x] No     [x]  [facility] Consent Verified     CATHETER ACCESS:  []N/A   [x]Right   []Left   [x]IJ     []Fem   []Chest wall   [] First use X-ray verified     [x]Tunnel    [] Non Tunneled   [x]No S/S infection  []Redness  []Drainage []Cultured []Swelling []Pain   [x]Medical Aseptic Prep Utilized   []Dressing Changed  [] Biopatch  Date:    []Clotted   [x]Patent   Flows: [x]Good  []Poor  []Reversed   If access problem,  notified: []Yes    [x]N/A        GRAFT/FISTULA ACCESS:   [x]N/A     []Right     []Left     []UE     []LE   []AVG   []AVF     []Buttonhole    []Medical Aseptic Prep Utilized   []No S/S infection  []Redness  []Drainage []Cultured  [] Swelling  [] Pain  Bruit:   [] Strong    [] Weak       Thrill :   [] Strong    [] Weak     Needle Gauge: 15   Length: 1 inch   If access problem,  notified: []Yes     [x]N/A     Please describe access if present and not used: N/A       GENERAL ASSESSMENT:    LUNGS:  Rate 18   SaO2%      [] Clear  [x] Coarse  [] Crackles  [] Wheezing                                                [] Diminished     Location : []RLL   []LLL    []RUL  []KATIE   Cough: []Productive  []Dry  [x]N/A   Respirations:  [x]Easy  []Labored   Therapy:  [x]RA  []NC l/min    Mask: []NRB  [] Venti    O2% []Ventilator  []Intubated  [] Trach  [] BiPaP   CARDIAC: [x]Regular      [] Irregular   [] Pericardial Rub  [] JVD          []  Monitored  [] Bedside  [] Remotely monitored     EDEMA: [] None  [x]Generalized  [] Pitting [] 1    [] 2    [] 3    [] 4                 [] Facial  [] Pedal  []  UE  [] LE   SKIN:   [x] Warm  [] Hot     [] Cold   [x] Dry     [] Pale   [] Diaphoretic                  [] Flushed  [] Jaundiced  [] Cyanotic  [] Rash  [] Weeping   LOC:    [x] Alert      [x]Oriented:    [x] Person     [x] Place  []Time               [] Confused  [] Lethargic  [] Medicated  [] Non-responsive   GI / ABDOMEN:                     [] Flat    [] Distended    [x] Soft    [] Firm   []  Obese                   [] Diarrhea  [x] Bowel Sounds  [] Nausea  [] Vomiting     / URINE ASSESSMENT:                   [] Voiding   [] Oliguria  [x] Anuria   []  Lopez                  [] Incontinent  []  Incontinent Brief []  Fecal Management System     PAIN:  [x] 0 []1  []2   []3   []4   []5   []6   []7   []8   []9   []10            Scale 0-10  Action/Follow Up:    MOBILITY:  [x] Bed    [] Stretcher      All Vitals and Treatment Details on Attached [address]: [facility]          Room # 692/01   [] 1st Time Acute      [] Stat       [x] Routine      [] Urgent     [x] Acute Room  []  Bedside  [] ICU/CCU  [] ER   Isolation Precautions:  [x] Dialysis    Droplet Plus       ALLERGIES:     Allergies   Allergen Reactions    Augmentin [Amoxicillin-Pot Clavulanate] Diarrhea    Clavulanic Acid Diarrhea    Levaquin [Levofloxacin] Other (comments)     Severe hypoglycemia        Code Status:  Full Code     Hepatitis Status      Lab Results   Component Value Date/Time    Hepatitis B surface Ag <0.10 05/07/2020 05:08 AM    Hepatitis B surface Ab <3.10 (L) 05/07/2020 05:08 AM        Current Labs:      Lab Results   Component Value Date/Time    WBC 8.8 05/11/2020 03:28 AM    HGB 7.5 (L) 05/11/2020 03:28 AM    HCT 23.7 (L) 05/11/2020 03:28 AM PLATELET 112 44/78/2034 03:28 AM    MCV 89.1 05/11/2020 03:28 AM     Lab Results   Component Value Date/Time    Sodium 136 05/11/2020 03:28 AM    Potassium 4.2 05/11/2020 03:28 AM    Chloride 104 05/11/2020 03:28 AM    CO2 26 05/11/2020 03:28 AM    Anion gap 6 05/11/2020 03:28 AM    Glucose 97 05/11/2020 03:28 AM    BUN 35 (H) 05/11/2020 03:28 AM    Creatinine 2.57 (H) 05/11/2020 03:28 AM    BUN/Creatinine ratio 14 05/11/2020 03:28 AM    GFR est AA 22 (L) 05/11/2020 03:28 AM    GFR est non-AA 19 (L) 05/11/2020 03:28 AM    Calcium 8.1 (L) 05/11/2020 03:28 AM          DIET:  DIET RENAL  DIET NUTRITIONAL SUPPLEMENTS      PRIMARY NURSE REPORT:   Pre Dialysis: Kolton Wall RN  Time: 3455        EDUCATION:    [x] Patient [] Other           Knowledge Basis: []None [x]Minimal [] Substantial   Barriers to learning  [x]N/A   [] Access Care     [] S&S of infection  [] Fluid Management  [] K+   [x] Procedural    []Albumin   [] Medications   [] Tx Options   [] Transplant   [] Diet   [] Other   Teaching Tools:  [x] Explain  [] Demo  [] Handouts [] Video  Patient response: [x] Verbalized understanding  [] Teach back  [] Return demonstration   [] Requires follow up      [x]Time Out/Safety Check  [x] Extracorporeal Circuit Tested for integrity       RO/HEMODIALYSIS MACHINE SAFETY CHECKS  Before each treatment:     Machine Number:                   [facility]                                     [x] Unit Machine # 9 with centralized RO                                                                                                Alarm Test:  Pass time 6110            [x] RO/Machine Log Complete    Machine Temp    36.0             Dialysate: pH  7.4    Conductivity: Meter 13.8     HD Machine  13.9      TCD: 13.7  Dialyzer Lot # [id number]     Blood Tubing Lot # [id number]     Saline Lot # -[id number]     CHLORINE TESTING-Before each treatment and every 4 hours    Total Chlorine: [x] less than 0.1 ppm  Initial Time Check: 0900 4 Hr/2nd Check Time: 130   (if greater than 0.1 ppm from Primary then every 30 minutes from Secondary)     TREATMENT INITIATION  with Dialysis Precautions:   [x] All Connections Secured              [x] Saline Line Double Clamped   [x] Venous Parameters Set               [x] Arterial Parameters Set    [x] Prime Given 250ml NSS              [x]Air Foam Detector Engaged      Treatment Initiation Note:  5411  Pt arrived to HD without any complaints. Pt A &O X 3, follows commands. During Treatment Notes:  21   [facility] CVC assessed no abnormalities noted, CVC accessed without any difficulty line patent with good flow. Vascular access visible with arterial and venous line connections intact. 1100  Vascular access visible with arterial and venous line connections intact. 1200  Vascular access visible with arterial and venous line connections intact. Medication Dose Volume Route Time Rich Nurse, Title   hectoral 4mcg 2ml  900 Balbir Elliott, RN     Post Assessment  Dialyzer Cleared:[] Good [x] Fair  [] Poor  Blood processed:  51.4 L  UF Removed:  1500 Ml  Post /59  Pulse  63 Resp  18   Temp 98.3 Lungs: [] Clear [x] Course  [] Crackles                 [] Wheezing [] Diminished   Post Tx Vascular Access: [x] N/A        Cardiac:[x] Regular   [] Irregular   Rhythm:  [] Monitored   [] Not Monitored    CVC Catheter: [] N/A  Locking solution: Heparin 1:1000 U  Arterial port 1.6 ml   Venous port 1.6 ml   Edema:  [] None  [x] General [] Facial                   [] Pedal   [] UE [] LE   Skin:[x] Warm  [x] Dry [] Diaphoretic               [] Flushed  [] Pale [] Cyanotic   Pain:  [x]0  []1 []2  []3 []4  []5  []6 []7 []8  []9  []10       Post Treatment Note:   5829  HD completed at this time, pt tolerated well. POST TREATMENT PRIMARY NURSE HANDOFF REPORT:   Post Dialysis: ALLISON Hernández RN                Time:  0805       Abbreviations: AVG-arterial venous graft, AVF-arterial venous fistula, IJ-Internal Jugular, Subcl-Subclavian, Fem-Femoral, Tx-treatment, AP/HR-apical heart rate, DFR-dialysate flow rate, BFR-blood flow rate, AP-arterial pressure, -venous pressure, UF-ultrafiltrate, TMP-transmembrane pressure, Austyn-Venous, Art-Arterial, RO-Reverse Osmosis

## 2020-05-11 NOTE — PROGRESS NOTES
Discharge planning:    A few SNFs are reviewing patient's clinicals. [facility] has accepted patient; however, they are asking follow up questions, before they officially accept. This writer will continue to monitor for discharge planning to ensure a safe discharge from [facility]. Raymundo De Paz MSW  Care Manager  Pager#: [phone]

## 2020-05-11 NOTE — PROGRESS NOTES
[facility]  Progress Note    Patient: [facility] Age: 71 y.o. : 1950 MR#: [id number] SSN: [id number]  Date/Time: 2020    Subjective:     Seen in dialysis today   More awake today, answers some questions   Has a very depressed/flat affect   C/o fatigue       Assessment/Plan:   [facility] is a 72 y/o female with a PMHx of CKD stage III, diastolic CHF, chronic hypoxic respiratory failure, anemia, Type II DM, HTN, HLD and obesity who is admitted for acute on chronic respiratory failure, acute on chronic diastolic CHF and OFE on CKD Stage III that has progressed to ESRD. 1. Acute on chronic respiratory failure   2. Acute on chronic diastolic heart failure  3. OFE on CKD stage III that has progressed to ESRD   4. Anemia of CKD   5. Hyponatremia   6. Deconditioned state  7. Depressed mood  8. Type II DM- HbA1c 6.1  9. CAD  10. HTN  11. HLD   12. Obesity     Repeat COVID-19 testing sent to [address] [place] [address] on , as pt will likely need SNF vs LTC placement and needs negative test within 5 days of placement; repeat testing is only for placement at SNF/LTC      COVID-19 testing negative on 20   HD per nephrology   Pt is set up with outpatient dialysis clinic,  [facility]  MWF 5:30 AM starting    Stop lantus, cont SSI w/accuchecks    Strict I&O's, fluid restriction   BP control- amlodipine, carvedilol, hydralazine, imdur   Cont other home meds   PT, OT   Needs HH, transportation set-up for dialysis       Case discussed with:  [x]Patient  []Family  [x]Nursing  []Case Management  DVT Prophylaxis:  []Lovenox  [x]Hep SQ  []SCDs  []Coumadin   []On Heparin gtt  Diet: Renal w/fluid restriction   Code Status: FULL   Disposition: SNF vs LTC when accepted        PAVEL Lott, DO   May 11, 2020        Objective:   VS:   Visit Vitals  /59   Pulse 63   Temp 98.3 °F (36.8 °C) (Oral)   Resp 18   Ht 5' 5\" (1.651 m)   Wt 108 kg (238 lb)   SpO2 99% Breastfeeding No   BMI 39.61 kg/m²      Tmax/24hrs: Temp (24hrs), Av.3 °F (36.8 °C), Min:97.5 °F (36.4 °C), Max:99.1 °F (37.3 °C)    Input/Output:     Intake/Output Summary (Last 24 hours) at 2020 1507  Last data filed at 2020 1335  Gross per 24 hour   Intake 240 ml   Output 1300 ml   Net -1060 ml       General:  Lethargic, fatigued awake, in NAD  Cardiovascular: Regular rate and rhythm.  Right sided upper chest catheter in situ   Pulmonary: Clear to auscultation bilaterally  GI: Soft nontender and bowel sounds present  Extremities: 1+ pitting pedal edema     Labs:    Recent Results (from the past 24 hour(s))   GLUCOSE, POC    Collection Time: 05/10/20  3:59 PM   Result Value Ref Range    Glucose (POC) 110 70 - 110 mg/dL   GLUCOSE, POC    Collection Time: 05/10/20  8:32 PM   Result Value Ref Range    Glucose (POC) 128 (H) 70 - 861 mg/dL   METABOLIC PANEL, BASIC    Collection Time: 20  3:28 AM   Result Value Ref Range    Sodium 136 136 - 145 mmol/L    Potassium 4.2 3.5 - 5.5 mmol/L    Chloride 104 100 - 111 mmol/L    CO2 26 21 - 32 mmol/L    Anion gap 6 3.0 - 18 mmol/L    Glucose 97 74 - 99 mg/dL    BUN 35 (H) 7.0 - 18 MG/DL    Creatinine 2.57 (H) 0.6 - 1.3 MG/DL    BUN/Creatinine ratio 14 12 - 20      GFR est AA 22 (L) >60 ml/min/1.73m2    GFR est non-AA 19 (L) >60 ml/min/1.73m2    Calcium 8.1 (L) 8.5 - 10.1 MG/DL   VANCOMYCIN, RANDOM    Collection Time: 20  3:28 AM   Result Value Ref Range    Vancomycin, random 13.7 5.0 - 40.0 UG/ML   CBC WITH AUTOMATED DIFF    Collection Time: 20  3:28 AM   Result Value Ref Range    WBC 8.8 4.6 - 13.2 K/uL    RBC 2.66 (L) 4.20 - 5.30 M/uL    HGB 7.5 (L) 12.0 - 16.0 g/dL    HCT 23.7 (L) 35.0 - 45.0 %    MCV 89.1 74.0 - 97.0 FL    MCH 28.2 24.0 - 34.0 PG    MCHC 31.6 31.0 - 37.0 g/dL    RDW 14.1 11.6 - 14.5 %    PLATELET 553 422 - 684 K/uL    MPV 12.7 (H) 9.2 - 11.8 FL    NEUTROPHILS 82 (H) 40 - 73 %    LYMPHOCYTES 10 (L) 21 - 52 %    MONOCYTES 8 3 - 10 %    EOSINOPHILS 0 0 - 5 %    BASOPHILS 0 0 - 2 %    ABS. NEUTROPHILS 7.2 1.8 - 8.0 K/UL    ABS. LYMPHOCYTES 0.9 0.9 - 3.6 K/UL    ABS. MONOCYTES 0.7 0.05 - 1.2 K/UL    ABS. EOSINOPHILS 0.0 0.0 - 0.4 K/UL    ABS.  BASOPHILS 0.0 0.0 - 0.1 K/UL    DF AUTOMATED     GLUCOSE, POC    Collection Time: 05/11/20  7:07 AM   Result Value Ref Range    Glucose (POC) 131 (H) 70 - 110 mg/dL

## 2020-05-11 NOTE — PROGRESS NOTES
[facility]  -  Progress Note      Patient: Gabo Barclay MRN: [id number]  SSN: [id number]    YOB: 1950  Age: 71 y.o. Sex: female      Admit Date: 4/30/2020    Assessment:     Hospital Problems  Date Reviewed: 4/23/2020          Codes Class Noted POA    * (Principal) Acute on chronic respiratory failure ([facility] ICD-10-CM: J96.20  ICD-9-CM: 518.84  5/9/2020 Unknown        Sepsis ([facility] ICD-10-CM: A41.9  ICD-9-CM: 038.9, 995.91  4/30/2020 Unknown        Respiratory failure ([facility] ICD-10-CM: J96.90  ICD-9-CM: 518.81  4/30/2020 Unknown        SIRS (systemic inflammatory response syndrome) ([facility] ICD-10-CM: R65.10  ICD-9-CM: 995.90  4/30/2020 Unknown            -Acute on chronic respiratory failure. Multifactorial in setting of below. Recurrent ER visits since last discharge.  Improving.  -Sepsis/SIRS, presented with hypothermia.  Improved. -Acute on chronic HFpEF. EF 55-60% by echo 4/25/20. -OFE on CKD.  Started on HD this admission.  -Acute on chronic anemia.  -Hypertension. Elevated on admission.  -Diabetes mellitus. HgbA1c 6.1.  -Dyslipidemia. On statin.  -Coronary disease reportedly diffuse medically managed, nuclear stress 3/2018 with small area of ischemia with EF 53%  -Chronic atypical LBBB. -COVID negative 4/30/2020.     Primary cardiologist Dr. Katja Garcia at [facility], now plans to follow up with Dr. Ibrahim [place]:     Stable symptoms and volume appears stable and managed by renal.  BP appears to be under good control on amlodipine, BB, hydralazine, nitrates- continue. Will continue to monitor and adjust meds as needed. Patient for dialysis today. Subjective:     Patient without complaints and feels her breathing is \"good\". No pain.     Objective:      Patient Vitals for the past 8 hrs:   Temp Pulse Resp BP SpO2   05/11/20 0749 98.9 °F (37.2 °C) (!) 55 17 134/53 99 %   05/11/20 0342 98.4 °F (36.9 °C) 60 18 138/54 97 %         Patient Vitals for the past 96 hrs: Weight   05/11/20 0359 108 kg (238 lb)   05/10/20 0325 108.6 kg (239 lb 6.4 oz)         Intake/Output Summary (Last 24 hours) at 5/11/2020 0830  Last data filed at 5/11/2020 0615  Gross per 24 hour   Intake 120 ml   Output 500 ml   Net -380 ml       Physical Exam:  General:  alert, cooperative, no distress, appears stated age  Neck:  nontender, no JVD  Lungs:  clear to auscultation bilaterally  Heart:  regular rate and rhythm, S1, S2 normal, no murmur, click, rub or gallop  Abdomen:  abdomen is soft without significant tenderness, masses, organomegaly or guarding  Extremities:  extremities normal, atraumatic, no cyanosis or edema    Data Review:     Labs: Results:       Chemistry Recent Labs     05/11/20 0328 05/09/20  0144   GLU 97 95    136   K 4.2 4.0    102   CO2 26 28   BUN 35* 39*   CREA 2.57* 2.15*   CA 8.1* 7.9*   AGAP 6 6   BUCR 14 18      CBC w/Diff Recent Labs     05/09/20  0144   WBC 7.7   RBC 2.74*   HGB 7.7*   HCT 23.7*      GRANS 79*   LYMPH 11*   EOS 0      Cardiac Enzymes No results found for: CPK, CK, CKMMB, CKMB, RCK3, CKMBT, CKNDX, CKND1, ANDER, TROPT, TROIQ, MINH, TROPT, TNIPOC, BNP, BNPP   Coagulation No results for input(s): PTP, INR, APTT, INREXT in the last 72 hours. Lipid Panel Lab Results   Component Value Date/Time    Cholesterol, total 182 04/24/2020 02:34 AM    HDL Cholesterol 41 04/24/2020 02:34 AM    LDL, calculated 112.8 (H) 04/24/2020 02:34 AM    VLDL, calculated 28.2 04/24/2020 02:34 AM    Triglyceride 141 04/24/2020 02:34 AM    CHOL/HDL Ratio 4.4 04/24/2020 02:34 AM      BNP No results found for: BNP, BNPP, XBNPT   Liver Enzymes No results for input(s): TP, ALB, TBIL, AP, SGOT, GPT in the last 72 hours.     No lab exists for component: DBIL   Digoxin    Thyroid Studies No results found for: T4, T3U, TSH, TSHEXT

## 2020-05-11 NOTE — PROGRESS NOTES
PT orders received, chart reviewed. Pt unable to participate with PT due to:  []  Nausea/vomiting  []  Eating  []  Pain  []  Pt lethargic  [x]  Off Unit   Attempted x2  Will f/u later as patient's schedule allows. Thank you.   Alonso Glez, PT PT, DPT

## 2020-05-11 NOTE — PROGRESS NOTES
Problem: Falls - Risk of  Goal: *Absence of Falls  Description: Document Lida Angel Fall Risk and appropriate interventions in the flowsheet.   Outcome: Progressing Towards Goal  Note: Fall Risk Interventions:  Mobility Interventions: Bed/chair exit alarm         Medication Interventions: Bed/chair exit alarm    Elimination Interventions: Bed/chair exit alarm

## 2020-05-11 NOTE — PROGRESS NOTES
Discharge planning update: This writer received a request, from Alonso Hernandez, to upload more clinical information. This writer sent updated information, via [email or web address]. This writer will continue to monitor for discharge planning to ensure a safe discharge from [facility]. Raymundo Nguyen MSW  Care Manager  Pager#: [phone]

## 2020-05-11 NOTE — INTERDISCIPLINARY ROUNDS
Interdisciplinary Round Note Patient Information: Patient Information: Maurizio Duncan 461/01 Reason for Admission: Sepsis ([facility] [A41.9] Attending Provider:   John Gamboa DO Past Medical History:  
Past Medical History:  
Diagnosis Date  Arthritis  Cancer ([facility]  CHF (congestive heart failure) ([facility]  Diabetes ([facility]  Fracture of neck ([facility]  GERD (gastroesophageal reflux disease)  Goiter  Hiatal hernia  Hypertension  Uterine cancer ([facility] Hospital day: 11 
Estimated discharge date: TBD 
RRAT Score: Medium Risk 25 Total Score 3 Patient Length of Stay (>5 days = 3) 4 IP Visits Last 12 Months (1-3=4, 4=9, >4=11) 5 Pt. Coverage (Medicare=5 , Medicaid, or Self-Pay=4) 6 Charlson Comorbidity Score (Age + Comorbid Conditions) Criteria that do not apply:  
 Has Seen PCP in Last 6 Months (Yes=3, No=0) . Living with Significant Other. Assisted Living. LTAC. SNF. or  
Rehab Goals for Today: Dialysis and finding patient a SNF bed. She is also COVID test result pending. VITAL SIGNS Vitals:  
 05/11/20 1300 05/11/20 1315 05/11/20 1330 05/11/20 1335 BP: 151/57 140/55 145/55 155/59 Pulse: 63 60 60 63 Resp:    18 Temp:    98.3 °F (36.8 °C) TempSrc:    Oral  
SpO2:      
Weight:      
Height:      
 
 
 
 Lines, Drains, & Airways Peripheral IV 05/11/20 Posterior;Right Hand-Site Assessment: Clean, dry, & intact [REMOVED] Peripheral IV 04/30/20 Left Hand-Site Assessment: Painful Hemodialysis Access 05/07/20-Site Assessment: Clean, dry, & intact [REMOVED] Peripheral IV 05/07/20 Posterior;Right Hand-Site Assessment: Other (Comment)(out) VTE Prophylaxis Sequential/Intermittent Compression Device: (Coban on) Graduated Compression Stockings: Bilateral 
     Patient Refused VTE Prophylaxis: Yes Intake and Output:  
05/09 1901 - 05/11 0700 In: 120 [P.O.:120] Out: 1025 [Urine:1025] 05/11 0701 - 05/11 1900 In: 120 [P.O.:120] Out: 1000  Current Diet: DIET RENAL Regular; FR 1000ML DIET NUTRITIONAL SUPPLEMENTS Lunch, Dinner; [employer]  
Last Bowel Movement Date: (unk) Stool Appearance: Formed Abdominal Assessment: Obese Appetite: Poor Bowel Sounds: Active Recent Glucose Results:  
Lab Results Component Value Date/Time GLU 97 05/11/2020 03:28 AM  
 GLUCPOC 131 (H) 05/11/2020 07:07 AM  
 GLUCPOC 128 (H) 05/10/2020 08:32 PM  
 GLUCPOC 110 05/10/2020 03:59 PM  
 
 
  
IV Antibiotics? NO Activity Level: Activity Level: Up with Assistance Needs assistance with ADLs: YES 
PT Consult Status: YES Current Immunizations: There is no immunization history on file for this patient. Recommendations:  
Discharge Disposition: SNF 
 
COVID status:  Pending results Will the patient require COVID testing prior to discharge for placement?: YES Medication Reconciliation Completed: YES Cardiac/Pulmonary Rehab Ordered: YES Needs for Discharge: Negative COVID test result and a SNF bed offer. Recommendations from IDR team: Dialysis and will need a nursing facility bed obtained for her. Raymundo Moreno. MSW Care Manager Pager#: [phone]

## 2020-05-11 NOTE — PROGRESS NOTES
LTSS screening: This writer completed patient's LTSS screening for long term care. The screening was submitted for processing. This writer will continue to monitor for discharge planning to ensure a safe discharge from [facility]. Raymundo Patterson MSW  Care Manager  Pager#: [phone]

## 2020-05-11 NOTE — PROGRESS NOTES
Attempted to see patient for skilled OT x 2 attempts, patient off unit for HD. Will continue to follow and see patient when available/as appropriate.             Thank you for this referral,   Ricky Huff MS, OTR/L

## 2020-05-12 LAB
GLUCOSE BLD STRIP.AUTO-MCNC: 128 MG/DL (ref 70–110)
GLUCOSE BLD STRIP.AUTO-MCNC: 161 MG/DL (ref 70–110)
GLUCOSE BLD STRIP.AUTO-MCNC: 200 MG/DL (ref 70–110)
GLUCOSE BLD STRIP.AUTO-MCNC: 212 MG/DL (ref 70–110)

## 2020-05-12 PROCEDURE — 74011636637 HC RX REV CODE- 636/637: Performed by: PHYSICIAN ASSISTANT

## 2020-05-12 PROCEDURE — 97116 GAIT TRAINING THERAPY: CPT

## 2020-05-12 PROCEDURE — 51798 US URINE CAPACITY MEASURE: CPT

## 2020-05-12 PROCEDURE — 74011250636 HC RX REV CODE- 250/636: Performed by: INTERNAL MEDICINE

## 2020-05-12 PROCEDURE — 74011250636 HC RX REV CODE- 250/636: Performed by: HOSPITALIST

## 2020-05-12 PROCEDURE — 74011250637 HC RX REV CODE- 250/637: Performed by: PHYSICIAN ASSISTANT

## 2020-05-12 PROCEDURE — 74011250637 HC RX REV CODE- 250/637: Performed by: HOSPITALIST

## 2020-05-12 PROCEDURE — 65660000000 HC RM CCU STEPDOWN

## 2020-05-12 PROCEDURE — 74011250637 HC RX REV CODE- 250/637: Performed by: FAMILY MEDICINE

## 2020-05-12 PROCEDURE — 97530 THERAPEUTIC ACTIVITIES: CPT

## 2020-05-12 PROCEDURE — 97168 OT RE-EVAL EST PLAN CARE: CPT

## 2020-05-12 PROCEDURE — 74011250637 HC RX REV CODE- 250/637: Performed by: INTERNAL MEDICINE

## 2020-05-12 PROCEDURE — 97164 PT RE-EVAL EST PLAN CARE: CPT

## 2020-05-12 PROCEDURE — 3331090001 HH PPS REVENUE CREDIT

## 2020-05-12 PROCEDURE — 82962 GLUCOSE BLOOD TEST: CPT

## 2020-05-12 PROCEDURE — 3331090002 HH PPS REVENUE DEBIT

## 2020-05-12 RX ORDER — DOCUSATE SODIUM 100 MG/1
100 CAPSULE, LIQUID FILLED ORAL
Status: DISCONTINUED | OUTPATIENT
Start: 2020-05-12 | End: 2020-05-14 | Stop reason: HOSPADM

## 2020-05-12 RX ORDER — POLYETHYLENE GLYCOL 3350 17 G/17G
17 POWDER, FOR SOLUTION ORAL ONCE
Status: COMPLETED | OUTPATIENT
Start: 2020-05-12 | End: 2020-05-12

## 2020-05-12 RX ORDER — POLYETHYLENE GLYCOL 3350 17 G/17G
17 POWDER, FOR SOLUTION ORAL DAILY
Status: DISCONTINUED | OUTPATIENT
Start: 2020-05-13 | End: 2020-05-14 | Stop reason: HOSPADM

## 2020-05-12 RX ORDER — FACIAL-BODY WIPES
10 EACH TOPICAL DAILY PRN
Status: DISCONTINUED | OUTPATIENT
Start: 2020-05-12 | End: 2020-05-14 | Stop reason: HOSPADM

## 2020-05-12 RX ADMIN — HYDRALAZINE HYDROCHLORIDE 100 MG: 50 TABLET, FILM COATED ORAL at 21:40

## 2020-05-12 RX ADMIN — INSULIN LISPRO 4 UNITS: 100 INJECTION, SOLUTION INTRAVENOUS; SUBCUTANEOUS at 16:44

## 2020-05-12 RX ADMIN — AMLODIPINE BESYLATE 10 MG: 10 TABLET ORAL at 08:38

## 2020-05-12 RX ADMIN — HYDRALAZINE HYDROCHLORIDE 100 MG: 50 TABLET, FILM COATED ORAL at 15:04

## 2020-05-12 RX ADMIN — INSULIN LISPRO 4 UNITS: 100 INJECTION, SOLUTION INTRAVENOUS; SUBCUTANEOUS at 12:06

## 2020-05-12 RX ADMIN — CITALOPRAM HYDROBROMIDE 20 MG: 20 TABLET ORAL at 08:39

## 2020-05-12 RX ADMIN — CYANOCOBALAMIN TAB 1000 MCG 1000 MCG: 1000 TAB at 21:40

## 2020-05-12 RX ADMIN — HYDRALAZINE HYDROCHLORIDE 100 MG: 50 TABLET, FILM COATED ORAL at 08:39

## 2020-05-12 RX ADMIN — HEPARIN SODIUM 5000 UNITS: 5000 INJECTION INTRAVENOUS; SUBCUTANEOUS at 21:40

## 2020-05-12 RX ADMIN — INSULIN LISPRO 2 UNITS: 100 INJECTION, SOLUTION INTRAVENOUS; SUBCUTANEOUS at 21:40

## 2020-05-12 RX ADMIN — EPOETIN ALFA-EPBX 10000 UNITS: 10000 INJECTION, SOLUTION INTRAVENOUS; SUBCUTANEOUS at 21:41

## 2020-05-12 RX ADMIN — HEPARIN SODIUM 5000 UNITS: 5000 INJECTION INTRAVENOUS; SUBCUTANEOUS at 08:40

## 2020-05-12 RX ADMIN — CYANOCOBALAMIN TAB 1000 MCG 1000 MCG: 1000 TAB at 08:38

## 2020-05-12 RX ADMIN — MIRTAZAPINE 15 MG: 15 TABLET, FILM COATED ORAL at 21:40

## 2020-05-12 RX ADMIN — PANTOPRAZOLE SODIUM 40 MG: 40 TABLET, DELAYED RELEASE ORAL at 08:39

## 2020-05-12 RX ADMIN — POLYETHYLENE GLYCOL 3350 17 G: 17 POWDER, FOR SOLUTION ORAL at 15:13

## 2020-05-12 RX ADMIN — HEPARIN SODIUM 5000 UNITS: 5000 INJECTION INTRAVENOUS; SUBCUTANEOUS at 15:03

## 2020-05-12 RX ADMIN — CARVEDILOL 12.5 MG: 12.5 TABLET, FILM COATED ORAL at 08:39

## 2020-05-12 RX ADMIN — ASPIRIN 81 MG CHEWABLE TABLET 81 MG: 81 TABLET CHEWABLE at 08:39

## 2020-05-12 RX ADMIN — CARVEDILOL 12.5 MG: 12.5 TABLET, FILM COATED ORAL at 21:39

## 2020-05-12 RX ADMIN — ROSUVASTATIN CALCIUM 5 MG: 10 TABLET, FILM COATED ORAL at 21:40

## 2020-05-12 RX ADMIN — ISOSORBIDE MONONITRATE 30 MG: 30 TABLET, EXTENDED RELEASE ORAL at 08:39

## 2020-05-12 RX ADMIN — PANTOPRAZOLE SODIUM 40 MG: 40 TABLET, DELAYED RELEASE ORAL at 21:40

## 2020-05-12 NOTE — PROGRESS NOTES
Problem: Falls - Risk of  Goal: *Absence of Falls  Description: Document Geovanni Figueroaer Fall Risk and appropriate interventions in the flowsheet. Outcome: Progressing Towards Goal  Note: Fall Risk Interventions:  Mobility Interventions: Bed/chair exit alarm         Medication Interventions: Bed/chair exit alarm    Elimination Interventions: Bed/chair exit alarm              Problem: Patient Education: Go to Patient Education Activity  Goal: Patient/Family Education  Outcome: Progressing Towards Goal     Problem: Patient Education: Go to Patient Education Activity  Goal: Patient/Family Education  Outcome: Progressing Towards Goal     Problem: Pressure Injury - Risk of  Goal: *Prevention of pressure injury  Description: Document Toney Scale and appropriate interventions in the flowsheet.   Outcome: Progressing Towards Goal  Note: Pressure Injury Interventions:  Sensory Interventions: Assess changes in LOC    Moisture Interventions: Absorbent underpads    Activity Interventions: Increase time out of bed    Mobility Interventions: Pressure redistribution bed/mattress (bed type)    Nutrition Interventions: Document food/fluid/supplement intake    Friction and Shear Interventions: Apply protective barrier, creams and emollients

## 2020-05-12 NOTE — ROUTINE PROCESS
Bedside shift change report given to Stephane Reyna (oncoming nurse) by Antoine Can RN (offgoing nurse). Report included the following information SBAR, Kardex, MAR and Recent Results.

## 2020-05-12 NOTE — PROGRESS NOTES
NUTRITION    Nutrition Screen      RECOMMENDATIONS / PLAN:     - Modify supplement: change to Glucerna Shake BID, magic Cup CAROLINE once daily (to be included with fluid restriction)  - Encourage and monitor po intake  - Continue appetite stimulant and bowel regimen  - Continue RD inpatient monitoring and evaluation. NUTRITION INTERVENTIONS & DIAGNOSIS:     - Meals/snacks: modified composition  - Medical food supplement therapy: modify  - Nutrition related medication management: remeron  - Collaboration and referral of nutrition care: interdisciplinary rounds    Nutrition Diagnosis: Inadequate oral intake related to poor appetite as evidenced by poor meal intake since admission. Increased nutrient needs (protein and energy) related to pt with increased nutrient expenditure as evidenced by pt started on HD 3 times weekly. ASSESSMENT:     5/12: Consent obtained from pt for remote assessment via telephone. Pt has poor appetite and meal intake. C/o constipation; no BM since 5/3; bowel regimen changed to more aggressive regimen. Pt reported fair intake of Nepro supplement; agreeable to trying different supplement. 5/7: Pt off unit at time of visit. Was NPO today; s/p procedure. Okay to resume diet per MD. Poor appetite; started on appetite stimulant. Impaired renal function; started on HD. Noted weight gain PTA; pt edematous. On fluid restriction of 1000 mL.     Nutritional intake adequate to meet patients estimated nutritional needs:  No    Diet: DIET RENAL Regular; FR 1000ML  DIET NUTRITIONAL SUPPLEMENTS Lunch, Dinner; [employer]      Food Allergies:  None known   Current Appetite: Poor    Appetite/meal intake prior to admission: Unknown  Feeding Limitations:  [] Swallowing difficulty    [] Chewing difficulty    [] Other:  Current Meal Intake:   Patient Vitals for the past 100 hrs:   % Diet Eaten   05/12/20 0941 20 %   05/11/20 0930 0 %   05/10/20 1836 0 %   05/10/20 1606 5 %   05/10/20 1323 0 %   05/10/20 1000 0 % 05/09/20 1839 0 %   05/09/20 0936 0 %   05/08/20 1848 0 %   05/08/20 1300 0 %   05/08/20 0955 25 %       BM: 5/3 - c/o constipation  Skin Integrity:  No pressure injury or wound noted  Edema:   [] No     [x] Yes   Pertinent Medications: Reviewed: dulcolax suppository daily prn, cyanocobalamin, colace BID prn, doxercalciferol, ferrous sulfate, SSI, remeron, ondansetron, pantoprazole, crestor. miralax daily ordered    Recent Labs     05/11/20  0328      K 4.2      CO2 26   GLU 97   BUN 35*   CREA 2.57*   CA 8.1*       Intake/Output Summary (Last 24 hours) at 5/12/2020 1326  Last data filed at 5/12/2020 0941  Gross per 24 hour   Intake 490 ml   Output 1150 ml   Net -660 ml       Anthropometrics:  Ht Readings from Last 1 Encounters:   04/30/20 5' 5\" (1.651 m)     Last 3 Recorded Weights in this Encounter    05/10/20 0325 05/11/20 0359 05/12/20 0515   Weight: 108.6 kg (239 lb 6.4 oz) 108 kg (238 lb) 107.8 kg (237 lb 10.5 oz)     Body mass index is 39.55 kg/m². Obese, Class II    Weight History:  +36 lb x 3.5 years PTA per chart hx.  Noted pt edematous; question if part of wt gain related to fluid retention    Weight Metrics 5/12/2020 4/30/2020 4/29/2020 4/26/2020 8/18/2016 6/2/2016   Weight 237 lb 10.5 oz - 252 lb 252 lb 4.8 oz 219 lb 228 lb   BMI - 39.55 kg/m2 41.93 kg/m2 41.98 kg/m2 36.44 kg/m2 37.94 kg/m2        Admitting Diagnosis: Sepsis ([facility] [A41.9]  Pertinent PMHx: cancer, CHF, DM, GERD, hiatal hernia, goiter, uterine cancer    Education Needs:        [x] None identified  [] Identified - Not appropriate at this time  []  Identified and addressed - refer to education log  Learning Limitations:   [x] None identified  [] Identified    Cultural, Rastafari & ethnic food preferences:  [x] None identified    [] Identified and addressed     ESTIMATED NUTRITION NEEDS:     Calories: 4742-5954 kcal (25-35 kcal/kg) based on  [] Actual BW      [x] SBW: 80 kg  Protein:  gm (1.2-1.5 gm/kg) based on  [] Actual BW      [x] SBW   Fluid: 750-1500 mL/day     MONITORING & EVALUATION:     Nutrition Goal(s):   - PO nutrition intake will meet >75% of patient estimated nutritional needs within the next 7 days. Outcome: Progressing towards goal     Monitoring:   [x] Food and nutrient intake   [x] Food and nutrient administration  [x] Comparative standards   [x] Nutrition-focused physical findings   [x] Anthropometric Measurements   [x] Treatment/therapy   [x] Biochemical data, medical tests, and procedures        Previous Recommendations (for follow-up assessments only): Implemented      Discharge Planning: No nutritional discharge needs at this time. Participated in care planning, discharge planning, & interdisciplinary rounds as appropriate.       Leroy Gonzalez RD  Pager: [phone]

## 2020-05-12 NOTE — PROGRESS NOTES
In Patient Progress note    Admit Date: 4/30/2020    Impression:     #1 Acute kidney injury on chronic kidney disease stage III v/s likely progressed to ESRD especially in the setting of   Severe diastolic CHF/ cardiorenal syndrome /severe azotemia, patient developed  diuretic resistance and worsening   cardiorenal physiology , volume status has improved with HD , TDC in place   #2 Acute on chronic diastolic CHF. CXR with persistent pulm edema , CHF  #3 hypertension, volume off should help  #4 dyslipidemia  #5 hypothermia/SIRS on empirical antibiotics per internal medicine.   #6 COVID-19 -ve   #7 leg edema , duplex neg for DVT  #8 hyponatremia in setting of CHF , this is indicative of worsening cardiorenal physiology      Plan:   #1 HD MWF   #2 strict ins and outs, fluid restrict 1200 mm  #3 encourage po intake, increase protein in diet, may need appetite stimulant   #4 follow cardiology recommendations  #5  avoid nephrotoxins renally dose medications  #6 continue epogen and hecterol    Patient does have outpatient chair time , [address] [employer]  MWF 5:30 AM starting 5/11   Significant deconditioning , will need PT/OT   Lives by herself , no family in this area , will need home health /transportation setup for dialysis    Please call with questions,     Denae Rausch MD [facility]  Cell [phone]  Pager: [phone]    Subjective:     Feels better  O2 2 lit   Edema better      Current Facility-Administered Medications:     [START ON 5/13/2020] polyethylene glycol (MIRALAX) packet 17 g, 17 g, Oral, DAILY, Maricarmen Giron MD    docusate sodium (COLACE) capsule 100 mg, 100 mg, Oral, BID PRN, Maricarmen Giron MD    bisacodyL (DULCOLAX) suppository 10 mg, 10 mg, Rectal, DAILY PRN, Mariacrmen Giron MD    carvediloL (COREG) tablet 12.5 mg, 12.5 mg, Oral, BID WITH MEALS, Alison Anthony, DO, 12.5 mg at 05/12/20 0839    isosorbide mononitrate ER (IMDUR) tablet 30 mg, 30 mg, Oral, DAILY, Clarissa Garcia PA, 30 mg at 05/12/20 0839    epoetin johnathon-epbx (RETACRIT) injection 10,000 Units, 10,000 Units, IntraVENous, Q TUE, THU & SAT, Maxwell Vitale MD, 10,000 Units at 05/10/20 0039    doxercalciferoL (HECTOROL) 4 mcg/2 mL injection 4 mcg, 4 mcg, IntraVENous, Q MON, WED & FRI, Brayan Vitale MD, Stopped at 05/11/20 2220    mirtazapine (REMERON) tablet 15 mg, 15 mg, Oral, QHS, Charley Blount MD, 15 mg at 05/11/20 2220    ondansetron (ZOFRAN) injection 4 mg, 4 mg, IntraVENous, Q6H PRN, Charley Blount MD, 4 mg at 05/11/20 0953    loratadine (CLARITIN) tablet 10 mg, 10 mg, Oral, DAILY PRN, Charley Blount MD, 10 mg at 05/07/20 0948    VANCOMYCIN INFORMATION NOTE, , Other, Rx Dosing/Monitoring, Sinai Soto MD    heparin (porcine) injection 5,000 Units, 5,000 Units, SubCUTAneous, Q8H, Mateo Tamayo MD, 5,000 Units at 05/12/20 0840    sodium chloride (NS) flush 5-10 mL, 5-10 mL, IntraVENous, PRN, Sinai Soto MD    amLODIPine (NORVASC) tablet 10 mg, 10 mg, Oral, DAILY, Ani Diaz [place], 10 mg at 05/12/20 6136    aspirin chewable tablet 81 mg, 81 mg, Oral, DAILY, Ani Diaz PA, 81 mg at 05/12/20 0839    citalopram (CELEXA) tablet 20 mg, 20 mg, Oral, DAILY, Ani Diaz PA, 20 mg at 05/12/20 3601    cyanocobalamin tablet 1,000 mcg, 1,000 mcg, Oral, BID, Ani Diaz PA, 1,000 mcg at 05/12/20 3186    pantoprazole (PROTONIX) tablet 40 mg, 40 mg, Oral, ACB&D, Ani Diaz PA, 40 mg at 05/12/20 2089    hydrALAZINE (APRESOLINE) tablet 100 mg, 100 mg, Oral, TID, Ani Diaz PA, 100 mg at 05/12/20 6024    rosuvastatin (CRESTOR) tablet 5 mg, 5 mg, Oral, QHS, RahmanAni braden [place], 5 mg at 05/11/20 2219    insulin lispro (HUMALOG) injection, , SubCUTAneous, AC&HS, Serenity Marquez, 4 Units at 05/12/20 1206    glucose chewable tablet 16 g, 16 g, Oral, PRN, nAi Diaz PA    glucagon (GLUCAGEN) injection 1 mg, 1 mg, IntraMUSCular, PRN, Royal WYNN, PA    dextrose (D50W) injection syrg 12.5-25 g, 25-50 mL, IntraVENous, PRN, Ani Christine PA    acetaminophen (TYLENOL) tablet 650 mg, 650 mg, Oral, Q4H PRN, Ani Christine PA, 650 mg at 05/11/20 3637    ferrous sulfate tablet 325 mg, 1 Tab, Oral, EVERY OTHER DAY, Farshad Cm MD, 325 mg at 05/11/20 0903        Objective:     Visit Vitals  /67 (BP 1 Location: Right arm, BP Patient Position: At rest)   Pulse 75   Temp 97.7 °F (36.5 °C)   Resp 16   Ht 5' 5\" (1.651 m)   Wt 107.8 kg (237 lb 10.5 oz)   SpO2 98%   Breastfeeding No   BMI 39.55 kg/m²         Intake/Output Summary (Last 24 hours) at 5/12/2020 1314  Last data filed at 5/12/2020 0941  Gross per 24 hour   Intake 490 ml   Output 1150 ml   Net -660 ml       Physical Exam:     General: NAD, alert and oriented. Neck: No jvd. LUNGS: decreased in bases, rales+  CVS EXM: S1, S2  RRR, no murmurs/gallops/rubs. Abdomen: soft, non tender. Lower Extremities: 1+ edema.    Rt IJ TDC     Data Review:    Recent Labs     05/11/20  0328   WBC 8.8   RBC 2.66*   HCT 23.7*   MCV 89.1   MCH 28.2   MCHC 31.6   RDW 14.1     Recent Labs     05/11/20  0328   BUN 35*   CREA 2.57*   CA 8.1*   K 4.2         CO2 26   GLU 97       Marlene Khoury MD

## 2020-05-12 NOTE — PROGRESS NOTES
Problem: Mobility Impaired (Adult and Pediatric)  Goal: *Acute Goals and Plan of Care (Insert Text)  Description: Physical Therapy Goals  Initiated 5/1/2020 and to be accomplished within 7 day(s)  1. Patient will move from supine to sit and sit to supine , scoot up and down and roll side to side in bed with supervision/set-up. 2.  Patient will transfer from bed to chair and chair to bed with SBA  using the least restrictive device. 3.  Patient will perform sit to stand with SBA. 4.  Patient will ambulate with supervision/set-up for 25 feet with the least restrictive device. 5.  Patient will ascend/descend 3 stairs with 1 handrail(s) with minimal assistance/contact guard assist.    PLOF: Pt lives alone in a one story home with 3 JAMES, has canes and a walker if needed, can ask a neighbor for assistance if needed. Pt was recently eval/dc from acute pt due to being mod ind/ind but pt reports that it was hard to take care of herself on her own before she came to the hospital this admission. 5/12/2020 1227 by Renaldo Delay  Outcome: Progressing Towards Goal     PHYSICAL THERAPY RE-EVALUATION    Patient: Abran Seip (20 y.o. female)  Date: 5/12/2020  Primary Diagnosis: Sepsis ([facility] [address] [A41.9]  Procedure(s) (LRB):  INSERTION TUNNELED CENTRAL VENOUS CATHETER (Right) 5 Days Post-Op   Precautions:   Aspiration, Fall, Skin    ASSESSMENT :  Based on the objective data described below, the patient presents with generalized weakness, decreased activity tolerance, impaired functional mobility. Pt received up in bed in NAD, agreeable to PT session at this time. Pt is able to come to sitting EOB with CGA at this time followed by min A for sit > stand up to RW in which she amb approx 8 ft across room to turn in sit in recliner, minor SOB noted with dizziness. Once sitting BP taken and found to be 142/58 with HR of 68 and 96% SpO2 on 2LNC.  Pt reports symptoms resolving with rest so pt positioned for comfort with all needs met and call bell with in reach. Will continue to follow in the acute setting to maximize functional mobility. Cont per POC. Patient will benefit from skilled intervention to address the above impairments. Patient's rehabilitation potential is considered to be Good  Factors which may influence rehabilitation potential include:   []         None noted  []         Mental ability/status  [x]         Medical condition  [x]         Home/family situation and support systems  []         Safety awareness  []         Pain tolerance/management  []         Other:      PLAN :  Recommendations and Planned Interventions:   [x]           Bed Mobility Training             [x]    Neuromuscular Re-Education  [x]           Transfer Training                   []    Orthotic/Prosthetic Training  [x]           Gait Training                          []    Modalities  [x]           Therapeutic Exercises           []    Edema Management/Control  [x]           Therapeutic Activities            [x]    Family Training/Education  [x]           Patient Education  []           Other (comment):    Frequency/Duration: Patient will be followed by physical therapy 1-2 times per day/4-7 days per week to address goals. Discharge Recommendations: Skilled Nursing Facility  Further Equipment Recommendations for Discharge: TBD at next level of care     SUBJECTIVE:   Patient stated Sparkle Roberto will get up.     OBJECTIVE DATA SUMMARY:   Hospital course since last seen and reason for re-evaluation: Pt making slow progress towards goals at this time.    Past Medical History:   Diagnosis Date    Arthritis     Cancer ([facility] [address]     CHF (congestive heart failure) (HCC)     Diabetes ([facility] [address]     Fracture of neck (HCC)     GERD (gastroesophageal reflux disease)     Goiter     Hiatal hernia     Hypertension     Uterine cancer ([facility] [address]      Past Surgical History:   Procedure Laterality Date    HX APPENDECTOMY      HX HYSTERECTOMY      HX KNEE REPLACEMENT Right     HX ORTHOPAEDIC      odontoid fracture repair with hardware placement. HX PARTIAL THYROIDECTOMY      [place] INSJ TUNNELED CVC W/O SUBQ PORT/ AGE 5 YR/> Right 5/7/2020    INSERTION TUNNELED CENTRAL VENOUS CATHETER performed by Ronen Hagen MD at [address] [place] [address]     Barriers to Learning/Limitations: None  Compensate with: N/A  Home Situation:   Home Situation  Home Environment: Private residence  # Steps to Enter: 3  One/Two Story Residence: One story  Living Alone: Yes  Support Systems: Friends \ neighbors  Patient Expects to be Discharged to[de-identified] Private residence  Current DME Used/Available at Home: Jose Millin, straight, Walker  Tub or Shower Type: Tub/Shower combination  Critical Behavior:  Neurologic State: Alert  Orientation Level: Oriented X4  Cognition: Appropriate decision making     Psychosocial  Patient Behaviors: Calm; Cooperative  Needs Expressed: Emotional  Purposeful Interaction: Yes  Pt Identified Daily Priority: Clinical issues (comment)  Caritas Process: Nurture loving kindness  Caring Interventions: Reassure; Therapeutic modalities  Reassure: Informing;Caring rounds  Therapeutic Modalities: Intentional therapeutic touch  Skin Condition/Temp: Warm;Dry     Skin Integrity: Intact  Skin Integumentary  Skin Color: Appropriate for ethnicity  Skin Condition/Temp: Warm;Dry  Skin Integrity: Intact  Turgor: Non-tenting  Hair Growth: Present  Varicosities: Absent     Strength:    Strength: Generally decreased, functional       Tone & Sensation:   Tone: Normal    Sensation: Intact    Range Of Motion:  AROM: Within functional limits    Functional Mobility:  Bed Mobility:     Supine to Sit: Contact guard assistance     Scooting: Contact guard assistance  Transfers:  Sit to Stand: Minimum assistance  Stand to Sit: Contact guard assistance    Balance:   Sitting: Intact  Standing: With support  Standing - Static: Good    Ambulation/Gait Training:  Distance (ft): 8 Feet (ft)  Assistive Device: Xavier Dayami, rolling  Ambulation - Level of Assistance: Contact guard assistance        Gait Abnormalities: Decreased step clearance        Base of Support: Widened     Speed/Madelaine: Pace decreased (<100 feet/min)  Step Length: Right shortened;Left shortened        Interventions: Safety awareness training     Pain:  Pain level pre-treatment: 0/10   Pain level post-treatment: 0/10     Activity Tolerance:   Good    Please refer to the flowsheet for vital signs taken during this treatment. After treatment:   [x]         Patient left in no apparent distress sitting up in chair  []         Patient left in no apparent distress in bed  [x]         Call bell left within reach  [x]         Nursing notified  []         Caregiver present  []         Bed alarm activated  []         SCDs applied    COMMUNICATION/EDUCATION:   [x]         Role of Physical Therapy in the acute care setting. [x]         Fall prevention education was provided and the patient/caregiver indicated understanding. [x]         Patient/family have participated as able in goal setting and plan of care. [x]         Patient/family agree to work toward stated goals and plan of care. []         Patient understands intent and goals of therapy, but is neutral about his/her participation. []         Patient is unable to participate in goal setting/plan of care: ongoing with therapy staff.  []         Other:     Thank you for this referral.  Malina Alan   Time Calculation: 14 mins

## 2020-05-12 NOTE — PROGRESS NOTES
Problem: Self Care Deficits Care Plan (Adult)  Goal: *Acute Goals and Plan of Care (Insert Text)  Description: Occupational Therapy Goals  Initiated 5/1/2020. Pt re-evaluated 5/12/2020. Continue goals. To be achieved within 7 day(s). 1.  Patient will perform bed mobility in preparation for selfcare with supervision/set-up. 2.  Patient will perform grooming standing for 4-7 minutes with supervision/setup and F+ balance. 3.  Patient will perform lower body dressing with supervision/set-up using AE prn.  4.  Patient will perform toilet transfers with supervision/set-up. 5.  Patient will perform all aspects of toileting with supervision/set-up. 6.  Patient will participate in upper extremity therapeutic exercise/activities with modified independence for 8 minutes. 7.  Patient will utilize energy conservation techniques during functional activities with verbal cues. Prior Level of Function: Patient was modified independent with self-care and used a RW for functional mobility PTA. Outcome: Progressing Towards Goal   OCCUPATIONAL THERAPY RE-EVALUATION    Patient: Janae Martinez (33 y.o. female)  Date: 5/12/2020  Primary Diagnosis: Sepsis ([facility] [A41.9]  Procedure(s) (LRB):  INSERTION TUNNELED CENTRAL VENOUS CATHETER (Right) 5 Days Post-Op   Precautions:  Aspiration, Fall, Skin    ASSESSMENT :  Upon entering room, pt seated in chair, alert, and agreeable to therapy session, Based on the objective data described below, the patient presents with  decreased strength, decreased endurance, decreased activity tolerance, decreased functional balance, and decreased functional mobility/transfers, affecting her performance in basic self-care/ADL tasks. Pt on 2L O2 via NC. Pt denying to use the bathroom, but agreeable to transferring back to bed for rest. Pt requiring additional time to stand from chair due to pt reporting shortness of breath. Provided cues to initiate pursed lip breathing.  Vitals assessed, SpO2 at 97%, HR at 57-58 bpm. After ~12 minutes of sitting in chair, pt stood with CGA able to ambulate ~5 ft to bed. Objective assessment done sitting EOB. Pt only able to reach up to B knees in sitting at this time. Pt requiring moderate assist to lift BLEs to bed. Max assist x2 to scoot HOB. Educated pt on the role of OT, re-evaluation process, and continued goals for therapy with pt demonstrating good understanding. As pt has made limited with progress since initial OT eval, most goals (with the exception of goal #2 [Mod(I)>Supervision/setup]) have remained the same for the pt achieve. The pt will benefit from further OT services, in order to maximize her ADL performance and decrease the risk for complications associated with decreased functional activity. At the end of the session, pt left resting comfortably in bed, call-bell in reach, with all needs met. Patient will benefit from skilled intervention to address the above impairments.   Patient's rehabilitation potential is considered to be Good  Factors which may influence rehabilitation potential include:   []             None noted  []             Mental ability/status  [x]             Medical condition  []             Home/family situation and support systems  []             Safety awareness  []             Pain tolerance/management  []             Other:      PLAN :  Recommendations and Planned Interventions:   [x]               Self Care Training                  [x]      Therapeutic Activities  [x]               Functional Mobility Training   []      Cognitive Retraining  [x]               Therapeutic Exercises           [x]      Endurance Activities  [x]               Balance Training                    []      Neuromuscular Re-Education  []               Visual/Perceptual Training     [x]      Home Safety Training  [x]               Patient Education                   [x]      Family Training/Education  []               Other (comment):    Frequency/Duration: Patient will be followed by occupational therapy 1-2 times per day/4-7 days per week to address goals. Discharge Recommendations: Rehab  Further Equipment Recommendations for Discharge: Shower chair, rolling walker, BSC     SUBJECTIVE:   Patient stated i've been sitting in the chair since N[place] referring to her PT session earlier this AM    OBJECTIVE DATA SUMMARY:   Hospital course since last seen and reason for reevaluation: Pt is due for re-evaluation. Pt with limited visits since initial OT eval due to pt being off-unit at HD or declining OT Tx due to fatigue after HD. OT will continue to follow the pt for further intervention during this hospitalization, in order to maximize her ADL performance and overall functional independence. Past Medical History:   Diagnosis Date    Arthritis     Cancer [facility] [address]     CHF (congestive heart failure) (HCC)     Diabetes ([facility] [address]     Fracture of neck (HCC)     GERD (gastroesophageal reflux disease)     Goiter     Hiatal hernia     Hypertension     Uterine cancer (HCC)      Past Surgical History:   Procedure Laterality Date    HX APPENDECTOMY      HX HYSTERECTOMY      HX KNEE REPLACEMENT Right     HX ORTHOPAEDIC      odontoid fracture repair with hardware placement.      HX PARTIAL THYROIDECTOMY      [place] INSJ TUNNELED CVC W/O SUBQ PORT/ AGE 5 YR/> Right 5/7/2020    INSERTION TUNNELED CENTRAL VENOUS CATHETER performed by Sherryle Star, MD at [address] [place] [address]     Barriers to Learning/Limitations: None  Compensate with: visual, verbal, tactile, kinesthetic cues/model    Home Situation:   Home Situation  Home Environment: Private residence  # Steps to Enter: 3  One/Two Story Residence: One story  Living Alone: Yes  Support Systems: Friends \ neighbors  Patient Expects to be Discharged to[de-identified] Private residence  Current DME Used/Available at Home: Jonita Devon, straight, Walker  Tub or Shower Type: Tub/Shower combination  []  Right hand dominant   []  Left hand dominant    Cognitive/Behavioral Status:  Neurologic State: Alert  Orientation Level: Oriented X4  Cognition: Follows commands  Safety/Judgement: Fall prevention    Skin: Visible skin appeared intact  Edema: None noted        Coordination: BUE  Coordination: Within functional limits  Fine Motor Skills-Upper: Left Intact; Right Intact    Gross Motor Skills-Upper: Left Intact; Right Intact    Balance:  Sitting: Intact  Standing: Impaired; With support  Standing - Static: Good  Standing - Dynamic : Fair    Strength: BUE  Strength: Generally decreased, functional      Tone & Sensation: BUE  Tone: Normal  Sensation: Intact      Range of Motion: BUE  AROM: Within functional limits      Functional Mobility and Transfers for ADLs:  Bed Mobility:  Supine to Sit: Contact guard assistance  Sit to Supine: Moderate assistance  Scooting: Maximum assistance;Assist x2(towards HOB)  Transfers:  Sit to Stand: Contact guard assistance; additional time  Stand to Sit: Contact guard assistance      ADL Assessment:   Feeding: Modified independent;Setup    Oral Facial Hygiene/Grooming: Stand-by assistance    Bathing: Minimum assistance    Upper Body Dressing: Minimum assistance    Lower Body Dressing: Moderate assistance    Toileting: Minimum assistance      ADL Intervention:  Cognitive Retraining  Safety/Judgement: Fall prevention      Pain:  Pain level pre-treatment: 0/10   Pain level post-treatment: 0/10  Pain Intervention(s): Medication (see MAR); Rest, Ice, Repositioning  Response to intervention: Nurse notified, See doc flow    Activity Tolerance:   Poor    Please refer to the flowsheet for vital signs taken during this treatment.   After treatment:   [] Patient left in no apparent distress sitting up in chair  [x] Patient left in no apparent distress in bed  [x] Call bell left within reach  [x] Nursing notified  [] Caregiver present  [] Bed alarm activated    COMMUNICATION/EDUCATION:   [x] Role of Occupational Therapy in the acute care setting  [] Home safety education was provided and the patient/caregiver indicated understanding. [x] Patient/family have participated as able in goal setting and plan of care. [x] Patient/family agree to work toward stated goals and plan of care. [] Patient understands intent and goals of therapy, but is neutral about his/her participation. [] Patient is unable to participate in goal setting and plan of care.     Thank you for this referral.  Erik Stroud MS, OTR/L  Time Calculation: 24 mins

## 2020-05-12 NOTE — PROGRESS NOTES
[facility] Hospitalist Group  Progress Note    Patient: Yessica Thompson Age: 71 y.o. : 1950 MR#: [id number] SSN: [id number]  Date/Time: 2020     Subjective:     Patient feels great, sitting in the chair. No complaints. Shortness of breath significantly better. No cough or fevers. Assessment/Plan:   Yessica Thompson is a 70 y/o female with a PMHx of CKD stage III, diastolic CHF, chronic hypoxic respiratory failure, anemia, Type II DM, HTN, HLD and obesity who is admitted for acute on chronic respiratory failure, acute on chronic diastolic CHF and OFE on CKD Stage III that has progressed to ESRD. 1. Acute on chronic respiratory failure   2. Acute on chronic diastolic heart failure  3. OFE on CKD stage III that has progressed to ESRD   4. Anemia of CKD   5. Hyponatremia   6. Deconditioned state  7. Depressed mood  8. Type II DM- HbA1c 6.1  9. CAD  10. HTN  11. HLD   12. Obesity     Pending repeat COVID-19 testing sent to [facility] on , as pt will likely need SNF vs LTC placement and needs negative test within 5 days of placement; repeat testing is only for placement at SNF/LTC      COVID-19 testing negative on 20   HD per nephrology   Pt is set up with outpatient dialysis clinic, [address] [place] [address] [employer]  MWF 5:30 AM starting    cont SSI w/accuchecks    Strict I&O's, fluid restriction   BP control- amlodipine, carvedilol, hydralazine, imdur   Cont other home meds   Continue PT, OT   We will discontinue vancomycin, do not see any signs of infection. All cultures negative. Currently awaiting SNF placement, pending call with testing results.   Discussed with case management    Case discussed with:  [x]Patient  []Family  [x]Nursing  []Case Management  DVT Prophylaxis:  []Lovenox  [x]Hep SQ  []SCDs  []Coumadin   []Eliquis/Xarelto     Objective:   VS:   Visit Vitals  /67 (BP 1 Location: Right arm, BP Patient Position: At rest)   Pulse 75   Temp 97.7 °F (36.5 °C) Resp 16   Ht 5' 5\" (1.651 m)   Wt 107.8 kg (237 lb 10.5 oz)   SpO2 98%   Breastfeeding No   BMI 39.55 kg/m²      Tmax/24hrs: Temp (24hrs), Av.2 °F (36.8 °C), Min:97.7 °F (36.5 °C), Max:98.7 °F (37.1 °C)  IOBRIEF    Intake/Output Summary (Last 24 hours) at 2020 1535  Last data filed at 2020 0941  Gross per 24 hour   Intake 490 ml   Output 150 ml   Net 340 ml       General:  Alert, cooperative, no acute distress    HEENT: PERRLA, anicteric sclerae. Pulmonary:  CTA Bilaterally. No Wheezing/Rales. Cardiovascular: Regular rate and Rhythm. GI:  Soft, Non distended, Non tender. + Bowel sounds. Extremities:  No edema. Psych: Good insight. Not anxious or agitated. Neurologic: Alert and oriented X 3. Moves all ext.   Additional:    Medications:   Current Facility-Administered Medications   Medication Dose Route Frequency    [START ON 2020] polyethylene glycol (MIRALAX) packet 17 g  17 g Oral DAILY    docusate sodium (COLACE) capsule 100 mg  100 mg Oral BID PRN    bisacodyL (DULCOLAX) suppository 10 mg  10 mg Rectal DAILY PRN    carvediloL (COREG) tablet 12.5 mg  12.5 mg Oral BID WITH MEALS    isosorbide mononitrate ER (IMDUR) tablet 30 mg  30 mg Oral DAILY    epoetin johnathon-epbx (RETACRIT) injection 10,000 Units  10,000 Units IntraVENous Q TUE, THU & SAT    doxercalciferoL (HECTOROL) 4 mcg/2 mL injection 4 mcg  4 mcg IntraVENous Q MON, WED & FRI    mirtazapine (REMERON) tablet 15 mg  15 mg Oral QHS    ondansetron (ZOFRAN) injection 4 mg  4 mg IntraVENous Q6H PRN    loratadine (CLARITIN) tablet 10 mg  10 mg Oral DAILY PRN    heparin (porcine) injection 5,000 Units  5,000 Units SubCUTAneous Q8H    sodium chloride (NS) flush 5-10 mL  5-10 mL IntraVENous PRN    amLODIPine (NORVASC) tablet 10 mg  10 mg Oral DAILY    aspirin chewable tablet 81 mg  81 mg Oral DAILY    citalopram (CELEXA) tablet 20 mg  20 mg Oral DAILY    cyanocobalamin tablet 1,000 mcg  1,000 mcg Oral BID    pantoprazole (PROTONIX) tablet 40 mg  40 mg Oral ACB&D    hydrALAZINE (APRESOLINE) tablet 100 mg  100 mg Oral TID    rosuvastatin (CRESTOR) tablet 5 mg  5 mg Oral QHS    insulin lispro (HUMALOG) injection   SubCUTAneous AC&HS    glucose chewable tablet 16 g  16 g Oral PRN    glucagon (GLUCAGEN) injection 1 mg  1 mg IntraMUSCular PRN    dextrose (D50W) injection syrg 12.5-25 g  25-50 mL IntraVENous PRN    acetaminophen (TYLENOL) tablet 650 mg  650 mg Oral Q4H PRN    ferrous sulfate tablet 325 mg  1 Tab Oral EVERY OTHER DAY       Labs:    Recent Results (from the past 24 hour(s))   GLUCOSE, POC    Collection Time: 05/11/20  4:22 PM   Result Value Ref Range    Glucose (POC) 105 70 - 110 mg/dL   GLUCOSE, POC    Collection Time: 05/11/20  9:40 PM   Result Value Ref Range    Glucose (POC) 189 (H) 70 - 110 mg/dL   GLUCOSE, POC    Collection Time: 05/12/20  7:05 AM   Result Value Ref Range    Glucose (POC) 128 (H) 70 - 110 mg/dL   GLUCOSE, POC    Collection Time: 05/12/20 11:51 AM   Result Value Ref Range    Glucose (POC) 212 (H) 70 - 110 mg/dL       Signed By: Amarilis Calderon MD     May 12, 2020      Disclaimer: Sections of this note are dictated using utilizing voice recognition software. Minor typographical errors may be present. If questions arise, please do not hesitate to contact me or call our department.

## 2020-05-12 NOTE — ROUTINE PROCESS
Bedside shift change report given to  Via Jesús Liriano (oncoming nurse) by  Eileen Hollins (offgoing nurse). Report included the following information SBAR, Kardex and Recent Results.

## 2020-05-12 NOTE — INTERDISCIPLINARY ROUNDS
Interdisciplinary Round Note Patient Information: Patient Information: Jung Wolf 461/01 Reason for Admission: Sepsis ([facility] [A41.9] Attending Provider:   Gordo Mcdonald Past Medical History:  
Past Medical History:  
Diagnosis Date  Arthritis  Cancer ([facility]  CHF (congestive heart failure) ([facility]  Diabetes ([facility]  Fracture of neck ([facility]  GERD (gastroesophageal reflux disease)  Goiter  Hiatal hernia  Hypertension  Uterine cancer ([facility] Hospital day: 12 
Estimated discharge date: next day or two RRAT Score: Medium Risk 25 Total Score 3 Patient Length of Stay (>5 days = 3) 4 IP Visits Last 12 Months (1-3=4, 4=9, >4=11) 5 Pt. Coverage (Medicare=5 , Medicaid, or Self-Pay=4) 6 Charlson Comorbidity Score (Age + Comorbid Conditions) Criteria that do not apply:  
 Has Seen PCP in Last 6 Months (Yes=3, No=0) . Living with Significant Other. Assisted Living. LTAC. SNF. or  
Rehab Goals for Today: Discharge planning and waiting on COVID results. VITAL SIGNS Vitals:  
 05/12/20 0501 05/12/20 0515 05/12/20 0805 05/12/20 1152 BP: 176/78  139/53 147/67 Pulse: 60  62 75 Resp: 19  17 16 Temp: 97.8 °F (36.6 °C)  98 °F (36.7 °C) 97.7 °F (36.5 °C) TempSrc:      
SpO2: 98%  98% 98% Weight:  107.8 kg (237 lb 10.5 oz) Height:      
 
 
 
 Lines, Drains, & Airways Peripheral IV 05/11/20 Posterior;Right Hand-Site Assessment: Clean, dry, & intact [REMOVED] Peripheral IV 04/30/20 Left Hand-Site Assessment: Painful Hemodialysis Access 05/07/20-Site Assessment: Clean, dry, & intact [REMOVED] Peripheral IV 05/07/20 Posterior;Right Hand-Site Assessment: Other (Comment)(out) VTE Prophylaxis Sequential/Intermittent Compression Device: (Coban on)      Graduated Compression Stockings: Bilateral 
     Patient Refused VTE Prophylaxis: Yes  
 Intake and Output:  
05/10 1901 - 05/12 0700 In: 370 [P.O.:370] Out: 1350 [Urine:350] 05/12 0701 - 05/12 1900 In: 240 [P.O.:240] Out: -  Current Diet: DIET RENAL Regular; FR 1000ML DIET NUTRITIONAL SUPPLEMENTS Breakfast, Lunch; [address] [facility] [place] 
DIET NUTRITIONAL SUPPLEMENTS Dinner; [address] CAROL[place] Abdominal  
Last Bowel Movement Date: 05/03/20 Stool Appearance: Formed Abdominal Assessment: Constipation, Obese Appetite: Fair Bowel Sounds: Active Recent Glucose Results:  
Lab Results Component Value Date/Time GLUCPOC 212 (H) 05/12/2020 11:51 AM  
 GLUCPOC 128 (H) 05/12/2020 07:05 AM  
 GLUCPOC 189 (H) 05/11/2020 09:40 PM  
 
 
  
IV Antibiotics? NO Activity Level: Activity Level: Up with Assistance Needs assistance with ADLs: YES 
PT Consult Status: YES Current Immunizations: There is no immunization history on file for this patient. Recommendations:  
Discharge Disposition: SNF 
 
COVID status: Pending Will the patient require COVID testing prior to discharge for placement?: YES Medication Reconciliation Completed: NO 
 
Cardiac/Pulmonary Rehab Ordered:  NO 
 
Needs for Discharge: SNF bed and negative COVID test. Recommendations from IDR team: SNF bed and COVID results pending. Raymundo Edge. MSW Care Manager Pager#: [phone]

## 2020-05-12 NOTE — PROGRESS NOTES
Problem: Falls - Risk of  Goal: *Absence of Falls  Description: Document Jose Rossi Fall Risk and appropriate interventions in the flowsheet.   Outcome: Progressing Towards Goal  Note: Fall Risk Interventions:  Mobility Interventions: Bed/chair exit alarm         Medication Interventions: Bed/chair exit alarm    Elimination Interventions: Bed/chair exit alarm              Problem: Patient Education: Go to Patient Education Activity  Goal: Patient/Family Education  Outcome: Progressing Towards Goal

## 2020-05-13 LAB
ANION GAP SERPL CALC-SCNC: 5 MMOL/L (ref 3–18)
BUN SERPL-MCNC: 37 MG/DL (ref 7–18)
BUN/CREAT SERPL: 10 (ref 12–20)
CALCIUM SERPL-MCNC: 7.9 MG/DL (ref 8.5–10.1)
CHLORIDE SERPL-SCNC: 104 MMOL/L (ref 100–111)
CO2 SERPL-SCNC: 29 MMOL/L (ref 21–32)
CREAT SERPL-MCNC: 3.66 MG/DL (ref 0.6–1.3)
GLUCOSE BLD STRIP.AUTO-MCNC: 113 MG/DL (ref 70–110)
GLUCOSE BLD STRIP.AUTO-MCNC: 130 MG/DL (ref 70–110)
GLUCOSE BLD STRIP.AUTO-MCNC: 134 MG/DL (ref 70–110)
GLUCOSE SERPL-MCNC: 115 MG/DL (ref 74–99)
POTASSIUM SERPL-SCNC: 4 MMOL/L (ref 3.5–5.5)
SARS-COV-2, COV2NT: NOT DETECTED
SODIUM SERPL-SCNC: 138 MMOL/L (ref 136–145)

## 2020-05-13 PROCEDURE — 74011250636 HC RX REV CODE- 250/636: Performed by: INTERNAL MEDICINE

## 2020-05-13 PROCEDURE — 80048 BASIC METABOLIC PNL TOTAL CA: CPT

## 2020-05-13 PROCEDURE — 74011250637 HC RX REV CODE- 250/637: Performed by: INTERNAL MEDICINE

## 2020-05-13 PROCEDURE — 3331090001 HH PPS REVENUE CREDIT

## 2020-05-13 PROCEDURE — 74011250637 HC RX REV CODE- 250/637: Performed by: FAMILY MEDICINE

## 2020-05-13 PROCEDURE — 77010033678 HC OXYGEN DAILY

## 2020-05-13 PROCEDURE — 90935 HEMODIALYSIS ONE EVALUATION: CPT

## 2020-05-13 PROCEDURE — 82962 GLUCOSE BLOOD TEST: CPT

## 2020-05-13 PROCEDURE — 74011250637 HC RX REV CODE- 250/637: Performed by: PHYSICIAN ASSISTANT

## 2020-05-13 PROCEDURE — 74011250636 HC RX REV CODE- 250/636: Performed by: HOSPITALIST

## 2020-05-13 PROCEDURE — 74011250637 HC RX REV CODE- 250/637: Performed by: HOSPITALIST

## 2020-05-13 PROCEDURE — 3331090002 HH PPS REVENUE DEBIT

## 2020-05-13 PROCEDURE — 65660000000 HC RM CCU STEPDOWN

## 2020-05-13 RX ORDER — CARVEDILOL 12.5 MG/1
12.5 TABLET ORAL 2 TIMES DAILY WITH MEALS
Qty: 30 TAB | Refills: 0 | Status: SHIPPED
Start: 2020-05-13

## 2020-05-13 RX ORDER — DOCUSATE SODIUM 100 MG/1
100 CAPSULE, LIQUID FILLED ORAL
Qty: 6 CAP | Refills: 0 | Status: SHIPPED
Start: 2020-05-13 | End: 2020-08-11

## 2020-05-13 RX ORDER — LANOLIN ALCOHOL/MO/W.PET/CERES
325 CREAM (GRAM) TOPICAL EVERY OTHER DAY
Qty: 15 TAB | Refills: 0 | Status: ON HOLD
Start: 2020-05-14 | End: 2020-06-20

## 2020-05-13 RX ORDER — GABAPENTIN 100 MG/1
100 CAPSULE ORAL 2 TIMES DAILY
Qty: 20 CAP | Refills: 0 | Status: ON HOLD | OUTPATIENT
Start: 2020-05-13 | End: 2020-06-26 | Stop reason: SDUPTHER

## 2020-05-13 RX ORDER — DOXERCALCIFEROL 4 UG/2ML
4 INJECTION INTRAVENOUS
Status: DISCONTINUED | OUTPATIENT
Start: 2020-05-13 | End: 2020-05-14 | Stop reason: HOSPADM

## 2020-05-13 RX ORDER — MIRTAZAPINE 15 MG/1
15 TABLET, FILM COATED ORAL
Qty: 10 TAB | Refills: 0 | Status: SHIPPED | OUTPATIENT
Start: 2020-05-13 | End: 2020-06-26

## 2020-05-13 RX ADMIN — CARVEDILOL 12.5 MG: 12.5 TABLET, FILM COATED ORAL at 22:10

## 2020-05-13 RX ADMIN — CARVEDILOL 12.5 MG: 12.5 TABLET, FILM COATED ORAL at 17:13

## 2020-05-13 RX ADMIN — HEPARIN SODIUM 5000 UNITS: 5000 INJECTION INTRAVENOUS; SUBCUTANEOUS at 22:09

## 2020-05-13 RX ADMIN — AMLODIPINE BESYLATE 10 MG: 10 TABLET ORAL at 14:10

## 2020-05-13 RX ADMIN — HEPARIN SODIUM 5000 UNITS: 5000 INJECTION INTRAVENOUS; SUBCUTANEOUS at 14:10

## 2020-05-13 RX ADMIN — DOXERCALCIFEROL 4 MCG: 4 INJECTION, SOLUTION INTRAVENOUS at 13:06

## 2020-05-13 RX ADMIN — HYDRALAZINE HYDROCHLORIDE 100 MG: 50 TABLET, FILM COATED ORAL at 22:10

## 2020-05-13 RX ADMIN — FERROUS SULFATE TAB 325 MG (65 MG ELEMENTAL FE) 325 MG: 325 (65 FE) TAB at 14:09

## 2020-05-13 RX ADMIN — HYDRALAZINE HYDROCHLORIDE 100 MG: 50 TABLET, FILM COATED ORAL at 14:09

## 2020-05-13 RX ADMIN — MIRTAZAPINE 15 MG: 15 TABLET, FILM COATED ORAL at 22:12

## 2020-05-13 RX ADMIN — PANTOPRAZOLE SODIUM 40 MG: 40 TABLET, DELAYED RELEASE ORAL at 22:15

## 2020-05-13 RX ADMIN — POLYETHYLENE GLYCOL 3350 17 G: 17 POWDER, FOR SOLUTION ORAL at 14:09

## 2020-05-13 RX ADMIN — ISOSORBIDE MONONITRATE 30 MG: 30 TABLET, EXTENDED RELEASE ORAL at 14:09

## 2020-05-13 RX ADMIN — ROSUVASTATIN CALCIUM 5 MG: 10 TABLET, FILM COATED ORAL at 22:12

## 2020-05-13 RX ADMIN — PANTOPRAZOLE SODIUM 40 MG: 40 TABLET, DELAYED RELEASE ORAL at 09:33

## 2020-05-13 RX ADMIN — ASPIRIN 81 MG CHEWABLE TABLET 81 MG: 81 TABLET CHEWABLE at 09:33

## 2020-05-13 RX ADMIN — CITALOPRAM HYDROBROMIDE 20 MG: 20 TABLET ORAL at 09:33

## 2020-05-13 RX ADMIN — DOCUSATE SODIUM 100 MG: 100 CAPSULE, LIQUID FILLED ORAL at 09:33

## 2020-05-13 RX ADMIN — ACETAMINOPHEN 650 MG: 325 TABLET ORAL at 09:33

## 2020-05-13 RX ADMIN — CYANOCOBALAMIN TAB 1000 MCG 1000 MCG: 1000 TAB at 22:15

## 2020-05-13 RX ADMIN — CYANOCOBALAMIN TAB 1000 MCG 1000 MCG: 1000 TAB at 09:33

## 2020-05-13 NOTE — PROGRESS NOTES
Attempted to ses patient for skilled OT at [phone]. Patient off the unit. Patient continues to be off unit at 1200 and 1310. Will continue to follow and see patient when available/as appropriate.             Thank you for this referral,   Arminda Jackson MS, OTR/L

## 2020-05-13 NOTE — ROUTINE PROCESS
Bedside shift change report given to  Heidi Shaw (oncoming nurse) by  Rubén Moralez (offgoing nurse). Report included the following information SBAR, Kardex, ED Summary and Recent Results.

## 2020-05-13 NOTE — PROGRESS NOTES
Problem: Falls - Risk of  Goal: *Absence of Falls  Description: Document Bard  Fall Risk and appropriate interventions in the flowsheet.   Outcome: Progressing Towards Goal  Note: Fall Risk Interventions:  Mobility Interventions: Bed/chair exit alarm         Medication Interventions: Bed/chair exit alarm    Elimination Interventions: Bed/chair exit alarm              Problem: Patient Education: Go to Patient Education Activity  Goal: Patient/Family Education  Outcome: Progressing Towards Goal

## 2020-05-13 NOTE — DIALYSIS
ACUTE HEMODIALYSIS FLOW SHEET    HEMODIALYSIS ORDERS: Physician: Dr. Monique Leiva: Iris   Duration: 3 hr   BFR: 300   DFR: 500   Dialysate:  Temp 36-37*C   K+  3.0    Ca+ 2.5   Na 142   Bicarb 30   Wt Readings from Last 1 Encounters:   05/13/20 108.1 kg (238 lb 5.1 oz)    Patient Chart [x]   Unable to Obtain []  Dry weight/UF Goal: 1500 ml    Heparin []  Bolus    Units    [] Hourly    Units    [x]None       Pre BP:   157/67    Pulse:  60   Respirations: 18    Temperature:  98.2    Tx: NSS    ml/Bolus   [] N/A   Labs: [x]  Pre  []  Post:   [x] N/A   Additional Orders(medications, blood products, hypotension management): [x] Yes   [] No     [x]  [facility] Consent Verified     CATHETER ACCESS:  []N/A   [x]Right   []Left   []IJ   []Fem  [x]Chest wall  []TransHepatic   [] First use X-ray verified     [x]Tunnel    [] Non Tunneled   []No S/S infection  []Redness  []Drainage []Cultured []Swelling []Pain   [x]Medical Aseptic Prep Utilized   []Dressing Changed  [] Biopatch  Date: 5/8/2020   []Clotted   [x]Patent   Flows: [x]Good  []Poor  []Reversed   If access problem,  notified: []Yes    [x]N/A        GRAFT/FISTULA ACCESS:   [x]N/A     []Right     []Left     []UE     []LE   []AVG   []AVF       []Medical Aseptic Prep Utilized   []No S/S infection  []Redness  []Drainage [] Cultured  [] Swelling  [] Pain  Bruit:   [] Strong    [] Weak       Thrill :   [] Strong    [] Weak     Needle Gauge: 15   Length: 1 inch   If access problem,  notified: []Yes     []N/A          GENERAL ASSESSMENT:    LUNGS:  Rate 18   SaO2%      [] Clear  [x] Coarse  [] Crackles  [] Wheezing                                                [x] Diminished     Location : []RLL   []LLL    []RUL  []KATIE   Cough:      []Productive  []Dry  []N/A   Respirations:  [x]Easy  []Labored   Therapy:  []RA  O2 Type:  [x]NC  Mask: []   NRB    [] BiPaP  Flow: 2 l/min                   [] Ventilator  [] Intubated  [] Trach     CARDIAC: [x] Regular [] Irregular   [] Pericardial Rub            []  Monitored  [] Bedside  [] Remotely monitored     EDEMA: [] None   [x]Generalized  [] Pitting [] 1+   [] 2 +   [] 3+    [] 4+  [] Pedal    SKIN:   [x] Warm  [] Hot     [] Cold   [x] Dry     [] Pale   [] Diaphoretic                  [] Flushed  [] Jaundiced  [] Cyanotic     LOC:    [] Alert      [x]Oriented:    [x] Person     [] Place  []Time               [] Confused  [x] Lethargic  [] Medicated  [] Non-responsive   GI / ABDOMEN:                     [] Flat    [] Distended    [x] Soft    [] Firm   []  Obese                   [] Diarrhea   [] FMS [x] Bowel Sounds  [] Nausea  [] Vomiting     / URINE ASSESSMENT:                   [] Voiding    [x] Oliguria  [] Anuria   []  Lopez                  [] Incontinent  []  Incontinent Brief   []  PureWick     PAIN:  [x] 0 []1  []2   []3   []4   []5   []6   []7   []8   []9   []10            Scale 0-10  Action/Follow Up:    MOBILITY:  [x] Bed    [] Stretcher      All Vitals and Treatment Details on [address]ached [address]: [address] [facility]          Room # 963/50    [x] Routine         [] 1st Time Acute    [] Urgent      [] Stat            [x] Acute Room   []  Bedside    [] ICU/CCU     [] ER   Isolation Precautions:  [x] Dialysis    Droplet Plus     ALLERGIES:     Allergies   Allergen Reactions    Augmentin [Amoxicillin-Pot Clavulanate] Diarrhea    Clavulanic Acid Diarrhea    Levaquin [Levofloxacin] Other (comments)     Severe hypoglycemia        Code Status:  Full Code     Hepatitis Status   2nd RN check :     Lab Results   Component Value Date/Time    Hepatitis B surface Ag <0.10 05/07/2020 05:08 AM    Hepatitis B surface Ab <3.10 (L) 05/07/2020 05:08 AM        Current Labs:      Lab Results   Component Value Date/Time    WBC 8.8 05/11/2020 03:28 AM    HGB 7.5 (L) 05/11/2020 03:28 AM    HCT 23.7 (L) 05/11/2020 03:28 AM    PLATELET 458 04/06/5845 03:28 AM    MCV 89.1 05/11/2020 03:28 AM     Lab Results   Component Value Date/Time Sodium 138 05/13/2020 10:16 AM    Potassium 4.0 05/13/2020 10:16 AM    Chloride 104 05/13/2020 10:16 AM    CO2 29 05/13/2020 10:16 AM    Anion gap 5 05/13/2020 10:16 AM    Glucose 115 (H) 05/13/2020 10:16 AM    BUN 37 (H) 05/13/2020 10:16 AM    Creatinine 3.66 (H) 05/13/2020 10:16 AM    BUN/Creatinine ratio 10 (L) 05/13/2020 10:16 AM    GFR est AA 15 (L) 05/13/2020 10:16 AM    GFR est non-AA 12 (L) 05/13/2020 10:16 AM    Calcium 7.9 (L) 05/13/2020 10:16 AM          DIET:  DIET RENAL  DIET NUTRITIONAL SUPPLEMENTS  DIET NUTRITIONAL SUPPLEMENTS     PRIMARY NURSE REPORT:   Pre Dialysis: Janel Hilario RN     Time: 0900      EDUCATION:    [x] Patient [] Other           Knowledge Basis: []None [x]Minimal [] Substantial   Barriers to learning  [x]N/A   [] Access Care     [] S&S of infection  [] Fluid Management  [] K+   [x] Procedural    []Albumin   [] Medications   [] Tx Options   [] Transplant   [] Diet   [] Other   Teaching Tools:  [x] Explain  [] Demo  [] Handouts [] Video  Patient response: [x] Verbalized understanding  [] Teach back  [] Return demonstration   [] Requires follow up      [x]Time Out/Safety Check  [x] Extracorporeal Circuit Tested for integrity       RO/HEMODIALYSIS MACHINE SAFETY CHECKS  Before each treatment:                                     [facility]                                    [x] Unit Machine # 9 with centralized RO                                                                             Alarm Test:  Pass time 0845            [x] RO/Machine Log Complete    Machine Temp    36*C             Dialysate: pH  7.4    Conductivity: Meter 14.0     HD Machine  13.8      TCD: 13.8  Dialyzer Lot # [id number]     Blood Tubing Lot # [id number]     Saline Lot # -[id number]     CHLORINE TESTING-Before each treatment and every 4 hours    Total Chlorine: [x] less than 0.1 ppm  Initial Time Check: 0900       4 Hr/2nd Check Time: 1300   (if greater than 0.1 ppm from Primary then every 30 minutes from Secondary)     TREATMENT INITIATION  with Dialysis Precautions:   [x] All Connections Secured              [x] Saline Line Double Clamped   [x] Venous Parameters Set               [x] Arterial Parameters Set    [x] Prime Given 250ml NSS              [x]Air Foam Detector Engaged      Treatment Initiation Note:  1010 Pt arrived to dialysis unit lethargic but arouses easily and acan give her name. 2L n/c  1020  RT tunneled chest wall CVC intact, patent and flushes easily. Dialysis initiated without difficulty. Dr Janene Bernard notified. During Treatment Notes:  1130  Vascular access visible with arterial and venous line connections intact. Pt resting comfortably. 1230  Vascular access visible with arterial and venous line connections intact. Pt resting comfortably. 1330  Vascular access visible with arterial and venous line connections intact. Pt resting comfortably. Medication Dose Volume Route Time Rich Nurse, Title   Hectoral 4 mcg 2 ml HD [address] SIMA Wu                     Post Assessment  Dialyzer Cleared:   [] Good  [x] Fair  [] Poor  Blood processed:  51 L  UF Removed:  1500 Ml  Post Wt: 106.5  kg  Post /61   Pulse  55 Resp  18  Temp 98 Lungs: [] Clear [x] Course  [] Crackles                 []  Wheezing   [x] Diminished   Post Tx Vascular Access: [x] N/A       Cardiac :[x] Regular   [] Irregular   Rhythm:  [] Monitored   [] Not Monitored    CVC Catheter: [] N/A  Locking solution: Heparin 1:1000 U  Arterial port 1.6 ml   Venous port 1.6 ml   Edema:  [] None  [x] Generalized                     Skin:[x] Warm  [x] Dry [] Diaphoretic               [] Flushed  [] Pale [] Cyanotic Pain:  [x]0  []1 []2  []3 []4  []5  []6  []7 []8  []9  []10     Post Treatment Note:   1330  Pt tolerated dialysis well. Dialysis catheter intact, patent and heplocked as noted above.      POST TREATMENT PRIMARY NURSE HANDOFF REPORT:   Post Dialysis: Mya Villanueva RN               Time:  7335 Abbreviations: AVG-arterial venous graft, AVF-arterial venous fistula, IJ-Internal Jugular, Subcl-Subclavian, Fem-Femoral, Tx-treatment, AP/HR-apical heart rate, VSS- Vital Signs Stable, CVC- Central Venous Catheter, DFR-dialysate flow rate, BFR-blood flow rate, AP-arterial pressure, -venous pressure, UF-ultrafiltrate, TMP-transmembrane pressure, Austyn-Venous, Art-Arterial, RO-Reverse Osmosis

## 2020-05-13 NOTE — ROUTINE PROCESS
Bedside and Verbal shift change report given to Cristine Diallo RN (oncoming nurse) by Cristine Diallo RN (offgoing nurse).  Report included the following information SBAR, Kardex, ED Summary, OR Summary, Procedure Summary, Intake/Output, MAR, Recent Results and Cardiac Rhythm SB.

## 2020-05-13 NOTE — PROGRESS NOTES
Discharge planning:    Patient has been accepted to [facility]. This writer will work out patient's dialysis transport, to and from the SNF. Once COVID result is in, patient will be able to transfer to the facility, per Pérez, with [address] This writer will continue to monitor for discharge planning to ensure a safe discharge from [facility]. Raymundo Floress MSW  Care Manager  Pager#: [phone]

## 2020-05-13 NOTE — PROGRESS NOTES
PT orders received, chart reviewed. Pt unable to participate with PT due to:  []  Nausea/vomiting  []  Eating  []  Pain  []  Pt lethargic  [x]  Off Unit  Will f/u later as patient's schedule allows. Thank you.   Debbie Crespo, PT PT, DPT

## 2020-05-13 NOTE — PROGRESS NOTES
[facility] Hospitalist Group  Progress Note    Patient: Clarissa Umanzor Age: 71 y.o. : 1950 MR#: [id number] SSN: [id number]  Date/Time: 2020     Subjective:     Patient feels good, seen in HD. No complaints. No cough or fevers. Assessment/Plan:   Clarissa Umanzor is a 70 y/o female with a PMHx of CKD stage III, diastolic CHF, chronic hypoxic respiratory failure, anemia, Type II DM, HTN, HLD and obesity who is admitted for acute on chronic respiratory failure, acute on chronic diastolic CHF and OFE on CKD Stage III that has progressed to ESRD. 1. Acute on chronic respiratory failure   2. Acute on chronic diastolic heart failure  3. OFE on CKD stage III that has progressed to ESRD   4. Anemia of CKD   5. Hyponatremia   6. Deconditioned state  7. Depressed mood  8. Type II DM- HbA1c 6.1  9. CAD  10. HTN  11. HLD   12. Obesity     Pending repeat COVID-19 testing sent to [facility] on , as pt will likely need SNF vs LTC placement and needs negative test within 5 days of placement; repeat testing is only for placement at SNF/LTC      COVID-19 testing negative on 20   HD per nephrology, discussed with Carmen Gil for transfer  Pt is set up with outpatient dialysis clinic, [facility]  MWF 5:30 AM starting    cont SSI w/accuchecks    Strict I&O's, fluid restriction   BP control- amlodipine, carvedilol, hydralazine, imdur   Cont other home meds   Continue PT, OT   Currently awaiting SNF placement, pending COVID testing results. Discussed with case management  Called and left a message to daughter  Ariane Simeon neighbor, next of kin in the chart and updated the above plan of care.     Case discussed with:  [x]Patient  [x]Family  [x]Nursing  []Case Management  DVT Prophylaxis:  []Lovenox  [x]Hep SQ  []SCDs  []Coumadin   []Eliquis/Xarelto     Objective:   VS:   Visit Vitals  /61   Pulse (!) 55   Temp 97.8 °F (36.6 °C) (Oral)   Resp 18   Ht 5' 5\" (1.651 m)   Wt 108.1 kg (238 lb 5.1 oz)   SpO2 95%   Breastfeeding No   BMI 39.66 kg/m²      Tmax/24hrs: Temp (24hrs), Av.2 °F (36.8 °C), Min:97.1 °F (36.2 °C), Max:98.8 °F (37.1 °C)  IOBRIEF    Intake/Output Summary (Last 24 hours) at 2020 1458  Last data filed at 2020 1325  Gross per 24 hour   Intake 220 ml   Output 1500 ml   Net -1280 ml       General:  Alert, cooperative, no acute distress    Pulmonary:  CTA Bilaterally. No Wheezing/Rales. Cardiovascular: Regular rate and Rhythm. GI:  Soft, Non distended, Non tender. + Bowel sounds. Extremities:  No edema. Psych: Good insight. Not anxious or agitated. Neurologic: Alert and oriented X 3. Moves all ext. Additional: Right HD catheter in neck clean.     Medications:   Current Facility-Administered Medications   Medication Dose Route Frequency    doxercalciferoL (HECTOROL) 4 mcg/2 mL injection 4 mcg  4 mcg IntraVENous DIALYSIS MON, WED & FRI    polyethylene glycol (MIRALAX) packet 17 g  17 g Oral DAILY    docusate sodium (COLACE) capsule 100 mg  100 mg Oral BID PRN    bisacodyL (DULCOLAX) suppository 10 mg  10 mg Rectal DAILY PRN    carvediloL (COREG) tablet 12.5 mg  12.5 mg Oral BID WITH MEALS    isosorbide mononitrate ER (IMDUR) tablet 30 mg  30 mg Oral DAILY    epoetin johnathon-epbx (RETACRIT) injection 10,000 Units  10,000 Units IntraVENous Q TUE, THU & SAT    mirtazapine (REMERON) tablet 15 mg  15 mg Oral QHS    ondansetron (ZOFRAN) injection 4 mg  4 mg IntraVENous Q6H PRN    loratadine (CLARITIN) tablet 10 mg  10 mg Oral DAILY PRN    heparin (porcine) injection 5,000 Units  5,000 Units SubCUTAneous Q8H    sodium chloride (NS) flush 5-10 mL  5-10 mL IntraVENous PRN    amLODIPine (NORVASC) tablet 10 mg  10 mg Oral DAILY    aspirin chewable tablet 81 mg  81 mg Oral DAILY    citalopram (CELEXA) tablet 20 mg  20 mg Oral DAILY    cyanocobalamin tablet 1,000 mcg  1,000 mcg Oral BID    pantoprazole (PROTONIX) tablet 40 mg  40 mg Oral ACB&D    hydrALAZINE (APRESOLINE) tablet 100 mg  100 mg Oral TID    rosuvastatin (CRESTOR) tablet 5 mg  5 mg Oral QHS    insulin lispro (HUMALOG) injection   SubCUTAneous AC&HS    glucose chewable tablet 16 g  16 g Oral PRN    glucagon (GLUCAGEN) injection 1 mg  1 mg IntraMUSCular PRN    dextrose (D50W) injection syrg 12.5-25 g  25-50 mL IntraVENous PRN    acetaminophen (TYLENOL) tablet 650 mg  650 mg Oral Q4H PRN    ferrous sulfate tablet 325 mg  1 Tab Oral EVERY OTHER DAY       Labs:    Recent Results (from the past 24 hour(s))   GLUCOSE, POC    Collection Time: 05/12/20  4:01 PM   Result Value Ref Range    Glucose (POC) 200 (H) 70 - 110 mg/dL   GLUCOSE, POC    Collection Time: 05/12/20  9:18 PM   Result Value Ref Range    Glucose (POC) 161 (H) 70 - 110 mg/dL   GLUCOSE, POC    Collection Time: 05/13/20  5:58 AM   Result Value Ref Range    Glucose (POC) 134 (H) 70 - 966 mg/dL   METABOLIC PANEL, BASIC    Collection Time: 05/13/20 10:16 AM   Result Value Ref Range    Sodium 138 136 - 145 mmol/L    Potassium 4.0 3.5 - 5.5 mmol/L    Chloride 104 100 - 111 mmol/L    CO2 29 21 - 32 mmol/L    Anion gap 5 3.0 - 18 mmol/L    Glucose 115 (H) 74 - 99 mg/dL    BUN 37 (H) 7.0 - 18 MG/DL    Creatinine 3.66 (H) 0.6 - 1.3 MG/DL    BUN/Creatinine ratio 10 (L) 12 - 20      GFR est AA 15 (L) >60 ml/min/1.73m2    GFR est non-AA 12 (L) >60 ml/min/1.73m2    Calcium 7.9 (L) 8.5 - 10.1 MG/DL       Signed By: Stanford Contreras MD     May 13, 2020      Disclaimer: Sections of this note are dictated using utilizing voice recognition software. Minor typographical errors may be present. If questions arise, please do not hesitate to contact me or call our department.

## 2020-05-13 NOTE — PROGRESS NOTES
In Patient Progress note    Admit Date: 4/30/2020    Impression:     #1 Acute kidney injury on chronic kidney disease stage III v/s likely progressed to ESRD especially in the setting of   Severe diastolic CHF/ cardiorenal syndrome /severe azotemia, patient developed  diuretic resistance and worsening   cardiorenal physiology , volume status has improved with HD , TDC in place   #2 Acute on chronic diastolic CHF. CXR with persistent pulm edema , CHF  #3 hypertension, volume off should help  #4 dyslipidemia  #5 hypothermia/SIRS on empirical antibiotics per internal medicine.   #6 COVID-19 -ve , pending repeat COVID19 test  #7 leg edema , duplex neg for DVT  #8 hyponatremia in setting of CHF ,resolved on HD      Plan:   #1 HD MWF   #2 strict ins and outs, fluid restrict 1200 mm  #3 encourage po intake, increase protein in diet, may need appetite stimulant   #4 follow cardiology recommendations  #5  avoid nephrotoxins renally dose medications  #6 continue epogen and hecterol   #7 noted plans for rehab placament     Patient does have outpatient chair time , [facility]  MWF 5:30 AM starting 5/11   Significant deconditioning , needs PT/OT , noted plans for rehab   Lives by herself , no family in this area , will need home health /transportation setup for dialysis    Please call with questions,     Oscar Liao MD FASN  Cell [phone]  Pager: [phone]    Subjective:     Feels better  Edema better  Breathing better      Current Facility-Administered Medications:     doxercalciferoL (HECTOROL) 4 mcg/2 mL injection 4 mcg, 4 mcg, IntraVENous, DIALYSIS MON, WED & FRI, Maxwell Vitale MD    polyethylene glycol (MIRALAX) packet 17 g, 17 g, Oral, DAILY, German Giron MD    docusate sodium (COLACE) capsule 100 mg, 100 mg, Oral, BID PRN, Rajeev Vallecillo MD, 100 mg at 05/13/20 0933    bisacodyL (DULCOLAX) suppository 10 mg, 10 mg, Rectal, DAILY PRN, Rajeev Vallecillo MD  [facility] carvediloL (COREG) tablet 12.5 mg, 12.5 mg, Oral, BID WITH MEALS, Torres Baird DO, Stopped at 05/13/20 0933    isosorbide mononitrate ER (IMDUR) tablet 30 mg, 30 mg, Oral, DAILY, Clarissa Garcia PA, Stopped at 05/13/20 0933    epoetin johnathon-epbx (RETACRIT) injection 10,000 Units, 10,000 Units, IntraVENous, Q TUE, THU & SAT, [facility], Maxwell PRADO MD, 10,000 Units at 05/12/20 2141    mirtazapine (REMERON) tablet 15 mg, 15 mg, Oral, QHS, Yelitza Simental MD, 15 mg at 05/12/20 2140    ondansetron (ZOFRAN) injection 4 mg, 4 mg, IntraVENous, Q6H PRN, Yelitza Simental MD, 4 mg at 05/11/20 0953    loratadine (CLARITIN) tablet 10 mg, 10 mg, Oral, DAILY PRN, Yelitza Simental MD, 10 mg at 05/07/20 0948    heparin (porcine) injection 5,000 Units, 5,000 Units, SubCUTAneous, Q8H, Jen Mehta MD, Stopped at 05/13/20 0933    sodium chloride (NS) flush 5-10 mL, 5-10 mL, IntraVENous, PRN, Joy Galicia MD    amLODIPine (NORVASC) tablet 10 mg, 10 mg, Oral, DAILY, Ani Rahman [place], Stopped at 05/13/20 6291    aspirin chewable tablet 81 mg, 81 mg, Oral, DAILY, [facility] Northern Michigan[facility]i PA, 81 mg at 05/13/20 0933    citalopram (CELEXA) tablet 20 mg, 20 mg, Oral, DAILY, [facility] Scanlon PA, 20 mg at 05/13/20 1635    cyanocobalamin tablet 1,000 mcg, 1,000 mcg, Oral, BID, [facility] Center[facility] HeflinAni PA, 1,000 mcg at 05/13/20 0933    pantoprazole (PROTONIX) tablet 40 mg, 40 mg, Oral, ACB&D, Ani Wilkinson PA, 40 mg at 05/13/20 0933    hydrALAZINE (APRESOLINE) tablet 100 mg, 100 mg, Oral, TID, Ani Wilkinson PA, Stopped at 05/13/20 0933    rosuvastatin (CRESTOR) tablet 5 mg, 5 mg, Oral, QHS, Ani Rahman [place], 5 mg at 05/12/20 2140    insulin lispro (HUMALOG) injection, , SubCUTAneous, AC&HS, [facility], Stopped at 05/13/20 0724    glucose chewable tablet 16 g, 16 g, Oral, PRN, Ani Wilkinson PA    glucagon (GLUCAGEN) injection 1 mg, 1 mg, IntraMUSCular, PRN, Ani Wilkinson PA    dextrose (D50W) injection syrg 12.5-25 g, 25-50 mL, IntraVENous, PRN, Ani Dunlap [place]    acetaminophen (TYLENOL) tablet 650 mg, 650 mg, Oral, Q4H PRN, Ani Dunlap PA, 650 mg at 05/13/20 0673    ferrous sulfate tablet 325 mg, 1 Tab, Oral, EVERY OTHER DAY, Vi Tavarez MD, 325 mg at 05/11/20 0953        Objective:     Visit Vitals  /49   Pulse (!) 59   Temp 98.2 °F (36.8 °C) (Oral)   Resp 18   Ht 5' 5\" (1.651 m)   Wt 108.1 kg (238 lb 5.1 oz)   SpO2 95%   Breastfeeding No   BMI 39.66 kg/m²         Intake/Output Summary (Last 24 hours) at 5/13/2020 1203  Last data filed at 5/13/2020 4756  Gross per 24 hour   Intake 220 ml   Output    Net 220 ml       Physical Exam:     General: NAD, alert and oriented. Neck: No jvd. LUNGS: decreased in bases, rales+  CVS EXM: S1, S2  RRR, no murmurs/gallops/rubs. Abdomen: soft, non tender. Lower Extremities: 1+ edema.    Rt IJ TDC     Data Review:    Recent Labs     05/11/20  0328   WBC 8.8   RBC 2.66*   HCT 23.7*   MCV 89.1   MCH 28.2   MCHC 31.6   RDW 14.1     Recent Labs     05/13/20  1016 05/11/20  0328   BUN 37* 35*   CREA 3.66* 2.57*   CA 7.9* 8.1*   K 4.0 4.2    136    104   CO2 29 26   * 97       Laxmi Sotomayor MD

## 2020-05-13 NOTE — DISCHARGE SUMMARY
Transfer Summary    Patient: Abran Seip MRN: [id number]  CSN: [id number]    YOB: 1950  Age: 71 y.o. Sex: female    DOA: 4/30/2020 LOS:  LOS: 13 days   Discharge Date: 5/13/2020     Disposition: Transfer to SNF    Admission Diagnoses: Sepsis ([facility] [address] [A41.9]    Discharge Diagnoses:    1. Acute on chronic respiratory failure   2. Acute on chronic diastolic heart failure  3. OFE on CKD stage III that has progressed to ESRD   4. Anemia of Chronic disease    5. Hyponatremia   6. Acute Deconditioned state  7. Depressed mood  8. Type II DM  9. CAD  10. HTN  11. HLD   12. Obesity     Discharge Condition: Stable    PHYSICAL EXAM  Visit Vitals  /60   Pulse (!) 55   Temp 98.2 °F (36.8 °C) (Oral)   Resp 18   Ht 5' 5\" (1.651 m)   Wt 108.1 kg (238 lb 5.1 oz)   SpO2 95%   Breastfeeding No   BMI 39.66 kg/m²       General: Alert, cooperative, no acute distress    Lungs:  CTA Bilaterally. No Wheezing/Rales. Heart:             Regular rate and Rhythm. Abdomen: Soft, Non distended, Non tender. + Bowel sounds. Extremities: No edema. Psych:   Good insight. Not anxious or agitated. Neurologic:  AA, oriented X 3. Moves all ext   Right neck dialysis catheter dressing clean. Hospital Course:   Ms. Tessa Crooks is a 72 yo  female with a past medical history of CKD stage 3/4, diastolic congestive heart failure, type 2 DM, HTN who presents from home with SOB. Patient was thought to be in acute heart failure, was started on IV Lasix. There is also concerned about infection, was started on empiric antibiotics and COVID 19 testing was sent. COVID testing came back negative. Given worsening renal failure nephrology was also consulted. It was thought that patient's symptoms are mostly from CHF did not have any signs of infection, antibiotics were discontinued. Patient was continued on aggressive IV Lasix but patient did not diuresed well.   She had only 1 L negative balance after 4 to 5 days of aggressive IV Lasix. It was thought the patient's daughter was involved because of her CKD. Cardiology thought that patient would be ideal candidate for hemodialysis for better volume management. Pulmonary was also consulted given her continued shortness of breath, pulmonary thought that her symptoms are mostly because of CHF and no pulmonary intervention needed. Nephrology discussed with the patient and patient accepted dialysis. Hemodialysis catheter was placed and patient was started on dialysis. Patient responded well with dialysis, diuresed well and her symptoms improved. After dialysis till now patient has more than 10 L of negative balance. Patient tolerated HD well. Nephrology arranged outpatient dialysis. PT OT seen the patient recommended SNF placement. For SNF placement patient had a repeat call with testing again, which came back negative today. Patient is currently remained on 2 to 3 L of O2 supplement, that is her baseline. She is hemodynamic medically stable for discharge. Cardiology and nephrology have cleared the patient for discharge. Discussed with the patient and also with her next of kin neighbor about her discharge plan to SNF, continuing HD outpatient and medication changes. Patient completely understood and agreed with my plan. Procedures:   HD catheter placement by vascular surgery    Consults:   Dr. Estela Scott, nephrology  Dr. Miguel Baig, vascular surgery  Dr. Josefina Gayle, pulmonary  Dr. Brad Hung, cardiology      Discharge Medications:     Current Discharge Medication List      START taking these medications    Details   ferrous sulfate 325 mg (65 mg iron) tablet Take 1 Tab by mouth every other day for 30 days. Qty: 15 Tab, Refills: 0      mirtazapine (REMERON) 15 mg tablet Take 1 Tab by mouth nightly. Qty: 10 Tab, Refills: 0      docusate sodium (COLACE) 100 mg capsule Take 1 Cap by mouth two (2) times daily as needed for Constipation for up to 90 days.   Qty: 6 Cap, Refills: 0         CONTINUE these medications which have CHANGED    Details   gabapentin (NEURONTIN) 100 mg capsule Take 1 Cap by mouth two (2) times a day. Max Daily Amount: 200 mg. Qty: 20 Cap, Refills: 0    Associated Diagnoses: Type 2 diabetes mellitus without complication, without long-term current use of insulin (HCC)      carvediloL (COREG) 12.5 mg tablet Take 1 Tab by mouth two (2) times daily (with meals). Qty: 30 Tab, Refills: 0         CONTINUE these medications which have NOT CHANGED    Details   amLODIPine (NORVASC) 10 mg tablet Take 1 Tab by mouth daily. Qty: 30 Tab, Refills: 0      polyethylene glycol (MIRALAX) 17 gram packet Take 1 Packet by mouth daily. Qty: 10 Packet, Refills: 0      rosuvastatin (CRESTOR) 5 mg tablet Take 1 Tab by mouth nightly. Qty: 30 Tab, Refills: 0      hydrALAZINE (APRESOLINE) 100 mg tablet Take 1 Tab by mouth three (3) times daily. Qty: 90 Tab, Refills: 0      aspirin 81 mg chewable tablet Take 1 Tab by mouth daily. Qty: 30 Tab, Refills: 0      isosorbide mononitrate ER (IMDUR) 30 mg tablet Take 1 Tab by mouth daily. Qty: 30 Tab, Refills: 0      esomeprazole (NEXIUM) 40 mg capsule Take 40 mg by mouth daily. citalopram (CELEXA) 20 mg tablet Take 20 mg by mouth daily. cranberry extract (AZO CRANBERRY) 450 mg tab Take 2 Tabs by mouth daily. cholecalciferol (VITAMIN D3) 1,000 unit cap Take 5,000 Units by mouth daily. cyanocobalamin (VITAMIN B-12) 1,000 mcg tablet Take 1,000 mcg by mouth two (2) times a day. Biotin 2,500 mcg cap Take 1 Tab by mouth two (2) times a day. vitamin a-vitamin c-vit e-min (OCUVITE) tablet Take 1 Tab by mouth daily. loratadine (CLARITIN) 10 mg tablet Take 10 mg by mouth daily. B.infantis-B.ani-B.long-B.bifi (PROBIOTIC 4X) 10-15 mg TbEC Take 1 Tab by mouth daily. OXYGEN-AIR DELIVERY SYSTEMS Take 2 L by inhalation daily.       insulin glargine (LANTUS) 100 unit/mL injection 4 units SQ daily- watch for Hypoglycemia adjust dose per patient's blood glucose level  Qty: 1 Vial, Refills: 0         STOP taking these medications       furosemide (LASIX) 40 mg tablet Comments:   Reason for Stopping:               DIET:  Diabetic Diet and Renal Diet    ACTIVITY: Activity as tolerated  Patient needs to be on Fall, aspiration, decubitus precaution. ·    PT/OT consult  ·             Speech consult  ·             DVT prophylaxis     ADDITIONAL INFORMATION: If you experience any of the following symptoms but not limited to Fever, chills, nausea, vomiting, diarrhea, change in mentation, falling, bleeding, shortness of breath, chest pain, please call your primary care physician or return to the emergency room if you cannot get hold of your doctor:     FOLLOW UP CARE:  Follow-up with 1. Physician at SNF in 1-2 days with Cbc with diff, bmp, mg.                             2.  [facility], cardio [facility] 2 week                           3. HD on Monday, Wednesday and Friday at 5:30 AM at [facility] Pt's PCP: Kj Bragg MD.    Minutes spent on discharge: >40 minutes spent coordinating this discharge (review instructions/follow-up, prescriptions, preparing report for sign off)    Na Ace MD  5/14/2020 10:33 AM        Disclaimer: Sections of this note are dictated using utilizing voice recognition software. Minor typographical errors may be present. If questions arise, please do not hesitate to contact me or call our department.

## 2020-05-13 NOTE — PROGRESS NOTES
Discharge planning update: This writer has arranged patient's ongoing dialysis transport, to and from [place] and [address] [place] [address]. [address] has been arranged for patient with a  time of 5:15AM (M-W-F) going to [facility]. Her chair time is at 5:50AM. This will be a repetitive trip. Once patient's negative COVID test is back, she will be able to discharge to [facility]. This writer will continue to monitor for discharge planning to ensure a safe discharge from [facility]. Raymundo Zhang MSW  Care Manager  Pager#: [phone]

## 2020-05-14 VITALS
TEMPERATURE: 97.8 F | RESPIRATION RATE: 17 BRPM | WEIGHT: 238.32 LBS | BODY MASS INDEX: 39.71 KG/M2 | SYSTOLIC BLOOD PRESSURE: 162 MMHG | HEART RATE: 60 BPM | HEIGHT: 65 IN | DIASTOLIC BLOOD PRESSURE: 64 MMHG | OXYGEN SATURATION: 96 %

## 2020-05-14 LAB
GLUCOSE BLD STRIP.AUTO-MCNC: 111 MG/DL (ref 70–110)
GLUCOSE BLD STRIP.AUTO-MCNC: 143 MG/DL (ref 70–110)
GLUCOSE BLD STRIP.AUTO-MCNC: 168 MG/DL (ref 70–110)

## 2020-05-14 PROCEDURE — 82962 GLUCOSE BLOOD TEST: CPT

## 2020-05-14 PROCEDURE — 74011250637 HC RX REV CODE- 250/637: Performed by: PHYSICIAN ASSISTANT

## 2020-05-14 PROCEDURE — 74011250636 HC RX REV CODE- 250/636: Performed by: HOSPITALIST

## 2020-05-14 PROCEDURE — 74011250637 HC RX REV CODE- 250/637: Performed by: FAMILY MEDICINE

## 2020-05-14 PROCEDURE — 74011636637 HC RX REV CODE- 636/637: Performed by: PHYSICIAN ASSISTANT

## 2020-05-14 PROCEDURE — 97530 THERAPEUTIC ACTIVITIES: CPT

## 2020-05-14 PROCEDURE — 97116 GAIT TRAINING THERAPY: CPT

## 2020-05-14 RX ADMIN — HYDRALAZINE HYDROCHLORIDE 100 MG: 50 TABLET, FILM COATED ORAL at 15:11

## 2020-05-14 RX ADMIN — INSULIN LISPRO 2 UNITS: 100 INJECTION, SOLUTION INTRAVENOUS; SUBCUTANEOUS at 11:49

## 2020-05-14 RX ADMIN — CARVEDILOL 12.5 MG: 12.5 TABLET, FILM COATED ORAL at 08:51

## 2020-05-14 RX ADMIN — AMLODIPINE BESYLATE 10 MG: 10 TABLET ORAL at 08:52

## 2020-05-14 RX ADMIN — HEPARIN SODIUM 5000 UNITS: 5000 INJECTION INTRAVENOUS; SUBCUTANEOUS at 15:11

## 2020-05-14 RX ADMIN — PANTOPRAZOLE SODIUM 40 MG: 40 TABLET, DELAYED RELEASE ORAL at 08:52

## 2020-05-14 RX ADMIN — HEPARIN SODIUM 5000 UNITS: 5000 INJECTION INTRAVENOUS; SUBCUTANEOUS at 08:52

## 2020-05-14 RX ADMIN — HYDRALAZINE HYDROCHLORIDE 100 MG: 50 TABLET, FILM COATED ORAL at 08:52

## 2020-05-14 RX ADMIN — ISOSORBIDE MONONITRATE 30 MG: 30 TABLET, EXTENDED RELEASE ORAL at 08:51

## 2020-05-14 RX ADMIN — CYANOCOBALAMIN TAB 1000 MCG 1000 MCG: 1000 TAB at 08:52

## 2020-05-14 RX ADMIN — CITALOPRAM HYDROBROMIDE 20 MG: 20 TABLET ORAL at 08:52

## 2020-05-14 RX ADMIN — ASPIRIN 81 MG CHEWABLE TABLET 81 MG: 81 TABLET CHEWABLE at 08:52

## 2020-05-14 NOTE — PROGRESS NOTES
Problem: Mobility Impaired (Adult and Pediatric)  Goal: *Acute Goals and Plan of Care (Insert Text)  Description: Physical Therapy Goals  Initiated 5/1/2020 and to be accomplished within 7 day(s)  1. Patient will move from supine to sit and sit to supine , scoot up and down and roll side to side in bed with supervision/set-up. 2.  Patient will transfer from bed to chair and chair to bed with SBA  using the least restrictive device. 3.  Patient will perform sit to stand with SBA. 4.  Patient will ambulate with supervision/set-up for 25 feet with the least restrictive device. 5.  Patient will ascend/descend 3 stairs with 1 handrail(s) with minimal assistance/contact guard assist.    PLOF: Pt lives alone in a one story home with 3 JAMES, has canes and a walker if needed, can ask a neighbor for assistance if needed. Pt was recently eval/dc from acute pt due to being mod ind/ind but pt reports that it was hard to take care of herself on her own before she came to the hospital this admission. Outcome: Progressing Towards Goal    PHYSICAL THERAPY TREATMENT    Patient: Terrie Toth (44 y.o. female)  Date: 5/14/2020  Diagnosis: Sepsis ([facility] [address] [A41.9]   Acute on chronic respiratory failure (HCC)  Procedure(s) (LRB):  INSERTION TUNNELED CENTRAL VENOUS CATHETER (Right) 7 Days Post-Op  Precautions: Aspiration, Fall, Skin      ASSESSMENT:  Patient received in bed agreeable to skilled therapy session. Patient seen in conjunction with OT due poor activity tolerance. Patient requires multiple rest break through out session and additional time to complete tasks due to fatigue/weakness, she denies SOB. Pt mobilizes to the EOB with CG/min A for trunk support. Sitting EOB, she c/o dizziness. Vitals stable; /63, BP 64 bpm, and SPO2 97% on RA. Her symptoms began to resolve after sitting EOB for 5 minutes with good balance. Verbal cues provided to push from bed in increase safety with sit to stand transfer.  Patient ambulates using RW and CGA with short step length, decreased paolo. Vc's for safety provided to back up towards the chair and pull RW closer to pt. Patient with uncontrolled descent into chair needing min A x2, did not reach arms back for arm rest as cued. Educated pt to perform BLE there-ex 3x/day in order to increase strength for functional transfers. Patient left sitting comfortably in recliner with all needs addressed. Progression toward goals:   []      Improving appropriately and progressing toward goals  [x]      Improving slowly and progressing toward goals  []      Not making progress toward goals and plan of care will be adjusted     PLAN:  Patient continues to benefit from skilled intervention to address the above impairments. Continue treatment per established plan of care. Discharge Recommendations:  Michele Day  Further Equipment Recommendations for Discharge: TBD     SUBJECTIVE:   Patient stated Wait. .. Not yet.     OBJECTIVE DATA SUMMARY:   Critical Behavior:  Neurologic State: Alert  Orientation Level: Oriented X4  Cognition: Follows commands  Safety/Judgement: Fall prevention  Functional Mobility Training:  Bed Mobility:  Supine to Sit: Contact guard assistance;Minimum assistance;Assist x2; Additional time  Scooting: Contact guard assistance     Transfers:  Sit to Stand: Contact guard assistance;Minimum assistance;Assist x2  Stand to Sit: Minimum assistance;Assist x2    Balance:  Sitting: Intact  Standing: Impaired; With support  Standing - Static: Fair  Standing - Dynamic : Fair     Ambulation/Gait Training:  Distance (ft): 5 Feet (ft)  Assistive Device: Walker, rolling  Ambulation - Level of Assistance: Contact guard assistance  Gait Abnormalities: Decreased step clearance  Speed/Paolo: Slow  Step Length: Right shortened;Left shortened  Interventions: Safety awareness training;Verbal cues    Therapeutic Exercises:   Educated pt to perform BLE therex 3x /day to increase strength for ease of transfers. Pain:  Pain level pre-treatment: 0/10  Pain level post-treatment: 0/10       Activity Tolerance:   Fair  Please refer to the flowsheet for vital signs taken during this treatment. After treatment:   [x] Patient left in no apparent distress sitting up in recliner  [] Patient left in no apparent distress in bed  [x] Call bell left within reach  [] Nursing notified  [] Caregiver present  [] Bed alarm activated  [] SCDs applied      COMMUNICATION/EDUCATION:   [x]         Role of Physical Therapy in the acute care setting. [x]         Fall prevention education was provided and the patient/caregiver indicated understanding. [x]         Patient/family have participated as able in working toward goals and plan of care. []         Patient/family agree to work toward stated goals and plan of care. []         Patient understands intent and goals of therapy, but is neutral about his/her participation.   []         Patient is unable to participate in stated goals/plan of care: ongoing with therapy staff.  []         Other:        Luis Spivey, PT   Time Calculation: 28 mins

## 2020-05-14 NOTE — DISCHARGE INSTRUCTIONS
Learning About Heart Failure  What is heart failure? Heart failure means that your heart muscle does not pump as much blood as your body needs. Failure does not mean that your heart has stopped. It means that your heart is not pumping as well as it should. Your body has an amazing ability to make up for heart failure. It may do such a good job that you don't know you have a disease. But at some point, your heart and body will no longer be able to keep up. Then fluid starts to build up in your lungs and other parts of your body. What can you expect when you have heart failure? Heart failure is a lifelong (chronic) disease. Treatment may be able to slow the disease and help you feel better. But heart failure tends to get worse over time. Despite this, there are many steps you can take to feel better and stay healthy longer. Early on, your symptoms may not be too bad. As heart failure gets worse, symptoms typically get worse, and you may need to limit your activities. Heart failure can also get worse suddenly. If this happens, you need emergency care. Then, after treatment, your symptoms may go back to being stable (which means they stay the same) for a long time. Heart failure can lead to other health problems, such as heart rhythm problems. Over time, your treatment options may change, especially as your symptoms get worse. As heart failure gets worse, palliative care can help improve the quality of your life. You can do advance care planning to decide what kind of care you want at the end of your life. What are the symptoms? Symptoms of heart failure start to happen when your heart can't pump enough blood to the rest of your body. In the early stages of heart failure, you may:  · Feel tired easily. · Be short of breath when you exert yourself. · Feel like your heart is pounding or racing (palpitations). · Feel weak or dizzy.   As heart failure gets worse, fluid starts to build up in your lungs and other parts of your body. This may cause you to:  · Feel short of breath even at rest.  · Have swelling (edema), especially in your legs, ankles, and feet. · Gain weight. This may happen over just a day or two, or more slowly. · Cough or wheeze, especially when you lie down. How is heart failure treated? · You'll probably take several medicines. · You might attend cardiac rehabilitation (rehab) to get education and support that help you make lifestyle changes and stay as healthy as possible. · You may get a heart device. A pacemaker helps your heart pump blood. An ICD can stop abnormal heart rhythms. How can you care for yourself? There are many steps you can take to feel better and live longer. These steps are an important part of treatment. They can help you stay active and enjoy life. · Take your medicine the right way. Avoid medicines that can make your symptoms worse. · Check your weight and symptoms every day. Know what to do if your symptoms get worse. · Limit sodium. This helps keep fluid from building up. It may help you feel better. · Be active. Exercise regularly, but don't exercise too hard. · Be heart-healthy. Eat healthy foods, stay at a healthy weight, limit alcohol, and don't smoke. · Stay as healthy as possible. Manage other health problems such as diabetes and high blood pressure. Avoid colds and flu, get help for depression and anxiety, and manage stress. If you think you may have a problem with alcohol or drug use, talk to your doctor. Follow-up care is a key part of your treatment and safety. Be sure to make and go to all appointments, and call your doctor if you are having problems. It's also a good idea to know your test results and keep a list of the medicines you take. Where can you learn more? Go to http://mechelle-darline.info/  Enter [id number] in the search box to learn more about \"Learning About Heart Failure. \"  Current as of: December 15, 2019Content Version: 12.4  © 7157-1676 [employer] Care instructions adapted under license by [employer] (which disclaims liability or warranty for this information). If you have questions about a medical condition or this instruction, always ask your healthcare professional. [address] any warranty or liability for your use of this information. Avoiding Triggers With Heart Failure: Care Instructions  Your Care Instructions    Triggers are anything that make your heart failure flare up. A flare-up is also called \"sudden heart failure\" or \"acute heart failure. \" When you have a flare-up, fluid builds up in your lungs, and you have problems breathing. You might need to go to the hospital. By watching for changes in your condition and avoiding triggers, you can prevent heart failure flare-ups. Follow-up care is a key part of your treatment and safety. Be sure to make and go to all appointments, and call your doctor if you are having problems. It's also a good idea to know your test results and keep a list of the medicines you take. How can you care for yourself at home? Watch for changes in your weight and condition  · Weigh yourself without clothing at the same time each day. Record your weight. Call your doctor if you have sudden weight gain, such as more than 2 to 3 pounds in a day or 5 pounds in a week. (Your doctor may suggest a different range of weight gain.) A sudden weight gain may mean that your heart failure is getting worse. · Keep a daily record of your symptoms. Write down any changes in how you feel, such as new shortness of breath, cough, or problems eating. Also record if your ankles are more swollen than usual and if you feel more tired than usual. Note anything that you ate or did that could have triggered these changes. Limit sodium  Sodium causes your body to hold on to extra water. This may cause your heart failure symptoms to get worse.  People get most of their sodium from processed foods. Fast food and restaurant meals also tend to be very high in sodium. · Your doctor may suggest that you limit sodium. Your doctor can tell you how much sodium is right for you. This includes limiting sodium in cooked and packaged foods. · Read food labels on cans and food packages. They tell you how much sodium you get in one serving. Check the serving size. If you eat more than one serving, you are getting more sodium. · Be aware that sodium can come in forms other than salt, including monosodium glutamate (MSG), sodium citrate, and sodium bicarbonate (baking soda). MSG is often added to Asian food. You can sometimes ask for food without MSG or salt. · Slowly reducing salt will help you adjust to the taste. Take the salt shaker off the table. · Flavor your food with garlic, lemon juice, onion, vinegar, herbs, and spices instead of salt. Do not use soy sauce, steak sauce, onion salt, garlic salt, mustard, or ketchup on your food, unless it is labeled \"low-sodium\" or \"low-salt. \"  · Make your own salad dressings, sauces, and ketchup without adding salt. · Use fresh or frozen ingredients, instead of canned ones, whenever you can. Choose low-sodium canned goods. · Eat less processed food and food from restaurants, including fast food. Exercise as directed  Moderate, regular exercise is very good for your heart. It improves your blood flow and helps control your weight. But too much exercise can stress your heart and cause a heart failure flare-up. · Check with your doctor before you start an exercise program.  · Walking is an easy way to get exercise. Start out slowly. Gradually increase the length and pace of your walk. Swimming, riding a bike, and using a treadmill are also good forms of exercise. · When you exercise, watch for signs that your heart is working too hard. You are pushing yourself too hard if you cannot talk while you are exercising.  If you become short of breath or dizzy or have chest pain, stop, sit down, and rest.  · Do not exercise when you do not feel well. Take medicines correctly  · Take your medicines exactly as prescribed. Call your doctor if you think you are having a problem with your medicine. · Make a list of all the medicines you take. Include those prescribed to you by other doctors and any over-the-counter medicines, vitamins, or supplements you take. Take this list with you when you go to any doctor. · Take your medicines at the same time every day. It may help you to post a list of all the medicines you take every day and what time of day you take them. · Make taking your medicine as simple as you can. Plan times to take your medicines when you are doing other things, such as eating a meal or getting ready for bed. This will make it easier to remember to take your medicines. · Get organized. Use helpful tools, such as daily or weekly pill containers. When should you call for help? Call 911 if you have symptoms of sudden heart failure such as:    · You have severe trouble breathing.     · You cough up pink, foamy mucus.     · You have a new irregular or rapid heartbeat.    Call your doctor now or seek immediate medical care if:    · You have new or increased shortness of breath.     · You are dizzy or lightheaded, or you feel like you may faint.     · You have sudden weight gain, such as more than 2 to 3 pounds in a day or 5 pounds in a week. (Your doctor may suggest a different range of weight gain.)     · You have increased swelling in your legs, ankles, or feet.     · You are suddenly so tired or weak that you cannot do your usual activities.    Watch closely for changes in your health, and be sure to contact your doctor if you develop new symptoms. Where can you learn more?   Go to http://mechelle-darline.info/  Enter V089 in the search box to learn more about \"Avoiding Triggers With Heart Failure: Care Instructions. \"  Current as of: December 15, 2019Content Version: 12.4  © 6138-8785 [employer]. Care instructions adapted under license by [employer] (which disclaims liability or warranty for this information). If you have questions about a medical condition or this instruction, always ask your healthcare professional. [address] any warranty or liability for your use of this information. Patient Education        Sepsis: Care Instructions  Your Care Instructions    Sepsis is an intense reaction to an infection. It can cause deadly damage to the body and lead to a dangerously low blood pressure. You may have inflammation across large areas of your body. It can damage tissue and even go deep into your organs. Infections that can lead to sepsis include:  · A skin infection such as from a cut. · A lung infection like pneumonia. · A kidney infection. · A gut infection such as E. coli. It's important to care for yourself and try to avoid infections so that you don't get sepsis again. Follow-up care is a key part of your treatment and safety. Be sure to make and go to all appointments, and call your doctor if you are having problems. It's also a good idea to know your test results and keep a list of the medicines you take. How can you care for yourself at home? · If your doctor prescribed antibiotics, take them as directed. Do not stop taking them just because you feel better. You need to take the full course of antibiotics. · Help prevent infections that could lead to sepsis:  ? Try to avoid colds and flu. If you must be around people who have a cold or the flu, wash your hands often. And get a flu vaccine every year. ? Get a pneumococcal vaccine shot (to prevent pneumonia, meningitis, and other infections). If you have had one before, ask your doctor if you need another dose. ? Clean any wounds or scrapes. · Do not smoke or use other tobacco products.  When you quit smoking, you are less likely to get a cold, the flu, bronchitis, and pneumonia. If you need help quitting, talk to your doctor about stop-smoking programs and medicines. These can increase your chances of quitting for good. · To prevent dehydration, drink plenty of fluids. Choose water and other caffeine-free clear liquids until you feel better. If you have kidney, heart, or liver disease and have to limit fluids, talk with your doctor before you increase the amount of fluids you drink. · Eat a healthy diet. Include fruits, vegetables, and whole grains in your diet every day. · If your doctor recommends it, try doing some physical activity. Walking is a good choice. Bit by bit, increase the amount you walk every day. When should you call for help? Call 911 anytime you think you may need emergency care. For example, call if:    · You passed out (lost consciousness).    Call your doctor now or seek immediate medical care if:    · You have symptoms of sepsis. These may include:  ? Shortness of breath. ? A fast heart rate. ? Cool, pale, or clammy skin. ? Feeling confused.     · You are dizzy or lightheaded, or you feel like you may faint.     · You have a fever or chills.    Watch closely for changes in your health, and be sure to contact your doctor if:    · You do not get better as expected. Where can you learn more? Go to http://mechelle-darline.info/  Enter T383 in the search box to learn more about \"Sepsis: Care Instructions. \"  Current as of: June 26, 2019Content Version: 12.4  © 1497-1979 [employer]. Care instructions adapted under license by [employer] (which disclaims liability or warranty for this information). If you have questions about a medical condition or this instruction, always ask your healthcare professional. [place] [address] any warranty or liability for your use of this information.          Patient armband removed and shredded      DISCHARGE SUMMARY from Nurse    PATIENT INSTRUCTIONS:    After general anesthesia or intravenous sedation, for 24 hours or while taking prescription Narcotics:  · Limit your activities  · Do not drive and operate hazardous machinery  · Do not make important personal or business decisions  · Do  not drink alcoholic beverages  · If you have not urinated within 8 hours after discharge, please contact your surgeon on call. Report the following to your surgeon:  · Excessive pain, swelling, redness or odor of or around the surgical area  · Temperature over 100.5  · Nausea and vomiting lasting longer than 4 hours or if unable to take medications  · Any signs of decreased circulation or nerve impairment to extremity: change in color, persistent  numbness, tingling, coldness or increase pain  · Any questions    What to do at Home:  Recommended activity: Activity as tolerated    If you experience any of the following symptoms Nausea, Vomiting, Diarrhea, Fever greater than 100.5, Dizziness, Severe headache, Shortness of breath, Chest pain, Increased pain, please follow up with PCP. *  Please give a list of your current medications to your Primary Care Provider. *  Please update this list whenever your medications are discontinued, doses are      changed, or new medications (including over-the-counter products) are added. *  Please carry medication information at all times in case of emergency situations. These are general instructions for a healthy lifestyle:    No smoking/ No tobacco products/ Avoid exposure to second hand smoke  Surgeon General's Warning:  Quitting smoking now greatly reduces serious risk to your health.     Obesity, smoking, and sedentary lifestyle greatly increases your risk for illness    A healthy diet, regular physical exercise & weight monitoring are important for maintaining a healthy lifestyle    You may be retaining fluid if you have a history of heart failure or if you experience any of the following symptoms:  Weight gain of 3 pounds or more overnight or 5 pounds in a week, increased swelling in our hands or feet or shortness of breath while lying flat in bed. Please call your doctor as soon as you notice any of these symptoms; do not wait until your next office visit. The discharge information has been reviewed with the patient. The patient verbalized understanding. Discharge medications reviewed with the patient and appropriate educational materials and side effects teaching were provided.   ___________________________________________________________________________________________________________________________________

## 2020-05-14 NOTE — PROGRESS NOTES
Transition of Care Plan to SNF/Rehab    SNF/Rehab Transition:  Patient has been accepted to [facility] and meets criteria for admission. Patient will  be transported by [facility] and expected to leave at [facility].    Communication to Patient/Family:  Met with patient and she is agreeable to the transition plan. Communication to SNF/Rehab:  Bedside RN, Zaira Medrano, has been notified of the transition plan to the facility and to call report (phone number [phone]). Discharge information has been updated on the AVS. And communicated to facility via [facility], or CC link. SNF/Rehab Transition:    PCP/Specialist:     Nursing Please include all hard scripts for controlled substances, med rec and dc summary, and AVS in packet. Reviewed and confirmed with facility representative, Tona at [facility], that they can manage the patient care needs for the following:     Grace Ramirez with (X) only those applicable:    Medication:  []  Medications will be available at the facility  []  IV Antibiotics  []  Controlled Substance - hard copy to be sent with patient   []  Weekly Labs    Documents:  [] Hard RX  Number sent   [] MAR  [] Kardex  [x] AVS  [] Wound care note  [x]Transfer Summary/Discharge Summary    Equipment:  []  CPAP/BiPAP  []  Wound Vacuum  []  Lopez or Urinary Device  []  PICC/Central Line  []  Nebulizer  []  Ventilator    Treatment:  []Isolation (for MRSA, VRE, etc.)  []Surgical Drain Management  []Tracheostomy Care  []Dressing Changes  [x]Dialysis with transportation and chair time. [facility]. Chair time is 5:50AM and  time will be 5:15AM. [facility] will transport.   []PEG Care  []Oxygen  []Daily Weights for Heart Failure    Dietary:  []Any diet limitations  []Tube Feedings   []Total Parenteral Management (TPN)    Eligible for Medicaid Long Term Services and Supports  Yes:  [] Eligible for medical assistance or will become eligible within 180 days and LTSS completed. [] Provider/Patient and/or support system has requested screening. [x] LTSS copy provided to patient or responsible party and to the facility. [] LTSS unavailable at discharge will send once processed to SNF provider.  [] LTSS  unavailable at discharged mailed to patient  [] LTSS performed by outside agency. No:   [] Private pay and is not financially eligible for Medicaid within the next 180 days. [] Reside out-of-state. [] A residents of a state owned/operated facility that is licensed  by [facility] or [facility]  [] Enrollment in [facility] hospice services  []  Medical [facility]  [] Patient /Family declines to have screening completed or provide financial information for screening          Financial Resources:  Medicaid    [] Initiated and application pending   [] Full coverage      Advanced Care Plan:  []Surrogate Decision Maker of Care  []POA  []Communicated Code Status/ sent FULL (DDNR, POST, Advance Directive)     Other      Cheryln Xena.  Kiley SALOMON  Care Manager  Pager#: [phone]

## 2020-05-14 NOTE — PROGRESS NOTES
Problem: Self Care Deficits Care Plan (Adult)  Goal: *Acute Goals and Plan of Care (Insert Text)  Description: Occupational Therapy Goals  Initiated 5/1/2020. Pt re-evaluated 5/12/2020. Continue goals. To be achieved within 7 day(s). 1.  Patient will perform bed mobility in preparation for selfcare with supervision/set-up. 2.  Patient will perform grooming standing for 4-7 minutes with supervision/setup and F+ balance. 3.  Patient will perform lower body dressing with supervision/set-up using AE prn.  4.  Patient will perform toilet transfers with supervision/set-up. 5.  Patient will perform all aspects of toileting with supervision/set-up. 6.  Patient will participate in upper extremity therapeutic exercise/activities with modified independence for 8 minutes. 7.  Patient will utilize energy conservation techniques during functional activities with verbal cues. Prior Level of Function: Patient was modified independent with self-care and used a RW for functional mobility PTA. Outcome: Progressing Towards Goal   OCCUPATIONAL THERAPY TREATMENT    Patient: Araceli Brock (7 y.o. female)  Date: 5/14/2020  Diagnosis: Sepsis ([facility] [address] [A41.9]   Acute on chronic respiratory failure ([address]  Procedure(s) (LRB):  INSERTION TUNNELED CENTRAL VENOUS CATHETER (Right) 7 Days Post-Op  Precautions: Aspiration, Fall, Skin    Chart, occupational therapy assessment, plan of care, and goals were reviewed. ASSESSMENT:  Pt cleared to participate in OT treatment session by Rn. Upon entering room, pt supine with HOB elevated, alert, and agreeable to session. Pt seen in conjunction with PT to maximize safety of patient and staff members, and to increase pt participation. In supine, noted pt's BLEs near R side of bed while trunk/upper body in middle. Pt able to bring BLEs towards middle of bed to be in neutral alignment. Pt requiring additional time for sup>sit on L side of bed this AM as pt reports increased SOB.  Vitals assessed at bed level, SpO2 97% on 2L O2 via, HR 60-61 bpm. Offered to provide assist this AM due to increased time with pt requiring min assist x2. Sitting EOB, pt denied to use the [facility] at this time. Pt reporting increased dizziness; /63 mmHg. Pt requiring additional time for transfer to chair, simulating transfers to [facility]; provided cues to initiate pursed lip breathing. Pt able to transfer to chair using RW, with max cueing to reach back for arm rails and to slowly descend self into chair for safety. Pt able to perform grooming with Mod(I)/setup in chair. Due to decreased endurance/activity tolerance, recommending SNF at D/C at this time. At the end of the session, pt resting comfortably in recliner, call-bell in reach, with all needs met. Progression toward goals:  []          Improving appropriately and progressing toward goals  [x]          Improving slowly and progressing toward goals  []          Not making progress toward goals and plan of care will be adjusted     PLAN:  Patient continues to benefit from skilled intervention to address the above impairments. Continue treatment per established plan of care. Discharge Recommendations: SNF  Further Equipment Recommendations for Discharge: TBD at the next level of care. SUBJECTIVE:   Patient stated Dizzy.     OBJECTIVE DATA SUMMARY:   Cognitive/Behavioral Status:  Neurologic State: Alert  Orientation Level: Oriented X4  Cognition: Appropriate decision making, Appropriate for age attention/concentration, Appropriate safety awareness, Follows commands  Safety/Judgement: Fall prevention    Functional Mobility and Transfers for ADLs:   Bed Mobility:  Supine to Sit: Contact guard assistance;Minimum assistance;Assist x2; Additional time  Scooting: Contact guard assistance   Transfers:  Sit to Stand: Contact guard assistance;Minimum assistance;Assist x2  Stand to Sit: Minimum assistance;Assist x2   Bed>chair: CGA, simulating transfers to BSC    Balance:  Sitting: Intact  Standing: Impaired; With support  Standing - Static: Fair  Standing - Dynamic : Fair    ADL Intervention:  Grooming: Modified/setup washing hands at chair level. Cognitive Retraining  Safety/Judgement: Fall prevention    Pain:  Pain level pre-treatment: 0/10   Pain level post-treatment: 0/10  Pain Intervention(s): Medication (see MAR); Rest, Ice, Repositioning  Response to intervention: Nurse notified, See doc flow    Activity Tolerance:    Poor    Please refer to the flowsheet for vital signs taken during this treatment. After treatment:   [x]  Patient left in no apparent distress sitting up in chair  []  Patient left in no apparent distress in bed  [x]  Call bell left within reach  []  Nursing notified  []  Caregiver present  []  Bed alarm activated    COMMUNICATION/EDUCATION:   [x] Role of Occupational Therapy in the acute care setting  [] Home safety education was provided and the patient/caregiver indicated understanding. [x] Patient/family have participated as able in working towards goals and plan of care. [x] Patient/family agree to work toward stated goals and plan of care. [] Patient understands intent and goals of therapy, but is neutral about his/her participation. [] Patient is unable to participate in goal setting and plan of care.       Thank you for this referral.  Sri Lemon MS, OTR/L  Time Calculation: 28 mins

## 2020-05-14 NOTE — PROGRESS NOTES
Important Message from [facility]" reviewed and explained with the patient and/or representative at bedside received a verbal agreement from patient. Patient states she is to weak to sign document. A  copy provided to patient/representative. Original document placed in patient's chart.

## 2020-05-14 NOTE — PROGRESS NOTES
In Patient Progress note    Admit Date: 4/30/2020    Impression:     #1 Acute kidney injury on chronic kidney disease stage III v/s likely progressed to ESRD especially in the setting of   Severe diastolic CHF/ cardiorenal syndrome /severe azotemia, patient developed  diuretic resistance and worsening   cardiorenal physiology , volume status has improved with HD , TDC in place   #2 Acute on chronic diastolic CHF. CXR with persistent pulm edema , CHF  #3 hypertension, volume off should help  #4 dyslipidemia  #5 hypothermia/SIRS on empirical antibiotics per internal medicine. #6 COVID-19 -ve   #7 leg edema , duplex neg for DVT  #8 hyponatremia in setting of CHF ,resolved on HD      Plan:   #1 HD MWF   #2 strict ins and outs, fluid restrict 1200 mm  #3 encourage po intake, increase protein in diet, may need appetite stimulant   #4 follow cardiology recommendations  #5  avoid nephrotoxins renally dose medications  #6 continue epogen and hecterol   #7 noted plans for rehab [facility]     outpatient dialysis chair time , [facility]  MWF 5:30 AM  Patient will be going to SNF and transport arranged to HD unit.     Please call with questions,     Conrado Yu MD [facility]  Cell [phone]  Pager: [phone]    Subjective:     Feels better  Edema better  Breathing better      Current Facility-Administered Medications:     doxercalciferoL (HECTOROL) 4 mcg/2 mL injection 4 mcg, 4 mcg, IntraVENous, DIALYSIS MON, WED & FRI, Bichu, Maxwell PRADO MD, 4 mcg at 05/13/20 1306    polyethylene glycol (MIRALAX) packet 17 g, 17 g, Oral, DAILY, Maribell Giron MD, 17 g at 05/13/20 1409    docusate sodium (COLACE) capsule 100 mg, 100 mg, Oral, BID PRN, Danilo Giron MD, 100 mg at 05/13/20 0933    bisacodyL (DULCOLAX) suppository 10 mg, 10 mg, Rectal, DAILY PRN, Danilo Giron MD    carvediloL (COREG) tablet 12.5 mg, 12.5 mg, Oral, BID WITH MEALS, Shay Bunch DO, 12.5 mg at 05/14/20 1994    isosorbide mononitrate ER (IMDUR) tablet 30 mg, 30 mg, Oral, DAILY, Clarissa Garcia, PA, 30 mg at 05/14/20 0851    epoetin johnathon-epbx (RETACRIT) injection 10,000 Units, 10,000 Units, IntraVENous, Q TUE, THU & SAT, Bichu, Maxwell PRADO MD, 10,000 Units at 05/12/20 2141    mirtazapine (REMERON) tablet 15 mg, 15 mg, Oral, QHS, Cari Corey MD, 15 mg at 05/13/20 2212    ondansetron (ZOFRAN) injection 4 mg, 4 mg, IntraVENous, Q6H PRN, Cari Corey MD, 4 mg at 05/11/20 0953    loratadine (CLARITIN) tablet 10 mg, 10 mg, Oral, DAILY PRN, Cari Corey MD, 10 mg at 05/07/20 0948    heparin (porcine) injection 5,000 Units, 5,000 Units, SubCUTAneous, Q8H, Ellena Paget, MD, 5,000 Units at 05/14/20 0033    sodium chloride (NS) flush 5-10 mL, 5-10 mL, IntraVENous, PRN, Jose Francisco Christianson MD    amLODIPine (NORVASC) tablet 10 mg, 10 mg, Oral, DAILY, Onetha DiAni shepherd, PA, 10 mg at 05/14/20 5086    aspirin chewable tablet 81 mg, 81 mg, Oral, DAILY, Onetha DiAni shepherd, PA, 81 mg at 05/14/20 9058    citalopram (CELEXA) tablet 20 mg, 20 mg, Oral, DAILY, Onetha TildenAni shepherd, PA, 20 mg at 05/14/20 9343    cyanocobalamin tablet 1,000 mcg, 1,000 mcg, Oral, BID, Onetha DiAni shepherd PA, 1,000 mcg at 05/14/20 9776    pantoprazole (PROTONIX) tablet 40 mg, 40 mg, Oral, ACB&D, Onetha Tilden, Valenciaong T, PA, 40 mg at 05/14/20 9248    hydrALAZINE (APRESOLINE) tablet 100 mg, 100 mg, Oral, TID, Ani Nixon PA, 100 mg at 05/14/20 4160    rosuvastatin (CRESTOR) tablet 5 mg, 5 mg, Oral, QHS, Ani Rahman [place], 5 mg at 05/13/20 2212    insulin lispro (HUMALOG) injection, , SubCUTAneous, AC&HS, [place] [place], Stopped at 05/13/20 0724    glucose chewable tablet 16 g, 16 g, Oral, PRN, Ani Nixon PA    glucagon (GLUCAGEN) injection 1 mg, 1 mg, IntraMUSCular, PRN, Ani Nixon PA    dextrose (D50W) injection syrg 12.5-25 g, 25-50 mL, IntraVENous, PRN, Ani Nixon PA    acetaminophen (TYLENOL) tablet 650 mg, 650 mg, Oral, Q4H PRN, Serenity Bolton, 650 mg at 05/13/20 5673    ferrous sulfate tablet 325 mg, 1 Tab, Oral, EVERY OTHER DAY, Selina Meadows MD, 325 mg at 05/13/20 1409        Objective:     Visit Vitals  /63 (BP 1 Location: Left arm, BP Patient Position: At rest)   Pulse 64   Temp 98.3 °F (36.8 °C)   Resp 18   Ht 5' 5\" (1.651 m)   Wt 108.1 kg (238 lb 5.1 oz)   SpO2 96%   Breastfeeding No   BMI 39.66 kg/m²         Intake/Output Summary (Last 24 hours) at 5/14/2020 1119  Last data filed at 5/14/2020 0937  Gross per 24 hour   Intake 717 ml   Output 1500 ml   Net -783 ml       Physical Exam:     General: NAD, alert and oriented. Neck: No jvd. LUNGS: decreased in bases, rales+  CVS EXM: S1, S2  RRR, no murmurs/gallops/rubs. Abdomen: soft, non tender. Lower Extremities: 1+ edema. Rt IJ TDC     Data Review:    No results for input(s): WBC, RBC, HCT, MCV, MCH, MCHC, RDW, HCTEXT, HCTEXT in the last 72 hours.     No lab exists for component: HEMOGLOBIN, PLATELET, MPV  Recent Labs     05/13/20  1016   BUN 37*   CREA 3.66*   CA 7.9*   K 4.0         CO2 29   *       Natalie Francisco MD

## 2020-05-14 NOTE — PROGRESS NOTES
Discharge planning update: This writer received a call from [address] at [facility], that they will not be able to accomodate patient today. They would have to move some beds around and they have people on isolation, due to [place]. This writer then reached out to other skilled nursing facilities for potential admission today. This writer will continue to monitor for discharge planning to ensure a safe discharge from [facility]. Raymundo Chowdary [facility]  Care Manager  Pager#: [phone]

## 2020-05-14 NOTE — PROGRESS NOTES
Problem: Falls - Risk of  Goal: *Absence of Falls  Description: Document Tania Blood Fall Risk and appropriate interventions in the flowsheet. Outcome: Progressing Towards Goal  Note: Fall Risk Interventions:  Mobility Interventions: Bed/chair exit alarm, Patient to call before getting OOB, Utilize walker, cane, or other assistive device         Medication Interventions: Bed/chair exit alarm, Patient to call before getting OOB, Teach patient to arise slowly    Elimination Interventions: Bed/chair exit alarm, Call light in reach, Elevated toilet seat, Patient to call for help with toileting needs, Stay With Me (per policy), Toilet paper/wipes in reach, Toileting schedule/hourly rounds              Problem: Patient Education: Go to Patient Education Activity  Goal: Patient/Family Education  Outcome: Progressing Towards Goal     Problem: Pressure Injury - Risk of  Goal: *Prevention of pressure injury  Description: Document Toney Scale and appropriate interventions in the flowsheet. Outcome: Progressing Towards Goal  Note: Pressure Injury Interventions:  Sensory Interventions: Assess changes in LOC, Assess need for specialty bed, Check visual cues for pain, Float heels, Keep linens dry and wrinkle-free, Minimize linen layers, Turn and reposition approx. every two hours (pillows and wedges if needed)    Moisture Interventions: Absorbent underpads, Assess need for specialty bed, Check for incontinence Q2 hours and as needed, Minimize layers, Offer toileting Q_hr    Activity Interventions: Increase time out of bed    Mobility Interventions: Assess need for specialty bed, Float heels, HOB 30 degrees or less, Pressure redistribution bed/mattress (bed type), Turn and reposition approx.  every two hours(pillow and wedges)    Nutrition Interventions: Document food/fluid/supplement intake    Friction and Shear Interventions: HOB 30 degrees or less, Lift sheet, Minimize layers                Problem: Patient Education: Go to Patient Education Activity  Goal: Patient/Family Education  Outcome: Progressing Towards Goal     Problem: Patient Education: Go to Patient Education Activity  Goal: Patient/Family Education  Outcome: Progressing Towards Goal     Problem: Heart Failure: Discharge Outcomes  Goal: *Demonstrates ability to perform prescribed activity without shortness of breath or discomfort  Outcome: Progressing Towards Goal  Goal: *Left ventricular function assessment completed prior to or during stay, or planned for post-discharge  Outcome: Progressing Towards Goal  Goal: *ACEI prescribed if LVEF less than 40% and no contraindications or ARB prescribed  Outcome: Progressing Towards Goal  Goal: *Verbalizes understanding and describes prescribed diet  Outcome: Progressing Towards Goal  Goal: *Verbalizes understanding/describes prescribed medications  Outcome: Progressing Towards Goal  Goal: *Describes available resources and support systems  Description: (eg: Home Health, Palliative Care, Advanced Medical Directive)  Outcome: Progressing Towards Goal  Goal: *Describes smoking cessation resources  Outcome: Progressing Towards Goal  Goal: *Understands and describes signs and symptoms to report to providers(Stroke Metric)  Outcome: Progressing Towards Goal  Goal: *Describes/verbalizes understanding of follow-up/return appt  Description: (eg: to physicians, diabetes treatment coordinator, and other resources  Outcome: Progressing Towards Goal  Goal: *Describes importance of continuing daily weights and changes to report to physician  Outcome: Progressing Towards Goal     Problem: Pain  Goal: *Control of Pain  Outcome: Progressing Towards Goal     Problem: Patient Education: Go to Patient Education Activity  Goal: Patient/Family Education  Outcome: Progressing Towards Goal     Problem: Diabetes Self-Management  Goal: *Disease process and treatment process  Description: Define diabetes and identify own type of diabetes; list 3 options for treating diabetes. Outcome: Progressing Towards Goal  Goal: *Incorporating nutritional management into lifestyle  Description: Describe effect of type, amount and timing of food on blood glucose; list 3 methods for planning meals. Outcome: Progressing Towards Goal  Goal: *Incorporating physical activity into lifestyle  Description: State effect of exercise on blood glucose levels. Outcome: Progressing Towards Goal  Goal: *Developing strategies to promote health/change behavior  Description: Define the ABC's of diabetes; identify appropriate screenings, schedule and personal plan for screenings. Outcome: Progressing Towards Goal  Goal: *Using medications safely  Description: State effect of diabetes medications on diabetes; name diabetes medication taking, action and side effects. Outcome: Progressing Towards Goal  Goal: *Monitoring blood glucose, interpreting and using results  Description: Identify recommended blood glucose targets  and personal targets. Outcome: Progressing Towards Goal  Goal: *Prevention, detection, treatment of acute complications  Description: List symptoms of hyper- and hypoglycemia; describe how to treat low blood sugar and actions for lowering  high blood glucose level. Outcome: Progressing Towards Goal  Goal: *Prevention, detection and treatment of chronic complications  Description: Define the natural course of diabetes and describe the relationship of blood glucose levels to long term complications of diabetes.   Outcome: Progressing Towards Goal  Goal: *Developing strategies to address psychosocial issues  Description: Describe feelings about living with diabetes; identify support needed and support network  Outcome: Progressing Towards Goal  Goal: *Sick day guidelines  Outcome: Progressing Towards Goal  Goal: *Patient Specific Goal (EDIT GOAL, INSERT TEXT)  Outcome: Progressing Towards Goal     Problem: Patient Education: Go to Patient Education Activity  Goal: Patient/Family Education  Outcome: Progressing Towards Goal     Problem: Breathing Pattern - Ineffective  Goal: *Absence of hypoxia  Outcome: Progressing Towards Goal  Goal: *Use of effective breathing techniques  Outcome: Progressing Towards Goal  Goal: *PALLIATIVE CARE:  Alleviation of Dyspnea  Outcome: Progressing Towards Goal     Problem: Patient Education: Go to Patient Education Activity  Goal: Patient/Family Education  Outcome: Progressing Towards Goal     Problem: Patient Education: Go to Patient Education Activity  Goal: Patient/Family Education  Outcome: Progressing Towards Goal     Problem: Patient Education: Go to Patient Education Activity  Goal: Patient/Family Education  Outcome: Progressing Towards Goal     Problem: Nutrition Deficit  Goal: *Optimize nutritional status  Outcome: Progressing Towards Goal

## 2020-05-14 NOTE — ROUTINE PROCESS
Bedside and Verbal shift change report given to Priyank Kan RN (oncoming nurse) by José Marks RN (offgoing nurse). Report included the following information SBAR, Kardex, MAR and Recent Results. SITUATION: 
Code Status: Full Code Reason for Admission: Sepsis ([facility] [address] [A41.9] Hospital day: 15 Problem List:  
   
Hospital Problems  Date Reviewed: 4/23/2020 Codes Class Noted POA * (Principal) Acute on chronic respiratory failure ([facility] [address] ICD-10-CM: J96.20 ICD-9-CM: 518.84  5/9/2020 Unknown Sepsis ([facility] [address] ICD-10-CM: A41.9 ICD-9-CM: 038.9, 995.91  4/30/2020 Unknown Respiratory failure ([facility] [address] ICD-10-CM: J96.90 ICD-9-CM: 518.81  4/30/2020 Unknown SIRS (systemic inflammatory response syndrome) (HCC) ICD-10-CM: R65.10 ICD-9-CM: 995.90  4/30/2020 Unknown BACKGROUND: 
 Past Medical History:  
Past Medical History:  
Diagnosis Date  Arthritis  Cancer ([facility] [address]  CHF (congestive heart failure) ([facility] [address]  Diabetes ([facility] [address]  Fracture of neck ([facility] [address]  GERD (gastroesophageal reflux disease)  Goiter  Hiatal hernia  Hypertension  Uterine cancer ([facility] [address] Patient taking anticoagulants yes Patient has a defibrillator: no  
 History of shots YES for example, flu, pneumonia, tetanus Isolation History YES for example, MRSA, CDiff ASSESSMENT: 
Changes in Assessment Throughout Shift: NONE Significant Changes in 24 hours (for example, RR/code, fall) Patient has Central Line: yes Reasons if yes: Dialysis Patient has Lopez Cath: no  
Mobility Issues PT 
IV Patency OR Checklist 
Pending Tests Last Vitals: 
Vitals w/ MEWS Score (last day) Date/Time MEWS Score Pulse Resp Temp BP Level of Consciousness SpO2  
 05/14/20 0730  1  64  18  98.3 °F (36.8 °C)  156/63  Alert  96 % 05/14/20 0432  1  61  16  98.8 °F (37.1 °C)  148/58  Alert  98 % 05/14/20 0031  1  62  16  97.9 °F (36.6 °C)  142/60  Alert  97 % 05/13/20 1943  1  (!) 57  16  98.2 °F (36.8 °C)  129/51  Alert  97 % 05/13/20 1502  1  (!) 58  18  98.1 °F (36.7 °C)  146/64  Alert  98 % 05/13/20 1325    (!) 55  18  97.8 °F (36.6 °C)  135/61      
 05/13/20 1320    (!) 55      135/61      
 05/13/20 1315    (!) 55      139/57      
 05/13/20 1300    (!) 55      134/60      
 05/13/20 1245    (!) 55      134/59      
 05/13/20 1230    (!) 55      128/56      
 05/13/20 1215    (!) 55      130/58      
 05/13/20 1200    (!) 58      149/61      
 05/13/20 1145    (!) 59      152/73      
 05/13/20 1130    (!) 59      149/64      
 05/13/20 1115    (!) 59      136/49      
 05/13/20 1100    (!) 57      139/58      
 05/13/20 1045    (!) 58      151/66      
 05/13/20 1030    (!) 57      151/65      
 05/13/20 1020    (!) 57      154/67      
 05/13/20 1010    60  18  98.2 °F (36.8 °C)  157/67      
 05/13/20 0801  1  60  16  97.1 °F (36.2 °C)  149/81  Alert  95 % 05/13/20 0348  1  62  15  98.6 °F (37 °C)  164/65  Alert  98 % PAIN Pain Assessment Pain Intensity 1: 0 (05/14/20 0432) Pain Location 1: Head 
  Pain Intervention(s) 1: Medication (see MAR) Patient Stated Pain Goal: 0 Intervention effective: N/A Time of last intervention: N/A Reassessment Completed: yes Other actions taken for pain: Distraction Last 3 Weights: 
Last 3 Recorded Weights in this Encounter 05/11/20 3971 05/12/20 0515 05/13/20 9960 Weight: 108 kg (238 lb) 107.8 kg (237 lb 10.5 oz) 108.1 kg (238 lb 5.1 oz) Weight change: INTAKE/OUPUT Current Shift: No intake/output data recorded. Last three shifts: 05/12 1901 - 05/14 0700 In: 817 [P.O.:817] Out: 1500 RECOMMENDATIONS AND DISCHARGE PLANNING Patient needs and requests: Assistance with ADL's 
 
Pending tests/procedures: labs Discharge plan for patient: SNF Discharge planning Needs or Barriers: NONE Estimated Discharge Date: 5/14/2020 Posted on Whiteboard in [facility] Room: yes \"HEALS\" SAFETY CHECK A safety check occurred in the patient's room between off going nurse and oncoming nurse listed above. The safety check included the below items: 
 
H High Alert Medications Verify all high alert medication drips (heparin, PCA, etc.) E Equipment Suction is set up for ALL patients (with terry) Red plugs utilized for all equipment (IV pumps, etc.) WOWs wiped down at end of shift. Room stocked with oxygen, suction, and other unit-specific supplies A Alarms Bed alarm is set for fall risk patients Ensure chair alarm is in place and activated if patient is up in a chair L Lines Check IV for any infiltration Lopez bag is empty if patient has a Lopez Tubing and IV bags are labeled Merlinda Olivia Safety Room is clean, patient is clean, and equipment is clean. Hallways are clear from equipment besides carts. Fall bracelet on for fall risk patients Ensure room is clear and free of clutter Suction is set up for ALL patients (with terry) Hallways are clear from equipment besides carts. Isolation precautions followed, supplies available outside room, sign posted Sarahi Kimbrough, RN

## 2020-05-14 NOTE — PROGRESS NOTES
conducted a Follow up consultation and Spiritual Assessment for Skye New, who is a 71 y.o.,female. The  provided the following Interventions:  Continued the relationship of care and support. Listened empathically. Offered prayer and assurance of continued prayer on patients behalf. Chart reviewed. The following outcomes were achieved:  Patient expressed gratitude for 's visit. Assessment:  There are no further spiritual or Buddhist issues which require Spiritual Care Services interventions at this time. Plan:  Chaplains will continue to follow and will provide pastoral care on an as needed/requested basis.  recommends bedside caregivers page  on duty if patient shows signs of acute spiritual or emotional distress.        [id number] [facility]   [phone]

## 2020-05-14 NOTE — PROGRESS NOTES
Had spoken to Dr. Dian Cuba re:           SARS-COV-2 [[id number]] (Order [id number])   Lab   Date: 5/9/2020 Department: [address] [place] [address] Released By/Authorizing: Ivonne Harden DO (auto-released)   Component Value Flag Ref Range Units Status   SARS-CoV-2 Not Detected   Not Detected   Final   Comment:   (NOTE)   This test was developed and its performance characteristics   determined by [employer]. This test has not been FDA   cleared or approved. This test has been authorized by FDA under an   Emergency Use Authorization (EUA). This test is only authorized for   the duration of time the declaration that circumstances exist   justifying the authorization of the emergency use of in vitro   diagnostic tests for detection of SARS-CoV-2 virus and/or diagnosis   of COVID-19 infection under section 564(b)(1) of the Act, 21 U. S.C.   356JCS-3(H)(1), unless the authorization is terminated or revoked   sooner. When diagnostic testing is negative, the possibility of a   false negative result should be considered in the context of a   patient's recent exposures and the presence of clinical signs and   symptoms consistent with COVID-19. An individual without symptoms of   COVID-19 and who is not shedding SARS-CoV-2 virus would expect to   have a negative (not detected) result in this assay.    Performed At: [address] [id number]   Blaze Tavares MD DENZEL:[id number]    Lab and Collection     SARS-COV-2 (Order: [id number]) - 5/9/2020   Result History     SARS-COV-2 (Order #[id number]) on 5/13/2020 - Order Result History Report   5/13/2020  4:17 PM - [place], Lab In [facility]     Specimen Information     Miscellaneous sample        Collected: 5/9/2020 1800       Final Report Date/time     Reported date Reported time   May 13, 2020 16:17 EDT          Provider Information     Ordering User Ordering Provider Authorizing Provider   Ivonne Harden, [address] [place] [address], [address] [place] [address], DO Attending Provider(s) Admitting Provider PCP   Grace Rubio MD; Selina Meadows MD; Venakt Ferrer MD; Raciel Caba MD; January Valenzuela MD; Estephania Chavez MD; Erasmo Beebe DO; MD Selina Mayorga MD Hassie Stage, MD   Lab Information     Lab   [facility]   Shefali Pruett M.D.  [address] [id number]               Lab Inquiry View [address] [place] [address] Lab Information   How to build your own [employer]     SARS-COV-2 (Order #[id number]) on 5/9/20   Order Report      Order Details   Tracking Links      Cosign Tracking Order Transmittal Tracking     He said it is alright to take patient off isolation.

## 2020-05-15 NOTE — HOME CARE
Active patient with [facility] discharged yesterday to [facility] and [address] [place] [address], [facility] central office notified . CAROLINA CARCAMO.

## 2020-05-18 ENCOUNTER — PATIENT OUTREACH (OUTPATIENT)
Dept: CASE MANAGEMENT | Age: 70
End: 2020-05-18

## 2020-05-18 NOTE — PROGRESS NOTES
Patient was being followed as Outpatient for possible COVID 19. She was admitted to hospital and has now been discharged to [facility] so I will be following her as part of the [facility] Team.    Her Covid Episode has been resolved and a APPLE Episode has been opened.     Brendan Ramirez RN

## 2020-05-21 ENCOUNTER — PATIENT OUTREACH (OUTPATIENT)
Dept: CASE MANAGEMENT | Age: 70
End: 2020-05-21

## 2020-05-21 NOTE — PROGRESS NOTES
Community Care Team Documentation for Patient in [facility]     Patient discharged from [facility]  to [address] [place] [address], on 5/14/20. Hospital Discharge diagnosis per Dr. Silvino Ahmadi:     1. Acute on chronic respiratory failure    2. Acute on chronic diastolic heart failure   3. OFE on CKD stage III that has progressed to ESRD    4. Anemia of Chronic disease      5. Hyponatremia    6. Acute Deconditioned state   7. Depressed mood   8. Type II DM   9. CAD   10. HTN   6.  HLD    12  Obesity     SNF Attending Provider:  Benedict Dubin    Anticipated discharge date from SNF: TBD, may transition to LTC. PCP : Alicia Parkinson MD    [facility] Team rounds completed, updates provided by facility. Brief Summary of Care:    Patient receiving PT/OT. Minimal progress. Dispo:  Patient was living alone PTA. Family is working on [employer] and if approved pt will probably transition to LTC. RRAT:  Low Risk            10       Total Score        4 IP Visits Last 12 Months (1-3=4, 4=9, >4=11)    6 Charlson Comorbidity Score (Age + Comorbid Conditions)        Criteria that do not apply:    Has Seen PCP in Last 6 Months (Yes=3, No=0)    . Living with Significant Other. Assisted Living. LTAC. SNF. or   Rehab    Patient Length of Stay (>5 days = 3)    Pt.  Coverage (Medicare=5 , Medicaid, or Self-Pay=4)        Active Ambulatory Problems     Diagnosis Date Noted    Acute on chronic diastolic congestive heart failure ([address] 04/23/2020    Suspected COVID-19 virus infection 04/23/2020    Type 2 diabetes mellitus without complication, without long-term current use of insulin ([address] 07/06/2018    Left anterior fascicular block 07/06/2018    HTN (hypertension), benign 07/06/2018    Coronary artery disease involving native coronary artery of native heart without angina pectoris 07/06/2018    Chronic diastolic congestive heart failure ([facility] 07/06/2018    Bilateral lower extremity edema 07/06/2018    BMI 35.0-35.9,adult 07/06/2018    CHF (congestive heart failure) ([facility] 04/25/2020    Sepsis ([facility] 04/30/2020    Respiratory failure ([facility] 04/30/2020    SIRS (systemic inflammatory response syndrome) ([facility] 04/30/2020    Acute on chronic respiratory failure ([facility] 05/09/2020     Resolved Ambulatory Problems     Diagnosis Date Noted    No Resolved Ambulatory Problems     Past Medical History:   Diagnosis Date    Arthritis     Cancer [facility]     Diabetes [facility]     Fracture of neck ([facility]     GERD (gastroesophageal reflux disease)     Goiter     Hiatal hernia     Hypertension     Uterine cancer ([facility]

## 2020-06-04 ENCOUNTER — PATIENT OUTREACH (OUTPATIENT)
Dept: CASE MANAGEMENT | Age: 70
End: 2020-06-04

## 2020-06-04 NOTE — PROGRESS NOTES
Community Care Team Documentation for Patient in [facility]     Patient discharged from [facility]  to [address] [place] [address], on 5/14/20. Hospital Discharge diagnosis per Dr. Vergara Nabil:     1. Acute on chronic respiratory failure    2. Acute on chronic diastolic heart failure   3. OFE on CKD stage III that has progressed to ESRD    4. Anemia of Chronic disease      5. Hyponatremia    6. Acute Deconditioned state   7. Depressed mood   8. Type II DM   9. CAD   10. HTN   11.  HLD    12  Obesity     SNF Attending Provider:  Sylvia Armstrong    Anticipated discharge date from SNF: TBD    PCP : Remedios Garcia MD    [facility] Team rounds completed, updates provided by facility. Brief Summary of Care:    Patient receiving PT/OT. Minimal progress. Dispo:  Patient was living alone PTA. Family is working on [employer] - Patient is insistent that she go home. Will arrange PCA once discharged. RRAT:  Low Risk            10       Total Score        4 IP Visits Last 12 Months (1-3=4, 4=9, >4=11)    6 Charlson Comorbidity Score (Age + Comorbid Conditions)        Criteria that do not apply:    Has Seen PCP in Last 6 Months (Yes=3, No=0)    . Living with Significant Other. Assisted Living. LTAC. SNF. or   Rehab    Patient Length of Stay (>5 days = 3)    Pt.  Coverage (Medicare=5 , Medicaid, or Self-Pay=4)        Active Ambulatory Problems     Diagnosis Date Noted    Acute on chronic diastolic congestive heart failure ([address] 04/23/2020    Suspected COVID-19 virus infection 04/23/2020    Type 2 diabetes mellitus without complication, without long-term current use of insulin ([address] 07/06/2018    Left anterior fascicular block 07/06/2018    HTN (hypertension), benign 07/06/2018    Coronary artery disease involving native coronary artery of native heart without angina pectoris 07/06/2018    Chronic diastolic congestive heart failure ([facility] 07/06/2018    Bilateral lower extremity edema 07/06/2018    BMI 35.0-35.9,adult 07/06/2018    CHF (congestive heart failure) ([facility] 04/25/2020    Sepsis ([facility] 04/30/2020    Respiratory failure ([facility] 04/30/2020    SIRS (systemic inflammatory response syndrome) ([facility] 04/30/2020    Acute on chronic respiratory failure ([facility] 05/09/2020     Resolved Ambulatory Problems     Diagnosis Date Noted    No Resolved Ambulatory Problems     Past Medical History:   Diagnosis Date    Arthritis     [facility]     Diabetes [facility]     Fracture [facility] neck ([facility]     GERD (gastroesophageal reflux disease)     Goiter     Hiatal hernia     Hypertension     Uterine cancer ([facility]

## 2020-06-07 ENCOUNTER — APPOINTMENT (OUTPATIENT)
Dept: GENERAL RADIOLOGY | Age: 70
DRG: 207 | End: 2020-06-07
Attending: EMERGENCY MEDICINE
Payer: MEDICARE

## 2020-06-07 ENCOUNTER — HOSPITAL ENCOUNTER (INPATIENT)
Age: 70
LOS: 18 days | Discharge: NURSING, REHAB OR LONG-TERM CARE | DRG: 207 | End: 2020-06-26
Attending: EMERGENCY MEDICINE | Admitting: INTERNAL MEDICINE
Payer: MEDICARE

## 2020-06-07 DIAGNOSIS — E11.9 TYPE 2 DIABETES MELLITUS WITHOUT COMPLICATION, WITHOUT LONG-TERM CURRENT USE OF INSULIN (HCC): Chronic | ICD-10-CM

## 2020-06-07 DIAGNOSIS — J96.01 ACUTE RESPIRATORY FAILURE WITH HYPOXIA AND HYPERCAPNIA (HCC): ICD-10-CM

## 2020-06-07 DIAGNOSIS — J18.9 ACUTE PNEUMONIA: ICD-10-CM

## 2020-06-07 DIAGNOSIS — E87.79 OTHER HYPERVOLEMIA: Primary | ICD-10-CM

## 2020-06-07 DIAGNOSIS — J96.02 ACUTE RESPIRATORY FAILURE WITH HYPOXIA AND HYPERCAPNIA (HCC): ICD-10-CM

## 2020-06-07 LAB
ALBUMIN SERPL-MCNC: 3.1 G/DL (ref 3.4–5)
ALBUMIN/GLOB SERPL: 0.8 {RATIO} (ref 0.8–1.7)
ALP SERPL-CCNC: 103 U/L (ref 45–117)
ALT SERPL-CCNC: 12 U/L (ref 13–56)
ANION GAP SERPL CALC-SCNC: 5 MMOL/L (ref 3–18)
ARTERIAL PATENCY WRIST A: NO
AST SERPL-CCNC: 10 U/L (ref 10–38)
BASE EXCESS BLD CALC-SCNC: 4 MMOL/L
BASOPHILS # BLD: 0 K/UL (ref 0–0.1)
BASOPHILS NFR BLD: 0 % (ref 0–2)
BDY SITE: ABNORMAL
BILIRUB SERPL-MCNC: 0.3 MG/DL (ref 0.2–1)
BUN SERPL-MCNC: 27 MG/DL (ref 7–18)
BUN/CREAT SERPL: 7 (ref 12–20)
CALCIUM SERPL-MCNC: 8.1 MG/DL (ref 8.5–10.1)
CHLORIDE SERPL-SCNC: 98 MMOL/L (ref 100–111)
CO2 SERPL-SCNC: 32 MMOL/L (ref 21–32)
CREAT SERPL-MCNC: 4.02 MG/DL (ref 0.6–1.3)
DIFFERENTIAL METHOD BLD: ABNORMAL
EOSINOPHIL # BLD: 0 K/UL (ref 0–0.4)
EOSINOPHIL NFR BLD: 0 % (ref 0–5)
ERYTHROCYTE [DISTWIDTH] IN BLOOD BY AUTOMATED COUNT: 13.8 % (ref 11.6–14.5)
GAS FLOW.O2 O2 DELIVERY SYS: ABNORMAL L/MIN
GLOBULIN SER CALC-MCNC: 3.7 G/DL (ref 2–4)
GLUCOSE SERPL-MCNC: 175 MG/DL (ref 74–99)
HCO3 BLD-SCNC: 32.7 MMOL/L (ref 22–26)
HCT VFR BLD AUTO: 26.1 % (ref 35–45)
HGB BLD-MCNC: 8.1 G/DL (ref 12–16)
INR PPP: 1.2 (ref 0.8–1.2)
LACTATE BLD-SCNC: 1.05 MMOL/L (ref 0.4–2)
LYMPHOCYTES # BLD: 1.3 K/UL (ref 0.9–3.6)
LYMPHOCYTES NFR BLD: 10 % (ref 21–52)
MCH RBC QN AUTO: 27.8 PG (ref 24–34)
MCHC RBC AUTO-ENTMCNC: 31 G/DL (ref 31–37)
MCV RBC AUTO: 89.7 FL (ref 74–97)
MONOCYTES # BLD: 0.9 K/UL (ref 0.05–1.2)
MONOCYTES NFR BLD: 7 % (ref 3–10)
NEUTS SEG # BLD: 10.8 K/UL (ref 1.8–8)
NEUTS SEG NFR BLD: 83 % (ref 40–73)
O2/TOTAL GAS SETTING VFR VENT: 10 %
PCO2 BLD: 72.7 MMHG (ref 35–45)
PEEP RESPIRATORY: 12 CMH2O
PH BLD: 7.26 [PH] (ref 7.35–7.45)
PIP ISTAT,IPIP: 30
PLATELET # BLD AUTO: 231 K/UL (ref 135–420)
PMV BLD AUTO: 10.6 FL (ref 9.2–11.8)
PO2 BLD: 213 MMHG (ref 80–100)
POTASSIUM SERPL-SCNC: 3.6 MMOL/L (ref 3.5–5.5)
PROT SERPL-MCNC: 6.8 G/DL (ref 6.4–8.2)
PROTHROMBIN TIME: 14.8 SEC (ref 11.5–15.2)
RBC # BLD AUTO: 2.91 M/UL (ref 4.2–5.3)
SAO2 % BLD: 100 % (ref 92–97)
SERVICE CMNT-IMP: ABNORMAL
SODIUM SERPL-SCNC: 135 MMOL/L (ref 136–145)
SPECIMEN TYPE: ABNORMAL
TOTAL RESP. RATE, ITRR: 23
WBC # BLD AUTO: 13 K/UL (ref 4.6–13.2)

## 2020-06-07 PROCEDURE — 36600 WITHDRAWAL OF ARTERIAL BLOOD: CPT

## 2020-06-07 PROCEDURE — 80053 COMPREHEN METABOLIC PANEL: CPT

## 2020-06-07 PROCEDURE — 87040 BLOOD CULTURE FOR BACTERIA: CPT

## 2020-06-07 PROCEDURE — 83605 ASSAY OF LACTIC ACID: CPT

## 2020-06-07 PROCEDURE — 93005 ELECTROCARDIOGRAM TRACING: CPT

## 2020-06-07 PROCEDURE — 74011000250 HC RX REV CODE- 250: Performed by: EMERGENCY MEDICINE

## 2020-06-07 PROCEDURE — 96365 THER/PROPH/DIAG IV INF INIT: CPT

## 2020-06-07 PROCEDURE — 85025 COMPLETE CBC W/AUTO DIFF WBC: CPT

## 2020-06-07 PROCEDURE — 94640 AIRWAY INHALATION TREATMENT: CPT

## 2020-06-07 PROCEDURE — 85610 PROTHROMBIN TIME: CPT

## 2020-06-07 PROCEDURE — 71045 X-RAY EXAM CHEST 1 VIEW: CPT

## 2020-06-07 PROCEDURE — 96375 TX/PRO/DX INJ NEW DRUG ADDON: CPT

## 2020-06-07 PROCEDURE — 82803 BLOOD GASES ANY COMBINATION: CPT

## 2020-06-07 PROCEDURE — 96366 THER/PROPH/DIAG IV INF ADDON: CPT

## 2020-06-07 PROCEDURE — 74011250636 HC RX REV CODE- 250/636: Performed by: EMERGENCY MEDICINE

## 2020-06-07 PROCEDURE — 99285 EMERGENCY DEPT VISIT HI MDM: CPT

## 2020-06-07 PROCEDURE — 94660 CPAP INITIATION&MGMT: CPT

## 2020-06-07 PROCEDURE — 96367 TX/PROPH/DG ADDL SEQ IV INF: CPT

## 2020-06-07 PROCEDURE — 74011000258 HC RX REV CODE- 258: Performed by: EMERGENCY MEDICINE

## 2020-06-07 RX ORDER — ALBUTEROL SULFATE 0.83 MG/ML
2.5 SOLUTION RESPIRATORY (INHALATION)
Status: COMPLETED | OUTPATIENT
Start: 2020-06-07 | End: 2020-06-07

## 2020-06-07 RX ORDER — KETAMINE HCL 50MG/ML(1)
SYRINGE (ML) INTRAVENOUS
Status: DISPENSED
Start: 2020-06-07 | End: 2020-06-08

## 2020-06-07 RX ORDER — VANCOMYCIN 2 GRAM/500 ML IN 0.9 % SODIUM CHLORIDE INTRAVENOUS
2000 ONCE
Status: COMPLETED | OUTPATIENT
Start: 2020-06-07 | End: 2020-06-08

## 2020-06-07 RX ORDER — PREDNISONE 20 MG/1
60 TABLET ORAL
Status: DISCONTINUED | OUTPATIENT
Start: 2020-06-07 | End: 2020-06-07

## 2020-06-07 RX ADMIN — ALBUTEROL SULFATE 2.5 MG: 2.5 SOLUTION RESPIRATORY (INHALATION) at 22:11

## 2020-06-07 RX ADMIN — PIPERACILLIN AND TAZOBACTAM 2.25 G: 2; .25 INJECTION, POWDER, FOR SOLUTION INTRAVENOUS at 22:18

## 2020-06-07 RX ADMIN — VANCOMYCIN HYDROCHLORIDE 2000 MG: 10 INJECTION, POWDER, LYOPHILIZED, FOR SOLUTION INTRAVENOUS at 22:55

## 2020-06-07 RX ADMIN — METHYLPREDNISOLONE SODIUM SUCCINATE 125 MG: 125 INJECTION, POWDER, FOR SOLUTION INTRAMUSCULAR; INTRAVENOUS at 22:38

## 2020-06-08 PROBLEM — J18.9 PNEUMONIA: Status: ACTIVE | Noted: 2020-06-08

## 2020-06-08 LAB
ANION GAP SERPL CALC-SCNC: 5 MMOL/L (ref 3–18)
ARTERIAL PATENCY WRIST A: ABNORMAL
ATRIAL RATE: 67 BPM
BASE EXCESS BLD CALC-SCNC: 3 MMOL/L
BDY SITE: ABNORMAL
BUN SERPL-MCNC: 31 MG/DL (ref 7–18)
BUN/CREAT SERPL: 8 (ref 12–20)
CALCIUM SERPL-MCNC: 8.3 MG/DL (ref 8.5–10.1)
CALCULATED P AXIS, ECG09: 31 DEGREES
CALCULATED R AXIS, ECG10: -34 DEGREES
CALCULATED T AXIS, ECG11: 66 DEGREES
CHLORIDE SERPL-SCNC: 99 MMOL/L (ref 100–111)
CK MB CFR SERPL CALC: 4.4 % (ref 0–4)
CK MB CFR SERPL CALC: 5 % (ref 0–4)
CK MB SERPL-MCNC: 1.1 NG/ML (ref 5–25)
CK MB SERPL-MCNC: 1.5 NG/ML (ref 5–25)
CK SERPL-CCNC: 25 U/L (ref 26–192)
CK SERPL-CCNC: 30 U/L (ref 26–192)
CO2 SERPL-SCNC: 30 MMOL/L (ref 21–32)
COVID-19 RAPID TEST, COVR: NOT DETECTED
CREAT SERPL-MCNC: 3.97 MG/DL (ref 0.6–1.3)
DIAGNOSIS, 93000: NORMAL
ERYTHROCYTE [SEDIMENTATION RATE] IN BLOOD: 120 MM/HR (ref 0–30)
FERRITIN SERPL-MCNC: 456 NG/ML (ref 8–388)
GAS FLOW.O2 O2 DELIVERY SYS: ABNORMAL L/MIN
GLUCOSE SERPL-MCNC: 211 MG/DL (ref 74–99)
HCO3 BLD-SCNC: 30 MMOL/L (ref 22–26)
LACTATE SERPL-SCNC: 0.6 MMOL/L (ref 0.4–2)
LDH SERPL L TO P-CCNC: 223 U/L (ref 81–234)
O2/TOTAL GAS SETTING VFR VENT: 50 %
P-R INTERVAL, ECG05: 178 MS
PCO2 BLD: 56.6 MMHG (ref 35–45)
PH BLD: 7.33 [PH] (ref 7.35–7.45)
PIP ISTAT,IPIP: 33
PO2 BLD: 78 MMHG (ref 80–100)
POTASSIUM SERPL-SCNC: 4.1 MMOL/L (ref 3.5–5.5)
PROCALCITONIN SERPL-MCNC: 0.09 NG/ML
Q-T INTERVAL, ECG07: 464 MS
QRS DURATION, ECG06: 134 MS
QTC CALCULATION (BEZET), ECG08: 490 MS
SAO2 % BLD: 94 % (ref 92–97)
SERVICE CMNT-IMP: ABNORMAL
SODIUM SERPL-SCNC: 134 MMOL/L (ref 136–145)
SOURCE, COVRS: NORMAL
SPECIMEN TYPE: ABNORMAL
TOTAL RESP. RATE, ITRR: 28
TROPONIN I SERPL-MCNC: 0.02 NG/ML (ref 0–0.04)
TROPONIN I SERPL-MCNC: 0.03 NG/ML (ref 0–0.04)
VANCOMYCIN SERPL-MCNC: 48.9 UG/ML (ref 5–40)
VENTRICULAR RATE, ECG03: 67 BPM

## 2020-06-08 PROCEDURE — 84145 PROCALCITONIN (PCT): CPT

## 2020-06-08 PROCEDURE — 82803 BLOOD GASES ANY COMBINATION: CPT

## 2020-06-08 PROCEDURE — 74011000258 HC RX REV CODE- 258: Performed by: EMERGENCY MEDICINE

## 2020-06-08 PROCEDURE — 85652 RBC SED RATE AUTOMATED: CPT

## 2020-06-08 PROCEDURE — 83615 LACTATE (LD) (LDH) ENZYME: CPT

## 2020-06-08 PROCEDURE — 74011250636 HC RX REV CODE- 250/636: Performed by: EMERGENCY MEDICINE

## 2020-06-08 PROCEDURE — 74011250636 HC RX REV CODE- 250/636

## 2020-06-08 PROCEDURE — 82728 ASSAY OF FERRITIN: CPT

## 2020-06-08 PROCEDURE — 80202 ASSAY OF VANCOMYCIN: CPT

## 2020-06-08 PROCEDURE — 74011250637 HC RX REV CODE- 250/637: Performed by: INTERNAL MEDICINE

## 2020-06-08 PROCEDURE — 87635 SARS-COV-2 COVID-19 AMP PRB: CPT

## 2020-06-08 PROCEDURE — 36600 WITHDRAWAL OF ARTERIAL BLOOD: CPT

## 2020-06-08 PROCEDURE — 83605 ASSAY OF LACTIC ACID: CPT

## 2020-06-08 PROCEDURE — 74011636637 HC RX REV CODE- 636/637: Performed by: INTERNAL MEDICINE

## 2020-06-08 PROCEDURE — 82550 ASSAY OF CK (CPK): CPT

## 2020-06-08 PROCEDURE — 5A1D70Z PERFORMANCE OF URINARY FILTRATION, INTERMITTENT, LESS THAN 6 HOURS PER DAY: ICD-10-PCS | Performed by: INTERNAL MEDICINE

## 2020-06-08 PROCEDURE — 65270000029 HC RM PRIVATE

## 2020-06-08 PROCEDURE — 74011000258 HC RX REV CODE- 258: Performed by: INTERNAL MEDICINE

## 2020-06-08 PROCEDURE — 90935 HEMODIALYSIS ONE EVALUATION: CPT

## 2020-06-08 PROCEDURE — 74011250636 HC RX REV CODE- 250/636: Performed by: INTERNAL MEDICINE

## 2020-06-08 PROCEDURE — 80048 BASIC METABOLIC PNL TOTAL CA: CPT

## 2020-06-08 RX ORDER — CARVEDILOL 12.5 MG/1
12.5 TABLET ORAL 2 TIMES DAILY WITH MEALS
Status: DISCONTINUED | OUTPATIENT
Start: 2020-06-08 | End: 2020-06-19

## 2020-06-08 RX ORDER — MIRTAZAPINE 15 MG/1
15 TABLET, FILM COATED ORAL
Status: DISCONTINUED | OUTPATIENT
Start: 2020-06-08 | End: 2020-06-20

## 2020-06-08 RX ORDER — ISOSORBIDE MONONITRATE 30 MG/1
30 TABLET, EXTENDED RELEASE ORAL DAILY
Status: DISCONTINUED | OUTPATIENT
Start: 2020-06-08 | End: 2020-06-26 | Stop reason: HOSPADM

## 2020-06-08 RX ORDER — HEPARIN SODIUM 5000 [USP'U]/ML
5000 INJECTION, SOLUTION INTRAVENOUS; SUBCUTANEOUS EVERY 8 HOURS
Status: DISCONTINUED | OUTPATIENT
Start: 2020-06-08 | End: 2020-06-26 | Stop reason: HOSPADM

## 2020-06-08 RX ORDER — FUROSEMIDE 10 MG/ML
80 INJECTION INTRAMUSCULAR; INTRAVENOUS 2 TIMES DAILY
Status: DISCONTINUED | OUTPATIENT
Start: 2020-06-08 | End: 2020-06-24

## 2020-06-08 RX ORDER — LORATADINE 10 MG/1
10 TABLET ORAL DAILY
Status: DISCONTINUED | OUTPATIENT
Start: 2020-06-08 | End: 2020-06-26 | Stop reason: HOSPADM

## 2020-06-08 RX ORDER — HEPARIN SODIUM 1000 [USP'U]/ML
INJECTION, SOLUTION INTRAVENOUS; SUBCUTANEOUS
Status: COMPLETED
Start: 2020-06-08 | End: 2020-06-08

## 2020-06-08 RX ORDER — GUAIFENESIN 100 MG/5ML
81 LIQUID (ML) ORAL DAILY
Status: DISCONTINUED | OUTPATIENT
Start: 2020-06-08 | End: 2020-06-26 | Stop reason: HOSPADM

## 2020-06-08 RX ORDER — INSULIN GLARGINE 100 [IU]/ML
4 INJECTION, SOLUTION SUBCUTANEOUS DAILY
Status: DISCONTINUED | OUTPATIENT
Start: 2020-06-08 | End: 2020-06-10

## 2020-06-08 RX ORDER — GABAPENTIN 100 MG/1
100 CAPSULE ORAL 2 TIMES DAILY
Status: DISCONTINUED | OUTPATIENT
Start: 2020-06-08 | End: 2020-06-26 | Stop reason: HOSPADM

## 2020-06-08 RX ORDER — DOCUSATE SODIUM 100 MG/1
100 CAPSULE, LIQUID FILLED ORAL
Status: DISCONTINUED | OUTPATIENT
Start: 2020-06-08 | End: 2020-06-26 | Stop reason: HOSPADM

## 2020-06-08 RX ORDER — POLYETHYLENE GLYCOL 3350 17 G/17G
17 POWDER, FOR SOLUTION ORAL DAILY
Status: DISCONTINUED | OUTPATIENT
Start: 2020-06-08 | End: 2020-06-20

## 2020-06-08 RX ORDER — LANOLIN ALCOHOL/MO/W.PET/CERES
325 CREAM (GRAM) TOPICAL EVERY OTHER DAY
Status: DISCONTINUED | OUTPATIENT
Start: 2020-06-08 | End: 2020-06-26 | Stop reason: HOSPADM

## 2020-06-08 RX ORDER — HYDRALAZINE HYDROCHLORIDE 50 MG/1
100 TABLET, FILM COATED ORAL 3 TIMES DAILY
Status: DISCONTINUED | OUTPATIENT
Start: 2020-06-08 | End: 2020-06-15

## 2020-06-08 RX ORDER — PANTOPRAZOLE SODIUM 40 MG/1
40 TABLET, DELAYED RELEASE ORAL
Status: DISCONTINUED | OUTPATIENT
Start: 2020-06-08 | End: 2020-06-11

## 2020-06-08 RX ORDER — ROSUVASTATIN CALCIUM 5 MG/1
5 TABLET, COATED ORAL
Status: DISCONTINUED | OUTPATIENT
Start: 2020-06-08 | End: 2020-06-26 | Stop reason: HOSPADM

## 2020-06-08 RX ORDER — AMLODIPINE BESYLATE 10 MG/1
10 TABLET ORAL DAILY
Status: DISCONTINUED | OUTPATIENT
Start: 2020-06-08 | End: 2020-06-15

## 2020-06-08 RX ORDER — CITALOPRAM 10 MG/1
20 TABLET ORAL DAILY
Status: DISCONTINUED | OUTPATIENT
Start: 2020-06-08 | End: 2020-06-20

## 2020-06-08 RX ORDER — LANOLIN ALCOHOL/MO/W.PET/CERES
1000 CREAM (GRAM) TOPICAL 2 TIMES DAILY
Status: DISCONTINUED | OUTPATIENT
Start: 2020-06-08 | End: 2020-06-26 | Stop reason: HOSPADM

## 2020-06-08 RX ADMIN — POLYETHYLENE GLYCOL 3350 17 G: 17 POWDER, FOR SOLUTION ORAL at 11:26

## 2020-06-08 RX ADMIN — PIPERACILLIN AND TAZOBACTAM 2.25 G: 2; .25 INJECTION, POWDER, FOR SOLUTION INTRAVENOUS at 14:06

## 2020-06-08 RX ADMIN — MIRTAZAPINE 15 MG: 15 TABLET, FILM COATED ORAL at 04:53

## 2020-06-08 RX ADMIN — HEPARIN SODIUM 5000 UNITS: 5000 INJECTION INTRAVENOUS; SUBCUTANEOUS at 23:37

## 2020-06-08 RX ADMIN — ASPIRIN 81 MG CHEWABLE TABLET 81 MG: 81 TABLET CHEWABLE at 11:24

## 2020-06-08 RX ADMIN — Medication 325 MG: at 04:53

## 2020-06-08 RX ADMIN — CYANOCOBALAMIN TAB 1000 MCG 1000 MCG: 1000 TAB at 20:10

## 2020-06-08 RX ADMIN — HYDRALAZINE HYDROCHLORIDE 100 MG: 50 TABLET, FILM COATED ORAL at 23:34

## 2020-06-08 RX ADMIN — INSULIN GLARGINE 4 UNITS: 100 INJECTION, SOLUTION SUBCUTANEOUS at 11:26

## 2020-06-08 RX ADMIN — AMLODIPINE BESYLATE 10 MG: 10 TABLET ORAL at 11:25

## 2020-06-08 RX ADMIN — CITALOPRAM HYDROBROMIDE 20 MG: 20 TABLET ORAL at 11:25

## 2020-06-08 RX ADMIN — HYDRALAZINE HYDROCHLORIDE 100 MG: 50 TABLET, FILM COATED ORAL at 11:25

## 2020-06-08 RX ADMIN — HEPARIN SODIUM 5000 UNITS: 5000 INJECTION INTRAVENOUS; SUBCUTANEOUS at 14:09

## 2020-06-08 RX ADMIN — GABAPENTIN 100 MG: 100 CAPSULE ORAL at 11:26

## 2020-06-08 RX ADMIN — LORATADINE 10 MG: 10 TABLET ORAL at 11:25

## 2020-06-08 RX ADMIN — CYANOCOBALAMIN TAB 1000 MCG 1000 MCG: 1000 TAB at 11:25

## 2020-06-08 RX ADMIN — HEPARIN SODIUM 5000 UNITS: 5000 INJECTION INTRAVENOUS; SUBCUTANEOUS at 04:55

## 2020-06-08 RX ADMIN — ROSUVASTATIN CALCIUM 5 MG: 10 TABLET, COATED ORAL at 23:36

## 2020-06-08 RX ADMIN — HEPARIN SODIUM 3200 UNITS: 1000 INJECTION, SOLUTION INTRAVENOUS; SUBCUTANEOUS at 18:35

## 2020-06-08 RX ADMIN — MIRTAZAPINE 15 MG: 15 TABLET, FILM COATED ORAL at 23:37

## 2020-06-08 RX ADMIN — PIPERACILLIN AND TAZOBACTAM 2.25 G: 2; .25 INJECTION, POWDER, FOR SOLUTION INTRAVENOUS at 23:38

## 2020-06-08 RX ADMIN — GABAPENTIN 100 MG: 100 CAPSULE ORAL at 20:16

## 2020-06-08 RX ADMIN — PIPERACILLIN AND TAZOBACTAM 0.75 G: 2; .25 INJECTION, POWDER, FOR SOLUTION INTRAVENOUS at 20:20

## 2020-06-08 RX ADMIN — PIPERACILLIN AND TAZOBACTAM 2.25 G: 2; .25 INJECTION, POWDER, FOR SOLUTION INTRAVENOUS at 06:06

## 2020-06-08 RX ADMIN — FUROSEMIDE 80 MG: 10 INJECTION, SOLUTION INTRAMUSCULAR; INTRAVENOUS at 11:24

## 2020-06-08 RX ADMIN — ROSUVASTATIN CALCIUM 5 MG: 10 TABLET, COATED ORAL at 04:53

## 2020-06-08 RX ADMIN — ISOSORBIDE MONONITRATE 30 MG: 30 TABLET, EXTENDED RELEASE ORAL at 11:26

## 2020-06-08 RX ADMIN — CARVEDILOL 12.5 MG: 12.5 TABLET, FILM COATED ORAL at 11:26

## 2020-06-08 RX ADMIN — PANTOPRAZOLE SODIUM 40 MG: 40 TABLET, DELAYED RELEASE ORAL at 11:25

## 2020-06-08 RX ADMIN — CARVEDILOL 12.5 MG: 12.5 TABLET, FILM COATED ORAL at 20:17

## 2020-06-08 NOTE — PROGRESS NOTES
Patient seen in HD unit, feels much better. Shortness of breath improving. Patient denies any cough, no fevers. Visit Vitals  /47   Pulse 64   Temp 97.9 °F (36.6 °C) (Oral)   Resp 20   Ht 5' 5\" (1.651 m)   Wt 121.6 kg (268 lb)   SpO2 98%   BMI 44.60 kg/m²       Exam  General:  Alert, cooperative, no acute distress    Pulmonary: Bilateral basal Rales, no wheezing. Neurologic: Alert and oriented X 3. No acute neuro deficits. Labs, chart, and vitals noted  We will continue current treatment  Suspect she is most likely fluid overload from ESRD and heart failure. Doubt pneumonia, will continue empiric antibiotics follow-up cultures. Rapid COVID negative, [facility] COVID pending  Discussed with patient. Disclaimer: Sections of this note are dictated using utilizing voice recognition software. Minor typographical errors may be present. If questions arise, please do not hesitate to contact me or call our department.

## 2020-06-08 NOTE — PROGRESS NOTES
ACUTE HEMODIALYSIS FLOW SHEET    HEMODIALYSIS ORDERS: Physician: Dr. Monique Leiva: Iris   Duration: 3 hr  BFR: 350   DFR: 800   Dialysate:  Temp 36.0   K+   3    Ca+  3   Na 140   Bicarb 35   Wt Readings from Last 1 Encounters:   06/08/20 121.6 kg (268 lb)    Patient Chart [x]   Unable to Obtain []  Dry weight/UF Goal: 3000 ml  Access RIJ CVC     Heparin []  Bolus    Units    [] Hourly    Units    [x]None      Catheter locking solution Heparin 1:1000   Pre BP:   154/65    Pulse:  71   Respirations: 18    Temperature:  97.9    Tx: NSS   ml/Bolus   [x] N/A   Labs: []  Pre  []  Post:   [x] N/A   Additional Orders(medications, blood products, hypotension management): [] Yes   [x] No     [x]  [facility] Consent Verified     CATHETER ACCESS:  []N/A   [x]Right   []Left   [x]IJ     []Fem   []Chest wall   [] First use X-ray verified     [x]Tunnel    [] Non Tunneled   [x]No S/S infection  []Redness  []Drainage []Cultured []Swelling []Pain   []Medical Aseptic Prep Utilized   [x]Dressing Changed  [x] Biopatch  Date: 6/8/20   []Clotted   [x]Patent   Flows: [x]Good  []Poor  []Reversed   If access problem,  notified: []Yes    [x]N/A        GRAFT/FISTULA ACCESS:   [x]N/A     []Right     []Left     []UE     []LE   []AVG   []AVF     []Buttonhole    []Medical Aseptic Prep Utilized   []No S/S infection  []Redness  []Drainage []Cultured  [] Swelling  [] Pain  Bruit:   [] Strong    [] Weak       Thrill :   [] Strong    [] Weak     Needle Gauge: 15   Length: 1 inch   If access problem,  notified: []Yes     [x]N/A     Please describe access if present and not used: N/A       GENERAL ASSESSMENT:    LUNGS:  Rate 20   SaO2%      [] Clear  [x] Coarse  [] Crackles  [] Wheezing                                                [] Diminished     Location : []RLL   []LLL    []RUL  []KATIE   Cough: []Productive  []Dry  [x]N/A   Respirations:  [x]Easy  []Labored   Therapy:  []RA  []NC 3l/min    Mask: []NRB  [] Venti    O2% []Ventilator  []Intubated  [] Trach  [] BiPaP   CARDIAC: []Regular      [x] Irregular   [] Pericardial Rub  [] JVD          []  Monitored  [] Bedside  [] Remotely monitored     EDEMA: [] None  [x]Generalized  [] Pitting [] 1    [] 2    [] 3    [] 4                 [] Facial  [] Pedal  []  UE  [] LE   SKIN:   [x] Warm  [] Hot     [] Cold   [x] Dry     [] Pale   [] Diaphoretic                  [] Flushed  [] Jaundiced  [] Cyanotic  [] Rash  [] Weeping   LOC:    [x] Alert      [x]Oriented:    [x] Person     [x] Place  []Time               [] Confused  [] Lethargic  [] Medicated  [] Non-responsive   GI / ABDOMEN:                     [] Flat    [] Distended    [x] Soft    [] Firm   []  Obese                   [] Diarrhea  [] Bowel Sounds  [] Nausea  [] Vomiting     / URINE ASSESSMENT:                   [x] Voiding   [] Oliguria  [] Anuria   []  Lopez                  [] Incontinent  []  Incontinent Brief []  Fecal Management System     PAIN:  [x] 0 []1  []2   []3   []4   []5   []6   []7   []8   []9   []10            Scale 0-10  Action/Follow Up:    MOBILITY:  [x] Bed    [] Stretcher      All Vitals and Treatment Details on Attached [address]: [facility]          Room # IV65/40   [] 1st Time Acute      [] Stat       [x] Routine      [] Urgent     [x] Acute Room  []  Bedside  [] ICU/CCU  [] ER   Isolation Precautions:  [x] Dialysis    Droplet Plus       ALLERGIES:     Allergies   Allergen Reactions    Augmentin [Amoxicillin-Pot Clavulanate] Diarrhea    Clavulanic Acid Diarrhea    Levaquin [Levofloxacin] Other (comments)     Severe hypoglycemia        Code Status:  Full Code     Hepatitis Status      Lab Results   Component Value Date/Time    Hepatitis B surface Ag <0.10 05/07/2020 05:08 AM    Hepatitis B surface Ab <3.10 (L) 05/07/2020 05:08 AM        Current Labs:      Lab Results   Component Value Date/Time    WBC 13.0 06/07/2020 10:02 PM    HGB 8.1 (L) 06/07/2020 10:02 PM    HCT 26.1 (L) 06/07/2020 10:02 PM    PLATELET 790 62/89/1689 10:02 PM    MCV 89.7 06/07/2020 10:02 PM     Lab Results   Component Value Date/Time    Sodium 134 (L) 06/08/2020 04:23 AM    Potassium 4.1 06/08/2020 04:23 AM    Chloride 99 (L) 06/08/2020 04:23 AM    CO2 30 06/08/2020 04:23 AM    Anion gap 5 06/08/2020 04:23 AM    Glucose 211 (H) 06/08/2020 04:23 AM    BUN 31 (H) 06/08/2020 04:23 AM    Creatinine 3.97 (H) 06/08/2020 04:23 AM    BUN/Creatinine ratio 8 (L) 06/08/2020 04:23 AM    GFR est AA 14 (L) 06/08/2020 04:23 AM    GFR est non-AA 11 (L) 06/08/2020 04:23 AM    Calcium 8.3 (L) 06/08/2020 04:23 AM          DIET:  DIET CARDIAC      PRIMARY NURSE REPORT:   Pre Dialysis: JENN Brennan RN     Time: 2938        EDUCATION:    [x] Patient [] Other           Knowledge Basis: []None [x]Minimal [] Substantial   Barriers to learning  [x]N/A   [] Access Care     [] S&S of infection  [] Fluid Management  [] K+   [x] Procedural    []Albumin   [] Medications   [] Tx Options   [] Transplant   [] Diet   [] Other   Teaching Tools:  [x] Explain  [] Demo  [] Handouts [] Video  Patient response: [x] Verbalized understanding  [] Teach back  [] Return demonstration   [] Requires follow up      [x]Time Out/Safety Check  [x] Extracorporeal Circuit Tested for integrity       RO/HEMODIALYSIS MACHINE SAFETY CHECKS  Before each treatment:     Machine Number:                   [facility]                                     [x] Unit Machine # 5 with centralized RO                                                                                                    Alarm Test:  Pass time 0062            [x] RO/Machine Log Complete    Machine Temp    36.0               Post Assessment  Dialyzer Cleared:[] Good [x] Fair  [] Poor  Blood processed:  59.4 L  UF Removed:  3500 Ml  Post /40  Pulse  70 Resp  22   Temp 97.7 Lungs: [] Clear [x] Course  [] Crackles                 [] Wheezing [] Diminished   Post Tx Vascular Access: [x] N/A        Cardiac:[] Regular   [x] Irregular   Rhythm:  [] Monitored   [] Not Monitored    CVC Catheter: [] N/A  Locking solution: Heparin 1:1000 U  Arterial port 1.6 ml   Venous port 1.6 ml   Edema:  [] None  [] General [] Facial                   [] Pedal   [] UE [] LE   Skin:[x] Warm  [x] Dry [] Diaphoretic               [] Flushed  [] Pale [] Cyanotic   Pain:  [x]0  []1 []2  []3 []4  []5  []6 []7 []8  []9  []10       Post Treatment Note:   1335  HD completed , Pt tolerated HD. Dressing clean, dry and intact. POST TREATMENT PRIMARY NURSE HANDOFF REPORT:   Post Dialysis: JENN  [place] RN                Time:  1050       Abbreviations: AVG-arterial venous graft, AVF-arterial venous fistula, IJ-Internal Jugular, Subcl-Subclavian, Fem-Femoral, Tx-treatment, AP/HR-apical heart rate, DFR-dialysate flow rate, BFR-blood flow rate, AP-arterial pressure, -venous pressure, UF-ultrafiltrate, TMP-transmembrane pressure, Austyn-Venous, Art-Arterial, RO-Reverse Osmosis

## 2020-06-08 NOTE — H&P
Admission History and Physical    Gabo Barclay is a 71 y.o. female who is being admitted. Chief Complaint   Patient presents with    Respiratory Distress       Impression/Plan     71years old presented from the nursing facility with shortness of breath, hypoxia and respiratory distress  Likely due to acute on chronic diastolic CHF and pneumonia  End-stage renal disease, on dialysis, MWF  Hypertension    We will continue on IV vancomycin and Zosyn  Blood culture was collected in ED  Lasix 80 mg IV twice daily  Continue oral nitrate  Continue oxygen and titrate  Serial cardiac enzymes  Nephrology consult in a.m. for dialysis, and optimize volume    Diabetes mellitus  Continue Lantus insulin  Monitor Accu-Cheks    Anemia, likely of chronic disease  Monitor hemoglobin  Outpatient follow-up    -Other medical problems as below  Continue home medications and outpatient follow-up    Admission plan was discussed    Heparin for DVT prophylaxis      HPI:   71 y.o. female  with multiple medical problems including diastolic CHF, diabetes, arthritis, acid reflux hiatal hernia, chronic hypoxic failure on home oxygen 2 L, hypertension, end-stage renal disease on dialysis Tuesday Thursday Saturday who presented to the emergency department via EMS with shortness of breath per report from [facility] and rehab. She is normally alert and oriented x4. She has been having worsening shortness of breath and reported mild cough with no fever. She was reported to be hypoxic to the 80s and was placed on 12 to 15 L of oxygen, in distress and was altered. She arrived to the ED by EMS and they were doing bag valve mask ventilation while preparing to intubate. However her mental status actually improved and  were able to place her on BiPAP. She was then able to wean off BiPAP and placed on oxygen by nasal cannula.   ED work-up was reported with right upper lobe infiltrates, for which she received IV antibiotics with vancomycin and Zosyn. She reported having complete dialysis on Friday. She has history of similar admissions in the past with 3- COVID-19 testing in the last 2 months. She is being admitted for further management. She has no other concerns. She was not able to recollect events prior to presentation from the nursing facility. Past Medical History:   Diagnosis Date    Arthritis     Cancer ([facility] [address]     [facility]congestive [facility] failure) [facility]     Diabetes ([facility] [address]     Fracture of neck ([facility]     GERD (gastroesophageal reflux disease)     Goiter     Hiatal hernia     Hypertension     Uterine cancer (HCC)      Past Surgical History:   Procedure Laterality Date    HX APPENDECTOMY      HX HYSTERECTOMY      HX KNEE REPLACEMENT Right     HX ORTHOPAEDIC      odontoid fracture repair with hardware placement.  HX PARTIAL THYROIDECTOMY      MA INSJ TUNNELED CVC W/O SUBQ PORT/ AGE 5 YR/> Right 5/7/2020    INSERTION TUNNELED CENTRAL VENOUS CATHETER performed by Ira Guardado MD at [facility] CATH LAB     [facility], brought from rehab center. History reviewed. No pertinent family history. Allergies   Allergen Reactions    Augmentin [Amoxicillin-Pot Clavulanate] Diarrhea    Clavulanic Acid Diarrhea    Levaquin [Levofloxacin] Other (comments)     Severe hypoglycemia         Home Medications:     No current facility-administered medications on file prior to encounter. Current Outpatient Medications on File Prior to Encounter   Medication Sig Dispense Refill    gabapentin (NEURONTIN) 100 mg capsule Take 1 Cap by mouth two (2) times a day. Max Daily Amount: 200 mg. 20 Cap 0    carvediloL (COREG) 12.5 mg tablet Take 1 Tab by mouth two (2) times daily (with meals). 30 Tab 0    ferrous sulfate 325 mg (65 mg iron) tablet Take 1 Tab by mouth every other day for 30 days. 15 Tab 0    mirtazapine (REMERON) 15 mg tablet Take 1 Tab by mouth nightly.  10 Tab 0    docusate sodium (COLACE) 100 mg capsule Take 1 Cap by mouth two (2) times daily as needed for Constipation for up to 90 days. 6 Cap 0    OXYGEN-AIR DELIVERY SYSTEMS Take 2 L by inhalation daily.  amLODIPine (NORVASC) 10 mg tablet Take 1 Tab by mouth daily. 30 Tab 0    polyethylene glycol (MIRALAX) 17 gram packet Take 1 Packet by mouth daily. 10 Packet 0    rosuvastatin (CRESTOR) 5 mg tablet Take 1 Tab by mouth nightly. 30 Tab 0    hydrALAZINE (APRESOLINE) 100 mg tablet Take 1 Tab by mouth three (3) times daily. 90 Tab 0    aspirin 81 mg chewable tablet Take 1 Tab by mouth daily. 30 Tab 0    insulin glargine (LANTUS) 100 unit/mL injection 4 units SQ daily- watch for Hypoglycemia adjust dose per patient's blood glucose level 1 Vial 0    isosorbide mononitrate ER (IMDUR) 30 mg tablet Take 1 Tab by mouth daily. 30 Tab 0    esomeprazole (NEXIUM) 40 mg capsule Take 40 mg by mouth daily.  citalopram (CELEXA) 20 mg tablet Take 20 mg by mouth daily.  cranberry extract (AZO CRANBERRY) 450 mg tab Take 2 Tabs by mouth daily.  cholecalciferol (VITAMIN D3) 1,000 unit cap Take 5,000 Units by mouth daily.  cyanocobalamin (VITAMIN B-12) 1,000 mcg tablet Take 1,000 mcg by mouth two (2) times a day.  Biotin 2,500 mcg cap Take 1 Tab by mouth two (2) times a day.  vitamin a-vitamin c-vit e-min (OCUVITE) tablet Take 1 Tab by mouth daily.  loratadine (CLARITIN) 10 mg tablet Take 10 mg by mouth daily.  B.infantis-B.ani-B.long-B.bifi (PROBIOTIC 4X) 10-15 mg TbEC Take 1 Tab by mouth daily.          Review of Systems:   Limited due to acuity of situation  GEN  no fever or chills  CV  no chest pain  PULM  mild cough  GI  no abd pain, no vomiting     no dysuria, no flank pain   M/S- no back pain, no neck pain   SKIN  no rashes, no bruising   NEURO  no focal weakness, no speech changes   PSYCH  no  hallucinations   HEENT  no headache    Physical Assessment:     Visit Vitals  /46   Pulse (!) 53   Temp 98.9 °F (37.2 °C)   Resp 20   SpO2 99% Constitutional: The patient is oriented to person, place, and time. No distress. Eyes: No scleral icterus. Neck: No JVD present. Cardiovascular: Regular rhythm. Exam reveals no gallop and no friction rub. Pulmonary/Chest: No stridor. No respiratory distress. Basilar Rales. Abdominal: Soft. Bowel sounds are normal. exhibits no distension. No tenderness. No rebound and no guarding. Musculoskeletal:Normal strength. exhibits no tenderness. Neurological:alert and oriented  No facial asymmetry or focal weakness  Skin: Skin is warm and dry. Psychiatric: Pleasant, cooperative, impaired memory to events prior to admission. Recent Results (from the past 24 hour(s))   CBC WITH AUTOMATED DIFF    Collection Time: 06/07/20 10:02 PM   Result Value Ref Range    WBC 13.0 4.6 - 13.2 K/uL    RBC 2.91 (L) 4.20 - 5.30 M/uL    HGB 8.1 (L) 12.0 - 16.0 g/dL    HCT 26.1 (L) 35.0 - 45.0 %    MCV 89.7 74.0 - 97.0 FL    MCH 27.8 24.0 - 34.0 PG    MCHC 31.0 31.0 - 37.0 g/dL    RDW 13.8 11.6 - 14.5 %    PLATELET 498 298 - 032 K/uL    MPV 10.6 9.2 - 11.8 FL    NEUTROPHILS 83 (H) 40 - 73 %    LYMPHOCYTES 10 (L) 21 - 52 %    MONOCYTES 7 3 - 10 %    EOSINOPHILS 0 0 - 5 %    BASOPHILS 0 0 - 2 %    ABS. NEUTROPHILS 10.8 (H) 1.8 - 8.0 K/UL    ABS. LYMPHOCYTES 1.3 0.9 - 3.6 K/UL    ABS. MONOCYTES 0.9 0.05 - 1.2 K/UL    ABS. EOSINOPHILS 0.0 0.0 - 0.4 K/UL    ABS.  BASOPHILS 0.0 0.0 - 0.1 K/UL    DF AUTOMATED     METABOLIC PANEL, COMPREHENSIVE    Collection Time: 06/07/20 10:02 PM   Result Value Ref Range    Sodium 135 (L) 136 - 145 mmol/L    Potassium 3.6 3.5 - 5.5 mmol/L    Chloride 98 (L) 100 - 111 mmol/L    CO2 32 21 - 32 mmol/L    Anion gap 5 3.0 - 18 mmol/L    Glucose 175 (H) 74 - 99 mg/dL    BUN 27 (H) 7.0 - 18 MG/DL    Creatinine 4.02 (H) 0.6 - 1.3 MG/DL    BUN/Creatinine ratio 7 (L) 12 - 20      GFR est AA 13 (L) >60 ml/min/1.73m2    GFR est non-AA 11 (L) >60 ml/min/1.73m2    Calcium 8.1 (L) 8.5 - 10.1 MG/DL    Bilirubin, total 0.3 0.2 - 1.0 MG/DL    ALT (SGPT) 12 (L) 13 - 56 U/L    AST (SGOT) 10 10 - 38 U/L    Alk. phosphatase 103 45 - 117 U/L    Protein, total 6.8 6.4 - 8.2 g/dL    Albumin 3.1 (L) 3.4 - 5.0 g/dL    Globulin 3.7 2.0 - 4.0 g/dL    A-G Ratio 0.8 0.8 - 1.7     PROTHROMBIN TIME + INR    Collection Time: 06/07/20 10:02 PM   Result Value Ref Range    Prothrombin time 14.8 11.5 - 15.2 sec    INR 1.2 0.8 - 1.2     POC G3    Collection Time: 06/07/20 10:11 PM   Result Value Ref Range    Device: BIPAP      FIO2 (POC) 10 %    pH (POC) 7.261 (L) 7.35 - 7.45      pCO2 (POC) 72.7 (H) 35.0 - 45.0 MMHG    pO2 (POC) 213 (H) 80 - 100 MMHG    HCO3 (POC) 32.7 (H) 22 - 26 MMOL/L    sO2 (POC) 100 (H) 92 - 97 %    Base excess (POC) 4 mmol/L    PEEP/CPAP (POC) 12 cmH2O    PIP (POC) 30      Allens test (POC) NO      Total resp.  rate 23      Site RIGHT RADIAL      Specimen type (POC) ARTERIAL      Performed by Xiao Solano    POC LACTIC ACID    Collection Time: 06/07/20 10:16 PM   Result Value Ref Range    Lactic Acid (POC) 1.05 0.40 - 2.00 mmol/L   EKG, 12 LEAD, INITIAL    Collection Time: 06/07/20 10:53 PM   Result Value Ref Range    Ventricular Rate 67 BPM    Atrial Rate 67 BPM    P-R Interval 178 ms    QRS Duration 134 ms    Q-T Interval 464 ms    QTC Calculation (Bezet) 490 ms    Calculated P Axis 31 degrees    Calculated R Axis -34 degrees    Calculated T Axis 66 degrees    Diagnosis       Sinus rhythm with premature ventricular complexes or fusion complexes  Left axis deviation  Nonspecific intraventricular block  Abnormal ECG  When compared with ECG of 04-MAY-2020 01:18,  fusion complexes are now present     POC G3    Collection Time: 06/08/20  3:05 AM   Result Value Ref Range    Device: BIPAP      FIO2 (POC) 50 %    pH (POC) 7.332 (L) 7.35 - 7.45      pCO2 (POC) 56.6 (H) 35.0 - 45.0 MMHG    pO2 (POC) 78 (L) 80 - 100 MMHG    HCO3 (POC) 30.0 (H) 22 - 26 MMOL/L    sO2 (POC) 94 92 - 97 %    Base excess (POC) 3 mmol/L    PIP (POC) 33      Allens test (POC) N/A      Total resp.  rate 28      Site RIGHT RADIAL      Specimen type (POC) ARTERIAL      Performed by Renato Robledo          Diagnostic Studies:  CXR:   Reported with right upper lobe infiltrates  Radiology report is pending

## 2020-06-08 NOTE — ED TRIAGE NOTES
Patient brought by medic for respiratory distress. Patient from [facility] health and rehab. Per staff pt was sating in the 80's on patient rounds. Patient was on 15L o2 with oxygen saturation of 88%. Upon medic arrival patient not coherent and oxygen saturations in the 50's. EMS bagging patient upon arrival. Patient placed on bipap.

## 2020-06-08 NOTE — ED NOTES
Patient bedside shift report given to [facility], [address] [facility].

## 2020-06-08 NOTE — ED NOTES
Pt transported to dialysis via stretcher Scribe Attestation (For Scribes USE Only)... Attending Attestation (For Attendings USE Only).../Scribe Attestation (For Scribes USE Only)...

## 2020-06-08 NOTE — PROGRESS NOTES
[facility] Dosing- Initial Progress Note    Pharmacy Consult ordered by Dr. Belinda Mckeon     Indication: sepsis    Patient clinical status and labs ordered/reviewed. Pt Weight     Serum Creatinine Lab Results   Component Value Date/Time    Creatinine 3.66 (H) 05/13/2020 10:16 AM       Creatinine Clearance CrCl cannot be calculated (Unknown ideal weight.). BUN Lab Results   Component Value Date/Time    BUN 37 (H) 05/13/2020 10:16 AM       WBC Lab Results   Component Value Date/Time    WBC 13.0 06/07/2020 10:02 PM      Temperature     HR Pulse (Heart Rate): 78     BP BP: 179/58               Drug Levels:   Vancomycin    No results for input(s): VANCP, VANCT, VANCR, VANRA in the last 72 hours. Gentamicin   No results for input(s): GENP, GENT in the last 72 hours. No lab exists for component:  GENR   Tobramycin   No results for input(s): TOBP, TOBT, TOBR in the last 72 hours. Amikacin   No results for input(s): Rollen Aas in the last 72 hours.     No lab exists for component:  Jackalyn Cranker, AMIKR, DAMIKR     Dose for naïve patient was initiated at: vancomycin 2gm x1, further dosing pending labs/levels    Continue to monitor    Sign: DUSTIN Boss [facility]  Date: 6/7/2020  Time: 10:26 PM

## 2020-06-08 NOTE — PROGRESS NOTES
Consult noted for ESRD , fluid overload , orders placed for HD , A/P as below    1) ESRD on HD MWF , HD orders placed, UF ~ 2-3 lit as tolerated  2) Ac respi failure d/t fluid overload , AC on chr diastolic CHF  3) AC on chr CHF  4) PNA   5) ANemia   6) sec hyperpara    PLan:    1) HD today for volume and solure  2) anticipate IUF tomorrow  3) fluid restrict 1200 ml    Please call with questions ,    Tony Holley MD [facility]  Cell [phone]  Pager: [phone]

## 2020-06-08 NOTE — ED NOTES
Bedside and verbal shift report given by Jesse Maldonado RN (off going nurse) to Emily Poe RN (oncoming nurse). Report included the following information SBAR and ED Summary.     Patient Back to room from dialysis

## 2020-06-09 LAB
ANION GAP SERPL CALC-SCNC: 4 MMOL/L (ref 3–18)
APPEARANCE UR: ABNORMAL
BACTERIA URNS QL MICRO: ABNORMAL /HPF
BILIRUB UR QL: ABNORMAL
BUN SERPL-MCNC: 22 MG/DL (ref 7–18)
BUN/CREAT SERPL: 8 (ref 12–20)
CALCIUM SERPL-MCNC: 8.5 MG/DL (ref 8.5–10.1)
CHLORIDE SERPL-SCNC: 101 MMOL/L (ref 100–111)
CK SERPL-CCNC: 18 U/L (ref 26–192)
CO2 SERPL-SCNC: 31 MMOL/L (ref 21–32)
COLOR UR: ABNORMAL
CREAT SERPL-MCNC: 2.91 MG/DL (ref 0.6–1.3)
CRP SERPL-MCNC: 2.5 MG/DL (ref 0–0.3)
EPITH CASTS URNS QL MICRO: ABNORMAL /LPF (ref 0–5)
ERYTHROCYTE [DISTWIDTH] IN BLOOD BY AUTOMATED COUNT: 13.8 % (ref 11.6–14.5)
FERRITIN SERPL-MCNC: 424 NG/ML (ref 8–388)
GLUCOSE SERPL-MCNC: 133 MG/DL (ref 74–99)
GLUCOSE UR STRIP.AUTO-MCNC: NEGATIVE MG/DL
HBV SURFACE AB SER QL IA: NEGATIVE
HBV SURFACE AB SERPL IA-ACNC: <3.1 MIU/ML
HBV SURFACE AG SER QL: <0.1 INDEX
HBV SURFACE AG SER QL: NEGATIVE
HCT VFR BLD AUTO: 22.7 % (ref 35–45)
HEP BS AB COMMENT,HBSAC: ABNORMAL
HGB BLD-MCNC: 7.3 G/DL (ref 12–16)
HGB UR QL STRIP: NEGATIVE
KETONES UR QL STRIP.AUTO: ABNORMAL MG/DL
LDH SERPL L TO P-CCNC: 173 U/L (ref 81–234)
LEUKOCYTE ESTERASE UR QL STRIP.AUTO: ABNORMAL
MCH RBC QN AUTO: 28 PG (ref 24–34)
MCHC RBC AUTO-ENTMCNC: 32.2 G/DL (ref 31–37)
MCV RBC AUTO: 87 FL (ref 74–97)
NITRITE UR QL STRIP.AUTO: POSITIVE
PH UR STRIP: 5 [PH] (ref 5–8)
PLATELET # BLD AUTO: 184 K/UL (ref 135–420)
PMV BLD AUTO: 10 FL (ref 9.2–11.8)
POTASSIUM SERPL-SCNC: 3.9 MMOL/L (ref 3.5–5.5)
PROT UR STRIP-MCNC: 100 MG/DL
RBC # BLD AUTO: 2.61 M/UL (ref 4.2–5.3)
RBC #/AREA URNS HPF: NEGATIVE /HPF (ref 0–5)
SARS-COV-2, COV2NT: NOT DETECTED
SODIUM SERPL-SCNC: 136 MMOL/L (ref 136–145)
SP GR UR REFRACTOMETRY: 1.02 (ref 1–1.03)
UROBILINOGEN UR QL STRIP.AUTO: 0.2 EU/DL (ref 0.2–1)
VANCOMYCIN SERPL-MCNC: 10.1 UG/ML (ref 5–40)
WBC # BLD AUTO: 11.3 K/UL (ref 4.6–13.2)
WBC URNS QL MICRO: ABNORMAL /HPF (ref 0–4)
YEAST URNS QL MICRO: ABNORMAL

## 2020-06-09 PROCEDURE — 86706 HEP B SURFACE ANTIBODY: CPT

## 2020-06-09 PROCEDURE — 80048 BASIC METABOLIC PNL TOTAL CA: CPT

## 2020-06-09 PROCEDURE — 74011250636 HC RX REV CODE- 250/636: Performed by: HOSPITALIST

## 2020-06-09 PROCEDURE — 74011250636 HC RX REV CODE- 250/636: Performed by: INTERNAL MEDICINE

## 2020-06-09 PROCEDURE — 80202 ASSAY OF VANCOMYCIN: CPT

## 2020-06-09 PROCEDURE — 86140 C-REACTIVE PROTEIN: CPT

## 2020-06-09 PROCEDURE — 65660000000 HC RM CCU STEPDOWN

## 2020-06-09 PROCEDURE — 87340 HEPATITIS B SURFACE AG IA: CPT

## 2020-06-09 PROCEDURE — 82728 ASSAY OF FERRITIN: CPT

## 2020-06-09 PROCEDURE — 90935 HEMODIALYSIS ONE EVALUATION: CPT

## 2020-06-09 PROCEDURE — 82550 ASSAY OF CK (CPK): CPT

## 2020-06-09 PROCEDURE — 74011636637 HC RX REV CODE- 636/637: Performed by: INTERNAL MEDICINE

## 2020-06-09 PROCEDURE — 81001 URINALYSIS AUTO W/SCOPE: CPT

## 2020-06-09 PROCEDURE — 83615 LACTATE (LD) (LDH) ENZYME: CPT

## 2020-06-09 PROCEDURE — 74011250637 HC RX REV CODE- 250/637: Performed by: INTERNAL MEDICINE

## 2020-06-09 PROCEDURE — 85027 COMPLETE CBC AUTOMATED: CPT

## 2020-06-09 PROCEDURE — 74011000258 HC RX REV CODE- 258: Performed by: INTERNAL MEDICINE

## 2020-06-09 RX ORDER — HEPARIN SODIUM 1000 [USP'U]/ML
INJECTION, SOLUTION INTRAVENOUS; SUBCUTANEOUS
Status: DISPENSED
Start: 2020-06-09 | End: 2020-06-10

## 2020-06-09 RX ORDER — VANCOMYCIN HYDROCHLORIDE
1250 ONCE
Status: COMPLETED | OUTPATIENT
Start: 2020-06-09 | End: 2020-06-09

## 2020-06-09 RX ADMIN — POLYETHYLENE GLYCOL 3350 17 G: 17 POWDER, FOR SOLUTION ORAL at 09:33

## 2020-06-09 RX ADMIN — PANTOPRAZOLE SODIUM 40 MG: 40 TABLET, DELAYED RELEASE ORAL at 09:19

## 2020-06-09 RX ADMIN — LORATADINE 10 MG: 10 TABLET ORAL at 09:18

## 2020-06-09 RX ADMIN — PIPERACILLIN AND TAZOBACTAM 2.25 G: 2; .25 INJECTION, POWDER, FOR SOLUTION INTRAVENOUS at 23:01

## 2020-06-09 RX ADMIN — AMLODIPINE BESYLATE 10 MG: 10 TABLET ORAL at 09:18

## 2020-06-09 RX ADMIN — HYDRALAZINE HYDROCHLORIDE 100 MG: 50 TABLET, FILM COATED ORAL at 17:36

## 2020-06-09 RX ADMIN — INSULIN GLARGINE 4 UNITS: 100 INJECTION, SOLUTION SUBCUTANEOUS at 09:30

## 2020-06-09 RX ADMIN — VANCOMYCIN HYDROCHLORIDE 1250 MG: 10 INJECTION, POWDER, LYOPHILIZED, FOR SOLUTION INTRAVENOUS at 09:27

## 2020-06-09 RX ADMIN — ISOSORBIDE MONONITRATE 30 MG: 30 TABLET, EXTENDED RELEASE ORAL at 09:19

## 2020-06-09 RX ADMIN — CARVEDILOL 12.5 MG: 12.5 TABLET, FILM COATED ORAL at 09:19

## 2020-06-09 RX ADMIN — ASPIRIN 81 MG CHEWABLE TABLET 81 MG: 81 TABLET CHEWABLE at 09:18

## 2020-06-09 RX ADMIN — GABAPENTIN 100 MG: 100 CAPSULE ORAL at 09:19

## 2020-06-09 RX ADMIN — CITALOPRAM HYDROBROMIDE 20 MG: 20 TABLET ORAL at 09:20

## 2020-06-09 RX ADMIN — CARVEDILOL 12.5 MG: 12.5 TABLET, FILM COATED ORAL at 17:36

## 2020-06-09 RX ADMIN — CYANOCOBALAMIN TAB 1000 MCG 1000 MCG: 1000 TAB at 17:36

## 2020-06-09 RX ADMIN — PIPERACILLIN AND TAZOBACTAM 2.25 G: 2; .25 INJECTION, POWDER, FOR SOLUTION INTRAVENOUS at 15:43

## 2020-06-09 RX ADMIN — FUROSEMIDE 80 MG: 10 INJECTION, SOLUTION INTRAMUSCULAR; INTRAVENOUS at 09:23

## 2020-06-09 RX ADMIN — ROSUVASTATIN CALCIUM 5 MG: 10 TABLET, COATED ORAL at 23:00

## 2020-06-09 RX ADMIN — GABAPENTIN 100 MG: 100 CAPSULE ORAL at 17:36

## 2020-06-09 RX ADMIN — EPOETIN ALFA-EPBX 10000 UNITS: 10000 INJECTION, SOLUTION INTRAVENOUS; SUBCUTANEOUS at 17:40

## 2020-06-09 RX ADMIN — MIRTAZAPINE 15 MG: 15 TABLET, FILM COATED ORAL at 23:00

## 2020-06-09 RX ADMIN — HYDRALAZINE HYDROCHLORIDE 100 MG: 50 TABLET, FILM COATED ORAL at 09:20

## 2020-06-09 RX ADMIN — CYANOCOBALAMIN TAB 1000 MCG 1000 MCG: 1000 TAB at 09:18

## 2020-06-09 RX ADMIN — HEPARIN SODIUM 5000 UNITS: 5000 INJECTION INTRAVENOUS; SUBCUTANEOUS at 15:42

## 2020-06-09 RX ADMIN — FUROSEMIDE 80 MG: 10 INJECTION, SOLUTION INTRAMUSCULAR; INTRAVENOUS at 17:36

## 2020-06-09 RX ADMIN — HEPARIN SODIUM 5000 UNITS: 5000 INJECTION INTRAVENOUS; SUBCUTANEOUS at 22:59

## 2020-06-09 RX ADMIN — HYDRALAZINE HYDROCHLORIDE 100 MG: 50 TABLET, FILM COATED ORAL at 23:00

## 2020-06-09 NOTE — PROGRESS NOTES
NUTRITION    Nursing Referral: [facility]     RECOMMENDATIONS / PLAN:     - Add supplements: Nepro Shake, once daily.  - Add 1200 mL fluid restriction per Nephrology. - Continue RD inpatient monitoring and evaluation. NUTRITION INTERVENTIONS & DIAGNOSIS:     - Meals/snacks: modified composition  - Medical food supplement therapy: initiate    Nutrition Diagnosis: Increased nutrient needs protein related to increased expenditure as evidenced by ESRD on HD    ASSESSMENT:     Admitted with pneumonia, CHF with hx of ESRD on HD. Diet started and noted pt eating meals in ED, intake unknown at this time. Wt gain noted PTA, question fluid status. Hx yesterday UF 3.5 L, 1200 mL FR recommended per Nephrology. Nutritional intake adequate to meet patients estimated nutritional needs:  Unable to determine at this time    Diet: No diet orders on file   Cardiac diet ordered 6/9 at 0445  Food Allergies: NKFA  Current Appetite: Unable to determine at this time  Appetite/meal intake prior to admission: Unable to determine at this time  Feeding Limitations:  [] Swallowing difficulty    [] Chewing difficulty    [] Other:  Current Meal Intake: No data found.     BM: PTA  Skin Integrity: WDL   Edema:   [] No     [x] Yes   Pertinent Medications: Reviewed: cyanocobalamin, docusate sodium prn, ferrous sulfate, furosemide, lantus (4 units), mirtazapine, pantoprazole    Recent Labs     06/09/20  0403 06/08/20  0423 06/07/20  2202    134* 135*   K 3.9 4.1 3.6    99* 98*   CO2 31 30 32   * 211* 175*   BUN 22* 31* 27*   CREA 2.91* 3.97* 4.02*   CA 8.5 8.3* 8.1*   ALB  --   --  3.1*   ALT  --   --  12*       Intake/Output Summary (Last 24 hours) at 6/9/2020 1045  Last data filed at 6/8/2020 2249  Gross per 24 hour   Intake 100 ml   Output 3000 ml   Net -2900 ml       Anthropometrics:  Ht Readings from Last 1 Encounters:   06/08/20 5' 5\" (1.651 m)     Last 3 Recorded Weights in this Encounter    06/08/20 1131   Weight: 121.6 kg (268 lb)     Body mass index is 44.6 kg/m². Obese Class III    Weight History: Wt gain noted PTA, question fluid status, ESRD on HD    Weight Metrics 6/8/2020 5/13/2020 4/30/2020 4/29/2020 4/26/2020 8/18/2016 6/2/2016   Weight 268 lb 238 lb 5.1 oz - 252 lb 252 lb 4.8 oz 219 lb 228 lb   BMI 44.6 kg/m2 - 39.66 kg/m2 41.93 kg/m2 41.98 kg/m2 36.44 kg/m2 37.94 kg/m2        Admitting Diagnosis: Pneumonia [J18.9]  Pertinent PMHx: CHF, DM, GERD, HTN, uterine cancer, ESRD on HD    Education Needs:        [x] None identified  [] Identified - Not appropriate at this time  []  Identified and addressed - refer to education log  Learning Limitations:   [x] None identified  [] Identified    Cultural, Rastafarian & ethnic food preferences:  [x] None identified    [] Identified and addressed     ESTIMATED NUTRITION NEEDS:     Calories: 9352-8421 kcal (30-35 kcal/kg) based on  [] Actual BW      [x] SBW: 67 kg   Protein:  gm (1.2-1.5 gm/kg) based on  [] Actual BW      [x] SBW   Fluid: 750-1500 mL/day     MONITORING & EVALUATION:     Nutrition Goal(s):   - PO nutrition intake will meet >75% of patient estimated nutritional needs within the next 7 days. Outcome: New/Initial goal      Monitoring:   [x] Food and nutrient intake   [x] Food and nutrient administration  [x] Comparative standards   [x] Nutrition-focused physical findings   [x] Anthropometric Measurements   [x] Treatment/therapy   [x] Biochemical data, medical tests, and procedures        Previous Recommendations (for follow-up assessments only):  Not Applicable     Discharge Planning: No nutritional discharge needs at this time. Participated in care planning, discharge planning, & interdisciplinary rounds as appropriate.       Cady Conner RD  Pager: [phone]

## 2020-06-09 NOTE — PROGRESS NOTES
[facility]  Progress Note    Patient: Gabo Serum Age: 71 y.o. : 1950 MR#: [id number] SSN: [id number]  Date/Time: 2020     Subjective: Patient seen in the dialysis unit. Patient feels lot better. Shortness of breath improved. Denies any cough. Assessment/Plan:   1. Shortness of breath and hypoxemia dissection suspect fluid overload versus pneumonia: COVID negative and all cultures negative, will de-escalate antibiotics. Suspect most likely fluid overload, will repeat chest x-ray tomorrow. 2. ESRD on HD, HD today by nephrology. 3. Acute on chronic diastolic heart failure: Fluid management with dialysis. 4. Hypertension: BP stable, continue current medications  5. Chronic respiratory failure on home O2  6. Diabetes mellitus type 2 continue Lantus and SSI:  Discussed with the patient at the bedside in detail. Case discussed with:  [x]Patient  []Family  [x]Nursing  []Case Management  DVT Prophylaxis:  []Lovenox  [x]Hep SQ  []SCDs  []Coumadin   []Eliquis/Xarelto     Objective:   VS:   Visit Vitals  /77 (BP 1 Location: Left arm, BP Patient Position: At rest)   Pulse 69   Temp 97.8 °F (36.6 °C)   Resp 18   Ht 5' 5\" (1.651 m)   Wt 121.6 kg (268 lb)   SpO2 95%   Breastfeeding No   BMI 44.60 kg/m²      Tmax/24hrs: Temp (24hrs), Av °F (36.7 °C), Min:97.7 °F (36.5 °C), Max:98.3 °F (36.8 °C)  IOBRIEF    Intake/Output Summary (Last 24 hours) at 2020 1800  Last data filed at 2020 1400  Gross per 24 hour   Intake 100 ml   Output 4500 ml   Net -4400 ml       General:  Alert, cooperative, no acute distress    HEENT: PERRLA, anicteric sclerae. Pulmonary:  CTA Bilaterally. No Wheezing/Rales. Cardiovascular: Regular rate and Rhythm. GI:  Soft, Non distended, Non tender. + Bowel sounds. Extremities:  No edema. No calf tenderness. Psych: Good insight. Not anxious or agitated. Neurologic: Alert and oriented X 3.  Moves all ext.  Additional:    Medications:   Current Facility-Administered Medications   Medication Dose Route Frequency    heparin (porcine) 1,000 unit/mL injection        piperacillin-tazobactam (ZOSYN) 2.25 g in 0.9% sodium chloride (MBP/ADV) 50 mL MBP  2.25 g IntraVENous Q8H    furosemide (LASIX) injection 80 mg  80 mg IntraVENous BID    pantoprazole (PROTONIX) tablet 40 mg  40 mg Oral ACB    citalopram (CELEXA) tablet 20 mg  20 mg Oral DAILY    cyanocobalamin tablet 1,000 mcg  1,000 mcg Oral BID    loratadine (CLARITIN) tablet 10 mg  10 mg Oral DAILY    aspirin chewable tablet 81 mg  81 mg Oral DAILY    isosorbide mononitrate ER (IMDUR) tablet 30 mg  30 mg Oral DAILY    amLODIPine (NORVASC) tablet 10 mg  10 mg Oral DAILY    polyethylene glycol (MIRALAX) packet 17 g  17 g Oral DAILY    rosuvastatin (CRESTOR) tablet 5 mg  5 mg Oral QHS    hydrALAZINE (APRESOLINE) tablet 100 mg  100 mg Oral TID    gabapentin (NEURONTIN) capsule 100 mg  100 mg Oral BID    carvediloL (COREG) tablet 12.5 mg  12.5 mg Oral BID WITH MEALS    ferrous sulfate tablet 325 mg  325 mg Oral EVERY OTHER DAY    mirtazapine (REMERON) tablet 15 mg  15 mg Oral QHS    docusate sodium (COLACE) capsule 100 mg  100 mg Oral BID PRN    insulin glargine (LANTUS) injection 4 Units  4 Units SubCUTAneous DAILY    heparin (porcine) injection 5,000 Units  5,000 Units SubCUTAneous Q8H    Piperacillin-tazobactam (ZOSYN) 0.75 gm Supplemental Dosing by Pharmacy   Other Rx Dosing/Monitoring       Labs:    Recent Results (from the past 24 hour(s))   URINALYSIS W/ RFLX MICROSCOPIC    Collection Time: 06/09/20  4:03 AM   Result Value Ref Range    Color DARK YELLOW      Appearance CLOUDY      Specific gravity 1.025 1.005 - 1.030      pH (UA) 5.0 5.0 - 8.0      Protein 100 (A) NEG mg/dL    Glucose Negative NEG mg/dL    Ketone TRACE (A) NEG mg/dL    Bilirubin MODERATE (A) NEG      Blood Negative NEG      Urobilinogen 0.2 0.2 - 1.0 EU/dL    Nitrites Positive (A) NEG      Leukocyte Esterase MODERATE (A) NEG     METABOLIC PANEL, BASIC    Collection Time: 06/09/20  4:03 AM   Result Value Ref Range    Sodium 136 136 - 145 mmol/L    Potassium 3.9 3.5 - 5.5 mmol/L    Chloride 101 100 - 111 mmol/L    CO2 31 21 - 32 mmol/L    Anion gap 4 3.0 - 18 mmol/L    Glucose 133 (H) 74 - 99 mg/dL    BUN 22 (H) 7.0 - 18 MG/DL    Creatinine 2.91 (H) 0.6 - 1.3 MG/DL    BUN/Creatinine ratio 8 (L) 12 - 20      GFR est AA 19 (L) >60 ml/min/1.73m2    GFR est non-AA 16 (L) >60 ml/min/1.73m2    Calcium 8.5 8.5 - 10.1 MG/DL   CBC W/O DIFF    Collection Time: 06/09/20  4:03 AM   Result Value Ref Range    WBC 11.3 4.6 - 13.2 K/uL    RBC 2.61 (L) 4.20 - 5.30 M/uL    HGB 7.3 (L) 12.0 - 16.0 g/dL    HCT 22.7 (L) 35.0 - 45.0 %    MCV 87.0 74.0 - 97.0 FL    MCH 28.0 24.0 - 34.0 PG    MCHC 32.2 31.0 - 37.0 g/dL    RDW 13.8 11.6 - 14.5 %    PLATELET 661 001 - 257 K/uL    MPV 10.0 9.2 - 11.8 FL   VANCOMYCIN, RANDOM    Collection Time: 06/09/20  4:03 AM   Result Value Ref Range    Vancomycin, random 10.1 5.0 - 40.0 UG/ML   LD    Collection Time: 06/09/20  4:03 AM   Result Value Ref Range     81 - 234 U/L   C REACTIVE PROTEIN, QT    Collection Time: 06/09/20  4:03 AM   Result Value Ref Range    C-Reactive protein 2.5 (H) 0 - 0.3 mg/dL   CK    Collection Time: 06/09/20  4:03 AM   Result Value Ref Range    CK 18 (L) 26 - 192 U/L   FERRITIN    Collection Time: 06/09/20  4:03 AM   Result Value Ref Range    Ferritin 424 (H) 8 - 388 NG/ML   HEP B SURFACE AB    Collection Time: 06/09/20  4:03 AM   Result Value Ref Range    Hepatitis B surface Ab <3.10 (L) >10.0 mIU/mL    Hep B surface Ab Interp. Negative (A) POS      Hep B surface Ab comment        Samples with a  value of 10 mIU/mL or greater are considered positive (protective immunity) in accordance with the CDC guidelines.    HEP B SURFACE AG    Collection Time: 06/09/20  4:03 AM   Result Value Ref Range    Hepatitis B surface Ag <0.10 <1.00 Index    Hep B surface Ag Interp. Negative NEG     URINE MICROSCOPIC ONLY    Collection Time: 06/09/20  4:03 AM   Result Value Ref Range    WBC 1 to 5 0 - 4 /hpf    RBC Negative 0 - 5 /hpf    Epithelial cells 1+ 0 - 5 /lpf    Bacteria 3+ (A) NEG /hpf    Yeast 4+ (A) NEG       Signed By: Rita Poon MD     June 9, 2020      Disclaimer: Sections of this note are dictated using utilizing voice recognition software. Minor typographical errors may be present. If questions arise, please do not hesitate to contact me or call our department.

## 2020-06-09 NOTE — ED NOTES
Bedside and verbal shift report given by Jose Tapia RN (off going nurse) to Warden Rosa, [address] [place] [address] (oncoming nurse). Report included the following information SBAR and ED Summary.

## 2020-06-09 NOTE — PROGRESS NOTES
Problem: Nutrition Deficit  Goal: *Optimize nutritional status  Outcome: Progressing Towards Goal     Problem: Falls - Risk of  Goal: *Absence of Falls  Description: Document Laretta Luis Alfredo Fall Risk and appropriate interventions in the flowsheet. Outcome: Progressing Towards Goal  Note: Fall Risk Interventions:            Medication Interventions: Bed/chair exit alarm, Assess postural VS orthostatic hypotension         History of Falls Interventions: Evaluate medications/consider consulting pharmacy, Door open when patient unattended         Problem: Patient Education: Go to Patient Education Activity  Goal: Patient/Family Education  Outcome: Progressing Towards Goal     Problem: Pressure Injury - Risk of  Goal: *Prevention of pressure injury  Description: Document Toney Scale and appropriate interventions in the flowsheet. Outcome: Progressing Towards Goal  Note: Pressure Injury Interventions:  Sensory Interventions: Assess changes in LOC, Assess need for specialty bed, Float heels, Keep linens dry and wrinkle-free, Minimize linen layers, Monitor skin under medical devices, Turn and reposition approx.  every two hours (pillows and wedges if needed)    Moisture Interventions: Absorbent underpads, Minimize layers, Limit adult briefs, Internal/External urinary devices    Activity Interventions: Pressure redistribution bed/mattress(bed type)    Mobility Interventions: HOB 30 degrees or less    Nutrition Interventions: Document food/fluid/supplement intake    Friction and Shear Interventions: Apply protective barrier, creams and emollients, Lift sheet, Minimize layers, Transferring/repositioning devices                Problem: Patient Education: Go to Patient Education Activity  Goal: Patient/Family Education  Outcome: Progressing Towards Goal     Problem: Airway Clearance - Ineffective  Goal: Achieve or maintain patent airway  Outcome: Progressing Towards Goal     Problem: Gas Exchange - Impaired  Goal: Absence of hypoxia  Outcome: Progressing Towards Goal  Goal: Promote optimal lung function  Outcome: Progressing Towards Goal     Problem: Breathing Pattern - Ineffective  Goal: Ability to achieve and maintain a regular respiratory rate  Outcome: Progressing Towards Goal     Problem: Isolation Precautions - Risk of Spread of Infection  Goal: Prevent transmission of infectious organism to others  Outcome: Progressing Towards Goal     Problem: Risk for Fluid Volume Deficit  Goal: Maintain normal heart rhythm  Outcome: Progressing Towards Goal  Goal: Maintain absence of muscle cramping  Outcome: Progressing Towards Goal  Goal: Maintain normal serum potassium, sodium, calcium, phosphorus, and pH  Outcome: Progressing Towards Goal     Problem: Loneliness or Risk for Loneliness  Goal: Demonstrate positive use of time alone when socialization is not possible  Outcome: Progressing Towards Goal     Problem: Fatigue  Goal: Verbalize increase energy and improved vitality  Outcome: Progressing Towards Goal     Problem: Patient Education: Go to Patient Education Activity  Goal: Patient/Family Education  Outcome: Progressing Towards Goal

## 2020-06-09 NOTE — PROGRESS NOTES
In Patient Progress note      Admit Date: 6/7/2020    Impression:     1) ESRD on HD MWF , volume overloaded   2) Ac respi failure d/t fluid overload , AC on chr diastolic CHF  3) AC on chr CHF  4) PNA ?  Rapid covid -ve    5) Anemia   6) sec hyperpara     PLan:  1) IUF today ~ 1.5 lit as tolerated   2) low Na diet  3) fluid restrict 1200 ml  4) adding epogen      Please call with questions ,     Leona Qiu MD FASN  Cell [phone]  Pager: [phone]    Subjective:     Feels better  Cough+  No fevers        Current Facility-Administered Medications:     heparin (porcine) 1,000 unit/mL injection, , , ,     piperacillin-tazobactam (ZOSYN) 2.25 g in 0.9% sodium chloride (MBP/ADV) 50 mL MBP, 2.25 g, IntraVENous, Q8H, Hadley Angulo MD, Last Rate: 100 mL/hr at 06/08/20 2338, 2.25 g at 06/08/20 2338    furosemide (LASIX) injection 80 mg, 80 mg, IntraVENous, BID, Hadley Angulo MD, 80 mg at 06/09/20 0923    pantoprazole (PROTONIX) tablet 40 mg, 40 mg, Oral, ACB, Hadley Angulo MD, 40 mg at 06/09/20 0919    citalopram (CELEXA) tablet 20 mg, 20 mg, Oral, DAILY, Hadley Angulo MD, 20 mg at 06/09/20 0920    cyanocobalamin tablet 1,000 mcg, 1,000 mcg, Oral, BID, Hadley Angulo MD, 1,000 mcg at 06/09/20 0918    loratadine (CLARITIN) tablet 10 mg, 10 mg, Oral, DAILY, Hadley Angulo MD, 10 mg at 06/09/20 1862    aspirin chewable tablet 81 mg, 81 mg, Oral, DAILY, Hadley Angulo MD, 81 mg at 06/09/20 0918    isosorbide mononitrate ER (IMDUR) tablet 30 mg, 30 mg, Oral, DAILY, Hadley Angulo MD, 30 mg at 06/09/20 0919    amLODIPine (NORVASC) tablet 10 mg, 10 mg, Oral, DAILY, Hadley Angulo MD, 10 mg at 06/09/20 0918    polyethylene glycol (MIRALAX) packet 17 g, 17 g, Oral, DAILY, Hadley Angulo MD, 17 g at 06/09/20 0933    rosuvastatin (CRESTOR) tablet 5 mg, 5 mg, Oral, QHS, Hadley Angulo MD, 5 mg at 06/08/20 2336    hydrALAZINE (APRESOLINE) tablet 100 mg, 100 mg, Oral, TID, Celine MORENO MD, 100 mg at 06/09/20 0920    gabapentin (NEURONTIN) capsule 100 mg, 100 mg, Oral, BID, Hadley Angulo MD, 100 mg at 06/09/20 0919    carvediloL (COREG) tablet 12.5 mg, 12.5 mg, Oral, BID WITH MEALS, Hadley Angulo MD, 12.5 mg at 06/09/20 0919    ferrous sulfate tablet 325 mg, 325 mg, Oral, EVERY OTHER DAY, Hadley Angulo MD, 325 mg at 06/08/20 0453    mirtazapine (REMERON) tablet 15 mg, 15 mg, Oral, QHS, Hadley Angulo MD, 15 mg at 06/08/20 2337    docusate sodium (COLACE) capsule 100 mg, 100 mg, Oral, BID PRN, Rayne Severance, Nabeel A, MD    insulin glargine (LANTUS) injection 4 Units, 4 Units, SubCUTAneous, DAILY, Hadley Angulo MD, 4 Units at 06/09/20 0930    heparin (porcine) injection 5,000 Units, 5,000 Units, SubCUTAneous, Q8H, Hadley Angulo MD, 5,000 Units at 06/08/20 2337    Piperacillin-tazobactam (ZOSYN) 0.75 gm Supplemental Dosing by Pharmacy, , Other, Rx Dosing/Monitoring, Loretta Schuster MD    VANCOMYCIN INFORMATION NOTE, , Other, Rx Dosing/Monitoring, Loretta Schuster MD        Objective:     Visit Vitals  /84 (BP 1 Location: Left arm, BP Patient Position: At rest;Supine)   Pulse 72   Temp 98.2 °F (36.8 °C)   Resp 18   Ht 5' 5\" (1.651 m)   Wt 121.6 kg (268 lb)   SpO2 90%   Breastfeeding No   BMI 44.60 kg/m²         Intake/Output Summary (Last 24 hours) at 6/9/2020 1535  Last data filed at 6/9/2020 1400  Gross per 24 hour   Intake 100 ml   Output 4500 ml   Net -4400 ml       Physical Exam:     gen NAD  HENT mmm  RS AEBE coarse BS   CVS s1 s2 wnl no JVD  GI soft BS +  Ext 1+ LE edema       Data Review:    Recent Labs     06/09/20  0403   WBC 11.3   RBC 2.61*   HCT 22.7*   MCV 87.0   MCH 28.0   MCHC 32.2   RDW 13.8     Recent Labs     06/09/20  0403 06/08/20  0423 06/07/20 2202   BUN 22* 31* 27*   CREA 2.91* 3.97* 4.02*   CA 8.5 8.3* 8.1*   ALB  --   --  3.1*   K 3.9 4.1 3.6    134* 135*    99* 98*   CO2 31 30 32   * 211* 219 [facility] MD Iam

## 2020-06-09 NOTE — ROUTINE PROCESS
Patient arrived on unit at this time. Information received from patient and the chart. Admission and physical assessment performed. Patient cleaned by CNAs from bowel movement.

## 2020-06-09 NOTE — DIALYSIS
ACUTE HEMODIALYSIS FLOW SHEET    HEMODIALYSIS ORDERS: Physician: Dr. Michelle Centers: Iris   Duration: 3 hr   BFR: 300   DFR: 300   Dialysate:  Temp 36-37*C   K+  3.0    Ca+ 2.5   Na 138   Bicarb 35   Wt Readings from Last 1 Encounters:   06/08/20 121.6 kg (268 lb)    Patient Chart [x]   Unable to Obtain []  Dry weight/UF Goal: 1500 ml    Heparin []  Bolus    Units    [] Hourly    Units    [x]None       Pre BP:   101/49    Pulse:  57   Respirations: 20    Temperature:  98.3    Tx: NSS    ml/Bolus   [x] N/A   Labs: []  Pre  []  Post:   [x] N/A   Additional Orders(medications, blood products, hypotension management): [] Yes   [x] No     [x]  [facility] Consent Verified     CATHETER ACCESS:  []N/A   [x]Right   []Left   []IJ   []Fem  [x]Chest wall  []TransHepatic   [] First use X-ray verified     [x]Tunnel    [] Non Tunneled   []No S/S infection  []Redness  []Drainage []Cultured []Swelling []Pain   []Medical Aseptic Prep Utilized   []Dressing Changed  [] Biopatch  Date: 6/8/2020   []Clotted   [x]Patent   Flows: [x]Good  []Poor  []Reversed   If access problem,  notified: []Yes    [x]N/A        GRAFT/FISTULA ACCESS:   [x]N/A     []Right     []Left     []UE     []LE   []AVG   []AVF       []Medical Aseptic Prep Utilized   []No S/S infection  []Redness  []Drainage [] Cultured  [] Swelling  [] Pain  Bruit:   [] Strong    [] Weak       Thrill :   [] Strong    [] Weak     Needle Gauge: 15   Length: 1 inch   If access problem,  notified: []Yes     []N/A          GENERAL ASSESSMENT:    LUNGS:  Rate 20   SaO2%      [] Clear  [x] Coarse  [] Crackles  [] Wheezing                                                [x] Diminished     Location : []RLL   []LLL    []RUL  []KATIE   Cough:      []Productive  []Dry  [x]N/A   Respirations:  [x]Easy  []Labored   Therapy:  []RA  O2 Type:  [x]NC  Mask: []   NRB    [] BiPaP  Flow: 3 l/min                   [] Ventilator  [] Intubated  [] Trach     CARDIAC: [x] Regular      [] Irregular   [] Pericardial Rub            []  Monitored  [] Bedside  [] Remotely monitored     EDEMA: [x] None   []Generalized  [] Pitting [] 1+   [] 2 +   [] 3+    [] 4+  [] Pedal    SKIN:   [x] Warm  [] Hot     [] Cold   [x] Dry     [] Pale   [] Diaphoretic                  [] Flushed  [] Jaundiced  [] Cyanotic     LOC:    [x] Alert      [x]Oriented:    [x] Person     [x] Place  []Time               [] Confused  [] Lethargic  [] Medicated  [] Non-responsive   GI / ABDOMEN:                     [] Flat    [] Distended    [x] Soft    [] Firm   []  Obese                   [] Diarrhea   [] FMS [x] Bowel Sounds  [] Nausea  [] Vomiting     / URINE ASSESSMENT:                   [] Voiding    [x] Oliguria  [] Anuria   []  Lopez                  [] Incontinent  []  Incontinent Brief   []  PureWick     PAIN:  [x] 0 []1  []2   []3   []4   []5   []6   []7   []8   []9   []10            Scale 0-10  Action/Follow Up:    MOBILITY:  [x] Bed    [] Stretcher      All Vitals and Treatment Details on Attached [address]: [facility]          Room # LONDON/LONDON    [x] Routine         [] 1st Time Acute    [] Urgent      [] Stat            [x] Acute Room   []  Bedside    [] ICU/CCU     [] ER   Isolation Precautions:  [x] Dialysis    Droplet Plus     Pt had Rapid Covid test performed in the ED on 6/8/20 with Negative results     ALLERGIES:     Allergies   Allergen Reactions    Augmentin [Amoxicillin-Pot Clavulanate] Diarrhea    Clavulanic Acid Diarrhea    Levaquin [Levofloxacin] Other (comments)     Severe hypoglycemia        Code Status:  Full Code     Hepatitis Status   2nd RN check :  LH   Lab Results   Component Value Date/Time    Hepatitis B surface Ag <0.10 06/09/2020 04:03 AM    Hepatitis B surface Ab <3.10 (L) 06/09/2020 04:03 AM        Current Labs:      Lab Results   Component Value Date/Time    WBC 11.3 06/09/2020 04:03 AM    HGB 7.3 (L) 06/09/2020 04:03 AM    HCT 22.7 (L) 06/09/2020 04:03 AM    PLATELET 888 00/24/4431 04:03 AM    MCV 87.0 06/09/2020 04:03 AM     Lab Results   Component Value Date/Time    Sodium 136 06/09/2020 04:03 AM    Potassium 3.9 06/09/2020 04:03 AM    Chloride 101 06/09/2020 04:03 AM    CO2 31 06/09/2020 04:03 AM    Anion gap 4 06/09/2020 04:03 AM    Glucose 133 (H) 06/09/2020 04:03 AM    BUN 22 (H) 06/09/2020 04:03 AM    Creatinine 2.91 (H) 06/09/2020 04:03 AM    BUN/Creatinine ratio 8 (L) 06/09/2020 04:03 AM    GFR est AA 19 (L) 06/09/2020 04:03 AM    GFR est non-AA 16 (L) 06/09/2020 04:03 AM    Calcium 8.5 06/09/2020 04:03 AM          DIET:  DIET CARDIAC  DIET NUTRITIONAL SUPPLEMENTS     PRIMARY NURSE REPORT:   Pre Dialysis: [facility] Luciano RN     Time: 56      EDUCATION:    [x] Patient [] Other           Knowledge Basis: []None [x]Minimal [] Substantial   Barriers to learning  [x]N/A   [] Access Care     [] S&S of infection  [] Fluid Management  [] K+   [x] Procedural    []Albumin   [] Medications   [] Tx Options   [] Transplant   [] Diet   [] Other   Teaching Tools:  [x] Explain  [] Demo  [] Handouts [] Video  Patient response: [x] Verbalized understanding  [] Teach back  [] Return demonstration   [] Requires follow up      [x]Time Out/Safety Check  [x] Extracorporeal Circuit Tested for integrity       RO/HEMODIALYSIS MACHINE SAFETY CHECKS  Before each treatment:                                     [facility]                                    [x] Unit Machine # 5 with centralized RO                                                                             Alarm Test:  Pass time 1030            [x] RO/Machine Log Complete    Machine Temp    36*C             Dialysate: pH  7.4    Conductivity: Meter 14.0     HD Machine  13.8      TCD: 13.8  Dialyzer Lot # [id number]     Blood Tubing Lot # [id number]     Saline Lot # [id number]     CHLORINE TESTING-Before each treatment and every 4 hours    Total Chlorine: [x] less than 0.1 ppm  Initial Time Check: 0900       4 Hr/2nd Check Time: 1300   (if greater than 0.1 ppm from Primary then every 30 minutes from Secondary)     TREATMENT INITIATION  with Dialysis Precautions:   [x] All Connections Secured              [x] Saline Line Double Clamped   [x] Venous Parameters Set               [x] Arterial Parameters Set    [x] Prime Given 250ml NSS              [x]Air Foam Detector Engaged      Treatment Initiation Note:  6638  Pt arrived to dialysis unit on stretcher, awake and alert on O2 per N/C 2L.  1055  Rt TCD intact, patent and flushes well. Dialysis initiated without difficulty and Dr Adelaida Guerra notified. During Treatment Notes:  1200  Vascular access visible with arterial and venous line connections intact. Pt resting comfortably. 1300  Vascular access visible with arterial and venous line connections intact. Pt resting comfortably. 1400  Vascular access visible with arterial and venous line connections intact. Pt resting comfortably. Medication Dose Volume Route Time Jose Danielita Nurse, Title   none     Fran Avila RN     Post Assessment  Dialyzer Cleared:   [] Good  [x] Fair  [] Poor  Blood processed:  51.8 L  UF Removed:  1500 Ml  Post Wt: 120  kg  Post /56   Pulse  66 Resp  20  Temp 98 Lungs: [] Clear [x] Course  [] Crackles                 []  Wheezing   [x] Diminished   Post Tx Vascular Access: [x] N/A       Cardiac :[x] Regular   [] Irregular   Rhythm:  [] Monitored   [x] Not Monitored    CVC Catheter: [] N/A  Locking solution: Heparin 1:1000 U  Arterial port 1.6 ml   Venous port 1.6 ml   Edema:  [x] None  [] Generalized                     Skin:[x] Warm  [x] Dry [] Diaphoretic               [] Flushed  [] Pale [] Cyanotic Pain:  [x]0  []1 []2  []3 []4  []5  []6  []7 []8  []9  []10     Post Treatment Note:   2625  Pt tolerated dialysis well. Dialysis catheter intact, patent and heplocked as noted above.      POST TREATMENT PRIMARY NURSE HANDOFF REPORT:   Post Dialysis: Dori Edge RN                Time:  1430       Abbreviations: AVG-arterial venous graft, AVF-arterial venous fistula, IJ-Internal Jugular, Subcl-Subclavian, Fem-Femoral, Tx-treatment, AP/HR-apical heart rate, VSS- Vital Signs Stable, CVC- Central Venous Catheter, DFR-dialysate flow rate, BFR-blood flow rate, AP-arterial pressure, -venous pressure, UF-ultrafiltrate, TMP-transmembrane pressure, Austyn-Venous, Art-Arterial, RO-Reverse Osmosis

## 2020-06-10 ENCOUNTER — APPOINTMENT (OUTPATIENT)
Dept: GENERAL RADIOLOGY | Age: 70
DRG: 207 | End: 2020-06-10
Attending: HOSPITALIST
Payer: MEDICARE

## 2020-06-10 PROBLEM — E87.70 FLUID OVERLOAD: Status: ACTIVE | Noted: 2020-06-10

## 2020-06-10 LAB
ANION GAP SERPL CALC-SCNC: 4 MMOL/L (ref 3–18)
BUN SERPL-MCNC: 24 MG/DL (ref 7–18)
BUN/CREAT SERPL: 9 (ref 12–20)
CALCIUM SERPL-MCNC: 8.3 MG/DL (ref 8.5–10.1)
CHLORIDE SERPL-SCNC: 98 MMOL/L (ref 100–111)
CK SERPL-CCNC: 22 U/L (ref 26–192)
CO2 SERPL-SCNC: 31 MMOL/L (ref 21–32)
CREAT SERPL-MCNC: 2.59 MG/DL (ref 0.6–1.3)
FERRITIN SERPL-MCNC: 411 NG/ML (ref 8–388)
GLUCOSE BLD STRIP.AUTO-MCNC: 138 MG/DL (ref 70–110)
GLUCOSE BLD STRIP.AUTO-MCNC: 161 MG/DL (ref 70–110)
GLUCOSE BLD STRIP.AUTO-MCNC: 166 MG/DL (ref 70–110)
GLUCOSE BLD STRIP.AUTO-MCNC: 179 MG/DL (ref 70–110)
GLUCOSE SERPL-MCNC: 149 MG/DL (ref 74–99)
HCT VFR BLD AUTO: 24.4 % (ref 35–45)
HGB BLD-MCNC: 7.6 G/DL (ref 12–16)
LDH SERPL L TO P-CCNC: 213 U/L (ref 81–234)
POTASSIUM SERPL-SCNC: 4.3 MMOL/L (ref 3.5–5.5)
SODIUM SERPL-SCNC: 133 MMOL/L (ref 136–145)
VANCOMYCIN SERPL-MCNC: 14.8 UG/ML (ref 5–40)

## 2020-06-10 PROCEDURE — 74011250636 HC RX REV CODE- 250/636: Performed by: INTERNAL MEDICINE

## 2020-06-10 PROCEDURE — 71045 X-RAY EXAM CHEST 1 VIEW: CPT

## 2020-06-10 PROCEDURE — 74011636637 HC RX REV CODE- 636/637: Performed by: FAMILY MEDICINE

## 2020-06-10 PROCEDURE — 36415 COLL VENOUS BLD VENIPUNCTURE: CPT

## 2020-06-10 PROCEDURE — 83615 LACTATE (LD) (LDH) ENZYME: CPT

## 2020-06-10 PROCEDURE — 82550 ASSAY OF CK (CPK): CPT

## 2020-06-10 PROCEDURE — 77030041247 HC PROTECTOR HEEL HEELMEDIX MDII -B

## 2020-06-10 PROCEDURE — 80048 BASIC METABOLIC PNL TOTAL CA: CPT

## 2020-06-10 PROCEDURE — 74011636637 HC RX REV CODE- 636/637: Performed by: INTERNAL MEDICINE

## 2020-06-10 PROCEDURE — 87040 BLOOD CULTURE FOR BACTERIA: CPT

## 2020-06-10 PROCEDURE — 65660000000 HC RM CCU STEPDOWN

## 2020-06-10 PROCEDURE — 74011000258 HC RX REV CODE- 258: Performed by: INTERNAL MEDICINE

## 2020-06-10 PROCEDURE — 82962 GLUCOSE BLOOD TEST: CPT

## 2020-06-10 PROCEDURE — 74011250637 HC RX REV CODE- 250/637: Performed by: INTERNAL MEDICINE

## 2020-06-10 PROCEDURE — 80202 ASSAY OF VANCOMYCIN: CPT

## 2020-06-10 PROCEDURE — 85018 HEMOGLOBIN: CPT

## 2020-06-10 PROCEDURE — 82728 ASSAY OF FERRITIN: CPT

## 2020-06-10 PROCEDURE — 77010033678 HC OXYGEN DAILY

## 2020-06-10 RX ORDER — INSULIN LISPRO 100 [IU]/ML
INJECTION, SOLUTION INTRAVENOUS; SUBCUTANEOUS
Status: DISCONTINUED | OUTPATIENT
Start: 2020-06-10 | End: 2020-06-11

## 2020-06-10 RX ORDER — DEXTROSE 50 % IN WATER (D50W) INTRAVENOUS SYRINGE
25-50 AS NEEDED
Status: DISCONTINUED | OUTPATIENT
Start: 2020-06-10 | End: 2020-06-26 | Stop reason: HOSPADM

## 2020-06-10 RX ORDER — MAGNESIUM SULFATE 100 %
4 CRYSTALS MISCELLANEOUS AS NEEDED
Status: DISCONTINUED | OUTPATIENT
Start: 2020-06-10 | End: 2020-06-26 | Stop reason: HOSPADM

## 2020-06-10 RX ORDER — ONDANSETRON 2 MG/ML
4 INJECTION INTRAMUSCULAR; INTRAVENOUS
Status: DISCONTINUED | OUTPATIENT
Start: 2020-06-10 | End: 2020-06-26 | Stop reason: HOSPADM

## 2020-06-10 RX ADMIN — FUROSEMIDE 80 MG: 10 INJECTION, SOLUTION INTRAMUSCULAR; INTRAVENOUS at 09:33

## 2020-06-10 RX ADMIN — CYANOCOBALAMIN TAB 1000 MCG 1000 MCG: 1000 TAB at 17:08

## 2020-06-10 RX ADMIN — HYDRALAZINE HYDROCHLORIDE 100 MG: 50 TABLET, FILM COATED ORAL at 16:42

## 2020-06-10 RX ADMIN — ROSUVASTATIN CALCIUM 5 MG: 10 TABLET, COATED ORAL at 22:28

## 2020-06-10 RX ADMIN — FUROSEMIDE 80 MG: 10 INJECTION, SOLUTION INTRAMUSCULAR; INTRAVENOUS at 17:08

## 2020-06-10 RX ADMIN — MIRTAZAPINE 15 MG: 15 TABLET, FILM COATED ORAL at 22:29

## 2020-06-10 RX ADMIN — ASPIRIN 81 MG CHEWABLE TABLET 81 MG: 81 TABLET CHEWABLE at 09:36

## 2020-06-10 RX ADMIN — GABAPENTIN 100 MG: 100 CAPSULE ORAL at 09:36

## 2020-06-10 RX ADMIN — CITALOPRAM HYDROBROMIDE 20 MG: 20 TABLET ORAL at 09:36

## 2020-06-10 RX ADMIN — ONDANSETRON 4 MG: 2 INJECTION INTRAMUSCULAR; INTRAVENOUS at 00:30

## 2020-06-10 RX ADMIN — CYANOCOBALAMIN TAB 1000 MCG 1000 MCG: 1000 TAB at 09:35

## 2020-06-10 RX ADMIN — Medication 325 MG: at 05:36

## 2020-06-10 RX ADMIN — PIPERACILLIN AND TAZOBACTAM 2.25 G: 2; .25 INJECTION, POWDER, FOR SOLUTION INTRAVENOUS at 22:30

## 2020-06-10 RX ADMIN — INSULIN LISPRO 2 UNITS: 100 INJECTION, SOLUTION INTRAVENOUS; SUBCUTANEOUS at 22:29

## 2020-06-10 RX ADMIN — HEPARIN SODIUM 5000 UNITS: 5000 INJECTION INTRAVENOUS; SUBCUTANEOUS at 05:36

## 2020-06-10 RX ADMIN — INSULIN GLARGINE 4 UNITS: 100 INJECTION, SOLUTION SUBCUTANEOUS at 09:33

## 2020-06-10 RX ADMIN — CARVEDILOL 12.5 MG: 12.5 TABLET, FILM COATED ORAL at 16:43

## 2020-06-10 RX ADMIN — GABAPENTIN 100 MG: 100 CAPSULE ORAL at 17:08

## 2020-06-10 RX ADMIN — SODIUM CHLORIDE 1000 MG: 900 INJECTION, SOLUTION INTRAVENOUS at 22:30

## 2020-06-10 RX ADMIN — LORATADINE 10 MG: 10 TABLET ORAL at 09:37

## 2020-06-10 RX ADMIN — PIPERACILLIN AND TAZOBACTAM 2.25 G: 2; .25 INJECTION, POWDER, FOR SOLUTION INTRAVENOUS at 06:00

## 2020-06-10 RX ADMIN — HYDRALAZINE HYDROCHLORIDE 100 MG: 50 TABLET, FILM COATED ORAL at 22:28

## 2020-06-10 RX ADMIN — PANTOPRAZOLE SODIUM 40 MG: 40 TABLET, DELAYED RELEASE ORAL at 09:37

## 2020-06-10 RX ADMIN — PIPERACILLIN AND TAZOBACTAM 2.25 G: 2; .25 INJECTION, POWDER, FOR SOLUTION INTRAVENOUS at 14:35

## 2020-06-10 RX ADMIN — ISOSORBIDE MONONITRATE 30 MG: 30 TABLET, EXTENDED RELEASE ORAL at 16:42

## 2020-06-10 RX ADMIN — HEPARIN SODIUM 5000 UNITS: 5000 INJECTION INTRAVENOUS; SUBCUTANEOUS at 22:29

## 2020-06-10 RX ADMIN — AMLODIPINE BESYLATE 10 MG: 10 TABLET ORAL at 16:43

## 2020-06-10 NOTE — PROGRESS NOTES
Patient is not available to be assessed at this time. Patient was sleeping/resting at time of 's visit.       [id number]   [phone]

## 2020-06-10 NOTE — ROUTINE PROCESS
Bedside and Verbal shift change report given to Den Stanley (oncoming nurse) by Alexandr Bee RN (offgoing nurse). Report included the following information SBAR, Kardex, ED Summary, Procedure Summary, Intake/Output, MAR, Recent Results and Cardiac Rhythm NSR.

## 2020-06-10 NOTE — PROGRESS NOTES
Important Message from [facility]" reviewed and explained with the patient at bedside and wasn't able to obtain information that was present. Unable to obtain patient's signature at this time. Ritu Jones

## 2020-06-10 NOTE — PROGRESS NOTES
Reason for Admission:  Pneumonia [J18.9]                 RRAT Score:   26%            Plan for utilizing home health:  Patient is SNF to LTC at [facility]. Likelihood of Readmission:   Moderate                         Do you (patient/family) have any concerns for transition/discharge?  no    Transition of Care Plan:   Patient will return to [address], once medically stable for discharge. Patient will need stretcher transport back to [address], upon discharge. Initial assessment completed with patient. Cognitive status of patient: Alert and oriented. Face sheet information confirmed:  yes. The patient designates her daughter (Paola), her son-in-law Dany Andersen and her neighbor Meg Saucedo to participate in her discharge plan and to receive any needed information. This patient is currently staying at [facility]. Patient is not able to navigate steps as needed. Prior to hospitalization, patient was considered to be independent with ADLs/IADLS : no . If not independent,  patient needs assist with : ADLs and IADLs. Patient has a current ACP document on file: no     Patient will need a stretcher transport to transport her, upon discharge. The patient already has medical equipment available at the facility. Patient is not currently active with home health. Patient has stayed in a skilled nursing facility or rehab. Was  stay within last 60 days : yes. [address]. This patient is on dialysis :yes    If yes, hemodialysis patient and receives treatment on Monday/Wednesday/Friday at [facility]  [address]  Chair time is 5:50AM. Pt is transported to/from dialysis by [facility]). Currently, the discharge plan is to return to [address], once medically stable for discharge. Patient will need stretcher transport back to [address]. Patient's daughter is ([address], ALFRED#[phone]). Patient's son-in-law is Ritu Amanda, CATHIE#[phone]). Patient's neighbor is (ASHLEE Cheek#[phone]). Patient's PCP is Dr. Homero Potter. Patient is insured through [employer]. The patient states that she can obtain her medications from the pharmacy, and take her medications as directed. This writer will continue to monitor for discharge planning to ensure a safe discharge from [place]. Care Management Interventions  PCP Verified by CM: Yes(Dr. Homero Potter)  Palliative Care Criteria Met (RRAT>21 & CHF Dx)?: No  Mode of Transport at Discharge: S  Transition of Care Consult (CM Consult): [facility], Discharge Planning  Current Support Network: Nursing Facility  Confirm Follow Up Transport: Other (see comment)(Will need stretcher transport.)  The Plan for Transition of Care is Related to the Following Treatment Goals : [facility]  The Patient and/or Patient Representative was Provided with a Choice of Provider and Agrees with the Discharge Plan?: Yes  Freedom of Choice List was Provided with Basic Dialogue that Supports the Patient's Individualized Plan of Care/Goals, Treatment Preferences and Shares the Quality Data Associated with the Providers?: Yes  Discharge Location  Discharge Placement: [facility] [address]  [facility] MSW  Care Manager  Pager#: [phone]

## 2020-06-10 NOTE — ROUTINE PROCESS
Bedside and Verbal shift change report given to Joellen Salazar RN (oncoming nurse) by Rubén Mercer RN (offgoing nurse). Report included the following information SBAR, Kardex, MAR and Recent Results.

## 2020-06-10 NOTE — PROGRESS NOTES
Problem: Nutrition Deficit  Goal: *Optimize nutritional status  Outcome: Progressing Towards Goal     Problem: Falls - Risk of  Goal: *Absence of Falls  Description: Document Danney Mess Fall Risk and appropriate interventions in the flowsheet.   Outcome: Progressing Towards Goal  Note: Fall Risk Interventions:            Medication Interventions: Bed/chair exit alarm         History of Falls Interventions: Bed/chair exit alarm

## 2020-06-10 NOTE — ROUTINE PROCESS
TRANSFER - IN REPORT: 
 
Verbal report received from Banner, RN(name) on [place]  being received from 3N (unit) for routine progression of care Report consisted of patients Situation, Background, Assessment and  
Recommendations(SBAR). Information from the following report(s) SBAR, Kardex, [facility] and Recent Results was reviewed with the receiving nurse. Opportunity for questions and clarification was provided. Assessment completed upon patients arrival to unit and care assumed.

## 2020-06-10 NOTE — PROGRESS NOTES
[facility]ist [facility]  Progress Note    Patient: Amanda Be Age: 71 y.o. : 1950 MR#: [id number] SSN: [id number]  Date/Time: 6/10/2020     Subjective:     Lethargic in bed today   Appears chronically ill, has a solemn and depressed demeanor   C/o not feeling well, will not/cannot elaborate further   Denies CP, SOB, nausea, pain        Assessment/Plan:     1. Shortness of breath and hypoxemia- likely 2/2 fluid overload   2. Gram positive bacteremia vs contaminant   3. ESRD on HD  4. Acute on chronic diastolic heart failure  5. Hypertension  6. Chronic hypoxic respiratory failure on home O2  7. Diabetes mellitus type 2- HbA1c 6.1 on 20     COVID-19 test negative 20  ID consulted    Nephrology following   HD per nephrology   CXR reviewed   1/2 blood cx's positive for gram positive cocci in groups  FU repeat blood cx's today   Cont SSI w/accuchecks  Will discontinue lantus and monitor   Cont home BP meds: amlodipine, carvedilol, hydralazine, imdur  Fall and aspiration precautions   PT, OT         Case discussed with:  [x]Patient  []Family  [x]Nursing  [x]Case Management  DVT Prophylaxis:  []Lovenox  [x]Hep SQ  []SCDs  []Coumadin   []Eliquis/Xarelto   Diet: Cardiac   Code Status: FULL    Disposition: Continue current care; >2 nights       H.  Rivka Ott DO   Shakira 10, 2020        Objective:   VS:   Visit Vitals  /59 (BP 1 Location: Left arm, BP Patient Position: At rest)   Pulse 66   Temp 98 °F (36.7 °C)   Resp 20   Ht 5' 5\" (1.651 m)   Wt 121.6 kg (268 lb)   SpO2 95%   Breastfeeding No   BMI 44.60 kg/m²      Tmax/24hrs: Temp (24hrs), Av.2 °F (36.8 °C), Min:97.8 °F (36.6 °C), Max:98.9 °F (37.2 °C)  IOBRIEF    Intake/Output Summary (Last 24 hours) at 6/10/2020 1552  Last data filed at 6/10/2020 1435  Gross per 24 hour   Intake 620 ml   Output    Net 620 ml       General:  Alert, cooperative, no acute distress, chronically ill     HEENT: anicteric sclerae. Pulmonary:  CTA Bilaterally. No Wheezing/Rales. Cardiovascular: Regular rate and Rhythm  GI:  Soft, Non distended, Non tender. + Bowel sounds. Extremities:  No edema. No calf tenderness. Psych: Not anxious or agitated, solemn, depressed   Neurologic: Alert and oriented X 3. Moves all ext.       Medications:   Current Facility-Administered Medications   Medication Dose Route Frequency    ondansetron (ZOFRAN) injection 4 mg  4 mg IntraVENous Q6H PRN    insulin lispro (HUMALOG) injection   SubCUTAneous AC&HS    glucose chewable tablet 16 g  4 Tab Oral PRN    glucagon (GLUCAGEN) injection 1 mg  1 mg IntraMUSCular PRN    dextrose (D50W) injection syrg 12.5-25 g  25-50 mL IntraVENous PRN    piperacillin-tazobactam (ZOSYN) 2.25 g in 0.9% sodium chloride (MBP/ADV) 50 mL MBP  2.25 g IntraVENous Q8H    furosemide (LASIX) injection 80 mg  80 mg IntraVENous BID    pantoprazole (PROTONIX) tablet 40 mg  40 mg Oral ACB    citalopram (CELEXA) tablet 20 mg  20 mg Oral DAILY    cyanocobalamin tablet 1,000 mcg  1,000 mcg Oral BID    loratadine (CLARITIN) tablet 10 mg  10 mg Oral DAILY    aspirin chewable tablet 81 mg  81 mg Oral DAILY    isosorbide mononitrate ER (IMDUR) tablet 30 mg  30 mg Oral DAILY    amLODIPine (NORVASC) tablet 10 mg  10 mg Oral DAILY    polyethylene glycol (MIRALAX) packet 17 g  17 g Oral DAILY    rosuvastatin (CRESTOR) tablet 5 mg  5 mg Oral QHS    hydrALAZINE (APRESOLINE) tablet 100 mg  100 mg Oral TID    gabapentin (NEURONTIN) capsule 100 mg  100 mg Oral BID    carvediloL (COREG) tablet 12.5 mg  12.5 mg Oral BID WITH MEALS    ferrous sulfate tablet 325 mg  325 mg Oral EVERY OTHER DAY    mirtazapine (REMERON) tablet 15 mg  15 mg Oral QHS    docusate sodium (COLACE) capsule 100 mg  100 mg Oral BID PRN    insulin glargine (LANTUS) injection 4 Units  4 Units SubCUTAneous DAILY    heparin (porcine) injection 5,000 Units  5,000 Units SubCUTAneous Q8H    Piperacillin-tazobactam (ZOSYN) 0.75 gm Supplemental Dosing by Pharmacy   Other Rx Dosing/Monitoring       Labs:    Recent Results (from the past 24 hour(s))   LD    Collection Time: 06/10/20  3:04 AM   Result Value Ref Range     81 - 234 U/L   CK    Collection Time: 06/10/20  3:04 AM   Result Value Ref Range    CK 22 (L) 26 - 192 U/L   FERRITIN    Collection Time: 06/10/20  3:04 AM   Result Value Ref Range    Ferritin 411 (H) 8 - 388 NG/ML   GLUCOSE, POC    Collection Time: 06/10/20  7:55 AM   Result Value Ref Range    Glucose (POC) 179 (H) 70 - 110 mg/dL   HGB & HCT    Collection Time: 06/10/20 10:04 AM   Result Value Ref Range    HGB 7.6 (L) 12.0 - 16.0 g/dL    HCT 24.4 (L) 35.0 - 32.5 %   METABOLIC PANEL, BASIC    Collection Time: 06/10/20 10:04 AM   Result Value Ref Range    Sodium 133 (L) 136 - 145 mmol/L    Potassium 4.3 3.5 - 5.5 mmol/L    Chloride 98 (L) 100 - 111 mmol/L    CO2 31 21 - 32 mmol/L    Anion gap 4 3.0 - 18 mmol/L    Glucose 149 (H) 74 - 99 mg/dL    BUN 24 (H) 7.0 - 18 MG/DL    Creatinine 2.59 (H) 0.6 - 1.3 MG/DL    BUN/Creatinine ratio 9 (L) 12 - 20      GFR est AA 22 (L) >60 ml/min/1.73m2    GFR est non-AA 18 (L) >60 ml/min/1.73m2    Calcium 8.3 (L) 8.5 - 10.1 MG/DL   GLUCOSE, POC    Collection Time: 06/10/20 11:37 AM   Result Value Ref Range    Glucose (POC) 161 (H) 70 - 110 mg/dL

## 2020-06-10 NOTE — INTERDISCIPLINARY ROUNDS
Interdisciplinary Round Note Patient Information: Patient Information: Sarah Stage 462/01 Reason for Admission: Pneumonia [J18.9] Attending Provider:   Brian Bedoya DO Past Medical History:  
Past Medical History:  
Diagnosis Date  Arthritis  [facility]  CHF (congestive [facility] failure) ([facility]  Diabetes ([facility]  Fracture of neck ([facility]  GERD (gastroesophageal reflux disease)  Goiter  Hiatal hernia  Hypertension  Uterine cancer ([facility] Hospital day: 2 Estimated discharge date: TBD 
RRAT Score: Medium Risk 15 Total Score 4 IP Visits Last 12 Months (1-3=4, 4=9, >4=11) 5 Pt. Coverage (Medicare=5 , Medicaid, or Self-Pay=4) 6 Charlson Comorbidity Score (Age + Comorbid Conditions) Criteria that do not apply:  
 Has Seen PCP in Last 6 Months (Yes=3, No=0) . Living with Significant Other. Assisted Living. LTAC. SNF. or  
Rehab Patient Length of Stay (>5 days = 3) Goals for Today: ID following. Dialysis VITAL SIGNS Vitals:  
 06/09/20 2347 06/10/20 0451 06/10/20 0818 06/10/20 1128 BP: 154/67 129/63 140/56 145/63 Pulse: 82 65 69 71 Resp: 19 18 18 Temp: 98.1 °F (36.7 °C) 98.3 °F (36.8 °C) 97.9 °F (36.6 °C) 98.9 °F (37.2 °C) TempSrc:      
SpO2: 98% 100% 97% 96% Weight:      
Height:      
 
 
 
 Lines, Drains, & Airways Peripheral IV 06/07/20 Right Antecubital-Site Assessment: Clean, dry, & intact External Female Catheter 06/08/20-Site Assessment: Clean, dry, & intact Peripheral IV 06/07/20 Left Wrist-Site Assessment: Clean, dry, & intact VTE Prophylaxis Intake and Output:  
06/08 1901 - 06/10 0700 In: 340 [P.O.:240; I.V.:100] Out: 1500  
06/10 0701 - 06/10 1900 In: 380 [P.O.:330; I.V.:50] Out: -  Current Diet: DIET CARDIAC Regular; FR 1200ML 
DIET NUTRITIONAL SUPPLEMENTS Dinner; [facility] Aframe Abdominal  
 Last Bowel Movement Date: 06/09/20 Stool Appearance: Soft Recent Glucose Results:  
Lab Results Component Value Date/Time  (H) 06/10/2020 10:04 AM  
 GLUCPOC 161 (H) 06/10/2020 11:37 AM  
 GLUCPOC 179 (H) 06/10/2020 07:55 AM  
 
 
  
IV Antibiotics? YES Activity Level: Activity Level: Bed Rest 
Needs assistance with ADLs: YES 
PT Consult Status: NO Current Immunizations: There is no immunization history on file for this patient. Recommendations:  
Discharge Disposition: SNF 
 
COVID status: Negative Will the patient require COVID testing prior to discharge for placement?: YES. She will need a second COVID test 24-48 hours prior to discharge. Medication Reconciliation Completed: NO 
 
Cardiac/Pulmonary Rehab Ordered:  NO 
 
Needs for Discharge: COVID test (Negative) Recommendations from IDR team: ID following Raymundo Morrow. MSW Care Manager Pager#: [phone]

## 2020-06-10 NOTE — PROGRESS NOTES
In Patient Progress note      Admit Date: 6/7/2020    Impression:     1) ESRD on HD MWF , volume overloaded   2) Ac respi failure d/t fluid overload , AC on chr diastolic CHF  3) AC on chr CHF  4) PNA ?  Rapid covid -ve    5) bacteremia , gm +ve cocci   5) Anemia   6) sec hyperpara     PLan:  1) holding HD today   2) follow ID recs   3) fluid restrict 1200 ml  4) continue epogen with HD   5) dose AB for GFR < 15     Please call with questions ,     Denae Rausch MD FASN  Cell [phone]  Pager: [phone]    Subjective:     Very lethargic   Tired  Partly confused   Cough+  No fevers        Current Facility-Administered Medications:     ondansetron (ZOFRAN) injection 4 mg, 4 mg, IntraVENous, Q6H PRN, Gavin Sandoval MD, 4 mg at 06/10/20 0030    insulin lispro (HUMALOG) injection, , SubCUTAneous, AC&HS, Alison Anthony, DO    glucose chewable tablet 16 g, 4 Tab, Oral, PRN, Alison Anthony, DO    glucagon (GLUCAGEN) injection 1 mg, 1 mg, IntraMUSCular, PRN, Alison Anthony, DO    dextrose (D50W) injection syrg 12.5-25 g, 25-50 mL, IntraVENous, PRN, Alison Anthony, DO    piperacillin-tazobactam (ZOSYN) 2.25 g in 0.9% sodium chloride (MBP/ADV) 50 mL MBP, 2.25 g, IntraVENous, Q8H, Hadley Angulo MD, Last Rate: 100 mL/hr at 06/10/20 1435, 2.25 g at 06/10/20 1435    furosemide (LASIX) injection 80 mg, 80 mg, IntraVENous, BID, Hadley Angulo MD, 80 mg at 06/10/20 0933    pantoprazole (PROTONIX) tablet 40 mg, 40 mg, Oral, ACB, Hadley Angulo MD, 40 mg at 06/10/20 2730    citalopram (CELEXA) tablet 20 mg, 20 mg, Oral, DAILY, Hadley Angulo MD, 20 mg at 06/10/20 0936    cyanocobalamin tablet 1,000 mcg, 1,000 mcg, Oral, BID, Hadley Angulo MD, 1,000 mcg at 06/10/20 0935    loratadine (CLARITIN) tablet 10 mg, 10 mg, Oral, DAILY, Hadley Angulo MD, 10 mg at 06/10/20 0674    aspirin chewable tablet 81 mg, 81 mg, Oral, DAILY, Hadley Angulo MD, 81 mg at 06/10/20 0936    isosorbide mononitrate ER (IMDUR) tablet 30 mg, 30 mg, Oral, DAILY, Hadley Angulo MD, Stopped at 06/10/20 0937    amLODIPine (NORVASC) tablet 10 mg, 10 mg, Oral, DAILY, Hadley Angulo MD, Stopped at 06/10/20 0936    polyethylene glycol (MIRALAX) packet 17 g, 17 g, Oral, DAILY, Hadley Angulo MD, 17 g at 06/09/20 0933    rosuvastatin (CRESTOR) tablet 5 mg, 5 mg, Oral, QHS, Hadley Angulo MD, 5 mg at 06/09/20 2300    hydrALAZINE (APRESOLINE) tablet 100 mg, 100 mg, Oral, TID, Hadley Angulo MD, Stopped at 06/10/20 7902    gabapentin (NEURONTIN) capsule 100 mg, 100 mg, Oral, BID, Hadley Angulo MD, 100 mg at 06/10/20 0936    carvediloL (COREG) tablet 12.5 mg, 12.5 mg, Oral, BID WITH MEALS, Hadley Angulo MD, Stopped at 06/10/20 0935    ferrous sulfate tablet 325 mg, 325 mg, Oral, EVERY OTHER DAY, Hadley Angulo MD, 325 mg at 06/10/20 0536    mirtazapine (REMERON) tablet 15 mg, 15 mg, Oral, QHS, Hadley Angulo MD, 15 mg at 06/09/20 2300    docusate sodium (COLACE) capsule 100 mg, 100 mg, Oral, BID PRN, Hadley Sams MD    heparin (porcine) injection 5,000 Units, 5,000 Units, SubCUTAneous, Q8H, Hadley Angulo MD, Stopped at 06/10/20 1202    Piperacillin-tazobactam (ZOSYN) 0.75 gm Supplemental Dosing by Pharmacy, , Other, Rx Dosing/Monitoring, Norberto Moreno MD        Objective:     Visit Vitals  /59 (BP 1 Location: Left arm, BP Patient Position: At rest)   Pulse 66   Temp 98 °F (36.7 °C)   Resp 20   Ht 5' 5\" (1.651 m)   Wt 121.6 kg (268 lb)   SpO2 95%   Breastfeeding No   BMI 44.60 kg/m²         Intake/Output Summary (Last 24 hours) at 6/10/2020 1615  Last data filed at 6/10/2020 1435  Gross per 24 hour   Intake 620 ml   Output    Net 620 ml       Physical Exam:     GEN NAD  HENT mmm  RS AEBE coarse BS   CVS s1 s2 wnl no JVD  GI soft BS +  Ext 1+ LE edema     Data Review:    Recent Labs     06/10/20  1004 06/09/20  0403   WBC  --  11.3   RBC  --  2.61*   HCT 24.4* 22.7*   MCV  --  87.0   MCH  --  28.0   MCHC  --  32.2   RDW  --  13.8     Recent Labs     06/10/20  1004 06/09/20  0403 06/08/20  0423 06/07/20  2202   BUN 24* 22* 31* 27*   CREA 2.59* 2.91* 3.97* 4.02*   CA 8.3* 8.5 8.3* 8.1*   ALB  --   --   --  3.1*   K 4.3 3.9 4.1 3.6   * 136 134* 135*   CL 98* 101 99* 98*   CO2 31 31 30 32   * 133* 211* 175*       Natalie Francisco MD

## 2020-06-10 NOTE — CONSULTS
Infectious Disease Consultation Note        Reason: Gram-positive bloodstream infection, pneumonia    Current abx Prior abx   Piperacillin/tazobactam since 6/7 Vancomycin 6/7     Lines:       Assessment :    69 y.o. female  with multiple medical problems including diastolic CHF, diabetes, arthritis, acid reflux hiatal hernia, chronic hypoxic failure on home oxygen 2 L, hypertension, end-stage renal disease on dialysis Tuesday Thursday Saturday who presented to the emergency department via EMS on 6/8/20 with shortness of breath per report from [facility] and rehab. Now with blood culture 6/7+ for gram-positive cocci, bilateral pulmonary infiltrates    Patient presents with a highly complex clinical picture. Clinical presentation seems consistent with acute respiratory failure secondary to fluid overload, acute on chronic diastolic CHF. However positive blood culture on 6/7 concerning for undiagnosed infection as a cause of recurrent CHF exacerbation. I would recommend to rule out colonized hemodialysis catheter as a source of bloodstream infection. Also worsening lethargy despite improvement in CHF symptoms. Rule out aspiration pneumonia    Recommendations:    1. Continue piperacillin/tazobactam.  Start vancomycin  2. Obtain swallow evaluation  3. Obtain blood culture 1 from hemodialysis catheter, 1 from periphery- discussed with HD nurse. She will come to patient's room and draw culture  4. Follow-up nephrology recommendations    Thank you for consultation request. Above plan was discussed in details with dr. Janene Bernard. Will d/w dr Brien Juan. Please call me if any further questions or concerns. Will continue to participate in the care of this patient.   HPI:    71 y.o. female  with multiple medical problems including diastolic CHF, diabetes, arthritis, acid reflux hiatal hernia, chronic hypoxic failure on home oxygen 2 L, hypertension, end-stage renal disease on dialysis Tuesday Thursday Saturday who presented to the emergency department via EMS on 6/8/20 with shortness of breath per [facility]. Brandee Rivas has been having worsening shortness of breath and reported mild cough with no fever. She was reported to be hypoxic to the 80s and was placed on 12 to 15 L of oxygen, in distress and was altered.  She arrived to the ED by EMS and they were doing bag valve mask ventilation while preparing to intubate. However her mental status actually improved and  were able to place her on BiPAP. She was then able to wean off BiPAP and placed on oxygen by nasal cannula. ED work-up was reported with right upper lobe infiltrates, for which she received IV antibiotics with vancomycin and Zosyn. She reported having complete dialysis on Friday. She has history of similar admissions in the past with 3- COVID-19 testing in the last 2 months. She is being admitted for further management. She has no other concerns. She was not able to recollect events prior to presentation from the nursing facility. She states that she feels better. She had coughing episodes when she was eating during my evaluation. She states that she has had this for several weeks. Patient denies headaches, visual disturbances, sore throat, runny nose, earaches, cp, sob, chills, abdominal pain, diarrhea, burning micturition, pain or weakness in extremities. she denies back pain/flank pain. Past Medical History:   Diagnosis Date    Arthritis     [facility]     CHF (congestive heart failure) (HCC)     Diabetes ([facility]     Fracture of neck (HCC)     GERD (gastroesophageal reflux disease)     Goiter     Hiatal hernia     Hypertension     [facility] cancer ([facility])        Past Surgical History:   Procedure Laterality Date    HX APPENDECTOMY      HX HYSTERECTOMY      HX KNEE REPLACEMENT Right     HX ORTHOPAEDIC      odontoid fracture repair with hardware placement.      HX PARTIAL THYROIDECTOMY      AL INSJ TUNNELED CVC W/O SUBQ PORT/ AGE 5 YR/> Right 5/7/2020    INSERTION TUNNELED CENTRAL VENOUS CATHETER performed by Lucian New MD at [facility] CATH LAB       home Medication List    Details   carvediloL (COREG) 12.5 mg tablet Take 1 Tab by mouth two (2) times daily (with meals). Qty: 30 Tab, Refills: 0      amLODIPine (NORVASC) 10 mg tablet Take 1 Tab by mouth daily. Qty: 30 Tab, Refills: 0      aspirin 81 mg chewable tablet Take 1 Tab by mouth daily. Qty: 30 Tab, Refills: 0      Biotin 2,500 mcg cap Take 1 Tab by mouth two (2) times a day. B.infantis-B.ani-B.long-B.bifi (PROBIOTIC 4X) 10-15 mg TbEC Take 1 Tab by mouth daily. gabapentin (NEURONTIN) 100 mg capsule Take 1 Cap by mouth two (2) times a day. Max Daily Amount: 200 mg. Qty: 20 Cap, Refills: 0    Associated Diagnoses: Type 2 diabetes mellitus without complication, without long-term current use of insulin (HCC)      ferrous sulfate 325 mg (65 mg iron) tablet Take 1 Tab by mouth every other day for 30 days. Qty: 15 Tab, Refills: 0      mirtazapine (REMERON) 15 mg tablet Take 1 Tab by mouth nightly. Qty: 10 Tab, Refills: 0      docusate sodium (COLACE) 100 mg capsule Take 1 Cap by mouth two (2) times daily as needed for Constipation for up to 90 days. Qty: 6 Cap, Refills: 0      OXYGEN-AIR DELIVERY SYSTEMS Take 2 L by inhalation daily. polyethylene glycol (MIRALAX) 17 gram packet Take 1 Packet by mouth daily. Qty: 10 Packet, Refills: 0      rosuvastatin (CRESTOR) 5 mg tablet Take 1 Tab by mouth nightly. Qty: 30 Tab, Refills: 0      hydrALAZINE (APRESOLINE) 100 mg tablet Take 1 Tab by mouth three (3) times daily. Qty: 90 Tab, Refills: 0      insulin glargine (LANTUS) 100 unit/mL injection 4 units SQ daily- watch for Hypoglycemia adjust dose per patient's blood glucose level  Qty: 1 Vial, Refills: 0      isosorbide mononitrate ER (IMDUR) 30 mg tablet Take 1 Tab by mouth daily.   Qty: 30 Tab, Refills: 0      esomeprazole (NEXIUM) 40 mg capsule Take 40 mg by mouth daily. citalopram (CELEXA) 20 mg tablet Take 20 mg by mouth daily. cranberry extract (AZO CRANBERRY) 450 mg tab Take 2 Tabs by mouth daily. cholecalciferol (VITAMIN D3) 1,000 unit cap Take 5,000 Units by mouth daily. cyanocobalamin (VITAMIN B-12) 1,000 mcg tablet Take 1,000 mcg by mouth two (2) times a day. vitamin a-vitamin c-vit e-min (OCUVITE) tablet Take 1 Tab by mouth daily. loratadine (CLARITIN) 10 mg tablet Take 10 mg by mouth daily.              Current Facility-Administered Medications   Medication Dose Route Frequency    ondansetron (ZOFRAN) injection 4 mg  4 mg IntraVENous Q6H PRN    piperacillin-tazobactam (ZOSYN) 2.25 g in 0.9% sodium chloride (MBP/ADV) 50 mL MBP  2.25 g IntraVENous Q8H    furosemide (LASIX) injection 80 mg  80 mg IntraVENous BID    pantoprazole (PROTONIX) tablet 40 mg  40 mg Oral ACB    citalopram (CELEXA) tablet 20 mg  20 mg Oral DAILY    cyanocobalamin tablet 1,000 mcg  1,000 mcg Oral BID    loratadine (CLARITIN) tablet 10 mg  10 mg Oral DAILY    aspirin chewable tablet 81 mg  81 mg Oral DAILY    isosorbide mononitrate ER (IMDUR) tablet 30 mg  30 mg Oral DAILY    amLODIPine (NORVASC) tablet 10 mg  10 mg Oral DAILY    polyethylene glycol (MIRALAX) packet 17 g  17 g Oral DAILY    rosuvastatin (CRESTOR) tablet 5 mg  5 mg Oral QHS    hydrALAZINE (APRESOLINE) tablet 100 mg  100 mg Oral TID    gabapentin (NEURONTIN) capsule 100 mg  100 mg Oral BID    carvediloL (COREG) tablet 12.5 mg  12.5 mg Oral BID WITH MEALS    ferrous sulfate tablet 325 mg  325 mg Oral EVERY OTHER DAY    mirtazapine (REMERON) tablet 15 mg  15 mg Oral QHS    docusate sodium (COLACE) capsule 100 mg  100 mg Oral BID PRN    insulin glargine (LANTUS) injection 4 Units  4 Units SubCUTAneous DAILY    heparin (porcine) injection 5,000 Units  5,000 Units SubCUTAneous Q8H    Piperacillin-tazobactam (ZOSYN) 0.75 gm Supplemental Dosing by Pharmacy   Other Rx Dosing/Monitoring       Allergies: Augmentin [amoxicillin-pot clavulanate]; Clavulanic acid; and Levaquin [levofloxacin]    History reviewed. No pertinent family history. Social History     Socioeconomic History    Marital status:      Spouse name: Not on file    Number of children: Not on file    Years of education: Not on file    Highest education level: Not on file   Occupational History    Not on file   Social Needs    Financial resource strain: Not on file    Food insecurity     Worry: Not on file     Inability: Not on file    Transportation needs     Medical: Not on file     Non-medical: Not on file   Tobacco Use    Smoking status: Never Smoker   Substance and Sexual Activity    Alcohol use: No    Drug use: No    Sexual activity: Not on file   Lifestyle    Physical activity     Days per week: Not on file     Minutes per session: Not on file    Stress: Not on file   Relationships    Social connections     Talks on phone: Not on file     Gets together: Not on file     Attends Zoroastrianism service: Not on file     Active member of club or organization: Not on file     Attends meetings of clubs or organizations: Not on file     Relationship status: Not on file    Intimate partner violence     Fear of current or ex partner: Not on file     Emotionally abused: Not on file     Physically abused: Not on file     Forced sexual activity: Not on file   Other Topics Concern    Not on file   Social History Narrative    Not on file     Social History     Tobacco Use   Smoking Status Never Smoker        Temp (24hrs), Av.2 °F (36.8 °C), Min:97.8 °F (36.6 °C), Max:98.7 °F (37.1 °C)    Visit Vitals  /56 (BP 1 Location: Left arm, BP Patient Position: At rest)   Pulse 69   Temp 97.9 °F (36.6 °C)   Resp 18   Ht 5' 5\" (1.651 m)   Wt 121.6 kg (268 lb)   SpO2 97%   Breastfeeding No   BMI 44.60 kg/m²       ROS: 12 point ROS obtained in details.  Pertinent positives as mentioned in HPI,   otherwise negative    Physical Exam:       Constitutional: The patient is oriented to person, place, and time. No distress. Eyes: No scleral icterus. Neck: No JVD present. Cardiovascular: Regular rhythm. Exam reveals no gallop and no friction rub. Pulmonary/Chest: No stridor. No respiratory distress. Basilar Rales. Abdominal: Soft. Bowel sounds are normal. exhibits no distension. No tenderness. No rebound and no guarding. Musculoskeletal:Normal strength. exhibits no tenderness. Trace edema of LE  Neurological:alert and oriented  No facial asymmetry or focal weakness  Skin: Skin is warm and dry. Psychiatric: Pleasant, cooperative, impaired memory to events prior to admission. Labs: Results:   Chemistry Recent Labs     06/09/20  0403 06/08/20  0423 06/07/20 2202   * 211* 175*    134* 135*   K 3.9 4.1 3.6    99* 98*   CO2 31 30 32   BUN 22* 31* 27*   CREA 2.91* 3.97* 4.02*   CA 8.5 8.3* 8.1*   AGAP 4 5 5   BUCR 8* 8* 7*   AP  --   --  103   TP  --   --  6.8   ALB  --   --  3.1*   GLOB  --   --  3.7   AGRAT  --   --  0.8      CBC w/Diff Recent Labs     06/09/20  0403 06/07/20 2202   WBC 11.3 13.0   RBC 2.61* 2.91*   HGB 7.3* 8.1*   HCT 22.7* 26.1*    231   GRANS  --  83*   LYMPH  --  10*   EOS  --  0      Microbiology Recent Labs     06/07/20 2223 06/07/20 2200   CULT CULTURE IN PROGRESS,FURTHER UPDATES TO FOLLOW Sent to [facility] for ID/Susceptibility if indicated.  NO GROWTH 3 DAYS          RADIOLOGY:    All available imaging studies/reports in [facility] for this admission were reviewed    Dr. Dawit Hicks, Infectious Disease Specialist  [phone]  Shakira 10, 2020  10:20 AM

## 2020-06-10 NOTE — ROUTINE PROCESS
TRANSFER - OUT REPORT: 
 
Verbal report given to Frida GAO (name) on Linsey Gupta  being transferred to  (unit) for routine progression of care Report consisted of patients Situation, Background, Assessment and  
Recommendations(SBAR). Information from the following report(s) SBAR, Intake/Output, MAR, Recent Results and Cardiac Rhythm SR was reviewed with the receiving nurse. Lines:  
Peripheral IV 06/07/20 Right Antecubital (Active) Site Assessment Clean, dry, & intact 6/9/2020  3:03 PM  
Phlebitis Assessment 0 6/9/2020  3:03 PM  
Infiltration Assessment 0 6/9/2020  3:03 PM  
Dressing Status Clean, dry, & intact 6/9/2020  3:03 PM  
Dressing Type Transparent;Tape 6/9/2020  3:03 PM  
Hub Color/Line Status Pink;Flushed;Patent 6/9/2020  3:03 PM  
Action Taken Open ports on tubing capped 6/9/2020  3:03 PM  
Alcohol Cap Used Yes 6/9/2020  3:03 PM  
   
Peripheral IV 06/07/20 Left Wrist (Active) Site Assessment Clean, dry, & intact 6/9/2020  3:03 PM  
Phlebitis Assessment 0 6/9/2020  3:03 PM  
Infiltration Assessment 0 6/9/2020  3:03 PM  
Dressing Status Clean, dry, & intact 6/9/2020  3:03 PM  
Dressing Type Transparent;Tape 6/9/2020  3:03 PM  
Hub Color/Line Status Green;Patent;Capped; Infusing 6/9/2020  3:03 PM  
Action Taken Open ports on tubing capped 6/9/2020  3:03 PM  
Alcohol Cap Used Yes 6/9/2020  3:03 PM  
  
 
Opportunity for questions and clarification was provided. Patient transported with: 
 O2 @ 3 liters Tech

## 2020-06-11 ENCOUNTER — ANESTHESIA EVENT (OUTPATIENT)
Dept: INTERNAL MEDICINE UNIT | Age: 70
DRG: 207 | End: 2020-06-11
Payer: MEDICARE

## 2020-06-11 ENCOUNTER — APPOINTMENT (OUTPATIENT)
Dept: CT IMAGING | Age: 70
DRG: 207 | End: 2020-06-11
Attending: HOSPITALIST
Payer: MEDICARE

## 2020-06-11 ENCOUNTER — ANESTHESIA (OUTPATIENT)
Dept: INTERNAL MEDICINE UNIT | Age: 70
DRG: 207 | End: 2020-06-11
Payer: MEDICARE

## 2020-06-11 ENCOUNTER — APPOINTMENT (OUTPATIENT)
Dept: GENERAL RADIOLOGY | Age: 70
DRG: 207 | End: 2020-06-11
Attending: FAMILY MEDICINE
Payer: MEDICARE

## 2020-06-11 LAB
ALBUMIN SERPL-MCNC: 2.9 G/DL (ref 3.4–5)
ALBUMIN/GLOB SERPL: 0.8 {RATIO} (ref 0.8–1.7)
ALP SERPL-CCNC: 84 U/L (ref 45–117)
ALT SERPL-CCNC: 10 U/L (ref 13–56)
ANION GAP SERPL CALC-SCNC: 6 MMOL/L (ref 3–18)
ARTERIAL PATENCY WRIST A: NO
ARTERIAL PATENCY WRIST A: NO
ARTERIAL PATENCY WRIST A: YES
AST SERPL-CCNC: 12 U/L (ref 10–38)
ATRIAL RATE: 74 BPM
BACTERIA SPEC CULT: ABNORMAL
BASE EXCESS BLD CALC-SCNC: 1 MMOL/L
BASE EXCESS BLD CALC-SCNC: 4 MMOL/L
BASE EXCESS BLD CALC-SCNC: 5 MMOL/L
BASOPHILS # BLD: 0 K/UL (ref 0–0.1)
BASOPHILS NFR BLD: 0 % (ref 0–2)
BDY SITE: ABNORMAL
BILIRUB SERPL-MCNC: 0.5 MG/DL (ref 0.2–1)
BODY TEMPERATURE: 98.6
BUN SERPL-MCNC: 31 MG/DL (ref 7–18)
BUN/CREAT SERPL: 9 (ref 12–20)
CALCIUM SERPL-MCNC: 8.3 MG/DL (ref 8.5–10.1)
CALCULATED P AXIS, ECG09: 36 DEGREES
CALCULATED R AXIS, ECG10: -36 DEGREES
CALCULATED T AXIS, ECG11: 91 DEGREES
CHLORIDE SERPL-SCNC: 99 MMOL/L (ref 100–111)
CK MB CFR SERPL CALC: ABNORMAL % (ref 0–4)
CK MB SERPL-MCNC: <1 NG/ML (ref 5–25)
CK SERPL-CCNC: 15 U/L (ref 26–192)
CO2 SERPL-SCNC: 30 MMOL/L (ref 21–32)
CREAT SERPL-MCNC: 3.39 MG/DL (ref 0.6–1.3)
DIAGNOSIS, 93000: NORMAL
DIFFERENTIAL METHOD BLD: ABNORMAL
EOSINOPHIL # BLD: 0 K/UL (ref 0–0.4)
EOSINOPHIL NFR BLD: 0 % (ref 0–5)
ERYTHROCYTE [DISTWIDTH] IN BLOOD BY AUTOMATED COUNT: 14 % (ref 11.6–14.5)
GAS FLOW.O2 O2 DELIVERY SYS: ABNORMAL L/MIN
GAS FLOW.O2 SETTING OXYMISER: 15 BPM
GAS FLOW.O2 SETTING OXYMISER: 15 L/M
GAS FLOW.O2 SETTING OXYMISER: 15 L/M
GLOBULIN SER CALC-MCNC: 3.5 G/DL (ref 2–4)
GLUCOSE BLD STRIP.AUTO-MCNC: 162 MG/DL (ref 70–110)
GLUCOSE BLD STRIP.AUTO-MCNC: 175 MG/DL (ref 70–110)
GLUCOSE BLD STRIP.AUTO-MCNC: 189 MG/DL (ref 70–110)
GLUCOSE SERPL-MCNC: 183 MG/DL (ref 74–99)
GRAM STN SPEC: ABNORMAL
GRAM STN SPEC: ABNORMAL
HCO3 BLD-SCNC: 29.6 MMOL/L (ref 22–26)
HCO3 BLD-SCNC: 30 MMOL/L (ref 22–26)
HCO3 BLD-SCNC: 30.7 MMOL/L (ref 22–26)
HCT VFR BLD AUTO: 25.1 % (ref 35–45)
HGB BLD-MCNC: 7.5 G/DL (ref 12–16)
INSPIRATION.DURATION SETTING TIME VENT: 1 SEC
LACTATE SERPL-SCNC: 0.8 MMOL/L (ref 0.4–2)
LYMPHOCYTES # BLD: 1.4 K/UL (ref 0.9–3.6)
LYMPHOCYTES NFR BLD: 9 % (ref 21–52)
MAGNESIUM SERPL-MCNC: 2.2 MG/DL (ref 1.6–2.6)
MCH RBC QN AUTO: 26.7 PG (ref 24–34)
MCHC RBC AUTO-ENTMCNC: 29.9 G/DL (ref 31–37)
MCV RBC AUTO: 89.3 FL (ref 74–97)
MONOCYTES # BLD: 1.3 K/UL (ref 0.05–1.2)
MONOCYTES NFR BLD: 8 % (ref 3–10)
NEUTS SEG # BLD: 13.2 K/UL (ref 1.8–8)
NEUTS SEG NFR BLD: 83 % (ref 40–73)
O2/TOTAL GAS SETTING VFR VENT: 100 %
P-R INTERVAL, ECG05: 168 MS
PCO2 BLD: 51.6 MMHG (ref 35–45)
PCO2 BLD: 68.8 MMHG (ref 35–45)
PCO2 BLD: 81.2 MMHG (ref 35–45)
PEEP RESPIRATORY: 5 CMH2O
PH BLD: 7.17 [PH] (ref 7.35–7.45)
PH BLD: 7.26 [PH] (ref 7.35–7.45)
PH BLD: 7.37 [PH] (ref 7.35–7.45)
PHOSPHATE SERPL-MCNC: 5.2 MG/DL (ref 2.5–4.9)
PLATELET # BLD AUTO: 212 K/UL (ref 135–420)
PMV BLD AUTO: 10.9 FL (ref 9.2–11.8)
PO2 BLD: 115 MMHG (ref 80–100)
PO2 BLD: 133 MMHG (ref 80–100)
PO2 BLD: 156 MMHG (ref 80–100)
POTASSIUM SERPL-SCNC: 4.2 MMOL/L (ref 3.5–5.5)
PRESSURE SUPPORT SETTING VENT: 5 CMH2O
PROT SERPL-MCNC: 6.4 G/DL (ref 6.4–8.2)
Q-T INTERVAL, ECG07: 440 MS
QRS DURATION, ECG06: 132 MS
QTC CALCULATION (BEZET), ECG08: 488 MS
RBC # BLD AUTO: 2.81 M/UL (ref 4.2–5.3)
SAO2 % BLD: 98 % (ref 92–97)
SAO2 % BLD: 99 % (ref 92–97)
SAO2 % BLD: 99 % (ref 92–97)
SERVICE CMNT-IMP: ABNORMAL
SODIUM SERPL-SCNC: 135 MMOL/L (ref 136–145)
SPECIMEN TYPE: ABNORMAL
TOTAL RESP. RATE, ITRR: 15
TOTAL RESP. RATE, ITRR: 20
TROPONIN I SERPL-MCNC: <0.02 NG/ML (ref 0–0.04)
VENTILATION MODE VENT: ABNORMAL
VENTRICULAR RATE, ECG03: 74 BPM
VOLUME CONTROL PLUS IVLCP: YES
VT SETTING VENT: 450 ML
WBC # BLD AUTO: 15.9 K/UL (ref 4.6–13.2)

## 2020-06-11 PROCEDURE — 31500 INSERT EMERGENCY AIRWAY: CPT

## 2020-06-11 PROCEDURE — 76040000007: Performed by: INTERNAL MEDICINE

## 2020-06-11 PROCEDURE — 36600 WITHDRAWAL OF ARTERIAL BLOOD: CPT

## 2020-06-11 PROCEDURE — 74011250636 HC RX REV CODE- 250/636: Performed by: PHYSICIAN ASSISTANT

## 2020-06-11 PROCEDURE — 74011000258 HC RX REV CODE- 258: Performed by: INTERNAL MEDICINE

## 2020-06-11 PROCEDURE — 94640 AIRWAY INHALATION TREATMENT: CPT

## 2020-06-11 PROCEDURE — 74011250636 HC RX REV CODE- 250/636

## 2020-06-11 PROCEDURE — 77030003400 HC NDL ASPIR BIOP CNMD -B: Performed by: INTERNAL MEDICINE

## 2020-06-11 PROCEDURE — 74011000258 HC RX REV CODE- 258

## 2020-06-11 PROCEDURE — 65610000006 HC RM INTENSIVE CARE

## 2020-06-11 PROCEDURE — 74011250636 HC RX REV CODE- 250/636: Performed by: INTERNAL MEDICINE

## 2020-06-11 PROCEDURE — 94669 MECHANICAL CHEST WALL OSCILL: CPT

## 2020-06-11 PROCEDURE — 74011250636 HC RX REV CODE- 250/636: Performed by: STUDENT IN AN ORGANIZED HEALTH CARE EDUCATION/TRAINING PROGRAM

## 2020-06-11 PROCEDURE — 87040 BLOOD CULTURE FOR BACTERIA: CPT

## 2020-06-11 PROCEDURE — 76060000031 HC ANESTHESIA FIRST 0.5 HR

## 2020-06-11 PROCEDURE — 82550 ASSAY OF CK (CPK): CPT

## 2020-06-11 PROCEDURE — 74011636637 HC RX REV CODE- 636/637: Performed by: FAMILY MEDICINE

## 2020-06-11 PROCEDURE — 90935 HEMODIALYSIS ONE EVALUATION: CPT

## 2020-06-11 PROCEDURE — 94002 VENT MGMT INPAT INIT DAY: CPT

## 2020-06-11 PROCEDURE — 74011000250 HC RX REV CODE- 250: Performed by: PHYSICIAN ASSISTANT

## 2020-06-11 PROCEDURE — 77030012699 HC VLV SUC CNTRL OCOA -A: Performed by: INTERNAL MEDICINE

## 2020-06-11 PROCEDURE — 82962 GLUCOSE BLOOD TEST: CPT

## 2020-06-11 PROCEDURE — 93005 ELECTROCARDIOGRAM TRACING: CPT

## 2020-06-11 PROCEDURE — 83735 ASSAY OF MAGNESIUM: CPT

## 2020-06-11 PROCEDURE — 87070 CULTURE OTHR SPECIMN AEROBIC: CPT

## 2020-06-11 PROCEDURE — 85025 COMPLETE CBC W/AUTO DIFF WBC: CPT

## 2020-06-11 PROCEDURE — 80053 COMPREHEN METABOLIC PANEL: CPT

## 2020-06-11 PROCEDURE — 5A1955Z RESPIRATORY VENTILATION, GREATER THAN 96 CONSECUTIVE HOURS: ICD-10-PCS | Performed by: INTERNAL MEDICINE

## 2020-06-11 PROCEDURE — 0B9F8ZZ DRAINAGE OF RIGHT LOWER LUNG LOBE, VIA NATURAL OR ARTIFICIAL OPENING ENDOSCOPIC: ICD-10-PCS | Performed by: INTERNAL MEDICINE

## 2020-06-11 PROCEDURE — 74011000250 HC RX REV CODE- 250: Performed by: INTERNAL MEDICINE

## 2020-06-11 PROCEDURE — 71045 X-RAY EXAM CHEST 1 VIEW: CPT

## 2020-06-11 PROCEDURE — C9113 INJ PANTOPRAZOLE SODIUM, VIA: HCPCS | Performed by: PHYSICIAN ASSISTANT

## 2020-06-11 PROCEDURE — 71250 CT THORAX DX C-: CPT

## 2020-06-11 PROCEDURE — 36415 COLL VENOUS BLD VENIPUNCTURE: CPT

## 2020-06-11 PROCEDURE — 82803 BLOOD GASES ANY COMBINATION: CPT

## 2020-06-11 PROCEDURE — 0BH17EZ INSERTION OF ENDOTRACHEAL AIRWAY INTO TRACHEA, VIA NATURAL OR ARTIFICIAL OPENING: ICD-10-PCS | Performed by: INTERNAL MEDICINE

## 2020-06-11 PROCEDURE — 84100 ASSAY OF PHOSPHORUS: CPT

## 2020-06-11 PROCEDURE — 94668 MNPJ CHEST WALL SBSQ: CPT

## 2020-06-11 PROCEDURE — 83605 ASSAY OF LACTIC ACID: CPT

## 2020-06-11 RX ORDER — HEPARIN SODIUM 1000 [USP'U]/ML
INJECTION, SOLUTION INTRAVENOUS; SUBCUTANEOUS
Status: COMPLETED
Start: 2020-06-11 | End: 2020-06-11

## 2020-06-11 RX ORDER — FENTANYL CITRATE 50 UG/ML
25-100 INJECTION, SOLUTION INTRAMUSCULAR; INTRAVENOUS
Status: DISCONTINUED | OUTPATIENT
Start: 2020-06-11 | End: 2020-06-14

## 2020-06-11 RX ORDER — LIDOCAINE HYDROCHLORIDE 10 MG/ML
1-5 INJECTION, SOLUTION EPIDURAL; INFILTRATION; INTRACAUDAL; PERINEURAL AS NEEDED
Status: DISCONTINUED | OUTPATIENT
Start: 2020-06-11 | End: 2020-06-14

## 2020-06-11 RX ORDER — FENTANYL CITRATE 50 UG/ML
INJECTION, SOLUTION INTRAMUSCULAR; INTRAVENOUS
Status: DISPENSED
Start: 2020-06-11 | End: 2020-06-12

## 2020-06-11 RX ORDER — MIDAZOLAM HYDROCHLORIDE 1 MG/ML
1-10 INJECTION, SOLUTION INTRAMUSCULAR; INTRAVENOUS
Status: DISCONTINUED | OUTPATIENT
Start: 2020-06-11 | End: 2020-06-14

## 2020-06-11 RX ORDER — EPINEPHRINE 0.1 MG/ML
INJECTION INTRACARDIAC; INTRAVENOUS
Status: DISPENSED
Start: 2020-06-11 | End: 2020-06-12

## 2020-06-11 RX ORDER — MIDAZOLAM HYDROCHLORIDE 1 MG/ML
INJECTION, SOLUTION INTRAMUSCULAR; INTRAVENOUS
Status: COMPLETED
Start: 2020-06-11 | End: 2020-06-12

## 2020-06-11 RX ORDER — FUROSEMIDE 10 MG/ML
INJECTION INTRAMUSCULAR; INTRAVENOUS
Status: COMPLETED | OUTPATIENT
Start: 2020-06-11 | End: 2020-06-11

## 2020-06-11 RX ORDER — FUROSEMIDE 10 MG/ML
INJECTION INTRAMUSCULAR; INTRAVENOUS
Status: COMPLETED
Start: 2020-06-11 | End: 2020-06-11

## 2020-06-11 RX ORDER — MIDAZOLAM HYDROCHLORIDE 1 MG/ML
INJECTION, SOLUTION INTRAMUSCULAR; INTRAVENOUS
Status: COMPLETED
Start: 2020-06-11 | End: 2020-06-11

## 2020-06-11 RX ORDER — SODIUM CHLORIDE 0.9 % (FLUSH) 0.9 %
5-40 SYRINGE (ML) INJECTION AS NEEDED
Status: DISCONTINUED | OUTPATIENT
Start: 2020-06-11 | End: 2020-06-26 | Stop reason: HOSPADM

## 2020-06-11 RX ORDER — CHLORHEXIDINE GLUCONATE 1.2 MG/ML
15 RINSE ORAL EVERY 12 HOURS
Status: DISCONTINUED | OUTPATIENT
Start: 2020-06-11 | End: 2020-06-18 | Stop reason: ALTCHOICE

## 2020-06-11 RX ORDER — INSULIN LISPRO 100 [IU]/ML
INJECTION, SOLUTION INTRAVENOUS; SUBCUTANEOUS EVERY 6 HOURS
Status: DISCONTINUED | OUTPATIENT
Start: 2020-06-11 | End: 2020-06-11

## 2020-06-11 RX ORDER — FUROSEMIDE 10 MG/ML
40 INJECTION INTRAMUSCULAR; INTRAVENOUS ONCE
Status: COMPLETED | OUTPATIENT
Start: 2020-06-11 | End: 2020-06-11

## 2020-06-11 RX ORDER — MIDAZOLAM HYDROCHLORIDE 1 MG/ML
1-2 INJECTION, SOLUTION INTRAMUSCULAR; INTRAVENOUS
Status: DISCONTINUED | OUTPATIENT
Start: 2020-06-11 | End: 2020-06-18

## 2020-06-11 RX ORDER — SODIUM CHLORIDE FOR INHALATION 3 %
4 VIAL, NEBULIZER (ML) INHALATION 3 TIMES DAILY
Status: DISCONTINUED | OUTPATIENT
Start: 2020-06-11 | End: 2020-06-11

## 2020-06-11 RX ORDER — INSULIN LISPRO 100 [IU]/ML
INJECTION, SOLUTION INTRAVENOUS; SUBCUTANEOUS EVERY 6 HOURS
Status: DISCONTINUED | OUTPATIENT
Start: 2020-06-12 | End: 2020-06-23

## 2020-06-11 RX ORDER — SODIUM CHLORIDE FOR INHALATION 3 %
4 VIAL, NEBULIZER (ML) INHALATION
Status: DISCONTINUED | OUTPATIENT
Start: 2020-06-11 | End: 2020-06-15

## 2020-06-11 RX ORDER — SODIUM CHLORIDE 900 MG/100ML
INJECTION INTRAVENOUS
Status: COMPLETED
Start: 2020-06-11 | End: 2020-06-11

## 2020-06-11 RX ORDER — HYDRALAZINE HYDROCHLORIDE 20 MG/ML
20 INJECTION INTRAMUSCULAR; INTRAVENOUS
Status: DISCONTINUED | OUTPATIENT
Start: 2020-06-11 | End: 2020-06-26 | Stop reason: HOSPADM

## 2020-06-11 RX ORDER — IPRATROPIUM BROMIDE AND ALBUTEROL SULFATE 2.5; .5 MG/3ML; MG/3ML
3 SOLUTION RESPIRATORY (INHALATION)
Status: DISCONTINUED | OUTPATIENT
Start: 2020-06-11 | End: 2020-06-18

## 2020-06-11 RX ORDER — SODIUM CHLORIDE 0.9 % (FLUSH) 0.9 %
5-40 SYRINGE (ML) INJECTION EVERY 8 HOURS
Status: DISCONTINUED | OUTPATIENT
Start: 2020-06-11 | End: 2020-06-26 | Stop reason: HOSPADM

## 2020-06-11 RX ADMIN — MIDAZOLAM 2 MG: 1 INJECTION INTRAMUSCULAR; INTRAVENOUS at 04:50

## 2020-06-11 RX ADMIN — MIDAZOLAM HYDROCHLORIDE 2 MG: 2 INJECTION, SOLUTION INTRAMUSCULAR; INTRAVENOUS at 06:00

## 2020-06-11 RX ADMIN — PIPERACILLIN AND TAZOBACTAM 2.25 G: 2; .25 INJECTION, POWDER, FOR SOLUTION INTRAVENOUS at 06:13

## 2020-06-11 RX ADMIN — IPRATROPIUM BROMIDE AND ALBUTEROL SULFATE 3 ML: .5; 3 SOLUTION RESPIRATORY (INHALATION) at 09:04

## 2020-06-11 RX ADMIN — FUROSEMIDE 80 MG: 10 INJECTION, SOLUTION INTRAMUSCULAR; INTRAVENOUS at 18:00

## 2020-06-11 RX ADMIN — CHLORHEXIDINE GLUCONATE 0.12% ORAL RINSE 15 ML: 1.2 LIQUID ORAL at 21:39

## 2020-06-11 RX ADMIN — HEPARIN SODIUM 4000 UNITS: 1000 INJECTION, SOLUTION INTRAVENOUS; SUBCUTANEOUS at 18:09

## 2020-06-11 RX ADMIN — PIPERACILLIN AND TAZOBACTAM 2.25 G: 2; .25 INJECTION, POWDER, FOR SOLUTION INTRAVENOUS at 21:19

## 2020-06-11 RX ADMIN — SODIUM CHLORIDE SOLN NEBU 3% 4 ML: 3 NEBU SOLN at 13:28

## 2020-06-11 RX ADMIN — INSULIN LISPRO 2 UNITS: 100 INJECTION, SOLUTION INTRAVENOUS; SUBCUTANEOUS at 11:30

## 2020-06-11 RX ADMIN — INSULIN LISPRO 2 UNITS: 100 INJECTION, SOLUTION INTRAVENOUS; SUBCUTANEOUS at 08:00

## 2020-06-11 RX ADMIN — FUROSEMIDE 40 MG: 10 INJECTION, SOLUTION INTRAMUSCULAR; INTRAVENOUS at 04:51

## 2020-06-11 RX ADMIN — FUROSEMIDE 80 MG: 10 INJECTION, SOLUTION INTRAMUSCULAR; INTRAVENOUS at 09:45

## 2020-06-11 RX ADMIN — PIPERACILLIN AND TAZOBACTAM 0.75 G: 2; .25 INJECTION, POWDER, FOR SOLUTION INTRAVENOUS at 21:19

## 2020-06-11 RX ADMIN — MIDAZOLAM HYDROCHLORIDE 2 MG: 2 INJECTION, SOLUTION INTRAMUSCULAR; INTRAVENOUS at 22:12

## 2020-06-11 RX ADMIN — FUROSEMIDE 40 MG: 10 INJECTION INTRAMUSCULAR; INTRAVENOUS at 04:51

## 2020-06-11 RX ADMIN — HEPARIN SODIUM 5000 UNITS: 5000 INJECTION INTRAVENOUS; SUBCUTANEOUS at 06:13

## 2020-06-11 RX ADMIN — FUROSEMIDE 40 MG: 10 INJECTION, SOLUTION INTRAMUSCULAR; INTRAVENOUS at 03:55

## 2020-06-11 RX ADMIN — SODIUM CHLORIDE SOLN NEBU 3% 4 ML: 3 NEBU SOLN at 09:04

## 2020-06-11 RX ADMIN — MIDAZOLAM HYDROCHLORIDE 2 MG: 2 INJECTION, SOLUTION INTRAMUSCULAR; INTRAVENOUS at 04:02

## 2020-06-11 RX ADMIN — SODIUM CHLORIDE 100 ML: 900 INJECTION INTRAVENOUS at 21:19

## 2020-06-11 RX ADMIN — SODIUM CHLORIDE 40 MG: 9 INJECTION INTRAMUSCULAR; INTRAVENOUS; SUBCUTANEOUS at 09:45

## 2020-06-11 RX ADMIN — CHLORHEXIDINE GLUCONATE 0.12% ORAL RINSE 15 ML: 1.2 LIQUID ORAL at 09:45

## 2020-06-11 RX ADMIN — HEPARIN SODIUM 5000 UNITS: 5000 INJECTION INTRAVENOUS; SUBCUTANEOUS at 21:00

## 2020-06-11 RX ADMIN — SODIUM CHLORIDE SOLN NEBU 3% 4 ML: 3 NEBU SOLN at 19:44

## 2020-06-11 RX ADMIN — SODIUM CHLORIDE 1000 MG: 900 INJECTION, SOLUTION INTRAVENOUS at 17:13

## 2020-06-11 NOTE — PROCEDURES
[address] [place] [address] [facility]  Pulmonary, Critical Care, and Sleep Medicine    Name: Ranjit Bautista MRN: [id number]   : 1950 Hospital: [address] [place] [address]   Date: 2020        Bronchoscopy Report    Procedure: Diagnostic bronchoscopy. Indication: aspiration with respiratory failure    Consent/Treatment: Informed consent was obtained from the  patient's NOK: step Daughter- via telephone after risks, benefits and alternatives were explained. Timeout verified the correct patient and correct procedure. Anesthesia:   Patient on ventilator and receiving  Versed 5mg, Fentanyl 100mcg, topical lidocaine: 1%- 2mls    Procedure Details:   -- The bronchoscope was introduced through an endotracheal tube. -- The vocal cords were not inspected  -- The tip of the endotracheal tube was about 1 cm above the main garth. -- The trachea and garth were completely inspected- Thick tan secretions were suctioned at the distant end of ETT and at the level of the main garth. These secretions appeared to be draining from upper airway. Thinner secretions were found in the lower airway. Hyperdynamic collapse at the level of the main garth was noted with respirations with intermittent occulusion by the distal posterior wall of trachea of the main mainstem with respirations. -- The right-sided endobronchial anatomy was completely inspected. Only notable finding was that the airway appeared more friable. Some distant mucus plugs were suctioned from the RLL.  Airway caliber was small  -- The left-sided endobronchial anatomy was completely inspected and was found to be normal with the exception that the airway caliber was was    Specimens:   Bronchial washings were sent for  microbiology Right Lower Lobe:  40 mls instilled, 15mls cloudy white fluid returned for culture         Complications: none    Estimated Blood Loss: Minimal to none                Ruben Malone DO  2020  7:54 PM

## 2020-06-11 NOTE — PROGRESS NOTES
Patient had RRT for hypoxemia, poor responsiveness and labored breathing. Patient was noted to be choking on her partial denture, which was taken out and was intubated. Patient oxygen sats improved after intubation and also she started waking up. Resident called me during RRT and I went to see the patient immediately. Patient currently alert awake, following commands, breathing on her own but still has ET tube in place with a respiratory on standby for bagging. Visit Vitals  /53   Pulse 72   Temp 97.2 °F (36.2 °C)   Resp 18   Ht 5' 5\" (1.651 m)   Wt 121.6 kg (268 lb)   SpO2 92%   Breastfeeding No   BMI 44.60 kg/m²       Exam  General:  Alert, cooperative, no acute distress, intubated    Pulmonary: Upper airway crackles, some basal Rales noted. Cardiovascular: Regular rate and Rhythm. GI:  Soft, Non distended, Non tender. + Bowel sounds. Extremities: Trace edema  Neurologic: Alert awake, following simple commands. .     Labs, chart, and vitals noted    1. Acute hypoxic respiratory failure due to foreign body(partial denture): Status post foreign body removal during intubation, patient currently intubated, will get CT chest stat. We will continue on vent. Discussed with pulmonary Dr. Joanna Castaneda agree with the plan. 2. Concern for aspiration: Patient already on Zosyn, will continue, follow-up CT chest.  3. ESRD on HD: HD scheduled for today. Discussed with the patient. Discussed with daughter Ms. patrick over the phone and updated the patient current condition. Discussed with RN and ICU PA. Plan is to obtain CT chest once patient stabilized in the ICU. Stat labs ordered. Disclaimer: Sections of this note are dictated using utilizing voice recognition software. Minor typographical errors may be present. If questions arise, please do not hesitate to contact me or call our department.

## 2020-06-11 NOTE — PROGRESS NOTES
Rapid Response Note  [facility]    Patient: Linsey Gupta 71 y.o. female  [id number]  1950      Admit Date: 6/7/2020   Admission Diagnosis: Pneumonia [J18.9]    RAPID RESPONSE     Rapid response called for Hypoxia w/ O2 sats in 30s. Patient called the nurse via remote and nurse noted patient was diaphoretic and had a lot of secretions that the nurse suctioned out. RRT was called. Upon PFM team's arrival, patient was having very heavy, laboured breathing. She was satting at 25%, had BP of 83/46, HR 50, RR31 and T98.4. She had her eyes closed and was non responsive to sound, touch or sternal rub. Anesthesia was paged STAT. Patient had audible gurgling and prominent crackles on physical exam. 1x IV 40mg Lasix given. Anesthesia upon attempting to intubate the patient, noted that a part of her dentures was stuck in the airway causing close to complete airway obstruction. Foreceps were used to retrieve the dentures and patient was intubated. Bedside ABG showed pH of 7.1, pCO2 in 80s. EKG showed peaked T waves in leads V1-V5 which was new compared to previous on file. (Upon chart review, noted patient's last K to be 4.2, patient also did not undergo dialysis yesterday)    Stat labs, CXR and EKG ordered. Patient's overall status started to improve post intubation. Her O2sats went upto 100% with BP, HR in normal limits. Over next few minutes she started following commands (raised her eyebrows, moved her upper and lower extremities). Could not perform ROS since patient intubated. CXR port showed diffuse volume overload with fluffy opacities in R mid and low lung fields. 1x dose of 2mg Versed given as patient more awake and started to cough with increased secretions    Spoke with Dr. Bethany Mays who arrived at bedside after a couple minutes.  Expressed concern of airway edema, possible FB pieces still stuck, aspiration PNA and need of further imaging of neck and chest. Dr. Bethany Mays in agreement and decision made to transfer the patient to ICU. Dr. Kiana White spoke with Dr. Joan Chavez. He also put in orders for CT imaging. Medications Reviewed    OBJECTIVE     BP 83/46    O2 Sat 25%  HR50  RR31    PHYSICAL:  General:  Non responsive, in severe respiratory distress. Diaphoretic. CV:  Heart sounds difficult to appreciate amidst loud crackles, no audible murmurs  RESP:  Laboured breathing with audible crackles b/l  ABD:  Soft, obese  Neuro:  Patient obtunded    EKG: Peaked T waves in Leads V1-V5      ASSESSMENT, PLAN & DISPOSITION   Clarissa Umanzor is a 71y.o. year old female admitted for Pneumonia [J18.9]. Rapid response called for Acute Hypoxia. Patient condition currently: stable, to be transferred to ICU for close monitoring and extubation in the future. Medications Administered: Versed 2mg, IV Lasix 40mg    Labs ordered/Pending: CBC, CMP, Cardiac Panel, Lactic Acid, Mg    Disposition: ICU    Attending Dr. Kiana White notified of rapid response. In agreement with plan. Primary team resuming care.      Senior resident Dr. Zenaida Morrow present during RRT and evaluation    Laurita Cardenas MD   [address] Medicine PGY-1  4:05 AM 06/11/20

## 2020-06-11 NOTE — PERIOP NOTES
TRANSFER - IN REPORT:    Verbal report received from [facility] on [place]  being received from 308 for ordered procedure      Report consisted of patients Situation, Background, Assessment and   Recommendations(SBAR). Information from the following report(s) SBAR was reviewed with the receiving nurse. Opportunity for questions and clarification was provided. Assessment completed upon patients arrival to unit and care assumed.

## 2020-06-11 NOTE — ROUTINE PROCESS
Patient call using her call bell. Noted sweating, secretions coming out in her mouth, labor breathing check [id number]. O2 sats on 20's-30's . Suction her mouth. Call RRT.

## 2020-06-11 NOTE — PROGRESS NOTES
ACUTE HEMODIALYSIS FLOW SHEET    HEMODIALYSIS ORDERS: Physician: Dr. Ramiro De La Rosa: Iris   Duration: 3 hr  BFR: 300   DFR: 500   Dialysate:  Temp 36.0   K+   3    Ca+  2.5   Na 140   Bicarb 35   Wt Readings from Last 1 Encounters:   06/11/20 106.1 kg (233 lb 14.5 oz)    Patient Chart [x]   Unable to Obtain []  Dry weight/UF Goal: 1500 ml  Access RIJ CVC     Heparin []  Bolus    Units    [] Hourly    Units    [x]None      Catheter locking solution Heparin 1:1000   Pre BP:   140/123    Pulse:  67   Respirations: 16    Temperature:  98.5    Tx: NSS   ml/Bolus   [x] N/A   Labs: []  Pre  []  Post:   [x] N/A   Additional Orders(medications, blood products, hypotension management): [] Yes   [x] No     [x]  [facility] Consent Verified     CATHETER ACCESS:  []N/A   [x]Right   []Left   [x]IJ     []Fem   []Chest wall   [] First use X-ray verified     []Tunnel    [] Non Tunneled   [x]No S/S infection  []Redness  []Drainage []Cultured []Swelling []Pain   [x]Medical Aseptic Prep Utilized   []Dressing Changed  [] Biopatch  Date: 6/8/20   []Clotted   [x]Patent   Flows: [x]Good  []Poor  []Reversed   If access problem,  notified: []Yes    [x]N/A        GRAFT/FISTULA ACCESS:   [x]N/A     []Right     []Left     []UE     []LE   []AVG   []AVF     []Buttonhole    []Medical Aseptic Prep Utilized   []No S/S infection  []Redness  []Drainage []Cultured  [] Swelling  [] Pain  Bruit:   [] Strong    [] Weak       Thrill :   [] Strong    [] Weak     Needle Gauge: 15   Length: 1 inch   If access problem,  notified: []Yes     [x]N/A     Please describe access if present and not used: N/A       GENERAL ASSESSMENT:    LUNGS:  Rate 16   SaO2%      [] Clear  [x] Coarse  [] Crackles  [] Wheezing                                                [] Diminished     Location : []RLL   []LLL    []RUL  []KATIE   Cough: []Productive  []Dry  [x]N/A   Respirations:  [x]Easy  []Labored   Therapy:  []RA  []NC /min    Mask: []NRB  [] Venti O2%                  []Ventilator  [x]Intubated  [] Trach  [] BiPaP   CARDIAC: []Regular      [x] Irregular   [] Pericardial Rub  [] JVD          []  Monitored  [] Bedside  [] Remotely monitored     EDEMA: [] None  [x]Generalized  [] Pitting [] 1    [] 2    [] 3    [] 4                 [] Facial  [] Pedal  []  UE  [] LE   SKIN:   [x] Warm  [] Hot     [] Cold   [x] Dry     [] Pale   [] Diaphoretic                  [] Flushed  [] Jaundiced  [] Cyanotic  [] Rash  [] Weeping   LOC:    [x] Alert      [x]Oriented:    [x] Person     [x] Place  []Time               [] Confused  [] Lethargic  [] Medicated  [] Non-responsive   GI / ABDOMEN:                     [] Flat    [] Distended    [x] Soft    [] Firm   [x]  Obese                   [] Diarrhea  [x] Bowel Sounds  [] Nausea  [] Vomiting     / URINE ASSESSMENT:                   [] Voiding   [] Oliguria  [x] Anuria   []  Lopez                  [] Incontinent  []  Incontinent Brief []  Fecal Management System     PAIN:  [x] 0 []1  []2   []3   []4   []5   []6   []7   []8   []9   []10            Scale 0-10  Action/Follow Up:    MOBILITY:  [x] Bed    [] Stretcher      All Vitals and Treatment Details on Attached [address]: [address]          Room # 389/38   [] 1st Time Acute      [] Stat       [x] Routine      [] Urgent     [x] Acute Room  []  Bedside  [] ICU/CCU  [] ER   Isolation Precautions:  [x] Dialysis    There are currently no Active Isolations       ALLERGIES:     Allergies   Allergen Reactions    Augmentin [Amoxicillin-Pot Clavulanate] Diarrhea    Clavulanic Acid Diarrhea    Levaquin [Levofloxacin] Other (comments)     Severe hypoglycemia        Code Status:  Full Code     Hepatitis Status     Lab Results   Component Value Date/Time    Hepatitis B surface Ag <0.10 06/09/2020 04:03 AM    Hepatitis B surface Ab <3.10 (L) 06/09/2020 04:03 AM        Current Labs:      Lab Results   Component Value Date/Time    WBC 15.9 (H) 06/11/2020 03:40 AM    HGB 7.5 (L) 06/11/2020 03:40 AM    HCT 25.1 (L) 06/11/2020 03:40 AM    PLATELET 772 65/59/3158 03:40 AM    MCV 89.3 06/11/2020 03:40 AM     Lab Results   Component Value Date/Time    Sodium 135 (L) 06/11/2020 03:40 AM    Potassium 4.2 06/11/2020 03:40 AM    Chloride 99 (L) 06/11/2020 03:40 AM    CO2 30 06/11/2020 03:40 AM    Anion gap 6 06/11/2020 03:40 AM    Glucose 183 (H) 06/11/2020 03:40 AM    BUN 31 (H) 06/11/2020 03:40 AM    Creatinine 3.39 (H) 06/11/2020 03:40 AM    BUN/Creatinine ratio 9 (L) 06/11/2020 03:40 AM    GFR est AA 16 (L) 06/11/2020 03:40 AM    GFR est non-AA 13 (L) 06/11/2020 03:40 AM    Calcium 8.3 (L) 06/11/2020 03:40 AM          DIET:  DIET NPO      PRIMARY NURSE REPORT:   Pre Dialysis: DECLAN Landin RN     Time: 9295        EDUCATION:    [x] Patient [] Other           Knowledge Basis: []None [x]Minimal [] Substantial   Barriers to learning  [x]N/A   [] Access Care     [] S&S of infection  [] Fluid Management  [] K+   [x] Procedural    []Albumin   [] Medications   [] Tx Options   [] Transplant   [] Diet   [] Other   Teaching Tools:  [x] Explain  [] Demo  [] Handouts [] Video  Patient response: [x] Verbalized understanding  [] Teach back  [] Return demonstration   [] Requires follow up      [x]Time Out/Safety Check  [x] Extracorporeal Circuit Tested for integrity       RO/HEMODIALYSIS MACHINE SAFETY CHECKS  Before each treatment:     Machine Number:                   [facility]                                                                                               [x] Portable Machine #4/RO serial # [id number]                                                                            Alarm Test:  Pass time 8431            [x] RO/Machine Log Complete    Machine Temp    36.0             Dialysate: pH  7.4    Conductivity: Meter 13.8     HD Machine  13.8      TCD: 13.8  Dialyzer Lot # [id number]     Blood Tubing Lot # [id number]     Saline Lot # [id number]     CHLORINE TESTING-Before each treatment and every 4 hours    Total Chlorine: [x] less than 0.1 ppm  Initial Time Check: 1505       4 Hr/2nd Check Time:    (if greater than 0.1 ppm from Primary then every 30 minutes from Secondary)     TREATMENT INITIATION  with Dialysis Precautions:   [x] All Connections Secured              [x] Saline Line Double Clamped   [x] Venous Parameters Set               [x] Arterial Parameters Set    [x] Prime Given 250ml NSS              [x]Air Foam Detector Engaged      Treatment Initiation Note:  1500  Arrived at pt's room, pt intubated able to open eyes spontaneously and shakes head in acknowledgement with spoken to. Pt A & O X 3, follows commands. During Treatment Notes:  1609  RIJ assessed no abnormalities noted. CVC accessed without any difficulty, line patent with good flow. Pt tolerated well. Vascular access visible with arterial and venous line connections intact. 1600  Vascular access visible with arterial and venous line connections intact. 1620  Blood cultures drawn  1700  Vascular access visible with arterial and venous line connections intact. Medication Dose Volume Route Time [facility] Nurse, Title   vancomycin 1000mg 250ml HD 3139 Flip Ponce RN     Post Assessment  Dialyzer Cleared:[] Good [x] Fair  [] Poor  Blood processed:  48.7 L  UF Removed:  2500 Ml  Post /35  Pulse  71 Resp  21   Temp 98.9    Post Tx Vascular Access: [x] N/A         CVC Catheter: [] N/A  Locking solution: Heparin 1:1000 U  Arterial port 1.6 ml   Venous port 1.6ml         Skin:[x] Warm  [x] Dry [] Diaphoretic               [] Flushed  [] Pale [] Cyanotic   Pain:  [x]0  []1 []2  []3 []4  []5  []6 []7 []8  []9  []10       Post Treatment Note:   8712  HD completed at this time, pt tolerated well. Dressing clean, dry and intact. POST TREATMENT PRIMARY NURSE HANDOFF REPORT:   Post Dialysis: DECLAN  Bart                Time:  6353       Abbreviations: AVG-arterial venous graft, AVF-arterial venous fistula, IJ-Internal Jugular, Subcl-Subclavian, Fem-Femoral, Tx-treatment, AP/HR-apical heart rate, DFR-dialysate flow rate, BFR-blood flow rate, AP-arterial pressure, -venous pressure, UF-ultrafiltrate, TMP-transmembrane pressure, Austyn-Venous, Art-Arterial, RO-Reverse Osmosis

## 2020-06-11 NOTE — PROGRESS NOTES
PT orders received and chart reviewed. Patient with active bedrest orders, intubated, and sedated. PT orders acknowledged and discontinued.   Please reorder when patient is able to actively participate.      Thank you for this referral.  Boris Ibarra DPT

## 2020-06-11 NOTE — ANESTHESIA PROCEDURE NOTES
Emergent Intubation  Performed by: Christin Sue CRNA  Authorized by: Christin Sue CRNA     Emergent Intubation:   Location:  ICU  Date/Time:  6/11/2020 3:39 AM  Indications:  Impending respiratory failure  Spontaneous Ventilation: present    Level of Consciousness: sedated    Preoxygenated: No      Airway Documentation:   Airway:  ETT - Cuffed  Technique:  Direct laryngoscopy  Blade Type:  Mara  Blade Size:  3  ETT size (mm):  7.5  ETT Line Dell:  Lips  ETT Insertion depth (cm):  22  Placement verified by: auscultation, EtCO2 and BBS    Attempts:  2  Difficult airway: No    Called for airway assistance. Arrived to find patient clearly SOB. Difficult to get history so elected to just intubate with out medication   Patient still attempting to breath on own  No upper airway sounds. With first attempted there was a partial metal denture located just superior to her cords at epiglotis obviously in the way. It was stuck and took some effort to retrieve. Removed in 10-15 seconds with mcgills forceps. On second try,   DL mac 3 grade 2 view   22 ett at lip  100% O 2 via ambu   Care per icu team  Post Vs bp 136/71    resp per ambu at 20  O2 sat 97% post intubation.

## 2020-06-11 NOTE — ROUTINE PROCESS
Bedside and Verbal shift change report given to Raj Buchanan RN (oncoming nurse) by Ted Corley RN (offgoing nurse). Report included the following information SBAR, ED Summary, Procedure Summary, Intake/Output, MAR, Recent Results, Alarm Parameters , Procedure Verification and Quality Measures.

## 2020-06-11 NOTE — PROGRESS NOTES
In Patient Progress note      Admit Date: 6/7/2020    Impression:     1) ESRD on HD MWF , volume overloaded   2) Ac respi failure , multifactorial , foreign body( dentures in airway)    ,fluid overload , AC on chr diastolic CHF, presently intubated ,  3) AC on chr CHF  4) PNA , Rapid covid -ve    5) bacteremia , gm +ve cocci   5) Anemia   6) sec hyperpara     PLan:  1) HD today for volume and solute , UF 1500ml  2) follow ID recs   3) fluid restrict 1200 ml  4) continue epogen with HD   5) dose AB for GFR < 15     Please call with questions ,     Efrain Telles MD FASN  Cell [phone]  Pager: [phone]    Subjective:      Intubated/ sedated        Current Facility-Administered Medications:     midazolam (VERSED) injection 1-2 mg, 1-2 mg, IntraVENous, Q2H PRN, Karyn Joy PA-C, 2 mg at 06/11/20 0402    pantoprazole (PROTONIX) 40 mg in 0.9% sodium chloride 10 mL injection, 40 mg, IntraVENous, DAILY, Divya Lilly PA-C, 40 mg at 06/11/20 0945    albuterol-ipratropium (DUO-NEB) 2.5 MG-0.5 MG/3 ML, 3 mL, Nebulization, Q6H PRN, Divya Lilly PA-C, 3 mL at 06/11/20 0904    sodium chloride 3% hypertonic nebulizer soln, 4 mL, Nebulization, TID RT, Divya Lilly PA-C, 4 mL at 06/11/20 1328    furosemide (LASIX) injection, , , Shahriar HOFFMAN Himani, MD, 40 mg at 06/11/20 0355    chlorhexidine (PERIDEX) 0.12 % mouthwash 15 mL, 15 mL, Oral, Q12H, Monica Lassiter DO, 15 mL at 06/11/20 0945    vancomycin (VANCOCIN) 1,000 mg in 0.9% sodium chloride (MBP/ADV) 250 mL adv, 1,000 mg, IntraVENous, ONCE, Monica Lassiter DO    [facility]ron [facility]) injection 4 mg, 4 mg, IntraVENous, Q6H PRN, Michelle Martin MD, 4 mg at 06/10/20 0030    insulin lispro (HUMALOG) injection, , SubCUTAneous, AC&HS, Merlinda Smock, DO, 2 Units at 06/11/20 1130    glucose chewable tablet 16 g, 4 Tab, Oral, PRN, Merlinda Smock, DO    glucagon (GLUCAGEN) injection 1 mg, 1 mg, IntraMUSCular, PRN, Merlinda Smock, DO    dextrose (D50W) injection syrg 12.5-25 g, 25-50 mL, IntraVENous, PRN, Haley Vivas,     VANCOMYCIN INFORMATION NOTE, , Other, Rx Dosing/Monitoring, Kana Calixto MD    piperacillin-tazobactam (ZOSYN) 2.25 g in 0.9% sodium chloride (MBP/ADV) 50 mL MBP, 2.25 g, IntraVENous, Q8H, Hadley Angulo MD, Last Rate: 100 mL/hr at 06/11/20 0613, 2.25 g at 06/11/20 1469    furosemide (LASIX) injection 80 mg, 80 mg, IntraVENous, BID, Hadley Angulo MD, 80 mg at 06/11/20 0945    citalopram (CELEXA) tablet 20 mg, 20 mg, Oral, DAILY, Hadley Angulo MD, Stopped at 06/11/20 0900    cyanocobalamin tablet 1,000 mcg, 1,000 mcg, Oral, BID, Hadley Angulo MD, Stopped at 06/11/20 0900    loratadine (CLARITIN) tablet 10 mg, 10 mg, Oral, DAILY, Hadley Angulo MD, Stopped at 06/11/20 0900    aspirin chewable tablet 81 mg, 81 mg, Oral, DAILY, Hadley Angulo MD, Stopped at 06/11/20 0900    isosorbide mononitrate ER (IMDUR) tablet 30 mg, 30 mg, Oral, DAILY, Hadley Angulo MD, Stopped at 06/11/20 0900    amLODIPine (NORVASC) tablet 10 mg, 10 mg, Oral, DAILY, Hadley Angulo MD, Stopped at 06/11/20 0900    polyethylene glycol (MIRALAX) packet 17 g, 17 g, Oral, DAILY, Hadley Angulo MD, Stopped at 06/11/20 0900    rosuvastatin (CRESTOR) tablet 5 mg, 5 mg, Oral, QHS, Hadley Angulo MD, 5 mg at 06/10/20 2228    hydrALAZINE (APRESOLINE) tablet 100 mg, 100 mg, Oral, TID, Kristopher Alan MD, Stopped at 06/11/20 0900    gabapentin (NEURONTIN) capsule 100 mg, 100 mg, Oral, BID, Hadley Angulo MD, Stopped at 06/11/20 0900    carvediloL (COREG) tablet 12.5 mg, 12.5 mg, Oral, BID WITH MEALS, Hadley Angulo MD, Stopped at 06/11/20 0800    ferrous sulfate tablet 325 mg, 325 mg, Oral, EVERY OTHER DAY, Hadley Angulo MD, 325 mg at 06/10/20 0536    mirtazapine (REMERON) tablet 15 mg, 15 mg, Oral, QHS, Hadley Angulo MD, 15 mg at 06/10/20 3879    docusate sodium (COLACE) capsule 100 mg, 100 mg, Oral, BID PRN, Hadley Angulo MD    heparin (porcine) injection 5,000 Units, 5,000 Units, SubCUTAneous, Q8H, Hadley Angulo MD, 5,000 Units at 06/11/20 3572    Piperacillin-tazobactam (ZOSYN) 0.75 gm Supplemental Dosing by Pharmacy, , Other, Rx Dosing/Monitoring, Anabelle Esquivel MD        Objective:     Visit Vitals  /47   Pulse 69   Temp 98.4 °F (36.9 °C)   Resp 15   Ht 5' 5\" (1.651 m)   Wt 106.1 kg (233 lb 14.5 oz)   SpO2 100%   Breastfeeding No   BMI 38.92 kg/m²         Intake/Output Summary (Last 24 hours) at 6/11/2020 1352  Last data filed at 6/11/2020 3065  Gross per 24 hour   Intake 220 ml   Output    Net 220 ml       Physical Exam:     Intubated. / sedated  HENT mmm  RS AEBE coarse BS   CVS s1 s2 wnl no JVD  GI soft BS +  Ext 1+ LE edema     Data Review:    Recent Labs     06/11/20  0340   WBC 15.9*   RBC 2.81*   HCT 25.1*   MCV 89.3   MCH 26.7   MCHC 29.9*   RDW 14.0     Recent Labs     06/11/20  0340 06/10/20  1004 06/09/20  0403   BUN 31* 24* 22*   CREA 3.39* 2.59* 2.91*   CA 8.3* 8.3* 8.5   ALB 2.9*  --   --    K 4.2 4.3 3.9   * 133* 136   CL 99* 98* 101   CO2 30 31 31   PHOS 5.2*  --   --    * 149* 133*       Illene Brunner, MD

## 2020-06-11 NOTE — PROGRESS NOTES
attended the interdisciplinary rounds for [facility], who is a 71 y.o.,female. Patients Primary Language is: [place]. According to the patients EMR Adventist Affiliation is: Restorationism.     The reason the Patient came to the hospital is:   Patient Active Problem List    Diagnosis Date Noted    Fluid overload 06/10/2020    Pneumonia 06/08/2020    Acute on chronic respiratory failure ([address] 05/09/2020    Sepsis ([address] 04/30/2020    Respiratory failure ([address] 04/30/2020    SIRS (systemic inflammatory response syndrome) ([address] 04/30/2020    CHF (congestive heart failure) [address] 04/25/2020    Acute on chronic diastolic congestive heart failure ([address] 04/23/2020    Suspected COVID-19 virus infection 04/23/2020    Type 2 diabetes mellitus without complication, without long-term current use of insulin ([address] 07/06/2018    Left anterior fascicular block 07/06/2018    HTN (hypertension), benign 07/06/2018    Coronary artery disease involving native coronary artery of native heart without angina pectoris 07/06/2018    Chronic diastolic congestive heart failure ([address] 07/06/2018    Bilateral lower extremity edema 07/06/2018    BMI 35.0-35.9,adult 07/06/2018        Plan:  Chaplains will continue to follow and will provide pastoral care on an as needed/requested basis.  recommends bedside caregivers page  on duty if patient shows signs of acute spiritual or emotional distress.     [address] [facility]  Board Certified [address] [place] [address]   [phone]

## 2020-06-11 NOTE — PROGRESS NOTES
OT orders received and chart reviewed. Patient with active bedrest orders, intubated, and sedated. OT orders acknowledged and discontinued. Please reorder when patient is able to actively participate.      Thank you for this referral.    Bladimir Solano MS, OTR/L

## 2020-06-11 NOTE — PROGRESS NOTES
Problem: Falls - Risk of  Goal: *Absence of Falls  Description: Document Jorge De La O Fall Risk and appropriate interventions in the flowsheet.   Outcome: Progressing Towards Goal  Note: Fall Risk Interventions:       Mentation Interventions: Bed/chair exit alarm    Medication Interventions: Bed/chair exit alarm    Elimination Interventions: Bed/chair exit alarm    History of Falls Interventions: Bed/chair exit alarm         Problem: Nutrition Deficit  Goal: *Optimize nutritional status  Outcome: Progressing Towards Goal

## 2020-06-11 NOTE — PROGRESS NOTES
Infectious Disease progress Note        Reason: Gram-positive bloodstream infection, pneumonia    Current abx Prior abx   Piperacillin/tazobactam since 6/7  Vancomycin restarted 6/10 Vancomycin 6/7     Lines:       Assessment :    71 y.o. female  with multiple medical problems including diastolic CHF, diabetes, arthritis, acid reflux hiatal hernia, chronic hypoxic failure on home oxygen 2 L, hypertension, end-stage renal disease on dialysis Tuesday Thursday Saturday who presented to the emergency department via EMS on 6/8/20 with shortness of breath per report from [address] and rehab. Now with blood culture 6/7+ for gram-positive cocci, bilateral pulmonary infiltrates    Patient presents with a highly complex clinical picture. Clinical presentation seems consistent with acute respiratory failure secondary to fluid overload, acute on chronic diastolic CHF. However positive blood culture on 6/7 concerning for undiagnosed infection as a cause of recurrent CHF exacerbation. I would recommend to rule out colonized hemodialysis catheter as a source of bloodstream infection  Acute on chronic respiratory failure requiring mechanical ventilation - intubated this AM 2/2 aspiration of partial dentures. Currently on 70% fio2. Recommendations:    1. Continue piperacillin/tazobactam, vancomycin  2. F/u ICU team recommendations. 3.  F/u  blood culture 1 from hemodialysis catheter, 1 from periphery  4. Follow-up nephrology recommendations     Above plan was discussed in details with dr. Dae Doshi. Kevin Jones Please call me if any further questions or concerns. Will continue to participate in the care of this patient. HPI:    Intubated. Detailed ros not feasible.       home Medication List    Details   carvediloL (COREG) 12.5 mg tablet Take 1 Tab by mouth two (2) times daily (with meals). Qty: 30 Tab, Refills: 0      amLODIPine (NORVASC) 10 mg tablet Take 1 Tab by mouth daily.   Qty: 30 Tab, Refills: 0      aspirin 81 mg chewable tablet Take 1 Tab by mouth daily. Qty: 30 Tab, Refills: 0      Biotin 2,500 mcg cap Take 1 Tab by mouth two (2) times a day. B.infantis-B.ani-B.long-B.bifi (PROBIOTIC 4X) 10-15 mg TbEC Take 1 Tab by mouth daily. gabapentin (NEURONTIN) 100 mg capsule Take 1 Cap by mouth two (2) times a day. Max Daily Amount: 200 mg. Qty: 20 Cap, Refills: 0    Associated Diagnoses: Type 2 diabetes mellitus without complication, without long-term current use of insulin (HCC)      ferrous sulfate 325 mg (65 mg iron) tablet Take 1 Tab by mouth every other day for 30 days. Qty: 15 Tab, Refills: 0      mirtazapine (REMERON) 15 mg tablet Take 1 Tab by mouth nightly. Qty: 10 Tab, Refills: 0      docusate sodium (COLACE) 100 mg capsule Take 1 Cap by mouth two (2) times daily as needed for Constipation for up to 90 days. Qty: 6 Cap, Refills: 0      OXYGEN-AIR DELIVERY SYSTEMS Take 2 L by inhalation daily. polyethylene glycol (MIRALAX) 17 gram packet Take 1 Packet by mouth daily. Qty: 10 Packet, Refills: 0      rosuvastatin (CRESTOR) 5 mg tablet Take 1 Tab by mouth nightly. Qty: 30 Tab, Refills: 0      hydrALAZINE (APRESOLINE) 100 mg tablet Take 1 Tab by mouth three (3) times daily. Qty: 90 Tab, Refills: 0      insulin glargine (LANTUS) 100 unit/mL injection 4 units SQ daily- watch for Hypoglycemia adjust dose per patient's blood glucose level  Qty: 1 Vial, Refills: 0      isosorbide mononitrate ER (IMDUR) 30 mg tablet Take 1 Tab by mouth daily. Qty: 30 Tab, Refills: 0      esomeprazole (NEXIUM) 40 mg capsule Take 40 mg by mouth daily. citalopram (CELEXA) 20 mg tablet Take 20 mg by mouth daily. cranberry extract (AZO CRANBERRY) 450 mg tab Take 2 Tabs by mouth daily. cholecalciferol (VITAMIN D3) 1,000 unit cap Take 5,000 Units by mouth daily. cyanocobalamin (VITAMIN B-12) 1,000 mcg tablet Take 1,000 mcg by mouth two (2) times a day.       vitamin a-vitamin c-vit e-min (OCUVITE) tablet Take 1 Tab by mouth daily. loratadine (CLARITIN) 10 mg tablet Take 10 mg by mouth daily.              Current Facility-Administered Medications   Medication Dose Route Frequency    midazolam (VERSED) injection 1-2 mg  1-2 mg IntraVENous Q2H PRN    pantoprazole (PROTONIX) 40 mg in 0.9% sodium chloride 10 mL injection  40 mg IntraVENous DAILY    albuterol-ipratropium (DUO-NEB) 2.5 MG-0.5 MG/3 ML  3 mL Nebulization Q6H PRN    sodium chloride 3% hypertonic nebulizer soln  4 mL Nebulization TID RT    furosemide (LASIX) injection    CODE BLUE    chlorhexidine (PERIDEX) 0.12 % mouthwash 15 mL  15 mL Oral Q12H    ondansetron (ZOFRAN) injection 4 mg  4 mg IntraVENous Q6H PRN    insulin lispro (HUMALOG) injection   SubCUTAneous AC&HS    glucose chewable tablet 16 g  4 Tab Oral PRN    glucagon (GLUCAGEN) injection 1 mg  1 mg IntraMUSCular PRN    dextrose (D50W) injection syrg 12.5-25 g  25-50 mL IntraVENous PRN    VANCOMYCIN INFORMATION NOTE   Other Rx Dosing/Monitoring    piperacillin-tazobactam (ZOSYN) 2.25 g in 0.9% sodium chloride (MBP/ADV) 50 mL MBP  2.25 g IntraVENous Q8H    furosemide (LASIX) injection 80 mg  80 mg IntraVENous BID    citalopram (CELEXA) tablet 20 mg  20 mg Oral DAILY    cyanocobalamin tablet 1,000 mcg  1,000 mcg Oral BID    loratadine (CLARITIN) tablet 10 mg  10 mg Oral DAILY    aspirin chewable tablet 81 mg  81 mg Oral DAILY    isosorbide mononitrate ER (IMDUR) tablet 30 mg  30 mg Oral DAILY    amLODIPine (NORVASC) tablet 10 mg  10 mg Oral DAILY    polyethylene glycol (MIRALAX) packet 17 g  17 g Oral DAILY    rosuvastatin (CRESTOR) tablet 5 mg  5 mg Oral QHS    hydrALAZINE (APRESOLINE) tablet 100 mg  100 mg Oral TID    gabapentin (NEURONTIN) capsule 100 mg  100 mg Oral BID    carvediloL (COREG) tablet 12.5 mg  12.5 mg Oral BID WITH MEALS    ferrous sulfate tablet 325 mg  325 mg Oral EVERY OTHER DAY    mirtazapine (REMERON) tablet 15 mg  15 mg Oral QHS    docusate sodium (COLACE) capsule 100 mg  100 mg Oral BID PRN    heparin (porcine) injection 5,000 Units  5,000 Units SubCUTAneous Q8H    Piperacillin-tazobactam (ZOSYN) 0.75 gm Supplemental Dosing by Pharmacy   Other Rx Dosing/Monitoring       Allergies: Augmentin [amoxicillin-pot clavulanate]; Clavulanic acid; and Levaquin [levofloxacin]    Temp (24hrs), Av.9 °F (36.6 °C), Min:97 °F (36.1 °C), Max:98.5 °F (36.9 °C)    Visit Vitals  /47   Pulse 63   Temp 97.7 °F (36.5 °C)   Resp 15   Ht 5' 5\" (1.651 m)   Wt 106.1 kg (233 lb 14.5 oz)   SpO2 100%   Breastfeeding No   BMI 38.92 kg/m²       ROS: 12 point ROS obtained in details. Pertinent positives as mentioned in HPI,   otherwise negative    Physical Exam:       Constitutional: laying on bed, alert, intubated. No distress. Eyes: No scleral icterus. Neck: No JVD present. Cardiovascular: Regular rhythm. Exam reveals no gallop and no friction rub. Pulmonary/Chest: no rales, rhonchi  Abdominal: Soft. Bowel sounds are normal. exhibits no distension. No tenderness. No rebound and no guarding. Musculoskeletal:Normal strength. exhibits no tenderness. Trace edema of LE  Neurological:alert and oriented  No facial asymmetry or focal weakness  Skin: Skin is warm and dry.    Psychiatric: Pleasant, cooperative    Labs: Results:   Chemistry Recent Labs     20  0340 06/10/20  1004 20  0403   * 149* 133*   * 133* 136   K 4.2 4.3 3.9   CL 99* 98* 101   CO2 30 31 31   BUN 31* 24* 22*   CREA 3.39* 2.59* 2.91*   CA 8.3* 8.3* 8.5   AGAP 6 4 4   BUCR 9* 9* 8*   AP 84  --   --    TP 6.4  --   --    ALB 2.9*  --   --    GLOB 3.5  --   --    AGRAT 0.8  --   --       CBC w/Diff Recent Labs     20  0340 06/10/20  1004 06/09/20  0403   WBC 15.9*  --  11.3   RBC 2.81*  --  2.61*   HGB 7.5* 7.6* 7.3*   HCT 25.1* 24.4* 22.7*     --  184   GRANS 83*  --   --    LYMPH 9*  --   --    EOS 0  --   --       Microbiology Recent Labs     06/10/20  8937 06/10/20  1230   CULT NO GROWTH AFTER 16 HOURS NO GROWTH AFTER 16 HOURS          RADIOLOGY:    All available imaging studies/reports in [facility] for this admission were reviewed    Dr. Feng Pablo, Infectious Disease Specialist  [phone]  June 11, 2020  10:20 AM

## 2020-06-11 NOTE — PROGRESS NOTES
Problem: Falls - Risk of  Goal: *Absence of Falls  Description: Document Yury Ng Fall Risk and appropriate interventions in the flowsheet.   6/11/2020 0314 by Estefanía Hernandez RN  Outcome: Progressing Towards Goal  Note: Fall Risk Interventions:       Mentation Interventions: Bed/chair exit alarm    Medication Interventions: Bed/chair exit alarm    Elimination Interventions: Bed/chair exit alarm    History of Falls Interventions: Bed/chair exit alarm      6/11/2020 0313 by Estefanía Hernandez RN  Outcome: Progressing Towards Goal  Note: Fall Risk Interventions:       Mentation Interventions: Bed/chair exit alarm    Medication Interventions: Bed/chair exit alarm    Elimination Interventions: Bed/chair exit alarm    History of Falls Interventions: Bed/chair exit alarm

## 2020-06-11 NOTE — ANESTHESIA PREPROCEDURE EVALUATION
Relevant Problems   No relevant active problems       Anesthetic History               Review of Systems / Medical History      Pulmonary                   Neuro/Psych              Cardiovascular                  Exercise tolerance: <4 METS     GI/Hepatic/Renal                Endo/Other             Other Findings                   Anesthetic Plan    ASA: 4, emergent  Anesthesia type: general

## 2020-06-11 NOTE — PROGRESS NOTES
06/11/20 1629   Weaning Parameters   Spontaneous Breathing Trial Complete No (Comments)  (does not meet criteria )

## 2020-06-11 NOTE — CONSULTS
[facility]  Pulmonary, Critical Care, and Sleep Medicine      Name: Jung Wolf MRN: [id number]   : 1950 Hospital: [facility]   Date: 2020          Critical Care Initial Patient Consult    Requesting MD: Dr. Blossom Collins                                                Reason for CC Consult: respiratory failure    IMPRESSION:   · Acute on chronic respiratory failure requiring mechanical ventilation - initially in the setting of PNA and fluid overload, however, intubated this AM 2/2 aspiration of partial dentures. Hypercapnia improving on subsequent ABGs  · Aspiration of foreign body  · RUL infiltrate/PNA  · Acute on HFpEF - most recent echo 20 w/ EF of 55-60%   · ? bacteremia - GPC in groups in 1/2 bottles  · ESRD - HD MWF  · Chronic anemia   RECOMMENDATIONS:   · Resp: Titrate FiO2 for SpO2 >90%; CT chest pending to evaluate for additional retained foreign bodies in the airway, +/- bronch pending results. · I/D: Sepsis bundle per hospital protocol, f/u repeat BCx and sputum Cx. ABX: vanc and zosyn. Deescalate ABX once Cx's finalize. · Hem/Onc: Daily CBC; H/H, and plts are stable  · CVS: HD stable; monitor for hypertension  · Metabolic: Daily BMP; monitor e-lytes; replace PRN  · Renal: Trend Renal indices; HD MWF per nephrology  · Endocrine: POC Glucose q6; SSI  · GI: SUP, Trend LFTs, Zofran PRN for N/V   · Musc/Skin: No acute issues, wound care as needed  · Neuro: PRN versed for agitation and vent synchrony   · Fluids: N/A  · Code Status: full code     Best Practice:  · Sepsis Bundle per Hospital Protocol  · Glycemic control; avoid Hypoglycemia  · IHI ICU Bundles:  ·  Lopez Bundle Followed and Vent Bundle Followed, Vent Day 1    · Mech Vent patients/ Pulmonary pts:   · VAP bundle, Aim to keep peak plateau pressure 50-45KF H2O  · Aspiration Precautions - HOB >30'  · Daily sedation holiday as indicated  · SBT as tolerated/appropriate  · Stress ulcer prophylaxis. famotidine  · DVT prophylaxis. heparin  · Need for Lines, ramos assessed. · Restraints need. · Palliative care evaluation as needed    Subjective/History: This patient has been seen and evaluated at the request of Dr. Justin Alicia for acute respiratory failure. Patient is a 71 y.o. female w/ pmhx of ESRD on HD, DM, and CHF on home O2 who presented to the ED complaining of SOB and cough from [facility]. She was noted to be in respiratory distress at the facility and her O2 was increased from her baseline 2L to 12-15L. At that time she was noted to be altered as well. Patient was bagged en route to the ED. Upon arrival to ED patient was A&O x3. She was placed on BiPAP and subsequently weaned to [place]. Work up significant for RUL infiltrate and patient was started on broad spectrum ABX. Was admitted to the floor. This AM, an RRT was called for patient being altered w/ increased work of breathing, copious secretions and hypoxia. Upon PFM arrival patient was noted to be additionally bradycardic and tachypnic. 40 mg lasix was given and anesthesia was called for intubation. Upon inspection of CRNA, patient was noted to be choking on her partial denture. It was removed at that time and O2 saturations and mental status began to improve. Was intubated at that time. Post intubation ABG significant for respiratory acidosis w/ pH of 7.1 and pCO2 in the 80s. Patient's mental status continued to improve and patient was able to follow commands. Upon my evaluation in the ICU, patient awake and calm on the ventilator. Follows commands, in no distress. Pending CT of chest to evaluate for possibility of additional retained foreign bodies in airway. Hemodynamically stable. The patient is critically ill and can not provide additional history due to Ventilated.      Past Medical History:   Diagnosis Date    Arthritis     Cancer ([address]     CHF (congestive heart failure) ([address]     Diabetes ([address]     Fracture of neck ([address]     GERD (gastroesophageal reflux disease)     Goiter     Hiatal hernia     Hypertension     Uterine cancer (HCC)       Past Surgical History:   Procedure Laterality Date    HX APPENDECTOMY      HX HYSTERECTOMY      HX KNEE REPLACEMENT Right     HX ORTHOPAEDIC      odontoid fracture repair with hardware placement.  HX PARTIAL THYROIDECTOMY      [place] INSJ TUNNELED CVC W/O SUBQ PORT/ AGE 5 YR/> Right 5/7/2020    INSERTION TUNNELED CENTRAL VENOUS CATHETER performed by Ira Guardado MD at [facility] CATH LAB      Prior to Admission medications    Medication Sig Start Date End Date Taking? Authorizing Provider   carvediloL (COREG) 12.5 mg tablet Take 1 Tab by mouth two (2) times daily (with meals). 5/13/20  Yes Manuela Stanley MD   amLODIPine (NORVASC) 10 mg tablet Take 1 Tab by mouth daily. 4/28/20  Yes Elisabeth Guevara MD   aspirin 81 mg chewable tablet Take 1 Tab by mouth daily. 4/28/20  Yes Elisabeth Guevara MD   Biotin 2,500 mcg cap Take 1 Tab by mouth two (2) times a day. Yes Other, MD Larry   B.infantis-B.ani-B.long-B.bifi (PROBIOTIC 4X) 10-15 mg TbEC Take 1 Tab by mouth daily. Yes Other, MD Larry   gabapentin (NEURONTIN) 100 mg capsule Take 1 Cap by mouth two (2) times a day. Max Daily Amount: 200 mg. 5/13/20   Manuela Stanley MD   ferrous sulfate 325 mg (65 mg iron) tablet Take 1 Tab by mouth every other day for 30 days. 5/14/20 6/13/20  Manuela Stanley MD   mirtazapine (REMERON) 15 mg tablet Take 1 Tab by mouth nightly. 5/13/20   Manuela Stanley MD   docusate sodium (COLACE) 100 mg capsule Take 1 Cap by mouth two (2) times daily as needed for Constipation for up to 90 days. 5/13/20 8/11/20  Jagruti Giron MD   OXYGEN-AIR DELIVERY SYSTEMS Take 2 L by inhalation daily. Provider, Historical   polyethylene glycol (MIRALAX) 17 gram packet Take 1 Packet by mouth daily.  4/28/20   Elisabeth Guevara MD   rosuvastatin (CRESTOR) 5 mg tablet Take 1 Tab by mouth nightly. 4/28/20   Oniel Rothman MD   hydrALAZINE (APRESOLINE) 100 mg tablet Take 1 Tab by mouth three (3) times daily. 4/28/20   Oniel Rothman MD   insulin glargine (LANTUS) 100 unit/mL injection 4 units SQ daily- watch for Hypoglycemia adjust dose per patient's blood glucose level 4/27/20   Oniel Rothman MD   isosorbide mononitrate ER (IMDUR) 30 mg tablet Take 1 Tab by mouth daily. 4/28/20   Oniel Rothman MD   esomeprazole (NEXIUM) 40 mg capsule Take 40 mg by mouth daily. Larry Hall MD   citalopram (CELEXA) 20 mg tablet Take 20 mg by mouth daily. Larry Hall MD   cranberry extract (AZO CRANBERRY) 450 mg tab Take 2 Tabs by mouth daily. Larry Hall MD   cholecalciferol (VITAMIN D3) 1,000 unit cap Take 5,000 Units by mouth daily. Larry Hall MD   cyanocobalamin (VITAMIN B-12) 1,000 mcg tablet Take 1,000 mcg by mouth two (2) times a day. Larry Hall MD   vitamin a-vitamin c-vit e-min (OCUVITE) tablet Take 1 Tab by mouth daily. Larry Hall MD   loratadine (CLARITIN) 10 mg tablet Take 10 mg by mouth daily.     Larry Hall MD     Current Facility-Administered Medications   Medication Dose Route Frequency    furosemide (LASIX) 10 mg/mL injection        furosemide (LASIX) injection 40 mg  40 mg IntraVENous ONCE    midazolam (VERSED) 1 mg/mL injection        insulin lispro (HUMALOG) injection   SubCUTAneous AC&HS    VANCOMYCIN INFORMATION NOTE   Other Rx Dosing/Monitoring    piperacillin-tazobactam (ZOSYN) 2.25 g in 0.9% sodium chloride (MBP/ADV) 50 mL MBP  2.25 g IntraVENous Q8H    furosemide (LASIX) injection 80 mg  80 mg IntraVENous BID    pantoprazole (PROTONIX) tablet 40 mg  40 mg Oral ACB    citalopram (CELEXA) tablet 20 mg  20 mg Oral DAILY    cyanocobalamin tablet 1,000 mcg  1,000 mcg Oral BID    loratadine (CLARITIN) tablet 10 mg  10 mg Oral DAILY    aspirin chewable tablet 81 mg  81 mg Oral DAILY    isosorbide mononitrate ER (IMDUR) tablet 30 mg  30 mg Oral DAILY    amLODIPine (NORVASC) tablet 10 mg  10 mg Oral DAILY    polyethylene glycol (MIRALAX) packet 17 g  17 g Oral DAILY    rosuvastatin (CRESTOR) tablet 5 mg  5 mg Oral QHS    hydrALAZINE (APRESOLINE) tablet 100 mg  100 mg Oral TID    gabapentin (NEURONTIN) capsule 100 mg  100 mg Oral BID    carvediloL (COREG) tablet 12.5 mg  12.5 mg Oral BID WITH MEALS    ferrous sulfate tablet 325 mg  325 mg Oral EVERY OTHER DAY    mirtazapine (REMERON) tablet 15 mg  15 mg Oral QHS    heparin (porcine) injection 5,000 Units  5,000 Units SubCUTAneous Q8H    Piperacillin-tazobactam (ZOSYN) 0.75 gm Supplemental Dosing by Pharmacy   Other Rx Dosing/Monitoring     Allergies   Allergen Reactions    Augmentin [Amoxicillin-Pot Clavulanate] Diarrhea    Clavulanic Acid Diarrhea    Levaquin [Levofloxacin] Other (comments)     Severe hypoglycemia        Social History     Tobacco Use    Smoking status: Never Smoker   Substance Use Topics    Alcohol use: No      History reviewed. No pertinent family history. Review of Systems:  Review of systems not obtained due to patient factors. Objective:   Vital Signs:    Visit Vitals  /56   Pulse 74   Temp 97 °F (36.1 °C)   Resp 20   Ht 5' 5\" (1.651 m)   Wt 121.6 kg (268 lb)   SpO2 96%   Breastfeeding No   BMI 44.60 kg/m²       O2 Device: Nasal cannula   O2 Flow Rate (L/min): 2 l/min   Temp (24hrs), Av °F (36.7 °C), Min:97 °F (36.1 °C), Max:98.9 °F (37.2 °C)       Intake/Output:   Last shift:      No intake/output data recorded. Last 3 shifts:  0701 - 06/10 1900  In: 740 [P.O.:690;  I.V.:50]  Out: 1500     Intake/Output Summary (Last 24 hours) at 2020 0434  Last data filed at 6/10/2020 1751  Gross per 24 hour   Intake 740 ml   Output    Net 740 ml       Ventilator Settings:  Mode Rate Tidal Volume Pressure FiO2 PEEP                    Peak airway pressure:      Minute ventilation: 11.3 l/min        Physical Exam:     General: Intubated, NAD   Head:  Normocephalic, without obvious abnormality, atraumatic. Eyes:  Conjunctivae/corneas clear. PERRL   Nose: Nares normal. Septum midline. Mucosa normal   Throat: Lips, mucosa, and tongue normal. edentulous   Neck: Supple, symmetrical, trachea midline, no adenopathy. Lungs:   Symmetrical chest rise; coarse rhonchi diffusely w/ expiratory wheezes   Heart:  RRR, S1, S2 normal, no m/r/g   Abdomen:   Soft, non-tender. Bowel sounds normal. No masses   Extremities: Extremities normal, atraumatic. 1+ pitting edema   Pulses: 2+ and symmetric all extremities.    Skin: Skin color, texture, turgor normal. No rashes or lesions   Neurologic: Grossly nonfocal   Devices: PIV, ETT         Data:     Recent Results (from the past 24 hour(s))   GLUCOSE, POC    Collection Time: 06/10/20  7:55 AM   Result Value Ref Range    Glucose (POC) 179 (H) 70 - 110 mg/dL   HGB & HCT    Collection Time: 06/10/20 10:04 AM   Result Value Ref Range    HGB 7.6 (L) 12.0 - 16.0 g/dL    HCT 24.4 (L) 35.0 - 92.8 %   METABOLIC PANEL, BASIC    Collection Time: 06/10/20 10:04 AM   Result Value Ref Range    Sodium 133 (L) 136 - 145 mmol/L    Potassium 4.3 3.5 - 5.5 mmol/L    Chloride 98 (L) 100 - 111 mmol/L    CO2 31 21 - 32 mmol/L    Anion gap 4 3.0 - 18 mmol/L    Glucose 149 (H) 74 - 99 mg/dL    BUN 24 (H) 7.0 - 18 MG/DL    Creatinine 2.59 (H) 0.6 - 1.3 MG/DL    BUN/Creatinine ratio 9 (L) 12 - 20      GFR est AA 22 (L) >60 ml/min/1.73m2    GFR est non-AA 18 (L) >60 ml/min/1.73m2    Calcium 8.3 (L) 8.5 - 10.1 MG/DL   GLUCOSE, POC    Collection Time: 06/10/20 11:37 AM   Result Value Ref Range    Glucose (POC) 161 (H) 70 - 110 mg/dL   GLUCOSE, POC    Collection Time: 06/10/20  3:45 PM   Result Value Ref Range    Glucose (POC) 138 (H) 70 - 110 mg/dL   GLUCOSE, POC    Collection Time: 06/10/20  8:21 PM   Result Value Ref Range    Glucose (POC) 166 (H) 70 - 110 mg/dL   VANCOMYCIN, RANDOM    Collection Time: 06/10/20  8:33 PM   Result Value Ref Range    Vancomycin, random 14.8 5.0 - 40.0 UG/ML   GLUCOSE, POC    Collection Time: 06/11/20  3:23 AM   Result Value Ref Range    Glucose (POC) 189 (H) 70 - 110 mg/dL   POC G3    Collection Time: 06/11/20  3:39 AM   Result Value Ref Range    Device: AMBU      Flow rate (POC) 15 L/M    FIO2 (POC) 100 %    pH (POC) 7.170 (LL) 7.35 - 7.45      pCO2 (POC) 81.2 (H) 35.0 - 45.0 MMHG    pO2 (POC) 156 (H) 80 - 100 MMHG    HCO3 (POC) 29.6 (H) 22 - 26 MMOL/L    sO2 (POC) 99 (H) 92 - 97 %    Base excess (POC) 1 mmol/L    Allens test (POC) NO      Site RIGHT RADIAL      Specimen type (POC) ARTERIAL      Performed by Nuala     CBC WITH AUTOMATED DIFF    Collection Time: 06/11/20  3:40 AM   Result Value Ref Range    WBC 15.9 (H) 4.6 - 13.2 K/uL    RBC 2.81 (L) 4.20 - 5.30 M/uL    HGB 7.5 (L) 12.0 - 16.0 g/dL    HCT 25.1 (L) 35.0 - 45.0 %    MCV 89.3 74.0 - 97.0 FL    MCH 26.7 24.0 - 34.0 PG    MCHC 29.9 (L) 31.0 - 37.0 g/dL    RDW 14.0 11.6 - 14.5 %    PLATELET 350 957 - 469 K/uL    MPV 10.9 9.2 - 11.8 FL    NEUTROPHILS 83 (H) 40 - 73 %    LYMPHOCYTES 9 (L) 21 - 52 %    MONOCYTES 8 3 - 10 %    EOSINOPHILS 0 0 - 5 %    BASOPHILS 0 0 - 2 %    ABS. NEUTROPHILS 13.2 (H) 1.8 - 8.0 K/UL    ABS. LYMPHOCYTES 1.4 0.9 - 3.6 K/UL    ABS. MONOCYTES 1.3 (H) 0.05 - 1.2 K/UL    ABS. EOSINOPHILS 0.0 0.0 - 0.4 K/UL    ABS.  BASOPHILS 0.0 0.0 - 0.1 K/UL    DF AUTOMATED     METABOLIC PANEL, COMPREHENSIVE    Collection Time: 06/11/20  3:40 AM   Result Value Ref Range    Sodium 135 (L) 136 - 145 mmol/L    Potassium 4.2 3.5 - 5.5 mmol/L    Chloride 99 (L) 100 - 111 mmol/L    CO2 30 21 - 32 mmol/L    Anion gap 6 3.0 - 18 mmol/L    Glucose 183 (H) 74 - 99 mg/dL    BUN 31 (H) 7.0 - 18 MG/DL    Creatinine 3.39 (H) 0.6 - 1.3 MG/DL    BUN/Creatinine ratio 9 (L) 12 - 20      GFR est AA 16 (L) >60 ml/min/1.73m2    GFR est non-AA 13 (L) >60 ml/min/1.73m2    Calcium 8.3 (L) 8.5 - 10.1 MG/DL    Bilirubin, total 0.5 0.2 - 1.0 MG/DL    ALT (SGPT) 10 (L) 13 - 56 U/L    AST (SGOT) 12 10 - 38 U/L    Alk. phosphatase 84 45 - 117 U/L    Protein, total 6.4 6.4 - 8.2 g/dL    Albumin 2.9 (L) 3.4 - 5.0 g/dL    Globulin 3.5 2.0 - 4.0 g/dL    A-G Ratio 0.8 0.8 - 1.7     CARDIAC PANEL,(CK, CKMB & TROPONIN)    Collection Time: 06/11/20  3:40 AM   Result Value Ref Range    CK - MB <1.0 <3.6 ng/ml    CK-MB Index  0.0 - 4.0 %     CALCULATION NOT PERFORMED WHEN RESULT IS BELOW LINEAR LIMIT    CK 15 (L) 26 - 192 U/L    Troponin-I, QT <0.02 0.0 - 0.045 NG/ML   MAGNESIUM    Collection Time: 06/11/20  3:40 AM   Result Value Ref Range    Magnesium 2.2 1.6 - 2.6 mg/dL   POC G3    Collection Time: 06/11/20  4:03 AM   Result Value Ref Range    Device: AMBU      Flow rate (POC) 15 L/M    FIO2 (POC) 100 %    pH (POC) 7.258 (L) 7.35 - 7.45      pCO2 (POC) 68.8 (H) 35.0 - 45.0 MMHG    pO2 (POC) 115 (H) 80 - 100 MMHG    HCO3 (POC) 30.7 (H) 22 - 26 MMOL/L    sO2 (POC) 98 (H) 92 - 97 %    Base excess (POC) 4 mmol/L    Allens test (POC) NO      Total resp. rate 20      Site RIGHT RADIAL      Specimen type (POC) ARTERIAL      Performed by Michelle Lemos              Telemetry:normal sinus rhythm    Imaging:    CXR Results  (Last 48 hours)               06/11/20 0406  XR CHEST PORT Final result    Impression:  Impression:       No radiopaque foreign body is identified. Stable right perihilar consolidation however increasing right lower lung   consolidation concerning for pneumonia. Endotracheal tube ends approximately 1 cm above the garth. Narrative:  CHEST PORTABLE        INDICATIONS: Swallowed dentures, foreign body evaluation. Hypoxia with decreased   oxygen saturations. COMPARISON: 6/10/2020 at 0534 hours       FINDINGS:        Support lines/catheters: Tip of the endotracheal tube projects 1 cm above the   garth. Right jugular venous catheter with the tip overlying the right atrium.        Lungs: Similar right perihilar opacities however worsening right lower lung opacities. Diffusely increased interstitial prominence. Cardiac silhouette and mediastinal contours: Stable enlargement. Pleural spaces: No pneumothorax. Costophrenic angles are sharp and there is no   sizable pleural effusion. Bones and soft tissues: Cervical fusion hardware. Other: No radiopaque foreign body is identified. 06/10/20 0558  XR CHEST PORT Final result    Impression:  Impression:       Dialysis catheter in good position. Cardiomegaly. Resolving pulmonary edema. Persistent airspace disease right upper lobe which could represent pneumonia or   edema. Small to moderate right effusion. Narrative:  CHEST PORTABLE       CPT CODE: 10426       COMPARISON: 6/7/2020       INDICATIONS: Pneumonia. Fluid overload. FINDINGS: Right-sided dialysis catheter present with its tip in the right   atrium. Heart is enlarged. There has been significant improvement in bilateral   interstitial infiltrates in the three-day interval likely representing resolving   pulmonary edema. There is a persistent focal consolidation in the right upper   lobe which could represent pneumonia or edema. There is a small to moderate   sized right pleural effusion. Extensive surgical hardware is seen involving the   thoracic spine. Lilian Camilo PA-C   06/11/20   Pulmonary, Critical Care Medicine  [facility]        [facility] Staff Addendum     I have independently evaluated the patient and reviewed the patient's chart. I have discussed the findings and care plan with ICU Care Team. Care Plan reviewed on ICU milti-D rounds. I agree with the above evaluation, assessment and recommendations along with the following comments and observations. Please also review my orders. Patient appear alert. Patient with some degree of aspiration. Appears to have aspirated partials plate. Plate appears in tact.  Unclear if other component of aspiration or if all of patient's respiratory distress due to aspiration of partial place into hypopharynx,  I did not see any clear signs of foreign body on CT scan. I spoke with patient's step daughter and next of kin and obtained consent for bronchoscopy and airway screen. Once airway has been cleared will work on liberating from ventilator. Continuing with supportive care measures at this time       Complex decision making was made in the evaluation and management plans during this consultation. More than 50% of time was spent in counseling and coordination of care including review of data and discussion with other team members. Further recommendations and therapies pending clinical course.     Critical Care and time spent coordinating care, minus procedure time: 30 min    Mireya Berg DO, [facility]  Pulmonary, Sleep and Critical Care Medicine  7:41 PM

## 2020-06-11 NOTE — PROGRESS NOTES
Problem: Nutrition Deficit  Goal: *Optimize nutritional status  Outcome: Progressing Towards Goal     Problem: Falls - Risk of  Goal: *Absence of Falls  Description: Document Jennifer Gonzalez Fall Risk and appropriate interventions in the flowsheet. Outcome: Progressing Towards Goal  Note: Fall Risk Interventions:       Mentation Interventions: Adequate sleep, hydration, pain control, Bed/chair exit alarm, Door open when patient unattended, Evaluate medications/consider consulting pharmacy, More frequent rounding, Reorient patient, Toileting rounds, Update white board    Medication Interventions: Bed/chair exit alarm, Evaluate medications/consider consulting pharmacy, Patient to call before getting OOB    Elimination Interventions: Bed/chair exit alarm, Call light in reach, Patient to call for help with toileting needs, Toileting schedule/hourly rounds    History of Falls Interventions: Bed/chair exit alarm, Door open when patient unattended, Evaluate medications/consider consulting pharmacy, Investigate reason for fall         Problem: Patient Education: Go to Patient Education Activity  Goal: Patient/Family Education  Outcome: Progressing Towards Goal     Problem: Pressure Injury - Risk of  Goal: *Prevention of pressure injury  Description: Document Toney Scale and appropriate interventions in the flowsheet. Outcome: Progressing Towards Goal  Note: Pressure Injury Interventions:  Sensory Interventions: Assess changes in LOC, Assess need for specialty bed, Check visual cues for pain, Discuss PT/OT consult with provider, Float heels, Keep linens dry and wrinkle-free, Maintain/enhance activity level, Minimize linen layers, Monitor skin under medical devices, Pressure redistribution bed/mattress (bed type), Turn and reposition approx.  every two hours (pillows and wedges if needed)    Moisture Interventions: Absorbent underpads, Assess need for specialty bed, Check for incontinence Q2 hours and as needed, Contain wound drainage, Maintain skin hydration (lotion/cream), Minimize layers, Offer toileting Q_hr    Activity Interventions: Assess need for specialty bed, Pressure redistribution bed/mattress(bed type)    Mobility Interventions: Assess need for specialty bed, Float heels, HOB 30 degrees or less, Pressure redistribution bed/mattress (bed type), Turn and reposition approx.  every two hours(pillow and wedges)    Nutrition Interventions: Document food/fluid/supplement intake, Discuss nutritional consult with provider    Friction and Shear Interventions: Apply protective barrier, creams and emollients, Feet elevated on foot rest, Foam dressings/transparent film/skin sealants, HOB 30 degrees or less, Lift sheet, Lift team/patient mobility team, Minimize layers, Transferring/repositioning devices                Problem: Patient Education: Go to Patient Education Activity  Goal: Patient/Family Education  Outcome: Progressing Towards Goal     Problem: Airway Clearance - Ineffective  Goal: Achieve or maintain patent airway  Outcome: Progressing Towards Goal     Problem: Gas Exchange - Impaired  Goal: Absence of hypoxia  Outcome: Progressing Towards Goal  Goal: Promote optimal lung function  Outcome: Progressing Towards Goal     Problem: Breathing Pattern - Ineffective  Goal: Ability to achieve and maintain a regular respiratory rate  Outcome: Progressing Towards Goal     Problem: Isolation Precautions - Risk of Spread of Infection  Goal: Prevent transmission of infectious organism to others  Outcome: Progressing Towards Goal     Problem: Risk for Fluid Volume Deficit  Goal: Maintain normal heart rhythm  Outcome: Progressing Towards Goal  Goal: Maintain absence of muscle cramping  Outcome: Progressing Towards Goal  Goal: Maintain normal serum potassium, sodium, calcium, phosphorus, and pH  Outcome: Progressing Towards Goal     Problem: Loneliness or Risk for Loneliness  Goal: Demonstrate positive use of time alone when socialization is not possible  Outcome: Progressing Towards Goal     Problem: Fatigue  Goal: Verbalize increase energy and improved vitality  Outcome: Progressing Towards Goal     Problem: Patient Education: Go to Patient Education Activity  Goal: Patient/Family Education  Outcome: Progressing Towards Goal     Problem: Ventilator Management  Goal: *Adequate oxygenation and ventilation  Outcome: Progressing Towards Goal  Goal: *Patient maintains clear airway/free of aspiration  Outcome: Progressing Towards Goal  Goal: *Absence of infection signs and symptoms  Outcome: Progressing Towards Goal  Goal: *Normal spontaneous ventilation  Outcome: Progressing Towards Goal     Problem: Patient Education: Go to Patient Education Activity  Goal: Patient/Family Education  Outcome: Progressing Towards Goal     Problem: Non-Violent Restraints  Goal: *Removal from restraints as soon as assessed to be safe  Outcome: Progressing Towards Goal  Goal: *No harm/injury to patient while restraints in use  Outcome: Progressing Towards Goal  Goal: *Patient's dignity will be maintained  Outcome: Progressing Towards Goal  Goal: *Patient Specific Goal (EDIT GOAL, INSERT TEXT)  Outcome: Progressing Towards Goal  Goal: Non-violent Restaints:Standard Interventions  Outcome: Progressing Towards Goal  Goal: Non-violent Restraints:Patient Interventions  Outcome: Progressing Towards Goal  Goal: Patient/Family Education  Outcome: Progressing Towards Goal

## 2020-06-11 NOTE — PROGRESS NOTES
NUTRITION    Nursing Referral: [facility]     RECOMMENDATIONS / PLAN:     - Discontinue oral supplements: Nepro Shake once daily due to NPO status. - Monitor ability to tolerate oral diet once extubated versus need for enteral nutrition support. - Continue RD inpatient monitoring and evaluation. Recommended Goal Enteral Regimen, as indicated: Vital High Protein at 55 mL/hr + 50 mL q 4 hour water flushes to provide: 1320 kcal, 116 gm protein, 148 gm CHO, 0 gm fiber, 1104 mL free water, 1404 mL total free water, 93% RDIs       NUTRITION INTERVENTIONS & DIAGNOSIS:     - Meals/snacks: NPO  - Medical food supplement therapy: Nepro Shake once daily- discontinue   - Collaboration and referral of nutrition care: interdisciplinary rounds     Nutrition Diagnosis: Inadequate oral intake related to respiratory status as evidenced by pt NPO. Increased nutrient needs protein related to increased expenditure as evidenced by ESRD on HD    ASSESSMENT:     6/11: s/p RRT overnight for hypoxia, increased work of breakfast and copious secretions, noted to be chocking on her partial denture- intubated for airway protection and foreign body retrieved during procedure; plan for imaging today to confirm removal and possible extubation. No OG or NGT placed. 6/9: Admitted with pneumonia, CHF with hx of ESRD on HD. Diet started and noted pt eating meals in ED, intake unknown at this time. Wt gain noted PTA, question fluid status. Hx yesterday UF 3.5 L, 1200 mL FR recommended per Nephrology.     Nutritional intake adequate to meet patients estimated nutritional needs:  No    Diet: DIET CARDIAC Regular; FR 1200ML  DIET NUTRITIONAL SUPPLEMENTS Dinner; NEPRO  DIET NPO     Food Allergies: NKFA  Current Appetite: Not Applicable - NPO  Appetite/meal intake prior to admission: Unable to determine at this time  Feeding Limitations:  [x] Swallowing difficulty: SLP signed off    [x] Chewing difficulty: partial dentures- chocking on lead to intubation 6/11/20    [x] Other: respiratory status   Current Meal Intake:   Patient Vitals for the past 100 hrs:   % Diet Eaten   06/10/20 1751 50 %   06/10/20 1257 0 %   06/10/20 0851 25 %     Anuric   BM: 6/10  Skin Integrity: WDL   Edema:   [] No     [x] Yes   Pertinent Medications: Reviewed: cyanocobalamin, docusate sodium prn, ferrous sulfate, furosemide, mirtazapine, pantoprazole, ondansetron     Recent Labs     06/11/20  0340 06/10/20  1004 06/09/20  0403   * 133* 136   K 4.2 4.3 3.9   CL 99* 98* 101   CO2 30 31 31   * 149* 133*   BUN 31* 24* 22*   CREA 3.39* 2.59* 2.91*   CA 8.3* 8.3* 8.5   MG 2.2  --   --    PHOS 5.2*  --   --    ALB 2.9*  --   --    ALT 10*  --   --        Intake/Output Summary (Last 24 hours) at 6/11/2020 1110  Last data filed at 6/11/2020 2792  Gross per 24 hour   Intake 340 ml   Output    Net 340 ml       Anthropometrics:  Ht Readings from Last 1 Encounters:   06/08/20 5' 5\" (1.651 m)     Last 3 Recorded Weights in this Encounter    06/08/20 1131 06/11/20 0500   Weight: 121.6 kg (268 lb) 106.1 kg (233 lb 14.5 oz)     Body mass index is 38.92 kg/m².    Obese Class II    Weight History: Wt gain noted PTA, question fluid status, ESRD on HD    Weight Metrics 6/11/2020 5/13/2020 4/30/2020 4/29/2020 4/26/2020 8/18/2016 6/2/2016   Weight 233 lb 14.5 oz 238 lb 5.1 oz - 252 lb 252 lb 4.8 oz 219 lb 228 lb   BMI 38.92 kg/m2 - 39.66 kg/m2 41.93 kg/m2 41.98 kg/m2 36.44 kg/m2 37.94 kg/m2        Admitting Diagnosis: Pneumonia [J18.9]  Pertinent PMHx: CHF, DM, GERD, HTN, uterine cancer, ESRD on HD    Education Needs:        [x] None identified  [] Identified - Not appropriate at this time  []  Identified and addressed - refer to education log  Learning Limitations:   [] None identified  [] Identified    Cultural, Islam & ethnic food preferences:  [x] None identified    [] Identified and addressed     ESTIMATED NUTRITION NEEDS:     Calories: 2691-4658 kcal (11-14 kcal/kg) based on  [x] Actual  kg     [] SBW 67 kg   Protein: 114-143 gm (2-2.5 gm/kg) based on  [] Actual BW      [x] IBW 57 kg  Fluid: 750-1500 mL/day     MONITORING & EVALUATION:     Nutrition Goal(s):   - Nutritional needs will be met through adequate oral intake or nutrition support within the next 7 days. Outcome: New/Initial goal      Monitoring:   [x] Food and nutrient intake   [x] Food and nutrient administration  [x] Comparative standards   [x] Nutrition-focused physical findings   [x] Anthropometric Measurements   [x] Treatment/therapy   [x] Biochemical data, medical tests, and procedures        Previous Recommendations (for follow-up assessments only):  No Longer Appropriate      Discharge Planning: No nutritional discharge needs at this time. Participated in care planning, discharge planning, & interdisciplinary rounds as appropriate.       Malgorzata Ye RD, [address]   Pager: [phone]

## 2020-06-11 NOTE — PERIOP NOTES
TRANSFER - OUT REPORT:    Verbal report given to Vivi GAO  on [employer]  being transferred to [facility] for routine post - op       Report consisted of patients Situation, Background, Assessment and   Recommendations(SBAR). Information from the following report(s) SBAR, Procedure Summary and MAR was reviewed with the receiving nurse. Lines:   Peripheral IV 06/07/20 Right Antecubital (Active)   Site Assessment Intact;Drainage (comment) 6/11/2020  5:00 AM   Phlebitis Assessment 0 6/11/2020  5:00 AM   Infiltration Assessment 0 6/11/2020  5:00 AM   Dressing Status Intact; New drainage 6/11/2020  5:00 AM   Dressing Type Transparent;Tape 6/11/2020  5:00 AM   Hub Color/Line Status Pink;Patent; Flushed;Capped 6/11/2020  5:00 AM   Action Taken Open ports on tubing capped 6/11/2020  5:00 AM   Alcohol Cap Used Yes 6/11/2020  5:00 AM       Peripheral IV 06/07/20 Left Wrist (Active)   Site Assessment Clean, dry, & intact 6/11/2020  5:00 AM   Phlebitis Assessment 0 6/11/2020  5:00 AM   Infiltration Assessment 0 6/11/2020  5:00 AM   Dressing Status Clean, dry, & intact 6/11/2020  5:00 AM   Dressing Type Transparent;Tape 6/11/2020  5:00 AM   Hub Color/Line Status Green;Patent; Flushed;Capped 6/11/2020  5:00 AM   Action Taken Open ports on tubing capped 6/11/2020  5:00 AM   Alcohol Cap Used Yes 6/11/2020  5:00 AM        Opportunity for questions and clarification was provided.       Patient transported with:

## 2020-06-11 NOTE — PROGRESS NOTES
[facility] Hospitalist Group  Progress Note    Patient: Bernabe Belle Age: 71 y.o. : 1950 MR#: [id number] SSN: [id number]  Date/Time: 2020     Subjective:     RRT last night for hypoxia, poor responsiveness and labored breathing   Pt found to have airway obstruction with dentures which were removed   Intubated and transferred to ICU     Seen in ICU   Responds to voice   Wants ETT out        Assessment/Plan:     1. Acute on chronic hypoxic respiratory failure   2. Shortness of breath and hypoxemia- likely 2/2 fluid overload   3. Gram positive bacteremia vs contaminant   4. ESRD on HD   5. Acute on chronic diastolic heart failure  6. Hypertension  7. Chronic hypoxic respiratory failure on home O2  8. Diabetes mellitus type 2- HbA1c 6.1 on 20       COVID-19 test negative 20  ID and nephrology following   Vent management per ICU     HD per nephrology   CXR reviewed   1/2 blood cx's positive for gram positive cocci in groups  FU repeat blood cx's- 1 from periphery and 1 from HD cath   Cont SSI w/accuchecks  Will discontinue lantus and monitor   Cont home BP meds: amlodipine, carvedilol, hydralazine, imdur  Fall and aspiration precautions   PT, OT         Case discussed with:  [x]Patient  []Family  [x]Nursing  []Case Management  DVT Prophylaxis:  []Lovenox  [x]Hep SQ  []SCDs  []Coumadin   []Eliquis/Xarelto   Diet: Cardiac; currently NPO   Code Status: FULL    Disposition: Continue current care; >2 nights       H.  Tin Austin DO   2020        Objective:   VS:   Visit Vitals  /44   Pulse 67   Temp 98.5 °F (36.9 °C) (Axillary)   Resp 15   Ht 5' 5\" (1.651 m)   Wt 106.1 kg (233 lb 14.5 oz)   SpO2 98%   Breastfeeding No   BMI 38.92 kg/m²      Tmax/24hrs: Temp (24hrs), Av.9 °F (36.6 °C), Min:97 °F (36.1 °C), Max:98.5 °F (36.9 °C)  IOBRIEF    Intake/Output Summary (Last 24 hours) at 2020 1613  Last data filed at 2020 0613  Gross per 24 hour   Intake 170 ml   Output    Net 170 ml       General:  Alert, cooperative, no acute distress, chronically ill     HEENT: anicteric sclerae. Pulmonary:  CTA Bilaterally. No Wheezing/Rales. Cardiovascular: Regular rate and Rhythm  GI:  Soft, Non distended, Non tender. + Bowel sounds. Extremities:  No edema. No calf tenderness. Psych: Not anxious or agitated, solemn, depressed   Neurologic: Alert and oriented X 3. Moves all ext.       Medications:   Current Facility-Administered Medications   Medication Dose Route Frequency    midazolam (VERSED) injection 1-2 mg  1-2 mg IntraVENous Q2H PRN    pantoprazole (PROTONIX) 40 mg in 0.9% sodium chloride 10 mL injection  40 mg IntraVENous DAILY    albuterol-ipratropium (DUO-NEB) 2.5 MG-0.5 MG/3 ML  3 mL Nebulization Q6H PRN    sodium chloride 3% hypertonic nebulizer soln  4 mL Nebulization TID RT    furosemide (LASIX) injection    CODE BLUE    chlorhexidine (PERIDEX) 0.12 % mouthwash 15 mL  15 mL Oral Q12H    vancomycin (VANCOCIN) 1,000 mg in 0.9% sodium chloride (MBP/ADV) 250 mL adv  1,000 mg IntraVENous ONCE    heparin (porcine) 1,000 unit/mL injection        piperacillin-tazobactam (ZOSYN) 0.75 g in 0.9% sodium chloride 50 mL IVPB  0.75 g IntraVENous ONCE    ondansetron (ZOFRAN) injection 4 mg  4 mg IntraVENous Q6H PRN    insulin lispro (HUMALOG) injection   SubCUTAneous AC&HS    glucose chewable tablet 16 g  4 Tab Oral PRN    glucagon (GLUCAGEN) injection 1 mg  1 mg IntraMUSCular PRN    dextrose (D50W) injection syrg 12.5-25 g  25-50 mL IntraVENous PRN    VANCOMYCIN INFORMATION NOTE   Other Rx Dosing/Monitoring    piperacillin-tazobactam (ZOSYN) 2.25 g in 0.9% sodium chloride (MBP/ADV) 50 mL MBP  2.25 g IntraVENous Q8H    furosemide (LASIX) injection 80 mg  80 mg IntraVENous BID    citalopram (CELEXA) tablet 20 mg  20 mg Oral DAILY    cyanocobalamin tablet 1,000 mcg  1,000 mcg Oral BID    loratadine (CLARITIN) tablet 10 mg  10 mg Oral DAILY    aspirin chewable tablet 81 mg  81 mg Oral DAILY    isosorbide mononitrate ER (IMDUR) tablet 30 mg  30 mg Oral DAILY    amLODIPine (NORVASC) tablet 10 mg  10 mg Oral DAILY    polyethylene glycol (MIRALAX) packet 17 g  17 g Oral DAILY    rosuvastatin (CRESTOR) tablet 5 mg  5 mg Oral QHS    hydrALAZINE (APRESOLINE) tablet 100 mg  100 mg Oral TID    gabapentin (NEURONTIN) capsule 100 mg  100 mg Oral BID    carvediloL (COREG) tablet 12.5 mg  12.5 mg Oral BID WITH MEALS    ferrous sulfate tablet 325 mg  325 mg Oral EVERY OTHER DAY    mirtazapine (REMERON) tablet 15 mg  15 mg Oral QHS    docusate sodium (COLACE) capsule 100 mg  100 mg Oral BID PRN    heparin (porcine) injection 5,000 Units  5,000 Units SubCUTAneous Q8H    Piperacillin-tazobactam (ZOSYN) 0.75 gm Supplemental Dosing by Pharmacy   Other Rx Dosing/Monitoring       Labs:    Recent Results (from the past 24 hour(s))   GLUCOSE, POC    Collection Time: 06/10/20  8:21 PM   Result Value Ref Range    Glucose (POC) 166 (H) 70 - 110 mg/dL   VANCOMYCIN, RANDOM    Collection Time: 06/10/20  8:33 PM   Result Value Ref Range    Vancomycin, random 14.8 5.0 - 40.0 UG/ML   GLUCOSE, POC    Collection Time: 06/11/20  3:23 AM   Result Value Ref Range    Glucose (POC) 189 (H) 70 - 110 mg/dL   EKG, 12 LEAD, INITIAL    Collection Time: 06/11/20  3:38 AM   Result Value Ref Range    Ventricular Rate 74 BPM    Atrial Rate 74 BPM    P-R Interval 168 ms    QRS Duration 132 ms    Q-T Interval 440 ms    QTC Calculation (Bezet) 488 ms    Calculated P Axis 36 degrees    Calculated R Axis -36 degrees    Calculated T Axis 91 degrees    Diagnosis       Normal sinus rhythm  Left axis deviation  Left ventricular hypertrophy with QRS widening and repolarization abnormality  Nonspecific intraventricular block  Abnormal ECG  When compared with ECG of 07-JUN-2020 22:53,  No significant change was found  Confirmed by Waldo Ibrahim MD, --- ([id number]) on 6/11/2020 8:13:19 AM     POC G3    Collection Time: 06/11/20  3:39 AM   Result Value Ref Range    Device: AMBU      Flow rate (POC) 15 L/M    FIO2 (POC) 100 %    pH (POC) 7.170 (LL) 7.35 - 7.45      pCO2 (POC) 81.2 (H) 35.0 - 45.0 MMHG    pO2 (POC) 156 (H) 80 - 100 MMHG    HCO3 (POC) 29.6 (H) 22 - 26 MMOL/L    sO2 (POC) 99 (H) 92 - 97 %    Base excess (POC) 1 mmol/L    Allens test (POC) NO      Site RIGHT RADIAL      Specimen type (POC) ARTERIAL      Performed by Harry Voss    CBC WITH AUTOMATED DIFF    Collection Time: 06/11/20  3:40 AM   Result Value Ref Range    WBC 15.9 (H) 4.6 - 13.2 K/uL    RBC 2.81 (L) 4.20 - 5.30 M/uL    HGB 7.5 (L) 12.0 - 16.0 g/dL    HCT 25.1 (L) 35.0 - 45.0 %    MCV 89.3 74.0 - 97.0 FL    MCH 26.7 24.0 - 34.0 PG    MCHC 29.9 (L) 31.0 - 37.0 g/dL    RDW 14.0 11.6 - 14.5 %    PLATELET 620 106 - 523 K/uL    MPV 10.9 9.2 - 11.8 FL    NEUTROPHILS 83 (H) 40 - 73 %    LYMPHOCYTES 9 (L) 21 - 52 %    MONOCYTES 8 3 - 10 %    EOSINOPHILS 0 0 - 5 %    BASOPHILS 0 0 - 2 %    ABS. NEUTROPHILS 13.2 (H) 1.8 - 8.0 K/UL    ABS. LYMPHOCYTES 1.4 0.9 - 3.6 K/UL    ABS. MONOCYTES 1.3 (H) 0.05 - 1.2 K/UL    ABS. EOSINOPHILS 0.0 0.0 - 0.4 K/UL    ABS. BASOPHILS 0.0 0.0 - 0.1 K/UL    DF AUTOMATED     METABOLIC PANEL, COMPREHENSIVE    Collection Time: 06/11/20  3:40 AM   Result Value Ref Range    Sodium 135 (L) 136 - 145 mmol/L    Potassium 4.2 3.5 - 5.5 mmol/L    Chloride 99 (L) 100 - 111 mmol/L    CO2 30 21 - 32 mmol/L    Anion gap 6 3.0 - 18 mmol/L    Glucose 183 (H) 74 - 99 mg/dL    BUN 31 (H) 7.0 - 18 MG/DL    Creatinine 3.39 (H) 0.6 - 1.3 MG/DL    BUN/Creatinine ratio 9 (L) 12 - 20      GFR est AA 16 (L) >60 ml/min/1.73m2    GFR est non-AA 13 (L) >60 ml/min/1.73m2    Calcium 8.3 (L) 8.5 - 10.1 MG/DL    Bilirubin, total 0.5 0.2 - 1.0 MG/DL    ALT (SGPT) 10 (L) 13 - 56 U/L    AST (SGOT) 12 10 - 38 U/L    Alk.  phosphatase 84 45 - 117 U/L    Protein, total 6.4 6.4 - 8.2 g/dL    Albumin 2.9 (L) 3.4 - 5.0 g/dL    Globulin 3.5 2.0 - 4.0 g/dL    A-G Ratio 0.8 0.8 - 1. 7     CARDIAC PANEL,(CK, CKMB & TROPONIN)    Collection Time: 06/11/20  3:40 AM   Result Value Ref Range    CK - MB <1.0 <3.6 ng/ml    CK-MB Index  0.0 - 4.0 %     CALCULATION NOT PERFORMED WHEN RESULT IS BELOW LINEAR LIMIT    CK 15 (L) 26 - 192 U/L    Troponin-I, QT <0.02 0.0 - 0.045 NG/ML   MAGNESIUM    Collection Time: 06/11/20  3:40 AM   Result Value Ref Range    Magnesium 2.2 1.6 - 2.6 mg/dL   PHOSPHORUS    Collection Time: 06/11/20  3:40 AM   Result Value Ref Range    Phosphorus 5.2 (H) 2.5 - 4.9 MG/DL   POC G3    Collection Time: 06/11/20  4:03 AM   Result Value Ref Range    Device: AMBU      Flow rate (POC) 15 L/M    FIO2 (POC) 100 %    pH (POC) 7.258 (L) 7.35 - 7.45      pCO2 (POC) 68.8 (H) 35.0 - 45.0 MMHG    pO2 (POC) 115 (H) 80 - 100 MMHG    HCO3 (POC) 30.7 (H) 22 - 26 MMOL/L    sO2 (POC) 98 (H) 92 - 97 %    Base excess (POC) 4 mmol/L    Allens test (POC) NO      Total resp. rate 20      Site RIGHT RADIAL      Specimen type (POC) ARTERIAL      Performed by Bushra Rebollar    GLUCOSE, POC    Collection Time: 06/11/20  4:34 AM   Result Value Ref Range    Glucose (POC) 175 (H) 70 - 110 mg/dL   POC G3    Collection Time: 06/11/20  5:05 AM   Result Value Ref Range    Device: VENT      FIO2 (POC) 100 %    pH (POC) 7.372 7.35 - 7.45      pCO2 (POC) 51.6 (H) 35.0 - 45.0 MMHG    pO2 (POC) 133 (H) 80 - 100 MMHG    HCO3 (POC) 30.0 (H) 22 - 26 MMOL/L    sO2 (POC) 99 (H) 92 - 97 %    Base excess (POC) 5 mmol/L    Mode SIMV      Tidal volume 450 ml    Set Rate 15 bpm    PEEP/CPAP (POC) 5 cmH2O    Pressure support 5 cmH2O    Allens test (POC) YES      Inspiratory Time 1 sec    Total resp. rate 15      Site LEFT RADIAL      Patient temp.  98.6      Specimen type (POC) ARTERIAL      Performed by Meghan Morataya     Volume control plus YES     GLUCOSE, POC    Collection Time: 06/11/20 11:26 AM   Result Value Ref Range    Glucose (POC) 162 (H) 70 - 110 mg/dL   LACTIC ACID    Collection Time: 06/11/20 11:54 AM   Result Value Ref Range    Lactic acid 0.8 0.4 - 2.0 MMOL/L

## 2020-06-11 NOTE — PROGRESS NOTES
SLP Note:    SLP evaluation order received and chart reviewed. Pt currently intubated. Will sign off.       Kristel Coker M.S., [address] [facility]  Speech-Language Pathologist

## 2020-06-12 ENCOUNTER — APPOINTMENT (OUTPATIENT)
Dept: GENERAL RADIOLOGY | Age: 70
DRG: 207 | End: 2020-06-12
Attending: PHYSICIAN ASSISTANT
Payer: MEDICARE

## 2020-06-12 LAB
ANION GAP SERPL CALC-SCNC: 7 MMOL/L (ref 3–18)
ARTERIAL PATENCY WRIST A: YES
ARTERIAL PATENCY WRIST A: YES
BASE EXCESS BLD CALC-SCNC: 3 MMOL/L
BASE EXCESS BLD CALC-SCNC: 6 MMOL/L
BASOPHILS # BLD: 0 K/UL (ref 0–0.1)
BASOPHILS NFR BLD: 0 % (ref 0–2)
BDY SITE: ABNORMAL
BDY SITE: ABNORMAL
BUN SERPL-MCNC: 24 MG/DL (ref 7–18)
BUN/CREAT SERPL: 9 (ref 12–20)
CALCIUM SERPL-MCNC: 8.3 MG/DL (ref 8.5–10.1)
CHLORIDE SERPL-SCNC: 103 MMOL/L (ref 100–111)
CO2 SERPL-SCNC: 28 MMOL/L (ref 21–32)
CREAT SERPL-MCNC: 2.55 MG/DL (ref 0.6–1.3)
DIFFERENTIAL METHOD BLD: ABNORMAL
EOSINOPHIL # BLD: 0 K/UL (ref 0–0.4)
EOSINOPHIL NFR BLD: 0 % (ref 0–5)
ERYTHROCYTE [DISTWIDTH] IN BLOOD BY AUTOMATED COUNT: 13.9 % (ref 11.6–14.5)
GAS FLOW.O2 O2 DELIVERY SYS: ABNORMAL L/MIN
GAS FLOW.O2 O2 DELIVERY SYS: ABNORMAL L/MIN
GAS FLOW.O2 SETTING OXYMISER: 15 BPM
GLUCOSE BLD STRIP.AUTO-MCNC: 78 MG/DL (ref 70–110)
GLUCOSE BLD STRIP.AUTO-MCNC: 81 MG/DL (ref 70–110)
GLUCOSE BLD STRIP.AUTO-MCNC: 90 MG/DL (ref 70–110)
GLUCOSE BLD STRIP.AUTO-MCNC: 90 MG/DL (ref 70–110)
GLUCOSE SERPL-MCNC: 81 MG/DL (ref 74–99)
HCO3 BLD-SCNC: 26.6 MMOL/L (ref 22–26)
HCO3 BLD-SCNC: 28.7 MMOL/L (ref 22–26)
HCT VFR BLD AUTO: 19.6 % (ref 35–45)
HGB BLD-MCNC: 6.3 G/DL (ref 12–16)
INSPIRATION.DURATION SETTING TIME VENT: 1 SEC
LYMPHOCYTES # BLD: 0.7 K/UL (ref 0.9–3.6)
LYMPHOCYTES NFR BLD: 7 % (ref 21–52)
MAGNESIUM SERPL-MCNC: 2 MG/DL (ref 1.6–2.6)
MCH RBC QN AUTO: 27.9 PG (ref 24–34)
MCHC RBC AUTO-ENTMCNC: 32.1 G/DL (ref 31–37)
MCV RBC AUTO: 86.7 FL (ref 74–97)
MONOCYTES # BLD: 1 K/UL (ref 0.05–1.2)
MONOCYTES NFR BLD: 10 % (ref 3–10)
NEUTS SEG # BLD: 8.1 K/UL (ref 1.8–8)
NEUTS SEG NFR BLD: 83 % (ref 40–73)
O2/TOTAL GAS SETTING VFR VENT: 50 %
O2/TOTAL GAS SETTING VFR VENT: 70 %
PCO2 BLD: 36.6 MMHG (ref 35–45)
PCO2 BLD: 38.6 MMHG (ref 35–45)
PEEP RESPIRATORY: 5 CMH2O
PEEP RESPIRATORY: 5 CMH2O
PH BLD: 7.45 [PH] (ref 7.35–7.45)
PH BLD: 7.5 [PH] (ref 7.35–7.45)
PHOSPHATE SERPL-MCNC: 2.6 MG/DL (ref 2.5–4.9)
PLATELET # BLD AUTO: 167 K/UL (ref 135–420)
PMV BLD AUTO: 10.7 FL (ref 9.2–11.8)
PO2 BLD: 48 MMHG (ref 80–100)
PO2 BLD: 99 MMHG (ref 80–100)
POTASSIUM SERPL-SCNC: 3.8 MMOL/L (ref 3.5–5.5)
PRESSURE SUPPORT SETTING VENT: 7 CMH2O
RBC # BLD AUTO: 2.26 M/UL (ref 4.2–5.3)
SAO2 % BLD: 85 % (ref 92–97)
SAO2 % BLD: 98 % (ref 92–97)
SERVICE CMNT-IMP: ABNORMAL
SERVICE CMNT-IMP: ABNORMAL
SODIUM SERPL-SCNC: 138 MMOL/L (ref 136–145)
SPECIMEN TYPE: ABNORMAL
SPECIMEN TYPE: ABNORMAL
TOTAL RESP. RATE, ITRR: 15
TOTAL RESP. RATE, ITRR: 20
VANCOMYCIN SERPL-MCNC: 21.9 UG/ML (ref 5–40)
VENTILATION MODE VENT: ABNORMAL
VENTILATION MODE VENT: ABNORMAL
VOLUME CONTROL PLUS IVLCP: YES
VT SETTING VENT: 450 ML
WBC # BLD AUTO: 9.8 K/UL (ref 4.6–13.2)

## 2020-06-12 PROCEDURE — 74011250636 HC RX REV CODE- 250/636: Performed by: INTERNAL MEDICINE

## 2020-06-12 PROCEDURE — 94003 VENT MGMT INPAT SUBQ DAY: CPT

## 2020-06-12 PROCEDURE — 80202 ASSAY OF VANCOMYCIN: CPT

## 2020-06-12 PROCEDURE — 65610000006 HC RM INTENSIVE CARE

## 2020-06-12 PROCEDURE — 36600 WITHDRAWAL OF ARTERIAL BLOOD: CPT

## 2020-06-12 PROCEDURE — P9016 RBC LEUKOCYTES REDUCED: HCPCS

## 2020-06-12 PROCEDURE — 86923 COMPATIBILITY TEST ELECTRIC: CPT

## 2020-06-12 PROCEDURE — 30233N1 TRANSFUSION OF NONAUTOLOGOUS RED BLOOD CELLS INTO PERIPHERAL VEIN, PERCUTANEOUS APPROACH: ICD-10-PCS | Performed by: INTERNAL MEDICINE

## 2020-06-12 PROCEDURE — 84100 ASSAY OF PHOSPHORUS: CPT

## 2020-06-12 PROCEDURE — 74011250636 HC RX REV CODE- 250/636: Performed by: PHYSICIAN ASSISTANT

## 2020-06-12 PROCEDURE — 82803 BLOOD GASES ANY COMBINATION: CPT

## 2020-06-12 PROCEDURE — 74011000258 HC RX REV CODE- 258: Performed by: INTERNAL MEDICINE

## 2020-06-12 PROCEDURE — 80048 BASIC METABOLIC PNL TOTAL CA: CPT

## 2020-06-12 PROCEDURE — C9113 INJ PANTOPRAZOLE SODIUM, VIA: HCPCS | Performed by: PHYSICIAN ASSISTANT

## 2020-06-12 PROCEDURE — 74011250637 HC RX REV CODE- 250/637: Performed by: INTERNAL MEDICINE

## 2020-06-12 PROCEDURE — 94640 AIRWAY INHALATION TREATMENT: CPT

## 2020-06-12 PROCEDURE — 74011000250 HC RX REV CODE- 250: Performed by: INTERNAL MEDICINE

## 2020-06-12 PROCEDURE — 86900 BLOOD TYPING SEROLOGIC ABO: CPT

## 2020-06-12 PROCEDURE — 74011250636 HC RX REV CODE- 250/636

## 2020-06-12 PROCEDURE — 74011000250 HC RX REV CODE- 250: Performed by: PHYSICIAN ASSISTANT

## 2020-06-12 PROCEDURE — 36415 COLL VENOUS BLD VENIPUNCTURE: CPT

## 2020-06-12 PROCEDURE — 82962 GLUCOSE BLOOD TEST: CPT

## 2020-06-12 PROCEDURE — 90935 HEMODIALYSIS ONE EVALUATION: CPT

## 2020-06-12 PROCEDURE — 71045 X-RAY EXAM CHEST 1 VIEW: CPT

## 2020-06-12 PROCEDURE — 85025 COMPLETE CBC W/AUTO DIFF WBC: CPT

## 2020-06-12 PROCEDURE — 94762 N-INVAS EAR/PLS OXIMTRY CONT: CPT

## 2020-06-12 PROCEDURE — 86920 COMPATIBILITY TEST SPIN: CPT

## 2020-06-12 PROCEDURE — 83735 ASSAY OF MAGNESIUM: CPT

## 2020-06-12 PROCEDURE — 94668 MNPJ CHEST WALL SBSQ: CPT

## 2020-06-12 RX ORDER — SODIUM CHLORIDE 9 MG/ML
250 INJECTION, SOLUTION INTRAVENOUS AS NEEDED
Status: DISCONTINUED | OUTPATIENT
Start: 2020-06-12 | End: 2020-06-13

## 2020-06-12 RX ADMIN — HEPARIN SODIUM 5000 UNITS: 5000 INJECTION INTRAVENOUS; SUBCUTANEOUS at 13:00

## 2020-06-12 RX ADMIN — MIDAZOLAM HYDROCHLORIDE 2 MG: 2 INJECTION, SOLUTION INTRAMUSCULAR; INTRAVENOUS at 02:50

## 2020-06-12 RX ADMIN — PIPERACILLIN AND TAZOBACTAM 2.25 G: 2; .25 INJECTION, POWDER, FOR SOLUTION INTRAVENOUS at 06:43

## 2020-06-12 RX ADMIN — MIDAZOLAM HYDROCHLORIDE 2 MG: 2 INJECTION, SOLUTION INTRAMUSCULAR; INTRAVENOUS at 19:10

## 2020-06-12 RX ADMIN — SODIUM CHLORIDE SOLN NEBU 3% 4 ML: 3 NEBU SOLN at 14:33

## 2020-06-12 RX ADMIN — IPRATROPIUM BROMIDE AND ALBUTEROL SULFATE 3 ML: .5; 3 SOLUTION RESPIRATORY (INHALATION) at 20:14

## 2020-06-12 RX ADMIN — IPRATROPIUM BROMIDE AND ALBUTEROL SULFATE 3 ML: .5; 3 SOLUTION RESPIRATORY (INHALATION) at 07:29

## 2020-06-12 RX ADMIN — SODIUM CHLORIDE 40 MG: 9 INJECTION INTRAMUSCULAR; INTRAVENOUS; SUBCUTANEOUS at 09:00

## 2020-06-12 RX ADMIN — PIPERACILLIN AND TAZOBACTAM 2.25 G: 2; .25 INJECTION, POWDER, FOR SOLUTION INTRAVENOUS at 21:24

## 2020-06-12 RX ADMIN — SODIUM CHLORIDE SOLN NEBU 3% 4 ML: 3 NEBU SOLN at 20:14

## 2020-06-12 RX ADMIN — CHLORHEXIDINE GLUCONATE 0.12% ORAL RINSE 15 ML: 1.2 LIQUID ORAL at 21:26

## 2020-06-12 RX ADMIN — Medication 10 ML: at 14:00

## 2020-06-12 RX ADMIN — FENTANYL CITRATE 100 MCG: 50 INJECTION INTRAMUSCULAR; INTRAVENOUS at 21:29

## 2020-06-12 RX ADMIN — Medication 10 ML: at 21:24

## 2020-06-12 RX ADMIN — HEPARIN SODIUM 5000 UNITS: 5000 INJECTION INTRAVENOUS; SUBCUTANEOUS at 04:53

## 2020-06-12 RX ADMIN — PIPERACILLIN AND TAZOBACTAM 0.75 G: 2; .25 INJECTION, POWDER, LYOPHILIZED, FOR SOLUTION INTRAVENOUS at 21:23

## 2020-06-12 RX ADMIN — SODIUM CHLORIDE SOLN NEBU 3% 4 ML: 3 NEBU SOLN at 07:29

## 2020-06-12 RX ADMIN — EPOETIN ALFA-EPBX 10000 UNITS: 10000 INJECTION, SOLUTION INTRAVENOUS; SUBCUTANEOUS at 21:32

## 2020-06-12 RX ADMIN — PIPERACILLIN AND TAZOBACTAM 2.25 G: 2; .25 INJECTION, POWDER, FOR SOLUTION INTRAVENOUS at 14:00

## 2020-06-12 RX ADMIN — HEPARIN SODIUM 5000 UNITS: 5000 INJECTION INTRAVENOUS; SUBCUTANEOUS at 21:25

## 2020-06-12 RX ADMIN — CHLORHEXIDINE GLUCONATE 0.12% ORAL RINSE 15 ML: 1.2 LIQUID ORAL at 09:00

## 2020-06-12 NOTE — ROUTINE PROCESS
Received pt in bed  Dialysis nurse finishing up on dialysis Pt calm Mobility--bedrest 
Respiratory-- ET vent setting mode A/C R 15  FIO2 50% peep 5 Drips--No sedation Bedside and Verbal shift change report given to [facility]  (oncoming nurse) by Arthur Victor RN 
 (offgoing nurse). Report included the following information SBAR, Kardex, Intake/Output and MAR.

## 2020-06-12 NOTE — PROGRESS NOTES
0700: Bedside and Verbal shift change report given to Brina Lim RN & Maria Esther Shafer RN (oncoming nurse) by Alfonzo Meckel, RN (offgoing nurse). Report included the following information SBAR, Intake/Output, MAR and Recent Results. 1900: Bedside and Verbal shift change report given to Alfonzo Meckel, RN (oncoming nurse) by Brina Lim RN & Maria Esther Shafer RN  (offgoing nurse). Report included the following information SBAR, Intake/Output, MAR and Recent Results.

## 2020-06-12 NOTE — ROUTINE PROCESS
Received pt in bed. Pt calm Mobility--bedrest 
Respiratory-- ET vent setting mode A/C R 15  FIO2 70% peep 5 [facility] [place] had Dialysis today Drips--No drip Bedside and Verbal shift change report given to [address] (oncoming nurse) by Zenaida Dumas RN 
 (offgoing nurse). Report included the following information SBAR, Kardex, Intake/Output and MAR.

## 2020-06-12 NOTE — PROGRESS NOTES
CM sent request through [facility] to . [facility] for acceptance. CM called and [facility] [address] to confirm L[facility] patient there. CM confirmed that SNF will need 1 Negative Covid (Not Rapid) 24-48 hours of admission. Medical transportation will be needed. [facility] updated that CM sent request through [facility]. [facility] accepted patient in [facility], [address] selected skilled nursing for SNF .        Misael Aquino, RN  Case Management [phone]

## 2020-06-12 NOTE — DIALYSIS
RICH        ACUTE HEMODIALYSIS FLOW SHEET      HEMODIALYSIS ORDERS: Physician: Iam     Dialyzer: revaclear   Duration: 3 hr  BFR: 300   DFR: 500   Dialysate:  Temp 36-37*C  K+   3    Ca+  2.5  Na 140 Bicarb 35   Weight:   Wt Readings from Last 1 Encounters:   06/12/20 107.9 kg (237 lb 14 oz)     UF Goal: 1500 Access right chest TDC Needle Gauge     Heparin []  Bolus      Units    [] Hourly       Units    []None      Catheter locking solution Heparin    Pre BP:   142/36    Pulse:     64       Respirations: 16  Temperature:   100.0   Labs: Pre        Post:        [x] N/A   Additional Orders(medications, blood products, hypotension management):       [x] N/A     [x] Rich Consent Verified     CATHETER ACCESS: []N/A   [x]Right   []Left   [x]IJ     []Fem   []transhepatic   [] First use X-ray verified     [x]Permanent                [] Temporary   [x]No S/S infection  []Redness  []Drainage []Cultured []Swelling []Pain   [x]Medical Aseptic Prep Utilized   []Dressing Changed  [x] Biopatch  Date: 6/8/2020       []Clotted   [x]Patent   Flows: [x]Good  []Poor  []Reversed   If access problem,  notified: []Yes    [x]N/A  Date:           GRAFT/FISTULA ACCESS:  [x]N/A     []Right     []Left     []UE     []LE   []AVG   []AVF        []Buttonhole    []Medical Aseptic Prep Utilized   []No S/S infection  []Redness  []Drainage []Cultured []Swelling []Pain    Bruit:   [] Strong    [] Weak       Thrill :   [] Strong    [] Weak       Needle Gauge:    Length:     If access problem,  notified: []Yes     [x]N/A  Date:        Please describe access if present and not used:                            GENERAL ASSESSMENT:      LUNGS:  Rate  SaO2%        [] N/A    [x] Clear  [] Coarse  [] Crackles  [] Wheezing        [] Diminished     Location : []RLL   []LLL    []RUL  []KATIE     Cough: []Productive  []Dry  [x]N/A   Respirations:  [x]Easy  []Labored     Therapy:   []RA  []NC  l/min    Mask: []NRB []Venti       O2% [x]Ventilator  [x]Intubated  [] Trach  [] BiPaP     CARDIAC: [x]Regular      [] Irregular   [] Pericardial Rub  [] JVD        [x]  Monitored  [x] Bedside  [] Remotely monitored [] N/A  Rhythm:      EDEMA: [] None  [x]Generalized  [] Pitting [] 1    [] 2    [] 3    [] 4                 [] Facial  [] Pedal  []  UE  [] LE     SKIN:   [x] Warm  [] Hot     [] Cold   [x] Dry     [] Pale   [] Diaphoretic                  [] Flushed  [] Jaundiced  [] Cyanotic  [] Rash  [] Weeping     LOC:    [x] Alert      [x]Oriented:    [x] Person     [x] Place  []Time               [] Confused  [] Lethargic  [] Medicated  [] Non-responsive     GI / ABDOMEN:  [] Flat    [] Distended    [x] Soft    [] Firm   []  Obese                             [] Diarrhea  [x] Bowel Sounds  [] Nausea  [] Vomiting       / URINE ASSESSMENT:[] Voiding   [] Oliguria  [x] Anuria   []  Lopez     [] Incontinent    []  Incontinent Brief      []  Bathroom Privileges       PAIN: [x] 0 []1  []2   []3   []4   []5   []6   []7   []8   []9   []10              Scale 0-10  Action/Follow Up:      MOBILITY:  [] Amb    [] Amb/Assist    [x] Bed    [] Wheelchair  [] Stretcher      All Vitals and Treatment Details on Attached [address]: [facility]          Room # 308/01      [] 1st Time Acute  [] Stat  [x] Routine  [] Urgent     [] Acute Room  []  Bedside  [x] ICU/CCU  [] ER   Isolation Precautions:   There are currently no Active Isolations      Special Considerations:         [x] Blood Consent Verified []N/A     ALLERGIES:   Allergies   Allergen Reactions    Augmentin [Amoxicillin-Pot Clavulanate] Diarrhea    Clavulanic Acid Diarrhea    Levaquin [Levofloxacin] Other (comments)     Severe hypoglycemia                 Code Status:Full Code        Hepatitis Status:    2nd RN check: [facility]                    Lab Results   Component Value Date/Time    Hepatitis B surface Ag <0.10 06/09/2020 04:03 AM    Hepatitis B surface Ab <3.10 (L) 06/09/2020 04:03 AM Current Labs:   Lab Results   Component Value Date/Time    Sodium 138 06/12/2020 02:24 AM    Potassium 3.8 06/12/2020 02:24 AM    Chloride 103 06/12/2020 02:24 AM    CO2 28 06/12/2020 02:24 AM    Anion gap 7 06/12/2020 02:24 AM    Glucose 81 06/12/2020 02:24 AM    BUN 24 (H) 06/12/2020 02:24 AM    Creatinine 2.55 (H) 06/12/2020 02:24 AM    BUN/Creatinine ratio 9 (L) 06/12/2020 02:24 AM    GFR est AA 23 (L) 06/12/2020 02:24 AM    GFR est non-AA 19 (L) 06/12/2020 02:24 AM    Calcium 8.3 (L) 06/12/2020 02:24 AM      Lab Results   Component Value Date/Time    WBC 9.8 06/12/2020 02:24 AM    HGB 6.3 (L) 06/12/2020 02:24 AM    HCT 19.6 (L) 06/12/2020 02:24 AM    PLATELET 604 05/31/6798 02:24 AM    MCV 86.7 06/12/2020 02:24 AM                                                                                     DIET: DIET NPO       PRIMARY NURSE REPORT: First initial/Last name/Title      Pre Dialysis: Tamie Lynne RN     Time: 1504      EDUCATION:    [x] Patient [] Other         Knowledge Basis: []None [x]Minimal [] Substantial   Barriers to learning  [x]N/A   [x] Access Care     [] S&S of infection     [] Fluid Management     []K+     [x]Procedural    []Albumin     [] Medications     [] Tx Options     [] Transplant     [] Diet     [] Other   Teaching Tools:  [x] Explain  [] Demo  [] Handouts [] Video  Patient response:   [] Verbalized understanding  [] Teach back  [] Return demonstration [x] Requires follow up   Inappropriate due to            [x] Time Out/Safety Check  [x]Extracorporeal Circuit Tested for integrity       RO/HEMODIALYSIS MACHINE SAFETY CHECKS  Before each treatment:     Machine Number:                   [address] [facility]                                               [x] Portable Machine #2/RO serial # [id number]                                    Alarm Test:  Pass time 1552               [x] RO/Machine Log Complete      Temp    36.3             Dialysate: pH  7.4 Conductivity: Meter   14.0     HD Machine 14.1                  TCD: 13.7  Dialyzer Lot # [id number]            Blood Tubing Lot # [id number]          Saline Lot #  -[id number]     CHLORINE TESTING-Before each treatment and every 4 hours    Total Chlorine: [x] less than 0.1 ppm  Time: 1550 4 Hr/2nd Check Time:    (if greater than 0.1 ppm from Primary then every 30 minutes from Secondary)     TREATMENT INITIATION  with Dialysis Precautions:   [x] All Connections Secured                 [x] Saline Line Double Clamped   [x] Venous Parameters Set                  [x] Arterial Parameters Set    [x] Prime Given 250ml                          [x]Air Foam Detector Engaged      Treatment Initiation Note:  ICU nurse at bedside for lee care when arrived to pt's room. No signs of infection present to right chest TDC. Treatment initiated without complication. During Treatment Notes: 9551: 1 unit PRBCs infusing without complication. Pt has elevated temp of 100.0 at start of blood transfusion. 1700: pt resting with eyes closed, easily aroused with verbal stimuli, no s/s of distress present, face and access in view. 1800: pt resting with eyes closed, easily aroused with verbal stimuli, no s/s of distress present, face and access in view. Medication Dose Volume Route Time [facility] name Title         RN         RN         RN           RN                   Post Assessment:   Dialyzer Cleared: [x] Good [] Fair  [] Poor  Blood processed:  52.2 L  UF Removed  2000 Ml  POst BP:   147/33       Pulse: 66        Respirations: 15  Temperature: 99.6 Lungs:     [x] Clear      [] Course         [] Crackles    [] Wheezing         [] Diminished   Post Tx Vascular Access:   AVF/AVG: Bleeding stopped   N/A Cardiac:   [x] Regular   [] Irregular   [] Monitor  [] N/A      Rhythm:       Catheter:   Locking solution: Heparin 1:1000   Art.  1.6  Austyn. 1.6      Skin:   Pain:   [x] Warm  [x] Dry [] Diaphoretic    [] Flushed    [] Pale [] Cyanotic [x]0  []1  []2   []3  []4 []5   []6   []7   []8   []9   []10     Post Treatment Note:  Treatment completed without complication. 1.5 Liters UF removed during HD treatment.         POST TREATMENT PRIMARY NURSE HANDOFF REPORT:     First initial/Last name/Title         Post Dialysis: Jovanni Gage RN  Time:  1915     Abbreviations: AVG-arterial venous graft, AVF-arterial venous fistula, IJ-Internal Jugular, Subcl-Subclavian, Fem-Femoral, Tx-treatment, AP/HR-apical heart rate, DFR-dialysate flow rate, BFR-blood flow rate, AP-arterial pressure, -venous pressure, UF-ultrafiltrate, TMP-transmembrane pressure, Austyn-Venous, Art-Arterial, RO-Reverse Osmosis

## 2020-06-12 NOTE — PROGRESS NOTES
In Patient Progress note      Admit Date: 6/7/2020    Impression:     1) ESRD on HD MWF , volume overloaded   2) Ac respi failure , multifactorial , foreign body( dentures in airway)    ,fluid overload , AC on chr diastolic CHF, presently intubated ,  3) AC on chr CHF  4) PNA , Rapid covid -ve    5) bacteremia , gm +ve cocci , suspected line infection  5) Anemia   6) sec hyperpara     Plan:  1) HD today for volume and solute  2) follow ID recs , awaiting ID recs regarding suspected   line infection  3) continue epogen with HD   4) dose AB for GFR < 15  Please call with questions ,     Dennis Mendez MD FASN  Cell [phone]  Pager: [phone]     Subjective:         Current Facility-Administered Medications:     0.9% sodium chloride infusion 250 mL, 250 mL, IntraVENous, PRN, Juan Chávez PA-C    midazolam (VERSED) injection 1-2 mg, 1-2 mg, IntraVENous, Q2H PRN, Karyn Joy PA-C, 2 mg at 06/12/20 0250    pantoprazole (PROTONIX) 40 mg in 0.9% sodium chloride 10 mL injection, 40 mg, IntraVENous, DAILY, Divya Lilly PA-C, 40 mg at 06/12/20 0900    albuterol-ipratropium (DUO-NEB) 2.5 MG-0.5 MG/3 ML, 3 mL, Nebulization, Q6H PRN, Divya Lilly PA-C, 3 mL at 06/12/20 0729    sodium chloride 3% hypertonic nebulizer soln, 4 mL, Nebulization, TID RT, Divya Lilly PA-C, 4 mL at 06/12/20 0729    furosemide (LASIX) injection, , , CODE BLUE, Mayela Bess MD, 40 mg at 06/11/20 0355    chlorhexidine (PERIDEX) 0.12 % mouthwash 15 mL, 15 mL, Oral, Q12H, Monica Lassiter DO, 15 mL at 06/12/20 0900    sodium chloride (NS) flush 5-40 mL, 5-40 mL, IntraVENous, Q8H, Monica Lassiter DO, 40 mL at 06/11/20 1902    sodium chloride (NS) flush 5-40 mL, 5-40 mL, IntraVENous, PRN, Monica Lassiter DO    midazolam (VERSED) injection 1-10 mg, 1-10 mg, IntraVENous, Multiple, Monica Lassiter DO, 2 mg at 06/11/20 1901    fentaNYL citrate (PF) injection  mcg,  mcg, IntraVENous, Multiple, Patsy Godoy, , 50 mcg at 06/11/20 1901    lidocaine (PF) (XYLOCAINE) 10 mg/mL (1 %) injection 1-5 mL, 1-5 mL, SubCUTAneous, PRN, Monica Lassiter DO, 2 mL at 06/11/20 1905    insulin lispro (HUMALOG) injection, , SubCUTAneous, Q6H, Monica Lassiter DO, Stopped at 06/12/20 0000    hydrALAZINE (APRESOLINE) 20 mg/mL injection 20 mg, 20 mg, IntraVENous, Q6H PRN, Galindo Lemon PA-C    ondansetron [facility]) injection 4 mg, 4 mg, IntraVENous, Q6H PRN, Jabier Dumas MD, 4 mg at 06/10/20 0030    glucose chewable tablet 16 g, 4 Tab, Oral, PRN, Cruz GrossmantonDO    glucagon (GLUCAGEN) injection 1 mg, 1 mg, IntraMUSCular, PRN, Cruz GrossmantonDO    dextrose (D50W) injection syrg 12.5-25 g, 25-50 mL, IntraVENous, PRN, Cruz GrossmantonDO    piperacillin-tazobactam (ZOSYN) 2.25 g in 0.9% sodium chloride (MBP/ADV) 50 mL MBP, 2.25 g, IntraVENous, Q8H, Hadley Angulo MD, Last Rate: 100 mL/hr at 06/12/20 0643, 2.25 g at 06/12/20 7653    furosemide (LASIX) injection 80 mg, 80 mg, IntraVENous, BID, Hadley Angulo MD, Stopped at 06/12/20 0900    citalopram (CELEXA) tablet 20 mg, 20 mg, Oral, DAILY, Hadley Angulo MD, Stopped at 06/11/20 0900    cyanocobalamin tablet 1,000 mcg, 1,000 mcg, Oral, BID, Hadley Angulo MD, Stopped at 06/11/20 0900    loratadine (CLARITIN) tablet 10 mg, 10 mg, Oral, DAILY, Hadley Angulo MD, Stopped at 06/11/20 0900    aspirin chewable tablet 81 mg, 81 mg, Oral, DAILY, Hadley Angulo MD, Stopped at 06/11/20 0900    [Held by provider] isosorbide mononitrate ER (IMDUR) tablet 30 mg, 30 mg, Oral, DAILY, Hadley Angulo MD, Stopped at 06/11/20 0900    [Held by provider] amLODIPine (NORVASC) tablet 10 mg, 10 mg, Oral, DAILY, Hadley Angulo MD, Stopped at 06/11/20 0900    polyethylene glycol (MIRALAX) packet 17 g, 17 g, Oral, DAILY, Hadley Angulo MD, Stopped at 06/11/20 0900    [Held by provider] rosuvastatin (CRESTOR) tablet 5 mg, 5 mg, Oral, QHS, Hadley Angulo MD, Stopped at 06/11/20 2200    [Held by provider] hydrALAZINE (APRESOLINE) tablet 100 mg, 100 mg, Oral, TID, Hadley Angulo MD, Stopped at 06/11/20 0900    [Held by provider] gabapentin (NEURONTIN) capsule 100 mg, 100 mg, Oral, BID, Hadley Angulo MD, Stopped at 06/11/20 0900    [Held by provider] carvediloL (COREG) tablet 12.5 mg, 12.5 mg, Oral, BID WITH MEALS, Hadley Angulo MD, Stopped at 06/11/20 0800    ferrous sulfate tablet 325 mg, 325 mg, Oral, EVERY OTHER DAY, Hadley Angulo MD, Stopped at 06/12/20 0421    mirtazapine (REMERON) tablet 15 mg, 15 mg, Oral, QHS, Hadley Angulo MD, Stopped at 06/11/20 2200    docusate sodium (COLACE) capsule 100 mg, 100 mg, Oral, BID PRN, Hadley Angulo MD    heparin (porcine) injection 5,000 Units, 5,000 Units, SubCUTAneous, Q8H, Hadley Angulo MD, 5,000 Units at 06/12/20 0453    Piperacillin-tazobactam (ZOSYN) 0.75 gm Supplemental Dosing by Pharmacy, , Other, Rx Dosing/Monitoring, Marina Daley MD        Objective:     Visit Vitals  BP (!) 152/39 (BP 1 Location: Left arm, BP Patient Position: At rest;Supine)   Pulse 62   Temp 99.9 °F (37.7 °C)   Resp 15   Ht 5' 5\" (1.651 m)   Wt 107.9 kg (237 lb 14 oz)   SpO2 97%   Breastfeeding No   BMI 39.58 kg/m²         Intake/Output Summary (Last 24 hours) at 6/12/2020 1224  Last data filed at 6/11/2020 2000  Gross per 24 hour   Intake 0 ml   Output 1500 ml   Net -1500 ml       Physical Exam:     Intubated. / sedated  HENT mmm  RS AEBE coarse BS   CVS s1 s2 wnl no JVD  GI soft BS +  Ext 1+ LE edema     Data Review:    Recent Labs     06/12/20 0224   WBC 9.8   RBC 2.26*   HCT 19.6*   MCV 86.7   MCH 27.9   MCHC 32.1   RDW 13.9     Recent Labs     06/12/20 0224 06/11/20 0340 06/10/20  1004   BUN 24* 31* 24*   CREA 2.55* 3.39* 2.59*   CA 8.3* 8.3* 8.3*   ALB  --  2.9*  --    K 3.8 4.2 4.3    135* 133*    99* 98*   CO2 28 30 31   PHOS 2.6 5.2*  -- GLU 81 183* 149*       Fiorella Donnelly MD

## 2020-06-12 NOTE — PROGRESS NOTES
[facility]  Pulmonary, Critical Care, and Sleep Medicine    Name: Clara Escalona MRN: [id number]   : 1950 Hospital: [facility]   Date: 2020        IMPRESSION:   · Acute on chronic respiratory failure requiring mechanical ventilation - initially in the setting of PNA and fluid overload, however, intubated 20 2/2 aspiration of partial dentures. Hypercapnia improving on subsequent ABGs  · Aspiration of foreign body  · RUL infiltrate/PNA - CXR 20  · Acute on HFpEF - most recent echo 20 w/ EF of 55-60%   · bacteremia - GPC in groups in 1/2 bottles  · ESRD - HD MWF, BUN 24, Cr 2.55  · Acute on chronic anemia- H/H 6.3 and 19.6, no signs of active bleeding     Patient Active Problem List   Diagnosis Code    Acute on chronic diastolic congestive heart failure (HCC) I50.33    Suspected COVID-19 virus infection Z20.828    Type 2 diabetes mellitus without complication, without long-term current use of insulin ([facility]) E11.9    Left anterior fascicular block I44.4    HTN (hypertension), benign I10    Coronary artery disease involving native coronary artery of native heart without angina pectoris I25.10    Chronic diastolic congestive heart failure ([facility]) I50.32    Bilateral lower extremity edema R60.0    BMI 35.0-35.9,adult Z68.35    CHF (congestive heart failure) ([facility]) I50.9    Sepsis ([address] A41.9    Respiratory failure ([address] J96.90    SIRS (systemic inflammatory response syndrome) ([facility]) R65.10    Acute on chronic respiratory failure ([address] J96.20    Pneumonia J18.9    Fluid overload E87.70      PLAN:   · Resp: Titrate FiO2 for SpO2 >90%; CT chest showed no additional retained foreign bodies in the airway. Bronch on 20 demonstrated distant mucus plugs / friable airway but otherwise normal.  Aspiration precautions, keep HOB > 30 degrees.   SBT today, possible extubation  · I/D: Sepsis bundle per hospital protocol, blood culture  positive for gram -positive cocci. Continue zosyn, d/c vanc. Repeat culture from HD cath and peripheral blood draw per recommendations of ROXANN Princeescalate ABX once Cx's finalize. · Hem/Onc: Daily CBC; H/H 6.3 and 19.6, plan to transfuse RBC's during HD today. Monitor for signs of active bleeding  · CVS: HD stable without pressors. Monitor for hypertension, maintain MAP > 65. Continue diuresis with lasix daily  · Metabolic: Daily BMP; monitor e-lytes; replace PRN  · Renal: Trend Renal indices; HD MWF per nephrology. · Endocrine: POC Glucose q6; SSI  · GI: NPO, SUP, Trend LFTs, Zofran PRN for N/V . Continue miralax and colace daily. · Musc/Skin: No acute issues, wound care as needed  · Neuro: Opens eyes, responds to voice. PRN versed for agitation and vent synchrony   · Fluids: N/A  · Code Status: full code   Best Practice:  · Sepsis Bundle per Hospital Protocol  · Glycemic control; avoid Hypoglycemia  · IHI ICU Bundles:  ·      Ramos Bundle Followed and Vent Bundle Followed, Vent Day 1    · [facility] Vent patients/ Pulmonary pts:   · VAP bundle, Aim to keep peak plateau pressure 01-25UA H2O  · Aspiration Precautions - HOB >30'  · Daily sedation holiday as indicated  · SBT as tolerated/appropriate  · Stress ulcer prophylaxis. famotidine  · DVT prophylaxis. heparin  · Need for Lines, ramos assessed. · Restraints need. · Palliative care evaluation as needed  Subjective/Interval History: This patient has been seen and evaluated at the request of Dr. Justin Alicia for acute respiratory failure. Patient is a 71 y.o. female w/ pmhx of ESRD on HD, DM, and CHF on home O2 who presented to the ED on 6/7/20 [facility] She was noted to be in respiratory distress at the facility and her O2 was increased from her baseline 2L to 12-15L. At that time she was noted to be altered as well. Patient was bagged en route to the ED. Upon arrival to ED patient was A&O x3.  She was placed on BiPAP and subsequently weaned to [place]. Work up significant for RUL infiltrate and patient was started on broad spectrum ABX. Was admitted to the floor. This AM, an RRT was called for patient being altered w/ increased work of breathing, copious secretions and hypoxia. Upon PFM arrival patient was noted to be additionally bradycardic and tachypnic. 40 mg lasix was given and anesthesia was called for intubation. Upon inspection of CRNA, patient was noted to be choking on her partial denture. It was removed at that time and O2 saturations and mental status began to improve. Was intubated at that time. Post intubation ABG significant for respiratory acidosis w/ pH of 7.1 and pCO2 in the 80s. Patient's mental status continued to improve and patient was able to follow commands.      Upon evaluation in the ICU, patient was awake and calm on the ventilator, following commands, in no distress. CT showed no retained foreign body.      The patient is critically ill and can not provide additional history due to Ventilated    20    - Attempted SBT today however pO2 was 48 at 50% FIO2 and 5 of PEEP  -opens eyes, in NAD. PRN versed for sedation  -HD today, will receive PRBC's due to hgb of 6.3  - fluid restricted to 1200 ml per nephrology      ROS:A comprehensive review of systems was negative except for that written in the HPI. Objective:   Vital Signs:    Visit Vitals  BP (!) 149/36   Pulse 67   Temp 99.9 °F (37.7 °C)   Resp 15   Ht 5' 5\" (1.651 m)   Wt 107.9 kg (237 lb 14 oz)   SpO2 98%   Breastfeeding No   BMI 39.58 kg/m²       O2 Device: Endotracheal tube, Ventilator   O2 Flow Rate (L/min): 2 l/min   Temp (24hrs), Av.7 °F (37.1 °C), Min:97.6 °F (36.4 °C), Max:99.9 °F (37.7 °C)       Intake/Output:   Last shift:      No intake/output data recorded.   Last 3 shifts: 06/10 1901 -  0700  In: 50 [I.V.:50]  Out: 1500     Intake/Output Summary (Last 24 hours) at 2020 1458  Last data filed at 2020 1200  Gross per 24 hour   Intake 0 ml   Output 1500 ml   Net -1500 ml         Physical Exam:      General:  Intubated, NAD   Head:  Normocephalic, without obvious abnormality, atraumatic. Eyes:  Conjunctivae/corneas clear. PERRL   Nose: Nares normal. Septum midline. Mucosa normal   Throat: Lips, mucosa, and tongue normal. edentulous   Neck: Supple, symmetrical, trachea midline, no adenopathy. Lungs:   Symmetrical chest rise; coarse rhonchi diffusely w/ expiratory wheezes   Heart:  RRR, S1, S2 normal, no m/r/g   Abdomen:   Soft, non-tender. Bowel sounds normal. No masses   Extremities: Extremities normal, atraumatic. 1+ pitting edema   Pulses: 2+ and symmetric all extremities. Skin: Skin color, texture, turgor normal. No rashes or lesions   Neurologic: Grossly nonfocal   Devices: PIV, ETT           DATA:  Labs:  Recent Labs     06/12/20  0224 06/11/20  0340 06/10/20  1004   WBC 9.8 15.9*  --    HGB 6.3* 7.5* 7.6*   HCT 19.6* 25.1* 24.4*    212  --      Recent Labs     06/12/20 0224 06/11/20  0340 06/10/20  1004    135* 133*   K 3.8 4.2 4.3    99* 98*   CO2 28 30 31   GLU 81 183* 149*   BUN 24* 31* 24*   CREA 2.55* 3.39* 2.59*   CA 8.3* 8.3* 8.3*   MG 2.0 2.2  --    PHOS 2.6 5.2*  --    ALB  --  2.9*  --    ALT  --  10*  --      No results for input(s): PH, PCO2, PO2, HCO3, FIO2 in the last 72 hours.      Results     Procedure Component Value Units Date/Time    CULTURE, RESPIRATORY/SPUTUM/BRONCH Albertmora Pierresimonhardy [[id number]] Collected:  06/11/20 1920    Order Status:  Completed Specimen:  Bronchial lavage Updated:  06/12/20 1100     Special Requests: --        BRONCHIAL LAVAGE  RIGHT  LUNG       GRAM STAIN 2+ WBCS SEEN         NO ORGANISMS SEEN        Culture result: PENDING    CULTURE, BLOOD [[id number]] Collected:  06/11/20 1820    Order Status:  Completed Specimen:  Blood Updated:  06/12/20 0655     Special Requests: NO SPECIAL REQUESTS        Culture result: NO GROWTH AFTER 8 HOURS       CULTURE, BLOOD [[id number]] Collected: 06/11/20 1620    Order Status:  Completed Specimen:  Blood Updated:  06/12/20 0655     Special Requests: NO SPECIAL REQUESTS        Culture result: NO GROWTH AFTER 12 HOURS       CULTURE, RESPIRATORY/SPUTUM/BRONCH Carbajal Shaun STAIN [[id number]] Collected:  06/11/20 0600    Order Status:  Canceled Specimen:  Endotracheal aspirate     CULTURE, BLOOD [[id number]] Collected:  06/10/20 1231    Order Status:  Completed Specimen:  Blood Updated:  06/12/20 0655     Special Requests: NO SPECIAL REQUESTS        Culture result: NO GROWTH 2 DAYS       CULTURE, BLOOD [[id number]] Collected:  06/10/20 1230    Order Status:  Completed Specimen:  Blood Updated:  06/12/20 0655     Special Requests: NO SPECIAL REQUESTS        Culture result: NO GROWTH 2 DAYS       CULTURE, BLOOD [[id number]]  (Abnormal) Collected:  06/07/20 2223    Order Status:  Completed Specimen:  Blood Updated:  06/11/20 1230     Special Requests: NO SPECIAL REQUESTS        GRAM STAIN       AEROBIC BOTTLE GRAM POSITIVE COCCI IN GROUPS                  SMEAR CALLED TO AND CORRECTLY REPEATED BY: Susan Bolanos RN  4N 6/10/2020 0802 TO [facility]           Culture result:       STAPHYLOCOCCUS SPECIES, COAGULASE NEGATIVE GROWING IN AEROBIC BOTTLE          CULTURE, BLOOD [[id number]] Collected:  06/07/20 2200    Order Status:  Completed Specimen:  Blood Updated:  06/12/20 0655     Special Requests: NO SPECIAL REQUESTS        Culture result: NO GROWTH 5 DAYS               PFT:                                                     Echo:  04/22/20   ECHO ADULT FOLLOW-UP OR LIMITED 04/26/2020 4/26/2020    Narrative · Normal cavity size, wall thickness and systolic function (ejection   fraction normal). Estimated left ventricular ejection fraction is 55 -   60%. Visually measured ejection fraction. · Dilated left atrium. · Dilated right atrium. · Pulmonary arterial systolic pressure is 32 mmHg.         Signed by: Fransisco Goode MD       Imaging:  [x]I have personally reviewed the patients radiographs  []Radiographs reviewed with radiologist   [x]No change from prior, tubes and lines in adequate position  []Improved   []Worsening    High complexity decision making was performed during the evaluation of this patient at high risk for decompensation with multiple organ involvement     Above mentioned total time spent on reviewing the case/medical record/data/notes/EMR/patient examination/documentation/coordinating care with nurse/consultants, exclusive of procedures with complex decision making performed and > 50% time spent in face to face evaluation. Priyanka Landin NP       [facility] Staff Addendum     I have independently evaluated the patient and reviewed the patient's chart. I have discussed the findings and care plan with ICU Care Team. Care Plan reviewed on ICU milti-D rounds. I agree with the above evaluation, assessment and recommendations along with the following comments and observations. Please also review my orders. Patient resting comfortably in ICU. Failed SBT due to hypoxia. Suspect related to  Volume status. Case discussed with Nephrology staff with plan for HD today to remove more volume. Hb 6.3 with plan for 1 unit PRBC with HD. BAL performed last night with no growth to date. WBC improving. Continuing with supportive care. Complex decision making was made in the evaluation and management plans during this consultation. More than 50% of time was spent in counseling and coordination of care including review of data and discussion with other team members. Further recommendations and therapies pending clinical course.     Critical Care and time spent coordinating care, minus procedure time: 35  min    Tadeo Davis DO, [facility]  P[facility], Sleep and Critical Care Medicine  6:25 PM

## 2020-06-12 NOTE — PROGRESS NOTES
NUTRITION    Nursing Referral: [facility]     RECOMMENDATIONS / PLAN:     - Monitor ability to tolerate oral diet once extubated versus need for enteral nutrition support. - Continue RD inpatient monitoring and evaluation. Goal Enteral Regimen, if indicated: Vital High Protein at 55 mL/hr + 50 mL q 4 hour water flushes to provide: 1320 kcal, 116 gm protein, 148 gm CHO, 0 gm fiber, 1104 mL free water, 1404 mL total free water, 93% RDIs       NUTRITION INTERVENTIONS & DIAGNOSIS:     - Meals/snacks: NPO  - Enteral nutrition: pending respiratory status   - Collaboration and referral of nutrition care: interdisciplinary rounds     Nutrition Diagnosis: Inadequate oral intake related to respiratory status as evidenced by pt NPO. Increased nutrient needs protein related to increased expenditure as evidenced by ESRD on HD    ASSESSMENT:     6/12: Remains intubated s/p bronch yesterday and currently on SBT with plan for extubation today. HD today. Swallow evaluation planned once extubated, recent BG WNL at 81-90 mg/dL and monitoring.     6/11: s/p RRT overnight for hypoxia, increased work of breakfast and copious secretions, noted to be chocking on her partial denture- intubated for airway protection and foreign body retrieved during procedure; plan for imaging today to confirm removal and possible extubation. No OG or NGT placed. 6/9: Admitted with pneumonia, CHF with hx of ESRD on HD. Diet started and noted pt eating meals in ED, intake unknown at this time. Wt gain noted PTA, question fluid status. Hx yesterday UF 3.5 L, 1200 mL FR recommended per Nephrology.     Nutritional intake adequate to meet patients estimated nutritional needs:  No    Diet: DIET NPO     Food Allergies: NKFA  Current Appetite: Not Applicable - NPO  Appetite/meal intake prior to admission: Unable to determine at this time  Feeding Limitations:  [x] Swallowing difficulty: SLP signed off    [x] Chewing difficulty: partial dentures- chocking on lead to intubation 6/11/20    [x] Other: respiratory status   Current Meal Intake:   Patient Vitals for the past 100 hrs:   % Diet Eaten   06/11/20 2000 0 %   06/10/20 1751 50 %   06/10/20 1257 0 %   06/10/20 0851 25 %     Anuric   BM: 6/10  Skin Integrity: WDL   Edema:   [] No     [x] Yes   Pertinent Medications: Reviewed: cyanocobalamin, docusate sodium prn, ferrous sulfate, furosemide, mirtazapine, pantoprazole, ondansetron, miralax, rosuvastatin- held    Recent Labs     06/12/20  0224 06/11/20  0340 06/10/20  1004    135* 133*   K 3.8 4.2 4.3    99* 98*   CO2 28 30 31   GLU 81 183* 149*   BUN 24* 31* 24*   CREA 2.55* 3.39* 2.59*   CA 8.3* 8.3* 8.3*   MG 2.0 2.2  --    PHOS 2.6 5.2*  --    ALB  --  2.9*  --    ALT  --  10*  --        Intake/Output Summary (Last 24 hours) at 6/12/2020 1014  Last data filed at 6/11/2020 2000  Gross per 24 hour   Intake 0 ml   Output 1500 ml   Net -1500 ml       Anthropometrics:  Ht Readings from Last 1 Encounters:   06/08/20 5' 5\" (1.651 m)     Last 3 Recorded Weights in this Encounter    06/08/20 1131 06/11/20 0500 06/12/20 0500   Weight: 121.6 kg (268 lb) 106.1 kg (233 lb 14.5 oz) 107.9 kg (237 lb 14 oz)     Body mass index is 39.58 kg/m².    Obese Class II    Weight History: weight gain noted PTA, question fluid status, ESRD on HD    Weight Metrics 6/12/2020 5/13/2020 4/30/2020 4/29/2020 4/26/2020 8/18/2016 6/2/2016   Weight 237 lb 14 oz 238 lb 5.1 oz - 252 lb 252 lb 4.8 oz 219 lb 228 lb   BMI 39.58 kg/m2 - 39.66 kg/m2 41.93 kg/m2 41.98 kg/m2 36.44 kg/m2 37.94 kg/m2        Admitting Diagnosis: Pneumonia [J18.9]  Pertinent PMHx: CHF, DM, GERD, HTN, uterine cancer, ESRD on HD    Education Needs:        [x] None identified  [] Identified - Not appropriate at this time  []  Identified and addressed - refer to education log  Learning Limitations:   [x] None identified  [] Identified:   Cultural, Zoroastrianism & ethnic food preferences:  [x] None identified    [] Identified and addressed     ESTIMATED NUTRITION NEEDS:     Calories: 0860-1012 kcal (11-14 kcal/kg) based on  [x] Actual  kg     [] SBW 67 kg   Protein: 114-143 gm (2-2.5 gm/kg) based on  [] Actual BW      [x] IBW 57 kg  Fluid: 750-1500 mL/day     MONITORING & EVALUATION:     Nutrition Goal(s):   - Nutritional needs will be met through adequate oral intake or nutrition support within the next 7 days. Outcome: Not progressing towards goal     Monitoring:   [x] Food and nutrient intake   [x] Food and nutrient administration  [x] Comparative standards   [x] Nutrition-focused physical findings   [x] Anthropometric Measurements   [x] Treatment/therapy   [x] Biochemical data, medical tests, and procedures        Previous Recommendations (for follow-up assessments only):  Not Implemented (RD to address)       Discharge Planning: Nutritional discharge needs unknown at this time. Participated in care planning, discharge planning, & interdisciplinary rounds as appropriate.       Betzy Euceda RD, [address] [place]   Pager: [phone]

## 2020-06-12 NOTE — PROGRESS NOTES
Decreased FiO2 to 50%. 06/12/20 0937   Patient Observations   Pulse (Heart Rate) 70   Resp Rate 20   O2 Sat (%) 99 %     Pt continues on SBT.

## 2020-06-12 NOTE — PROGRESS NOTES
Patient started on SBTFiO2 @ 70% Will continue to monitor.      06/12/20 0850   Weaning Parameters   Spontaneous Breathing Trial Complete Yes   Resp Rate Observed 10   Ve 8.4      RSBI 38

## 2020-06-12 NOTE — PROGRESS NOTES
Patient placed back on AC/VC+ vent settings due to low PaO2 48.     06/12/20 1036   Patient Observations   Pulse (Heart Rate) 72   Resp Rate 20   O2 Sat (%) 91 %

## 2020-06-12 NOTE — PROGRESS NOTES
Infectious Disease progress Note        Reason: Gram-positive bloodstream infection, pneumonia    Current abx Prior abx   Piperacillin/tazobactam since 6/7  Vancomycin restarted 6/10 Vancomycin 6/7     Lines:       Assessment :    71 y.o. female  with multiple medical problems including diastolic CHF, diabetes, arthritis, acid reflux hiatal hernia, chronic hypoxic failure on home oxygen 2 L, hypertension, end-stage renal disease on dialysis Tuesday Thursday Saturday who presented to the emergency department via EMS on 6/8/20 with shortness of breath per report from [facility] and rehab. Now with blood culture 6/7+ for gram-positive cocci, bilateral pulmonary infiltrates    Patient presents with a highly complex clinical picture. Clinical presentation seems consistent with acute respiratory failure secondary to fluid overload, acute on chronic diastolic CHF. Single positive blood culture on 6/7 for coagulase-negative Staphylococcus likely contaminant. Negative blood culture 6/10    Acute on chronic respiratory failure requiring mechanical ventilation - intubated 6/11 due to aspiration of partial dentures. Removed. Currently on 50% fio2. S/p bronchoscopy 6/11- findings noted. Recommendations:    1. Continue piperacillin/tazobactam, d/c vancomycin  2. F/u ICU team recommendations. 3.  F/u  blood culture 1 from hemodialysis catheter, 1 from periphery  4. Follow-up nephrology recommendations     Above plan was discussed in details with dr. Munira Martínez. Odin Preston Please call me if any further questions or concerns. Will continue to participate in the care of this patient. HPI:    Intubated. Detailed ros not feasible.       home Medication List    Details   carvediloL (COREG) 12.5 mg tablet Take 1 Tab by mouth two (2) times daily (with meals). Qty: 30 Tab, Refills: 0      amLODIPine (NORVASC) 10 mg tablet Take 1 Tab by mouth daily.   Qty: 30 Tab, Refills: 0      aspirin 81 mg chewable tablet Take 1 Tab by mouth daily. Qty: 30 Tab, Refills: 0      Biotin 2,500 mcg cap Take 1 Tab by mouth two (2) times a day. B.infantis-B.ani-B.long-B.bifi (PROBIOTIC 4X) 10-15 mg TbEC Take 1 Tab by mouth daily. gabapentin (NEURONTIN) 100 mg capsule Take 1 Cap by mouth two (2) times a day. Max Daily Amount: 200 mg. Qty: 20 Cap, Refills: 0    Associated Diagnoses: Type 2 diabetes mellitus without complication, without long-term current use of insulin (HCC)      ferrous sulfate 325 mg (65 mg iron) tablet Take 1 Tab by mouth every other day for 30 days. Qty: 15 Tab, Refills: 0      mirtazapine (REMERON) 15 mg tablet Take 1 Tab by mouth nightly. Qty: 10 Tab, Refills: 0      docusate sodium (COLACE) 100 mg capsule Take 1 Cap by mouth two (2) times daily as needed for Constipation for up to 90 days. Qty: 6 Cap, Refills: 0      OXYGEN-AIR DELIVERY SYSTEMS Take 2 L by inhalation daily. polyethylene glycol (MIRALAX) 17 gram packet Take 1 Packet by mouth daily. Qty: 10 Packet, Refills: 0      rosuvastatin (CRESTOR) 5 mg tablet Take 1 Tab by mouth nightly. Qty: 30 Tab, Refills: 0      hydrALAZINE (APRESOLINE) 100 mg tablet Take 1 Tab by mouth three (3) times daily. Qty: 90 Tab, Refills: 0      insulin glargine (LANTUS) 100 unit/mL injection 4 units SQ daily- watch for Hypoglycemia adjust dose per patient's blood glucose level  Qty: 1 Vial, Refills: 0      isosorbide mononitrate ER (IMDUR) 30 mg tablet Take 1 Tab by mouth daily. Qty: 30 Tab, Refills: 0      esomeprazole (NEXIUM) 40 mg capsule Take 40 mg by mouth daily. citalopram (CELEXA) 20 mg tablet Take 20 mg by mouth daily. cranberry extract (AZO CRANBERRY) 450 mg tab Take 2 Tabs by mouth daily. cholecalciferol (VITAMIN D3) 1,000 unit cap Take 5,000 Units by mouth daily. cyanocobalamin (VITAMIN B-12) 1,000 mcg tablet Take 1,000 mcg by mouth two (2) times a day. vitamin a-vitamin c-vit e-min (OCUVITE) tablet Take 1 Tab by mouth daily. loratadine (CLARITIN) 10 mg tablet Take 10 mg by mouth daily.              Current Facility-Administered Medications   Medication Dose Route Frequency    0.9% sodium chloride infusion 250 mL  250 mL IntraVENous PRN    midazolam (VERSED) injection 1-2 mg  1-2 mg IntraVENous Q2H PRN    pantoprazole (PROTONIX) 40 mg in 0.9% sodium chloride 10 mL injection  40 mg IntraVENous DAILY    albuterol-ipratropium (DUO-NEB) 2.5 MG-0.5 MG/3 ML  3 mL Nebulization Q6H PRN    sodium chloride 3% hypertonic nebulizer soln  4 mL Nebulization TID RT    furosemide (LASIX) injection    CODE BLUE    chlorhexidine (PERIDEX) 0.12 % mouthwash 15 mL  15 mL Oral Q12H    sodium chloride (NS) flush 5-40 mL  5-40 mL IntraVENous Q8H    sodium chloride (NS) flush 5-40 mL  5-40 mL IntraVENous PRN    midazolam (VERSED) injection 1-10 mg  1-10 mg IntraVENous Multiple    fentaNYL citrate (PF) injection  mcg   mcg IntraVENous Multiple    lidocaine (PF) (XYLOCAINE) 10 mg/mL (1 %) injection 1-5 mL  1-5 mL SubCUTAneous PRN    insulin lispro (HUMALOG) injection   SubCUTAneous Q6H    hydrALAZINE (APRESOLINE) 20 mg/mL injection 20 mg  20 mg IntraVENous Q6H PRN    ondansetron (ZOFRAN) injection 4 mg  4 mg IntraVENous Q6H PRN    glucose chewable tablet 16 g  4 Tab Oral PRN    glucagon (GLUCAGEN) injection 1 mg  1 mg IntraMUSCular PRN    dextrose (D50W) injection syrg 12.5-25 g  25-50 mL IntraVENous PRN    VANCOMYCIN INFORMATION NOTE   Other Rx Dosing/Monitoring    piperacillin-tazobactam (ZOSYN) 2.25 g in 0.9% sodium chloride (MBP/ADV) 50 mL MBP  2.25 g IntraVENous Q8H    furosemide (LASIX) injection 80 mg  80 mg IntraVENous BID    citalopram (CELEXA) tablet 20 mg  20 mg Oral DAILY    cyanocobalamin tablet 1,000 mcg  1,000 mcg Oral BID    loratadine (CLARITIN) tablet 10 mg  10 mg Oral DAILY    aspirin chewable tablet 81 mg  81 mg Oral DAILY    [Held by provider] isosorbide mononitrate ER (IMDUR) tablet 30 mg  30 mg Oral DAILY    [Held by provider] amLODIPine (NORVASC) tablet 10 mg  10 mg Oral DAILY    polyethylene glycol (MIRALAX) packet 17 g  17 g Oral DAILY    [Held by provider] rosuvastatin (CRESTOR) tablet 5 mg  5 mg Oral QHS    [Held by provider] hydrALAZINE (APRESOLINE) tablet 100 mg  100 mg Oral TID    [Held by provider] gabapentin (NEURONTIN) capsule 100 mg  100 mg Oral BID    [Held by provider] carvediloL (COREG) tablet 12.5 mg  12.5 mg Oral BID WITH MEALS    ferrous sulfate tablet 325 mg  325 mg Oral EVERY OTHER DAY    mirtazapine (REMERON) tablet 15 mg  15 mg Oral QHS    docusate sodium (COLACE) capsule 100 mg  100 mg Oral BID PRN    heparin (porcine) injection 5,000 Units  5,000 Units SubCUTAneous Q8H    Piperacillin-tazobactam (ZOSYN) 0.75 gm Supplemental Dosing by Pharmacy   Other Rx Dosing/Monitoring       Allergies: Augmentin [amoxicillin-pot clavulanate]; Clavulanic acid; and Levaquin [levofloxacin]    Temp (24hrs), Av.7 °F (37.1 °C), Min:97.6 °F (36.4 °C), Max:99.9 °F (37.7 °C)    Visit Vitals  BP (!) 152/39 (BP 1 Location: Left arm, BP Patient Position: At rest;Supine)   Pulse 70   Temp 99.9 °F (37.7 °C)   Resp 20   Ht 5' 5\" (1.651 m)   Wt 107.9 kg (237 lb 14 oz)   SpO2 99%   Breastfeeding No   BMI 39.58 kg/m²       ROS: 12 point ROS obtained in details. Pertinent positives as mentioned in HPI,   otherwise negative    Physical Exam:       Constitutional: laying on bed, alert, intubated. No distress. Eyes: No scleral icterus. Neck: No JVD present. Cardiovascular: Regular rhythm. Exam reveals no gallop and no friction rub. Pulmonary/Chest: no rales, rhonchi  Abdominal: Soft. Bowel sounds are normal. exhibits no distension. No tenderness. No rebound and no guarding. Musculoskeletal:Normal strength. exhibits no tenderness. Trace edema of LE  Neurological:alert and oriented  No facial asymmetry or focal weakness  Skin: Skin is warm and dry.    Psychiatric: Pleasant, cooperative    Labs: Results:   Chemistry Recent Labs     06/12/20 0224 06/11/20 0340 06/10/20  1004   GLU 81 183* 149*    135* 133*   K 3.8 4.2 4.3    99* 98*   CO2 28 30 31   BUN 24* 31* 24*   CREA 2.55* 3.39* 2.59*   CA 8.3* 8.3* 8.3*   AGAP 7 6 4   BUCR 9* 9* 9*   AP  --  84  --    TP  --  6.4  --    ALB  --  2.9*  --    GLOB  --  3.5  --    AGRAT  --  0.8  --       CBC w/Diff Recent Labs     06/12/20  0224 06/11/20  0340 06/10/20  1004   WBC 9.8 15.9*  --    RBC 2.26* 2.81*  --    HGB 6.3* 7.5* 7.6*   HCT 19.6* 25.1* 24.4*    212  --    GRANS 83* 83*  --    LYMPH 7* 9*  --    EOS 0 0  --       Microbiology Recent Labs     06/11/20  1820 06/11/20  1620 06/10/20  1231 06/10/20  1230   CULT NO GROWTH AFTER 8 HOURS NO GROWTH AFTER 12 HOURS NO GROWTH 2 DAYS NO GROWTH 2 DAYS          RADIOLOGY:    All available imaging studies/reports in [facility] for this admission were reviewed    Dr. Brenda Orourke, Infectious Disease Specialist  [phone]  June 12, 2020  10:20 AM

## 2020-06-13 ENCOUNTER — APPOINTMENT (OUTPATIENT)
Dept: GENERAL RADIOLOGY | Age: 70
DRG: 207 | End: 2020-06-13
Attending: PHYSICIAN ASSISTANT
Payer: MEDICARE

## 2020-06-13 ENCOUNTER — APPOINTMENT (OUTPATIENT)
Dept: CT IMAGING | Age: 70
DRG: 207 | End: 2020-06-13
Attending: INTERNAL MEDICINE
Payer: MEDICARE

## 2020-06-13 ENCOUNTER — APPOINTMENT (OUTPATIENT)
Dept: GENERAL RADIOLOGY | Age: 70
DRG: 207 | End: 2020-06-13
Attending: INTERNAL MEDICINE
Payer: MEDICARE

## 2020-06-13 LAB
ANION GAP SERPL CALC-SCNC: 5 MMOL/L (ref 3–18)
ARTERIAL PATENCY WRIST A: YES
BACTERIA SPEC CULT: NORMAL
BASE EXCESS BLD CALC-SCNC: 4 MMOL/L
BASOPHILS # BLD: 0 K/UL (ref 0–0.1)
BASOPHILS NFR BLD: 0 % (ref 0–2)
BDY SITE: ABNORMAL
BUN SERPL-MCNC: 19 MG/DL (ref 7–18)
BUN/CREAT SERPL: 9 (ref 12–20)
CALCIUM SERPL-MCNC: 8.1 MG/DL (ref 8.5–10.1)
CHLORIDE SERPL-SCNC: 103 MMOL/L (ref 100–111)
CO2 SERPL-SCNC: 30 MMOL/L (ref 21–32)
CREAT SERPL-MCNC: 2.15 MG/DL (ref 0.6–1.3)
DIFFERENTIAL METHOD BLD: ABNORMAL
EOSINOPHIL # BLD: 0 K/UL (ref 0–0.4)
EOSINOPHIL NFR BLD: 0 % (ref 0–5)
ERYTHROCYTE [DISTWIDTH] IN BLOOD BY AUTOMATED COUNT: 14.1 % (ref 11.6–14.5)
GAS FLOW.O2 O2 DELIVERY SYS: ABNORMAL L/MIN
GAS FLOW.O2 SETTING OXYMISER: 15 BPM
GLUCOSE BLD STRIP.AUTO-MCNC: 101 MG/DL (ref 70–110)
GLUCOSE BLD STRIP.AUTO-MCNC: 125 MG/DL (ref 70–110)
GLUCOSE BLD STRIP.AUTO-MCNC: 64 MG/DL (ref 70–110)
GLUCOSE BLD STRIP.AUTO-MCNC: 65 MG/DL (ref 70–110)
GLUCOSE BLD STRIP.AUTO-MCNC: 80 MG/DL (ref 70–110)
GLUCOSE BLD STRIP.AUTO-MCNC: 87 MG/DL (ref 70–110)
GLUCOSE BLD STRIP.AUTO-MCNC: 94 MG/DL (ref 70–110)
GLUCOSE BLD STRIP.AUTO-MCNC: 99 MG/DL (ref 70–110)
GLUCOSE SERPL-MCNC: 77 MG/DL (ref 74–99)
HCO3 BLD-SCNC: 26.9 MMOL/L (ref 22–26)
HCT VFR BLD AUTO: 20.9 % (ref 35–45)
HGB BLD-MCNC: 6.7 G/DL (ref 12–16)
INSPIRATION.DURATION SETTING TIME VENT: 1 SEC
LYMPHOCYTES # BLD: 0.8 K/UL (ref 0.9–3.6)
LYMPHOCYTES NFR BLD: 10 % (ref 21–52)
MAGNESIUM SERPL-MCNC: 1.8 MG/DL (ref 1.6–2.6)
MCH RBC QN AUTO: 28 PG (ref 24–34)
MCHC RBC AUTO-ENTMCNC: 32.1 G/DL (ref 31–37)
MCV RBC AUTO: 87.4 FL (ref 74–97)
MONOCYTES # BLD: 0.8 K/UL (ref 0.05–1.2)
MONOCYTES NFR BLD: 10 % (ref 3–10)
NEUTS SEG # BLD: 6.4 K/UL (ref 1.8–8)
NEUTS SEG NFR BLD: 80 % (ref 40–73)
O2/TOTAL GAS SETTING VFR VENT: 50 %
PCO2 BLD: 33.6 MMHG (ref 35–45)
PEEP RESPIRATORY: 5 CMH2O
PH BLD: 7.51 [PH] (ref 7.35–7.45)
PHOSPHATE SERPL-MCNC: 2 MG/DL (ref 2.5–4.9)
PLATELET # BLD AUTO: 162 K/UL (ref 135–420)
PMV BLD AUTO: 10.9 FL (ref 9.2–11.8)
PO2 BLD: 76 MMHG (ref 80–100)
POTASSIUM SERPL-SCNC: 3.3 MMOL/L (ref 3.5–5.5)
RBC # BLD AUTO: 2.39 M/UL (ref 4.2–5.3)
SAO2 % BLD: 97 % (ref 92–97)
SERVICE CMNT-IMP: ABNORMAL
SERVICE CMNT-IMP: NORMAL
SODIUM SERPL-SCNC: 138 MMOL/L (ref 136–145)
SPECIMEN TYPE: ABNORMAL
TOTAL RESP. RATE, ITRR: 15
VENTILATION MODE VENT: ABNORMAL
VOLUME CONTROL PLUS IVLCP: YES
VT SETTING VENT: 450 ML
WBC # BLD AUTO: 8.1 K/UL (ref 4.6–13.2)

## 2020-06-13 PROCEDURE — 77030038269 HC DRN EXT URIN PURWCK BARD -A

## 2020-06-13 PROCEDURE — 94640 AIRWAY INHALATION TREATMENT: CPT

## 2020-06-13 PROCEDURE — 74011000250 HC RX REV CODE- 250: Performed by: INTERNAL MEDICINE

## 2020-06-13 PROCEDURE — 83735 ASSAY OF MAGNESIUM: CPT

## 2020-06-13 PROCEDURE — 65610000006 HC RM INTENSIVE CARE

## 2020-06-13 PROCEDURE — 74011250636 HC RX REV CODE- 250/636: Performed by: INTERNAL MEDICINE

## 2020-06-13 PROCEDURE — 74011250636 HC RX REV CODE- 250/636: Performed by: PHYSICIAN ASSISTANT

## 2020-06-13 PROCEDURE — 36415 COLL VENOUS BLD VENIPUNCTURE: CPT

## 2020-06-13 PROCEDURE — 74011250637 HC RX REV CODE- 250/637: Performed by: INTERNAL MEDICINE

## 2020-06-13 PROCEDURE — 74011000258 HC RX REV CODE- 258: Performed by: PHYSICIAN ASSISTANT

## 2020-06-13 PROCEDURE — 74018 RADEX ABDOMEN 1 VIEW: CPT

## 2020-06-13 PROCEDURE — C9113 INJ PANTOPRAZOLE SODIUM, VIA: HCPCS | Performed by: PHYSICIAN ASSISTANT

## 2020-06-13 PROCEDURE — 80048 BASIC METABOLIC PNL TOTAL CA: CPT

## 2020-06-13 PROCEDURE — 74011000250 HC RX REV CODE- 250: Performed by: PHYSICIAN ASSISTANT

## 2020-06-13 PROCEDURE — 36600 WITHDRAWAL OF ARTERIAL BLOOD: CPT

## 2020-06-13 PROCEDURE — 74011636320 HC RX REV CODE- 636/320: Performed by: INTERNAL MEDICINE

## 2020-06-13 PROCEDURE — 74011000250 HC RX REV CODE- 250: Performed by: FAMILY MEDICINE

## 2020-06-13 PROCEDURE — 84100 ASSAY OF PHOSPHORUS: CPT

## 2020-06-13 PROCEDURE — 71275 CT ANGIOGRAPHY CHEST: CPT

## 2020-06-13 PROCEDURE — 90935 HEMODIALYSIS ONE EVALUATION: CPT

## 2020-06-13 PROCEDURE — 85025 COMPLETE CBC W/AUTO DIFF WBC: CPT

## 2020-06-13 PROCEDURE — 74011000258 HC RX REV CODE- 258: Performed by: INTERNAL MEDICINE

## 2020-06-13 PROCEDURE — P9016 RBC LEUKOCYTES REDUCED: HCPCS

## 2020-06-13 PROCEDURE — 82803 BLOOD GASES ANY COMBINATION: CPT

## 2020-06-13 PROCEDURE — 71045 X-RAY EXAM CHEST 1 VIEW: CPT

## 2020-06-13 RX ORDER — MAGNESIUM SULFATE HEPTAHYDRATE 40 MG/ML
2 INJECTION, SOLUTION INTRAVENOUS ONCE
Status: COMPLETED | OUTPATIENT
Start: 2020-06-13 | End: 2020-06-13

## 2020-06-13 RX ORDER — HEPARIN SODIUM 1000 [USP'U]/ML
INJECTION, SOLUTION INTRAVENOUS; SUBCUTANEOUS
Status: DISPENSED
Start: 2020-06-13 | End: 2020-06-14

## 2020-06-13 RX ORDER — DEXTROSE MONOHYDRATE 100 MG/ML
INJECTION, SOLUTION INTRAVENOUS CONTINUOUS
Status: DISCONTINUED | OUTPATIENT
Start: 2020-06-13 | End: 2020-06-18

## 2020-06-13 RX ORDER — SODIUM CHLORIDE 9 MG/ML
250 INJECTION, SOLUTION INTRAVENOUS AS NEEDED
Status: DISCONTINUED | OUTPATIENT
Start: 2020-06-13 | End: 2020-06-23 | Stop reason: ALTCHOICE

## 2020-06-13 RX ADMIN — SODIUM CHLORIDE 0.75 G: 900 INJECTION, SOLUTION INTRAVENOUS at 19:40

## 2020-06-13 RX ADMIN — HEPARIN SODIUM 5000 UNITS: 5000 INJECTION INTRAVENOUS; SUBCUTANEOUS at 05:29

## 2020-06-13 RX ADMIN — DEXTROSE MONOHYDRATE 20 ML: 10 INJECTION, SOLUTION INTRAVENOUS at 02:04

## 2020-06-13 RX ADMIN — Medication 10 ML: at 19:41

## 2020-06-13 RX ADMIN — PIPERACILLIN AND TAZOBACTAM 2.25 G: 2; .25 INJECTION, POWDER, FOR SOLUTION INTRAVENOUS at 05:31

## 2020-06-13 RX ADMIN — FENTANYL CITRATE 100 MCG: 50 INJECTION INTRAMUSCULAR; INTRAVENOUS at 11:28

## 2020-06-13 RX ADMIN — Medication 10 ML: at 22:00

## 2020-06-13 RX ADMIN — CITALOPRAM HYDROBROMIDE 20 MG: 20 TABLET ORAL at 19:47

## 2020-06-13 RX ADMIN — PIPERACILLIN AND TAZOBACTAM 2.25 G: 2; .25 INJECTION, POWDER, FOR SOLUTION INTRAVENOUS at 22:00

## 2020-06-13 RX ADMIN — ASPIRIN 81 MG CHEWABLE TABLET 81 MG: 81 TABLET CHEWABLE at 19:47

## 2020-06-13 RX ADMIN — SODIUM CHLORIDE SOLN NEBU 3% 4 ML: 3 NEBU SOLN at 09:16

## 2020-06-13 RX ADMIN — MIRTAZAPINE 15 MG: 15 TABLET, FILM COATED ORAL at 21:59

## 2020-06-13 RX ADMIN — IOPAMIDOL 100 ML: 755 INJECTION, SOLUTION INTRAVENOUS at 12:50

## 2020-06-13 RX ADMIN — CHLORHEXIDINE GLUCONATE 0.12% ORAL RINSE 15 ML: 1.2 LIQUID ORAL at 10:19

## 2020-06-13 RX ADMIN — SODIUM CHLORIDE SOLN NEBU 3% 4 ML: 3 NEBU SOLN at 20:13

## 2020-06-13 RX ADMIN — FUROSEMIDE 80 MG: 10 INJECTION, SOLUTION INTRAMUSCULAR; INTRAVENOUS at 10:14

## 2020-06-13 RX ADMIN — PIPERACILLIN AND TAZOBACTAM 2.25 G: 2; .25 INJECTION, POWDER, FOR SOLUTION INTRAVENOUS at 14:06

## 2020-06-13 RX ADMIN — HEPARIN SODIUM 5000 UNITS: 5000 INJECTION INTRAVENOUS; SUBCUTANEOUS at 21:55

## 2020-06-13 RX ADMIN — CHLORHEXIDINE GLUCONATE 0.12% ORAL RINSE 15 ML: 1.2 LIQUID ORAL at 21:55

## 2020-06-13 RX ADMIN — SODIUM CHLORIDE SOLN NEBU 3% 4 ML: 3 NEBU SOLN at 14:04

## 2020-06-13 RX ADMIN — MAGNESIUM SULFATE IN WATER 2 G: 40 INJECTION, SOLUTION INTRAVENOUS at 10:15

## 2020-06-13 RX ADMIN — CYANOCOBALAMIN TAB 1000 MCG 1000 MCG: 1000 TAB at 19:39

## 2020-06-13 RX ADMIN — SODIUM CHLORIDE 40 MG: 9 INJECTION INTRAMUSCULAR; INTRAVENOUS; SUBCUTANEOUS at 10:15

## 2020-06-13 RX ADMIN — DEXTROSE MONOHYDRATE 25 G: 25 INJECTION, SOLUTION INTRAVENOUS at 00:41

## 2020-06-13 RX ADMIN — FENTANYL CITRATE 100 MCG: 50 INJECTION INTRAMUSCULAR; INTRAVENOUS at 19:38

## 2020-06-13 RX ADMIN — HEPARIN SODIUM 5000 UNITS: 5000 INJECTION INTRAVENOUS; SUBCUTANEOUS at 14:06

## 2020-06-13 RX ADMIN — FUROSEMIDE 80 MG: 10 INJECTION, SOLUTION INTRAMUSCULAR; INTRAVENOUS at 19:38

## 2020-06-13 NOTE — PROGRESS NOTES
In Patient Progress note      Admit Date: 6/7/2020    Impression:     1) ESRD on HD MWF , volume overloaded   Did get HD yesterday with 1.5 lit UF  2) Ac respi failure , multifactorial , foreign body( dentures in airway)    ,fluid overload , AC on chr diastolic CHF, presently intubated ,  3) AC on chr alfredo CHF, fluid overloaded   4) PNA , Rapid covid -v e    5) bacteremia , coag neg staph from 6/7 likely contaminant per ID   Bld cx since have been negative   5) Anemia , getting 1 unit of PRBC   6) sec hyperpara      Plan:  1) HD today for volume and solute , 1 unit with PRBC , UF ~ 2.5 lit   2) follow ID recs , awaiting ID recs regarding suspected   line infection  3) continue epogen with HD   4) dose AB for GFR < 15  Please call with questions ,     Yessi Donis MD FASN  Cell [phone]  Pager: [phone]     Subjective:      Intubated  Awake following commands    Current Facility-Administered Medications:     dextrose 10% infusion, , IntraVENous, CONTINUOUS, Catie Decker PA-C, Last Rate: 20 mL/hr at 06/13/20 0204, 20 mL at 06/13/20 0204    potassium bicarb-citric acid (EFFER-K) tablet 40 mEq, 40 mEq, Oral, NOW, Maira Mccabe PA-C    0.9% sodium chloride infusion 250 mL, 250 mL, IntraVENous, PRN, Maira Emanuel PA-C    iopamidoL (ISOVUE-370) 76 % injection  mL,  mL, IntraVENous, RAD ONCE, Monica Lassiter, DO    epoetin johnathon-epbx (RETACRIT) injection 10,000 Units, 10,000 Units, SubCUTAneous, Q MON, WED & FRI, Maxwell Vitale MD, 10,000 Units at 06/12/20 2132    midazolam (VERSED) injection 1-2 mg, 1-2 mg, IntraVENous, Q2H PRN, Karyn Joy PA-C, 2 mg at 06/12/20 1910    pantoprazole (PROTONIX) 40 mg in 0.9% sodium chloride 10 mL injection, 40 mg, IntraVENous, DAILY, Divya Lilly PA-C, 40 mg at 06/13/20 1015    albuterol-ipratropium (DUO-NEB) 2.5 MG-0.5 MG/3 ML, 3 mL, Nebulization, Q6H PRN, Divya Lilly, MICHELLE, 3 mL at 06/12/20 2014    sodium chloride 3% hypertonic nebulizer soln, 4 mL, Nebulization, TID RT, Divya Lilly PA-C, 4 mL at 06/13/20 0916    furosemide (LASIX) injection, , , CODE BLUE, Mayela Bess MD, 40 mg at 06/11/20 0355    chlorhexidine (PERIDEX) 0.12 % mouthwash 15 mL, 15 mL, Oral, Q12H, Monica Lassiter, DO, 15 mL at 06/13/20 1019    sodium chloride (NS) flush 5-40 mL, 5-40 mL, IntraVENous, Q8H, Monica Lassiter, DO, 10 mL at 06/12/20 2124    sodium chloride (NS) flush 5-40 mL, 5-40 mL, IntraVENous, PRN, Monica Lassiter, DO    midazolam (VERSED) injection 1-10 mg, 1-10 mg, IntraVENous, Multiple, Monica Lassiter, DO, 2 mg at 06/11/20 1901    fentaNYL citrate (PF) injection  mcg,  mcg, IntraVENous, Multiple, Monica Lassiter, DO, 100 mcg at 06/13/20 1128    lidocaine (PF) (XYLOCAINE) 10 mg/mL (1 %) injection 1-5 mL, 1-5 mL, SubCUTAneous, PRN, Monica Lassiter, DO, 2 mL at 06/11/20 1905    insulin lispro (HUMALOG) injection, , SubCUTAneous, Q6H, Monica Lassiter, DO, Stopped at 06/12/20 0000    hydrALAZINE (APRESOLINE) 20 mg/mL injection 20 mg, 20 mg, IntraVENous, Q6H PRN, Florencio Simeon PA-C    [facility]ron [facility]) injection 4 mg, 4 mg, IntraVENous, Q6H PRN, Sav Marrufo MD, 4 mg at 06/10/20 0030    glucose chewable tablet 16 g, 4 Tab, Oral, PRN, [place] Sleek, DO    glucagon (GLUCAGEN) injection 1 mg, 1 mg, IntraMUSCular, PRN, [place] Sleek, DO    dextrose (D50W) injection syrg 12.5-25 g, 25-50 mL, IntraVENous, PRN, [place] Sleek, DO, 25 g at 06/13/20 0041    piperacillin-tazobactam (ZOSYN) 2.25 g in 0.9% sodium chloride (MBP/ADV) 50 mL MBP, 2.25 g, IntraVENous, Q8H, Hadley Angulo MD, Last Rate: 100 mL/hr at 06/13/20 0531, 2.25 g at 06/13/20 0531    furosemide (LASIX) injection 80 mg, 80 mg, IntraVENous, BID, Hadley Angulo MD, 80 mg at 06/13/20 1014    citalopram (CELEXA) tablet 20 mg, 20 mg, Oral, DAILY, Hadley Angulo MD, Stopped at 06/11/20 0900    cyanocobalamin tablet 1,000 mcg, 1,000 mcg, Oral, BID, Hadley Angulo MD, Stopped at 06/11/20 0900    loratadine (CLARITIN) tablet 10 mg, 10 mg, Oral, DAILY, Hadley Angulo MD, Stopped at 06/11/20 0900    aspirin chewable tablet 81 mg, 81 mg, Oral, DAILY, Hadley Angulo MD, Stopped at 06/11/20 0900    [Held by provider] isosorbide mononitrate ER (IMDUR) tablet 30 mg, 30 mg, Oral, DAILY, Hadley Angulo MD, Stopped at 06/11/20 0900    [Held by provider] amLODIPine (NORVASC) tablet 10 mg, 10 mg, Oral, DAILY, Hadley Angulo MD, Stopped at 06/11/20 0900    polyethylene glycol (MIRALAX) packet 17 g, 17 g, Oral, DAILY, Hadley Angulo MD, Stopped at 06/11/20 0900    [Held by provider] rosuvastatin (CRESTOR) tablet 5 mg, 5 mg, Oral, QHS, Hadley Angulo MD, Stopped at 06/11/20 2200    [Held by provider] hydrALAZINE (APRESOLINE) tablet 100 mg, 100 mg, Oral, TID, Hadley Angulo MD, Stopped at 06/11/20 0900    [Held by provider] gabapentin (NEURONTIN) capsule 100 mg, 100 mg, Oral, BID, Hadley Angulo MD, Stopped at 06/11/20 0900    [Held by provider] carvediloL (COREG) tablet 12.5 mg, 12.5 mg, Oral, BID WITH MEALS, Hadley Angulo MD, Stopped at 06/11/20 0800    ferrous sulfate tablet 325 mg, 325 mg, Oral, EVERY OTHER DAY, Hadley Angulo MD, Stopped at 06/12/20 0421    mirtazapine (REMERON) tablet 15 mg, 15 mg, Oral, QHS, Hadley Angulo MD, Stopped at 06/11/20 2200    docusate sodium (COLACE) capsule 100 mg, 100 mg, Oral, BID PRN, Hadley Angulo MD    heparin (porcine) injection 5,000 Units, 5,000 Units, SubCUTAneous, Q8H, Hadley Angulo MD, 5,000 Units at 06/13/20 0529    Piperacillin-tazobactam (ZOSYN) 0.75 gm Supplemental Dosing by Pharmacy, , Other, Rx Dosing/Monitoring, Ira Kenyon MD        Objective:     Visit Vitals  /43   Pulse 63   Temp 99.5 °F (37.5 °C)   Resp 15   Ht 5' 5\" (1.651 m)   Wt 108.7 kg (239 lb 10.2 oz)   SpO2 99%   Breastfeeding No   BMI 39.88 kg/m²         Intake/Output Summary (Last 24 hours) at 6/13/2020 1302  Last data filed at 6/13/2020 0525  Gross per 24 hour   Intake 0 ml   Output 1650 ml   Net -1650 ml       Physical Exam:     Intubated. / sedated  HENT mmm  RS AEBE coarse BS   CVS s1 s2 wnl no JVD  GI soft BS +  Ext 1+ LE edema     Data Review:    Recent Labs     06/13/20  0438   WBC 8.1   RBC 2.39*   HCT 20.9*   MCV 87.4   MCH 28.0   MCHC 32.1   RDW 14.1     Recent Labs     06/13/20  0438 06/12/20  0224 06/11/20  0340   BUN 19* 24* 31*   CREA 2.15* 2.55* 3.39*   CA 8.1* 8.3* 8.3*   ALB  --   --  2.9*   K 3.3* 3.8 4.2    138 135*    103 99*   CO2 30 28 30   PHOS 2.0* 2.6 5.2*   GLU 77 81 183*       Swathi Lantigua MD

## 2020-06-13 NOTE — PROGRESS NOTES
[employer] [facility]  Pulmonary, Critical Care, and Sleep Medicine    Name: Clarence Jimenez MRN: [id number]   : 1950 Hospital: [facility]   Date: 2020        IMPRESSION:   · Acute on chronic respiratory failure requiring mechanical ventilation - initially in the setting of PNA and fluid overload, however, intubated 20 2/2 aspiration of partial dentures. Hypercapnia improving on subsequent ABGs  · Aspiration of foreign body (partials) in to the hypopharynx- retrieved with forceps. Bronch 20- some mucus plugs, small caliber airways noted but no foreign material    · RUL infiltrate/PNA - CXR 20  · Acute on HFpEF - most recent echo 20 w/ EF of 55-60%   · Bacteremia vs contaminant- GPC in groups in 1/2 bottles  · ESRD - HD MWF, BUN 24, Cr 2.55  · Acute on chronic anemia- H/H 6.3 and 19.6, no signs of active bleeding     Patient Active Problem List   Diagnosis Code    Acute on chronic diastolic congestive heart failure (HCC) I50.33    Suspected COVID-19 virus infection Z20.828    Type 2 diabetes mellitus without complication, without long-term current use of insulin ([facility]) E11.9    Left anterior fascicular block I44.4    HTN (hypertension), benign I10    Coronary artery disease involving native coronary artery of native heart without angina pectoris I25.10    Chronic diastolic congestive heart failure ([facility]) I50.32    Bilateral lower extremity edema R60.0    BMI 35.0-35.9,adult Z68.35    CHF (congestive heart failure) ([facility]) I50.9    Sepsis ([facility]) A41.9    Respiratory failure ([facility]) J96.90    SIRS (systemic inflammatory response syndrome) ([facility]) R65.10    Acute on chronic respiratory failure ([facility]) J96.20    Pneumonia J18.9    Fluid overload E87.70      PLAN:   · Resp: Titrate FiO2 for SpO2 >90%; CT chest showed no additional retained foreign bodies in the airway.   Bronch on 20 demonstrated distant mucus plugs / friable airway but otherwise normal. Aspiration precautions, keep HOB > 30 degrees. SBT today, possible extubation  · I/D: Sepsis bundle per hospital protocol, blood culture 6/7 positive for gram -positive cocci. Continue zosyn, d/c vanc. Repeat culture from HD cath and peripheral blood draw per recommendations of ROXANN Gandhicalate ABX once Cx's finalize. · Hem/Onc: Daily CBC; H/H 6.3 and 19.6, plan to transfuse RBC's during HD today. Monitor for signs of active bleeding  · CVS: HD stable without pressors. Monitor for hypertension, maintain MAP > 65. Continue diuresis with lasix daily  · Metabolic: Daily BMP; monitor e-lytes; replace PRN  · Renal: Trend Renal indices; HD MWF per nephrology. · Endocrine: POC Glucose q6; SSI  · GI: NPO, SUP, Trend LFTs, Zofran PRN for N/V . Continue miralax and colace daily. · Musc/Skin: No acute issues, wound care as needed  · Neuro: Opens eyes, responds to voice. PRN versed for agitation and vent synchrony   · Fluids: N/A  · Code Status: full code   Best Practice:  · Sepsis Bundle per Hospital Protocol  · Glycemic control; avoid Hypoglycemia  · IHI ICU Bundles:  ·      Ramos Bundle Followed and Vent Bundle Followed, Vent Day 1    · [facility] Vent patients/ Pulmonary pts:   · VAP bundle, Aim to keep peak plateau pressure 26-47ZJ H2O  · Aspiration Precautions - HOB >30'  · Daily sedation holiday as indicated  · SBT as tolerated/appropriate  · Stress ulcer prophylaxis. famotidine  · DVT prophylaxis. heparin  · Need for Lines, ramos assessed. · Restraints need. · Palliative care evaluation as needed  Subjective/Interval History: This patient has been seen and evaluated at the request of Dr. Tia Garza for acute respiratory failure. Patient is a 71 y.o. female w/ pmhx of ESRD on HD, DM, and CHF on home O2 who presented to the ED on 6/7/20 complaining of SOB and cough from [facility]. She was noted to be in respiratory distress at the facility and her O2 was increased from her baseline 2L to 12-15L.  At that time she was noted to be altered as well. Patient was bagged en route to the ED. Upon arrival to ED patient was A&O x3. She was placed on BiPAP and subsequently weaned to [place]. Work up significant for RUL infiltrate and patient was started on broad spectrum ABX. Was admitted to the floor. This AM, an RRT was called for patient being altered w/ increased work of breathing, copious secretions and hypoxia. Upon PFM arrival patient was noted to be additionally bradycardic and tachypnic. 40 mg lasix was given and anesthesia was called for intubation. Upon inspection of CRNA, patient was noted to be choking on her partial denture. It was removed at that time and O2 saturations and mental status began to improve. Was intubated at that time. Post intubation ABG significant for respiratory acidosis w/ pH of 7.1 and pCO2 in the 80s. Patient's mental status continued to improve and patient was able to follow commands.      Upon evaluation in the ICU, patient was awake and calm on the ventilator, following commands, in no distress. CT showed no retained foreign body.      The patient is critically ill and can not provide additional history due to Ventilated    20    - Patient resting comfortable on vent  - Received 1 U PRBC yesterday with HD without appropriate rise in Hb. No sings of active bleeding at this time  - Hypoxia still an issue. I discussed with patient's Nephrologist this morning. Will plan for Chest CTA to r/o PE, plan for HD after that and given another unit of blood  - Temp: 99.5  - Remains on Zosyn- pharmacy dosing      ROS:A comprehensive review of systems was negative except for that written in the HPI.     Objective:   Vital Signs:    Visit Vitals  /41   Pulse 75   Temp 99.5 °F (37.5 °C)   Resp 19   Ht 5' 5\" (1.651 m)   Wt 108.7 kg (239 lb 10.2 oz)   SpO2 96%   Breastfeeding No   BMI 39.88 kg/m²       O2 Device: Ventilator   O2 Flow Rate (L/min): 2 l/min   Temp (24hrs), Av.4 °F (37.4 °C), Min:97.9 °F (36.6 °C), Max:100 °F (37.8 °C)       Intake/Output:   Last shift:      No intake/output data recorded. Last 3 shifts: 06/11 1901 - 06/13 0700  In: 0   Out: 1650 [Urine:150]    Intake/Output Summary (Last 24 hours) at 6/13/2020 1154  Last data filed at 6/13/2020 0525  Gross per 24 hour   Intake 0 ml   Output 1650 ml   Net -1650 ml         Physical Exam:      General:  Intubated, NAD- awake and alert   Head:  Normocephalic, without obvious abnormality, atraumatic. Eyes:  Conjunctivae/corneas clear. PERRL   Nose: Nares normal. Septum midline. Mucosa normal   Throat: Lips, mucosa, and tongue normal. edentulous   Neck: Supple, symmetrical, trachea midline, no adenopathy. Lungs:   Symmetrical chest rise; coarse rhonchi    Heart:  RRR, S1, S2 normal, no m/r/g   Abdomen:   Soft, non-tender. Bowel sounds normal. No masses   Extremities: Extremities normal, atraumatic. 1+ pitting edema   Pulses: 2+ and symmetric all extremities. Skin: Skin color, texture, turgor normal. No rashes or lesions   Neurologic: Grossly nonfocal   Devices: PIV, ETT           DATA:  Labs:  Recent Labs     06/13/20  0438 06/12/20 0224 06/11/20  0340   WBC 8.1 9.8 15.9*   HGB 6.7* 6.3* 7.5*   HCT 20.9* 19.6* 25.1*    167 212     Recent Labs     06/13/20  0438 06/12/20  0224 06/11/20  0340    138 135*   K 3.3* 3.8 4.2    103 99*   CO2 30 28 30   GLU 77 81 183*   BUN 19* 24* 31*   CREA 2.15* 2.55* 3.39*   CA 8.1* 8.3* 8.3*   MG 1.8 2.0 2.2   PHOS 2.0* 2.6 5.2*   ALB  --   --  2.9*   ALT  --   --  10*     No results for input(s): PH, PCO2, PO2, HCO3, FIO2 in the last 72 hours.      Results     Procedure Component Value Units Date/Time    CULTURE, RESPIRATORY/SPUTUM/BRONCH Sarah Villanueva [[id number]] Collected:  06/11/20 1920    Order Status:  Completed Specimen:  Bronchial lavage Updated:  06/13/20 0857     Special Requests: --        BRONCHIAL LAVAGE  RIGHT  LUNG       GRAM STAIN 2+ WBCS SEEN         NO ORGANISMS SEEN Culture result:       SCANT NORMAL RESPIRATORY MARS          CULTURE, BLOOD [[id number]] Collected:  06/11/20 1820    Order Status:  Completed Specimen:  Blood Updated:  06/13/20 0634     Special Requests: NO SPECIAL REQUESTS        Culture result: NO GROWTH 2 DAYS       CULTURE, BLOOD [[id number]] Collected:  06/11/20 1620    Order Status:  Completed Specimen:  Blood Updated:  06/13/20 0634     Special Requests: NO SPECIAL REQUESTS        Culture result: NO GROWTH 2 DAYS       CULTURE, RESPIRATORY/SPUTUM/BRONCH Ruffus Nelda [[id number]] Collected:  06/11/20 0600    Order Status:  Canceled Specimen:  Endotracheal aspirate     CULTURE, BLOOD [[id number]] Collected:  06/10/20 1231    Order Status:  Completed Specimen:  Blood Updated:  06/13/20 0634     Special Requests: NO SPECIAL REQUESTS        Culture result: NO GROWTH 3 DAYS       CULTURE, BLOOD [[id number]] Collected:  06/10/20 1230    Order Status:  Completed Specimen:  Blood Updated:  06/13/20 0634     Special Requests: NO SPECIAL REQUESTS        Culture result: NO GROWTH 3 DAYS       CULTURE, BLOOD [[id number]]  (Abnormal) Collected:  06/07/20 2223    Order Status:  Completed Specimen:  Blood Updated:  06/11/20 1230     Special Requests: NO SPECIAL REQUESTS        GRAM STAIN       AEROBIC BOTTLE GRAM POSITIVE COCCI IN GROUPS                  SMEAR CALLED TO AND CORRECTLY REPEATED BY: Josette Mauro RN  4N 6/10/2020 0802 TO LAE           Culture result:       STAPHYLOCOCCUS SPECIES, COAGULASE NEGATIVE GROWING IN AEROBIC BOTTLE          CULTURE, BLOOD [[id number]] Collected:  06/07/20 2200    Order Status:  Completed Specimen:  Blood Updated:  06/13/20 0633     Special Requests: NO SPECIAL REQUESTS        Culture result: NO GROWTH 6 DAYS               PFT:                                                     Echo:  04/22/20   ECHO ADULT FOLLOW-UP OR LIMITED 04/26/2020 4/26/2020    Narrative · Normal cavity size, wall thickness and systolic function (ejection   fraction normal). Estimated left ventricular ejection fraction is 55 -   60%. Visually measured ejection fraction. · Dilated left atrium. · Dilated right atrium. · Pulmonary arterial systolic pressure is 32 mmHg. Signed by: Satinder Barnett MD       Imaging:  [x]I have personally reviewed the patients radiographs    CXR Results  (Last 48 hours)               06/13/20 0341  XR CHEST PORT Final result    Impression:  IMPRESSION:        Endotracheal tube tip projects mildly low-lying approximately 1.5 cm cephalad to   the garth, possibly better positioned by withdrawing it approximately 1-2 cm. Little interval change in aeration compared to the preceding radiograph. Low   lung volumes. Bilateral lung opacities, right greater than left. Narrative:  AP CHEST, PORTABLE       CPT CODE: 28450       INDICATION: Above. Intubated. COMPARISON: 6/12/2020. TECHNIQUE: Portable semi-upright AP chest radiograph is reviewed. FINDINGS:       Endotracheal tube tip projects mildly low-lying approximately 1.5 cm cephalad to   the garth (2.1 cm on the preceding radiograph), possibly better positioned by   withdrawing it approximately 1-2 cm. Right sided dialysis catheter is again seen   with tip projected over the right atrium. Multiple EKG leads/wires and other   catheter tubing overlie the patient. Lung volumes are low. The lung apices are partially obscured by the patient's   overlying chin/neck. No evidence of pneumothorax. Bilateral patchy lung   opacities, right greater than left, consistent with airspace disease and/or   atelectasis, better assessed on chest CT of 6/11/2020. Pleural effusions better   visualized on the CT. Little interval change in aeration compared to the   preceding radiograph. The cardiomediastinal silhouette is prominent without significant interval   change. 06/12/20 0412  XR CHEST PORT Final result    Impression:  Impression:   1.   Improving multifocal airspace opacities. However, persistent opacities   remain. Small pleural effusions. Narrative:      Examination: Portable AP chest        History: Intubated       Comparison: One day prior       Findings: Right IJ dialysis catheter remains in place. Endotracheal tube   terminates 2.1 cm above the garth. Multifocal airspace disease is slightly   improved with persistent airspace disease remaining. Small bilateral pleural   effusions. There is no pneumothorax. Cardiomegaly is stable. Atherosclerosis. High complexity decision making was performed during the evaluation of this patient at high risk for decompensation with multiple organ involvement     Above mentioned total time spent on reviewing the case/medical record/data/notes/EMR/patient examination/documentation/coordinating care with nurse/consultants, exclusive of procedures with complex decision making performed and > 50% time spent in face to face evaluation. Complex decision making was made in the evaluation and management plans during this consultation. More than 50% of time was spent in counseling and coordination of care including review of data and discussion with other team members.       Critical  care, chart review and time spent coordinating care:  30 min

## 2020-06-13 NOTE — PROGRESS NOTES
ACUTE HEMODIALYSIS FLOW SHEET    HEMODIALYSIS ORDERS: Physician: Dr. Burkett Carrier: Iris   Duration: 3 hr  BFR: 300   DFR: 500   Dialysate:  Temp 36.0   K+   4    Ca+  2.5   Na 140   Bicarb 35   Wt Readings from Last 1 Encounters:   06/13/20 108.7 kg (239 lb 10.2 oz)    Patient Chart [x]   Unable to Obtain []  Dry weight/UF Goal: 2500 ml  Access RIJ CVC     Heparin []  Bolus    Units    [] Hourly    Units    [x]None      Catheter locking solution Heparin 1:1000   Pre BP:   164/33    Pulse:  69   Respirations: 15    Temperature:  99.1    Tx: NSS   ml/Bolus   [x] N/A   Labs: []  Pre  []  Post:   [x] N/A   Additional Orders(medications, blood products, hypotension management): [x] Yes   [] No     [x]  [facility] Consent Verified     CATHETER ACCESS:  []N/A   [x]Right   []Left   [x]IJ     []Fem   []Chest wall   [] First use X-ray verified     [x]Tunnel    [] Non Tunneled   [x]No S/S infection  []Redness  []Drainage []Cultured []Swelling []Pain   [x]Medical Aseptic Prep Utilized   []Dressing Changed  [] Biopatch  Date: 6/8/20   []Clotted   [x]Patent   Flows: [x]Good  []Poor  []Reversed   If access problem,  notified: []Yes    [x]N/A        GRAFT/FISTULA ACCESS:   [x]N/A     []Right     []Left     []UE     []LE   []AVG   []AVF     []Buttonhole    []Medical Aseptic Prep Utilized   []No S/S infection  []Redness  []Drainage []Cultured  [] Swelling  [] Pain  Bruit:   [] Strong    [] Weak       Thrill :   [] Strong    [] Weak     Needle Gauge: 15   Length: 1 inch   If access problem,  notified: []Yes     [x]N/A     Please describe access if present and not used: N/A       GENERAL ASSESSMENT:    LUNGS:  Rate 15   SaO2%      [] Clear  [x] Coarse  [] Crackles  [] Wheezing                                                [] Diminished     Location : []RLL   []LLL    []RUL  []KATIE   Cough: []Productive  []Dry  []N/A   Respirations:  []Easy  []Labored   Therapy:  []RA  []NC /min    Mask: []NRB  [] Julia Saenz O2%                  []Ventilator  [x]Intubated  [] Trach  [] BiPaP   CARDIAC: []Regular      [x] Irregular   [] Pericardial Rub  [] JVD          []  Monitored  [] Bedside  [] Remotely monitored     EDEMA: [] None  [x]Generalized  [] Pitting [] 1    [] 2    [] 3    [] 4                 [] Facial  [] Pedal  []  UE  [] LE   SKIN:   [x] Warm  [] Hot     [] Cold   [x] Dry     [] Pale   [] Diaphoretic                  [] Flushed  [] Jaundiced  [] Cyanotic  [] Rash  [] Weeping   LOC:    [x] Alert      [x]Oriented:    [x] Person     [x] Place  []Time               [] Confused  [] Lethargic  [] Medicated  [] Non-responsive   GI / ABDOMEN:                     [] Flat    [] Distended    [x] Soft    [] Firm   []  Obese                   [] Diarrhea  [x] Bowel Sounds  [] Nausea  [] Vomiting     / URINE ASSESSMENT:                   [x] Voiding   [] Oliguria  [] Anuria   []  Lopez                  [] Incontinent  []  Incontinent Brief []  Fecal Management System     PAIN:  [x] 0 []1  []2   []3   []4   []5   []6   []7   []8   []9   []10            Scale 0-10  Action/Follow Up:    MOBILITY:  [x] Bed    [] Stretcher      All Vitals and Treatment Details on Attached [address]: [facility]          Room # 122/06   [] 1st Time Acute      [] Stat       [x] Routine      [] Urgent     [x] Acute Room  []  Bedside  [] ICU/CCU  [] ER   Isolation Precautions:  [x] Dialysis    There are currently no Active Isolations       ALLERGIES:     Allergies   Allergen Reactions    Augmentin [Amoxicillin-Pot Clavulanate] Diarrhea    Clavulanic Acid Diarrhea    Levaquin [Levofloxacin] Other (comments)     Severe hypoglycemia        Code Status:  Full Code     Hepatitis Status     Lab Results   Component Value Date/Time    Hepatitis B surface Ag <0.10 06/09/2020 04:03 AM    Hepatitis B surface Ab <3.10 (L) 06/09/2020 04:03 AM        Current Labs:      Lab Results   Component Value Date/Time    WBC 8.1 06/13/2020 04:38 AM    HGB 6.7 (L) 06/13/2020 04:38 AM    HCT 20.9 (L) 06/13/2020 04:38 AM    PLATELET 070 45/88/3241 04:38 AM    MCV 87.4 06/13/2020 04:38 AM     Lab Results   Component Value Date/Time    Sodium 138 06/13/2020 04:38 AM    Potassium 3.3 (L) 06/13/2020 04:38 AM    Chloride 103 06/13/2020 04:38 AM    CO2 30 06/13/2020 04:38 AM    Anion gap 5 06/13/2020 04:38 AM    Glucose 77 06/13/2020 04:38 AM    BUN 19 (H) 06/13/2020 04:38 AM    Creatinine 2.15 (H) 06/13/2020 04:38 AM    BUN/Creatinine ratio 9 (L) 06/13/2020 04:38 AM    GFR est AA 28 (L) 06/13/2020 04:38 AM    GFR est non-AA 23 (L) 06/13/2020 04:38 AM    Calcium 8.1 (L) 06/13/2020 04:38 AM          DIET:  DIET NPO      PRIMARY NURSE REPORT:   Pre Dialysis: ALLISON Alegre RN     Time: 5343        EDUCATION:    [x] Patient [] Other           Knowledge Basis: []None [x]Minimal [] Substantial   Barriers to learning  [x]N/A   [] Access Care     [] S&S of infection  [] Fluid Management  [] K+   [x] Procedural    []Albumin   [] Medications   [] Tx Options   [] Transplant   [] Diet   [] Other   Teaching Tools:  [x] Explain  [] Demo  [] Handouts [] Video  Patient response: [x] Verbalized understanding  [] Teach back  [] Return demonstration   [] Requires follow up      [x]Time Out/Safety Check  [x] Extracorporeal Circuit Tested for integrity       RO/HEMODIALYSIS MACHINE SAFETY CHECKS  Before each treatment:     Machine Number:                   [facility]                                          [x] Portable Machine #4/RO serial # [id number]                                                                            Alarm Test:  Pass time 4772            [x] RO/Machine Log Complete    Machine Temp    36.0             Dialysate: pH  7.4    Conductivity: Meter 14.2     HD Machine  14.0      TCD: 14.0  Dialyzer Lot # [id number]     Blood Tubing Lot # [id number]     Saline Lot # [id number]     CHLORINE TESTING-Before each treatment and every 4 hours    Total Chlorine: [x] less than 0.1 ppm Initial Time Check: 1420       4 Hr/2nd Check Time:    (if greater than 0.1 ppm from Primary then every 30 minutes from Secondary)     TREATMENT INITIATION  with Dialysis Precautions:   [x] All Connections Secured              [x] Saline Line Double Clamped   [x] Venous Parameters Set               [x] Arterial Parameters Set    [x] Prime Given 250ml NSS              [x]Air Foam Detector Engaged      Treatment Initiation Note:  0332  Arrived at pt's room, pt intubated at this time. Pt able to follow commands, acknowledges conversation by shaking her head. VSS. Will continue to monitor pt's status. During Treatment Notes:  [address] assessed no abnormalities noted, CVC accessed without any difficulties, line patent good flow. Pt tolerated well. Vascular access visible with arterial and venous line connections intact. 1500  PRBC's started. Vascular access visible with arterial and venous line connections intact. Vascular access visible with arterial and venous line connections intact. 1540  PRBC's completed  1600 Pt resting with eyes closed without any distress at this time. Vascular access visible with arterial and venous line connections intact. 1700  Vascular access visible with arterial and venous line connections intact. Medication Dose Volume Route Time [facility] Nurse, Title   none     Sravani Albarran, RN     Post Assessment  Dialyzer Cleared:[] Good [x] Fair  [] Poor  Blood processed:  49.4 L  UF Removed:  2500 Ml  Post /48  Pulse  68 Resp  17   Temp 99.1    Post Tx Vascular Access: [x] N/A           CVC Catheter: [] N/A  Locking solution: Heparin 1:1000 U  Arterial port 1.6 ml   Venous port 1.6ml         Skin:[x] Warm  [x] Dry [] Diaphoretic               [] Flushed  [] Pale [] Cyanotic   Pain:  [x]0  []1 []2  []3 []4  []5  []6 []7 []8  []9  []10       Post Treatment Note:   1800  HD completed at this time, pt tolerated well. Dressing clean, dry and intact.      POST TREATMENT PRIMARY NURSE HANDOFF REPORT:   Post Dialysis: ALLISON Alegre RN                Time:  1800       Abbreviations: AVG-arterial venous graft, AVF-arterial venous fistula, IJ-Internal Jugular, Subcl-Subclavian, Fem-Femoral, Tx-treatment, AP/HR-apical heart rate, DFR-dialysate flow rate, BFR-blood flow rate, AP-arterial pressure, -venous pressure, UF-ultrafiltrate, TMP-transmembrane pressure, Aust[place]-Venous, Art-Arterial, RO-Reverse Osmosis

## 2020-06-13 NOTE — PROGRESS NOTES
[facility]  Pulmonary, Critical Care, and Sleep Medicine    Name: Juana Arceo MRN: [id number]   : 1950 Hospital: [facility]   Date: 2020        IMPRESSION:   · Acute on chronic respiratory failure requiring mechanical ventilation - initially in the setting of PNA and fluid overload, however, intubated 20 2/2 aspiration of partial dentures. Hypercapnia improving on subsequent ABGs  · Aspiration of foreign body (partials) in to the hypopharynx- retrieved with forceps. Bronch 20- some mucus plugs, small caliber airways noted but no foreign material    · RUL infiltrate/PNA - CXR 20  · Acute on HFpEF - most recent echo 20 w/ EF of 55-60%   · Bacteremia vs contaminant- GPC in groups in 1/2 bottles  · ESRD - HD MWF, BUN 24, Cr 2.55  · Acute on chronic anemia- H/H 6.3 and 19.6, no signs of active bleeding     Patient Active Problem List   Diagnosis Code    Acute on chronic diastolic congestive heart failure ([facility]) I50.33    Suspected COVID-19 virus infection Z20.828    Type 2 diabetes mellitus without complication, without long-term current use of insulin ([facility]) E11.9    Left anterior fascicular block I44.4    HTN (hypertension), benign I10    Coronary artery disease involving native coronary artery of native heart without angina pectoris I25.10    Chronic diastolic congestive heart failure ([facility]) I50.32    Bilateral lower extremity edema R60.0    BMI 35.0-35.9,adult Z68.35    CHF (congestive heart failure) ([facility]) I50.9    Sepsis ([facility]) A41.9    Respiratory failure ([facility]) J96.90    SIRS (systemic inflammatory response syndrome) ([facility]) R65.10    Acute on chronic respiratory failure ([facility]) J96.20    Pneumonia J18.9    Fluid overload E87.70      PLAN:   · Resp: Titrate FiO2 for SpO2 >90%; CT chest showed no additional retained foreign bodies in the airway.   Bronch on 20 demonstrated distant mucus plugs / friable airway but otherwise normal. Aspiration precautions, keep HOB > 30 degrees. SBT today, possible extubation  · I/D: Sepsis bundle per hospital protocol, blood culture 6/7 positive for gram -positive cocci. Continue zosyn, d/c vanc. Repeat culture from HD cath and peripheral blood draw per recommendations of ROXANN Princeescalate ABX once Cx's finalize. · Hem/Onc: Daily CBC; H/H 6.3 and 19.6, plan to transfuse RBC's during HD today. Monitor for signs of active bleeding  · CVS: HD stable without pressors. Monitor for hypertension, maintain MAP > 65. Continue diuresis with lasix daily  · Metabolic: Daily BMP; monitor e-lytes; replace PRN  · Renal: Trend Renal indices; HD MWF per nephrology. - will see if can pull extra fluid to better optimize respiratory status  · Endocrine: POC Glucose q6; SSI  · GI: NPO, SUP, Trend LFTs, Zofran PRN for N/V . Continue miralax and colace daily. · Musc/Skin: No acute issues, wound care as needed  · Neuro: Opens eyes, responds to voice. PRN versed for agitation and vent synchrony   · Fluids: N/A  · Code Status: full code   Best Practice:  · Sepsis Bundle per Hospital Protocol  · Glycemic control; avoid Hypoglycemia  · IHI ICU Bundles:  ·      Ramos Bundle Followed and Vent Bundle Followed, Vent Day 1    · [facility] Vent patients/ Pulmonary pts:   · VAP bundle, Aim to keep peak plateau pressure 48-70GD H2O  · Aspiration Precautions - HOB >30'  · Daily sedation holiday as indicated  · SBT as tolerated/appropriate  · Stress ulcer prophylaxis. famotidine  · DVT prophylaxis. heparin  · Need for Lines, ramos assessed. · Restraints need. · Palliative care evaluation as needed  Subjective/Interval History: This patient has been seen and evaluated at the request of Dr. Guanaco Rivas for acute respiratory failure. Patient is a 71 y.o. female w/ pmhx of ESRD on HD, DM, and CHF on home O2 who presented to the ED on 6/7/20 complaining of SOB and cough from [facility].  She was noted to be in respiratory distress at the facility and her O2 was increased from her baseline 2L to 12-15L. At that time she was noted to be altered as well. Patient was bagged en route to the ED. Upon arrival to ED patient was A&O x3. She was placed on BiPAP and subsequently weaned to [place]. Work up significant for RUL infiltrate and patient was started on broad spectrum ABX. Was admitted to the floor. This AM, an RRT was called for patient being altered w/ increased work of breathing, copious secretions and hypoxia. Upon PFM arrival patient was noted to be additionally bradycardic and tachypnic. 40 mg lasix was given and anesthesia was called for intubation. Upon inspection of CRNA, patient was noted to be choking on her partial denture. It was removed at that time and O2 saturations and mental status began to improve. Was intubated at that time. Post intubation ABG significant for respiratory acidosis w/ pH of 7.1 and pCO2 in the 80s. Patient's mental status continued to improve and patient was able to follow commands.      Upon evaluation in the ICU, patient was awake and calm on the ventilator, following commands, in no distress. CT showed no retained foreign body.      The patient is critically ill and can not provide additional history due to Ventilated    6/12/20    - Patient resting comfortable on vent  - Received 1 U PRBC yesterday with HD without appropriate rise in Hb. No sings of active bleeding at this time  - Hypoxia still an issue. I discussed with patient's Nephrologist this morning.     - Chest CTA negative for PE- Bilateral pleural effusions with compressive atelectasis and pulmonary edema  - Temp: 99.5  - Remains on Zosyn- pharmacy dosing      ROS:A comprehensive review of systems was negative except for that written in the HPI.     Objective:   Vital Signs:    Visit Vitals  /44   Pulse 65   Temp 99.1 °F (37.3 °C) (Axillary)   Resp 15   Ht 5' 5\" (1.651 m)   Wt 108.7 kg (239 lb 10.2 oz)   SpO2 98%   Breastfeeding No   BMI 39.88 kg/m² O2 Device: Ventilator   O2 Flow Rate (L/min): 2 l/min   Temp (24hrs), Av.2 °F (37.3 °C), Min:97.9 °F (36.6 °C), Max:100 °F (37.8 °C)       Intake/Output:   Last shift:       07 - 1900  In: 310   Out: -   Last 3 shifts: 1901 -  0700  In: 0   Out: 5453 [Urine:150]    Intake/Output Summary (Last 24 hours) at 2020 1622  Last data filed at 2020 1540  Gross per 24 hour   Intake 310 ml   Output 1650 ml   Net -1340 ml         Physical Exam:      General:  Intubated, NAD- awake and alert   Head:  Normocephalic, without obvious abnormality, atraumatic. Eyes:  Conjunctivae/corneas clear. PERRL   Nose: Nares normal. Septum midline. Mucosa normal   Throat: Lips, mucosa, and tongue normal. edentulous   Neck: Supple, symmetrical, trachea midline, no adenopathy. Lungs:   Symmetrical chest rise; coarse rhonchi    Heart:  RRR, S1, S2 normal, no m/r/g   Abdomen:   Soft, non-tender. Bowel sounds normal. No masses   Extremities: Extremities normal, atraumatic. 1+ pitting edema   Pulses: 2+ and symmetric all extremities. Skin: Skin color, texture, turgor normal. No rashes or lesions   Neurologic: Grossly nonfocal   Devices: PIV, ETT           DATA:  Labs:  Recent Labs     20  0438 204 20  0340   WBC 8.1 9.8 15.9*   HGB 6.7* 6.3* 7.5*   HCT 20.9* 19.6* 25.1*    167 212     Recent Labs     20  0438 204 20  0340    138 135*   K 3.3* 3.8 4.2    103 99*   CO2 30 28 30   GLU 77 81 183*   BUN 19* 24* 31*   CREA 2.15* 2.55* 3.39*   CA 8.1* 8.3* 8.3*   MG 1.8 2.0 2.2   PHOS 2.0* 2.6 5.2*   ALB  --   --  2.9*   ALT  --   --  10*     No results for input(s): PH, PCO2, PO2, HCO3, FIO2 in the last 72 hours.      Results     Procedure Component Value Units Date/Time    CULTURE, RESPIRATORY/SPUTUM/BRONCH Kristina James [[id number]] Collected:  20    Order Status:  Completed Specimen:  Bronchial lavage Updated:  20 7844 Special Requests: --        BRONCHIAL LAVAGE  RIGHT  LUNG       GRAM STAIN 2+ WBCS SEEN         NO ORGANISMS SEEN        Culture result:       SCANT NORMAL RESPIRATORY MARS          CULTURE, BLOOD [[id number]] Collected:  06/11/20 1820    Order Status:  Completed Specimen:  Blood Updated:  06/13/20 0634     Special Requests: NO SPECIAL REQUESTS        Culture result: NO GROWTH 2 DAYS       CULTURE, BLOOD [[id number]] Collected:  06/11/20 1620    Order Status:  Completed Specimen:  Blood Updated:  06/13/20 0634     Special Requests: NO SPECIAL REQUESTS        Culture result: NO GROWTH 2 DAYS       CULTURE, RESPIRATORY/SPUTUM/BRONCH Gonzalez Mckusick STAIN [[id number]] Collected:  06/11/20 0600    Order Status:  Canceled Specimen:  Endotracheal aspirate     CULTURE, BLOOD [[id number]] Collected:  06/10/20 1231    Order Status:  Completed Specimen:  Blood Updated:  06/13/20 0634     Special Requests: NO SPECIAL REQUESTS        Culture result: NO GROWTH 3 DAYS       CULTURE, BLOOD [[id number]] Collected:  06/10/20 1230    Order Status:  Completed Specimen:  Blood Updated:  06/13/20 0634     Special Requests: NO SPECIAL REQUESTS        Culture result: NO GROWTH 3 DAYS       CULTURE, BLOOD [[id number]]  (Abnormal) Collected:  06/07/20 2223    Order Status:  Completed Specimen:  Blood Updated:  06/11/20 1230     Special Requests: NO SPECIAL REQUESTS        GRAM STAIN       AEROBIC BOTTLE GRAM POSITIVE COCCI IN GROUPS                  SMEAR CALLED TO AND CORRECTLY REPEATED BY: Katalina Elam RN  4N 6/10/2020 0802 TO LAE           Culture result:       STAPHYLOCOCCUS SPECIES, COAGULASE NEGATIVE GROWING IN AEROBIC BOTTLE          CULTURE, BLOOD [[id number]] Collected:  06/07/20 2200    Order Status:  Completed Specimen:  Blood Updated:  06/13/20 0633     Special Requests: NO SPECIAL REQUESTS        Culture result: NO GROWTH 6 DAYS               PFT:                                                     Echo:  04/22/20   ECHO ADULT FOLLOW-UP OR LIMITED 04/26/2020 4/26/2020    Narrative · Normal cavity size, wall thickness and systolic function (ejection   fraction normal). Estimated left ventricular ejection fraction is 55 -   60%. Visually measured ejection fraction. · Dilated left atrium. · Dilated right atrium. · Pulmonary arterial systolic pressure is 32 mmHg. Signed by: Maren Schroeder MD       Imaging:  [x]I have personally reviewed the patients radiographs    CXR Results  (Last 48 hours)               06/13/20 0341  XR CHEST PORT Final result    Impression:  IMPRESSION:        Endotracheal tube tip projects mildly low-lying approximately 1.5 cm cephalad to   the garth, possibly better positioned by withdrawing it approximately 1-2 cm. Little interval change in aeration compared to the preceding radiograph. Low   lung volumes. Bilateral lung opacities, right greater than left. Narrative:  AP CHEST, PORTABLE       CPT CODE: 89061       INDICATION: Above. Intubated. COMPARISON: 6/12/2020. TECHNIQUE: Portable semi-upright AP chest radiograph is reviewed. FINDINGS:       Endotracheal tube tip projects mildly low-lying approximately 1.5 cm cephalad to   the garth (2.1 cm on the preceding radiograph), possibly better positioned by   withdrawing it approximately 1-2 cm. Right sided dialysis catheter is again seen   with tip projected over the right atrium. Multiple EKG leads/wires and other   catheter tubing overlie the patient. Lung volumes are low. The lung apices are partially obscured by the patient's   overlying chin/neck. No evidence of pneumothorax. Bilateral patchy lung   opacities, right greater than left, consistent with airspace disease and/or   atelectasis, better assessed on chest CT of 6/11/2020. Pleural effusions better   visualized on the CT. Little interval change in aeration compared to the   preceding radiograph.        The cardiomediastinal silhouette is prominent without significant interval   change. 06/12/20 0412  XR CHEST PORT Final result    Impression:  Impression:   1. Improving multifocal airspace opacities. However, persistent opacities   remain. Small pleural effusions. Narrative:      Examination: Portable AP chest        History: Intubated       Comparison: One day prior       Findings: Right IJ dialysis catheter remains in place. Endotracheal tube   terminates 2.1 cm above the garth. Multifocal airspace disease is slightly   improved with persistent airspace disease remaining. Small bilateral pleural   effusions. There is no pneumothorax. Cardiomegaly is stable. Atherosclerosis. High complexity decision making was performed during the evaluation of this patient at high risk for decompensation with multiple organ involvement     Above mentioned total time spent on reviewing the case/medical record/data/notes/EMR/patient examination/documentation/coordinating care with nurse/consultants, exclusive of procedures with complex decision making performed and > 50% time spent in face to face evaluation. Complex decision making was made in the evaluation and management plans during this consultation. More than 50% of time was spent in counseling and coordination of care including review of data and discussion with other team members.       Critical  care, chart review and time spent coordinating care:  35 min      Debi Leavitt DO, [facility]    [facility]  Pulmonary, Critical Care, and Sleep Medicine

## 2020-06-14 ENCOUNTER — APPOINTMENT (OUTPATIENT)
Dept: GENERAL RADIOLOGY | Age: 70
DRG: 207 | End: 2020-06-14
Attending: PHYSICIAN ASSISTANT
Payer: MEDICARE

## 2020-06-14 LAB
ABO + RH BLD: NORMAL
ANION GAP SERPL CALC-SCNC: 6 MMOL/L (ref 3–18)
ANION GAP SERPL CALC-SCNC: 7 MMOL/L (ref 3–18)
ARTERIAL PATENCY WRIST A: YES
BACTERIA SPEC CULT: ABNORMAL
BACTERIA SPEC CULT: ABNORMAL
BASE EXCESS BLD CALC-SCNC: 4 MMOL/L
BASOPHILS # BLD: 0.3 K/UL (ref 0–0.1)
BASOPHILS NFR BLD: 4 % (ref 0–2)
BDY SITE: ABNORMAL
BLD PROD TYP BPU: NORMAL
BLD PROD TYP BPU: NORMAL
BLOOD GROUP ANTIBODIES SERPL: NORMAL
BODY TEMPERATURE: 100.1
BPU ID: NORMAL
BPU ID: NORMAL
BUN SERPL-MCNC: 13 MG/DL (ref 7–18)
BUN SERPL-MCNC: 18 MG/DL (ref 7–18)
BUN/CREAT SERPL: 7 (ref 12–20)
BUN/CREAT SERPL: 7 (ref 12–20)
CA-I SERPL-SCNC: 0.96 MMOL/L (ref 1.12–1.32)
CALCIUM SERPL-MCNC: 6.8 MG/DL (ref 8.5–10.1)
CALCIUM SERPL-MCNC: 8.1 MG/DL (ref 8.5–10.1)
CALLED TO:,BCALL1: NORMAL
CALLED TO:,BCALL2: NORMAL
CHLORIDE SERPL-SCNC: 103 MMOL/L (ref 100–111)
CHLORIDE SERPL-SCNC: 105 MMOL/L (ref 100–111)
CO2 SERPL-SCNC: 26 MMOL/L (ref 21–32)
CO2 SERPL-SCNC: 26 MMOL/L (ref 21–32)
CREAT SERPL-MCNC: 1.83 MG/DL (ref 0.6–1.3)
CREAT SERPL-MCNC: 2.52 MG/DL (ref 0.6–1.3)
CROSSMATCH RESULT,%XM: NORMAL
CROSSMATCH RESULT,%XM: NORMAL
DIFFERENTIAL METHOD BLD: ABNORMAL
EOSINOPHIL # BLD: 0 K/UL (ref 0–0.4)
EOSINOPHIL NFR BLD: 0 % (ref 0–5)
ERYTHROCYTE [DISTWIDTH] IN BLOOD BY AUTOMATED COUNT: 13.9 % (ref 11.6–14.5)
GAS FLOW.O2 O2 DELIVERY SYS: ABNORMAL L/MIN
GAS FLOW.O2 SETTING OXYMISER: 15 BPM
GLUCOSE BLD STRIP.AUTO-MCNC: 107 MG/DL (ref 70–110)
GLUCOSE BLD STRIP.AUTO-MCNC: 119 MG/DL (ref 70–110)
GLUCOSE BLD STRIP.AUTO-MCNC: 120 MG/DL (ref 70–110)
GLUCOSE SERPL-MCNC: 112 MG/DL (ref 74–99)
GLUCOSE SERPL-MCNC: 88 MG/DL (ref 74–99)
GRAM STN SPEC: ABNORMAL
GRAM STN SPEC: ABNORMAL
HCO3 BLD-SCNC: 27.5 MMOL/L (ref 22–26)
HCT VFR BLD AUTO: 28.8 % (ref 35–45)
HGB BLD-MCNC: 9.2 G/DL (ref 12–16)
INSPIRATION.DURATION SETTING TIME VENT: 1 SEC
LYMPHOCYTES # BLD: 0.8 K/UL (ref 0.9–3.6)
LYMPHOCYTES NFR BLD: 9 % (ref 21–52)
MAGNESIUM SERPL-MCNC: 2 MG/DL (ref 1.6–2.6)
MCH RBC QN AUTO: 28.5 PG (ref 24–34)
MCHC RBC AUTO-ENTMCNC: 31.9 G/DL (ref 31–37)
MCV RBC AUTO: 89.2 FL (ref 74–97)
MONOCYTES # BLD: 1.1 K/UL (ref 0.05–1.2)
MONOCYTES NFR BLD: 13 % (ref 3–10)
NEUTS SEG # BLD: 6.3 K/UL (ref 1.8–8)
NEUTS SEG NFR BLD: 74 % (ref 40–73)
O2/TOTAL GAS SETTING VFR VENT: 50 %
PCO2 BLD: 36 MMHG (ref 35–45)
PEEP RESPIRATORY: 5 CMH2O
PH BLD: 7.49 [PH] (ref 7.35–7.45)
PHOSPHATE SERPL-MCNC: 1.8 MG/DL (ref 2.5–4.9)
PHOSPHATE SERPL-MCNC: 2.5 MG/DL (ref 2.5–4.9)
PLATELET # BLD AUTO: 169 K/UL (ref 135–420)
PMV BLD AUTO: 11.1 FL (ref 9.2–11.8)
PO2 BLD: 90 MMHG (ref 80–100)
POTASSIUM SERPL-SCNC: 4.4 MMOL/L (ref 3.5–5.5)
POTASSIUM SERPL-SCNC: 4.5 MMOL/L (ref 3.5–5.5)
RBC # BLD AUTO: 3.23 M/UL (ref 4.2–5.3)
SAO2 % BLD: 97 % (ref 92–97)
SERVICE CMNT-IMP: ABNORMAL
SERVICE CMNT-IMP: ABNORMAL
SODIUM SERPL-SCNC: 136 MMOL/L (ref 136–145)
SODIUM SERPL-SCNC: 137 MMOL/L (ref 136–145)
SPECIMEN EXP DATE BLD: NORMAL
SPECIMEN TYPE: ABNORMAL
STATUS OF UNIT,%ST: NORMAL
STATUS OF UNIT,%ST: NORMAL
TOTAL RESP. RATE, ITRR: 15
UNIT DIVISION, %UDIV: 0
UNIT DIVISION, %UDIV: 0
VENTILATION MODE VENT: ABNORMAL
VOLUME CONTROL PLUS IVLCP: YES
VT SETTING VENT: 450 ML
WBC # BLD AUTO: 8.6 K/UL (ref 4.6–13.2)

## 2020-06-14 PROCEDURE — 65610000006 HC RM INTENSIVE CARE

## 2020-06-14 PROCEDURE — 80048 BASIC METABOLIC PNL TOTAL CA: CPT

## 2020-06-14 PROCEDURE — 77010033678 HC OXYGEN DAILY

## 2020-06-14 PROCEDURE — 74011250636 HC RX REV CODE- 250/636: Performed by: NURSE PRACTITIONER

## 2020-06-14 PROCEDURE — C9113 INJ PANTOPRAZOLE SODIUM, VIA: HCPCS | Performed by: PHYSICIAN ASSISTANT

## 2020-06-14 PROCEDURE — 77030040392 HC DRSG OPTIFOAM MDII -A

## 2020-06-14 PROCEDURE — 83735 ASSAY OF MAGNESIUM: CPT

## 2020-06-14 PROCEDURE — 94003 VENT MGMT INPAT SUBQ DAY: CPT

## 2020-06-14 PROCEDURE — 74011000250 HC RX REV CODE- 250: Performed by: NURSE PRACTITIONER

## 2020-06-14 PROCEDURE — 74011000258 HC RX REV CODE- 258: Performed by: NURSE PRACTITIONER

## 2020-06-14 PROCEDURE — 84100 ASSAY OF PHOSPHORUS: CPT

## 2020-06-14 PROCEDURE — 74011250636 HC RX REV CODE- 250/636: Performed by: INTERNAL MEDICINE

## 2020-06-14 PROCEDURE — 36600 WITHDRAWAL OF ARTERIAL BLOOD: CPT

## 2020-06-14 PROCEDURE — 82962 GLUCOSE BLOOD TEST: CPT

## 2020-06-14 PROCEDURE — 36415 COLL VENOUS BLD VENIPUNCTURE: CPT

## 2020-06-14 PROCEDURE — 94640 AIRWAY INHALATION TREATMENT: CPT

## 2020-06-14 PROCEDURE — 77030038269 HC DRN EXT URIN PURWCK BARD -A

## 2020-06-14 PROCEDURE — 74011000258 HC RX REV CODE- 258: Performed by: INTERNAL MEDICINE

## 2020-06-14 PROCEDURE — 74011250636 HC RX REV CODE- 250/636: Performed by: PHYSICIAN ASSISTANT

## 2020-06-14 PROCEDURE — 94761 N-INVAS EAR/PLS OXIMETRY MLT: CPT

## 2020-06-14 PROCEDURE — 71045 X-RAY EXAM CHEST 1 VIEW: CPT

## 2020-06-14 PROCEDURE — 74011000250 HC RX REV CODE- 250: Performed by: INTERNAL MEDICINE

## 2020-06-14 PROCEDURE — 74011250637 HC RX REV CODE- 250/637: Performed by: INTERNAL MEDICINE

## 2020-06-14 PROCEDURE — 94762 N-INVAS EAR/PLS OXIMTRY CONT: CPT

## 2020-06-14 PROCEDURE — 85025 COMPLETE CBC W/AUTO DIFF WBC: CPT

## 2020-06-14 PROCEDURE — 74011000250 HC RX REV CODE- 250: Performed by: PHYSICIAN ASSISTANT

## 2020-06-14 PROCEDURE — 82803 BLOOD GASES ANY COMBINATION: CPT

## 2020-06-14 PROCEDURE — 74011000258 HC RX REV CODE- 258: Performed by: PHYSICIAN ASSISTANT

## 2020-06-14 PROCEDURE — 82330 ASSAY OF CALCIUM: CPT

## 2020-06-14 PROCEDURE — 94668 MNPJ CHEST WALL SBSQ: CPT

## 2020-06-14 PROCEDURE — 74011250637 HC RX REV CODE- 250/637: Performed by: PHYSICIAN ASSISTANT

## 2020-06-14 PROCEDURE — 77030008771 HC TU NG SALEM SUMP -A

## 2020-06-14 RX ORDER — MENTHOL AND ZINC OXIDE .44; 20.625 G/100G; G/100G
OINTMENT TOPICAL
Status: DISCONTINUED | OUTPATIENT
Start: 2020-06-14 | End: 2020-06-26 | Stop reason: HOSPADM

## 2020-06-14 RX ORDER — LIDOCAINE 4 G/100G
1 PATCH TOPICAL EVERY 24 HOURS
Status: DISCONTINUED | OUTPATIENT
Start: 2020-06-14 | End: 2020-06-26 | Stop reason: HOSPADM

## 2020-06-14 RX ORDER — MENTHOL AND ZINC OXIDE .44; 20.625 G/100G; G/100G
OINTMENT TOPICAL
Status: DISCONTINUED | OUTPATIENT
Start: 2020-06-14 | End: 2020-06-14 | Stop reason: SDUPTHER

## 2020-06-14 RX ADMIN — LORATADINE 10 MG: 10 TABLET ORAL at 09:39

## 2020-06-14 RX ADMIN — CITALOPRAM HYDROBROMIDE 20 MG: 20 TABLET ORAL at 09:39

## 2020-06-14 RX ADMIN — CYANOCOBALAMIN TAB 1000 MCG 1000 MCG: 1000 TAB at 18:00

## 2020-06-14 RX ADMIN — FUROSEMIDE 80 MG: 10 INJECTION, SOLUTION INTRAMUSCULAR; INTRAVENOUS at 18:00

## 2020-06-14 RX ADMIN — SODIUM CHLORIDE SOLN NEBU 3% 4 ML: 3 NEBU SOLN at 19:23

## 2020-06-14 RX ADMIN — CHLORHEXIDINE GLUCONATE 0.12% ORAL RINSE 15 ML: 1.2 LIQUID ORAL at 09:39

## 2020-06-14 RX ADMIN — FUROSEMIDE 80 MG: 10 INJECTION, SOLUTION INTRAMUSCULAR; INTRAVENOUS at 09:38

## 2020-06-14 RX ADMIN — HYDRALAZINE HYDROCHLORIDE 20 MG: 20 INJECTION, SOLUTION INTRAMUSCULAR; INTRAVENOUS at 01:35

## 2020-06-14 RX ADMIN — HYDRALAZINE HYDROCHLORIDE 100 MG: 50 TABLET, FILM COATED ORAL at 09:39

## 2020-06-14 RX ADMIN — CALCIUM GLUCONATE 4 G: 98 INJECTION, SOLUTION INTRAVENOUS at 05:24

## 2020-06-14 RX ADMIN — Medication 10 ML: at 21:41

## 2020-06-14 RX ADMIN — SODIUM PHOSPHATE, MONOBASIC, MONOHYDRATE 20 MMOL: 276; 142 INJECTION, SOLUTION INTRAVENOUS at 15:00

## 2020-06-14 RX ADMIN — ASPIRIN 81 MG CHEWABLE TABLET 81 MG: 81 TABLET CHEWABLE at 09:39

## 2020-06-14 RX ADMIN — HEPARIN SODIUM 5000 UNITS: 5000 INJECTION INTRAVENOUS; SUBCUTANEOUS at 14:59

## 2020-06-14 RX ADMIN — SODIUM CHLORIDE 40 MG: 9 INJECTION INTRAMUSCULAR; INTRAVENOUS; SUBCUTANEOUS at 09:39

## 2020-06-14 RX ADMIN — POLYETHYLENE GLYCOL 3350 17 G: 17 POWDER, FOR SOLUTION ORAL at 09:47

## 2020-06-14 RX ADMIN — HEPARIN SODIUM 5000 UNITS: 5000 INJECTION INTRAVENOUS; SUBCUTANEOUS at 05:22

## 2020-06-14 RX ADMIN — ANORECTAL OINTMENT: 15.7; .44; 24; 20.6 OINTMENT TOPICAL at 21:39

## 2020-06-14 RX ADMIN — PIPERACILLIN AND TAZOBACTAM 2.25 G: 2; .25 INJECTION, POWDER, FOR SOLUTION INTRAVENOUS at 05:33

## 2020-06-14 RX ADMIN — Medication 10 ML: at 14:59

## 2020-06-14 RX ADMIN — Medication 10 ML: at 06:00

## 2020-06-14 RX ADMIN — CHLORHEXIDINE GLUCONATE 0.12% ORAL RINSE 15 ML: 1.2 LIQUID ORAL at 21:14

## 2020-06-14 RX ADMIN — CYANOCOBALAMIN TAB 1000 MCG 1000 MCG: 1000 TAB at 09:39

## 2020-06-14 RX ADMIN — HYDRALAZINE HYDROCHLORIDE 100 MG: 50 TABLET, FILM COATED ORAL at 15:00

## 2020-06-14 RX ADMIN — MIRTAZAPINE 15 MG: 15 TABLET, FILM COATED ORAL at 22:00

## 2020-06-14 RX ADMIN — SODIUM CHLORIDE SOLN NEBU 3% 4 ML: 3 NEBU SOLN at 08:35

## 2020-06-14 RX ADMIN — SODIUM CHLORIDE 1 G: 9 INJECTION, SOLUTION INTRAVENOUS at 16:00

## 2020-06-14 RX ADMIN — HEPARIN SODIUM 5000 UNITS: 5000 INJECTION INTRAVENOUS; SUBCUTANEOUS at 21:12

## 2020-06-14 RX ADMIN — PIPERACILLIN AND TAZOBACTAM 2.25 G: 2; .25 INJECTION, POWDER, FOR SOLUTION INTRAVENOUS at 14:59

## 2020-06-14 RX ADMIN — PIPERACILLIN AND TAZOBACTAM 2.25 G: 2; .25 INJECTION, POWDER, FOR SOLUTION INTRAVENOUS at 21:39

## 2020-06-14 NOTE — ROUTINE PROCESS
Bedside and Verbal shift change report given to Silvia Jose (oncoming nurse) by Tamir Dubon RN (offgoing nurse). Report included the following information SBAR, Kardex, Intake/Output, Recent Results and Cardiac Rhythm . Damion Kilpatrick

## 2020-06-14 NOTE — ROUTINE PROCESS
2000 Bedside shift change report given to madie rn (oncoming nurse) by joan rn (offgoing nurse). Report included the following information SBAR, Kardex and Recent Results. Assessment completed. Oral and lee care completed. 0000 reassessment completed. Oral and external cath care completed. 0100 lab called to draw lab early . 0130 notified Jean RODRIGUEZ of c/o back pain and need of order and [place] sump placed and patient receiving d10w at 20 cc/hr and hydralazine being given for elevated bp. Will assess. 0300 notified johnny rodriguez phos 1.9, and ionized calcium 0.96, ordered 4 gm calcium gluconate and will address phos. 0400 reassessment  Completed. Oral and external cath care provided. 0430 johnny rodriguez notified ferrous sulfate held since npo, no new orders received. 0730 Bedside shift change report given to joan rn (oncoming nurse) by madie rn (offgoing nurse). Report included the following information SBAR, Kardex and Recent Results. Side rails up , call light within reach. Hob elevated 30 degrees.

## 2020-06-14 NOTE — PROGRESS NOTES
Problem: Nutrition Deficit  Goal: *Optimize nutritional status  Outcome: Progressing Towards Goal     Problem: Falls - Risk of  Goal: *Absence of Falls  Description: Document Kenneth Orourke Fall Risk and appropriate interventions in the flowsheet. Outcome: Progressing Towards Goal  Note: Fall Risk Interventions:       Mentation Interventions: Adequate sleep, hydration, pain control, Bed/chair exit alarm, Door open when patient unattended, Update white board, Toileting rounds, Reorient patient, More frequent rounding, Eyeglasses and hearing aids    Medication Interventions: Bed/chair exit alarm, Evaluate medications/consider consulting pharmacy, Patient to call before getting OOB, Teach patient to arise slowly    Elimination Interventions: Bed/chair exit alarm, Call light in reach, Stay With Me (per policy), Toileting schedule/hourly rounds, Toilet paper/wipes in reach    History of Falls Interventions: Bed/chair exit alarm         Problem: Patient Education: Go to Patient Education Activity  Goal: Patient/Family Education  Outcome: Progressing Towards Goal     Problem: Pressure Injury - Risk of  Goal: *Prevention of pressure injury  Description: Document Tnoey Scale and appropriate interventions in the flowsheet. Outcome: Progressing Towards Goal  Note: Pressure Injury Interventions:  Sensory Interventions: Use 30-degree side-lying position, Turn and reposition approx.  every two hours (pillows and wedges if needed), Sit a 90-degree angle/use footstool if needed, Minimize linen layers, Keep linens dry and wrinkle-free, Float heels, Avoid rigorous massage over bony prominences, Assess need for specialty bed, Assess changes in LOC    Moisture Interventions: Absorbent underpads, Apply protective barrier, creams and emollients, Minimize layers, Moisture barrier, Internal/External urinary devices, Check for incontinence Q2 hours and as needed, Assess need for specialty bed    Activity Interventions: Pressure redistribution bed/mattress(bed type), Assess need for specialty bed    Mobility Interventions: Assess need for specialty bed, Float heels, HOB 30 degrees or less, Pressure redistribution bed/mattress (bed type), Turn and reposition approx.  every two hours(pillow and wedges)    Nutrition Interventions: Discuss nutritional consult with provider    Friction and Shear Interventions: Apply protective barrier, creams and emollients, Foam dressings/transparent film/skin sealants, HOB 30 degrees or less, Minimize layers, Transferring/repositioning devices                Problem: Patient Education: Go to Patient Education Activity  Goal: Patient/Family Education  Outcome: Progressing Towards Goal     Problem: Ventilator Management  Goal: *Adequate oxygenation and ventilation  Outcome: Progressing Towards Goal  Goal: *Patient maintains clear airway/free of aspiration  Outcome: Progressing Towards Goal  Goal: *Absence of infection signs and symptoms  Outcome: Progressing Towards Goal     Problem: Non-Violent Restraints  Goal: *Removal from restraints as soon as assessed to be safe  Outcome: Progressing Towards Goal  Goal: *No harm/injury to patient while restraints in use  Outcome: Progressing Towards Goal  Goal: *Patient's dignity will be maintained  Outcome: Progressing Towards Goal  Goal: Non-violent Restaints:Standard Interventions  Outcome: Progressing Towards Goal     Problem: Injury - Risk of, Adverse Drug Event  Goal: *Absence of adverse drug events  Outcome: Progressing Towards Goal  Goal: *Absence of medication errors  Outcome: Progressing Towards Goal     Problem: Patient Education: Go to Patient Education Activity  Goal: Patient/Family Education  Outcome: Progressing Towards Goal

## 2020-06-14 NOTE — PROGRESS NOTES
06/14/20 1225   Weaning Parameters   Spontaneous Breathing Trial Complete Yes   Resp Rate Observed 26   Ve 6.8      RSBI 96     End of wean

## 2020-06-14 NOTE — PROGRESS NOTES
[facility]  Pulmonary, Critical Care, and Sleep Medicine    Name: Miguelina Todd MRN: [id number]   : 1950 Hospital: [address] [place] [address]   Date: 2020        IMPRESSION:   · Acute on chronic hypoxemic respiratory failure requiring mechanical ventilation - initially in the setting of PNA and fluid overload, however, intubated 20 2/2 aspiration of partial dentures. · Aspiration of foreign body (partials) in to the hypopharynx- retrieved with forceps.    Bronch 20- some mucus plugs, small caliber airways noted but no foreign material    · RUL infiltrate/PNA - CXR 20,scant yeast on cx   · Acute on HFpEF - most recent echo 20 w/ EF of 55-60%   · Bacteremia vs contaminant- GPC in groups in 1/2 bottles  · ESRD - HD MWF, BUN 24, Cr 2.55  · Acute on chronic anemia- H/H 6.3 and 19.6, no signs of active bleeding     Patient Active Problem List   Diagnosis Code    Acute on chronic diastolic congestive heart failure (HCC) I50.33    Suspected COVID-19 virus infection Z20.828    Type 2 diabetes mellitus without complication, without long-term current use of insulin ([facility]) E11.9    Left anterior fascicular block I44.4    HTN (hypertension), benign I10    Coronary artery disease involving native coronary artery of native heart without angina pectoris I25.10    Chronic diastolic congestive heart failure ([facility]) I50.32    Bilateral lower extremity edema R60.0    BMI 35.0-35.9,adult Z68.35    CHF (congestive heart failure) ([facility]) I50.9    Sepsis ([facility]) A41.9    Respiratory failure ([address] J96.90    SIRS (systemic inflammatory response syndrome) ([facility]) R65.10    Acute on chronic respiratory failure ([facility]) J96.20    Pneumonia J18.9    Fluid overload E87.70      PLAN:   Resp:   -Titrate FiO2 for SpO2 >90%  CT chest showed no additional retained foreign bodies in the airway  -Bronch on 20 demonstrated distant mucus plugs / friable airway but otherwise normal. -Aspiration precautions  -keep HOB > 30 degrees  I/D:   -Sepsis bundle per hospital protocol  -blood culture  + coag negative staph. Subsequent cx no growth   -Continue zosyn per ID  Hem/Onc:   -s/p PRBC transfusion   CVS:  - HD stable without pressors.    -Continue lasix   -Continue hydralazine   Metabolic:   -Daily BMP; monitor e-lytes; replace PRN  Renal:   -Trend Renal indices; HD MWF per nephrology  Endocrine:  -POC Glucose q6; SSI  GI:   -NPO, SUP, Trend LFTs, Zofran PRN for N/V . Continue miralax and colace daily. Musc/Skin:  -No acute issues, wound care as needed  Neuro:   -Off sedation     Code Status: full code   ·   Subjective/Interval History:      Patient is a 71 y.o. female w/ pmhx of ESRD on HD, DM, and CHF admitted for acute hypoxemic respiratory failure and subsequently started on tx for pna. Patient was started on BIPAP and intubated for progressive respiratory failure. Noted to have foreign body on CT w/ forceps denture retrieval.Patient remains intubated and HD stable. 20    -HD stable  -Temp max 100.1 F  -Follows commands   -Worsening pleural effusions        ROS:A comprehensive review of systems was negative except for that written in the HPI.     Objective:   Vital Signs:    Visit Vitals  BP (!) 130/37   Pulse 60   Temp 98 °F (36.7 °C)   Resp 12   Ht 5' 5\" (1.651 m)   Wt 106.1 kg (233 lb 14.5 oz)   SpO2 97%   Breastfeeding No   BMI 38.92 kg/m²       O2 Device: Endotracheal tube   O2 Flow Rate (L/min): 2 l/min   Temp (24hrs), Av.2 °F (37.3 °C), Min:98 °F (36.7 °C), Max:100.1 °F (37.8 °C)       Intake/Output:   Last shift:       0701 -  1900  In: 40 [I.V.:40]  Out: -   Last 3 shifts:  1901 -  0700  In: 1261 [I.V.:861]  Out: 2651 [Urine:151]    Intake/Output Summary (Last 24 hours) at 2020 1455  Last data filed at 2020 0900  Gross per 24 hour   Intake 1091 ml   Output 2500 ml   Net -1409 ml         Physical Exam:      General:  Intubated, NAD- awake and alert   Head:  Normocephalic, without obvious abnormality, atraumatic. Eyes:  Conjunctivae/corneas clear. PERRL   Nose: Nares normal. Septum midline. Mucosa normal   Throat: Lips, mucosa, and tongue normal. edentulous   Neck: Supple, symmetrical, trachea midline, no adenopathy. Lungs:   Symmetrical chest rise; scattered rhonchi    Heart:  RRR, S1, S2 normal, no m/r/g   Abdomen:   Soft, non-tender. Bowel sounds normal. No masses   Extremities: Extremities normal, atraumatic. + 1 lower ext edema    Pulses: 2+ and symmetric all extremities. Skin: Skin color, texture, turgor normal. No rashes or lesions   Neurologic: Grossly nonfocal   Devices: PIV, ETT           DATA:  Labs:  Recent Labs     06/14/20 0136 06/13/20 0438 06/12/20 0224   WBC 8.6 8.1 9.8   HGB 9.2* 6.7* 6.3*   HCT 28.8* 20.9* 19.6*    162 167     Recent Labs     06/14/20 0136 06/13/20 0438 06/12/20 0224    138 138   K 4.5 3.3* 3.8    103 103   CO2 26 30 28   GLU 88 77 81   BUN 13 19* 24*   CREA 1.83* 2.15* 2.55*   CA 8.1* 8.1* 8.3*   MG 2.0 1.8 2.0   PHOS 1.8* 2.0* 2.6     No results for input(s): PH, PCO2, PO2, HCO3, FIO2 in the last 72 hours.      Results     Procedure Component Value Units Date/Time    CULTURE, RESPIRATORY/SPUTUM/BRONCH Lavada Gambles STAIN [[id number]]  (Abnormal) Collected:  06/11/20 1920    Order Status:  Completed Specimen:  Bronchial lavage Updated:  06/14/20 1353     Special Requests: --        BRONCHIAL LAVAGE  RIGHT  LUNG       GRAM STAIN 2+ WBCS SEEN         NO ORGANISMS SEEN        Culture result:       SCANT NORMAL RESPIRATORY MARS            SCANT YEAST       CULTURE, BLOOD [[id number]] Collected:  06/11/20 1820    Order Status:  Completed Specimen:  Blood Updated:  06/14/20 0645     Special Requests: NO SPECIAL REQUESTS        Culture result: NO GROWTH 3 DAYS       CULTURE, BLOOD [[id number]] Collected:  06/11/20 1620    Order Status:  Completed Specimen:  Blood Updated:  06/14/20 0628 Special Requests: NO SPECIAL REQUESTS        Culture result: NO GROWTH 3 DAYS       CULTURE, RESPIRATORY/SPUTUM/BRONCH Blondell Barry [[id number]] Collected:  06/11/20 0600    Order Status:  Canceled Specimen:  Endotracheal aspirate     CULTURE, BLOOD [[id number]] Collected:  06/10/20 1231    Order Status:  Completed Specimen:  Blood Updated:  06/14/20 0645     Special Requests: NO SPECIAL REQUESTS        Culture result: NO GROWTH 4 DAYS       CULTURE, BLOOD [[id number]] Collected:  06/10/20 1230    Order Status:  Completed Specimen:  Blood Updated:  06/14/20 0645     Special Requests: NO SPECIAL REQUESTS        Culture result: NO GROWTH 4 DAYS       CULTURE, BLOOD [[id number]]  (Abnormal) Collected:  06/07/20 2223    Order Status:  Completed Specimen:  Blood Updated:  06/11/20 1230     Special Requests: NO SPECIAL REQUESTS        GRAM STAIN       AEROBIC BOTTLE GRAM POSITIVE COCCI IN GROUPS                  SMEAR CALLED TO AND CORRECTLY REPEATED BY: Katalina Elam RN  4N 6/10/2020 0802 TO LAE           Culture result:       STAPHYLOCOCCUS SPECIES, COAGULASE NEGATIVE GROWING IN AEROBIC BOTTLE          CULTURE, BLOOD [[id number]] Collected:  06/07/20 2200    Order Status:  Completed Specimen:  Blood Updated:  06/13/20 0633     Special Requests: NO SPECIAL REQUESTS        Culture result: NO GROWTH 6 DAYS               PFT:                                                     Echo:  04/22/20   ECHO ADULT FOLLOW-UP OR LIMITED 04/26/2020 4/26/2020    Narrative · Normal cavity size, wall thickness and systolic function (ejection   fraction normal). Estimated left ventricular ejection fraction is 55 -   60%. Visually measured ejection fraction. · Dilated left atrium. · Dilated right atrium. · Pulmonary arterial systolic pressure is 32 mmHg.         Signed by: Syd Mora MD       Imaging:  [x]I have personally reviewed the patients radiographs    CXR Results  (Last 48 hours)               06/14/20 8573  XR CHEST PORT Final result    Impression:  IMPRESSION:        Endotracheal tube tip projects mildly low-lying approximately 1.5 cm cephalad to   the garth similar to prior, possibly better positioned by withdrawing it   approximately 1-2 cm. Allowing for slight differences in technique, little interval change in aeration   compared to the prior radiograph with bilateral pleural effusions and bilateral   right greater than left lung opacities again seen as described, further   characterized on recent chest CT of 6/13/2020. Cardiomegaly similar to prior. Narrative:  AP CHEST, PORTABLE       CPT CODE: 04129       INDICATION: Above. Intubated. COMPARISON: 6/13/2020. TECHNIQUE: Portable semi-upright AP chest radiograph is reviewed. FINDINGS:       Endotracheal tube tip projects mildly low-lying approximately 1.5 cm cephalad to   the garth, similar compared to the preceding chest radiograph, possibly better   positioned by withdrawing it approximately 1-2 cm. Right chest dialysis catheter   is again seen with tip projected over the right atrium. Esophagogastric tube   projects extending below the left hemidiaphragm before it extends beyond the   field-of-view. Bilateral cervical spine fusion rods/screws/hardware partially   visualized before extending cephalad to the field-of-view. Multiple EKG   leads/wires and other catheter tubing overlie the patient. The lung apices are partially obscured by the patient's overlying chin/neck. No   evidence of pneumothorax. Low lung volumes. Mild blunting of the right   costophrenic sulcus consistent with small pleural effusion, hazy obscuration of   the left costophrenic sulcus, compatible with bilateral pleural effusions seen   on CT. Bilateral lung opacities, right greater than left, extensive on the   right, consistent with airspace disease and atelectasis, better characterized on   chest CT of 6/13/2020.        The cardiomediastinal silhouette is prominent without significant interval   change. Aortic atherosclerotic calcification noted best seen at the level of the   aortic arch.             06/13/20 0341  XR CHEST PORT Final result    Impression:  IMPRESSION:        Endotracheal tube tip projects mildly low-lying approximately 1.5 cm cephalad to   the garth, possibly better positioned by withdrawing it approximately 1-2 cm. Little interval change in aeration compared to the preceding radiograph. Low   lung volumes. Bilateral lung opacities, right greater than left. Narrative:  AP CHEST, PORTABLE       CPT CODE: 22820       INDICATION: Above. Intubated. COMPARISON: 6/12/2020. TECHNIQUE: Portable semi-upright AP chest radiograph is reviewed. FINDINGS:       Endotracheal tube tip projects mildly low-lying approximately 1.5 cm cephalad to   the garth (2.1 cm on the preceding radiograph), possibly better positioned by   withdrawing it approximately 1-2 cm. Right sided dialysis catheter is again seen   with tip projected over the right atrium. Multiple EKG leads/wires and other   catheter tubing overlie the patient. Lung volumes are low. The lung apices are partially obscured by the patient's   overlying chin/neck. No evidence of pneumothorax. Bilateral patchy lung   opacities, right greater than left, consistent with airspace disease and/or   atelectasis, better assessed on chest CT of 6/11/2020. Pleural effusions better   visualized on the CT. Little interval change in aeration compared to the   preceding radiograph. The cardiomediastinal silhouette is prominent without significant interval   change. [address] [facility] Staff Addendum     I have independently evaluated the patient and reviewed the patient's chart. I have discussed the findings and care plan with ICU Care Team. Care Plan reviewed on ICU milti-D rounds.     I agree with the above evaluation, assessment and recommendations along with the following comments and observations. Please also review my orders. Patient still having some difficulty from weaning form the ventilator. Patient with tachypnea with SBT. Initial Vts in the 200 range dropping to the 100's. This is more than likely related to increased pleural effusions. Nephrology plans for HD tomorrow. Patient may benefit from increased sessions to remove more fluid. Afebrile, most recent cultures negative. Complex decision making was made in the evaluation and management plans during this consultation. More than 50% of time was spent in counseling and coordination of care including review of data and discussion with other team members. Further recommendations and therapies pending clinical course.     Critical Care and time spent coordinating care, minus procedure time: 30 min    Mireya Berg DO, [facility]  Pulmonary, Sleep and Critical Care Medicine  4:53 PM

## 2020-06-14 NOTE — PROGRESS NOTES
Infectious Disease progress Note        Reason: Gram-positive bloodstream infection, pneumonia    Current abx Prior abx   Piperacillin/tazobactam since 6/7   Vancomycin 6/7; 6/10-6/12     Lines:       Assessment :    69 y.o. female  with multiple medical problems including diastolic CHF, diabetes, arthritis, acid reflux hiatal hernia, chronic hypoxic failure on home oxygen 2 L, hypertension, end-stage renal disease on dialysis Tuesday Thursday Saturday who presented to the emergency department via EMS on 6/8/20 with shortness of breath per report from [facility] and rehab. Now with blood culture 6/7+ for gram-positive cocci, bilateral pulmonary infiltrates    Patient presents with a highly complex clinical picture. Clinical presentation seems consistent with acute respiratory failure secondary to fluid overload, acute on chronic diastolic CHF. Single positive blood culture on 6/7 for coagulase-negative Staphylococcus likely contaminant. Negative blood culture 6/10    Acute on chronic respiratory failure requiring mechanical ventilation - intubated 6/11 due to aspiration of partial dentures. Removed. S/p bronchoscopy 6/11- findings noted. Clinically better. Improved oxygenation. On spontaneous breathing trial  cxr 6/14- Bilateral lung opacities, right greater than left, extensive on the right, consistent with airspace disease and atelectasis    Recommendations:    1. Continue piperacillin/tazobactam  2. F/u ICU team recommendations. 3.  Follow-up nephrology recommendations  4. Duration of antibiotics based on the clinical course    I will be on vacation till 6/21/20. Dr. Susan Huynh will be covering for me during this time. She can be reached @ [phone]    Above plan was discussed in details with dr. Yessi Benitez. Kait Gloria Please call me if any further questions or concerns. Will continue to participate in the care of this patient. HPI:    Intubated.  Detailed ros not feasible.       home Medication List    Details   carvediloL (COREG) 12.5 mg tablet Take 1 Tab by mouth two (2) times daily (with meals). Qty: 30 Tab, Refills: 0      amLODIPine (NORVASC) 10 mg tablet Take 1 Tab by mouth daily. Qty: 30 Tab, Refills: 0      aspirin 81 mg chewable tablet Take 1 Tab by mouth daily. Qty: 30 Tab, Refills: 0      Biotin 2,500 mcg cap Take 1 Tab by mouth two (2) times a day. B.infantis-B.ani-B.long-B.bifi (PROBIOTIC 4X) 10-15 mg TbEC Take 1 Tab by mouth daily. gabapentin (NEURONTIN) 100 mg capsule Take 1 Cap by mouth two (2) times a day. Max Daily Amount: 200 mg. Qty: 20 Cap, Refills: 0    Associated Diagnoses: Type 2 diabetes mellitus without complication, without long-term current use of insulin (HCC)      ferrous sulfate 325 mg (65 mg iron) tablet Take 1 Tab by mouth every other day for 30 days. Qty: 15 Tab, Refills: 0      mirtazapine (REMERON) 15 mg tablet Take 1 Tab by mouth nightly. Qty: 10 Tab, Refills: 0      docusate sodium (COLACE) 100 mg capsule Take 1 Cap by mouth two (2) times daily as needed for Constipation for up to 90 days. Qty: 6 Cap, Refills: 0      OXYGEN-AIR DELIVERY SYSTEMS Take 2 L by inhalation daily. polyethylene glycol (MIRALAX) 17 gram packet Take 1 Packet by mouth daily. Qty: 10 Packet, Refills: 0      rosuvastatin (CRESTOR) 5 mg tablet Take 1 Tab by mouth nightly. Qty: 30 Tab, Refills: 0      hydrALAZINE (APRESOLINE) 100 mg tablet Take 1 Tab by mouth three (3) times daily. Qty: 90 Tab, Refills: 0      insulin glargine (LANTUS) 100 unit/mL injection 4 units SQ daily- watch for Hypoglycemia adjust dose per patient's blood glucose level  Qty: 1 Vial, Refills: 0      isosorbide mononitrate ER (IMDUR) 30 mg tablet Take 1 Tab by mouth daily. Qty: 30 Tab, Refills: 0      esomeprazole (NEXIUM) 40 mg capsule Take 40 mg by mouth daily. citalopram (CELEXA) 20 mg tablet Take 20 mg by mouth daily.       cranberry extract (AZO CRANBERRY) 450 mg tab Take 2 Tabs by mouth daily. cholecalciferol (VITAMIN D3) 1,000 unit cap Take 5,000 Units by mouth daily. cyanocobalamin (VITAMIN B-12) 1,000 mcg tablet Take 1,000 mcg by mouth two (2) times a day. vitamin a-vitamin c-vit e-min (OCUVITE) tablet Take 1 Tab by mouth daily. loratadine (CLARITIN) 10 mg tablet Take 10 mg by mouth daily.              Current Facility-Administered Medications   Medication Dose Route Frequency    lidocaine 4 % patch 1 Patch  1 Patch TransDERmal Q24H    menthol-zinc oxide (CALMOSEPTINE) 0.44-20.6 % ointment   Topical Q6H PRN    dextrose 10% infusion   IntraVENous CONTINUOUS    0.9% sodium chloride infusion 250 mL  250 mL IntraVENous PRN    epoetin johnathon-epbx (RETACRIT) injection 10,000 Units  10,000 Units SubCUTAneous Q MON, WED & FRI    midazolam (VERSED) injection 1-2 mg  1-2 mg IntraVENous Q2H PRN    pantoprazole (PROTONIX) 40 mg in 0.9% sodium chloride 10 mL injection  40 mg IntraVENous DAILY    albuterol-ipratropium (DUO-NEB) 2.5 MG-0.5 MG/3 ML  3 mL Nebulization Q6H PRN    sodium chloride 3% hypertonic nebulizer soln  4 mL Nebulization TID RT    furosemide (LASIX) injection    CODE BLUE    chlorhexidine (PERIDEX) 0.12 % mouthwash 15 mL  15 mL Oral Q12H    sodium chloride (NS) flush 5-40 mL  5-40 mL IntraVENous Q8H    sodium chloride (NS) flush 5-40 mL  5-40 mL IntraVENous PRN    insulin lispro (HUMALOG) injection   SubCUTAneous Q6H    hydrALAZINE (APRESOLINE) 20 mg/mL injection 20 mg  20 mg IntraVENous Q6H PRN    ondansetron (ZOFRAN) injection 4 mg  4 mg IntraVENous Q6H PRN    glucose chewable tablet 16 g  4 Tab Oral PRN    glucagon (GLUCAGEN) injection 1 mg  1 mg IntraMUSCular PRN    dextrose (D50W) injection syrg 12.5-25 g  25-50 mL IntraVENous PRN    piperacillin-tazobactam (ZOSYN) 2.25 g in 0.9% sodium chloride (MBP/ADV) 50 mL MBP  2.25 g IntraVENous Q8H    furosemide (LASIX) injection 80 mg  80 mg IntraVENous BID    citalopram (CELEXA) tablet 20 mg  20 mg Oral DAILY    cyanocobalamin tablet 1,000 mcg  1,000 mcg Oral BID    loratadine (CLARITIN) tablet 10 mg  10 mg Oral DAILY    aspirin chewable tablet 81 mg  81 mg Oral DAILY    [Held by provider] isosorbide mononitrate ER (IMDUR) tablet 30 mg  30 mg Oral DAILY    [Held by provider] amLODIPine (NORVASC) tablet 10 mg  10 mg Oral DAILY    polyethylene glycol (MIRALAX) packet 17 g  17 g Oral DAILY    [Held by provider] rosuvastatin (CRESTOR) tablet 5 mg  5 mg Oral QHS    hydrALAZINE (APRESOLINE) tablet 100 mg  100 mg Oral TID    [Held by provider] gabapentin (NEURONTIN) capsule 100 mg  100 mg Oral BID    [Held by provider] carvediloL (COREG) tablet 12.5 mg  12.5 mg Oral BID WITH MEALS    ferrous sulfate tablet 325 mg  325 mg Oral EVERY OTHER DAY    mirtazapine (REMERON) tablet 15 mg  15 mg Oral QHS    docusate sodium (COLACE) capsule 100 mg  100 mg Oral BID PRN    heparin (porcine) injection 5,000 Units  5,000 Units SubCUTAneous Q8H    Piperacillin-tazobactam (ZOSYN) 0.75 gm Supplemental Dosing by Pharmacy   Other Rx Dosing/Monitoring       Allergies: Augmentin [amoxicillin-pot clavulanate]; Clavulanic acid; and Levaquin [levofloxacin]    Temp (24hrs), Av.3 °F (37.4 °C), Min:98.5 °F (36.9 °C), Max:100.1 °F (37.8 °C)    Visit Vitals  BP (!) 145/27   Pulse 65   Temp 98.5 °F (36.9 °C)   Resp 17   Ht 5' 5\" (1.651 m)   Wt 106.1 kg (233 lb 14.5 oz)   SpO2 96%   Breastfeeding No   BMI 38.92 kg/m²       ROS: Unable to obtain detailed review of systems since intubated  Physical Exam:       Constitutional: laying on bed, alert, intubated. Appears comfortable  Eyes: No scleral icterus. Neck: No JVD present. Cardiovascular: Regular rhythm. Exam reveals no gallop and no friction rub. Pulmonary/Chest: no rales, rhonchi  Abdominal: Soft. Bowel sounds are normal. exhibits no distension. No tenderness. No rebound and no guarding. Musculoskeletal:Normal strength. exhibits no tenderness.  Trace edema of LE  Neurological:alert and oriented  No facial asymmetry or focal weakness  Skin: Skin is warm and dry.    Psychiatric: Pleasant, cooperative    Labs: Results:   Chemistry Recent Labs     06/14/20 0136 06/13/20 0438 06/12/20 0224   GLU 88 77 81    138 138   K 4.5 3.3* 3.8    103 103   CO2 26 30 28   BUN 13 19* 24*   CREA 1.83* 2.15* 2.55*   CA 8.1* 8.1* 8.3*   AGAP 7 5 7   BUCR 7* 9* 9*      CBC w/Diff Recent Labs     06/14/20 0136 06/13/20 0438 06/12/20 0224   WBC 8.6 8.1 9.8   RBC 3.23* 2.39* 2.26*   HGB 9.2* 6.7* 6.3*   HCT 28.8* 20.9* 19.6*    162 167   GRANS 74* 80* 83*   LYMPH 9* 10* 7*   EOS 0 0 0      Microbiology Recent Labs     06/11/20  1920 06/11/20  1820 06/11/20  1620   CULT SCANT NORMAL RESPIRATORY MARS NO GROWTH 3 DAYS NO GROWTH 3 DAYS          RADIOLOGY:    All available imaging studies/reports in [facility] for this admission were reviewed    Dr. oYjana Crespo, Infectious Disease Specialist  [phone]  June 14, 2020  10:20 AM

## 2020-06-14 NOTE — ROUTINE PROCESS
Bedside and Verbal shift change report given to Jaylin HODGE RN (oncoming nurse) by  ALLISON Alegre RN (offgoing nurse). Report included the following information SBAR, Kardex, Intake/Output, MAR, Recent Results and Cardiac Rhythm . Damion Kilpatrick

## 2020-06-15 ENCOUNTER — APPOINTMENT (OUTPATIENT)
Dept: GENERAL RADIOLOGY | Age: 70
DRG: 207 | End: 2020-06-15
Attending: PHYSICIAN ASSISTANT
Payer: MEDICARE

## 2020-06-15 LAB
ANION GAP SERPL CALC-SCNC: 8 MMOL/L (ref 3–18)
ARTERIAL PATENCY WRIST A: YES
BASE EXCESS BLD CALC-SCNC: 3 MMOL/L
BASOPHILS # BLD: 0 K/UL (ref 0–0.1)
BASOPHILS NFR BLD: 0 % (ref 0–2)
BDY SITE: ABNORMAL
BUN SERPL-MCNC: 18 MG/DL (ref 7–18)
BUN/CREAT SERPL: 6 (ref 12–20)
CA-I SERPL-SCNC: 1.16 MMOL/L (ref 1.12–1.32)
CALCIUM SERPL-MCNC: 8.8 MG/DL (ref 8.5–10.1)
CHLORIDE SERPL-SCNC: 101 MMOL/L (ref 100–111)
CO2 SERPL-SCNC: 29 MMOL/L (ref 21–32)
CREAT SERPL-MCNC: 2.77 MG/DL (ref 0.6–1.3)
DIFFERENTIAL METHOD BLD: ABNORMAL
EOSINOPHIL # BLD: 0 K/UL (ref 0–0.4)
EOSINOPHIL NFR BLD: 0 % (ref 0–5)
ERYTHROCYTE [DISTWIDTH] IN BLOOD BY AUTOMATED COUNT: 13.9 % (ref 11.6–14.5)
GAS FLOW.O2 O2 DELIVERY SYS: ABNORMAL L/MIN
GAS FLOW.O2 SETTING OXYMISER: 12 BPM
GLUCOSE BLD STRIP.AUTO-MCNC: 109 MG/DL (ref 70–110)
GLUCOSE BLD STRIP.AUTO-MCNC: 109 MG/DL (ref 70–110)
GLUCOSE BLD STRIP.AUTO-MCNC: 110 MG/DL (ref 70–110)
GLUCOSE BLD STRIP.AUTO-MCNC: 120 MG/DL (ref 70–110)
GLUCOSE BLD STRIP.AUTO-MCNC: 120 MG/DL (ref 70–110)
GLUCOSE SERPL-MCNC: 106 MG/DL (ref 74–99)
HCO3 BLD-SCNC: 26.6 MMOL/L (ref 22–26)
HCT VFR BLD AUTO: 26 % (ref 35–45)
HGB BLD-MCNC: 8.3 G/DL (ref 12–16)
INSPIRATION.DURATION SETTING TIME VENT: 1 SEC
LYMPHOCYTES # BLD: 0.7 K/UL (ref 0.9–3.6)
LYMPHOCYTES NFR BLD: 9 % (ref 21–52)
MAGNESIUM SERPL-MCNC: 2.1 MG/DL (ref 1.6–2.6)
MCH RBC QN AUTO: 28.1 PG (ref 24–34)
MCHC RBC AUTO-ENTMCNC: 31.9 G/DL (ref 31–37)
MCV RBC AUTO: 88.1 FL (ref 74–97)
MONOCYTES # BLD: 0.9 K/UL (ref 0.05–1.2)
MONOCYTES NFR BLD: 11 % (ref 3–10)
NEUTS SEG # BLD: 6.4 K/UL (ref 1.8–8)
NEUTS SEG NFR BLD: 80 % (ref 40–73)
O2/TOTAL GAS SETTING VFR VENT: 45 %
PCO2 BLD: 38.8 MMHG (ref 35–45)
PEEP RESPIRATORY: 5 CMH2O
PH BLD: 7.44 [PH] (ref 7.35–7.45)
PHOSPHATE SERPL-MCNC: 2.7 MG/DL (ref 2.5–4.9)
PLATELET # BLD AUTO: 175 K/UL (ref 135–420)
PMV BLD AUTO: 11.2 FL (ref 9.2–11.8)
PO2 BLD: 65 MMHG (ref 80–100)
POTASSIUM SERPL-SCNC: 3.6 MMOL/L (ref 3.5–5.5)
RBC # BLD AUTO: 2.95 M/UL (ref 4.2–5.3)
SAO2 % BLD: 93 % (ref 92–97)
SERVICE CMNT-IMP: ABNORMAL
SODIUM SERPL-SCNC: 138 MMOL/L (ref 136–145)
SPECIMEN TYPE: ABNORMAL
TOTAL RESP. RATE, ITRR: 15
VENTILATION MODE VENT: ABNORMAL
VOLUME CONTROL PLUS IVLCP: YES
VT SETTING VENT: 450 ML
WBC # BLD AUTO: 8.1 K/UL (ref 4.6–13.2)

## 2020-06-15 PROCEDURE — C9113 INJ PANTOPRAZOLE SODIUM, VIA: HCPCS | Performed by: PHYSICIAN ASSISTANT

## 2020-06-15 PROCEDURE — 85025 COMPLETE CBC W/AUTO DIFF WBC: CPT

## 2020-06-15 PROCEDURE — 74011000258 HC RX REV CODE- 258: Performed by: PHYSICIAN ASSISTANT

## 2020-06-15 PROCEDURE — 74011000258 HC RX REV CODE- 258: Performed by: STUDENT IN AN ORGANIZED HEALTH CARE EDUCATION/TRAINING PROGRAM

## 2020-06-15 PROCEDURE — 71045 X-RAY EXAM CHEST 1 VIEW: CPT

## 2020-06-15 PROCEDURE — 80048 BASIC METABOLIC PNL TOTAL CA: CPT

## 2020-06-15 PROCEDURE — 74011000258 HC RX REV CODE- 258: Performed by: INTERNAL MEDICINE

## 2020-06-15 PROCEDURE — 74011250637 HC RX REV CODE- 250/637: Performed by: INTERNAL MEDICINE

## 2020-06-15 PROCEDURE — 94003 VENT MGMT INPAT SUBQ DAY: CPT

## 2020-06-15 PROCEDURE — 77010033678 HC OXYGEN DAILY

## 2020-06-15 PROCEDURE — 74011250636 HC RX REV CODE- 250/636: Performed by: PHYSICIAN ASSISTANT

## 2020-06-15 PROCEDURE — 74011000250 HC RX REV CODE- 250: Performed by: INTERNAL MEDICINE

## 2020-06-15 PROCEDURE — 74011000250 HC RX REV CODE- 250: Performed by: PHYSICIAN ASSISTANT

## 2020-06-15 PROCEDURE — P9047 ALBUMIN (HUMAN), 25%, 50ML: HCPCS

## 2020-06-15 PROCEDURE — 94668 MNPJ CHEST WALL SBSQ: CPT

## 2020-06-15 PROCEDURE — 83735 ASSAY OF MAGNESIUM: CPT

## 2020-06-15 PROCEDURE — 65610000006 HC RM INTENSIVE CARE

## 2020-06-15 PROCEDURE — 74011250636 HC RX REV CODE- 250/636: Performed by: INTERNAL MEDICINE

## 2020-06-15 PROCEDURE — 82962 GLUCOSE BLOOD TEST: CPT

## 2020-06-15 PROCEDURE — 36600 WITHDRAWAL OF ARTERIAL BLOOD: CPT

## 2020-06-15 PROCEDURE — 84100 ASSAY OF PHOSPHORUS: CPT

## 2020-06-15 PROCEDURE — 94640 AIRWAY INHALATION TREATMENT: CPT

## 2020-06-15 PROCEDURE — 74011250636 HC RX REV CODE- 250/636: Performed by: STUDENT IN AN ORGANIZED HEALTH CARE EDUCATION/TRAINING PROGRAM

## 2020-06-15 PROCEDURE — 82803 BLOOD GASES ANY COMBINATION: CPT

## 2020-06-15 PROCEDURE — 90935 HEMODIALYSIS ONE EVALUATION: CPT

## 2020-06-15 PROCEDURE — 82330 ASSAY OF CALCIUM: CPT

## 2020-06-15 PROCEDURE — 74011250636 HC RX REV CODE- 250/636

## 2020-06-15 RX ORDER — SODIUM CHLORIDE FOR INHALATION 3 %
4 VIAL, NEBULIZER (ML) INHALATION
Status: DISCONTINUED | OUTPATIENT
Start: 2020-06-15 | End: 2020-06-26 | Stop reason: HOSPADM

## 2020-06-15 RX ORDER — ALBUMIN HUMAN 250 G/1000ML
SOLUTION INTRAVENOUS
Status: COMPLETED
Start: 2020-06-15 | End: 2020-06-15

## 2020-06-15 RX ORDER — HEPARIN SODIUM 1000 [USP'U]/ML
INJECTION, SOLUTION INTRAVENOUS; SUBCUTANEOUS
Status: DISPENSED
Start: 2020-06-15 | End: 2020-06-16

## 2020-06-15 RX ADMIN — HYDRALAZINE HYDROCHLORIDE 100 MG: 50 TABLET, FILM COATED ORAL at 09:00

## 2020-06-15 RX ADMIN — Medication 10 ML: at 15:00

## 2020-06-15 RX ADMIN — ASPIRIN 81 MG CHEWABLE TABLET 81 MG: 81 TABLET CHEWABLE at 09:00

## 2020-06-15 RX ADMIN — FUROSEMIDE 80 MG: 10 INJECTION, SOLUTION INTRAMUSCULAR; INTRAVENOUS at 09:00

## 2020-06-15 RX ADMIN — CYANOCOBALAMIN TAB 1000 MCG 1000 MCG: 1000 TAB at 09:00

## 2020-06-15 RX ADMIN — ALBUMIN (HUMAN) 25 G: 0.25 INJECTION, SOLUTION INTRAVENOUS at 19:10

## 2020-06-15 RX ADMIN — DEXTROSE MONOHYDRATE: 10 INJECTION, SOLUTION INTRAVENOUS at 05:03

## 2020-06-15 RX ADMIN — Medication 10 ML: at 22:08

## 2020-06-15 RX ADMIN — EPOETIN ALFA-EPBX 10000 UNITS: 10000 INJECTION, SOLUTION INTRAVENOUS; SUBCUTANEOUS at 22:08

## 2020-06-15 RX ADMIN — CITALOPRAM HYDROBROMIDE 20 MG: 20 TABLET ORAL at 09:00

## 2020-06-15 RX ADMIN — SODIUM CHLORIDE SOLN NEBU 3% 4 ML: 3 NEBU SOLN at 13:06

## 2020-06-15 RX ADMIN — HEPARIN SODIUM 5000 UNITS: 5000 INJECTION INTRAVENOUS; SUBCUTANEOUS at 13:00

## 2020-06-15 RX ADMIN — PIPERACILLIN AND TAZOBACTAM 2.25 G: 2; .25 INJECTION, POWDER, FOR SOLUTION INTRAVENOUS at 05:28

## 2020-06-15 RX ADMIN — HEPARIN SODIUM 5000 UNITS: 5000 INJECTION INTRAVENOUS; SUBCUTANEOUS at 05:03

## 2020-06-15 RX ADMIN — PIPERACILLIN AND TAZOBACTAM 2.25 G: 2; .25 INJECTION, POWDER, LYOPHILIZED, FOR SOLUTION INTRAVENOUS at 21:30

## 2020-06-15 RX ADMIN — SODIUM CHLORIDE 40 MG: 9 INJECTION INTRAMUSCULAR; INTRAVENOUS; SUBCUTANEOUS at 09:00

## 2020-06-15 RX ADMIN — FUROSEMIDE 80 MG: 10 INJECTION, SOLUTION INTRAMUSCULAR; INTRAVENOUS at 18:12

## 2020-06-15 RX ADMIN — LORATADINE 10 MG: 10 TABLET ORAL at 09:00

## 2020-06-15 RX ADMIN — SODIUM CHLORIDE SOLN NEBU 3% 4 ML: 3 NEBU SOLN at 09:12

## 2020-06-15 RX ADMIN — Medication 20 ML: at 06:00

## 2020-06-15 RX ADMIN — PIPERACILLIN AND TAZOBACTAM 2.25 G: 2; .25 INJECTION, POWDER, LYOPHILIZED, FOR SOLUTION INTRAVENOUS at 14:00

## 2020-06-15 RX ADMIN — CHLORHEXIDINE GLUCONATE 0.12% ORAL RINSE 15 ML: 1.2 LIQUID ORAL at 09:00

## 2020-06-15 RX ADMIN — IPRATROPIUM BROMIDE AND ALBUTEROL SULFATE 3 ML: .5; 3 SOLUTION RESPIRATORY (INHALATION) at 09:12

## 2020-06-15 RX ADMIN — SODIUM CHLORIDE 0.75 G: 900 INJECTION, SOLUTION INTRAVENOUS at 21:00

## 2020-06-15 RX ADMIN — HEPARIN SODIUM 5000 UNITS: 5000 INJECTION INTRAVENOUS; SUBCUTANEOUS at 22:08

## 2020-06-15 NOTE — PROGRESS NOTES
ACUTE HEMODIALYSIS FLOW SHEET    HEMODIALYSIS ORDERS: Physician: Dr. Monique Haganters: aclear   Duration: 3 hr  BFR: 300   DFR: 500   Dialysate:  Temp 36.0   K+   3    Ca+  2.5   Na 140   Bicarb 35   Wt Readings from Last 1 Encounters:   06/15/20 106.2 kg (234 lb 2.1 oz)    Patient Chart [x]   Unable to Obtain []  Dry weight/UF Goal: 2500 ml  Access RIJ CVC     Heparin []  Bolus    Units    [] Hourly    Units    [x]None      Catheter locking solution Heparin 1:1000   Pre BP:   130/41    Pulse:  78   Respirations: 24    Temperature:  98.7    Tx: NSS   ml/Bolus   [x] N/A   Labs: []  Pre  []  Post:   [x] N/A   Additional Orders(medications, blood products, hypotension management): [x] Yes   [] No     [x]  [facility] Consent Verified     CATHETER ACCESS:  []N/A   [x]Right   []Left   [x]IJ     []Fem   []Chest wall   [] First use X-ray verified     [x]Tunnel    [] Non Tunneled   [x]No S/S infection  []Redness  []Drainage []Cultured []Swelling []Pain   [x]Medical Aseptic Prep Utilized   [x]Dressing Changed  [x] Biopatch  Date: 6/15/20   []Clotted   [x]Patent   Flows: [x]Good  []Poor  []Reversed   If access problem,  notified: []Yes    [x]N/A        GRAFT/FISTULA ACCESS:   [x]N/A     []Right     []Left     []UE     []LE   []AVG   []AVF     []Buttonhole    []Medical Aseptic Prep Utilized   []No S/S infection  []Redness  []Drainage []Cultured  [] Swelling  [] Pain  Bruit:   [] Strong    [] Weak       Thrill :   [] Strong    [] Weak     Needle Gauge: 15   Length: 1 inch   If access problem,  notified: []Yes     [x]N/A     Please describe access if present and not used: N/A       GENERAL ASSESSMENT:    LUNGS:   SaO2%  89    [] Clear  [x] Coarse  [] Crackles  [] Wheezing                                                [] Diminished     Location : []RLL   []LLL    []RUL  []KATIE   Cough: []Productive  [x]Dry  []N/A   Respirations:  [x]Easy  []Labored   Therapy:  []RA  [x]NC 5/min    Mask: []NRB  [] Venti O2%                  []Ventilator  []Intubated  [] Trach  [] BiPaP   CARDIAC: []Regular      [x] Irregular   [] Pericardial Rub  [] JVD          []  Monitored  [] Bedside  [] Remotely monitored     EDEMA: [] None  [x]Generalized  [] Pitting [] 1    [] 2    [] 3    [] 4                 [] Facial  [] Pedal  []  UE  [] LE   SKIN:   [x] Warm  [] Hot     [] Cold   [x] Dry     [] Pale   [] Diaphoretic                  [] Flushed  [] Jaundiced  [] Cyanotic  [] Rash  [] Weeping   LOC:    [x] Alert      [x]Oriented:    [x] Person     [x] Place  []Time               [] Confused  [] Lethargic  [] Medicated  [] Non-responsive   GI / ABDOMEN:                     [] Flat    [] Distended    [x] Soft    [] Firm   []  Obese                   [] Diarrhea  [x] Bowel Sounds  [] Nausea  [] Vomiting     / URINE ASSESSMENT:                   [x] Voiding   [] Oliguria  [] Anuria   []  Lopez                  [] Incontinent  []  Incontinent Brief []  Fecal Management System     PAIN:  [x] 0 []1  []2   []3   []4   []5   []6   []7   []8   []9   []10            Scale 0-10  Action/Follow Up:    MOBILITY:  [x] Bed    [] Stretcher      All Vitals and Treatment Details on Attached [address]: [facility]          Room # 616/16   [] 1st Time Acute      [] Stat       [x] Routine      [] Urgent     [x] Acute Room  []  Bedside  [] ICU/CCU  [] ER   Isolation Precautions:  [x] Dialysis    There are currently no Active Isolations       ALLERGIES:     Allergies   Allergen Reactions    Augmentin [Amoxicillin-Pot Clavulanate] Diarrhea    Clavulanic Acid Diarrhea    Levaquin [Levofloxacin] Other (comments)     Severe hypoglycemia        Code Status:  Full Code     Hepatitis Status     Lab Results   Component Value Date/Time    Hepatitis B surface Ag <0.10 06/09/2020 04:03 AM    Hepatitis B surface Ab <3.10 (L) 06/09/2020 04:03 AM        Current Labs:      Lab Results   Component Value Date/Time    WBC 8.1 06/15/2020 02:10 AM    HGB 8.3 (L) 06/15/2020 02:10 AM    HCT 26.0 (L) 06/15/2020 02:10 AM    PLATELET 599 65/89/0558 02:10 AM    MCV 88.1 06/15/2020 02:10 AM     Lab Results   Component Value Date/Time    Sodium 138 06/15/2020 02:10 AM    Potassium 3.6 06/15/2020 02:10 AM    Chloride 101 06/15/2020 02:10 AM    CO2 29 06/15/2020 02:10 AM    Anion gap 8 06/15/2020 02:10 AM    Glucose 106 (H) 06/15/2020 02:10 AM    BUN 18 06/15/2020 02:10 AM    Creatinine 2.77 (H) 06/15/2020 02:10 AM    BUN/Creatinine ratio 6 (L) 06/15/2020 02:10 AM    GFR est AA 21 (L) 06/15/2020 02:10 AM    GFR est non-AA 17 (L) 06/15/2020 02:10 AM    Calcium 8.8 06/15/2020 02:10 AM          DIET:  DIET NPO  DIET TUBE FEEDING      PRIMARY NURSE REPORT:   Pre Dialysis: Nandini Vazquez RN     Time: 1800        EDUCATION:    [x] Patient [] Other           Knowledge Basis: []None [x]Minimal [] Substantial   Barriers to learning  [x]N/A   [] Access Care     [] S&S of infection  [] Fluid Management  [] K+   [x] Procedural    []Albumin   [] Medications   [] Tx Options   [] Transplant   [] Diet   [] Other   Teaching Tools:  [x] Explain  [] Demo  [] Handouts [] Video  Patient response: [x] Verbalized understanding  [] Teach back  [] Return demonstration   [] Requires follow up      [x]Time Out/Safety Check  [x] Extracorporeal Circuit Tested for integrity       RO/HEMODIALYSIS MACHINE SAFETY CHECKS  Before each treatment:     Machine Number:                   [facility]                                                                       [] Portable Machine #13/RO serial #                                Alarm Test:  Pass time 3400            [x] RO/Machine Log Complete    Machine Temp    36.0             Dialysate: pH  7.4    Conductivity: Meter 14.0     HD Machine  14.2      TCD: 14.0  Dialyzer Lot # [id number]     Blood Tubing Lot # [id number]     Saline Lot # -FW     CHLORINE TESTING-Before each treatment and every 4 hours    Total Chlorine: [x] less than 0.1 ppm  Initial Time Check: 1810       4 Hr/2nd Check Time:    (if greater than 0.1 ppm from Primary then every 30 minutes from Secondary)     TREATMENT INITIATION  with Dialysis Precautions:   [x] All Connections Secured              [x] Saline Line Double Clamped   [x] Venous Parameters Set               [x] Arterial Parameters Set    [x] Prime Given 250ml NSS              [x]Air Foam Detector Engaged      Treatment Initiation Note:  0405  Arrived at pt's room, pt awake, A & O X 2, pt whispers d/t being extubated about an hour prior. Pt follows commands and denies any pain. Pt has a dry cough from her throat feeling scratchy post extubation. VSS. Will continue to monitor pt's status. During Treatment Notes:  8869  CVC assessed no abnormalities noted, line patent with good flow. CVC accessed without any difficulty, pt tolerated well. Vascular access visible with arterial and venous line connections intact. 1900  Vascular access visible with arterial and venous line connections intact. 1910  Pt given Albumin d/t drop in SBP from 130's to 104. .    2000  Vascular access visible with arterial and venous line connections intact. 2100  Vascular access visible with arterial and venous line connections intact. Medication Dose Volume Route Time [facility] Nurse, Title   Albumin 25g 100ml HD cath Rober Jennings RN     Post Assessment  Dialyzer Cleared:[] Good [x] Fair  [] Poor  Blood processed:  52.2 L  UF Removed:  3000 Ml  Post /42  Pulse  77 Resp  19   Temp 98.6    Post Tx Vascular Access: [x] N/A             CVC Catheter: [] N/A  Locking solution: Heparin 1:1000 U  Arterial port 1.6 ml   Venous port 1.6ml         Skin:[x] Warm  [x] Dry [] Diaphoretic               [] Flushed  [] Pale [] Cyanotic   Pain:  [x]0  []1 []2  []3 []4  []5  []6 []7 []8  []9  []10       Post Treatment Note:   2145  HD completed at this time, pt tolerated well. Dressing clean, dry and intact.      POST TREATMENT PRIMARY NURSE HANDOFF REPORT: Post Dialysis: M. Tania Goldberg RN                Time:  2140       Abbreviations: AVG-arterial venous graft, AVF-arterial venous fistula, IJ-Internal Jugular, Subcl-Subclavian, Fem-Femoral, Tx-treatment, AP/HR-apical heart rate, DFR-dialysate flow rate, BFR-blood flow rate, AP-arterial pressure, -venous pressure, UF-ultrafiltrate, TMP-transmembrane pressure, Austyn-Venous, Art-Arterial, RO-Reverse Osmosis

## 2020-06-15 NOTE — PROGRESS NOTES
NUTRITION    Nursing Referral: [facility]     RECOMMENDATIONS / PLAN:     - Start tube feeding of Vital High Protein at 20 mL/hr with 50 mL q 4 hours; monitor tolerance and ability to advance to goal rate of 55 mL/hr.  - Recommend discontinuing IV fluid once tube feeding started. - Continue RD inpatient monitoring and evaluation. Goal Enteral Regimen: Vital High Protein at 55 mL/hr + 50 mL q 4 hour water flushes to provide: 1320 kcal, 116 gm protein, 148 gm CHO, 0 gm fiber, 1104 mL free water, 1404 mL total free water, 93% RDIs       NUTRITION INTERVENTIONS & DIAGNOSIS:     - Enteral nutrition: initiate trickle feeds, verbal order from MD  - IV fluid: D10 at 20 mL/hr (48 gm dextrose, 163 kcal per day)  - Collaboration and referral of nutrition care: interdisciplinary rounds     Nutrition Diagnosis: Inadequate oral intake related to respiratory status as evidenced by pt NPO. Increased nutrient needs protein related to increased expenditure as evidenced by ESRD on HD    ASSESSMENT:     6/15: Remains intubated, OGT placed overnight/clamped and plan for dialysis today. Possible extubation today, dextrose infusion started for hypoglycemia- improved over the past 2 days. 6/12: Remains intubated s/p bronch yesterday and currently on SBT with plan for extubation today. HD today. Swallow evaluation planned once extubated, recent BG WNL at 81-90 mg/dL and monitoring.   6/11: s/p RRT overnight for hypoxia, increased work of breakfast and copious secretions, noted to be chocking on her partial denture- intubated for airway protection and foreign body retrieved during procedure; plan for imaging today to confirm removal and possible extubation. No OG or NGT placed. 6/9: Admitted with pneumonia, CHF with hx of ESRD on HD. Diet started and noted pt eating meals in ED, intake unknown at this time. Wt gain noted PTA, question fluid status. Hx yesterday UF 3.5 L, 1200 mL FR recommended per Nephrology.     Nutritional intake adequate to meet patients estimated nutritional needs:  No    Diet: DIET NPO     Food Allergies: NKFA  Current Appetite: Not Applicable - NPO  Appetite/meal intake prior to admission: Unable to determine at this time  Feeding Limitations:  [x] Swallowing difficulty: SLP signed off    [x] Chewing difficulty: partial dentures- chocking on lead to intubation 6/11/20    [x] Other: respiratory status   Current Meal Intake:   Patient Vitals for the past 100 hrs:   % Diet Eaten   06/12/20 2000 0 %   06/11/20 2000 0 %     Anuric/Oliguric    BM: 6/13  Skin Integrity: WDL   Edema:   [] No     [x] Yes   Pertinent Medications: Reviewed: cyanocobalamin, docusate sodium prn, ferrous sulfate, furosemide, mirtazapine, pantoprazole, ondansetron, miralax, epoetin, rosuvastatin- held    Recent Labs     06/15/20  0210 06/14/20  2313 06/14/20  0136 06/13/20  0438    137 136 138   K 3.6 4.4 4.5 3.3*    105 103 103   CO2 29 26 26 30   * 112* 88 77   BUN 18 18 13 19*   CREA 2.77* 2.52* 1.83* 2.15*   CA 8.8 6.8* 8.1* 8.1*   MG 2.1  --  2.0 1.8   PHOS 2.7 2.5 1.8* 2.0*       Intake/Output Summary (Last 24 hours) at 6/15/2020 0936  Last data filed at 6/15/2020 0700  Gross per 24 hour   Intake 700 ml   Output 725 ml   Net -25 ml       Anthropometrics:  Ht Readings from Last 1 Encounters:   06/08/20 5' 5\" (1.651 m)     Last 3 Recorded Weights in this Encounter    06/13/20 0525 06/13/20 2209 06/15/20 0400   Weight: 108.7 kg (239 lb 10.2 oz) 106.1 kg (233 lb 14.5 oz) 106.2 kg (234 lb 2.1 oz)     Body mass index is 38.96 kg/m².    Obese Class II    Weight History: weight gain noted PTA, question fluid status, ESRD on HD    Weight Metrics 6/15/2020 5/13/20202020 4/30/2020 4/29/2020 4/26/2020 8/18/2016 6/2/2016   Weight 234 lb 2.1 oz 238 lb 5.1 oz - 252 lb 252 lb 4.8 oz 219 lb 228 lb   BMI 38.96 kg/m2 - 39.66 kg/m2 41.93 kg/m2 41.98 kg/m2 36.44 kg/m2 37.94 kg/m2        Admitting Diagnosis: Pneumonia [J18.9]  Pertinent PMHx: CHF, DM, GERD, HTN, uterine cancer, ESRD on HD    Education Needs:        [x] None identified  [] Identified - Not appropriate at this time  []  Identified and addressed - refer to education log  Learning Limitations:   [x] None identified  [] Identified:   Cultural, Scientologist & ethnic food preferences:  [x] None identified    [] Identified and addressed     ESTIMATED NUTRITION NEEDS:     Calories: 1384-0794 kcal (11-14 kcal/kg) based on  [x] Actual  kg     [] SBW 67 kg   Protein: 114-143 gm (2-2.5 gm/kg) based on  [] Actual BW      [x] IBW 57 kg  Fluid: 750-1500 mL/day     MONITORING & EVALUATION:     Nutrition Goal(s):   - Nutritional needs will be met through adequate oral intake or nutrition support within the next 7 days. Outcome: Met/Continue     Monitoring:   [x] Food and nutrient intake   [x] Food and nutrient administration  [x] Comparative standards   [x] Nutrition-focused physical findings   [x] Anthropometric Measurements   [x] Treatment/therapy   [x] Biochemical data, medical tests, and procedures        Previous Recommendations (for follow-up assessments only): Implemented      Discharge Planning: Nutritional discharge needs unknown at this time. Participated in care planning, discharge planning, & interdisciplinary rounds as appropriate.       Robertha Homans, RD, [address]   Pager: [phone]

## 2020-06-15 NOTE — PROGRESS NOTES
Chart reviewed. Pt is a LTC resident of [facility]. Plan is for patient to return there when medically stable. Pt continues to intubated and sedated. Will continue to monitor for changing discharge needs.   Gabriella Higginbotham RN - Outcomes Manager  [phone]

## 2020-06-15 NOTE — PROGRESS NOTES
3 [facility]  Pulmonary, Critical Care, and Sleep Medicine    Name: Rosalind Lockwood MRN: [id number]   : 1950 Hospital: [address] [place] [address]   Date: 6/15/2020        IMPRESSION:   · Acute on chronic hypoxemic respiratory failure requiring mechanical ventilation - initially in the setting of PNA and fluid overload, however, intubated 20 2/2 aspiration of partial dentures. · Aspiration of foreign body (partials) in to the hypopharynx- retrieved with forceps. Bronch 20- some mucus plugs, small caliber airways noted but no foreign material    · RUL infiltrate/PNA - CXR 20,scant yeast on cx   · Acute on HFpEF - most recent echo 20 w/ EF of 55-60%   · Bacteremia vs contaminant- GPC in groups in 1/2 bottles, repeat cultures negative.    · ESRD - HD MWF, BUN 18, Cr 2.77  · Acute on chronic anemia- H/H 8.3 and 26, no signs of active bleeding     Patient Active Problem List   Diagnosis Code    Acute on chronic diastolic congestive heart failure ([facility]) I50.33    Suspected COVID-19 virus infection Z20.828    Type 2 diabetes mellitus without complication, without long-term current use of insulin ([facility]) E11.9    Left anterior fascicular block I44.4    HTN (hypertension), benign I10    Coronary artery disease involving native coronary artery of native heart without angina pectoris I25.10    Chronic diastolic congestive heart failure ([facility]) I50.32    Bilateral lower extremity edema R60.0    BMI 35.0-35.9,adult Z68.35    CHF (congestive heart failure) ([facility]) I50.9    Sepsis ([facility]) A41.9    Respiratory failure ([facility]) J96.90    SIRS (systemic inflammatory response syndrome) ([facility]) R65.10    Acute on chronic respiratory failure ([facility]) J96.20    Pneumonia J18.9    Fluid overload E87.70      PLAN:   Resp:   -Titrate FiO2 for SpO2 >90%  CT chest showed no additional retained foreign bodies in the airway  -Bronch on 20 demonstrated distant mucus plugs / friable airway but otherwise normal.  -Aspiration precautions  -keep HOB > 30 degrees  -Considering SBT tomorrow, pO2 65 with FiO2 of 45 today. I/D:   -Sepsis bundle per hospital protocol  -blood culture  + coag negative staph. Subsequent cx no growth   -Continue zosyn per ID  Hem/Onc:   -s/p PRBC transfusion   CVS:  - HD stable without pressors.    -Continue lasix   -Continue hydralazine   Metabolic:   -Daily BMP; monitor e-lytes; replace PRN  Renal:   -Trend Renal indices; HD MWF per nephrology   Endocrine:  -POC Glucose q6; SSI  GI:   -Starting NGT feeds today, subsequently titrate D10 drip off. protonix for SUP, Trend LFTs, Zofran PRN for N/V . Continue miralax and colace daily. Musc/Skin:  -No acute issues, wound care as needed  Neuro:   -Off sedation     Code Status: full code   ·   Subjective/Interval History:      Patient is a 71 y.o. female w/ pmhx of ESRD on HD, DM, and CHF admitted for acute hypoxemic respiratory failure and subsequently started on tx for pna. Patient was started on BIPAP and intubated for progressive respiratory failure. Noted to have foreign body on CT w/ forceps denture retrieval.Patient remains intubated and HD stable. 6/15/20    -No acute events overnight   -HD stable  -afebrile  -Follows commands, agitated when I woke her this morning and began to fight the vent. She was easily calmed with coaching. ROS:A comprehensive review of systems was negative except for that written in the HPI.     Objective:   Vital Signs:    Visit Vitals  BP (!) 107/36   Pulse 67   Temp 99 °F (37.2 °C)   Resp 16   Ht 5' 5\" (1.651 m)   Wt 106.2 kg (234 lb 2.1 oz)   SpO2 98%   Breastfeeding No   BMI 38.96 kg/m²       O2 Device: Ventilator   O2 Flow Rate (L/min): 2 l/min   Temp (24hrs), Av.8 °F (37.1 °C), Min:98.4 °F (36.9 °C), Max:99.3 °F (37.4 °C)       Intake/Output:   Last shift:      06/15 07 - 06/15 1900  In: 170 [I.V.:100]  Out: 400 [Urine:400]  Last 3 shifts: 1901 - 06/15 0700  In: 9813 [I.V.:1201]  Out: 725 [Urine:725]    Intake/Output Summary (Last 24 hours) at 6/15/2020 1318  Last data filed at 6/15/2020 1200  Gross per 24 hour   Intake 790 ml   Output 875 ml   Net -85 ml         Physical Exam:      General:  Intubated, NAD- awake and alert   Head:  Normocephalic, without obvious abnormality, atraumatic. Eyes:  Conjunctivae/corneas clear. PERRL   Nose: Nares normal. Septum midline. Mucosa normal   Throat: Lips, mucosa, and tongue normal. edentulous   Neck: Supple, symmetrical, trachea midline, no adenopathy. Lungs:   Symmetrical chest rise; scattered rhonchi    Heart:  RRR, S1, S2 normal, no m/r/g   Abdomen:   Soft, non-tender. Bowel sounds normal. No masses   Extremities: Extremities normal, atraumatic. + 1 lower ext edema    Pulses: 2+ and symmetric all extremities. Skin: Skin color, texture, turgor normal. No rashes or lesions   Neurologic: Grossly nonfocal. Awakens easily to verbal stimuli, follows commands x4 extremities. Devices: PIV, ETT, NGT          DATA:  Labs:  Recent Labs     06/15/20  0210 06/14/20  0136 06/13/20  0438   WBC 8.1 8.6 8.1   HGB 8.3* 9.2* 6.7*   HCT 26.0* 28.8* 20.9*    169 162     Recent Labs     06/15/20  0210 06/14/20  2313 06/14/20  0136 06/13/20  0438    137 136 138   K 3.6 4.4 4.5 3.3*    105 103 103   CO2 29 26 26 30   * 112* 88 77   BUN 18 18 13 19*   CREA 2.77* 2.52* 1.83* 2.15*   CA 8.8 6.8* 8.1* 8.1*   MG 2.1  --  2.0 1.8   PHOS 2.7 2.5 1.8* 2.0*     No results for input(s): PH, PCO2, PO2, HCO3, FIO2 in the last 72 hours.      Results     Procedure Component Value Units Date/Time    CULTURE, RESPIRATORY/SPUTUM/BRONCH Ardean Sayer STAIN [[id number]]  (Abnormal) Collected:  06/11/20 1920    Order Status:  Completed Specimen:  Bronchial lavage Updated:  06/14/20 0517     Special Requests: --        BRONCHIAL LAVAGE  RIGHT  LUNG       GRAM STAIN 2+ WBCS SEEN         NO ORGANISMS SEEN        Culture result:       SCANT NORMAL RESPIRATORY MARS            SCANT YEAST       CULTURE, BLOOD [[id number]] Collected:  06/11/20 1820    Order Status:  Completed Specimen:  Blood Updated:  06/15/20 0642     Special Requests: NO SPECIAL REQUESTS        Culture result: NO GROWTH 4 DAYS       CULTURE, BLOOD [[id number]] Collected:  06/11/20 1620    Order Status:  Completed Specimen:  Blood Updated:  06/15/20 0642     Special Requests: NO SPECIAL REQUESTS        Culture result: NO GROWTH 4 DAYS       CULTURE, RESPIRATORY/SPUTUM/BRONCH Brinda Pucker STAIN [[id number]] Collected:  06/11/20 0600    Order Status:  Canceled Specimen:  Endotracheal aspirate     CULTURE, BLOOD [[id number]] Collected:  06/10/20 1231    Order Status:  Completed Specimen:  Blood Updated:  06/15/20 0642     Special Requests: NO SPECIAL REQUESTS        Culture result: NO GROWTH 5 DAYS       CULTURE, BLOOD [[id number]] Collected:  06/10/20 1230    Order Status:  Completed Specimen:  Blood Updated:  06/15/20 0642     Special Requests: NO SPECIAL REQUESTS        Culture result: NO GROWTH 5 DAYS       CULTURE, BLOOD [[id number]]  (Abnormal) Collected:  06/07/20 2223    Order Status:  Completed Specimen:  Blood Updated:  06/11/20 1230     Special Requests: NO SPECIAL REQUESTS        GRAM STAIN       AEROBIC BOTTLE GRAM POSITIVE COCCI IN GROUPS                  SMEAR CALLED TO AND CORRECTLY REPEATED BY: Khloe Donovan RN  4N 6/10/2020 0802 TO LAE           Culture result:       STAPHYLOCOCCUS SPECIES, COAGULASE NEGATIVE GROWING IN AEROBIC BOTTLE          CULTURE, BLOOD [[id number]] Collected:  06/07/20 2200    Order Status:  Completed Specimen:  Blood Updated:  06/13/20 0633     Special Requests: NO SPECIAL REQUESTS        Culture result: NO GROWTH 6 DAYS               PFT:                                                     Echo:  04/22/20   ECHO ADULT FOLLOW-UP OR LIMITED 04/26/2020 4/26/2020    Narrative · Normal cavity size, wall thickness and systolic function (ejection   fraction normal).  Estimated left ventricular ejection fraction is 55 -   60%. Visually measured ejection fraction. · Dilated left atrium. · Dilated right atrium. · Pulmonary arterial systolic pressure is 32 mmHg. Signed by: Isidro Pineda MD       Imaging:  [x]I have personally reviewed the patients radiographs    CXR Results  (Last 48 hours)               06/15/20 1109  XR CHEST PORT Final result    Impression:  IMPRESSION:       Stable ET tube terminating 1.5 cm above the garth. Enteric tube extends below   the diaphragm beyond the field-of-view. Stable enlarged cardiac silhouette with vascular congestion and pulmonary   opacities with pleural effusions. Narrative:  EXAM:  XR CHEST PORT       INDICATION:   Respiratory distress/ oxygen desaturation/ altered mental status       COMPARISON: 6/14/2020 and priors. FINDINGS:   ET tube tip about 1.5 cm above the garth. Stable enteric tube terminating   beyond the field-of-view in the abdomen. . Stable right jugular catheter. Stable   enlarged cardiac silhouette. Aortic atherosclerosis. Pulmonary vascular   congestion. Similar retrocardiac opacification and hazy right perihilar and   basilar opacities. No pneumothorax. Layering bilateral pleural effusions. 06/14/20 0451  XR CHEST PORT Final result    Impression:  IMPRESSION:        Endotracheal tube tip projects mildly low-lying approximately 1.5 cm cephalad to   the garth similar to prior, possibly better positioned by withdrawing it   approximately 1-2 cm. Allowing for slight differences in technique, little interval change in aeration   compared to the prior radiograph with bilateral pleural effusions and bilateral   right greater than left lung opacities again seen as described, further   characterized on recent chest CT of 6/13/2020. Cardiomegaly similar to prior. Narrative:  AP CHEST, PORTABLE       CPT CODE: 30213       INDICATION: Above. Intubated. COMPARISON: 6/13/2020. TECHNIQUE: Portable semi-upright AP chest radiograph is reviewed. FINDINGS:       Endotracheal tube tip projects mildly low-lying approximately 1.5 cm cephalad to   the garth, similar compared to the preceding chest radiograph, possibly better   positioned by withdrawing it approximately 1-2 cm. Right chest dialysis catheter   is again seen with tip projected over the right atrium. Esophagogastric tube   projects extending below the left hemidiaphragm before it extends beyond the   field-of-view. Bilateral cervical spine fusion rods/screws/hardware partially   visualized before extending cephalad to the field-of-view. Multiple EKG   leads/wires and other catheter tubing overlie the patient. The lung apices are partially obscured by the patient's overlying chin/neck. No   evidence of pneumothorax. Low lung volumes. Mild blunting of the right   costophrenic sulcus consistent with small pleural effusion, hazy obscuration of   the left costophrenic sulcus, compatible with bilateral pleural effusions seen   on CT. Bilateral lung opacities, right greater than left, extensive on the   right, consistent with airspace disease and atelectasis, better characterized on   chest CT of 2020. The cardiomediastinal silhouette is prominent without significant interval   change. Aortic atherosclerotic calcification noted best seen at the level of the   aortic arch.                    Lauren Apgar, DO [facility] PGY-1

## 2020-06-15 NOTE — ROUTINE PROCESS
Bedside and Verbal shift change report given to Deborah Jones (oncoming nurse) by Cheryl Morris (offgoing nurse). Report included the following information SBAR, Kardex and Recent Results.

## 2020-06-15 NOTE — PROGRESS NOTES
In Patient Progress note      Admit Date: 6/7/2020    Impression:     1) ESRD on HD MWF , plan for HD tomorrow   2) Ac respi failure , multifactorial , foreign body( dentures in airway)    ,AC on chr diastolic CHF  3) AC on chr alfredo CHF  4) PNA , Rapid covid -v e    5) bacteremia , coag neg staph from 6/7 likely contaminant per ID   Bld cx since have been negative   5) Anemia of CKD   6) sec hyperpara      Plan:  1) HD today for volume and solute   2) adding albumin as needed with dialysis   3) continue lasix 80 mg IV BID   4) follow ID recs   5) continue epogen with HD   6) dose AB for GFR < 15    Please call with questions ,     Shari Webb MD FASN  Cell [phone]  Pager: [phone]     Subjective:      Intubated  Awake following commands    Current Facility-Administered Medications:     lidocaine 4 % patch 1 Patch, 1 Patch, TransDERmal, Q24H, MICHAEL Rivera-C, 1 Patch at 06/15/20 0351    menthol-zinc oxide (CALMOSEPTINE) 0.44-20.6 % ointment, , Topical, Q6H PRN, Luz Maria Anna PA-C    dextrose 10% infusion, , IntraVENous, CONTINUOUS, Luz Maria Anna PA-C, Last Rate: 20 mL/hr at 06/15/20 0503    0.9% sodium chloride infusion 250 mL, 250 mL, IntraVENous, PRN, MICHAEL Rivera-STEPHANIA    epoetin johnathon-epbx (RETACRIT) injection 10,000 Units, 10,000 Units, SubCUTAneous, Q MON, Daily Arrington MD, 10,000 Units at 06/12/20 2132    midazolam (VERSED) injection 1-2 mg, 1-2 mg, IntraVENous, Q2H PRN, Karyn Joy PA-C, 2 mg at 06/12/20 1910    pantoprazole (PROTONIX) 40 mg in 0.9% sodium chloride 10 mL injection, 40 mg, IntraVENous, DAILY, Divya Lilly PA-C, 40 mg at 06/15/20 0900    albuterol-ipratropium (DUO-NEB) 2.5 MG-0.5 MG/3 ML, 3 mL, Nebulization, Q6H PRN, Divya Lilly PA-C, 3 mL at 06/15/20 0912    sodium chloride 3% hypertonic nebulizer soln, 4 mL, Nebulization, TID RT, Divya Lilly PA-C, 4 mL at 06/15/20 0912    furosemide (LASIX) injection, , , CODE BLUE, Shahriar, Anahi Bhatt MD, 40 mg at 06/11/20 0355    chlorhexidine (PERIDEX) 0.12 % mouthwash 15 mL, 15 mL, Oral, Q12H, Monica Lassiter DO, 15 mL at 06/15/20 0900    sodium chloride (NS) flush 5-40 mL, 5-40 mL, IntraVENous, Q8H, Monica Lassiter DO, 20 mL at 06/15/20 0600    sodium chloride (NS) flush 5-40 mL, 5-40 mL, IntraVENous, PRN, Monica Lassiter DO    insulin lispro (HUMALOG) injection, , SubCUTAneous, Q6H, Monica Lassiter DO, Stopped at 06/12/20 0000    hydrALAZINE (APRESOLINE) 20 mg/mL injection 20 mg, 20 mg, IntraVENous, Q6H PRN, Oneyda Lance PA-C, 20 mg at 06/14/20 0135    ondansetron (ZOFRAN) injection 4 mg, 4 mg, IntraVENous, Q6H PRN, Martine Venegas MD, 4 mg at 06/10/20 0030    glucose chewable tablet 16 g, 4 Tab, Oral, PRN, Padmini Ro DO    glucagon (GLUCAGEN) injection 1 mg, 1 mg, IntraMUSCular, PRN, Padmini Ro DO    dextrose (D50W) injection syrg 12.5-25 g, 25-50 mL, IntraVENous, PRN, Padmini Ro DO, 25 g at 06/13/20 0041    furosemide (LASIX) injection 80 mg, 80 mg, IntraVENous, BID, Hadley Angulo MD, 80 mg at 06/15/20 0900    citalopram (CELEXA) tablet 20 mg, 20 mg, Oral, DAILY, Hadley Angulo MD, 20 mg at 06/15/20 0900    cyanocobalamin tablet 1,000 mcg, 1,000 mcg, Oral, BID, Hadley Angulo MD, 1,000 mcg at 06/15/20 0900    loratadine (CLARITIN) tablet 10 mg, 10 mg, Oral, DAILY, Hadley Angulo MD, 10 mg at 06/15/20 0900    aspirin chewable tablet 81 mg, 81 mg, Oral, DAILY, Hadley Angulo MD, 81 mg at 06/15/20 0900    [Held by provider] isosorbide mononitrate ER (IMDUR) tablet 30 mg, 30 mg, Oral, DAILY, Hadley Angulo MD, Stopped at 06/11/20 0900    polyethylene glycol (MIRALAX) packet 17 g, 17 g, Oral, DAILY, Hadley Angulo MD, Stopped at 06/15/20 0900    [Held by provider] rosuvastatin (CRESTOR) tablet 5 mg, 5 mg, Oral, QHS, Hadley Angulo MD, Stopped at 06/11/20 2200    [Held by provider] gabapentin (NEURONTIN) capsule 100 mg, 100 mg, Oral, BID, Hadley Angulo MD, Stopped at 06/11/20 0900    [Held by provider] carvediloL (COREG) tablet 12.5 mg, 12.5 mg, Oral, BID WITH MEALS, Hadley Angulo MD, Stopped at 06/11/20 0800    ferrous sulfate tablet 325 mg, 325 mg, Oral, EVERY OTHER DAY, Hadley Angulo MD, Stopped at 06/12/20 0421    mirtazapine (REMERON) tablet 15 mg, 15 mg, Oral, QHS, Hadley Angulo MD, 15 mg at 06/14/20 2200    docusate sodium (COLACE) capsule 100 mg, 100 mg, Oral, BID PRN, Hadley Angulo MD    heparin (porcine) injection 5,000 Units, 5,000 Units, SubCUTAneous, Q8H, Hadley Angulo MD, 5,000 Units at 06/15/20 0503        Objective:     Visit Vitals  BP (!) 103/26   Pulse (!) 58   Temp 98.8 °F (37.1 °C)   Resp 15   Ht 5' 5\" (1.651 m)   Wt 106.2 kg (234 lb 2.1 oz)   SpO2 97%   Breastfeeding No   BMI 38.96 kg/m²         Intake/Output Summary (Last 24 hours) at 6/15/2020 1205  Last data filed at 6/15/2020 0800  Gross per 24 hour   Intake 640 ml   Output 575 ml   Net 65 ml       Physical Exam:     Intubated. / sedated  HENT mmm  RS AEBE coarse BS   CVS s1 s2 wnl no JVD  GI soft BS +  Ext 1+ LE edema     Data Review:    Recent Labs     06/15/20  0210   WBC 8.1   RBC 2.95*   HCT 26.0*   MCV 88.1   MCH 28.1   MCHC 31.9   RDW 13.9     Recent Labs     06/15/20  0210 06/14/20  2313 06/14/20  0136 06/13/20  0438   BUN 18 18 13 19*   CREA 2.77* 2.52* 1.83* 2.15*   CA 8.8 6.8* 8.1* 8.1*   K 3.6 4.4 4.5 3.3*    137 136 138    105 103 103   CO2 29 26 26 30   PHOS 2.7 2.5 1.8* 2.0*   * 112* 88 77       Griffin Charles MD

## 2020-06-15 NOTE — PROGRESS NOTES
attended the interdisciplinary rounds for [facility], who is a 71 y.o.,female. Patients Primary Language is: [place]. According to the patients EMR Yazidism Affiliation is: Restorationist.     The reason the Patient came to the hospital is:   Patient Active Problem List    Diagnosis Date Noted    Fluid overload 06/10/2020    Pneumonia 06/08/2020    Acute on chronic respiratory failure ([address] 05/09/2020    Sepsis ([address] 04/30/2020    Respiratory failure ([address] 04/30/2020    SIRS (systemic inflammatory response syndrome) ([address] 04/30/2020    CH[facility]est[facility]) [facility] 04/25/2020    Acute on chronic diastolic congestive heart failure ([address] 04/23/2020    Suspected COVID-19 virus infection 04/23/2020    Type 2 diabetes mellitus without complication, without long-term current use of insulin ([address] 07/06/2018    Left anterior fascicular block 07/06/2018    HTN (hypertension), benign 07/06/2018    Coronary artery disease involving native coronary artery of native heart without angina pectoris 07/06/2018    Chronic diastolic congestive heart failure ([address] 07/06/2018    Bilateral lower extremity edema 07/06/2018    BMI 35.0-35.9,adult 07/06/2018        Plan:  Chaplains will continue to follow and will provide pastoral care on an as needed/requested basis.  recommends bedside caregivers page  on duty if patient shows signs of acute spiritual or emotional distress.     [address] [facility]  Board Certified [address] [facility]   [phone]

## 2020-06-15 NOTE — ROUTINE PROCESS
1945 Bedside shift change report given to madie rn (oncoming nurse) by phu rn (offgoing nurse). Report included the following information SBAR. Side rails up x 4, call light within reach. Hob elevated 30 degrees. 2000 assessment completed. Oral and lee care completed. 645 [address] to hold po hydralzine, due to low bp earlier, 
0000 reassessment completed. Oral and lee care completed. 0300 johnny de leon notified potassium level 3.6, will review. 0400 reassessment completed. oral nd lee care completed. 0407 oxygen level increased to 50%. Inga de leon notified patient oxygen sat drop when turned or suctioned, takes sereval minutes to maintain sat greater 96 %. Will assess. 0730 Bedside shift change report given to jany kamara (oncoming nurse) by madie rn(offgoing nurse). Report included the following information SBAR, Kardex and Recent Results. Side rails up , call light within reach. Hob elevated 30 degrees. . patient resting with eye closed.

## 2020-06-15 NOTE — PROGRESS NOTES
Problem: Nutrition Deficit  Goal: *Optimize nutritional status  Outcome: Progressing Towards Goal     Problem: Falls - Risk of  Goal: *Absence of Falls  Description: Document Clydene Bone Fall Risk and appropriate interventions in the flowsheet. Outcome: Progressing Towards Goal  Note: Fall Risk Interventions:       Mentation Interventions: Adequate sleep, hydration, pain control, Bed/chair exit alarm, Door open when patient unattended, Update white board, Toileting rounds, Reorient patient, More frequent rounding    Medication Interventions: Bed/chair exit alarm, Evaluate medications/consider consulting pharmacy, Patient to call before getting OOB, Teach patient to arise slowly    Elimination Interventions: Bed/chair exit alarm, Call light in reach, Elevated toilet seat, Patient to call for help with toileting needs, Stay With Me (per policy), Toilet paper/wipes in reach, Toileting schedule/hourly rounds    History of Falls Interventions: Consult care management for discharge planning, Evaluate medications/consider consulting pharmacy         Problem: Patient Education: Go to Patient Education Activity  Goal: Patient/Family Education  Outcome: Progressing Towards Goal     Problem: Pressure Injury - Risk of  Goal: *Prevention of pressure injury  Description: Document Toney Scale and appropriate interventions in the flowsheet. Outcome: Progressing Towards Goal  Note: Pressure Injury Interventions:  Sensory Interventions: Assess need for specialty bed, Use 30-degree side-lying position, Turn and reposition approx.  every two hours (pillows and wedges if needed), Sit a 90-degree angle/use footstool if needed, Pressure redistribution bed/mattress (bed type), Minimize linen layers, Maintain/enhance activity level, Keep linens dry and wrinkle-free, Float heels    Moisture Interventions: Absorbent underpads, Apply protective barrier, creams and emollients, Assess need for specialty bed, Check for incontinence Q2 hours and as needed, Minimize layers, Maintain skin hydration (lotion/cream), Internal/External urinary devices    Activity Interventions: Pressure redistribution bed/mattress(bed type), Assess need for specialty bed    Mobility Interventions: Assess need for specialty bed, Float heels, HOB 30 degrees or less, Pressure redistribution bed/mattress (bed type), Turn and reposition approx.  every two hours(pillow and wedges)    Nutrition Interventions: Document food/fluid/supplement intake, Discuss nutritional consult with provider    Friction and Shear Interventions: Apply protective barrier, creams and emollients, Foam dressings/transparent film/skin sealants, HOB 30 degrees or less, Minimize layers, Transferring/repositioning devices                Problem: Patient Education: Go to Patient Education Activity  Goal: Patient/Family Education  Outcome: Progressing Towards Goal     Problem: Ventilator Management  Goal: *Adequate oxygenation and ventilation  Outcome: Progressing Towards Goal  Goal: *Patient maintains clear airway/free of aspiration  Outcome: Progressing Towards Goal  Goal: *Absence of infection signs and symptoms  Outcome: Progressing Towards Goal     Problem: Patient Education: Go to Patient Education Activity  Goal: Patient/Family Education  Outcome: Progressing Towards Goal     Problem: Non-Violent Restraints  Goal: *Removal from restraints as soon as assessed to be safe  Outcome: Progressing Towards Goal  Goal: *No harm/injury to patient while restraints in use  Outcome: Progressing Towards Goal  Goal: *Patient's dignity will be maintained  Outcome: Progressing Towards Goal     Problem: Injury - Risk of, Adverse Drug Event  Goal: *Absence of adverse drug events  Outcome: Progressing Towards Goal  Goal: *Absence of medication errors  Outcome: Progressing Towards Goal     Problem: Patient Education: Go to Patient Education Activity  Goal: Patient/Family Education  Outcome: Progressing Towards Goal

## 2020-06-15 NOTE — PROGRESS NOTES
Infectious Disease progress Note        Reason: Gram-positive bloodstream infection, pneumonia    Current abx Prior abx   Piperacillin/tazobactam since 6/7   Vancomycin 6/7; 6/10-6/12     Assessment :  69 y.o. female  with multiple medical problems including diastolic CHF, diabetes, arthritis, acid reflux hiatal hernia, chronic hypoxic failure on home oxygen 2 L, hypertension, end-stage renal disease on dialysis Tuesday Thursday Saturday who presented to the emergency department via EMS on 6/8/20 with shortness of breath per report from [facility] and rehab. Patient presents with a highly complex clinical picture. Clinical presentation seems consistent with acute respiratory failure secondary to fluid overload, acute on chronic diastolic CHF. Single positive blood culture on 6/7 for coagulase-negative Staphylococcus likely contaminant. Negative blood culture 6/10    Acute on chronic respiratory failure requiring mechanical ventilation - intubated 6/11 due to aspiration of partial dentures. Removed. S/p bronchoscopy 6/11- findings noted. Clinically better. Improved oxygenation. cxr 6/14- Bilateral lung opacities, right greater than left, extensive on the right, consistent with airspace disease and atelectasis    Recommendations:  1. Continue piperacillin/tazobactam.  Tx for aspiration with a shorter course anticipated  2. F/u ICU team recommendations. 3.  Follow-up nephrology recommendations  4. Duration of antibiotics based on the clinical course  Relayed information to pt. She denies questions today. Please call me if any further questions or concerns. Will continue to participate in the care of this patient. HPI:  Pt denies n/v/abd pain  She denies diarrhea  Intubated. Detailed ros not feasible.      Current Facility-Administered Medications   Medication Dose Route Frequency    lidocaine 4 % patch 1 Patch  1 Patch TransDERmal Q24H    menthol-zinc oxide (CALMOSEPTINE) 0.44-20.6 % ointment   Topical Q6H PRN    dextrose 10% infusion   IntraVENous CONTINUOUS    0.9% sodium chloride infusion 250 mL  250 mL IntraVENous PRN    epoetin johnathon-epbx (RETACRIT) injection 10,000 Units  10,000 Units SubCUTAneous Q MON, WED & FRI    midazolam (VERSED) injection 1-2 mg  1-2 mg IntraVENous Q2H PRN    pantoprazole (PROTONIX) 40 mg in 0.9% sodium chloride 10 mL injection  40 mg IntraVENous DAILY    albuterol-ipratropium (DUO-NEB) 2.5 MG-0.5 MG/3 ML  3 mL Nebulization Q6H PRN    sodium chloride 3% hypertonic nebulizer soln  4 mL Nebulization TID RT    furosemide (LASIX) injection    CODE BLUE    chlorhexidine (PERIDEX) 0.12 % mouthwash 15 mL  15 mL Oral Q12H    sodium chloride (NS) flush 5-40 mL  5-40 mL IntraVENous Q8H    sodium chloride (NS) flush 5-40 mL  5-40 mL IntraVENous PRN    insulin lispro (HUMALOG) injection   SubCUTAneous Q6H    hydrALAZINE (APRESOLINE) 20 mg/mL injection 20 mg  20 mg IntraVENous Q6H PRN    ondansetron (ZOFRAN) injection 4 mg  4 mg IntraVENous Q6H PRN    glucose chewable tablet 16 g  4 Tab Oral PRN    glucagon (GLUCAGEN) injection 1 mg  1 mg IntraMUSCular PRN    dextrose (D50W) injection syrg 12.5-25 g  25-50 mL IntraVENous PRN    furosemide (LASIX) injection 80 mg  80 mg IntraVENous BID    citalopram (CELEXA) tablet 20 mg  20 mg Oral DAILY    cyanocobalamin tablet 1,000 mcg  1,000 mcg Oral BID    loratadine (CLARITIN) tablet 10 mg  10 mg Oral DAILY    aspirin chewable tablet 81 mg  81 mg Oral DAILY    [Held by provider] isosorbide mononitrate ER (IMDUR) tablet 30 mg  30 mg Oral DAILY    polyethylene glycol (MIRALAX) packet 17 g  17 g Oral DAILY    [Held by provider] rosuvastatin (CRESTOR) tablet 5 mg  5 mg Oral QHS    [Held by provider] gabapentin (NEURONTIN) capsule 100 mg  100 mg Oral BID    [Held by provider] carvediloL (COREG) tablet 12.5 mg  12.5 mg Oral BID WITH MEALS    ferrous sulfate tablet 325 mg  325 mg Oral EVERY OTHER DAY    mirtazapine (REMERON) tablet 15 mg  15 mg Oral QHS    docusate sodium (COLACE) capsule 100 mg  100 mg Oral BID PRN    heparin (porcine) injection 5,000 Units  5,000 Units SubCUTAneous Q8H       Allergies: Augmentin [amoxicillin-pot clavulanate]; Clavulanic acid; and Levaquin [levofloxacin]    Temp (24hrs), Av.6 °F (37 °C), Min:98 °F (36.7 °C), Max:99.3 °F (37.4 °C)    Visit Vitals  BP (!) 101/30   Pulse 63   Temp 98.8 °F (37.1 °C)   Resp 13   Ht 5' 5\" (1.651 m)   Wt 106.2 kg (234 lb 2.1 oz)   SpO2 98%   Breastfeeding No   BMI 38.96 kg/m²       ROS: Unable to obtain detailed review of systems since intubated  Physical Exam:    Constitutional: laying on bed, alert, intubated. Appears comfortable  HEENT: non icteric sclera, ET tube in place, no oral lesions  Cardiovascular: Regular rhythm. Exam reveals no gallop and no friction rub. Pulmonary/Chest: anteriorly her lungs are coarse but clears with cough, she is weaning  Abdominal: Soft. Bowel sounds are normal. exhibits no distension. No tenderness. No rebound and no guarding. Musculoskeletal: Normal strength. exhibits no tenderness. Trace edema of LE  Neurological: awake, alert, answers yes/no questions  No facial asymmetry or focal weakness  Skin: Skin is warm and dry. Psychiatric: Pleasant, cooperative    Labs: Results:   Chemistry Recent Labs     06/15/20  0210 06/14/20  2313 20  0136   * 112* 88    137 136   K 3.6 4.4 4.5    105 103   CO2 29 26 26   BUN 18 18 13   CREA 2.77* 2.52* 1.83*   CA 8.8 6.8* 8.1*   AGAP 8 6 7   BUCR 6* 7* 7*      CBC w/Diff Recent Labs     06/15/20  0210 06/14/20  0136 20  0438   WBC 8.1 8.6 8.1   RBC 2.95* 3.23* 2.39*   HGB 8.3* 9.2* 6.7*   HCT 26.0* 28.8* 20.9*    169 162   GRANS 80* 74* 80*   LYMPH 9* 9* 10*   EOS 0 0 0      Microbiology No results for input(s): CULT in the last 72 hours.        RADIOLOGY:    All available imaging studies/reports in [facility] for this admission were reviewed- pCXR from today is pending    Dr. Vaibhav Vasquez, Infectious Disease Specialist  [phone]  Shakira 15, 2020  10:20 AM

## 2020-06-15 NOTE — PROGRESS NOTES
0710: Bedside and Verbal shift change report given to [facility], RN (oncoming nurse) by Vaishnavi Rollins RN (offgoing nurse). Report included the following information SBAR, Intake/Output, MAR, Recent Results and Med Rec Status. 1200: PA notified of patients BP. No new orders received. Will continue to monitor, Albumin to be added to HD per Dr Maurizio Healy. 1250: Bedside report given to Juan José Palomo RN, all questions answered.

## 2020-06-16 ENCOUNTER — APPOINTMENT (OUTPATIENT)
Dept: GENERAL RADIOLOGY | Age: 70
DRG: 207 | End: 2020-06-16
Attending: PHYSICIAN ASSISTANT
Payer: MEDICARE

## 2020-06-16 LAB
ANION GAP SERPL CALC-SCNC: 9 MMOL/L (ref 3–18)
ARTERIAL PATENCY WRIST A: ABNORMAL
ARTERIAL PATENCY WRIST A: YES
BACTERIA SPEC CULT: NORMAL
BACTERIA SPEC CULT: NORMAL
BASE EXCESS BLD CALC-SCNC: 3 MMOL/L
BASE EXCESS BLD CALC-SCNC: 4 MMOL/L
BASOPHILS # BLD: 0 K/UL (ref 0–0.1)
BASOPHILS NFR BLD: 0 % (ref 0–2)
BDY SITE: ABNORMAL
BODY TEMPERATURE: 98
BUN SERPL-MCNC: 13 MG/DL (ref 7–18)
BUN/CREAT SERPL: 6 (ref 12–20)
CALCIUM SERPL-MCNC: 8.6 MG/DL (ref 8.5–10.1)
CHLORIDE SERPL-SCNC: 102 MMOL/L (ref 100–111)
CO2 SERPL-SCNC: 28 MMOL/L (ref 21–32)
CREAT SERPL-MCNC: 2.14 MG/DL (ref 0.6–1.3)
DIFFERENTIAL METHOD BLD: ABNORMAL
EOSINOPHIL # BLD: 0 K/UL (ref 0–0.4)
EOSINOPHIL NFR BLD: 0 % (ref 0–5)
ERYTHROCYTE [DISTWIDTH] IN BLOOD BY AUTOMATED COUNT: 14.3 % (ref 11.6–14.5)
GAS FLOW.O2 O2 DELIVERY SYS: ABNORMAL L/MIN
GAS FLOW.O2 SETTING OXYMISER: 30 L/M
GLUCOSE BLD STRIP.AUTO-MCNC: 103 MG/DL (ref 70–110)
GLUCOSE BLD STRIP.AUTO-MCNC: 120 MG/DL (ref 70–110)
GLUCOSE BLD STRIP.AUTO-MCNC: 128 MG/DL (ref 70–110)
GLUCOSE BLD STRIP.AUTO-MCNC: 153 MG/DL (ref 70–110)
GLUCOSE BLD STRIP.AUTO-MCNC: 157 MG/DL (ref 70–110)
GLUCOSE SERPL-MCNC: 146 MG/DL (ref 74–99)
HCO3 BLD-SCNC: 28 MMOL/L (ref 22–26)
HCO3 BLD-SCNC: 28.3 MMOL/L (ref 22–26)
HCO3 BLD-SCNC: 29.8 MMOL/L (ref 22–26)
HCO3 BLD-SCNC: 30.5 MMOL/L (ref 22–26)
HCT VFR BLD AUTO: 28.4 % (ref 35–45)
HGB BLD-MCNC: 8.6 G/DL (ref 12–16)
LYMPHOCYTES # BLD: 0.7 K/UL (ref 0.9–3.6)
LYMPHOCYTES NFR BLD: 7 % (ref 21–52)
MAGNESIUM SERPL-MCNC: 2 MG/DL (ref 1.6–2.6)
MCH RBC QN AUTO: 27.3 PG (ref 24–34)
MCHC RBC AUTO-ENTMCNC: 30.3 G/DL (ref 31–37)
MCV RBC AUTO: 90.2 FL (ref 74–97)
MONOCYTES # BLD: 0.3 K/UL (ref 0.05–1.2)
MONOCYTES NFR BLD: 3 % (ref 3–10)
NEUTS SEG # BLD: 8.3 K/UL (ref 1.8–8)
NEUTS SEG NFR BLD: 90 % (ref 40–73)
O2/TOTAL GAS SETTING VFR VENT: 0.5 %
O2/TOTAL GAS SETTING VFR VENT: 100 %
O2/TOTAL GAS SETTING VFR VENT: 100 %
O2/TOTAL GAS SETTING VFR VENT: 60 %
PCO2 BLD: 42.6 MMHG (ref 35–45)
PCO2 BLD: 44.7 MMHG (ref 35–45)
PCO2 BLD: 63.7 MMHG (ref 35–45)
PCO2 BLD: 75.4 MMHG (ref 35–45)
PEEP RESPIRATORY: 10 CMH2O
PEEP RESPIRATORY: 10 CMH2O
PEEP RESPIRATORY: 5 CMH2O
PH BLD: 7.21 [PH] (ref 7.35–7.45)
PH BLD: 7.28 [PH] (ref 7.35–7.45)
PH BLD: 7.41 [PH] (ref 7.35–7.45)
PH BLD: 7.43 [PH] (ref 7.35–7.45)
PHOSPHATE SERPL-MCNC: 3.6 MG/DL (ref 2.5–4.9)
PIP ISTAT,IPIP: 16
PIP ISTAT,IPIP: 20
PLATELET # BLD AUTO: 186 K/UL (ref 135–420)
PMV BLD AUTO: 10.7 FL (ref 9.2–11.8)
PO2 BLD: 105 MMHG (ref 80–100)
PO2 BLD: 148 MMHG (ref 80–100)
PO2 BLD: 69 MMHG (ref 80–100)
PO2 BLD: 99 MMHG (ref 80–100)
POTASSIUM SERPL-SCNC: 3.5 MMOL/L (ref 3.5–5.5)
PRESSURE SUPPORT SETTING VENT: 10 CMH2O
PRESSURE SUPPORT SETTING VENT: 10 CMH2O
PRESSURE SUPPORT SETTING VENT: 7 CMH2O
RBC # BLD AUTO: 3.15 M/UL (ref 4.2–5.3)
SAO2 % BLD: 93 % (ref 92–97)
SAO2 % BLD: 96 % (ref 92–97)
SAO2 % BLD: 98 % (ref 92–97)
SAO2 % BLD: 99 % (ref 92–97)
SERVICE CMNT-IMP: ABNORMAL
SERVICE CMNT-IMP: NORMAL
SERVICE CMNT-IMP: NORMAL
SODIUM SERPL-SCNC: 139 MMOL/L (ref 136–145)
SPECIMEN TYPE: ABNORMAL
SPONTANEOUS TIMED, IST: YES
SPONTANEOUS TIMED, IST: YES
T4 FREE SERPL-MCNC: 1 NG/DL (ref 0.7–1.5)
TOTAL RESP. RATE, ITRR: 19
TOTAL RESP. RATE, ITRR: 20
TOTAL RESP. RATE, ITRR: 20
TOTAL RESP. RATE, ITRR: 25
TSH SERPL DL<=0.05 MIU/L-ACNC: 1.36 UIU/ML (ref 0.36–3.74)
VENTILATION MODE VENT: ABNORMAL
WBC # BLD AUTO: 9.3 K/UL (ref 4.6–13.2)

## 2020-06-16 PROCEDURE — 36600 WITHDRAWAL OF ARTERIAL BLOOD: CPT

## 2020-06-16 PROCEDURE — 36415 COLL VENOUS BLD VENIPUNCTURE: CPT

## 2020-06-16 PROCEDURE — 74011000258 HC RX REV CODE- 258: Performed by: PHYSICIAN ASSISTANT

## 2020-06-16 PROCEDURE — 74011250636 HC RX REV CODE- 250/636: Performed by: PHYSICIAN ASSISTANT

## 2020-06-16 PROCEDURE — 94640 AIRWAY INHALATION TREATMENT: CPT

## 2020-06-16 PROCEDURE — 85025 COMPLETE CBC W/AUTO DIFF WBC: CPT

## 2020-06-16 PROCEDURE — 84443 ASSAY THYROID STIM HORMONE: CPT

## 2020-06-16 PROCEDURE — 77010033711 HC HIGH FLOW OXYGEN

## 2020-06-16 PROCEDURE — P9047 ALBUMIN (HUMAN), 25%, 50ML: HCPCS

## 2020-06-16 PROCEDURE — 74011000258 HC RX REV CODE- 258: Performed by: STUDENT IN AN ORGANIZED HEALTH CARE EDUCATION/TRAINING PROGRAM

## 2020-06-16 PROCEDURE — 65610000006 HC RM INTENSIVE CARE

## 2020-06-16 PROCEDURE — 74011000250 HC RX REV CODE- 250: Performed by: PHYSICIAN ASSISTANT

## 2020-06-16 PROCEDURE — 74011636637 HC RX REV CODE- 636/637: Performed by: INTERNAL MEDICINE

## 2020-06-16 PROCEDURE — 74011250637 HC RX REV CODE- 250/637: Performed by: NURSE PRACTITIONER

## 2020-06-16 PROCEDURE — 71045 X-RAY EXAM CHEST 1 VIEW: CPT

## 2020-06-16 PROCEDURE — 84100 ASSAY OF PHOSPHORUS: CPT

## 2020-06-16 PROCEDURE — 74011000250 HC RX REV CODE- 250: Performed by: INTERNAL MEDICINE

## 2020-06-16 PROCEDURE — C9113 INJ PANTOPRAZOLE SODIUM, VIA: HCPCS | Performed by: PHYSICIAN ASSISTANT

## 2020-06-16 PROCEDURE — 82962 GLUCOSE BLOOD TEST: CPT

## 2020-06-16 PROCEDURE — 94762 N-INVAS EAR/PLS OXIMTRY CONT: CPT

## 2020-06-16 PROCEDURE — 74011000258 HC RX REV CODE- 258: Performed by: INTERNAL MEDICINE

## 2020-06-16 PROCEDURE — 74011000250 HC RX REV CODE- 250

## 2020-06-16 PROCEDURE — 87040 BLOOD CULTURE FOR BACTERIA: CPT

## 2020-06-16 PROCEDURE — 77030005513 HC CATH URETH FOL11 MDII -B

## 2020-06-16 PROCEDURE — 84439 ASSAY OF FREE THYROXINE: CPT

## 2020-06-16 PROCEDURE — 80048 BASIC METABOLIC PNL TOTAL CA: CPT

## 2020-06-16 PROCEDURE — 77030018846 HC SOL IRR STRL H20 ICUM -A

## 2020-06-16 PROCEDURE — 74011250636 HC RX REV CODE- 250/636

## 2020-06-16 PROCEDURE — 83735 ASSAY OF MAGNESIUM: CPT

## 2020-06-16 PROCEDURE — 74011250636 HC RX REV CODE- 250/636: Performed by: INTERNAL MEDICINE

## 2020-06-16 PROCEDURE — 94660 CPAP INITIATION&MGMT: CPT

## 2020-06-16 PROCEDURE — 74011250636 HC RX REV CODE- 250/636: Performed by: STUDENT IN AN ORGANIZED HEALTH CARE EDUCATION/TRAINING PROGRAM

## 2020-06-16 PROCEDURE — 74011250637 HC RX REV CODE- 250/637: Performed by: INTERNAL MEDICINE

## 2020-06-16 RX ORDER — METOLAZONE 5 MG/1
5 TABLET ORAL ONCE
Status: ACTIVE | OUTPATIENT
Start: 2020-06-16 | End: 2020-06-17

## 2020-06-16 RX ORDER — IPRATROPIUM BROMIDE AND ALBUTEROL SULFATE 2.5; .5 MG/3ML; MG/3ML
3 SOLUTION RESPIRATORY (INHALATION)
Status: DISCONTINUED | OUTPATIENT
Start: 2020-06-16 | End: 2020-06-18

## 2020-06-16 RX ORDER — ACETAMINOPHEN 325 MG/1
325 TABLET ORAL
Status: DISCONTINUED | OUTPATIENT
Start: 2020-06-16 | End: 2020-06-24

## 2020-06-16 RX ORDER — POTASSIUM CHLORIDE 7.45 MG/ML
10 INJECTION INTRAVENOUS ONCE
Status: COMPLETED | OUTPATIENT
Start: 2020-06-16 | End: 2020-06-16

## 2020-06-16 RX ORDER — ALBUMIN HUMAN 250 G/1000ML
25 SOLUTION INTRAVENOUS EVERY 6 HOURS
Status: DISCONTINUED | OUTPATIENT
Start: 2020-06-16 | End: 2020-06-17

## 2020-06-16 RX ORDER — BUMETANIDE 0.25 MG/ML
INJECTION INTRAMUSCULAR; INTRAVENOUS
Status: COMPLETED
Start: 2020-06-16 | End: 2020-06-16

## 2020-06-16 RX ORDER — BUMETANIDE 0.25 MG/ML
1 INJECTION INTRAMUSCULAR; INTRAVENOUS ONCE
Status: COMPLETED | OUTPATIENT
Start: 2020-06-16 | End: 2020-06-16

## 2020-06-16 RX ORDER — ALBUMIN HUMAN 250 G/1000ML
SOLUTION INTRAVENOUS
Status: COMPLETED
Start: 2020-06-16 | End: 2020-06-16

## 2020-06-16 RX ADMIN — POTASSIUM CHLORIDE 10 MEQ: 10 INJECTION, SOLUTION INTRAVENOUS at 09:30

## 2020-06-16 RX ADMIN — CHLORHEXIDINE GLUCONATE 0.12% ORAL RINSE 15 ML: 1.2 LIQUID ORAL at 09:00

## 2020-06-16 RX ADMIN — INSULIN LISPRO 2 UNITS: 100 INJECTION, SOLUTION INTRAVENOUS; SUBCUTANEOUS at 05:48

## 2020-06-16 RX ADMIN — DEXTROSE MONOHYDRATE: 10 INJECTION, SOLUTION INTRAVENOUS at 18:00

## 2020-06-16 RX ADMIN — ALBUMIN (HUMAN) 25 G: 0.25 INJECTION, SOLUTION INTRAVENOUS at 17:00

## 2020-06-16 RX ADMIN — IPRATROPIUM BROMIDE AND ALBUTEROL SULFATE 3 ML: .5; 3 SOLUTION RESPIRATORY (INHALATION) at 02:10

## 2020-06-16 RX ADMIN — Medication 10 ML: at 05:54

## 2020-06-16 RX ADMIN — IPRATROPIUM BROMIDE AND ALBUTEROL SULFATE 3 ML: .5; 3 SOLUTION RESPIRATORY (INHALATION) at 13:57

## 2020-06-16 RX ADMIN — PIPERACILLIN AND TAZOBACTAM 2.25 G: 2; .25 INJECTION, POWDER, LYOPHILIZED, FOR SOLUTION INTRAVENOUS at 08:00

## 2020-06-16 RX ADMIN — Medication 10 ML: at 23:39

## 2020-06-16 RX ADMIN — FUROSEMIDE 80 MG: 10 INJECTION, SOLUTION INTRAMUSCULAR; INTRAVENOUS at 18:00

## 2020-06-16 RX ADMIN — IPRATROPIUM BROMIDE AND ALBUTEROL SULFATE 3 ML: .5; 3 SOLUTION RESPIRATORY (INHALATION) at 07:22

## 2020-06-16 RX ADMIN — MIRTAZAPINE 15 MG: 15 TABLET, FILM COATED ORAL at 23:30

## 2020-06-16 RX ADMIN — IPRATROPIUM BROMIDE AND ALBUTEROL SULFATE 3 ML: .5; 3 SOLUTION RESPIRATORY (INHALATION) at 20:58

## 2020-06-16 RX ADMIN — ACETAMINOPHEN 325 MG: 325 TABLET ORAL at 19:04

## 2020-06-16 RX ADMIN — Medication 10 ML: at 14:00

## 2020-06-16 RX ADMIN — HEPARIN SODIUM 5000 UNITS: 5000 INJECTION INTRAVENOUS; SUBCUTANEOUS at 05:48

## 2020-06-16 RX ADMIN — HEPARIN SODIUM 5000 UNITS: 5000 INJECTION INTRAVENOUS; SUBCUTANEOUS at 13:00

## 2020-06-16 RX ADMIN — BUMETANIDE 1 MG: 0.25 INJECTION INTRAMUSCULAR; INTRAVENOUS at 02:27

## 2020-06-16 RX ADMIN — PIPERACILLIN AND TAZOBACTAM 2.25 G: 2; .25 INJECTION, POWDER, LYOPHILIZED, FOR SOLUTION INTRAVENOUS at 02:28

## 2020-06-16 RX ADMIN — INSULIN LISPRO 2 UNITS: 100 INJECTION, SOLUTION INTRAVENOUS; SUBCUTANEOUS at 12:30

## 2020-06-16 RX ADMIN — CHLORHEXIDINE GLUCONATE 0.12% ORAL RINSE 15 ML: 1.2 LIQUID ORAL at 21:00

## 2020-06-16 RX ADMIN — SODIUM CHLORIDE 40 MG: 9 INJECTION INTRAMUSCULAR; INTRAVENOUS; SUBCUTANEOUS at 09:00

## 2020-06-16 RX ADMIN — PIPERACILLIN AND TAZOBACTAM 2.25 G: 2; .25 INJECTION, POWDER, LYOPHILIZED, FOR SOLUTION INTRAVENOUS at 16:00

## 2020-06-16 RX ADMIN — HEPARIN SODIUM 5000 UNITS: 5000 INJECTION INTRAVENOUS; SUBCUTANEOUS at 23:24

## 2020-06-16 RX ADMIN — FUROSEMIDE 80 MG: 10 INJECTION, SOLUTION INTRAMUSCULAR; INTRAVENOUS at 09:00

## 2020-06-16 RX ADMIN — PIPERACILLIN AND TAZOBACTAM 2.25 G: 2; .25 INJECTION, POWDER, LYOPHILIZED, FOR SOLUTION INTRAVENOUS at 23:30

## 2020-06-16 NOTE — PROGRESS NOTES
[facility] Update:  Nurse notified provider of respiratory distress. Patient with saturations in 80's, diaphoretic, weakly following commands. Patient did not have stridor. She was not moving air b/l. A nebulizer was given while RT got high flow which quickly escalated to bipap due to work of breathing. Following neb treatment, patient began to move more air. Bumex 1 mg also given as rales could be appreciated following first neb treatment. Second neb treatment ordered through bipap and ABG obtained:   Results for James Phillips (MRN [id number]) as of 6/16/2020 02:16   Ref. Range 6/16/2020 02:10   pH (POC) Latest Ref Range: 7.35 - 7.45   7.215 (LL)   pCO2 (POC) Latest Ref Range: 35.0 - 45.0 MMHG 75.4 (H)   pO2 (POC) Latest Ref Range: 80 - 100 MMHG 105 (H)   HCO3 (POC) Latest Ref Range: 22 - 26 MMOL/L 30.5 (H)   sO2 (POC) Latest Ref Range: 92 - 97 % 96   Base excess (POC) Latest Units: mmol/L 3   FIO2 (POC) Latest Units: % 100     CXR ordered. Will cont' to monitor. Obtain f/u ABG in 1 hour. Patient at this time has stable vital signs and improving mentation and work of breathing.        Dayana Simmons PA-C  06/16/20  Pulmonary, Critical Care Medicine  [facility]

## 2020-06-16 NOTE — PROGRESS NOTES
0710: Bedside and Verbal shift change report given to [facility], RN (oncoming nurse) by Amairani Clemons RN (offgoing nurse). Report included the following information SBAR, Intake/Output, MAR, Recent Results and Med Rec Status. 1300: Patient offered hair care with a shampoo cap and combing, patient declined at this time. 1915: Bedside and Verbal shift change report given to RN (oncoming nurse) by [facility], RN (offgoing nurse). Report included the following information SBAR, Intake/Output, MAR, Recent Results and Med Rec Status.

## 2020-06-16 NOTE — ROUTINE PROCESS
1910:  Rec'd Araceli Brock  from Amalia Cowden, RN. SBAR reviewed patient-side with two-nurse check-offs done of meds, dressings, wounds, LDA's & revelant assessment findings including neuro status, vent settings, rhythm & pulses, diet. Bed in lowest position with noted SR up, wheels locked and exit alarm on. Tonight:  Alert, speaking quietly. Watching TV. No distress. Using pure-wick. 0150:  Noted off pulse-ox. Found to be extremely diaphoretic with accessory muscle breathing. Ms. Violetta Lundborg came to room, assessed, ordered nebs, diuretic and an increase in resp support. Also, incontinent of urine and very large stool. 0240:  Less WOB, nods that she is more comfortable. 0700:  Since going on BIPAP, more comfortable and has slept soundly most of the time since. Temp & HR have both decreased, Ms. Santos aware and has written orders. 3082:  Verbal Patient-side shift change report given to JENN iDaz RN, (oncoming nurse) by Yogi Adorno RN 
(offgoing nurse). Report included the following information SBAR, Kardex, ED Summary, Procedure Summary, Intake/Output, MAR, Recent Results and Med Rec Status. Included:  Intro, hx, two-RN eval of relevant assessment findings, LDAs, skin, diagnostics and infusions.

## 2020-06-16 NOTE — PROGRESS NOTES
In Patient Progress note      Admit Date: 6/7/2020    Impression:     1) ESRD on HD MWF , plan for HD tomorrow   2) Ac respi failure , multifactorial , Asp PNA  ,AC on chr diastolic CHF  3) AC on chr alfredo CHF  4) PNA , Rapid covid -v e    5) bacteremia , coag neg staph from 6/7 likely contaminant per ID   Bld cx since have been negative   5) Anemia of CKD   6) sec hyperpara     UF ~ 2 lit yesterday'  Soft BP today   CXR reviewed, rt sided patchy opacities : PNA  Extubated , was transitioned to NC but was more hypoxic /hypercapnic   So was placed on BIPAP      Plan:  1) holding HD today   2) diurese with Lasix 80 mg BID   2) albumin 25 gm q6hrs   3) add midodrine   4) follow ID recs   5) continue epogen with HD   6) dose AB for GFR < 15    Please call with questions ,     Nya Staples MD FASN  Cell [phone]  Pager: [phone]     Subjective:      Intubated  Awake following commands    Current Facility-Administered Medications:     albuterol-ipratropium (DUO-NEB) 2.5 MG-0.5 MG/3 ML, 3 mL, Nebulization, Q4H RT, Maricruz Santos PA-C, 3 mL at 06/16/20 1357    piperacillin-tazobactam (ZOSYN) 2.25 g in 0.9% sodium chloride (MBP/ADV) 50 mL MBP, 2.25 g, IntraVENous, Q8H, Margie Mcmanus MD    sodium chloride 3% hypertonic nebulizer soln, 4 mL, Nebulization, TID PRN, Monica Lassiter DO    lidocaine 4 % patch 1 Patch, 1 Patch, TransDERmal, Q24H, Abel Scott PA-C, 1 Patch at 06/16/20 0232    menthol-zinc oxide (CALMOSEPTINE) 0.44-20.6 % ointment, , Topical, Q6H PRN, Abel Scott PA-C    dextrose 10% infusion, , IntraVENous, CONTINUOUS, Abel Scott PA-C, Last Rate: 20 mL/hr at 06/15/20 2000    0.9% sodium chloride infusion 250 mL, 250 mL, IntraVENous, PRN, Abel Scott PA-C    epoetin johnathon-epbx (RETACRIT) injection 10,000 Units, 10,000 Units, SubCUTAneous, Q MON, WED & Nan Onofre MD, 10,000 Units at 06/15/20 2208    midazolam (VERSED) injection 1-2 mg, 1-2 mg, IntraVENous, Q2H PRN, Juhi Jackson PA-C, 2 mg at 06/12/20 1910    pantoprazole (PROTONIX) 40 mg in 0.9% sodium chloride 10 mL injection, 40 mg, IntraVENous, DAILY, Divya Lilly PA-C, 40 mg at 06/16/20 0900    albuterol-ipratropium (DUO-NEB) 2.5 MG-0.5 MG/3 ML, 3 mL, Nebulization, Q6H PRN, Divya Lilly PA-C, 3 mL at 06/16/20 0722    chlorhexidine (PERIDEX) 0.12 % mouthwash 15 mL, 15 mL, Oral, Q12H, Monica Lassiter DO, 15 mL at 06/16/20 0900    sodium chloride (NS) flush 5-40 mL, 5-40 mL, IntraVENous, Q8H, Monica Lassiter DO, 10 mL at 06/16/20 1400    sodium chloride (NS) flush 5-40 mL, 5-40 mL, IntraVENous, PRN, Monica Lassiter,     insulin lispro (HUMALOG) injection, , SubCUTAneous, Q6H, Monica Lassiter DO, 2 Units at 06/16/20 1230    hydrALAZINE (APRESOLINE) 20 mg/mL injection 20 mg, 20 mg, IntraVENous, Q6H PRN, Oneyda Lance PA-C, 20 mg at 06/14/20 0135    ondansetron (ZOFRAN) injection 4 mg, 4 mg, IntraVENous, Q6H PRN, Martine Venegas MD, 4 mg at 06/10/20 0030    glucose chewable tablet 16 g, 4 Tab, Oral, PRN, Padmini Yens, DO    glucagon (GLUCAGEN) injection 1 mg, 1 mg, IntraMUSCular, PRN, Padmini Yens, DO    dextrose (D50W) injection syrg 12.5-25 g, 25-50 mL, IntraVENous, PRN, Padmini Yens, DO, 25 g at 06/13/20 0041    furosemide (LASIX) injection 80 mg, 80 mg, IntraVENous, BID, Hadley Angulo MD, 80 mg at 06/16/20 0900    citalopram (CELEXA) tablet 20 mg, 20 mg, Oral, DAILY, Hadley Angulo MD, Stopped at 06/16/20 0900    cyanocobalamin tablet 1,000 mcg, 1,000 mcg, Oral, BID, Hadley Angulo MD, Stopped at 06/15/20 1800    loratadine (CLARITIN) tablet 10 mg, 10 mg, Oral, DAILY, Hadley Angulo MD, Stopped at 06/16/20 0900    aspirin chewable tablet 81 mg, 81 mg, Oral, DAILY, Hadley Angulo MD, Stopped at 06/16/20 0900    [Held by provider] isosorbide mononitrate ER (IMDUR) tablet 30 mg, 30 mg, Oral, DAILY, Hadley Angulo MD, Stopped at 06/11/20 0900    polyethylene glycol (MIRALAX) packet 17 g, 17 g, Oral, DAILY, Hadley Angulo MD, Stopped at 06/15/20 0900    [Held by provider] rosuvastatin (CRESTOR) tablet 5 mg, 5 mg, Oral, QHS, Hadley Angulo MD, Stopped at 06/11/20 2200    [Held by provider] gabapentin (NEURONTIN) capsule 100 mg, 100 mg, Oral, BID, Hadley Angulo MD, Stopped at 06/11/20 0900    [Held by provider] carvediloL (COREG) tablet 12.5 mg, 12.5 mg, Oral, BID WITH MEALS, Hadley Angulo MD, Stopped at 06/11/20 0800    ferrous sulfate tablet 325 mg, 325 mg, Oral, EVERY OTHER DAY, Hadley Angulo MD, Stopped at 06/12/20 0421    mirtazapine (REMERON) tablet 15 mg, 15 mg, Oral, QHS, Hadley Angulo MD, Stopped at 06/15/20 2200    docusate sodium (COLACE) capsule 100 mg, 100 mg, Oral, BID PRN, Hadley Angulo MD    heparin (porcine) injection 5,000 Units, 5,000 Units, SubCUTAneous, Q8H, Hadley Angulo MD, 5,000 Units at 06/16/20 1300        Objective:     Visit Vitals  BP (!) 112/26   Pulse 71   Temp 97.8 °F (36.6 °C)   Resp 22   Ht 5' 5\" (1.651 m)   Wt 101.7 kg (224 lb 3.3 oz)   SpO2 95%   Breastfeeding No   BMI 37.31 kg/m²         Intake/Output Summary (Last 24 hours) at 6/16/2020 1450  Last data filed at 6/16/2020 1200  Gross per 24 hour   Intake 750 ml   Output 3600 ml   Net -2850 ml       Physical Exam:     Intubated. / sedated  HENT mmm  RS AEBE coarse BS   CVS s1 s2 wnl no JVD  GI soft BS +  Ext 1+ LE edema     Data Review:    Recent Labs     06/16/20  0524   WBC 9.3   RBC 3.15*   HCT 28.4*   MCV 90.2   MCH 27.3   MCHC 30.3*   RDW 14.3     Recent Labs     06/16/20  0524 06/15/20  0210 06/14/20  2313 06/14/20  0136   BUN 13 18 18 13   CREA 2.14* 2.77* 2.52* 1.83*   CA 8.6 8.8 6.8* 8.1*   K 3.5 3.6 4.4 4.5    138 137 136    101 105 103   CO2 28 29 26 26   PHOS 3.6 2.7 2.5 1.8*   * 106* 112* 88       Fiorella Donnelly MD

## 2020-06-16 NOTE — PROGRESS NOTES
[facility]  Pulmonary, Critical Care, and Sleep Medicine    Name: Jenny Carter MRN: [id number]   : 1950 Hospital: [facility]   Date: 2020        IMPRESSION:   · Acute on chronic hypoxemic respiratory failure requiring mechanical ventilation - initially in the setting of PNA and fluid overload, however, intubated 20 2/2 aspiration of partial dentures. Extubated to [place] 6/15, escalated to BiPAP on . · Aspiration of foreign body (partials) in to the hypopharynx- retrieved with forceps. Bronch 20- some mucus plugs, small caliber airways noted but no foreign material. Probable additional episode of aspiration on  after extubation. · RUL infiltrate/PNA - CXR 20,scant yeast on cx   · Acute on HFpEF - most recent echo 20 w/ EF of 55-60%   · Bacteremia vs contaminant- GPC in groups in 1/2 bottles, repeat cultures negative.    · ESRD - HD MWF, BUN 13, Cr 2.14  · Acute on chronic anemia- H/H 8.3 and 26, no signs of active bleeding     Patient Active Problem List   Diagnosis Code    Acute on chronic diastolic congestive heart failure (HCC) I50.33    Suspected COVID-19 virus infection Z20.828    Type 2 diabetes mellitus without complication, without long-term current use of insulin ([facility]) E11.9    Left anterior fascicular block I44.4    HTN (hypertension), benign I10    Coronary artery disease involving native coronary artery of native heart without angina pectoris I25.10    Chronic diastolic congestive heart failure ([facility]) I50.32    Bilateral lower extremity edema R60.0    BMI 35.0-35.9,adult Z68.35    CHF (congestive heart failure) ([facility]) I50.9    Sepsis ([facility]) A41.9    Respiratory failure ([facility]) J96.90    SIRS (systemic inflammatory response syndrome) ([facility]) R65.10    Acute on chronic respiratory failure ([facility]) J96.20    Pneumonia J18.9    Fluid overload E87.70      PLAN:   Resp:   -Passed SBT yesterday, extubated to NC however overnight became hypoxic in the 80's with diaphoresis and increased work of breathing. Escalated to BiPAP and given 1mg bumex, 2 nebulizers. Vitals stabilized. Suspicious for episode of aspiration.   -Titrate FiO2 for SpO2 >90%  CT chest showed no additional retained foreign bodies in the airway  -Bronch on 6/11/20 demonstrated distant mucus plugs / friable airway but otherwise normal.  -Aspiration precautions  -keep HOB > 30 degrees  I/D:   -Sepsis bundle per hospital protocol  -blood culture 6/7 + coag negative staph. Subsequent cx no growth. After acute decompensation overnight, blood cultures resent.   - Respiratory culture positive for scant yeast.   -Continue zosyn per ID  Hem/Onc:   -s/p PRBC transfusion 6/13  CVS:  - HD stable without pressors.    -Lasix 80mg daily   -Continue hydralazine   Metabolic:   -Daily BMP; monitor e-lytes; replace PRN  Renal:   -Trend Renal indices; HD MWF per nephrology   Endocrine:  -POC Glucose q6; SSI  GI:   -NPO for now, will have speech eval for diet when more stable. D10 drip currently. protonix for SUP, Trend LFTs, Zofran PRN for N/V . Continue miralax and colace daily. Musc/Skin:  -No acute issues, wound care as needed  Neuro:   -Off sedation     Code Status: full code   ·   Subjective/Interval History:      Patient is a 71 y.o. female w/ pmhx of ESRD on HD, DM, and CHF admitted for acute hypoxemic respiratory failure and subsequently started on tx for pna. Patient was started on BIPAP and intubated for progressive respiratory failure. Noted to have foreign body on CT w/ forceps denture retrieval. Pt extubated on 6/15 to NC, escalated to BiPAP on 6/16 after acute respiratory failure. 6/15/20    Overnight noted to have episode in which pt became hypoxic in the 80's bradycardic in the 40's, diaphoretic with increased work of breathing. She was placed on BiPAP and given 1mg bumex, 2 nebs.  Her vitals stabilized after this, ABG around this time showed hypercapnia and acidosis. -afebrile  -Responds to verbal stimuli, follows commands    ROS:A comprehensive review of systems was negative except for that written in the HPI. Objective:   Vital Signs:    Visit Vitals  BP (!) 98/31   Pulse (!) 55   Temp 97.4 °F (36.3 °C) Comment: warming blanket lowered to 32. Resp 13   Ht 5' 5\" (1.651 m)   Wt 101.7 kg (224 lb 3.3 oz)   SpO2 100%   Breastfeeding No   BMI 37.31 kg/m²       O2 Device: BIPAP   O2 Flow Rate (L/min): 4 l/min   Temp (24hrs), Av °F (35.6 °C), Min:93.4 °F (34.1 °C), Max:99 °F (37.2 °C)       Intake/Output:   Last shift:      No intake/output data recorded. Last 3 shifts:  1901 -  0700  In: 1440 [I.V.:1280]  Out: 4475 [Urine:1975]    Intake/Output Summary (Last 24 hours) at 2020 0819  Last data filed at 2020 0700  Gross per 24 hour   Intake 800 ml   Output 3900 ml   Net -3100 ml         Physical Exam:      General:  BiPAP, NAD   Head:  Normocephalic, without obvious abnormality, atraumatic. Eyes:  Conjunctivae/corneas clear. PERRL   Nose: Nares normal. Septum midline. Mucosa normal   Throat: Lips, mucosa, and tongue normal. edentulous   Neck: Supple, symmetrical, trachea midline, no adenopathy. Lungs:   Symmetrical chest rise; scattered rhonchi, coarse breath sounds on the right. Heart:  RRR, S1, S2 normal, no m/r/g   Abdomen:   Soft, non-tender. Bowel sounds normal. No masses   Extremities: Extremities normal, atraumatic. + 1 lower ext edema    Pulses: 2+ and symmetric all extremities. Skin: Skin color, texture, turgor normal. No rashes or lesions   Neurologic: Grossly nonfocal. Awakens easily to verbal stimuli, follows commands.     Devices: PIV, BiPAP         DATA:  Labs:  Recent Labs     20  0524 06/15/20  0210 20  0136   WBC 9.3 8.1 8.6   HGB 8.6* 8.3* 9.2*   HCT 28.4* 26.0* 28.8*    175 169     Recent Labs     20  0524 06/15/20  0210 20  2313 20  0136    138 137 136   K 3.5 3.6 4.4 4.5  101 105 103   CO2 28 29 26 26   * 106* 112* 88   BUN 13 18 18 13   CREA 2.14* 2.77* 2.52* 1.83*   CA 8.6 8.8 6.8* 8.1*   MG 2.0 2.1  --  2.0   PHOS 3.6 2.7 2.5 1.8*     No results for input(s): PH, PCO2, PO2, HCO3, FIO2 in the last 72 hours.      Results     Procedure Component Value Units Date/Time    CULTURE, BLOOD [[id number]] Collected:  06/16/20 0628    Order Status:  Completed Specimen:  Blood Updated:  06/16/20 0631    CULTURE, BLOOD [[id number]] Collected:  06/16/20 0625    Order Status:  Completed Specimen:  Blood Updated:  06/16/20 0632    CULTURE, RESPIRATORY/SPUTUM/BRONCH Haily Howe STAIN [[id number]]  (Abnormal) Collected:  06/11/20 1920    Order Status:  Completed Specimen:  Bronchial lavage Updated:  06/14/20 1353     Special Requests: --        BRONCHIAL LAVAGE  RIGHT  LUNG       GRAM STAIN 2+ WBCS SEEN         NO ORGANISMS SEEN        Culture result:       SCANT NORMAL RESPIRATORY MARS            SCANT YEAST       CULTURE, BLOOD [[id number]] Collected:  06/11/20 1820    Order Status:  Completed Specimen:  Blood Updated:  06/16/20 0652     Special Requests: NO SPECIAL REQUESTS        Culture result: NO GROWTH 5 DAYS       CULTURE, BLOOD [[id number]] Collected:  06/11/20 1620    Order Status:  Completed Specimen:  Blood Updated:  06/16/20 0652     Special Requests: NO SPECIAL REQUESTS        Culture result: NO GROWTH 5 DAYS       CULTURE, RESPIRATORY/SPUTUM/BRONCH Haily Howe STAIN [[id number]] Collected:  06/11/20 0600    Order Status:  Canceled Specimen:  Endotracheal aspirate     CULTURE, BLOOD [[id number]] Collected:  06/10/20 1231    Order Status:  Completed Specimen:  Blood Updated:  06/16/20 0652     Special Requests: NO SPECIAL REQUESTS        Culture result: NO GROWTH 6 DAYS       CULTURE, BLOOD [[id number]] Collected:  06/10/20 1230    Order Status:  Completed Specimen:  Blood Updated:  06/16/20 0652     Special Requests: NO SPECIAL REQUESTS        Culture result: NO GROWTH 6 DAYS CULTURE, BLOOD [[id number]]  (Abnormal) Collected:  06/07/20 2223    Order Status:  Completed Specimen:  Blood Updated:  06/11/20 1230     Special Requests: NO SPECIAL REQUESTS        GRAM STAIN       AEROBIC BOTTLE GRAM POSITIVE COCCI IN GROUPS                  SMEAR CALLED TO AND CORRECTLY REPEATED BY: Glenn Millard RN  4N 6/10/2020 0802 TO LAE           Culture result:       STAPHYLOCOCCUS SPECIES, COAGULASE NEGATIVE GROWING IN AEROBIC BOTTLE          CULTURE, BLOOD [[id number]] Collected:  06/07/20 2200    Order Status:  Completed Specimen:  Blood Updated:  06/13/20 0633     Special Requests: NO SPECIAL REQUESTS        Culture result: NO GROWTH 6 DAYS               PFT:                                                     Echo:  04/22/20   ECHO ADULT FOLLOW-UP OR LIMITED 04/26/2020 4/26/2020    Narrative · Normal cavity size, wall thickness and systolic function (ejection   fraction normal). Estimated left ventricular ejection fraction is 55 -   60%. Visually measured ejection fraction. · Dilated left atrium. · Dilated right atrium. · Pulmonary arterial systolic pressure is 32 mmHg. Signed by: Spencer Russell MD       Imaging:  [x]I have personally reviewed the patients radiographs    CXR Results  (Last 48 hours)               06/15/20 1109  XR CHEST PORT Final result    Impression:  IMPRESSION:       Stable ET tube terminating 1.5 cm above the garth. Enteric tube extends below   the diaphragm beyond the field-of-view. Stable enlarged cardiac silhouette with vascular congestion and pulmonary   opacities with pleural effusions. Narrative:  EXAM:  XR CHEST PORT       INDICATION:   Respiratory distress/ oxygen desaturation/ altered mental status       COMPARISON: 6/14/2020 and priors. FINDINGS:   ET tube tip about 1.5 cm above the garth. Stable enteric tube terminating   beyond the field-of-view in the abdomen. . Stable right jugular catheter. Stable   enlarged cardiac silhouette.  Aortic atherosclerosis. Pulmonary vascular   congestion. Similar retrocardiac opacification and hazy right perihilar and   basilar opacities. No pneumothorax. Layering bilateral pleural effusions.                  Lauren Apgar, DO   [facility] PGY-1

## 2020-06-16 NOTE — PROGRESS NOTES
NUTRITION    Nursing Referral: [facility]     RECOMMENDATIONS / PLAN:     - Monitor ability to tolerate oral diet versus need for enteral nutrition support. - IV fluid per MD.   - Continue RD inpatient monitoring and evaluation. NUTRITION INTERVENTIONS & DIAGNOSIS:     - Enteral nutrition: discontinue, NGT removed   - IV fluid: D10 at 20 mL/hr (48 gm dextrose, 163 kcal per day)  - Collaboration and referral of nutrition care: interdisciplinary rounds     Nutrition Diagnosis: Inadequate oral intake related to respiratory status as evidenced by pt NPO. Increased nutrient needs protein related to increased expenditure as evidenced by ESRD on HD    ASSESSMENT:     6/16: Started on trickle feeds yesterday then held, NGT clamped and removed with extubation by the afternoon. Remains NPO with concern for aspiration due to worsening chest xray today and may require re-intubation per MD. To remain NPO, diuresis and receiving dextrose infusion. 6/15: Remains intubated, OGT placed overnight/clamped and plan for dialysis today. Possible extubation today, dextrose infusion started for hypoglycemia- improved over the past 2 days. 6/12: Remains intubated s/p bronch yesterday and currently on SBT with plan for extubation today. HD today. Swallow evaluation planned once extubated, recent BG WNL at 81-90 mg/dL and monitoring.   6/11: s/p RRT overnight for hypoxia, increased work of breakfast and copious secretions, noted to be chocking on her partial denture- intubated for airway protection and foreign body retrieved during procedure; plan for imaging today to confirm removal and possible extubation. No OG or NGT placed. 6/9: Admitted with pneumonia, CHF with hx of ESRD on HD. Diet started and noted pt eating meals in ED, intake unknown at this time. Wt gain noted PTA, question fluid status. Hx yesterday UF 3.5 L, 1200 mL FR recommended per Nephrology.     Nutritional intake adequate to meet patients estimated nutritional needs: No    Diet: DIET NPO  DIET TUBE FEEDING     Food Allergies: NKFA  Current Appetite: Not Applicable - NPO  Appetite/meal intake prior to admission: Unable to determine at this time  Feeding Limitations:  [x] Swallowing difficulty: SLP signed off    [x] Chewing difficulty: partial dentures- chocking on lead to intubation 6/11/20    [x] Other: respiratory status   Current Meal Intake:   Patient Vitals for the past 100 hrs:   % Diet Eaten   06/12/20 2000 0 %     HD 6/15 UF: 2.5 L  Anuric/Oliguric    BM: 6/16  Skin Integrity: WDL   Edema:   [] No     [x] Yes   Pertinent Medications: Reviewed: cyanocobalamin, docusate sodium prn, ferrous sulfate, furosemide, mirtazapine, pantoprazole, ondansetron, miralax, epoetin, rosuvastatin- held, bumetanide, 10 mEq KCl     Recent Labs     06/16/20  0524 06/15/20  0210 06/14/20  2313 06/14/20  0136    138 137 136   K 3.5 3.6 4.4 4.5    101 105 103   CO2 28 29 26 26   * 106* 112* 88   BUN 13 18 18 13   CREA 2.14* 2.77* 2.52* 1.83*   CA 8.6 8.8 6.8* 8.1*   MG 2.0 2.1  --  2.0   PHOS 3.6 2.7 2.5 1.8*       Intake/Output Summary (Last 24 hours) at 6/16/2020 1334  Last data filed at 6/16/2020 1200  Gross per 24 hour   Intake 750 ml   Output 3600 ml   Net -2850 ml       Anthropometrics:  Ht Readings from Last 1 Encounters:   06/08/20 5' 5\" (1.651 m)     Last 3 Recorded Weights in this Encounter    06/13/20 2209 06/15/20 0400 06/16/20 0430   Weight: 106.1 kg (233 lb 14.5 oz) 106.2 kg (234 lb 2.1 oz) 101.7 kg (224 lb 3.3 oz)     Body mass index is 37.31 kg/m².    Obese Class II    Weight History: weight gain noted PTA, question fluid status, ESRD on HD    Weight Metrics 6/16/20202020 5/13/2020 4/30/2020 4/29/2020 4/26/2020 8/18/2016 6/2/2016   Weight 224 lb 3.3 oz 238 lb 5.1 oz - 252 lb 252 lb 4.8 oz 219 lb 228 lb   BMI 37.31 kg/m2 - 39.66 kg/m2 41.93 kg/m2 41.98 kg/m2 36.44 kg/m2 37.94 kg/m2        Admitting Diagnosis: Pneumonia [J18.9]  Pertinent PMHx: CHF, DM, GERD, HTN, uterine cancer, ESRD on HD    Education Needs:        [x] None identified  [] Identified - Not appropriate at this time  []  Identified and addressed - refer to education log  Learning Limitations:   [x] None identified  [] Identified:   Cultural, Jehovah's witness & ethnic food preferences:  [x] None identified    [] Identified and addressed     ESTIMATED NUTRITION NEEDS:     Calories: 1043-5283 kcal (30-35 kcal/kg) based on  [] Actual  kg     [x] SBW 67 kg   Protein:  gm (1.2-1.5 gm/kg) based on  [] Actual BW      [x] SBW   Fluid: 750-1500 mL/day     MONITORING & EVALUATION:     Nutrition Goal(s):   - Nutritional needs will be met through adequate oral intake or nutrition support within the next 7 days. Outcome: Not progressing towards goal     Monitoring:   [x] Food and nutrient intake   [x] Food and nutrient administration  [x] Comparative standards   [x] Nutrition-focused physical findings   [x] Anthropometric Measurements   [x] Treatment/therapy   [x] Biochemical data, medical tests, and procedures        Previous Recommendations (for follow-up assessments only):  No Longer Appropriate      Discharge Planning: Nutritional discharge needs unknown at this time. Participated in care planning, discharge planning, & interdisciplinary rounds as appropriate.       Kian Saleh RD, [address]   Pager: [phone]

## 2020-06-16 NOTE — PROGRESS NOTES
Notified by nursing that patient is desaturating. Entered the room and patient in respiratory distress with low saturations in the 80's. High flow was placed on patient while nursing administered a nebulizer treatment. High flow ineffective and BiPAP was placed on the patient that proved more beneficial as well as a nebulizer treatment given inline with the machine. ABG was obtained and a post ABG is planned post 45 minutes to an hour after being on the BiPAP to hopefully show improvement. Will continue to monitor.

## 2020-06-16 NOTE — PROGRESS NOTES
attended the interdisciplinary rounds for [facility], who is a 71 y.o.,female. Patients Primary Language is: [place]. According to the patients EMR [place] Affiliation is: Denominational.     The reason the Patient came to the hospital is:   Patient Active Problem List    Diagnosis Date Noted    Fluid overload 06/10/2020    Pneumonia 06/08/2020    Acute on chronic respiratory failure ([address] 05/09/2020    Sepsis ([address] 04/30/2020    Respiratory failure ([address] 04/30/2020    SIRS (systemic inflammatory response syndrome) ([address] 04/30/2020    CH[facility]est[facility]) [facility] 04/25/2020    Acute on chronic diastolic congestive heart failure ([address] 04/23/2020    Suspected COVID-19 virus infection 04/23/2020    Type 2 diabetes mellitus without complication, without long-term current use of insulin ([address] 07/06/2018    Left anterior fascicular block 07/06/2018    HTN (hypertension), benign 07/06/2018    Coronary artery disease involving native coronary artery of native heart without angina pectoris 07/06/2018    Chronic diastolic congestive heart failure ([address] 07/06/2018    Bilateral lower extremity edema 07/06/2018    BMI 35.0-35.9,adult 07/06/2018        Plan:  Chaplains will continue to follow and will provide pastoral care on an as needed/requested basis.  recommends bedside caregivers page  on duty if patient shows signs of acute spiritual or emotional distress.     [address] [facility]  Board Certified [address] [place] [address]   [phone]

## 2020-06-16 NOTE — PROGRESS NOTES
Infectious Disease progress Note        Reason: Gram-positive bloodstream infection, pneumonia    Current abx Prior abx   Piperacillin/tazobactam since 6/7   Vancomycin 6/7; 6/10-6/12     Assessment :  69 y.o. female  with multiple medical problems including diastolic CHF, diabetes, arthritis, acid reflux hiatal hernia, chronic hypoxic failure on home oxygen 2 L, hypertension, end-stage renal disease on dialysis Tuesday Thursday Saturday who presented to the emergency department via EMS on 6/8/20 with shortness of breath per report from [facility] and rehab. Patient presents with a highly complex clinical picture. Clinical presentation seems consistent with acute respiratory failure secondary to fluid overload, acute on chronic diastolic CHF. Single positive blood culture on 6/7 for coagulase-negative Staphylococcus likely contaminant. Negative blood culture 6/10    Acute on chronic respiratory failure requiring mechanical ventilation - intubated 6/11 due to aspiration of partial dentures. Removed. S/p bronchoscopy 6/11- findings noted. Clinically better. Improved oxygenation. cxr 6/14- Bilateral lung opacities, right greater than left, extensive on the right, consistent with airspace disease and atelectasis    Recommendations:  1. Continue piperacillin/tazobactam.  Tx for aspiration with a shorter course anticipated  2. F/u ICU team recommendations. 3.  Follow-up nephrology recommendations  4. Duration of antibiotics based on the clinical course-> if she continues to do well consider d/c of pip/tazo tomorrow. Relayed information to pt. She denies questions today. Please call me if any further questions or concerns. Will continue to participate in the care of this patient. HPI:  Pt reports that she is feeling \"ok\" today  Denies n/v/abd pain  Extubated and there was concern for some per-extubation aspiration, but pt tolerating extubation well this morning.   Feels her breathing is ok.     Current Facility-Administered Medications   Medication Dose Route Frequency    albuterol-ipratropium (DUO-NEB) 2.5 MG-0.5 MG/3 ML  3 mL Nebulization Q4H RT    potassium chloride 10 mEq in 100 ml IVPB  10 mEq IntraVENous ONCE    piperacillin-tazobactam (ZOSYN) 2.25 g in 0.9% sodium chloride (MBP/ADV) 50 mL MBP  2.25 g IntraVENous Q6H    sodium chloride 3% hypertonic nebulizer soln  4 mL Nebulization TID PRN    lidocaine 4 % patch 1 Patch  1 Patch TransDERmal Q24H    menthol-zinc oxide (CALMOSEPTINE) 0.44-20.6 % ointment   Topical Q6H PRN    dextrose 10% infusion   IntraVENous CONTINUOUS    0.9% sodium chloride infusion 250 mL  250 mL IntraVENous PRN    epoetin johnathon-epbx (RETACRIT) injection 10,000 Units  10,000 Units SubCUTAneous Q MON, WED & FRI    midazolam (VERSED) injection 1-2 mg  1-2 mg IntraVENous Q2H PRN    pantoprazole (PROTONIX) 40 mg in 0.9% sodium chloride 10 mL injection  40 mg IntraVENous DAILY    albuterol-ipratropium (DUO-NEB) 2.5 MG-0.5 MG/3 ML  3 mL Nebulization Q6H PRN    chlorhexidine (PERIDEX) 0.12 % mouthwash 15 mL  15 mL Oral Q12H    sodium chloride (NS) flush 5-40 mL  5-40 mL IntraVENous Q8H    sodium chloride (NS) flush 5-40 mL  5-40 mL IntraVENous PRN    insulin lispro (HUMALOG) injection   SubCUTAneous Q6H    hydrALAZINE (APRESOLINE) 20 mg/mL injection 20 mg  20 mg IntraVENous Q6H PRN    ondansetron (ZOFRAN) injection 4 mg  4 mg IntraVENous Q6H PRN    glucose chewable tablet 16 g  4 Tab Oral PRN    glucagon (GLUCAGEN) injection 1 mg  1 mg IntraMUSCular PRN    dextrose (D50W) injection syrg 12.5-25 g  25-50 mL IntraVENous PRN    furosemide (LASIX) injection 80 mg  80 mg IntraVENous BID    citalopram (CELEXA) tablet 20 mg  20 mg Oral DAILY    cyanocobalamin tablet 1,000 mcg  1,000 mcg Oral BID    loratadine (CLARITIN) tablet 10 mg  10 mg Oral DAILY    aspirin chewable tablet 81 mg  81 mg Oral DAILY    [Held by provider] isosorbide mononitrate ER (IMDUR) tablet 30 mg  30 mg Oral DAILY    polyethylene glycol (MIRALAX) packet 17 g  17 g Oral DAILY    [Held by provider] rosuvastatin (CRESTOR) tablet 5 mg  5 mg Oral QHS    [Held by provider] gabapentin (NEURONTIN) capsule 100 mg  100 mg Oral BID    [Held by provider] carvediloL (COREG) tablet 12.5 mg  12.5 mg Oral BID WITH MEALS    ferrous sulfate tablet 325 mg  325 mg Oral EVERY OTHER DAY    mirtazapine (REMERON) tablet 15 mg  15 mg Oral QHS    docusate sodium (COLACE) capsule 100 mg  100 mg Oral BID PRN    heparin (porcine) injection 5,000 Units  5,000 Units SubCUTAneous Q8H       Allergies: Augmentin [amoxicillin-pot clavulanate]; Clavulanic acid; and Levaquin [levofloxacin]    Temp (24hrs), Av.1 °F (35.6 °C), Min:93.4 °F (34.1 °C), Max:99 °F (37.2 °C)    Visit Vitals  BP (!) 122/34 (BP 1 Location: Left arm, BP Patient Position: At rest)   Pulse (!) 55   Temp 97.5 °F (36.4 °C)   Resp 11   Ht 5' 5\" (1.651 m)   Wt 101.7 kg (224 lb 3.3 oz)   SpO2 99%   Breastfeeding No   BMI 37.31 kg/m²       ROS: Unable to obtain detailed review of systems since intubated  Physical Exam:    Constitutional: laying on bed, NAD  HEENT: non icteric sclera, no oral lesions  Cardiovascular: Regular rhythm. Exam reveals no gallop and no friction rub. Pulmonary/Chest: CTAB anteriorly  Abdominal: Soft. Bowel sounds are normal. NT, ND  Musculoskeletal: Normal strength. exhibits no tenderness. Trace edema of LE  Neurological: awake, alert, answers yes/no questions  No facial asymmetry or focal weakness  Skin: Skin is warm and dry.    Psychiatric: Pleasant, cooperative    Labs: Results:   Chemistry Recent Labs     20  0524 06/15/20  0210 20  2313   * 106* 112*    138 137   K 3.5 3.6 4.4    101 105   CO2 28 29 26   BUN 13 18 18   CREA 2.14* 2.77* 2.52*   CA 8.6 8.8 6.8*   AGAP 9 8 6   BUCR 6* 6* 7*      CBC w/Diff Recent Labs     20  0524 06/15/20  0210 20  0136   WBC 9.3 8.1 8.6   RBC 3.15* 2.95* 3.23*   HGB 8.6* 8.3* 9.2*   HCT 28.4* 26.0* 28.8*    175 169   GRANS 90* 80* 74*   LYMPH 7* 9* 9*   EOS 0 0 0      Microbiology No results for input(s): CULT in the last 72 hours. RADIOLOGY:  pCXR 6/16:  Fluctuating airspace opacities in the midlung zones, possibly on the basis of  atelectasis in this patient with relatively low lung volumes and previously  imaged pleural effusions on the chest CT of 6/13/2020. Pneumonia could be occult  in this setting.     Some degree of underlying interstitial edema is also likely present.     Dr. Mingo Ruiz, Infectious Disease Specialist  [phone]  June 16, 2020  10:20 AM

## 2020-06-17 LAB
ANION GAP SERPL CALC-SCNC: 6 MMOL/L (ref 3–18)
APPEARANCE UR: ABNORMAL
BACTERIA SPEC CULT: NORMAL
BACTERIA SPEC CULT: NORMAL
BACTERIA URNS QL MICRO: ABNORMAL /HPF
BASOPHILS # BLD: 0 K/UL (ref 0–0.06)
BASOPHILS NFR BLD: 0 % (ref 0–3)
BILIRUB UR QL: NEGATIVE
BUN SERPL-MCNC: 19 MG/DL (ref 7–18)
BUN/CREAT SERPL: 6 (ref 12–20)
CALCIUM SERPL-MCNC: 8.2 MG/DL (ref 8.5–10.1)
CHLORIDE SERPL-SCNC: 105 MMOL/L (ref 100–111)
CO2 SERPL-SCNC: 26 MMOL/L (ref 21–32)
COLOR UR: YELLOW
CREAT SERPL-MCNC: 3.12 MG/DL (ref 0.6–1.3)
DIFFERENTIAL METHOD BLD: ABNORMAL
EOSINOPHIL # BLD: 0 K/UL (ref 0–0.4)
EOSINOPHIL NFR BLD: 0 % (ref 0–5)
EPITH CASTS URNS QL MICRO: ABNORMAL /LPF (ref 0–5)
ERYTHROCYTE [DISTWIDTH] IN BLOOD BY AUTOMATED COUNT: 14 % (ref 11.6–14.5)
GLUCOSE BLD STRIP.AUTO-MCNC: 141 MG/DL (ref 70–110)
GLUCOSE BLD STRIP.AUTO-MCNC: 144 MG/DL (ref 70–110)
GLUCOSE BLD STRIP.AUTO-MCNC: 152 MG/DL (ref 70–110)
GLUCOSE BLD STRIP.AUTO-MCNC: 155 MG/DL (ref 70–110)
GLUCOSE SERPL-MCNC: 148 MG/DL (ref 74–99)
GLUCOSE UR STRIP.AUTO-MCNC: NEGATIVE MG/DL
HCT VFR BLD AUTO: 28.6 % (ref 35–45)
HGB BLD-MCNC: 8.8 G/DL (ref 12–16)
HGB UR QL STRIP: ABNORMAL
KETONES UR QL STRIP.AUTO: NEGATIVE MG/DL
LEUKOCYTE ESTERASE UR QL STRIP.AUTO: ABNORMAL
LYMPHOCYTES # BLD: 1.2 K/UL (ref 0.8–3.5)
LYMPHOCYTES NFR BLD: 10 % (ref 20–51)
MAGNESIUM SERPL-MCNC: 1.9 MG/DL (ref 1.6–2.6)
MCH RBC QN AUTO: 27.6 PG (ref 24–34)
MCHC RBC AUTO-ENTMCNC: 30.8 G/DL (ref 31–37)
MCV RBC AUTO: 89.7 FL (ref 74–97)
MONOCYTES # BLD: 0.5 K/UL (ref 0–1)
MONOCYTES NFR BLD: 4 % (ref 2–9)
NEUTS SEG # BLD: 10.3 K/UL (ref 1.8–8)
NEUTS SEG NFR BLD: 86 % (ref 42–75)
NITRITE UR QL STRIP.AUTO: NEGATIVE
PH UR STRIP: 5 [PH] (ref 5–8)
PHOSPHATE SERPL-MCNC: 3.1 MG/DL (ref 2.5–4.9)
PLATELET # BLD AUTO: 187 K/UL (ref 135–420)
PLATELET COMMENTS,PCOM: ABNORMAL
PMV BLD AUTO: 10.8 FL (ref 9.2–11.8)
POTASSIUM SERPL-SCNC: 3.1 MMOL/L (ref 3.5–5.5)
PROT UR STRIP-MCNC: 300 MG/DL
RBC # BLD AUTO: 3.19 M/UL (ref 4.2–5.3)
RBC #/AREA URNS HPF: ABNORMAL /HPF (ref 0–5)
RBC MORPH BLD: ABNORMAL
SERVICE CMNT-IMP: NORMAL
SERVICE CMNT-IMP: NORMAL
SODIUM SERPL-SCNC: 137 MMOL/L (ref 136–145)
SP GR UR REFRACTOMETRY: 1.02 (ref 1–1.03)
UROBILINOGEN UR QL STRIP.AUTO: 0.2 EU/DL (ref 0.2–1)
WBC # BLD AUTO: 12 K/UL (ref 4.6–13.2)
WBC URNS QL MICRO: ABNORMAL /HPF (ref 0–4)
YEAST URNS QL MICRO: ABNORMAL

## 2020-06-17 PROCEDURE — 74011000250 HC RX REV CODE- 250: Performed by: PHYSICIAN ASSISTANT

## 2020-06-17 PROCEDURE — 94640 AIRWAY INHALATION TREATMENT: CPT

## 2020-06-17 PROCEDURE — 80048 BASIC METABOLIC PNL TOTAL CA: CPT

## 2020-06-17 PROCEDURE — 65610000006 HC RM INTENSIVE CARE

## 2020-06-17 PROCEDURE — 74011636637 HC RX REV CODE- 636/637: Performed by: INTERNAL MEDICINE

## 2020-06-17 PROCEDURE — P9047 ALBUMIN (HUMAN), 25%, 50ML: HCPCS | Performed by: INTERNAL MEDICINE

## 2020-06-17 PROCEDURE — 90935 HEMODIALYSIS ONE EVALUATION: CPT

## 2020-06-17 PROCEDURE — 85025 COMPLETE CBC W/AUTO DIFF WBC: CPT

## 2020-06-17 PROCEDURE — 87086 URINE CULTURE/COLONY COUNT: CPT

## 2020-06-17 PROCEDURE — 74011250636 HC RX REV CODE- 250/636: Performed by: PHYSICIAN ASSISTANT

## 2020-06-17 PROCEDURE — 77010033711 HC HIGH FLOW OXYGEN

## 2020-06-17 PROCEDURE — 94660 CPAP INITIATION&MGMT: CPT

## 2020-06-17 PROCEDURE — 77030041174 HC STOOL COL SYS DIGNSHLD BARD -C

## 2020-06-17 PROCEDURE — 74011250636 HC RX REV CODE- 250/636: Performed by: INTERNAL MEDICINE

## 2020-06-17 PROCEDURE — 74011250637 HC RX REV CODE- 250/637: Performed by: INTERNAL MEDICINE

## 2020-06-17 PROCEDURE — 36415 COLL VENOUS BLD VENIPUNCTURE: CPT

## 2020-06-17 PROCEDURE — 74011000258 HC RX REV CODE- 258: Performed by: INTERNAL MEDICINE

## 2020-06-17 PROCEDURE — 74011250636 HC RX REV CODE- 250/636

## 2020-06-17 PROCEDURE — C9113 INJ PANTOPRAZOLE SODIUM, VIA: HCPCS | Performed by: PHYSICIAN ASSISTANT

## 2020-06-17 PROCEDURE — 84100 ASSAY OF PHOSPHORUS: CPT

## 2020-06-17 PROCEDURE — 82962 GLUCOSE BLOOD TEST: CPT

## 2020-06-17 PROCEDURE — 74011000250 HC RX REV CODE- 250: Performed by: INTERNAL MEDICINE

## 2020-06-17 PROCEDURE — 94762 N-INVAS EAR/PLS OXIMTRY CONT: CPT

## 2020-06-17 PROCEDURE — 94668 MNPJ CHEST WALL SBSQ: CPT

## 2020-06-17 PROCEDURE — 83735 ASSAY OF MAGNESIUM: CPT

## 2020-06-17 PROCEDURE — 81001 URINALYSIS AUTO W/SCOPE: CPT

## 2020-06-17 RX ORDER — ALBUMIN HUMAN 250 G/1000ML
SOLUTION INTRAVENOUS
Status: DISPENSED
Start: 2020-06-17 | End: 2020-06-17

## 2020-06-17 RX ORDER — POTASSIUM CHLORIDE 7.45 MG/ML
10 INJECTION INTRAVENOUS
Status: COMPLETED | OUTPATIENT
Start: 2020-06-17 | End: 2020-06-17

## 2020-06-17 RX ORDER — MIDODRINE HYDROCHLORIDE 5 MG/1
10 TABLET ORAL
Status: DISCONTINUED | OUTPATIENT
Start: 2020-06-17 | End: 2020-06-18

## 2020-06-17 RX ORDER — MAGNESIUM SULFATE HEPTAHYDRATE 40 MG/ML
2 INJECTION, SOLUTION INTRAVENOUS ONCE
Status: COMPLETED | OUTPATIENT
Start: 2020-06-17 | End: 2020-06-17

## 2020-06-17 RX ORDER — MAGNESIUM SULFATE HEPTAHYDRATE 40 MG/ML
INJECTION, SOLUTION INTRAVENOUS
Status: DISCONTINUED
Start: 2020-06-17 | End: 2020-06-17 | Stop reason: WASHOUT

## 2020-06-17 RX ORDER — HEPARIN SODIUM 1000 [USP'U]/ML
INJECTION, SOLUTION INTRAVENOUS; SUBCUTANEOUS
Status: DISPENSED
Start: 2020-06-17 | End: 2020-06-17

## 2020-06-17 RX ADMIN — IPRATROPIUM BROMIDE AND ALBUTEROL SULFATE 3 ML: .5; 3 SOLUTION RESPIRATORY (INHALATION) at 12:37

## 2020-06-17 RX ADMIN — IPRATROPIUM BROMIDE AND ALBUTEROL SULFATE 3 ML: .5; 3 SOLUTION RESPIRATORY (INHALATION) at 09:43

## 2020-06-17 RX ADMIN — FUROSEMIDE 80 MG: 10 INJECTION, SOLUTION INTRAMUSCULAR; INTRAVENOUS at 18:31

## 2020-06-17 RX ADMIN — EPOETIN ALFA-EPBX 10000 UNITS: 10000 INJECTION, SOLUTION INTRAVENOUS; SUBCUTANEOUS at 20:30

## 2020-06-17 RX ADMIN — POTASSIUM CHLORIDE 10 MEQ: 10 INJECTION, SOLUTION INTRAVENOUS at 09:01

## 2020-06-17 RX ADMIN — HEPARIN SODIUM 5000 UNITS: 5000 INJECTION INTRAVENOUS; SUBCUTANEOUS at 05:20

## 2020-06-17 RX ADMIN — Medication 10 ML: at 22:13

## 2020-06-17 RX ADMIN — SODIUM CHLORIDE 40 MG: 9 INJECTION INTRAMUSCULAR; INTRAVENOUS; SUBCUTANEOUS at 09:02

## 2020-06-17 RX ADMIN — HEPARIN SODIUM 5000 UNITS: 5000 INJECTION INTRAVENOUS; SUBCUTANEOUS at 18:42

## 2020-06-17 RX ADMIN — POTASSIUM CHLORIDE 10 MEQ: 10 INJECTION, SOLUTION INTRAVENOUS at 09:00

## 2020-06-17 RX ADMIN — IPRATROPIUM BROMIDE AND ALBUTEROL SULFATE 3 ML: .5; 3 SOLUTION RESPIRATORY (INHALATION) at 17:51

## 2020-06-17 RX ADMIN — IPRATROPIUM BROMIDE AND ALBUTEROL SULFATE 3 ML: .5; 3 SOLUTION RESPIRATORY (INHALATION) at 20:17

## 2020-06-17 RX ADMIN — CHLORHEXIDINE GLUCONATE 0.12% ORAL RINSE 15 ML: 1.2 LIQUID ORAL at 09:17

## 2020-06-17 RX ADMIN — POTASSIUM CHLORIDE 10 MEQ: 10 INJECTION, SOLUTION INTRAVENOUS at 10:00

## 2020-06-17 RX ADMIN — Medication 10 ML: at 05:21

## 2020-06-17 RX ADMIN — FUROSEMIDE 80 MG: 10 INJECTION, SOLUTION INTRAMUSCULAR; INTRAVENOUS at 09:02

## 2020-06-17 RX ADMIN — POTASSIUM CHLORIDE 10 MEQ: 10 INJECTION, SOLUTION INTRAVENOUS at 07:30

## 2020-06-17 RX ADMIN — INSULIN LISPRO 2 UNITS: 100 INJECTION, SOLUTION INTRAVENOUS; SUBCUTANEOUS at 12:31

## 2020-06-17 RX ADMIN — CHLORHEXIDINE GLUCONATE 0.12% ORAL RINSE 15 ML: 1.2 LIQUID ORAL at 20:27

## 2020-06-17 RX ADMIN — ALBUMIN (HUMAN) 25 G: 0.25 INJECTION, SOLUTION INTRAVENOUS at 00:30

## 2020-06-17 RX ADMIN — PIPERACILLIN AND TAZOBACTAM 2.25 G: 2; .25 INJECTION, POWDER, LYOPHILIZED, FOR SOLUTION INTRAVENOUS at 20:30

## 2020-06-17 RX ADMIN — MAGNESIUM SULFATE IN WATER 2 G: 40 INJECTION, SOLUTION INTRAVENOUS at 06:17

## 2020-06-17 RX ADMIN — MAGNESIUM SULFATE HEPTAHYDRATE 2 G: 40 INJECTION, SOLUTION INTRAVENOUS at 06:17

## 2020-06-17 RX ADMIN — PIPERACILLIN AND TAZOBACTAM 2.25 G: 2; .25 INJECTION, POWDER, LYOPHILIZED, FOR SOLUTION INTRAVENOUS at 09:02

## 2020-06-17 RX ADMIN — INSULIN LISPRO 2 UNITS: 100 INJECTION, SOLUTION INTRAVENOUS; SUBCUTANEOUS at 05:18

## 2020-06-17 RX ADMIN — IPRATROPIUM BROMIDE AND ALBUTEROL SULFATE 3 ML: .5; 3 SOLUTION RESPIRATORY (INHALATION) at 04:08

## 2020-06-17 RX ADMIN — CYANOCOBALAMIN TAB 1000 MCG 1000 MCG: 1000 TAB at 18:31

## 2020-06-17 RX ADMIN — HEPARIN SODIUM 5000 UNITS: 5000 INJECTION INTRAVENOUS; SUBCUTANEOUS at 20:31

## 2020-06-17 RX ADMIN — SODIUM CHLORIDE 0.75 G: 900 INJECTION, SOLUTION INTRAVENOUS at 18:51

## 2020-06-17 RX ADMIN — Medication 10 ML: at 18:45

## 2020-06-17 RX ADMIN — MIRTAZAPINE 15 MG: 15 TABLET, FILM COATED ORAL at 20:26

## 2020-06-17 RX ADMIN — IPRATROPIUM BROMIDE AND ALBUTEROL SULFATE 3 ML: .5; 3 SOLUTION RESPIRATORY (INHALATION) at 00:53

## 2020-06-17 NOTE — PROGRESS NOTES
NUTRITION    Nursing Referral: [facility]     RECOMMENDATIONS / PLAN:     - Monitor ability to tolerate oral diet versus need for enteral nutrition support. - IV fluid per MD.   - Continue RD inpatient monitoring and evaluation. NUTRITION INTERVENTIONS & DIAGNOSIS:     - Enteral nutrition: pending swallow evaluation   - IV fluid: D10 at 20 mL/hr (48 gm dextrose, 163 kcal per day)  - Collaboration and referral of nutrition care: interdisciplinary rounds     Nutrition Diagnosis: Inadequate oral intake related to respiratory status as evidenced by pt NPO. Increased nutrient needs protein related to increased expenditure as evidenced by ESRD on HD    ASSESSMENT:     6/17: Remains NPO on/off bipap overnight with plan for swallow evaluation prior to diet initiation, SLP re-consulted. HD planned today. 6/16: Started on trickle feeds yesterday then held, NGT clamped and removed with extubation by the afternoon. Remains NPO with concern for aspiration due to worsening chest xray today and may require re-intubation per MD. To remain NPO, diuresis and receiving dextrose infusion. 6/15: Remains intubated, OGT placed overnight/clamped and plan for dialysis today. Possible extubation today, dextrose infusion started for hypoglycemia- improved over the past 2 days. 6/12: Remains intubated s/p bronch yesterday and currently on SBT with plan for extubation today. HD today. Swallow evaluation planned once extubated, recent BG WNL at 81-90 mg/dL and monitoring.   6/11: s/p RRT overnight for hypoxia, increased work of breakfast and copious secretions, noted to be chocking on her partial denture- intubated for airway protection and foreign body retrieved during procedure; plan for imaging today to confirm removal and possible extubation. No OG or NGT placed. 6/9: Admitted with pneumonia, CHF with hx of ESRD on HD. Diet started and noted pt eating meals in ED, intake unknown at this time.  Wt gain noted PTA, question fluid status. Hx yesterday UF 3.5 L, 1200 mL FR recommended per Nephrology. Nutritional intake adequate to meet patients estimated nutritional needs:  No    Diet: DIET NPO     Food Allergies: NKFA  Current Appetite: Not Applicable - NPO  Appetite/meal intake prior to admission: Unable to determine at this time  Feeding Limitations:  [x] Swallowing difficulty: SLP signed off    [x] Chewing difficulty: partial dentures- chocking on lead to intubation 6/11/20    [x] Other: respiratory status, NPO while on bipap   Current Meal Intake:   No data found. HD 6/15 UF: 2.5 L, planned today  Anuric/Oliguric    BM: 6/17, loose   Skin Integrity: WDL   Edema:   [] No     [x] Yes   Pertinent Medications: Reviewed: cyanocobalamin, docusate sodium prn, ferrous sulfate, furosemide, mirtazapine, pantoprazole, ondansetron, miralax, epoetin, rosuvastatin- held, bumetanide, 2 gm Mg, 40 mEq KCl     Recent Labs     06/17/20  0450 06/16/20  0524 06/15/20  0210    139 138   K 3.1* 3.5 3.6    102 101   CO2 26 28 29   * 146* 106*   BUN 19* 13 18   CREA 3.12* 2.14* 2.77*   CA 8.2* 8.6 8.8   MG 1.9 2.0 2.1   PHOS 3.1 3.6 2.7       Intake/Output Summary (Last 24 hours) at 6/17/2020 1312  Last data filed at 6/17/2020 0700  Gross per 24 hour   Intake 930 ml   Output 30 ml   Net 900 ml       Anthropometrics:  Ht Readings from Last 1 Encounters:   06/08/20 5' 5\" (1.651 m)     Last 3 Recorded Weights in this Encounter    06/15/20 0400 06/16/20 0430 06/17/20 0730   Weight: 106.2 kg (234 lb 2.1 oz) 101.7 kg (224 lb 3.3 oz) 101.7 kg (224 lb 3.3 oz)     Body mass index is 37.31 kg/m².    Obese Class II    Weight History: weight gain noted PTA, question fluid status, ESRD on HD    Weight Metrics 6/17/2020 5/13/2020/2020 4/30/2020 4/29/2020 4/26/2020 8/18/2016 6/2/2016   Weight 224 lb 3.3 oz 238 lb 5.1 oz - 252 lb 252 lb 4.8 oz 219 lb 228 lb   BMI 37.31 kg/m2 - 39.66 kg/m2 41.93 kg/m2 41.98 kg/m2 36.44 kg/m2 37.94 kg/m2        Admitting Diagnosis: Pneumonia [J18.9]  Pertinent PMHx: CHF, DM, GERD, HTN, uterine cancer, ESRD on HD    Education Needs:        [x] None identified  [] Identified - Not appropriate at this time  []  Identified and addressed - refer to education log  Learning Limitations:   [x] None identified  [] Identified:   Cultural, Islam & ethnic food preferences:  [x] None identified    [] Identified and addressed     ESTIMATED NUTRITION NEEDS:     Calories: 6874-6836 kcal (30-35 kcal/kg) based on  [] Actual  kg     [x] SBW 67 kg   Protein:  gm (1.2-1.5 gm/kg) based on  [] Actual BW      [x] SBW   Fluid: 750-1500 mL/day     MONITORING & EVALUATION:     Nutrition Goal(s):   - Nutritional needs will be met through adequate oral intake or nutrition support within the next 7 days. Outcome: Not progressing towards goal     Monitoring:   [x] Food and nutrient intake   [x] Food and nutrient administration  [x] Comparative standards   [x] Nutrition-focused physical findings   [x] Anthropometric Measurements   [x] Treatment/therapy   [x] Biochemical data, medical tests, and procedures        Previous Recommendations (for follow-up assessments only):  No Longer Appropriate      Discharge Planning: Nutritional discharge needs unknown at this time. Participated in care planning, discharge planning, & interdisciplinary rounds as appropriate.       Geraldine Soto RD, [address]   Pager: [phone]

## 2020-06-17 NOTE — PROGRESS NOTES
In Patient Progress note      Admit Date: 6/7/2020    Impression:     1) ESRD on HD MWF , plan for HD tomorrow   2) Ac respi failure , multifactorial , Asp PNA  ,AC on chr diastolic CHF  3) AC on chr alfredo CHF  4) PNA , Rapid covid -v e    5) bacteremia , coag neg staph from 6/7 likely contaminant per ID   Bld cx since have been negative   5) Anemia of CKD   6) sec hyperpara     Poor urine output charted ,   Patient did not have ramos in place and likely incontinent   BP better  CXR reviewed, rt sided patchy opacities : PNA  On BIPAP this AM      Plan:  1) HD today for volume and solute   2) continue  Lasix 80 mg BID   3) bladder scans and ramos in place   4) d/c albumin   5) follow ID recs   6) continue epogen with HD   7) dose AB for GFR < 15    Please call with questions ,     Balbir Johns MD FASN  Cell [phone]  Pager: [phone]     Subjective:      On BIPAP  Awake following commands    Current Facility-Administered Medications:     albumin human 25% (BUMINATE) 25 % solution, , , ,     heparin (porcine) 1,000 unit/mL injection, , , ,     midodrine (PROAMATINE) tablet 10 mg, 10 mg, Oral, TID WITH MEALS, Kizzy Christopher, SONIA    Piperacillin-tazobactam (ZOSYN) 0.75 gm Supplemental Dosing by Pharmacy, , Other, Rx Dosing/Monitoring, Sommer MD Shmuel    piperacillin-tazobactam (ZOSYN) 0.75 g in 0.9% sodium chloride 50 mL IVPB, 0.75 g, IntraVENous, ONCE, Brad ALCALA MD    albuterol-ipratropium (DUO-NEB) 2.5 MG-0.5 MG/3 ML, 3 mL, Nebulization, Q4H RT, Maricruz Santos PA-C, 3 mL at 06/17/20 1237    piperacillin-tazobactam (ZOSYN) 2.25 g in 0.9% sodium chloride (MBP/ADV) 50 mL MBP, 2.25 g, IntraVENous, Q8H, Eder Clarke MD, Last Rate: 100 mL/hr at 06/17/20 0902, 2.25 g at 06/17/20 0902    acetaminophen (TYLENOL) tablet 325 mg, 325 mg, Oral, Q4H PRN, Franki MORENO NP, 325 mg at 06/16/20 1904    sodium chloride 3% hypertonic nebulizer soln, 4 mL, Nebulization, TID PRN, Greg Kelly FREDRICK,     lidocaine 4 % patch 1 Patch, 1 Patch, TransDERmal, Q24H, Antwon Kirkland PA-C, 1 Patch at 06/17/20 0330    menthol-zinc oxide (CALMOSEPTINE) 0.44-20.6 % ointment, , Topical, Q6H PRN, Antwon Kirkland PA-C    dextrose 10% infusion, , IntraVENous, CONTINUOUS, Antwon Kirkland PA-C, Last Rate: 20 mL/hr at 06/16/20 1800    0.9% sodium chloride infusion 250 mL, 250 mL, IntraVENous, PRN, Antwon Kirkland PA-C    epoetin johnathon-epbx (RETACRIT) injection 10,000 Units, 10,000 Units, SubCUTAneous, Q MON, Shireen Tovar MD, 10,000 Units at 06/15/20 2208    midazolam (VERSED) injection 1-2 mg, 1-2 mg, IntraVENous, Q2H PRN, Karyn Joy PA-C, 2 mg at 06/12/20 1910    pantoprazole (PROTONIX) 40 mg in 0.9% sodium chloride 10 mL injection, 40 mg, IntraVENous, DAILY, Divya Lilly PA-C, 40 mg at 06/17/20 0902    albuterol-ipratropium (DUO-NEB) 2.5 MG-0.5 MG/3 ML, 3 mL, Nebulization, Q6H PRN, Divya Lilly PA-C, 3 mL at 06/16/20 0722    chlorhexidine (PERIDEX) 0.12 % mouthwash 15 mL, 15 mL, Oral, Q12H, Monica Lassiter DO, 15 mL at 06/17/20 0917    sodium chloride (NS) flush 5-40 mL, 5-40 mL, IntraVENous, Q8H, Monica Lassiter DO, 10 mL at 06/17/20 0521    sodium chloride (NS) flush 5-40 mL, 5-40 mL, IntraVENous, PRN, Monica Lassiter DO    insulin lispro (HUMALOG) injection, , SubCUTAneous, Q6H, Monica Lassiter DO, 2 Units at 06/17/20 1231    hydrALAZINE (APRESOLINE) 20 mg/mL injection 20 mg, 20 mg, IntraVENous, Q6H PRN, Antwon Kirkland PA-C, 20 mg at 06/14/20 0135    ondansetron (ZOFRAN) injection 4 mg, 4 mg, IntraVENous, Q6H PRN, Eugene Marx MD, 4 mg at 06/10/20 0030    glucose chewable tablet 16 g, 4 Tab, Oral, PRN, [facility] Asha, DO    glucagon (GLUCAGEN) injection 1 mg, 1 mg, IntraMUSCular, PRN, [facility] Asha,     dextrose (D50W) injection syrg 12.5-25 g, 25-50 mL, IntraVENous, PRN, [facility] DO Asha, 25 g at 06/13/20 0041    furosemide (LASIX) injection 80 mg, 80 mg, IntraVENous, BID, Hadley Angulo MD, 80 mg at 06/17/20 0902    citalopram (CELEXA) tablet 20 mg, 20 mg, Oral, DAILY, Hadley Angulo MD, Stopped at 06/16/20 0900    cyanocobalamin tablet 1,000 mcg, 1,000 mcg, Oral, BID, Hadely Angulo MD, Stopped at 06/15/20 1800    loratadine (CLARITIN) tablet 10 mg, 10 mg, Oral, DAILY, Hadley Angulo MD, Stopped at 06/16/20 0900    aspirin chewable tablet 81 mg, 81 mg, Oral, DAILY, Hadley Angulo MD, Stopped at 06/16/20 0900    [Held by provider] isosorbide mononitrate ER (IMDUR) tablet 30 mg, 30 mg, Oral, DAILY, Hadley Angulo MD, Stopped at 06/11/20 0900    polyethylene glycol (MIRALAX) packet 17 g, 17 g, Oral, DAILY, Hadley Angulo MD, Stopped at 06/15/20 0900    [Held by provider] rosuvastatin (CRESTOR) tablet 5 mg, 5 mg, Oral, QHS, Hadley Angulo MD, Stopped at 06/11/20 2200    [Held by provider] gabapentin (NEURONTIN) capsule 100 mg, 100 mg, Oral, BID, Hadley Angulo MD, Stopped at 06/11/20 0900    [Held by provider] carvediloL (COREG) tablet 12.5 mg, 12.5 mg, Oral, BID WITH MEALS, Hadley Angulo MD, Stopped at 06/11/20 0800    ferrous sulfate tablet 325 mg, 325 mg, Oral, EVERY OTHER DAY, Hadley Angulo MD, Stopped at 06/12/20 0421    mirtazapine (REMERON) tablet 15 mg, 15 mg, Oral, QHS, Hadley Angulo MD, 15 mg at 06/16/20 2330    docusate sodium (COLACE) capsule 100 mg, 100 mg, Oral, BID PRN, Hadley Angulo MD    heparin (porcine) injection 5,000 Units, 5,000 Units, SubCUTAneous, Q8H, Hadley Angulo MD, 5,000 Units at 06/17/20 0520        Objective:     Visit Vitals  /65   Pulse 71   Temp 97.1 °F (36.2 °C) (Axillary)   Resp 23   Ht 5' 5\" (1.651 m)   Wt 101.7 kg (224 lb 3.3 oz)   SpO2 98%   Breastfeeding No   BMI 37.31 kg/m²         Intake/Output Summary (Last 24 hours) at 6/17/2020 1507  Last data filed at 6/17/2020 1320  Gross per 24 hour   Intake 940 ml   Output 330 ml   Net 610 ml       Physical Exam:     On BIPAP  HENT mmm  RS AEBE coarse BS   CVS s1 s2 wnl no JVD  GI soft BS +  Ext 1+ LE edema     Data Review:    Recent Labs     06/17/20  0606   WBC 12.0   RBC 3.19*   HCT 28.6*   MCV 89.7   MCH 27.6   MCHC 30.8*   RDW 14.0     Recent Labs     06/17/20  0450 06/16/20  0524 06/15/20  0210 06/14/20  2313   BUN 19* 13 18 18   CREA 3.12* 2.14* 2.77* 2.52*   CA 8.2* 8.6 8.8 6.8*   K 3.1* 3.5 3.6 4.4    139 138 137    102 101 105   CO2 26 28 29 26   PHOS 3.1 3.6 2.7 2.5   * 146* 106* 112*       Pina Hidalgo MD

## 2020-06-17 NOTE — PROGRESS NOTES
attended the interdisciplinary rounds for [facility], who is a 71 y.o.,female. Patients Primary Language is: [place]. According to the patients EMR Latter-day Affiliation is: Jain.     The reason the Patient came to the hospital is:   Patient Active Problem List    Diagnosis Date Noted    Fluid overload 06/10/2020    Pneumonia 06/08/2020    Acute on chronic respiratory failure ([address] 05/09/2020    Sepsis ([address] 04/30/2020    Respiratory failure ([address] 04/30/2020    SIRS (systemic inflammatory response syndrome) ([address] 04/30/2020    CHF (congestive heart failure) ([facility] 04/25/2020    Acute on chronic diastolic congestive heart failure ([address] 04/23/2020    Suspected COVID-19 virus infection 04/23/2020    Type 2 diabetes mellitus without complication, without long-term current use of insulin ([address] 07/06/2018    Left anterior fascicular block 07/06/2018    HTN (hypertension), benign 07/06/2018    Coronary artery disease involving native coronary artery of native heart without angina pectoris 07/06/2018    Chronic diastolic congestive heart failure ([address] 07/06/2018    Bilateral lower extremity edema 07/06/2018    BMI 35.0-35.9,adult 07/06/2018          Plan:  Chaplains will continue to follow and will provide pastoral care on an as needed/requested basis.  recommends bedside caregivers page  on duty if patient shows signs of acute spiritual or emotional distress.     [address] [facility]  Board Certified [address] [place] [address]   [phone]

## 2020-06-17 NOTE — ROUTINE PROCESS
1930:  Rec'd Clarissa Umanzor  from JENN Diaz RN. SBAR reviewed patient-side with two-nurse check-offs done of meds, dressings, wounds, LDA's & revelant assessment findings including neuro status, vent settings, rhythm & pulses, diet. Bed in lowest position with noted SR up, wheels locked and exit alarm on. Tonight:  Pleasant. Denying distress. Speech louder. Watching TV. Able to rest.   
Later:  Accepted BiPAP. 
 
~0200:  Stooled. Then respiratory distress. Hypertension, diaphoresis. Hi-flow increased, eventually accepted BiPAP but then removed it. Removed Hi-flow NC to blow nose, SpO2 decr to 75 rapidly. Eventually accepted BiPAP back at elevated setting, SpO2 staying < 90.   
 
0738:  Verbal Patient-side shift change report given to DECLAN Jaramillo RN, (oncoming nurse) by Marisabel Murrieta RN 
(offgoing nurse). Report included the following information SBAR, Kardex, ED Summary, Procedure Summary, Intake/Output, MAR, Recent Results and Med Rec Status. Included:  Intro, hx, two-RN eval of relevant assessment findings, LDAs, skin, diagnostics and infusions.

## 2020-06-17 NOTE — DIALYSIS
RICH        ACUTE HEMODIALYSIS FLOW SHEET      HEMODIALYSIS ORDERS: Physician: Dr. Almon Kayser     Dialyzer: revaclear   Duration: 3 hr  BFR: 350   DFR: 800   Dialysate:  Temp 36-37*C  K+   4    Ca+  2.5 Na 140 Bicarb 35   Weight:   Wt Readings from Last 1 Encounters:   06/17/20 101.7 kg (224 lb 3.3 oz)     UF Goal: 3000 ml Access Right subclavian TDC Needle Gauge NA    Heparin []  Bolus      Units    [] Hourly       Units    []None      Catheter locking solution Heparin   Pre BP:   148/57    Pulse:     78       Respirations: 23  Temperature:   97.1 Ax   Labs: Pre        Post:        [] N/A   Additional Orders(medications, blood products, hypotension management):       [x] N/A     [x] Rich Consent Verified     CATHETER ACCESS: []N/A   [x]Right subclavian TDC   []Left   []IJ     []Fem   []transhepatic   [] First use X-ray verified     []Permanent                [] Temporary   [x]No S/S infection  []Redness  []Drainage []Cultured []Swelling []Pain   [x]Medical Aseptic Prep Utilized   []Dressing Changed  [x] Biopatch  Date: 6/15/2020       []Clotted   [x]Patent   Flows: [x]Good  []Poor  []Reversed   If access problem,  notified: []Yes    [x]N/A  Date:           GRAFT/FISTULA ACCESS:  [x]N/A     []Right     []Left     []UE     []LE   []AVG   []AVF        []Buttonhole    []Medical Aseptic Prep Utilized   []No S/S infection  []Redness  []Drainage []Cultured []Swelling []Pain    Bruit:   [] Strong    [] Weak       Thrill :   [] Strong    [] Weak       Needle Gauge: NA   Length: NA   If access problem,  notified: []Yes     [x]N/A  Date:        Please describe access if present and not used:                            GENERAL ASSESSMENT:      LUNGS:  Rate 23 SaO2%       96% [] N/A    [] Clear  [] Coarse  [] Crackles  [] Wheezing        [] Diminished     Location : []RLL   []LLL    []RUL  []KATIE     Cough: []Productive  [x]Dry  []N/A   Respirations:  []Easy  [x]Labored     Therapy:   []RA  []NC  l/min    Mask: []NRB []Venti       O2%                  []Ventilator  []Intubated  [] Trach  [x] BiPaP     CARDIAC: []Regular      [x] Irregular   [] Pericardial Rub  [] JVD        []  Monitored  [] Bedside  [] Remotely monitored [] N/A  Rhythm:      EDEMA: [] None  [x]Generalized  [] Pitting [] 1    [] 2    [] 3    [] 4                 [] Facial  [] Pedal  []  UE  [] LE     SKIN:   [x] Warm  [] Hot     [] Cold   [x] Dry     [] Pale   [] Diaphoretic                  [] Flushed  [] Jaundiced  [] Cyanotic  [] Rash  [] Weeping     LOC:    [x] Alert      []Oriented:    [] Person     [] Place  []Time               [] Confused  [x] Lethargic  [] Medicated  [] Non-responsive     GI / ABDOMEN:  [] Flat    [] Distended    [] Soft    [] Firm   []  Obese                             [] Diarrhea  [] Bowel Sounds  [] Nausea  [] Vomiting       / URINE ASSESSMENT:[] Voiding   [] Oliguria  [] Anuria   [x]  Lopez (Placed by primary nurse after an hour of HD)   [] Incontinent    []  Incontinent Brief      []  Bathroom Privileges       PAIN: [] 0 []1  []2   []3   []4   []5   []6   []7   []8   []9   []10              Scale 0-10  Action/Follow Up:      MOBILITY:  [] Amb    [] Amb/Assist    [x] Bed    [] Wheelchair  [] Stretcher      All Vitals and Treatment Details on Attached [address]: [facility]          Room # 308/01      [] 1st Time Acute  [] Stat  [x] Routine  [] Urgent     [] Acute Room  []  Bedside  [x] ICU/CCU  [] ER   Isolation Precautions:   There are currently no Active Isolations      Special Considerations:         [] Blood Consent Verified [x]N/A     ALLERGIES:   Allergies   Allergen Reactions    Augmentin [Amoxicillin-Pot Clavulanate] Diarrhea    Clavulanic Acid Diarrhea    Levaquin [Levofloxacin] Other (comments)     Severe hypoglycemia                 Code Status:Full Code        Hepatitis Status:    2nd RN check: T.O., RN                    Lab Results   Component Value Date/Time    Hepatitis B surface Ag <0.10 06/09/2020 04:03 AM    Hepatitis B surface Ab <3.10 (L) 06/09/2020 04:03 AM                     Current Labs:   Lab Results   Component Value Date/Time    Sodium 137 06/17/2020 04:50 AM    Potassium 3.1 (L) 06/17/2020 04:50 AM    Chloride 105 06/17/2020 04:50 AM    CO2 26 06/17/2020 04:50 AM    Anion gap 6 06/17/2020 04:50 AM    Glucose 148 (H) 06/17/2020 04:50 AM    BUN 19 (H) 06/17/2020 04:50 AM    Creatinine 3.12 (H) 06/17/2020 04:50 AM    BUN/Creatinine ratio 6 (L) 06/17/2020 04:50 AM    GFR est AA 18 (L) 06/17/2020 04:50 AM    GFR est non-AA 15 (L) 06/17/2020 04:50 AM    Calcium 8.2 (L) 06/17/2020 04:50 AM      Lab Results   Component Value Date/Time    WBC 12.0 06/17/2020 06:06 AM    HGB 8.8 (L) 06/17/2020 06:06 AM    HCT 28.6 (L) 06/17/2020 06:06 AM    PLATELET 886 77/50/1446 06:06 AM    MCV 89.7 06/17/2020 06:06 AM                                                                                     DIET: DIET NPO       PRIMARY NURSE REPORT: First initial/Last name/Title      Pre Dialysis: DECLAN Duncan RN     Time: 1112      EDUCATION:    [x] Patient [] Other         Knowledge Basis: []None []Minimal [] Substantial   Barriers to learning  []N/A   [] Access Care     [] S&S of infection     [] Fluid Management     []K+     [x]Procedural    []Albumin     [] Medications     [x] Tx Options     [] Transplant     [] Diet     [] Other   Teaching Tools:  [x] Explain  [] Demo  [] Handouts [] Video  Patient response:   [x] Verbalized understanding  [] Teach back  [] Return demonstration [] Requires follow up   Inappropriate due to            [x] Time Out/Safety Check  [x]Extracorporeal Circuit Tested for integrity       RO/HEMODIALYSIS MACHINE SAFETY CHECKS  Before each treatment:     Machine Number:                   [facility]                                  [] Unit Machine #  with centralized RO                                  [x] Portable Machine #1/RO serial # [id number]                                  [] Portable Machine #2/RO serial # [id number]                                  [] Portable Machine #4/RO serial # [id number]                                                     [facility]                                  [] Portable Machine #11/RO serial # [id number]                                   [] Portable Machine #12/RO serial # [id number]                                  [] Portable Machine #13/RO serial #  [id number]      Alarm Test:  Pass time 4222               [x] RO/Machine Log Complete      Temp    36.7             Dialysate: pH  7.4 Conductivity: Meter   14     HD Machine   14.3                  TCD: 14.1  Dialyzer Lot # [id number]            Blood Tubing Lot # [id number]          Saline Lot #  [id number]     CHLORINE TESTING-Before each treatment and every 4 hours    Total Chlorine: [] less than 0.1 ppm  Time: 1208 4 Hr/2nd Check Time:    (if greater than 0.1 ppm from Primary then every 30 minutes from Secondary)     TREATMENT INITIATION  with Dialysis Precautions:   [x] All Connections Secured                 [x] Saline Line Double Clamped   [x] Venous Parameters Set                  [x] Arterial Parameters Set    [x] Prime Given 250ml                          [x]Air Foam Detector Engaged      Treatment Initiation Note:Patient admitted to the ICU, but stable to begin HD. Her right subclavian TDC was accessed, and her treatment began. Shortly after HD treatment began, respiratory switched the patient from high flow NC to bipap, as her O2 saturation was in the 80's. During Treatment Notes:Primary nurse did place a ramos at 1300.               Medication Dose Volume Route Time [facility] name Title         RN         RN         RN           RN                   Post Assessment:   Dialyzer Cleared: [x] Good [] Fair  [] Poor  Blood processed:  59.1 L  UF Removed  3000 Ml  POst BP:   178/49       Pulse: 93        Respirations: 23  Temperature: 97.4 Ax Lungs:     [x] Clear      [] Course         [] Crackles    [] Wheezing         [] Diminished   Post Tx Vascular Access:     N/A Cardiac:   [x] Regular   [] Irregular   [] Monitor  [] N/A      Rhythm:       Catheter:   Locking solution: Heparin 1:1000   Art. 1.6 ml  Austyn. 1.6 ml      Skin:   Pain:    [x] Warm  [x] Dry [] Diaphoretic    [] Flushed    [] Pale [] Cyanotic [x]0  []1  []2   []3  []4   []5   []6   []7   []8   []9   []10     Post Treatment Note:   Patient's blood was returned at the conclusion of her HD treatment. Her right subclavian TDC was locked with Heparin. She remains admitted to the ICU. POST TREATMENT PRIMARY NURSE HANDOFF REPORT:     First initial/Last name/Title         Post Dialysis: DECLAN Goode RN Time:  1600     Abbreviations: AVG-arterial venous graft, AVF-arterial venous fistula, IJ-Internal Jugular, Subcl-Subclavian, Fem-Femoral, Tx-treatment, AP/HR-apical heart rate, DFR-dialysate flow rate, BFR-blood flow rate, AP-arterial pressure, -venous pressure, UF-ultrafiltrate, TMP-transmembrane pressure, Austyn-Venous, Art-Arterial, RO-Reverse Osmosis

## 2020-06-17 NOTE — ROUTINE PROCESS
Patient refused bipap for tonight  Will continue to monitor . No distress noted Patient fell asleep and desat was able to talk patietn into wearing bipap for a few hours 0400 patient refused BiPAP post bath RN and RT tried multiple times to get patient to wear BiPAP she said please no. Talked patient into wearing bipap for a little while and hope  she falls asleep and wears the BiPAP longer! RN aware

## 2020-06-17 NOTE — PROGRESS NOTES
Infectious Disease progress Note        Reason: Gram-positive bloodstream infection, pneumonia    Current abx Prior abx   Piperacillin/tazobactam since 6/7   Vancomycin 6/7; 6/10-6/12     Assessment :  69 y.o. female  with multiple medical problems including diastolic CHF, diabetes, arthritis, acid reflux hiatal hernia, chronic hypoxic failure on home oxygen 2 L, hypertension, end-stage renal disease on dialysis Tuesday Thursday Saturday who presented to the emergency department via EMS on 6/8/20 with shortness of breath per report from [facility] and rehab. Patient presents with a highly complex clinical picture. Clinical presentation seems consistent with acute respiratory failure secondary to fluid overload, acute on chronic diastolic CHF. Single positive blood culture on 6/7 for coagulase-negative Staphylococcus likely contaminant. Negative blood culture 6/10    Acute on chronic respiratory failure requiring mechanical ventilation - intubated 6/11 due to aspiration of partial dentures. Removed. S/p bronchoscopy 6/11- findings noted. --extubated 6/15, concern for aspiration 6/16    Recommendations:  --Continue piperacillin/tazobactam.  Tx for aspiration with a shorter course anticipated, with aspiration possible 6/16, will re-consider cessation on 6/19.  --F/u ICU team recommendations. --Follow-up nephrology recommendations-> noted plans for ramos placement. With rising Cr & decreased urine output. Check UA with cx  Once ramos in    Relayed information to pt. She denies questions today. Please call me if any further questions or concerns. Will continue to participate in the care of this patient.     HPI:  Pt shrugs her shoulders when asked how she is feeling today  Wants to take the mask off  Denies abd pain/nausea    Current Facility-Administered Medications   Medication Dose Route Frequency    potassium chloride 10 mEq in 100 ml IVPB  10 mEq IntraVENous Q1H    albuterol-ipratropium (DUO-NEB) 2.5 MG-0.5 MG/3 ML  3 mL Nebulization Q4H RT    piperacillin-tazobactam (ZOSYN) 2.25 g in 0.9% sodium chloride (MBP/ADV) 50 mL MBP  2.25 g IntraVENous Q8H    albumin human 25% (BUMINATE) solution 25 g  25 g IntraVENous Q6H    acetaminophen (TYLENOL) tablet 325 mg  325 mg Oral Q4H PRN    sodium chloride 3% hypertonic nebulizer soln  4 mL Nebulization TID PRN    lidocaine 4 % patch 1 Patch  1 Patch TransDERmal Q24H    menthol-zinc oxide (CALMOSEPTINE) 0.44-20.6 % ointment   Topical Q6H PRN    dextrose 10% infusion   IntraVENous CONTINUOUS    0.9% sodium chloride infusion 250 mL  250 mL IntraVENous PRN    epoetin johnathon-epbx (RETACRIT) injection 10,000 Units  10,000 Units SubCUTAneous Q MON, WED & FRI    midazolam (VERSED) injection 1-2 mg  1-2 mg IntraVENous Q2H PRN    pantoprazole (PROTONIX) 40 mg in 0.9% sodium chloride 10 mL injection  40 mg IntraVENous DAILY    albuterol-ipratropium (DUO-NEB) 2.5 MG-0.5 MG/3 ML  3 mL Nebulization Q6H PRN    chlorhexidine (PERIDEX) 0.12 % mouthwash 15 mL  15 mL Oral Q12H    sodium chloride (NS) flush 5-40 mL  5-40 mL IntraVENous Q8H    sodium chloride (NS) flush 5-40 mL  5-40 mL IntraVENous PRN    insulin lispro (HUMALOG) injection   SubCUTAneous Q6H    hydrALAZINE (APRESOLINE) 20 mg/mL injection 20 mg  20 mg IntraVENous Q6H PRN    ondansetron (ZOFRAN) injection 4 mg  4 mg IntraVENous Q6H PRN    glucose chewable tablet 16 g  4 Tab Oral PRN    glucagon (GLUCAGEN) injection 1 mg  1 mg IntraMUSCular PRN    dextrose (D50W) injection syrg 12.5-25 g  25-50 mL IntraVENous PRN    furosemide (LASIX) injection 80 mg  80 mg IntraVENous BID    citalopram (CELEXA) tablet 20 mg  20 mg Oral DAILY    cyanocobalamin tablet 1,000 mcg  1,000 mcg Oral BID    loratadine (CLARITIN) tablet 10 mg  10 mg Oral DAILY    aspirin chewable tablet 81 mg  81 mg Oral DAILY    [Held by provider] isosorbide mononitrate ER (IMDUR) tablet 30 mg  30 mg Oral DAILY    polyethylene glycol (MIRALAX) packet 17 g  17 g Oral DAILY    [Held by provider] rosuvastatin (CRESTOR) tablet 5 mg  5 mg Oral QHS    [Held by provider] gabapentin (NEURONTIN) capsule 100 mg  100 mg Oral BID    [Held by provider] carvediloL (COREG) tablet 12.5 mg  12.5 mg Oral BID WITH MEALS    ferrous sulfate tablet 325 mg  325 mg Oral EVERY OTHER DAY    mirtazapine (REMERON) tablet 15 mg  15 mg Oral QHS    docusate sodium (COLACE) capsule 100 mg  100 mg Oral BID PRN    heparin (porcine) injection 5,000 Units  5,000 Units SubCUTAneous Q8H       Allergies: Augmentin [amoxicillin-pot clavulanate]; Clavulanic acid; and Levaquin [levofloxacin]    Temp (24hrs), Av.6 °F (37 °C), Min:97.8 °F (36.6 °C), Max:99.5 °F (37.5 °C)    Visit Vitals  /48   Pulse 66   Temp 99 °F (37.2 °C)   Resp 18   Ht 5' 5\" (1.651 m)   Wt 101.7 kg (224 lb 3.3 oz)   SpO2 95%   Breastfeeding No   BMI 37.31 kg/m²       ROS: Unable to obtain detailed review of systems since intubated  Physical Exam:    Constitutional: laying on bed, NAD, BiPap in place  HEENT: non icteric sclera, no oral lesions  Cardiovascular: Regular rhythm. Exam reveals no gallop and no friction rub. Pulmonary/Chest: CTAB anteriorly  Abdominal: Soft. Bowel sounds are normal. NT, ND  Musculoskeletal: Normal strength. exhibits no tenderness. Trace edema of LE  Neurological: awake, alert, answers yes/no questions  No facial asymmetry or focal weakness  Skin: Skin is warm and dry.    Psychiatric: Pleasant, cooperative    Labs: Results:   Chemistry Recent Labs     20  0450 20  0524 06/15/20  0210   * 146* 106*    139 138   K 3.1* 3.5 3.6    102 101   CO2 26 28 29   BUN 19* 13 18   CREA 3.12* 2.14* 2.77*   CA 8.2* 8.6 8.8   AGAP 6 9 8   BUCR 6* 6* 6*      CBC w/Diff Recent Labs     20  0606 20  0524 06/15/20  0210   WBC 12.0 9.3 8.1   RBC 3.19* 3.15* 2.95*   HGB 8.8* 8.6* 8.3*   HCT 28.6* 28.4* 26.0*    186 175   GRANS 86* 90* 80* LYMPH 10* 7* 9*   EOS 0 0 0      Microbiology Recent Labs     06/16/20  0628 06/16/20  0625   CULT NO GROWTH AFTER 21 HOURS NO GROWTH AFTER 21 HOURS          RADIOLOGY:  pCXR 6/16:  Fluctuating airspace opacities in the midlung zones, possibly on the basis of  atelectasis in this patient with relatively low lung volumes and previously  imaged pleural effusions on the chest CT of 6/13/2020. Pneumonia could be occult  in this setting.     Some degree of underlying interstitial edema is also likely present.     Dr. Benito Rivas, Infectious Disease Specialist  [phone]  June 17, 2020  10:20 AM

## 2020-06-17 NOTE — PROGRESS NOTES
CM called and spoke with patient's neighbor, patient's primary decision maker Vincenzo Higgins. Zahraa is agreeable to the discharge plan for patient to return to [address] at [facility] when patient is medically stable. AARON spoke to [facility] last week at [facility] and [address] [place] [address] and she is agreeable with the discharge plan to return to the facility when medically stable. Covid requirements  confirmed that SNF will need 1 Negative Covid (Not Rapid) 24-48 hours of admission. Medical transportation will be needed. [facility]/LTSS was successfully processed on 05/11/2020.     Erum hSen, RN  Case Management [phone]

## 2020-06-17 NOTE — PROGRESS NOTES
Speech Pathology Note      Acknowledged new order for swallow eval, chart reviewed, spoke with MD and nurse. Pt currently on BiPap and undergoing dialysis currently. Will f/u with swallow eval when pt appropriate.     Thank you for this referral,  Zack Webb MS, CCC/SLP  Speech- Language Pathologist

## 2020-06-17 NOTE — PROGRESS NOTES
[facility]  Pulmonary, Critical Care, and Sleep Medicine    Name: Winifred Conrad MRN: [id number]   : 1950 Hospital: [facility]   Date: 2020        IMPRESSION:   · Acute on chronic hypoxemic respiratory failure requiring mechanical ventilation - initially in the setting of PNA and fluid overload, however, intubated 20 2/2 aspiration of partial dentures. Extubated to [place] 6/15, escalated to BiPAP on . · Aspiration of foreign body (partials) in to the hypopharynx- retrieved with forceps. Bronch 20- some mucus plugs, small caliber airways noted but no foreign material. Probable additional episode of aspiration on  after extubation. · RUL infiltrate/PNA - CXR 20,scant yeast on cx   · Acute on HFpEF - most recent echo 20 w/ EF of 55-60%   · Bacteremia vs contaminant- GPC in groups in 1/2 bottles, repeat cultures negative.    · ESRD - HD MWF, BUN 19, Cr 3.12  · Acute on chronic anemia- H/H 8.8 and 28.6, no signs of active bleeding     Patient Active Problem List   Diagnosis Code    Acute on chronic diastolic congestive heart failure (HCC) I50.33    Suspected COVID-19 virus infection Z20.828    Type 2 diabetes mellitus without complication, without long-term current use of insulin ([facility]) E11.9    Left anterior fascicular block I44.4    HTN (hypertension), benign I10    Coronary artery disease involving native coronary artery of native heart without angina pectoris I25.10    Chronic diastolic congestive heart failure ([facility]) I50.32    Bilateral lower extremity edema R60.0    BMI 35.0-35.9,adult Z68.35    CHF (congestive heart failure) ([facility]) I50.9    Sepsis ([facility]) A41.9    Respiratory failure ([facility]) J96.90    SIRS (systemic inflammatory response syndrome) ([facility]) R65.10    Acute on chronic respiratory failure ([facility]) J96.20    Pneumonia J18.9    Fluid overload E87.70      PLAN:   Resp:   -Extubated on 6/15 to NC however hypoxic and hypercapneic requiring BiPAP. Pt taking BiPAP off overnight requiring HFNC to be placed. Pt currently tolerating BiPAP. -Titrate FiO2 for SpO2 >90%  CT chest showed no additional retained foreign bodies in the airway  -Bronch on 6/11/20 demonstrated distant mucus plugs / friable airway but otherwise normal.  -Aspiration precautions  -keep HOB > 30 degrees  I/D:   -Sepsis bundle per hospital protocol  -blood culture 6/7 + coag negative staph. Subsequent cx no growth. After acute decompensation overnight, blood cultures resent.   - Respiratory culture positive for scant yeast.   -Continue zosyn per ID  Hem/Onc:   -s/p PRBC transfusion 6/13  CVS:  - HD stable without pressors. -Given bumex yesterday, Lasix 80mg, 5mg metolazone yesterday however minimal UOP (50cc). Ramos was removed overnight, reason unclear. Pt reportedly refusing ramos replacement. Will continue to work with pt for replacement.   -albumin 25g q6h per nephro   -Continue hydralazine PRN  -Adding midodrine today per nephrology recommendations (38TG tid)  Metabolic:   -Daily BMP; monitor e-lytes; replace PRN  Renal:   -Trend Renal indices; HD MWF per nephrology  Endocrine:  -POC Glucose q6; SSI  GI:   -Plan for speech eval if pt continues on HFNC, NPO while on BiPAP. -D10 drip currently while NPO  -Protonix for SUP, Trend LFTs, Zofran PRN for N/V .   -Pt had bm overnight, continue miralax and colace daily. Musc/Skin:  -No acute issues, wound care as needed  Neuro:   -Off sedation     Code Status: full code   ·   Subjective/Interval History:      Patient is a 71 y.o. female w/ pmhx of ESRD on HD, DM, and CHF admitted for acute hypoxemic respiratory failure and subsequently started on tx for pna. Patient was started on BIPAP and intubated for progressive respiratory failure. Noted to have foreign body on CT w/ forceps denture retrieval. Pt extubated on 6/15 to NC, escalated to BiPAP on 6/16 after acute respiratory failure/probable aspiration. 6/15/20    Overnight pt had multiple stools. She was taking the BiPAP off throughout the night, would quickly desat requiring HFNC. She has been back and forth between BiPAP and HFNC as she will tolerate.   -afebrile  -Responds to verbal stimuli, follows commands    ROS:A comprehensive review of systems was negative except for that written in the HPI. Objective:   Vital Signs:    Visit Vitals  /48   Pulse 66   Temp 99 °F (37.2 °C)   Resp 18   Ht 5' 5\" (1.651 m)   Wt 101.7 kg (224 lb 3.3 oz)   SpO2 95%   Breastfeeding No   BMI 37.31 kg/m²       O2 Device: BIPAP   O2 Flow Rate (L/min): 30 l/min   Temp (24hrs), Av.6 °F (37 °C), Min:97.8 °F (36.6 °C), Max:99.5 °F (37.5 °C)       Intake/Output:   Last shift:      No intake/output data recorded. Last 3 shifts: 06/15 1901 -  0700  In: 6690 [P.O.:130; I.V.:1310]  Out: 3230 [Urine:730]    Intake/Output Summary (Last 24 hours) at 2020 0848  Last data filed at 2020 0700  Gross per 24 hour   Intake 1030 ml   Output 30 ml   Net 1000 ml         Physical Exam:      General:  BiPAP, NAD   Head:  Normocephalic, without obvious abnormality, atraumatic. Eyes:  Conjunctivae/corneas clear. PERRL   Nose: Nares normal. Septum midline. Mucosa normal   Throat: Lips, mucosa, and tongue normal. edentulous   Neck: Supple, symmetrical, trachea midline, no adenopathy. Lungs:   Symmetrical chest rise; scattered rhonchi, coarse breath sounds on the right. Heart:  RRR, S1, S2 normal, no m/r/g   Abdomen:   Soft, non-tender. Bowel sounds normal. No masses   Extremities: Extremities normal, atraumatic. + 1 lower ext edema    Pulses: 2+ and symmetric all extremities. Skin: Skin color, texture, turgor normal. No rashes or lesions   Neurologic: Grossly nonfocal. Awakens easily to verbal stimuli, follows commands.     Devices: PIV, BiPAP         DATA:  Labs:  Recent Labs     20  0606 20  0524 06/15/20  0210   WBC 12.0 9.3 8.1   HGB 8.8* 8.6* 8.3*   HCT 28.6* 28.4* 26.0*    186 175     Recent Labs     06/17/20  0450 06/16/20  0524 06/15/20  0210    139 138   K 3.1* 3.5 3.6    102 101   CO2 26 28 29   * 146* 106*   BUN 19* 13 18   CREA 3.12* 2.14* 2.77*   CA 8.2* 8.6 8.8   MG 1.9 2.0 2.1   PHOS 3.1 3.6 2.7     No results for input(s): PH, PCO2, PO2, HCO3, FIO2 in the last 72 hours.      Results     Procedure Component Value Units Date/Time    CULTURE, BLOOD [[id number]] Collected:  06/16/20 0628    Order Status:  Completed Specimen:  Blood Updated:  06/17/20 0654     Special Requests: NO SPECIAL REQUESTS        Culture result: NO GROWTH AFTER 21 HOURS       CULTURE, BLOOD [[id number]] Collected:  06/16/20 0625    Order Status:  Completed Specimen:  Blood Updated:  06/17/20 0654     Special Requests: NO SPECIAL REQUESTS        Culture result: NO GROWTH AFTER 21 HOURS       CULTURE, RESPIRATORY/SPUTUM/BRONCH Juan Loupe STAIN [[id number]]  (Abnormal) Collected:  06/11/20 1920    Order Status:  Completed Specimen:  Bronchial lavage Updated:  06/14/20 1353     Special Requests: --        BRONCHIAL LAVAGE  RIGHT  LUNG       GRAM STAIN 2+ WBCS SEEN         NO ORGANISMS SEEN        Culture result:       SCANT NORMAL RESPIRATORY MARS            SCANT YEAST       CULTURE, BLOOD [[id number]] Collected:  06/11/20 1820    Order Status:  Completed Specimen:  Blood Updated:  06/17/20 0654     Special Requests: NO SPECIAL REQUESTS        Culture result: NO GROWTH 6 DAYS       CULTURE, BLOOD [[id number]] Collected:  06/11/20 1620    Order Status:  Completed Specimen:  Blood Updated:  06/17/20 0654     Special Requests: NO SPECIAL REQUESTS        Culture result: NO GROWTH 6 DAYS       CULTURE, RESPIRATORY/SPUTUM/BRONCH Juan Loupe STAIN [[id number]] Collected:  06/11/20 0600    Order Status:  Canceled Specimen:  Endotracheal aspirate     CULTURE, BLOOD [[id number]] Collected:  06/10/20 1231    Order Status:  Completed Specimen:  Blood Updated:  06/16/20 7272     Special Requests: NO SPECIAL REQUESTS        Culture result: NO GROWTH 6 DAYS       CULTURE, BLOOD [[id number]] Collected:  06/10/20 1230    Order Status:  Completed Specimen:  Blood Updated:  06/16/20 0652     Special Requests: NO SPECIAL REQUESTS        Culture result: NO GROWTH 6 DAYS       CULTURE, BLOOD [[id number]]  (Abnormal) Collected:  06/07/20 2223    Order Status:  Completed Specimen:  Blood Updated:  06/11/20 1230     Special Requests: NO SPECIAL REQUESTS        GRAM STAIN       AEROBIC BOTTLE GRAM POSITIVE COCCI IN GROUPS                  SMEAR CALLED TO AND CORRECTLY REPEATED BY: Livier Manzano RN  4N 6/10/2020 0802 TO LAE           Culture result:       STAPHYLOCOCCUS SPECIES, COAGULASE NEGATIVE GROWING IN AEROBIC BOTTLE          CULTURE, BLOOD [[id number]] Collected:  06/07/20 2200    Order Status:  Completed Specimen:  Blood Updated:  06/13/20 0633     Special Requests: NO SPECIAL REQUESTS        Culture result: NO GROWTH 6 DAYS               PFT:                                                     Echo:  04/22/20   ECHO ADULT FOLLOW-UP OR LIMITED 04/26/2020 4/26/2020    Narrative · Normal cavity size, wall thickness and systolic function (ejection   fraction normal). Estimated left ventricular ejection fraction is 55 -   60%. Visually measured ejection fraction. · Dilated left atrium. · Dilated right atrium. · Pulmonary arterial systolic pressure is 32 mmHg. Signed by: Nate Roberts MD       Imaging:  [x]I have personally reviewed the patients radiographs    CXR Results  (Last 48 hours)               06/16/20 0222  XR CHEST PORT Final result    Impression:  IMPRESSION:         Fluctuating airspace opacities in the midlung zones, possibly on the basis of   atelectasis in this patient with relatively low lung volumes and previously   imaged pleural effusions on the chest CT of 6/13/2020. Pneumonia could be occult   in this setting.        Some degree of underlying interstitial edema is also likely present. Narrative:  Clinical history/indication: resp distress          Technique:  Single view of the chest was obtained. Comparisons: Chest radiograph 6/15/2020. CTA chest 2020       Findings: There has been interval extubation and removal of the patient's OG   tube. Right sided central venous catheter is unchanged position, terminating in   the right atrium. There are increased airspace opacities in the mid lung zones and right upper   lung zone. There is persistent right middle lobe and left lower lobe   consolidation. When accounting for differences in technique, this may be similar   to 2024 but is increased compared to yesterday. There is a backdrop of   increased interstitial markings which could relate to underlying pulmonary   edema. 06/15/20 1109  XR CHEST PORT Final result    Impression:  IMPRESSION:       Stable ET tube terminating 1.5 cm above the garth. Enteric tube extends below   the diaphragm beyond the field-of-view. Stable enlarged cardiac silhouette with vascular congestion and pulmonary   opacities with pleural effusions. Narrative:  EXAM:  XR CHEST PORT       INDICATION:   Respiratory distress/ oxygen desaturation/ altered mental status       COMPARISON: 2020 and priors. FINDINGS:   ET tube tip about 1.5 cm above the garth. Stable enteric tube terminating   beyond the field-of-view in the abdomen. . Stable right jugular catheter. Stable   enlarged cardiac silhouette. Aortic atherosclerosis. Pulmonary vascular   congestion. Similar retrocardiac opacification and hazy right perihilar and   basilar opacities. No pneumothorax. Layering bilateral pleural effusions.                  Lauren Apgar, DO   [facility] PGY-1

## 2020-06-17 NOTE — ROUTINE PROCESS
Bedside and Verbal shift change report given to nurse Ana Maria Finnegan RN (oncoming nurse) by Kassandra Gottron, RN 
 (offgoing nurse). Report included the following information SBAR, Kardex, Procedure Summary, Intake/Output, MAR, Accordion and Recent Results.

## 2020-06-18 ENCOUNTER — APPOINTMENT (OUTPATIENT)
Dept: GENERAL RADIOLOGY | Age: 70
DRG: 207 | End: 2020-06-18
Attending: PHYSICIAN ASSISTANT
Payer: MEDICARE

## 2020-06-18 LAB
ANION GAP SERPL CALC-SCNC: 10 MMOL/L (ref 3–18)
BACTERIA SPEC CULT: NORMAL
BASOPHILS # BLD: 0 K/UL (ref 0–0.06)
BASOPHILS NFR BLD: 0 % (ref 0–3)
BNP SERPL-MCNC: ABNORMAL PG/ML (ref 0–900)
BUN SERPL-MCNC: 14 MG/DL (ref 7–18)
BUN/CREAT SERPL: 6 (ref 12–20)
CALCIUM SERPL-MCNC: 9.1 MG/DL (ref 8.5–10.1)
CHLORIDE SERPL-SCNC: 103 MMOL/L (ref 100–111)
CO2 SERPL-SCNC: 28 MMOL/L (ref 21–32)
CREAT SERPL-MCNC: 2.4 MG/DL (ref 0.6–1.3)
DIFFERENTIAL METHOD BLD: ABNORMAL
EOSINOPHIL # BLD: 0 K/UL (ref 0–0.4)
EOSINOPHIL NFR BLD: 0 % (ref 0–5)
ERYTHROCYTE [DISTWIDTH] IN BLOOD BY AUTOMATED COUNT: 13.9 % (ref 11.6–14.5)
GLUCOSE BLD STRIP.AUTO-MCNC: 143 MG/DL (ref 70–110)
GLUCOSE BLD STRIP.AUTO-MCNC: 161 MG/DL (ref 70–110)
GLUCOSE BLD STRIP.AUTO-MCNC: 210 MG/DL (ref 70–110)
GLUCOSE SERPL-MCNC: 162 MG/DL (ref 74–99)
HCT VFR BLD AUTO: 31.7 % (ref 35–45)
HGB BLD-MCNC: 9.8 G/DL (ref 12–16)
LYMPHOCYTES # BLD: 0.5 K/UL (ref 0.8–3.5)
LYMPHOCYTES NFR BLD: 3 % (ref 20–51)
MAGNESIUM SERPL-MCNC: 2.2 MG/DL (ref 1.6–2.6)
MCH RBC QN AUTO: 27.6 PG (ref 24–34)
MCHC RBC AUTO-ENTMCNC: 30.9 G/DL (ref 31–37)
MCV RBC AUTO: 89.3 FL (ref 74–97)
MONOCYTES # BLD: 0.9 K/UL (ref 0–1)
MONOCYTES NFR BLD: 6 % (ref 2–9)
NEUTS SEG # BLD: 14 K/UL (ref 1.8–8)
NEUTS SEG NFR BLD: 91 % (ref 42–75)
PHOSPHATE SERPL-MCNC: 2.6 MG/DL (ref 2.5–4.9)
PLATELET # BLD AUTO: 226 K/UL (ref 135–420)
PLATELET COMMENTS,PCOM: ABNORMAL
PMV BLD AUTO: 11.1 FL (ref 9.2–11.8)
POTASSIUM SERPL-SCNC: 3.9 MMOL/L (ref 3.5–5.5)
RBC # BLD AUTO: 3.55 M/UL (ref 4.2–5.3)
RBC MORPH BLD: ABNORMAL
RBC MORPH BLD: ABNORMAL
SERVICE CMNT-IMP: NORMAL
SODIUM SERPL-SCNC: 141 MMOL/L (ref 136–145)
WBC # BLD AUTO: 15.4 K/UL (ref 4.6–13.2)

## 2020-06-18 PROCEDURE — 94640 AIRWAY INHALATION TREATMENT: CPT

## 2020-06-18 PROCEDURE — 74011250636 HC RX REV CODE- 250/636: Performed by: INTERNAL MEDICINE

## 2020-06-18 PROCEDURE — C9113 INJ PANTOPRAZOLE SODIUM, VIA: HCPCS | Performed by: PHYSICIAN ASSISTANT

## 2020-06-18 PROCEDURE — 74011000258 HC RX REV CODE- 258: Performed by: INTERNAL MEDICINE

## 2020-06-18 PROCEDURE — 74011000250 HC RX REV CODE- 250: Performed by: INTERNAL MEDICINE

## 2020-06-18 PROCEDURE — 84100 ASSAY OF PHOSPHORUS: CPT

## 2020-06-18 PROCEDURE — 85025 COMPLETE CBC W/AUTO DIFF WBC: CPT

## 2020-06-18 PROCEDURE — 74011000250 HC RX REV CODE- 250: Performed by: PHYSICIAN ASSISTANT

## 2020-06-18 PROCEDURE — 74011250637 HC RX REV CODE- 250/637: Performed by: NURSE PRACTITIONER

## 2020-06-18 PROCEDURE — 83735 ASSAY OF MAGNESIUM: CPT

## 2020-06-18 PROCEDURE — 92610 EVALUATE SWALLOWING FUNCTION: CPT

## 2020-06-18 PROCEDURE — 94660 CPAP INITIATION&MGMT: CPT

## 2020-06-18 PROCEDURE — 74011250636 HC RX REV CODE- 250/636: Performed by: PHYSICIAN ASSISTANT

## 2020-06-18 PROCEDURE — 71045 X-RAY EXAM CHEST 1 VIEW: CPT

## 2020-06-18 PROCEDURE — 77010033711 HC HIGH FLOW OXYGEN

## 2020-06-18 PROCEDURE — 80048 BASIC METABOLIC PNL TOTAL CA: CPT

## 2020-06-18 PROCEDURE — 82962 GLUCOSE BLOOD TEST: CPT

## 2020-06-18 PROCEDURE — 74011636637 HC RX REV CODE- 636/637: Performed by: INTERNAL MEDICINE

## 2020-06-18 PROCEDURE — 36415 COLL VENOUS BLD VENIPUNCTURE: CPT

## 2020-06-18 PROCEDURE — 74011250637 HC RX REV CODE- 250/637: Performed by: INTERNAL MEDICINE

## 2020-06-18 PROCEDURE — 92526 ORAL FUNCTION THERAPY: CPT

## 2020-06-18 PROCEDURE — 83880 ASSAY OF NATRIURETIC PEPTIDE: CPT

## 2020-06-18 PROCEDURE — 65610000006 HC RM INTENSIVE CARE

## 2020-06-18 PROCEDURE — 90935 HEMODIALYSIS ONE EVALUATION: CPT

## 2020-06-18 PROCEDURE — 94762 N-INVAS EAR/PLS OXIMTRY CONT: CPT

## 2020-06-18 RX ORDER — IPRATROPIUM BROMIDE AND ALBUTEROL SULFATE 2.5; .5 MG/3ML; MG/3ML
3 SOLUTION RESPIRATORY (INHALATION)
Status: DISCONTINUED | OUTPATIENT
Start: 2020-06-18 | End: 2020-06-26 | Stop reason: HOSPADM

## 2020-06-18 RX ADMIN — CITALOPRAM HYDROBROMIDE 20 MG: 20 TABLET ORAL at 09:11

## 2020-06-18 RX ADMIN — LORATADINE 10 MG: 10 TABLET ORAL at 09:12

## 2020-06-18 RX ADMIN — HYDRALAZINE HYDROCHLORIDE 20 MG: 20 INJECTION, SOLUTION INTRAMUSCULAR; INTRAVENOUS at 11:27

## 2020-06-18 RX ADMIN — HEPARIN SODIUM 5000 UNITS: 5000 INJECTION INTRAVENOUS; SUBCUTANEOUS at 13:02

## 2020-06-18 RX ADMIN — Medication 325 MG: at 05:47

## 2020-06-18 RX ADMIN — IPRATROPIUM BROMIDE AND ALBUTEROL SULFATE 3 ML: .5; 3 SOLUTION RESPIRATORY (INHALATION) at 00:04

## 2020-06-18 RX ADMIN — MIRTAZAPINE 15 MG: 15 TABLET, FILM COATED ORAL at 20:58

## 2020-06-18 RX ADMIN — HEPARIN SODIUM 5000 UNITS: 5000 INJECTION INTRAVENOUS; SUBCUTANEOUS at 04:35

## 2020-06-18 RX ADMIN — MIDODRINE HYDROCHLORIDE 10 MG: 5 TABLET ORAL at 09:12

## 2020-06-18 RX ADMIN — IPRATROPIUM BROMIDE AND ALBUTEROL SULFATE 3 ML: .5; 3 SOLUTION RESPIRATORY (INHALATION) at 14:14

## 2020-06-18 RX ADMIN — CYANOCOBALAMIN TAB 1000 MCG 1000 MCG: 1000 TAB at 18:27

## 2020-06-18 RX ADMIN — PIPERACILLIN AND TAZOBACTAM 2.25 G: 2; .25 INJECTION, POWDER, LYOPHILIZED, FOR SOLUTION INTRAVENOUS at 13:29

## 2020-06-18 RX ADMIN — CYANOCOBALAMIN TAB 1000 MCG 1000 MCG: 1000 TAB at 09:12

## 2020-06-18 RX ADMIN — PIPERACILLIN AND TAZOBACTAM 2.25 G: 2; .25 INJECTION, POWDER, LYOPHILIZED, FOR SOLUTION INTRAVENOUS at 20:58

## 2020-06-18 RX ADMIN — CARVEDILOL 12.5 MG: 12.5 TABLET, FILM COATED ORAL at 18:27

## 2020-06-18 RX ADMIN — INSULIN LISPRO 4 UNITS: 100 INJECTION, SOLUTION INTRAVENOUS; SUBCUTANEOUS at 13:00

## 2020-06-18 RX ADMIN — PIPERACILLIN AND TAZOBACTAM 2.25 G: 2; .25 INJECTION, POWDER, LYOPHILIZED, FOR SOLUTION INTRAVENOUS at 04:30

## 2020-06-18 RX ADMIN — SODIUM CHLORIDE 0.75 G: 900 INJECTION, SOLUTION INTRAVENOUS at 17:29

## 2020-06-18 RX ADMIN — IPRATROPIUM BROMIDE AND ALBUTEROL SULFATE 3 ML: .5; 3 SOLUTION RESPIRATORY (INHALATION) at 08:35

## 2020-06-18 RX ADMIN — FUROSEMIDE 80 MG: 10 INJECTION, SOLUTION INTRAMUSCULAR; INTRAVENOUS at 18:27

## 2020-06-18 RX ADMIN — CHLORHEXIDINE GLUCONATE 0.12% ORAL RINSE 15 ML: 1.2 LIQUID ORAL at 09:13

## 2020-06-18 RX ADMIN — INSULIN LISPRO 2 UNITS: 100 INJECTION, SOLUTION INTRAVENOUS; SUBCUTANEOUS at 05:48

## 2020-06-18 RX ADMIN — Medication 10 ML: at 21:04

## 2020-06-18 RX ADMIN — ASPIRIN 81 MG CHEWABLE TABLET 81 MG: 81 TABLET CHEWABLE at 09:11

## 2020-06-18 RX ADMIN — IPRATROPIUM BROMIDE AND ALBUTEROL SULFATE 3 ML: .5; 3 SOLUTION RESPIRATORY (INHALATION) at 20:36

## 2020-06-18 RX ADMIN — HYDRALAZINE HYDROCHLORIDE 20 MG: 20 INJECTION, SOLUTION INTRAMUSCULAR; INTRAVENOUS at 18:27

## 2020-06-18 RX ADMIN — IPRATROPIUM BROMIDE AND ALBUTEROL SULFATE 3 ML: .5; 3 SOLUTION RESPIRATORY (INHALATION) at 04:29

## 2020-06-18 RX ADMIN — HYDRALAZINE HYDROCHLORIDE 20 MG: 20 INJECTION, SOLUTION INTRAMUSCULAR; INTRAVENOUS at 02:09

## 2020-06-18 RX ADMIN — SODIUM CHLORIDE 40 MG: 9 INJECTION INTRAMUSCULAR; INTRAVENOUS; SUBCUTANEOUS at 09:12

## 2020-06-18 RX ADMIN — Medication 10 ML: at 05:48

## 2020-06-18 RX ADMIN — HEPARIN SODIUM 5000 UNITS: 5000 INJECTION INTRAVENOUS; SUBCUTANEOUS at 20:57

## 2020-06-18 RX ADMIN — Medication 10 ML: at 13:30

## 2020-06-18 RX ADMIN — FUROSEMIDE 80 MG: 10 INJECTION, SOLUTION INTRAMUSCULAR; INTRAVENOUS at 09:11

## 2020-06-18 NOTE — PROGRESS NOTES
[facility]  ICU Progress Note      Name: Clarissa Umanzor   : 1950   MRN: [id number]   Date: 2020 12:09 PM     [x]I have reviewed the flowsheet and previous days notes. Events overnight reviewed and discussed with nursing staff. Vital signs and records reviewed. Brief ICU Course:  Patient is a 71 y.o. female w/ pmhx of ESRD on HD, DM, and CHF admitted for acute hypoxemic respiratory failure and subsequently started on tx for pna. Patient was started on BIPAP and intubated for progressive respiratory failure. Noted to have foreign body on CT w/ forceps denture retrieval. Pt extubated on 6/15 to NC, escalated to BiPAP on  after acute respiratory failure/probable aspiration. Pt is tolerating HFNC well this AM at 30L and 100% FiO2. Subjective:  20:    - No new events overnight  - BiPAP overnight, 2/2 some intermittent hypoxia, currently doing well on HFNC - FiO2 100% 30L  - Minimal UOP (~90cc recorded overnight)  - Afebrile  - New leukocytosis this AM - 15.4 > 12.0 >9.3  - AOx4 and able to answer questions appropriately  - Admits to some SOB, but states that breathing \"okay\"  - Denies any CP, palpitations, abd pain, N/V/D, F/C, HA                  ROS:Pertinent items are noted in HPI. Medication Review:  · Pressors - N/A  · Sedation - N/A  · Antibiotics - Zosyn  · Pain - Lidocaine patch; PRN Tylenol  · GI/ DVT - SQH; Protonix  · Others (other gtts)      Vital Signs:    Visit Vitals  /64   Pulse 83   Temp 98.6 °F (37 °C)   Resp 25   Ht 5' 5\" (1.651 m)   Wt 98 kg (216 lb 0.8 oz)   SpO2 93%   Breastfeeding No   BMI 35.95 kg/m²       O2 Device: Hi flow nasal cannula   O2 Flow Rate (L/min): 30 l/min   Temp (24hrs), Av.9 °F (36.6 °C), Min:97.1 °F (36.2 °C), Max:98.7 °F (37.1 °C)       Intake/Output:   Last shift:      No intake/output data recorded. Last 3 shifts: 1901 -  0700  In: 1320 [P.O.:130;  I.V.:1180]  Out: 7443 [Urine:435]    Intake/Output Summary (Last 24 hours) at 6/18/2020 1209  Last data filed at 6/18/2020 0700  Gross per 24 hour   Intake 670 ml   Output 3405 ml   Net -2735 ml       Ventilator Settings:  Mode Rate Tidal Volume Pressure FiO2 PEEP   Assist control, VC+   400 ml  10 cm H2O 100 % 10 cm H20     Peak airway pressure: 22 cm H2O    Minute ventilation: 12.3 l/min      ARDS network Guidelines:   Lung protective strategy and Plateau  Pressure goal < 30 cm H2O goals  Oxygenation Goals PaO2 55-80 mm Hg or SaO2 88-95%  PH goal 7.30-7.45    VAP bundle:  Reviewed. [place] tube to suction at 20-30 cm Hg. Maintain [place] tube with 5-10ml air every 4 hours  Routine oral care every 4 hours  Elevation of head > 45 degree  Daily sedation holiday and SBT evaluation starting at 6.00am.    Physical Exam:    General: NAD on HFNC, resting and watching TV  HEENT:  Anicteric sclerae; pink palpebral conjunctivae; mucosa moist  Resp:  Symmetrical chest rise, no accessory muscle use; RUL +rales/cousrse; KATIE +crackles; diminished bibasilar.    CV:  S1, S2 present; RRR; no m/g/r  GI:  Abdomen soft, non-tender; (+) active bowel sounds  Extremities:  +2 pulses on all extremities; no edema/ cyanosis/ clubbing noted  Skin:  Warm; no rashes/ lesions noted  Neurologic:  Non-focal  Devices:  HFNC, Lopez      DATA:     Current Facility-Administered Medications   Medication Dose Route Frequency    albuterol-ipratropium (DUO-NEB) 2.5 MG-0.5 MG/3 ML  3 mL Nebulization Q6H RT    midodrine (PROAMATINE) tablet 10 mg  10 mg Oral TID WITH MEALS    Piperacillin-tazobactam (ZOSYN) 0.75 gm Supplemental Dosing by Pharmacy   Other Rx Dosing/Monitoring    piperacillin-tazobactam (ZOSYN) 2.25 g in 0.9% sodium chloride (MBP/ADV) 50 mL MBP  2.25 g IntraVENous Q8H    acetaminophen (TYLENOL) tablet 325 mg  325 mg Oral Q4H PRN    sodium chloride 3% hypertonic nebulizer soln  4 mL Nebulization TID PRN    lidocaine 4 % patch 1 Patch  1 Patch TransDERmal Q24H    menthol-zinc oxide (CALMOSEPTINE) 0.44-20.6 % ointment   Topical Q6H PRN    0.9% sodium chloride infusion 250 mL  250 mL IntraVENous PRN    epoetin johnathon-epbx (RETACRIT) injection 10,000 Units  10,000 Units SubCUTAneous Q MON, WED & FRI    pantoprazole (PROTONIX) 40 mg in 0.9% sodium chloride 10 mL injection  40 mg IntraVENous DAILY    albuterol-ipratropium (DUO-NEB) 2.5 MG-0.5 MG/3 ML  3 mL Nebulization Q6H PRN    sodium chloride (NS) flush 5-40 mL  5-40 mL IntraVENous Q8H    sodium chloride (NS) flush 5-40 mL  5-40 mL IntraVENous PRN    insulin lispro (HUMALOG) injection   SubCUTAneous Q6H    hydrALAZINE (APRESOLINE) 20 mg/mL injection 20 mg  20 mg IntraVENous Q6H PRN    ondansetron (ZOFRAN) injection 4 mg  4 mg IntraVENous Q6H PRN    glucose chewable tablet 16 g  4 Tab Oral PRN    glucagon (GLUCAGEN) injection 1 mg  1 mg IntraMUSCular PRN    dextrose (D50W) injection syrg 12.5-25 g  25-50 mL IntraVENous PRN    furosemide (LASIX) injection 80 mg  80 mg IntraVENous BID    citalopram (CELEXA) tablet 20 mg  20 mg Oral DAILY    cyanocobalamin tablet 1,000 mcg  1,000 mcg Oral BID    loratadine (CLARITIN) tablet 10 mg  10 mg Oral DAILY    aspirin chewable tablet 81 mg  81 mg Oral DAILY    [Held by provider] isosorbide mononitrate ER (IMDUR) tablet 30 mg  30 mg Oral DAILY    polyethylene glycol (MIRALAX) packet 17 g  17 g Oral DAILY    [Held by provider] rosuvastatin (CRESTOR) tablet 5 mg  5 mg Oral QHS    [Held by provider] gabapentin (NEURONTIN) capsule 100 mg  100 mg Oral BID    [Held by provider] carvediloL (COREG) tablet 12.5 mg  12.5 mg Oral BID WITH MEALS    ferrous sulfate tablet 325 mg  325 mg Oral EVERY OTHER DAY    mirtazapine (REMERON) tablet 15 mg  15 mg Oral QHS    docusate sodium (COLACE) capsule 100 mg  100 mg Oral BID PRN    heparin (porcine) injection 5,000 Units  5,000 Units SubCUTAneous Q8H         Labs: Results:       Chemistry Recent Labs     06/18/20  0209 06/17/20  0458 06/16/20  0524   * 148* 146*    137 139   K 3.9 3.1* 3.5    105 102   CO2 28 26 28   BUN 14 19* 13   CREA 2.40* 3.12* 2.14*   CA 9.1 8.2* 8.6   AGAP 10 6 9   BUCR 6* 6* 6*      CBC w/Diff Recent Labs     06/18/20  0209 06/17/20  0606 06/16/20  0524   WBC 15.4* 12.0 9.3   RBC 3.55* 3.19* 3.15*   HGB 9.8* 8.8* 8.6*   HCT 31.7* 28.6* 28.4*    187 186   GRANS 91* 86* 90*   LYMPH 3* 10* 7*   EOS 0 0 0      Coagulation No results for input(s): PTP, INR, APTT, INREXT in the last 72 hours. Liver Enzymes No results for input(s): TP, ALB, TBIL, AP in the last 72 hours. No lab exists for component: SGOT, GPT, DBIL   ABG No results found for: PH, PHI, PCO2, PCO2I, PO2, PO2I, HCO3, HCO3I, FIO2, FIO2I   Microbiology Recent Labs     06/16/20  0628 06/16/20  0625   CULT NO GROWTH 2 DAYS NO GROWTH 2 DAYS          Telemetry: []Sinus []A-flutter []Paced    []A-fib []Multiple PVCs                  Imaging:    CXR [06/18/20]: Findings: Moderate rotation to the left  Lines/Tubes/Devices:  Right IJ dialysis catheter remains unremarkable  LUNGS: Moderate hypoinflation. Allowing for differences in rotation, there is some worsening of aeration. Increased extent of ill-defined opacities of the perihilar areas. Persistence of left basilar opacification. Still with some vascular indistinctness. No visible pneumothorax. MEDIASTINUM: Limited evaluation given rotation and adjacent parenchymal findings. BONES/SOFT TISSUES: No acute findings    IMPRESSION:  1. Worsened aeration, presumed alveolar edema. Preexistent atelectasis at least at the left base. Recommend upright PA and lateral chest x-ray when clinically feasible. CTA CHEST W OR WO CONT [06/13/20]: FINDINGS:     Mildly suboptimal contrast bolus. There is near iso enhancementisoenhancement of the pulmonary veins, thoracic aorta, and pulmonary arteries.  Image quality is degraded by body habitus, hypoinflation, and arms at sides, which result in streak/mottle/noise artifact, in addition to bibasilar consolidations. There is no convincing evidence of pulmonary arterial filling defect in the main, central, lobar, or larger enhanced segmental branches of the pulmonary arterial tree indicative of acute pulmonary embolism. Assessment becomes limited beginning at the segmental level.     Endotracheal tube is present with tip cephalad to the garth. Moderate sized bilateral pleural effusions, similar compared to prior. Bilateral adjacent consolidations, slightly increased compared to the prior chest CT, likely atelectasis, underlying airspace disease not excluded.      Additional hazy bilateral airspace disease most conspicuous in the upper lobes. Additional multinodular airspace disease in the left upper lobe posteriorly. Couple of additional tiny chronic right-sided pulmonary nodules are again noted. No evidence of pneumothorax. There is some mosaic attenuation with geographic variation in lung density best appreciated in the left upper lobe, similar to prior, may reflect a component of air-trapping such as can be seen due to small airways disease.     Very small pericardial effusion. Multichamber cardiac enlargement. Atherosclerotic calcifications noted including in the coronary arteries. The main pulmonary artery appears prominent in caliber, approximately 4.1 cm, which can be seen with pulmonary hypertension. The thoracic aorta enhances normally. Usual limitation of cardiac motion artifacts noted.     Esophagus appears mildly patulous.     No evidence of pathologic lymph node enlargement is seen.     On review of bone windows, no acute fractures or destructive bone lesions are seen. Cervical spine fusion hardware noted.      IMPRESSION:   Image quality is degraded by body habitus, hypoinflation, arms at sides, and mildly suboptimal contrast bolus, as noted above.  No evidence of acute pulmonary embolism in the main, central, lobar, or larger enhanced segmental branches of the pulmonary arterial tree. Assessment becomes limited beginning at the segmental level.     Moderate sized bilateral pleural effusions. Adjacent bibasilar consolidations, slightly larger compared to prior, likely atelectasis, underlying airspace disease not excluded.     Hazy lung opacity bilaterally. Multinodular airspace disease in the left upper lobe.     Cardiomegaly.     Additional nonacute findings as above.         [x]I have personally reviewed the patients radiographs  []Radiographs reviewed with radiologist   []No change from prior, tubes and lines in adequate position  []Improved   []Worsening        IMPRESSION:   · Acute on Chronic Hypoxic Respiratory Failure - Multifactorial. Required emergent intubation on 06/11/20, 2/2 aspiration event on the floor (aspiration of partial dentures, removed with forceps) in addition to PNA and CHF Exacerbation/fluid overload on CXR. Extubated to NC on 06/15 with intermittent BiPAP use  · Aspiration of foreign body (partials) into the hypopharynxretrieved with forceps. S/p bronc 6/11/2020some mucous plugs, small caliber airways noted but no foreign material.  CTA chest (06/13) (-) for PE and (-) retained FB. Probable additional episode of aspiration on 6/16 after extubation, prompted brief use of BiPAP. · RUL infiltrate/PNA  CXR (6/7/20), scant yeast on Resp Cx. · Acute on chronic HFpEF - Most recent Echo ( 04/25/20) EF 55-60%. · Bacteremia vs contaminant - BxCx (06/07) (+)GPC in 1/2 bottles, repeat BxCx - NGTD  · ESRD - HD MWF  · Acute on Chronic Anemia - s/p 2u PRBCs this admission   RECOMMENDATIONS:   · Resp: Titrate FiO2/ supp O2 for SpO2 >90%; wean HFNC as able. Optimize bronchial hygiene. Con't 3% saline nebs with Metanebs TID. Duo-nebs q6 and q4 PRN. CXR in AM to monitor fluid overload and PNA. Closely monitor respiratory status for decompensation. · I/D: F/u BxCx. Trend temp & WBC curve. BxCx - NGTD.  Con't Zosyn per ID recs, possible D/C on 06/19? Rekha Emelia Deescalate ABX once Cx's finalize. If patient spikes temp or otherwise decompensates, will re-culture, add Vanc and switch Zosyn to Merrem per ID Rec  · Hem/Onc: Daily CBC; H/H, and plts are stable  · CVS: HD stable; Goal MAP >65mmHg. Con't Lasix 80mg BID for fluid overload. D/C Midodrine. Restart Coreg PO for HTN (after dialysis). Con't Hydralazine 20mg IVP q6 PRN for SBP >170mmHg. Consider restarting Crestor PO. Noted BNP this AM - con't diuresis and fluid removal with HD. Consider updating Echo in setting significantly elevated BNP above baseline. · Metabolic: Daily BMP, Mag & Phos; monitor e-lytes; replace PRN  · Renal: Trend Renal indices; con't Diuresis. Nephrology following - HD MWF. Recommend leaving ramos for now - however if temp spikes or WBC continues to elevate, may need to consider D/C. · Endocrine: POC Glucose q6; SSI.   · GI: SUP, Trend LFTs, Zofran PRN for N/V. SLP cleared for diet - soft solid with thin liquids. · Musc/Skin: No acute issues  · Neuro: No active issues  · Fluids: N/A  · Discussed in Interdisciplinary Rounds      Best Practices/ Safety Bundles:  · Sepsis Bundle per Hospital Protocol  · CAUTI Bundle  · Electrolyte Replacement Bundle  · Glycemic control goal <180; avoid Hypoglycemia  · IHI ICU Bundles:  ·  Central Line Bundle Followed  and Ramos Bundle Followed    · Mech Vent patients:   · VAP bundle, Aim to keep peak plateau pressure 87-27SL H2O  · Aspiration Precautions - HOB >30'  · Daily sedation holiday as indicated  · SBT as tolerated/appropriate  · Titrate FiO2 for SpO2 >94%  · Aggressive Pulmonary Hygeiene  · Stress ulcer prophylaxis: Protonix  · DVT prophylaxis: SQH  · Need for Lines, ramos assessed.       The patient is: [x] acutely ill Risk of deterioration: [x] moderate    [] critically ill  [] high     [x]See my orders for details    My assessment/plan was discussed with:  [x]Nursing []PT/OT    [x]Respiratory therapy [x]Dr. Wai Ramos MD []Family []     Felix Taylor PA-C  06/18/20      Pulmonary [address] [facility]

## 2020-06-18 NOTE — PROGRESS NOTES
In Patient Progress note      Admit Date: 6/7/2020    Impression:     1) ESRD on HD MWF , plan for HD tomorrow   2) Ac respi failure , multifactorial , Asp PNA  ,AC on chr diastolic CHF  3) AC on chr alfredo CHF  4) PNA , Rapid covid -v e    5) bacteremia , coag neg staph from 6/7 likely contaminant per ID   Bld cx since have been negative   5) Anemia of CKD   6) sec hyperpara     Ramos in place, making some urine   Tolerated HD , UF ~ 3 lit , -12 lit since admission  CXR reviewed, worsening opacities on left   On BIPAP this AM      Plan:  1) IUF today ~ 3.5 lit   2) continue Lasix 80 mg BID  3) ok to d/c ramos cath  4) follow ID recs   6) continue epogen with HD   7) dose AB for GFR < 15    Please call with questions ,     Sonal Gamboa MD FASN  Cell [phone]  Pager: [phone]     Subjective:      On BIPAP  Awake following commands    Current Facility-Administered Medications:     albuterol-ipratropium (DUO-NEB) 2.5 MG-0.5 MG/3 ML, 3 mL, Nebulization, Q6H RT, Kwadwo ALCALA MD    midodrine (PROAMATINE) tablet 10 mg, 10 mg, Oral, TID WITH MEALS, Kizzy Mireles NP, 10 mg at 06/18/20 0912    Piperacillin-tazobactam (ZOSYN) 0.75 gm Supplemental Dosing by Pharmacy, , Other, Rx Dosing/Monitoring, Erica Díaz MD    piperacillin-tazobactam (ZOSYN) 2.25 g in 0.9% sodium chloride (MBP/ADV) 50 mL MBP, 2.25 g, IntraVENous, Q8H, Lacretia Severin, MD, Last Rate: 100 mL/hr at 06/18/20 0430, 2.25 g at 06/18/20 0430    acetaminophen (TYLENOL) tablet 325 mg, 325 mg, Oral, Q4H PRN, Kizzy Mireles NP, 325 mg at 06/16/20 1904    sodium chloride 3% hypertonic nebulizer soln, 4 mL, Nebulization, TID PRN, Monica Lassiter DO    lidocaine 4 % patch 1 Patch, 1 Patch, TransDERmal, Q24H, Jaswinder Walker PA-C, 1 Patch at 06/18/20 0911    menthol-zinc oxide (CALMOSEPTINE) 0.44-20.6 % ointment, , Topical, Q6H PRN, Jaswinder Walker PA-C    0.9% sodium chloride infusion 250 mL, 250 mL, IntraVENous, PRN, Aldair Finer MICHELLE ALCALA    epoetin johnathon-epbx (RETACRIT) injection 10,000 Units, 10,000 Units, SubCUTAneous, Q MON, WED & FRI, [facility] MD, 10,000 Units at 06/17/20 2030    pantoprazole (PROTONIX) 40 mg in 0.9% sodium chloride 10 mL injection, 40 mg, IntraVENous, DAILY, Divya Lilly PA-C, 40 mg at 06/18/20 0912    albuterol-ipratropium (DUO-NEB) 2.5 MG-0.5 MG/3 ML, 3 mL, Nebulization, Q6H PRN, Divya Lilly PA-C, 3 mL at 06/16/20 0722    sodium chloride (NS) flush 5-40 mL, 5-40 mL, IntraVENous, Q8H, Monica Lassiter DO, 10 mL at 06/18/20 0548    sodium chloride (NS) flush 5-40 mL, 5-40 mL, IntraVENous, PRN, Monica Lassiter DO    insulin lispro (HUMALOG) injection, , SubCUTAneous, Q6H, Monica Lassiter DO, 2 Units at 06/18/20 0548    hydrALAZINE (APRESOLINE) 20 mg/mL injection 20 mg, 20 mg, IntraVENous, Q6H PRN, Vikram Ellis PA-C, 20 mg at 06/18/20 1127    ondansetron (ZOFRAN) injection 4 mg, 4 mg, IntraVENous, Q6H PRN, Alon Gar MD, 4 mg at 06/10/20 0030    glucose chewable tablet 16 g, 4 Tab, Oral, PRN, Shay Bunch DO    glucagon (GLUCAGEN) injection 1 mg, 1 mg, IntraMUSCular, PRN, Shay Bunch DO    dextrose (D50W) injection syrg 12.5-25 g, 25-50 mL, IntraVENous, PRN, Shay Bunch DO, 25 g at 06/13/20 0041    furosemide (LASIX) injection 80 mg, 80 mg, IntraVENous, BID, Hadley Angulo MD, 80 mg at 06/18/20 0911    citalopram (CELEXA) tablet 20 mg, 20 mg, Oral, DAILY, Hadley Angulo MD, 20 mg at 06/18/20 0911    cyanocobalamin tablet 1,000 mcg, 1,000 mcg, Oral, BID, Hadley Angulo MD, 1,000 mcg at 06/18/20 0912    loratadine (CLARITIN) tablet 10 mg, 10 mg, Oral, DAILY, Hadley Angulo MD, 10 mg at 06/18/20 0912    aspirin chewable tablet 81 mg, 81 mg, Oral, DAILY, Hadley Angulo MD, 81 mg at 06/18/20 0911    [Held by provider] isosorbide mononitrate ER (IMDUR) tablet 30 mg, 30 mg, Oral, DAILY, Hadley Angulo MD, Stopped at 06/11/20 0900   polyethylene glycol (MIRALAX) packet 17 g, 17 g, Oral, DAILY, Hadley Angulo MD, Stopped at 06/15/20 0900    [Held by provider] rosuvastatin (CRESTOR) tablet 5 mg, 5 mg, Oral, QHS, Hadley Angulo MD, Stopped at 06/11/20 2200    [Held by provider] gabapentin (NEURONTIN) capsule 100 mg, 100 mg, Oral, BID, Hadley Angulo MD, Stopped at 06/11/20 0900    [Held by provider] carvediloL (COREG) tablet 12.5 mg, 12.5 mg, Oral, BID WITH MEALS, Hadley Angulo MD, Stopped at 06/11/20 0800    ferrous sulfate tablet 325 mg, 325 mg, Oral, EVERY OTHER DAY, Hadley Angulo MD, 325 mg at 06/18/20 0547    mirtazapine (REMERON) tablet 15 mg, 15 mg, Oral, QHS, Hadley Angulo MD, 15 mg at 06/17/20 2026    docusate sodium (COLACE) capsule 100 mg, 100 mg, Oral, BID PRN, Rayne Severance, Nabeel A, MD    heparin (porcine) injection 5,000 Units, 5,000 Units, SubCUTAneous, Q8H, Hadley Angulo MD, 5,000 Units at 06/18/20 0435        Objective:     Visit Vitals  /64   Pulse 83   Temp 98.6 °F (37 °C)   Resp 25   Ht 5' 5\" (1.651 m)   Wt 98 kg (216 lb 0.8 oz)   SpO2 93%   Breastfeeding No   BMI 35.95 kg/m²         Intake/Output Summary (Last 24 hours) at 6/18/2020 1147  Last data filed at 6/18/2020 0700  Gross per 24 hour   Intake 670 ml   Output 3405 ml   Net -2735 ml       Physical Exam:     On BIPAP  HENT mmm  RS AEBE coarse BS   CVS s1 s2 wnl no JVD  GI soft BS +  Ext 1+ LE edema     Data Review:    Recent Labs     06/18/20  0209   WBC 15.4*   RBC 3.55*   HCT 31.7*   MCV 89.3   MCH 27.6   MCHC 30.9*   RDW 13.9     Recent Labs     06/18/20  0209 06/17/20  0450 06/16/20  0524   BUN 14 19* 13   CREA 2.40* 3.12* 2.14*   CA 9.1 8.2* 8.6   K 3.9 3.1* 3.5    137 139    105 102   CO2 28 26 28   PHOS 2.6 3.1 3.6   * 148* 146*       Giovany Andrew MD

## 2020-06-18 NOTE — ROUTINE PROCESS
0740:  Wai Newell  from [facility]CLAN Cordero RN. SBAR reviewed patient-side with two-nurse check-offs done of meds, dressings, wounds, LDA's & revelant assessment findings including neuro status, vent settings, rhythm & pulses, diet. Bed in lowest position with noted SR up, wheels locked and exit alarm on. Tonight:  Alert. Has been convinced to wear BiPAP but more leak than when properly applied. Large, very loose stool repeated. Per Ms. Lilly, okay to place FMS. BP up, responsive to IV antihypertensive.  ~0000, requested BiPAP and wore most of the night, able to sleep. Was denied more ice chips. 0725:  Verbal Patient-side shift change report given to FAM Fall, (oncoming nurse) by Greg Alcala RN 
(offgoing nurse). Report included the following information SBAR, Kardex, ED Summary, Procedure Summary, Intake/Output, MAR, Recent Results and Med Rec Status. Included:  Intro, hx, two-RN eval of relevant assessment findings, LDAs, skin, diagnostics and infusions.

## 2020-06-18 NOTE — PROGRESS NOTES
NUTRITION    Nursing Referral: [facility]     RECOMMENDATIONS / PLAN:     - Add supplements: Nepro TID. - Monitor po intake and diet tolerance. - Recommend stopping IV fluid- MD to discontinue.   - Continue RD inpatient monitoring and evaluation. NUTRITION INTERVENTIONS & DIAGNOSIS:     - Meals/snacks: modified composition   - Medical food supplement therapy: initiate   - IV fluid: D10 at 20 mL/hr (48 gm dextrose, 163 kcal per day)  - Collaboration and referral of nutrition care: interdisciplinary rounds     Nutrition Diagnosis: Increased nutrient needs protein related to increased expenditure as evidenced by ESRD on HD    ASSESSMENT:     6/18: Started on diet after swallow evaluation this morning; plan for dialysis again today (3 L removed yesterday) and receiving diuretics with plan for MD to stop IV fluid- hypoglycemia resolved. BNP to be checked today. 6/17: Remains NPO on/off bipap overnight with plan for swallow evaluation prior to diet initiation, SLP re-consulted. HD planned today. 6/16: Started on trickle feeds yesterday then held, NGT clamped and removed with extubation by the afternoon. Remains NPO with concern for aspiration due to worsening chest xray today and may require re-intubation per MD. To remain NPO, diuresis and receiving dextrose infusion. 6/15: Remains intubated, OGT placed overnight/clamped and plan for dialysis today. Possible extubation today, dextrose infusion started for hypoglycemia- improved over the past 2 days. 6/12: Remains intubated s/p bronch yesterday and currently on SBT with plan for extubation today. HD today.  Swallow evaluation planned once extubated, recent BG WNL at 81-90 mg/dL and monitoring.   6/11: s/p RRT overnight for hypoxia, increased work of breakfast and copious secretions, noted to be chocking on her partial denture- intubated for airway protection and foreign body retrieved during procedure; plan for imaging today to confirm removal and possible extubation. No OG or NGT placed. 6/9: Admitted with pneumonia, CHF with hx of ESRD on HD. Diet started and noted pt eating meals in ED, intake unknown at this time. Wt gain noted PTA, question fluid status. Hx yesterday UF 3.5 L, 1200 mL FR recommended per Nephrology. Nutritional intake adequate to meet patients estimated nutritional needs:  No    Diet: DIET DENTAL SOFT (SOFT SOLID)     Food Allergies: NKFA  Current Appetite: Poor  Appetite/meal intake prior to admission: Unable to determine at this time  Feeding Limitations:  [x] Swallowing difficulty: SLP following    [x] Chewing difficulty: partial dentures- chocking on lead to intubation 6/11/20    [x] Other: respiratory status, unable to eat while on bipap   Current Meal Intake:   No data found. Anuric/Oliguric    HD UF: 2.5 L (6/15), 3 L (6/17)   BM: 6/17, loose   Skin Integrity: WDL   Edema:   [] No     [x] Yes   Pertinent Medications: Reviewed: cyanocobalamin, docusate sodium prn, ferrous sulfate, furosemide, mirtazapine, pantoprazole, ondansetron, miralax, epoetin, rosuvastatin- held    Recent Labs     06/18/20  0209 06/17/20  0450 06/16/20  0524    137 139   K 3.9 3.1* 3.5    105 102   CO2 28 26 28   * 148* 146*   BUN 14 19* 13   CREA 2.40* 3.12* 2.14*   CA 9.1 8.2* 8.6   MG 2.2 1.9 2.0   PHOS 2.6 3.1 3.6       Intake/Output Summary (Last 24 hours) at 6/18/2020 1024  Last data filed at 6/18/2020 0700  Gross per 24 hour   Intake 670 ml   Output 3405 ml   Net -2735 ml       Anthropometrics:  Ht Readings from Last 1 Encounters:   06/08/20 5' 5\" (1.651 m)     Last 3 Recorded Weights in this Encounter    06/16/20 0430 06/17/20 0730 06/18/20 0600   Weight: 101.7 kg (224 lb 3.3 oz) 101.7 kg (224 lb 3.3 oz) 98 kg (216 lb 0.8 oz)     Body mass index is 35.95 kg/m².    Obese Class II    Weight History: weight gain noted PTA, question fluid status, ESRD on HD    Weight Metrics 6/18/2020 5/13/2020 4/30/2020 4/29/2020 4/26/2020 8/18/2016 6/2/2016   Weight 216 lb 0.8 oz 238 lb 5.1 oz - 252 lb 252 lb 4.8 oz 219 lb 228 lb   BMI 35.95 kg/m2 - 39.66 kg/m2 41.93 kg/m2 41.98 kg/m2 36.44 kg/m2 37.94 kg/m2        Admitting Diagnosis: Pneumonia [J18.9]  Pertinent PMHx: CHF, DM, GERD, HTN, uterine cancer, ESRD on HD    Education Needs:        [x] None identified  [] Identified - Not appropriate at this time  []  Identified and addressed - refer to education log  Learning Limitations:   [x] None identified  [] Identified:   Cultural, Baptism & ethnic food preferences:  [x] None identified    [] Identified and addressed     ESTIMATED NUTRITION NEEDS:     Calories: 8328-7570 kcal (30-35 kcal/kg) based on  [] Actual  kg     [x] SBW 67 kg   Protein:  gm (1.2-1.5 gm/kg) based on  [] Actual BW      [x] SBW   Fluid: 750-1500 mL/day     MONITORING & EVALUATION:     Nutrition Goal(s):   - PO nutrition intake will meet >75% of patient estimated nutritional needs within the next 7 days. Outcome: New/Initial goal      Monitoring:   [x] Food and nutrient intake   [x] Food and nutrient administration  [x] Comparative standards   [x] Nutrition-focused physical findings   [x] Anthropometric Measurements   [x] Treatment/therapy   [x] Biochemical data, medical tests, and procedures        Previous Recommendations (for follow-up assessments only):  No Longer Appropriate      Discharge Planning: Nutritional discharge needs unknown at this time. Participated in care planning, discharge planning, & interdisciplinary rounds as appropriate.       Ariel Chery RD, [address]   Pager: [phone]

## 2020-06-18 NOTE — DIALYSIS
ACUTE HEMODIALYSIS FLOW SHEET    HEMODIALYSIS ORDERS: Physician: Dr. Patrick Malik: Iris   Duration: 3 hr   BFR: 250   DFR: 300   Dialysate:  Temp 36-37*C   K+  3.0    Ca+ 2.5   Na 140   Bicarb 35   Wt Readings from Last 1 Encounters:   06/18/20 98 kg (216 lb 0.8 oz)    Patient Chart [x]   Unable to Obtain []  Dry weight/UF Goal: 3500 ml    Heparin []  Bolus    Units    [] Hourly    Units    [x]None       Pre BP:   188/68    Pulse:  80   Respirations: 26    Temperature:  97.6    Tx: NSS    ml/Bolus   [x] N/A   Labs: []  Pre  []  Post:   [x] N/A   Additional Orders(medications, blood products, hypotension management): [x] Yes   [] No     [x]  [facility] Consent Verified     CATHETER ACCESS:  []N/A   [x]Right   []Left   []IJ   []Fem  [x]Chest wall  []TransHepatic   [] First use X-ray verified     [x]Tunnel    [] Non Tunneled   []No S/S infection  []Redness  []Drainage []Cultured []Swelling []Pain   [x]Medical Aseptic Prep Utilized   []Dressing Changed  [x] Biopatch  Date: 6/15/2020   []Clotted   [x]Patent   Flows: [x]Good  []Poor  []Reversed   If access problem,  notified: []Yes    [x]N/A        GRAFT/FISTULA ACCESS:   [x]N/A     []Right     []Left     []UE     []LE   []AVG   []AVF       []Medical Aseptic Prep Utilized   []No S/S infection  []Redness  []Drainage [] Cultured  [] Swelling  [] Pain  Bruit:   [] Strong    [] Weak       Thrill :   [] Strong    [] Weak     Needle Gauge: 15   Length: 1 inch   If access problem,  notified: []Yes     []N/A          GENERAL ASSESSMENT:    LUNGS:  Rate 26   92 SaO2%      [] Clear  [x] Coarse  [x] Crackles  [] Wheezing                                                [x] Diminished     Location : []RLL   []LLL    []RUL  []KATIE   Cough:      []Productive  []Dry  []N/A   Respirations:  [x]Easy  []Labored   Therapy:  []RA  O2 Type:  [x] HFNC  Mask: []   NRB    [] BiPaP  Flow:  l/min                   [] Ventilator  [] Intubated  [] Trach     CARDIAC: [] Regular [] Irregular   [] Pericardial Rub            [x]  Monitored  [x] Bedside  [] Remotely monitored     EDEMA: [] None   [x]Generalized  [] Pitting [] 1+   [] 2 +   [] 3+    [] 4+  [] Pedal    SKIN:   [x] Warm  [] Hot     [x] Cold   [] Dry     [] Pale   [] Diaphoretic                  [] Flushed  [] Jaundiced  [] Cyanotic     LOC:    [] Alert      [x]Oriented:    [x] Person     [x] Place  []Time               [] Confused  [] Lethargic  [] Medicated  [] Non-responsive   GI / ABDOMEN:                     [] Flat    [] Distended    [x] Soft    [] Firm   []  Obese                   [] Diarrhea   [x] FMS [x] Bowel Sounds  [] Nausea  [] Vomiting     / URINE ASSESSMENT:                   [] Voiding    [x] Oliguria  [] Anuria   [x]  Lopez                  [] Incontinent  []  Incontinent Brief   []  PureWick     PAIN:  [x] 0 []1  []2   []3   []4   []5   []6   []7   []8   []9   []10            Scale 0-10  Action/Follow Up:    MOBILITY:  [x] Bed    [] Stretcher      All Vitals and Treatment Details on Attached [address] [employer] [address]: [facility]          Room # 385/61    [x] Routine         [] 1st Time Acute    [] Urgent      [] Stat            [] Acute Room   []  Bedside    [x] ICU/CCU     [] ER   Isolation Precautions:  [x] Dialysis    There are currently no Active Isolations     ALLERGIES:     Allergies   Allergen Reactions    Augmentin [Amoxicillin-Pot Clavulanate] Diarrhea    Clavulanic Acid Diarrhea    Levaquin [Levofloxacin] Other (comments)     Severe hypoglycemia        Code Status:  Full Code     Hepatitis Status   2nd RN check :  TO   Lab Results   Component Value Date/Time    Hepatitis B surface Ag <0.10 06/09/2020 04:03 AM    Hepatitis B surface Ab <3.10 (L) 06/09/2020 04:03 AM        Current Labs:      Lab Results   Component Value Date/Time    WBC 15.4 (H) 06/18/2020 02:09 AM    HGB 9.8 (L) 06/18/2020 02:09 AM    HCT 31.7 (L) 06/18/2020 02:09 AM    PLATELET 758 47/60/2845 02:09 AM    MCV 89.3 06/18/2020 02:09 AM Lab Results   Component Value Date/Time    Sodium 141 06/18/2020 02:09 AM    Potassium 3.9 06/18/2020 02:09 AM    Chloride 103 06/18/2020 02:09 AM    CO2 28 06/18/2020 02:09 AM    Anion gap 10 06/18/2020 02:09 AM    Glucose 162 (H) 06/18/2020 02:09 AM    BUN 14 06/18/2020 02:09 AM    Creatinine 2.40 (H) 06/18/2020 02:09 AM    BUN/Creatinine ratio 6 (L) 06/18/2020 02:09 AM    GFR est AA 24 (L) 06/18/2020 02:09 AM    GFR est non-AA 20 (L) 06/18/2020 02:09 AM    Calcium 9.1 06/18/2020 02:09 AM          DIET:  DIET DENTAL SOFT (SOFT SOLID)  DIET NUTRITIONAL SUPPLEMENTS     PRIMARY NURSE REPORT:   Pre Dialysis: Nona Enriquez RN     Time: 1400      EDUCATION:    [x] Patient [] Other           Knowledge Basis: []None [x]Minimal [] Substantial   Barriers to learning  [x]N/A   [] Access Care     [] S&S of infection  [] Fluid Management  [] K+   [x] Procedural    []Albumin   [] Medications   [] Tx Options   [] Transplant   [] Diet   [] Other   Teaching Tools:  [x] Explain  [] Demo  [] Handouts [] Video  Patient response: [x] Verbalized understanding  [] Teach back  [] Return demonstration   [] Requires follow up      [x]Time Out/Safety Check  [x] Extracorporeal Circuit Tested for integrity       RO/HEMODIALYSIS MACHINE SAFETY CHECKS  Before each treatment:                                     [address] [place] [address]                                       [x] Portable Machine #1/RO serial # [id number]                                                                                         Alarm Test:  Pass time 1400            [x] RO/Machine Log Complete    Machine Temp    36*C             Dialysate: pH  7.4    Conductivity: Meter 14.0     HD Machine  14.0      TCD: 13.8  Dialyzer Lot # [id number]     Blood Tubing Lot # [id number]     Saline Lot # [id number]     CHLORINE TESTING-Before each treatment and every 4 hours    Total Chlorine: [x] less than 0.1 ppm  Initial Time Check: 1400       4 Hr/2nd Check Time: 1800   (if greater than 0.1 ppm from Primary then every 30 minutes from Secondary)     TREATMENT INITIATION  with Dialysis Precautions:   [x] All Connections Secured              [x] Saline Line Double Clamped   [x] Venous Parameters Set               [x] Arterial Parameters Set    [x] Prime Given 250ml NSS              [x]Air Foam Detector Engaged      Treatment Initiation Note:  0795  Arrived to pt room , pt awake and oriented. HFNC 30L FIO2 100% with 92% Sat. Monitor shows NSR with freq PVC's  1415  Rt chest wall tunneled CVC intact, patent and flushes easily. Dialysis initiated without difficulty. Dr Ally Pandya notified. During Treatment Notes:  1500  Vascular access visible with arterial and venous line connections intact. Pt resting comfortably. 1600  Vascular access visible with arterial and venous line connections intact. Pt resting comfortably. 1700  Vascular access visible with arterial and venous line connections intact. Pt resting comfortably. Medication Dose Volume Route Time iRch Nurse, Title   Zosyn 0.75  ml NSS HD 1730 Danny Moya RN     Post Assessment  Dialyzer Cleared:   [] Good  [x] Fair  [] Poor  Blood processed:  45.7 L  UF Removed:  3500 Ml  Post Wt: 94.5  kg  Post /56   Pulse  74 Resp  24  Temp 97.6   Lungs: [] Clear [x] Course  [x] Crackles                 []  Wheezing   [x] Diminished   Post Tx Vascular Access: [x] N/A        Cardiac :[x] Regular   [] Irregular   Rhythm:  [x] Monitored   [] Not Monitored    CVC Catheter: [] N/A  Locking solution: Heparin 1:1000 U  Arterial port 1.6 ml   Venous port 1.6 ml   Edema:  [] None  [x] Generalized                     Skin:[x] Warm  [x] Dry [] Diaphoretic               [] Flushed  [] Pale [] Cyanotic Pain:  [x]0  []1 []2  []3 []4  []5  []6  []7 []8  []9  []10     Post Treatment Note:   2686  Pt tolerated dialysis well. Dialysis catheter intact, patent and heplocked as noted above.      POST TREATMENT PRIMARY NURSE HANDOFF REPORT: Post Dialysis: Tomas Schmidt RN                Time:  200       Abbreviations: AVG-arterial venous graft, AVF-arterial venous fistula, IJ-Internal Jugular, Subcl-Subclavian, Fem-Femoral, Tx-treatment, AP/HR-apical heart rate, VSS- Vital Signs Stable, CVC- Central Venous Catheter, DFR-dialysate flow rate, BFR-blood flow rate, AP-arterial pressure, -venous pressure, UF-ultrafiltrate, TMP-transmembrane pressure, Austyn-Venous, Art-Arterial, RO-Reverse Osmosis

## 2020-06-18 NOTE — PROGRESS NOTES
Problem: Nutrition Deficit  Goal: *Optimize nutritional status  Outcome: Progressing Towards Goal     Problem: Falls - Risk of  Goal: *Absence of Falls  Description: Document Laretta Luis Alfredo Fall Risk and appropriate interventions in the flowsheet. Outcome: Progressing Towards Goal  Note: Fall Risk Interventions:       Mentation Interventions: Adequate sleep, hydration, pain control, Door open when patient unattended, Familiar objects from home, Gait belt with transfers/ambulation, More frequent rounding    Medication Interventions: Assess postural VS orthostatic hypotension, Evaluate medications/consider consulting pharmacy, Patient to call before getting OOB    Elimination Interventions: Bed/chair exit alarm, Elevated toilet seat, Stay With Me (per policy)    History of Falls Interventions: Bed/chair exit alarm, Door open when patient unattended, Investigate reason for fall, Room close to nurse's station         Problem: Patient Education: Go to Patient Education Activity  Goal: Patient/Family Education  Outcome: Progressing Towards Goal     Problem: Pressure Injury - Risk of  Goal: *Prevention of pressure injury  Description: Document Toney Scale and appropriate interventions in the flowsheet.   Outcome: Progressing Towards Goal  Note: Pressure Injury Interventions:  Sensory Interventions: Assess changes in LOC, Check visual cues for pain, Maintain/enhance activity level, Sit a 90-degree angle/use footstool if needed    Moisture Interventions: Absorbent underpads, Assess need for specialty bed, Contain wound drainage, Minimize layers    Activity Interventions: Assess need for specialty bed, Chair cushion, PT/OT evaluation    Mobility Interventions: Assess need for specialty bed, HOB 30 degrees or less    Nutrition Interventions: Document food/fluid/supplement intake, Discuss nutritional consult with provider, Offer support with meals,snacks and hydration    Friction and Shear Interventions: Apply protective barrier, creams and emollients, Foam dressings/transparent film/skin sealants, Lift sheet, Lift team/patient mobility team                Problem: Patient Education: Go to Patient Education Activity  Goal: Patient/Family Education  Outcome: Progressing Towards Goal     Problem: Ventilator Management  Goal: *Adequate oxygenation and ventilation  Outcome: Progressing Towards Goal  Goal: *Patient maintains clear airway/free of aspiration  Outcome: Progressing Towards Goal  Goal: *Absence of infection signs and symptoms  Outcome: Progressing Towards Goal  Goal: *Normal spontaneous ventilation  Outcome: Progressing Towards Goal     Problem: Patient Education: Go to Patient Education Activity  Goal: Patient/Family Education  Outcome: Progressing Towards Goal     Problem: Non-Violent Restraints  Goal: *Removal from restraints as soon as assessed to be safe  Outcome: Progressing Towards Goal  Goal: *No harm/injury to patient while restraints in use  Outcome: Progressing Towards Goal  Goal: *Patient's dignity will be maintained  Outcome: Progressing Towards Goal  Goal: *Patient Specific Goal (EDIT GOAL, INSERT TEXT)  Outcome: Progressing Towards Goal  Goal: Non-violent Restaints:Standard Interventions  Outcome: Progressing Towards Goal  Goal: Non-violent Restraints:Patient Interventions  Outcome: Progressing Towards Goal  Goal: Patient/Family Education  Outcome: Progressing Towards Goal     Problem: Injury - Risk of, Adverse Drug Event  Goal: *Absence of adverse drug events  Outcome: Progressing Towards Goal  Goal: *Absence of medication errors  Outcome: Progressing Towards Goal  Goal: *Knowledge of prescribed medications  Outcome: Progressing Towards Goal     Problem: Patient Education: Go to Patient Education Activity  Goal: Patient/Family Education  Outcome: Progressing Towards Goal     Problem: Non-Violent Restraints  Goal: *Removal from restraints as soon as assessed to be safe  Outcome: Progressing Towards Goal  Goal: *No harm/injury to patient while restraints in use  Outcome: Progressing Towards Goal  Goal: *Patient's dignity will be maintained  Outcome: Progressing Towards Goal  Goal: Non-violent Restaints:Standard Interventions  Outcome: Progressing Towards Goal  Goal: Non-violent Restraints:Patient Interventions  Outcome: Progressing Towards Goal  Goal: Patient/Family Education  Outcome: Progressing Towards Goal     Problem: Patient Education: Go to Patient Education Activity  Goal: Patient/Family Education  Outcome: Progressing Towards Goal

## 2020-06-18 NOTE — PROGRESS NOTES
Decreased O2 90%.       06/18/20 1416   Oxygen Therapy   O2 Sat (%) 94 %   Pulse via Oximetry 80 beats per minute   O2 Device Hi flow nasal cannula;Humidifier   O2 Flow Rate (L/min) 30 l/min   O2 Temperature 91.4 °F (33 °C)   FIO2 (%) 90 %

## 2020-06-18 NOTE — PROGRESS NOTES
attended the interdisciplinary rounds for [facility], who is a 71 y.o.,female. Patients Primary Language is: [place]. According to the patients EMR Druze Affiliation is: Buddhism.     The reason the Patient came to the hospital is:   Patient Active Problem List    Diagnosis Date Noted    Fluid overload 06/10/2020    Pneumonia 06/08/2020    Acute on chronic respiratory failure ([address] 05/09/2020    Sepsis ([address] 04/30/2020    Respiratory failure ([address] 04/30/2020    SIRS (systemic inflammatory response syndrome) ([address] 04/30/2020    CHF (congestive heart failure) ([address] 04/25/2020    Acute on chronic diastolic congestive heart failure ([address] 04/23/2020    Suspected COVID-19 virus infection 04/23/2020    Type 2 diabetes mellitus without complication, without long-term current use of insulin ([address] 07/06/2018    Left anterior fascicular block 07/06/2018    HTN (hypertension), benign 07/06/2018    Coronary artery disease involving native coronary artery of native heart without angina pectoris 07/06/2018    Chronic diastolic congestive heart failure ([address] 07/06/2018    Bilateral lower extremity edema 07/06/2018    BMI 35.0-35.9,adult 07/06/2018      Plan:  Chaplains will continue to follow and will provide pastoral care on an as needed/requested basis.  recommends bedside caregivers page  on duty if patient shows signs of acute spiritual or emotional distress.     [address]  Board Certified [address] [place] [address]   [phone]

## 2020-06-18 NOTE — PROGRESS NOTES
Problem: Dysphagia (Adult)  Goal: *Acute Goals and Plan of Care (Insert Text)  Description: Patient will:  1. Tolerate PO trials with 0 s/s overt distress in 4/5 trials  2. Utilize compensatory swallow strategies/maneuvers (decrease bite/sip, size/rate, alt. liq/sol) with min cues in 4/5 trials  3. Perform oral-motor/laryngeal exercises to increase oropharyngeal swallow function with min cues  4. Complete an objective swallow study (i.e., MBSS) to assess swallow integrity, r/o aspiration, and determine of safest LRD, min A    Rec:     Soft solid with thin liquids  Aspiration precautions  HOB >45 during po intake, remain >30 for 30-45 minutes after po   Small bites/sips; alternate liquid/solid with slow feeding rate   Oral care TID  Meds per pt preference  Frequent breaks as needed with PO         Outcome: Progressing Towards Goal    SPEECH LANGUAGE PATHOLOGY BEDSIDE SWALLOW EVALUATION/TREATMENT    Patient: Micki Valera (62 y.o. female)  Date: 6/18/2020  Primary Diagnosis: Pneumonia [J18.9]  Procedure(s) (LRB):  FLEXIBLE BRONCHOSCOPY with BAL/SLIM SCOPE (N/A) 7 Days Post-Op   Precautions: aspiration     PLOF: As per H&P    ASSESSMENT :  Based on the objective data described below, the patient presents with mild-mod oropharyngeal dysphagia. Pt seen at bedside on 30L HFNC upon SLP arrival. Pt with dry cough at baseline as well as x 1 out of all PO trials. She tolerated ~8 oz thin, two anton crackers, and ~4 oz puree with no overt s/sx of aspiration. Laryngeal elevation appeared delayed to palpation. Recommend to initiate soft solid diet with thin liquids, aspiration precautions, oral care TID, and meds as tolerated. Rec frequent breaks with meals as needed. She may benefit from a MBS when medically appropriate to leave unit for testing, will continue to monitor. ST will continue to follow.      TREATMENT :  Skilled therapy initiated; Educated pt on aspiration precautions, breathing/swallowing coordination, and importance of compensatory swallow techniques to decrease aspiration risk (decrease rate of intake & sip/bite size, frequent breaks with PO as needed. upright @HOB for all po intake and ~30 minutes after po); verbalized comprehension. Patient will benefit from skilled intervention to address the above impairments. Patient's rehabilitation potential is considered to be Good  Factors which may influence rehabilitation potential include:   []            None noted  []            Mental ability/status  [x]            Medical condition  []            Home/family situation and support systems  []            Safety awareness  []            Pain tolerance/management  []            Other:      PLAN :  Recommendations and Planned Interventions: See above  Frequency/Duration: Patient will be followed by speech-language pathology 1-2 times per day/4-7 days per week to address goals. Discharge Recommendations: [address], and To Be Determined     SUBJECTIVE:   Patient stated I haven't had anything to eat in a while. OBJECTIVE:     Past Medical History:   Diagnosis Date    Arthritis     Cancer ([facility] [address]     CHF (congestive heart failure) ([facility])     Diabetes ([facility] [address]     Fracture of neck ([facility])     GERD (gastroesophageal reflux disease)     Goiter     Hiatal hernia     Hypertension     Uterine cancer ([facility] [address]      Past Surgical History:   Procedure Laterality Date    HX APPENDECTOMY      HX HYSTERECTOMY      HX KNEE REPLACEMENT Right     HX ORTHOPAEDIC      odontoid fracture repair with hardware placement.      HX PARTIAL THYROIDECTOMY      NH INSJ TUNNELED CVC W/O SUBQ PORT/ AGE 5 YR/> Right 5/7/2020    INSERTION TUNNELED CENTRAL VENOUS CATHETER performed by Lucian New MD at [address] [place] [address] CATH LAB     Prior Level of Function/Home Situation: see below  Home Situation  Home Environment: Private residence  # Steps to Enter: 4  One/Two Story Residence: One story  Living Alone: Yes  Support Systems: None  Patient Expects to be Discharged to[de-identified] Private residence  Current DME Used/Available at Home: None    Diet prior to admission: soft solid with thin  Current Diet:  soft solid with thin      Cognitive and Communication Status:  Neurologic State: Alert  Orientation Level: Oriented X4  Cognition: Follows commands    Oral Assessment:  Oral Assessment  Labial: No impairment  Dentition: Limited;Natural(upper partials)  Oral Hygiene: adequate  Lingual: No impairment  Velum: No impairment  Mandible: No impairment  P.O. Trials:  Patient Position: 65 [facility]  Vocal quality prior to P.O.: Breathy  Consistency Presented: Thin liquid; Solid;Puree  How Presented: Self-fed/presented;Cup/sip;Spoon;Straw  Bolus Acceptance: No impairment  Bolus Formation/Control: Impaired  Type of Impairment: Delayed;Mastication  Propulsion: Delayed (# of seconds)  Oral Residue: None  Initiation of Swallow: Delayed (# of seconds)  Laryngeal Elevation: Decreased  Aspiration Signs/Symptoms: Infiltrate on chest xray  Pharyngeal Phase Characteristics: Suspected pharyngeal residue;Poor endurance  Effective Modifications: Small sips and bites; Alternate liquids/solids  Cues for Modifications: Moderate     Oral Phase Severity: Mild-moderate  Pharyngeal Phase Severity : Mild-moderate    PAIN:  Start of Eval: 0  End of Eval: 0     After treatment:   []            Patient left in no apparent distress sitting up in chair  [x]            Patient left in no apparent distress in bed  [x]            Call bell left within reach  [x]            Nursing notified  []            Family present  []            Caregiver present  []            Bed alarm activated    COMMUNICATION/EDUCATION:   [x]            Aspiration precautions; swallow safety; compensatory techniques. [x]            Patient/family have participated as able in goal setting and plan of care. [x]         Posted safety precautions in patient's room.     Thank you for this referral.    Malina Begum M.S. CCC-SLP/L  Speech-Language Pathologist

## 2020-06-18 NOTE — ROUTINE PROCESS
Bedside and Verbal shift change report given to [employer] (oncoming nurse) by Diana Peña (offgoing nurse). Report included the following information SBAR, Kardex and MAR.

## 2020-06-18 NOTE — PROGRESS NOTES
FiO2 decreased to 80%. Will continue to monitor.      06/18/20 1644   Oxygen Therapy   O2 Sat (%) 97 %   Pulse via Oximetry 70 beats per minute   O2 Device Hi flow nasal cannula;Humidifier   O2 Flow Rate (L/min) 30 l/min   O2 Temperature 91.4 °F (33 °C)   FIO2 (%) 80 %

## 2020-06-19 ENCOUNTER — APPOINTMENT (OUTPATIENT)
Dept: NON INVASIVE DIAGNOSTICS | Age: 70
DRG: 207 | End: 2020-06-19
Attending: PHYSICIAN ASSISTANT
Payer: MEDICARE

## 2020-06-19 LAB
ANION GAP SERPL CALC-SCNC: 7 MMOL/L (ref 3–18)
BASOPHILS # BLD: 0 K/UL (ref 0–0.1)
BASOPHILS NFR BLD: 0 % (ref 0–2)
BUN SERPL-MCNC: 16 MG/DL (ref 7–18)
BUN/CREAT SERPL: 7 (ref 12–20)
CALCIUM SERPL-MCNC: 9.4 MG/DL (ref 8.5–10.1)
CHLORIDE SERPL-SCNC: 101 MMOL/L (ref 100–111)
CO2 SERPL-SCNC: 30 MMOL/L (ref 21–32)
CREAT SERPL-MCNC: 2.31 MG/DL (ref 0.6–1.3)
DIFFERENTIAL METHOD BLD: ABNORMAL
EOSINOPHIL # BLD: 0 K/UL (ref 0–0.4)
EOSINOPHIL NFR BLD: 0 % (ref 0–5)
ERYTHROCYTE [DISTWIDTH] IN BLOOD BY AUTOMATED COUNT: 13.8 % (ref 11.6–14.5)
GLUCOSE BLD STRIP.AUTO-MCNC: 123 MG/DL (ref 70–110)
GLUCOSE BLD STRIP.AUTO-MCNC: 139 MG/DL (ref 70–110)
GLUCOSE BLD STRIP.AUTO-MCNC: 196 MG/DL (ref 70–110)
GLUCOSE BLD STRIP.AUTO-MCNC: 226 MG/DL (ref 70–110)
GLUCOSE SERPL-MCNC: 136 MG/DL (ref 74–99)
HCT VFR BLD AUTO: 32.6 % (ref 35–45)
HGB BLD-MCNC: 10.5 G/DL (ref 12–16)
LYMPHOCYTES # BLD: 0.8 K/UL (ref 0.9–3.6)
LYMPHOCYTES NFR BLD: 5 % (ref 21–52)
MAGNESIUM SERPL-MCNC: 1.9 MG/DL (ref 1.6–2.6)
MCH RBC QN AUTO: 27.9 PG (ref 24–34)
MCHC RBC AUTO-ENTMCNC: 32.2 G/DL (ref 31–37)
MCV RBC AUTO: 86.5 FL (ref 74–97)
MONOCYTES # BLD: 1.2 K/UL (ref 0.05–1.2)
MONOCYTES NFR BLD: 8 % (ref 3–10)
NEUTS SEG # BLD: 13 K/UL (ref 1.8–8)
NEUTS SEG NFR BLD: 87 % (ref 40–73)
PHOSPHATE SERPL-MCNC: 0.8 MG/DL (ref 2.5–4.9)
PLATELET # BLD AUTO: 236 K/UL (ref 135–420)
PMV BLD AUTO: 10.8 FL (ref 9.2–11.8)
POTASSIUM SERPL-SCNC: 3.1 MMOL/L (ref 3.5–5.5)
RBC # BLD AUTO: 3.77 M/UL (ref 4.2–5.3)
SODIUM SERPL-SCNC: 138 MMOL/L (ref 136–145)
WBC # BLD AUTO: 15 K/UL (ref 4.6–13.2)

## 2020-06-19 PROCEDURE — 74011250636 HC RX REV CODE- 250/636: Performed by: PHYSICIAN ASSISTANT

## 2020-06-19 PROCEDURE — 74011000250 HC RX REV CODE- 250: Performed by: INTERNAL MEDICINE

## 2020-06-19 PROCEDURE — 92526 ORAL FUNCTION THERAPY: CPT

## 2020-06-19 PROCEDURE — 74011636637 HC RX REV CODE- 636/637: Performed by: INTERNAL MEDICINE

## 2020-06-19 PROCEDURE — 74011000250 HC RX REV CODE- 250: Performed by: PHYSICIAN ASSISTANT

## 2020-06-19 PROCEDURE — 74011250637 HC RX REV CODE- 250/637: Performed by: INTERNAL MEDICINE

## 2020-06-19 PROCEDURE — 80048 BASIC METABOLIC PNL TOTAL CA: CPT

## 2020-06-19 PROCEDURE — 74011250636 HC RX REV CODE- 250/636: Performed by: INTERNAL MEDICINE

## 2020-06-19 PROCEDURE — 93308 TTE F-UP OR LMTD: CPT

## 2020-06-19 PROCEDURE — 77010033711 HC HIGH FLOW OXYGEN

## 2020-06-19 PROCEDURE — 84100 ASSAY OF PHOSPHORUS: CPT

## 2020-06-19 PROCEDURE — 74011000258 HC RX REV CODE- 258: Performed by: INTERNAL MEDICINE

## 2020-06-19 PROCEDURE — 90935 HEMODIALYSIS ONE EVALUATION: CPT

## 2020-06-19 PROCEDURE — 85025 COMPLETE CBC W/AUTO DIFF WBC: CPT

## 2020-06-19 PROCEDURE — 94762 N-INVAS EAR/PLS OXIMTRY CONT: CPT

## 2020-06-19 PROCEDURE — 94761 N-INVAS EAR/PLS OXIMETRY MLT: CPT

## 2020-06-19 PROCEDURE — 65610000006 HC RM INTENSIVE CARE

## 2020-06-19 PROCEDURE — 36415 COLL VENOUS BLD VENIPUNCTURE: CPT

## 2020-06-19 PROCEDURE — 94640 AIRWAY INHALATION TREATMENT: CPT

## 2020-06-19 PROCEDURE — 94668 MNPJ CHEST WALL SBSQ: CPT

## 2020-06-19 PROCEDURE — C9113 INJ PANTOPRAZOLE SODIUM, VIA: HCPCS | Performed by: PHYSICIAN ASSISTANT

## 2020-06-19 PROCEDURE — 82962 GLUCOSE BLOOD TEST: CPT

## 2020-06-19 PROCEDURE — 83735 ASSAY OF MAGNESIUM: CPT

## 2020-06-19 RX ORDER — CARVEDILOL 25 MG/1
25 TABLET ORAL 2 TIMES DAILY WITH MEALS
Status: DISCONTINUED | OUTPATIENT
Start: 2020-06-19 | End: 2020-06-26 | Stop reason: HOSPADM

## 2020-06-19 RX ORDER — PANTOPRAZOLE SODIUM 40 MG/1
40 TABLET, DELAYED RELEASE ORAL
Status: DISCONTINUED | OUTPATIENT
Start: 2020-06-20 | End: 2020-06-26 | Stop reason: HOSPADM

## 2020-06-19 RX ORDER — MAGNESIUM SULFATE 1 G/100ML
1 INJECTION INTRAVENOUS ONCE
Status: COMPLETED | OUTPATIENT
Start: 2020-06-19 | End: 2020-06-19

## 2020-06-19 RX ADMIN — CARVEDILOL 25 MG: 25 TABLET, FILM COATED ORAL at 17:32

## 2020-06-19 RX ADMIN — HEPARIN SODIUM 5000 UNITS: 5000 INJECTION INTRAVENOUS; SUBCUTANEOUS at 12:07

## 2020-06-19 RX ADMIN — Medication 10 ML: at 15:00

## 2020-06-19 RX ADMIN — CYANOCOBALAMIN TAB 1000 MCG 1000 MCG: 1000 TAB at 08:50

## 2020-06-19 RX ADMIN — INSULIN LISPRO 2 UNITS: 100 INJECTION, SOLUTION INTRAVENOUS; SUBCUTANEOUS at 00:32

## 2020-06-19 RX ADMIN — LORATADINE 10 MG: 10 TABLET ORAL at 08:50

## 2020-06-19 RX ADMIN — HYDRALAZINE HYDROCHLORIDE 20 MG: 20 INJECTION, SOLUTION INTRAMUSCULAR; INTRAVENOUS at 06:53

## 2020-06-19 RX ADMIN — FUROSEMIDE 80 MG: 10 INJECTION, SOLUTION INTRAMUSCULAR; INTRAVENOUS at 17:32

## 2020-06-19 RX ADMIN — MIRTAZAPINE 15 MG: 15 TABLET, FILM COATED ORAL at 20:47

## 2020-06-19 RX ADMIN — HYDRALAZINE HYDROCHLORIDE 20 MG: 20 INJECTION, SOLUTION INTRAMUSCULAR; INTRAVENOUS at 08:50

## 2020-06-19 RX ADMIN — SODIUM CHLORIDE 40 MG: 9 INJECTION INTRAMUSCULAR; INTRAVENOUS; SUBCUTANEOUS at 08:50

## 2020-06-19 RX ADMIN — PIPERACILLIN AND TAZOBACTAM 2.25 G: 2; .25 INJECTION, POWDER, LYOPHILIZED, FOR SOLUTION INTRAVENOUS at 20:47

## 2020-06-19 RX ADMIN — SODIUM CHLORIDE: 900 INJECTION, SOLUTION INTRAVENOUS at 09:00

## 2020-06-19 RX ADMIN — IPRATROPIUM BROMIDE AND ALBUTEROL SULFATE 3 ML: .5; 3 SOLUTION RESPIRATORY (INHALATION) at 07:16

## 2020-06-19 RX ADMIN — CARVEDILOL 12.5 MG: 12.5 TABLET, FILM COATED ORAL at 08:50

## 2020-06-19 RX ADMIN — CYANOCOBALAMIN TAB 1000 MCG 1000 MCG: 1000 TAB at 17:32

## 2020-06-19 RX ADMIN — PIPERACILLIN AND TAZOBACTAM 2.25 G: 2; .25 INJECTION, POWDER, LYOPHILIZED, FOR SOLUTION INTRAVENOUS at 12:07

## 2020-06-19 RX ADMIN — PIPERACILLIN AND TAZOBACTAM 2.25 G: 2; .25 INJECTION, POWDER, LYOPHILIZED, FOR SOLUTION INTRAVENOUS at 05:54

## 2020-06-19 RX ADMIN — IPRATROPIUM BROMIDE AND ALBUTEROL SULFATE 3 ML: .5; 3 SOLUTION RESPIRATORY (INHALATION) at 14:44

## 2020-06-19 RX ADMIN — HEPARIN SODIUM 5000 UNITS: 5000 INJECTION INTRAVENOUS; SUBCUTANEOUS at 20:46

## 2020-06-19 RX ADMIN — CITALOPRAM HYDROBROMIDE 20 MG: 20 TABLET ORAL at 08:50

## 2020-06-19 RX ADMIN — EPOETIN ALFA-EPBX 10000 UNITS: 10000 INJECTION, SOLUTION INTRAVENOUS; SUBCUTANEOUS at 20:54

## 2020-06-19 RX ADMIN — FUROSEMIDE 80 MG: 10 INJECTION, SOLUTION INTRAMUSCULAR; INTRAVENOUS at 08:50

## 2020-06-19 RX ADMIN — Medication 10 ML: at 05:55

## 2020-06-19 RX ADMIN — INSULIN LISPRO 4 UNITS: 100 INJECTION, SOLUTION INTRAVENOUS; SUBCUTANEOUS at 12:09

## 2020-06-19 RX ADMIN — HEPARIN SODIUM 5000 UNITS: 5000 INJECTION INTRAVENOUS; SUBCUTANEOUS at 05:54

## 2020-06-19 RX ADMIN — Medication 10 ML: at 21:00

## 2020-06-19 RX ADMIN — ASPIRIN 81 MG CHEWABLE TABLET 81 MG: 81 TABLET CHEWABLE at 08:50

## 2020-06-19 RX ADMIN — MAGNESIUM SULFATE HEPTAHYDRATE 1 G: 1 INJECTION, SOLUTION INTRAVENOUS at 05:54

## 2020-06-19 RX ADMIN — IPRATROPIUM BROMIDE AND ALBUTEROL SULFATE 3 ML: .5; 3 SOLUTION RESPIRATORY (INHALATION) at 20:16

## 2020-06-19 NOTE — PROGRESS NOTES
[facility]  ICU Progress Note      Name: Micki Valera   : 1950   MRN: [id number]   Date: 2020 12:09 PM     [x]I have reviewed the flowsheet and previous days notes. Events overnight reviewed and discussed with nursing staff. Vital signs and records reviewed. Brief ICU Course:  Patient is a 71 y.o. female w/ pmhx of ESRD on HD, DM, and CHF admitted for acute hypoxemic respiratory failure and subsequently started on tx for pna. Patient was started on BIPAP and intubated for progressive respiratory failure. Noted to have foreign body on CT w/ forceps denture retrieval. Pt extubated on 6/15 to NC, escalated to BiPAP on  after acute respiratory failure/probable aspiration. Pt is tolerating HFNC well this AM at 30L and 80% FiO2. Subjective:  20:    - No new events overnight  - Currently doing well on HFNC - FiO2 80% 30L  - Minimal UOP (~50cc recorded last 24 hours)  - Afebrile  -leukocytosis 15 from 15.4   - AOx4 and able to answer questions appropriately  - Admits to some SOB, but states that breathing \"okay\"                ROS:Pertinent items are noted in HPI. Medication Review:  · Pressors - N/A  · Sedation - N/A  · Antibiotics - Zosyn  · Pain - Lidocaine patch; PRN Tylenol  · GI/ DVT - SQH; Protonix  · Others (other gtts)      Vital Signs:    Visit Vitals  /50   Pulse 77   Temp 97.7 °F (36.5 °C)   Resp 20   Ht 5' 5\" (1.651 m)   Wt 98 kg (216 lb 0.8 oz)   SpO2 95%   Breastfeeding No   BMI 35.95 kg/m²       O2 Device: Hi flow nasal cannula   O2 Flow Rate (L/min): 30 l/min   Temp (24hrs), Av.1 °F (36.7 °C), Min:97.6 °F (36.4 °C), Max:98.9 °F (37.2 °C)       Intake/Output:   Last shift:      No intake/output data recorded.   Last 3 shifts:  1901 -  0700  In: 500 [I.V.:500]  Out: 3640 [Urine:140]    Intake/Output Summary (Last 24 hours) at 2020 0952  Last data filed at 2020 0800  Gross per 24 hour   Intake 160 ml   Output 3550 ml Net -3390 ml       Ventilator Settings:  Mode Rate Tidal Volume Pressure FiO2 PEEP   Assist control, VC+   400 ml  10 cm H2O 80 % 10 cm H20     Peak airway pressure: 22 cm H2O    Minute ventilation: 13.6 l/min      ARDS network Guidelines:   Lung protective strategy and Plateau  Pressure goal < 30 cm H2O goals  Oxygenation Goals PaO2 55-80 mm Hg or SaO2 88-95%  PH goal 7.30-7.45    VAP bundle:  Reviewed. Dougherty tube to suction at 20-30 cm Hg. Maintain Dougherty tube with 5-10ml air every 4 hours  Routine oral care every 4 hours  Elevation of head > 45 degree  Daily sedation holiday and SBT evaluation starting at 6.00am.    Physical Exam:    General: NAD on HFNC, resting and watching TV  HEENT:  Anicteric sclerae; pink palpebral conjunctivae; mucosa moist  Resp:  Symmetrical chest rise, no accessory muscle use; RUL +rales/cousrse; KATIE +crackles; diminished bibasilar.    CV:  S1, S2 present; RRR; no m/g/r  GI:  Abdomen soft, non-tender; (+) active bowel sounds  Extremities:  +2 pulses on all extremities; 1+ edema bilateral lower extremities, no cyanosis or clubbing noted  Skin:  Warm; no rashes/ lesions noted  Neurologic:  Non-focal  Devices:  HFNC, Lopez      DATA:     Current Facility-Administered Medications   Medication Dose Route Frequency    potassium phosphate 30 mmol in 0.9% sodium chloride 250 mL infusion   IntraVENous ONCE    [START ON 6/20/2020] pantoprazole (PROTONIX) tablet 40 mg  40 mg Oral ACB    albuterol-ipratropium (DUO-NEB) 2.5 MG-0.5 MG/3 ML  3 mL Nebulization Q6H RT    albuterol-ipratropium (DUO-NEB) 2.5 MG-0.5 MG/3 ML  3 mL Nebulization Q4H PRN    Piperacillin-tazobactam (ZOSYN) 0.75 gm Supplemental Dosing by Pharmacy   Other Rx Dosing/Monitoring    piperacillin-tazobactam (ZOSYN) 2.25 g in 0.9% sodium chloride (MBP/ADV) 50 mL MBP  2.25 g IntraVENous Q8H    acetaminophen (TYLENOL) tablet 325 mg  325 mg Oral Q4H PRN    sodium chloride 3% hypertonic nebulizer soln  4 mL Nebulization TID PRN    lidocaine 4 % patch 1 Patch  1 Patch TransDERmal Q24H    menthol-zinc oxide (CALMOSEPTINE) 0.44-20.6 % ointment   Topical Q6H PRN    0.9% sodium chloride infusion 250 mL  250 mL IntraVENous PRN    epoetin johnathon-epbx (RETACRIT) injection 10,000 Units  10,000 Units SubCUTAneous Q MON, WED & FRI    sodium chloride (NS) flush 5-40 mL  5-40 mL IntraVENous Q8H    sodium chloride (NS) flush 5-40 mL  5-40 mL IntraVENous PRN    insulin lispro (HUMALOG) injection   SubCUTAneous Q6H    hydrALAZINE (APRESOLINE) 20 mg/mL injection 20 mg  20 mg IntraVENous Q6H PRN    ondansetron (ZOFRAN) injection 4 mg  4 mg IntraVENous Q6H PRN    glucose chewable tablet 16 g  4 Tab Oral PRN    glucagon (GLUCAGEN) injection 1 mg  1 mg IntraMUSCular PRN    dextrose (D50W) injection syrg 12.5-25 g  25-50 mL IntraVENous PRN    furosemide (LASIX) injection 80 mg  80 mg IntraVENous BID    citalopram (CELEXA) tablet 20 mg  20 mg Oral DAILY    cyanocobalamin tablet 1,000 mcg  1,000 mcg Oral BID    loratadine (CLARITIN) tablet 10 mg  10 mg Oral DAILY    aspirin chewable tablet 81 mg  81 mg Oral DAILY    [Held by provider] isosorbide mononitrate ER (IMDUR) tablet 30 mg  30 mg Oral DAILY    polyethylene glycol (MIRALAX) packet 17 g  17 g Oral DAILY    [Held by provider] rosuvastatin (CRESTOR) tablet 5 mg  5 mg Oral QHS    [Held by provider] gabapentin (NEURONTIN) capsule 100 mg  100 mg Oral BID    carvediloL (COREG) tablet 12.5 mg  12.5 mg Oral BID WITH MEALS    ferrous sulfate tablet 325 mg  325 mg Oral EVERY OTHER DAY    mirtazapine (REMERON) tablet 15 mg  15 mg Oral QHS    docusate sodium (COLACE) capsule 100 mg  100 mg Oral BID PRN    heparin (porcine) injection 5,000 Units  5,000 Units SubCUTAneous Q8H         Labs: Results:       Chemistry Recent Labs     06/19/20  0327 06/18/20  0209 06/17/20  0450   * 162* 148*    141 137   K 3.1* 3.9 3.1*    103 105   CO2 30 28 26   BUN 16 14 19*   CREA 2.31* 2.40* 3.12* CA 9.4 9.1 8.2*   AGAP 7 10 6   BUCR 7* 6* 6*      CBC w/Diff Recent Labs     06/19/20  0327 06/18/20  0209 06/17/20  0606   WBC 15.0* 15.4* 12.0   RBC 3.77* 3.55* 3.19*   HGB 10.5* 9.8* 8.8*   HCT 32.6* 31.7* 28.6*    226 187   GRANS 87* 91* 86*   LYMPH 5* 3* 10*   EOS 0 0 0      Coagulation No results for input(s): PTP, INR, APTT, INREXT, INREXT in the last 72 hours. Liver Enzymes No results for input(s): TP, ALB, TBIL, AP in the last 72 hours. No lab exists for component: SGOT, GPT, DBIL   ABG No results found for: PH, PHI, PCO2, PCO2I, PO2, PO2I, HCO3, HCO3I, FIO2, FIO2I   Microbiology Recent Labs     06/17/20  1300   CULT No growth (<1,000 CFU/ML)          Telemetry: [x]Sinus []A-flutter []Paced    []A-fib []Multiple PVCs                  Imaging:    CXR [06/18/20]: IMPRESSION:  1. Worsened aeration, presumed alveolar edema. Preexistent atelectasis at least  at the left base. CTA CHEST W OR WO CONT [06/13/20]: FINDINGS:     Mildly suboptimal contrast bolus. There is near iso enhancementisoenhancement of the pulmonary veins, thoracic aorta, and pulmonary arteries. Image quality is degraded by body habitus, hypoinflation, and arms at sides, which result in streak/mottle/noise artifact, in addition to bibasilar consolidations. There is no convincing evidence of pulmonary arterial filling defect in the main, central, lobar, or larger enhanced segmental branches of the pulmonary arterial tree indicative of acute pulmonary embolism. Assessment becomes limited beginning at the segmental level.     Endotracheal tube is present with tip cephalad to the garth. Moderate sized bilateral pleural effusions, similar compared to prior. Bilateral adjacent consolidations, slightly increased compared to the prior chest CT, likely atelectasis, underlying airspace disease not excluded.      Additional hazy bilateral airspace disease most conspicuous in the upper lobes.  Additional multinodular airspace disease in the left upper lobe posteriorly. Couple of additional tiny chronic right-sided pulmonary nodules are again noted. No evidence of pneumothorax. There is some mosaic attenuation with geographic variation in lung density best appreciated in the left upper lobe, similar to prior, may reflect a component of air-trapping such as can be seen due to small airways disease.     Very small pericardial effusion. Multichamber cardiac enlargement. Atherosclerotic calcifications noted including in the coronary arteries. The main pulmonary artery appears prominent in caliber, approximately 4.1 cm, which can be seen with pulmonary hypertension. The thoracic aorta enhances normally. Usual limitation of cardiac motion artifacts noted.     Esophagus appears mildly patulous.     No evidence of pathologic lymph node enlargement is seen.     On review of bone windows, no acute fractures or destructive bone lesions are seen. Cervical spine fusion hardware noted.      IMPRESSION:   Image quality is degraded by body habitus, hypoinflation, arms at sides, and mildly suboptimal contrast bolus, as noted above. No evidence of acute pulmonary embolism in the main, central, lobar, or larger enhanced segmental branches of the pulmonary arterial tree. Assessment becomes limited beginning at the segmental level.     Moderate sized bilateral pleural effusions. Adjacent bibasilar consolidations, slightly larger compared to prior, likely atelectasis, underlying airspace disease not excluded.     Hazy lung opacity bilaterally.  Multinodular airspace disease in the left upper lobe.     Cardiomegaly.     Additional nonacute findings as above.         [x]I have personally reviewed the patients radiographs  []Radiographs reviewed with radiologist   []No change from prior, tubes and lines in adequate position  []Improved   []Worsening        IMPRESSION:   · Acute on Chronic Hypoxic Respiratory Failure - Multifactorial. Required emergent intubation on 06/11/20, 2/2 aspiration event on the floor (aspiration of partial dentures, removed with forceps) in addition to PNA and CHF Exacerbation/fluid overload on CXR. Extubated to NC on 06/15 with intermittent BiPAP use  · Aspiration of foreign body (partials) into the hypopharynxretrieved with forceps. S/p bronc 6/11/2020some mucous plugs, small caliber airways noted but no foreign material.  CTA chest (06/13) (-) for PE and (-) retained FB. Probable additional episode of aspiration on 6/16 after extubation, prompted brief use of BiPAP. · RUL infiltrate/PNA  CXR (6/7/20), scant yeast on Resp Cx. · Acute on chronic HFpEF - Most recent Echo ( 04/25/20) EF 55-60%. · Bacteremia vs contaminant - BxCx (06/07) (+)GPC in 1/2 bottles, repeat BxCx - NGTD  · ESRD - HD MWF  · Acute on Chronic Anemia - s/p 2u PRBCs this admission   RECOMMENDATIONS:   · Resp: Titrate FiO2/ supp O2 for SpO2 >90%; wean HFNC as able. Optimize bronchial hygiene. Con't 3% saline nebs with Metanebs TID. Duo-nebs q6 and q4 PRN. Closely monitor respiratory status for decompensation. · I/D: Blood cultures NGTD. Trend temp & WBC curve. Follow ID recommendations on Zosyn. Deescalate ABX once Cx's finalize. If patient spikes temp or otherwise decompensates, will re-culture, add Vanc and switch Zosyn to Merrem per ID Rec  · Hem/Onc: Daily CBC; H/H, and plts are stable  · CVS: HD stable; Goal MAP >65mmHg. Con't Lasix 80mg BID for fluid overload. Increasing Coreg to 25mg. Con't Hydralazine 20mg IVP q6 PRN for SBP >170mmHg. Consider restarting Crestor PO. Con't diuresis and fluid removal with HD. Pending echo in the setting of worsening fluid overload despite dialysis. SQH for DVT prophylaxis. · Metabolic: Daily BMP, Mag & Phos; monitor e-lytes; replace PRN  · Renal: Trend Renal indices; con't Diuresis. Nephrology following - planning for HD today. Considering pt's very minimal UOP, can dc ramos today per nephro.    · Endocrine: POC Glucose q6; SSI.   · GI: Protonix for SUP, Zofran PRN for N/V. SLP cleared for diet - soft solid with thin liquids. · Musc/Skin: No acute issues  · Neuro: No active issues  · Fluids: N/A  · Discussed in Interdisciplinary Rounds      Best Practices/ Safety Bundles:  · Sepsis Bundle per Hospital Protocol  · CAUTI Bundle  · Electrolyte Replacement Bundle  · Glycemic control goal <180; avoid Hypoglycemia  · IHI ICU Bundles:  ·  Central Line Bundle Followed  and Ramos Bundle Followed    · Mech Vent patients:   · VAP bundle, Aim to keep peak plateau pressure 15-18MD H2O  · Aspiration Precautions - HOB >30'  · Daily sedation holiday as indicated  · SBT as tolerated/appropriate  · Titrate FiO2 for SpO2 >94%  · Aggressive Pulmonary Hygeiene  · Stress ulcer prophylaxis: Protonix  · DVT prophylaxis: SQH  · Need for Lines, ramos assessed.       The patient is: [x] acutely ill Risk of deterioration: [x] moderate    [] critically ill  [] high     [x]See my orders for details    My assessment/plan was discussed with:  [x]Nursing []PT/OT    [x]Respiratory therapy [x]Dr. Seema Preciado MD   []Family []     Lauren Apgar, DO  20      [address] [address] [facility]

## 2020-06-19 NOTE — DIALYSIS
RICH        ACUTE HEMODIALYSIS FLOW SHEET      HEMODIALYSIS ORDERS: Physician: Dr. Bibi Junior     Dialyzer: revaclear   Duration: 3 hr  BFR: 350   DFR: 800   Dialysate:  Temp 36-37*C  K+   4    Ca+  2.5 Na 140 Bicarb 35   Weight:   Wt Readings from Last 1 Encounters:   06/19/20 98 kg (216 lb)     UF Goal: 3500 ml Access Right subclavian TDC Needle Gauge NA    Heparin []  Bolus      Units    [] Hourly       Units    []None      Catheter locking solution Heparin   Pre BP:   169/57    Pulse:     75       Respirations: 18  Temperature:   97.4 Ax   Labs: Pre        Post:        [] N/A   Additional Orders(medications, blood products, hypotension management):       [x] N/A     [x] Rich Consent Verified     CATHETER ACCESS: []N/A   []Right subclavian TDC  []Left   []IJ     []Fem   []transhepatic   [] First use X-ray verified     []Permanent                [] Temporary   [x]No S/S infection  []Redness  []Drainage []Cultured []Swelling []Pain   [x]Medical Aseptic Prep Utilized   []Dressing Changed  [] Biopatch  Date:       []Clotted   []Patent   Flows: [x]Good  []Poor  []Reversed   If access problem,  notified: []Yes    [x]N/A  Date:           GRAFT/FISTULA ACCESS:  [x]N/A     []Right     []Left     []UE     []LE   []AVG   []AVF        []Buttonhole    []Medical Aseptic Prep Utilized   []No S/S infection  []Redness  []Drainage []Cultured []Swelling []Pain    Bruit:   [] Strong    [] Weak       Thrill :   [] Strong    [] Weak       Needle Gauge: NA   Length: NA   If access problem,  notified: []Yes     [x]N/A  Date:        Please describe access if present and not used:                            GENERAL ASSESSMENT:      LUNGS:  Rate 23 SaO2%       98% [] N/A    [] Clear  [] Coarse  [] Crackles  [] Wheezing        [] Diminished     Location : []RLL   []LLL    []RUL  []KATIE     Cough: [x]Productive  []Dry  []N/A   Respirations:  [x]Easy  []Labored     Therapy:   []RA  [x] High flow NC  l/min    Mask: []NRB []Venti O2%                  []Ventilator  []Intubated  [] Trach  [] BiPaP     CARDIAC: []Regular      [] Irregular   [] Pericardial Rub  [] JVD        []  Monitored  [] Bedside  [] Remotely monitored [] N/A  Rhythm:      EDEMA: [] None  [x]Generalized  [] Pitting [] 1    [] 2    [] 3    [] 4                 [] Facial  [] Pedal  []  UE  [] LE     SKIN:   [x] Warm  [] Hot     [] Cold   [x] Dry     [] Pale   [] Diaphoretic                  [] Flushed  [] Jaundiced  [] Cyanotic  [] Rash  [] Weeping     LOC:    [x] Alert      [x]Oriented:    [x] Person     [x] Place  [x]Time               [] Confused  [x] Lethargic  [] Medicated  [] Non-responsive     GI / ABDOMEN:  [] Flat    [] Distended    [] Soft    [] Firm   []  Obese                             [] Diarrhea  [] Bowel Sounds  [] Nausea  [] Vomiting       / URINE ASSESSMENT:[] Voiding   [] Oliguria  [] Anuria   [x]  Lopez     [] Incontinent    []  Incontinent Brief      []  Bathroom Privileges       PAIN: [x] 0 []1  []2   []3   []4   []5   []6   []7   []8   []9   []10              Scale 0-10  Action/Follow Up:      MOBILITY:  [] Amb    [] Amb/Assist    [x] Bed    [] Wheelchair  [] Stretcher      All Vitals and Treatment Details on Attached [address]: [address] [facility]          Room # 308/01      [] 1st Time Acute  [] Stat  [x] Routine  [] Urgent     [] Acute Room  []  Bedside  [x] ICU/CCU  [] ER   Isolation Precautions:   There are currently no Active Isolations      Special Considerations:         [] Blood Consent Verified [x]N/A     ALLERGIES:   Allergies   Allergen Reactions    Augmentin [Amoxicillin-Pot Clavulanate] Diarrhea    Clavulanic Acid Diarrhea    Levaquin [Levofloxacin] Other (comments)     Severe hypoglycemia                 Code Status:Full Code        Hepatitis Status:    2nd RN check: B.ERINN, RN                    Lab Results   Component Value Date/Time    Hepatitis B surface Ag <0.10 06/09/2020 04:03 AM    Hepatitis B surface Ab <3.10 (L) 06/09/2020 04:03 AM                     Current Labs:   Lab Results   Component Value Date/Time    Sodium 138 06/19/2020 03:27 AM    Potassium 3.1 (L) 06/19/2020 03:27 AM    Chloride 101 06/19/2020 03:27 AM    CO2 30 06/19/2020 03:27 AM    Anion gap 7 06/19/2020 03:27 AM    Glucose 136 (H) 06/19/2020 03:27 AM    BUN 16 06/19/2020 03:27 AM    Creatinine 2.31 (H) 06/19/2020 03:27 AM    BUN/Creatinine ratio 7 (L) 06/19/2020 03:27 AM    GFR est AA 25 (L) 06/19/2020 03:27 AM    GFR est non-AA 21 (L) 06/19/2020 03:27 AM    Calcium 9.4 06/19/2020 03:27 AM      Lab Results   Component Value Date/Time    WBC 15.0 (H) 06/19/2020 03:27 AM    HGB 10.5 (L) 06/19/2020 03:27 AM    HCT 32.6 (L) 06/19/2020 03:27 AM    PLATELET 568 80/85/4028 03:27 AM    MCV 86.5 06/19/2020 03:27 AM                                                                                     DIET: DIET NUTRITIONAL SUPPLEMENTS  DIET DYSPHAGIA PUREED (NDD1)        PRIMARY NURSE REPORT: First initial/Last name/Title      Pre Dialysis: Manuel Zhang RN     Time: 12      EDUCATION:    [x] Patient [] Other         Knowledge Basis: []None []Minimal [x] Substantial   Barriers to learning  []N/A   [] Access Care     [] S&S of infection     [x] Fluid Management     []K+     [x]Procedural    []Albumin     [] Medications     [x] Tx Options     [] Transplant     [] Diet     [] Other   Teaching Tools:  [x] Explain  [] Demo  [] Handouts [] Video  Patient response:   [x] Verbalized understanding  [] Teach back  [] Return demonstration [] Requires follow up   Inappropriate due to            [x] Time Out/Safety Check  [x]Extracorporeal Circuit Tested for integrity       RO/HEMODIALYSIS MACHINE SAFETY CHECKS  Before each treatment:     Machine Number:                   [facility]                                  [] Unit Machine #  with centralized RO                                  [x] Portable Machine #1/RO serial # [id number]                                  [] Portable Machine #2/RO serial # [id number]                                  [] Portable Machine #4/RO serial # [id number]                                                     [facility]                                  [] Portable Machine #11/RO serial # [id number]                                   [] Portable Machine #12/RO serial # [id number]                                  [] Portable Machine #13/RO serial #  [id number]      Alarm Test:  Pass time 7322               [x] RO/Machine Log Complete      Temp    36             Dialysate: pH  7.4 Conductivity: Meter   14     HD Machine   14.3                  TCD: 14  Dialyzer Lot # [id number]            Blood Tubing Lot # [id number]          Saline Lot #  [id number]     CHLORINE TESTING-Before each treatment and every 4 hours    Total Chlorine: [] less than 0.1 ppm  Time: 1345 4 Hr/2nd Check Time:    (if greater than 0.1 ppm from Primary then every 30 minutes from Secondary)     TREATMENT INITIATION  with Dialysis Precautions:   [x] All Connections Secured                 [x] Saline Line Double Clamped   [x] Venous Parameters Set                  [x] Arterial Parameters Set    [x] Prime Given 250ml                          [x]Air Foam Detector Engaged      Treatment Initiation Note:Patient admitted to the ICU, but stable to begin HD treatment. Her right subclavian TDC was accessed, and her treatment began. During Treatment Notes:UF was interrupted at [id number] for a BP of 105/47 pulse 61.               Medication Dose Volume Route Time [facility] name Title                                                       Post Assessment:   Dialyzer Cleared: [x] Good [] Fair  [] Poor  Blood processed:  58.3 L  UF Removed  3110 Ml  POst BP:   119/52       Pulse: 63        Respirations: 24  Temperature: 98 Ax Lungs:     [] Clear      [x] Course         [] Crackles    [] Wheezing         [] Diminished   Post Tx Vascular Access:    N/A Cardiac:   [x] Regular   [] Irregular   [] Monitor  [] N/A Rhythm:       Catheter:   Locking solution: Heparin 1:1000   Art. 1.6 ml  Austyn. 1.6 ml      Skin:   Pain:    [x] Warm  [] Dry [] Diaphoretic    [] Flushed    [] Pale [] Cyanotic [x]0  []1  []2   []3  []4   []5   []6   []7   []8   []9   []10     Post Treatment Note:   Patient's blood was returned at the conclusion of her treatment. She tolerated well. Her right subclavian TDC was locked with Heparin. She remains admitted to the ICU.        POST TREATMENT PRIMARY NURSE HANDOFF REPORT:     First initial/Last name/Title         Post Dialysis: Margy Gross RN Time:  0207     Abbreviations: AVG-arterial venous graft, AVF-arterial venous fistula, IJ-Internal Jugular, Subcl-Subclavian, Fem-Femoral, Tx-treatment, AP/HR-apical heart rate, DFR-dialysate flow rate, BFR-blood flow rate, AP-arterial pressure, -venous pressure, UF-ultrafiltrate, TMP-transmembrane pressure, Austyn-Venous, Art-Arterial, RO-Reverse Osmosis

## 2020-06-19 NOTE — PROGRESS NOTES
Chart Review:  Patient on Hi flow nasal cannula at this time. Discharge plan when medically stable is to return to [address] at  [facility], will need stretcher medical transport. CM confirmed that SNF will need 1 Negative Covid (Not Rapid) 24-48 hours of admission. UAI/LTSS done on 05/11/2020. CM will continue to monitor this patient, and remain available for discharge planning and needs.        Carlyn Camacho, RN  Case Management [phone]

## 2020-06-19 NOTE — PROGRESS NOTES
This patient meets the following P&T approved criteria and has been converted from IV to oral therapy for the following medication: Pantoprazole    1. INITIAL IV Therapy  Patient has received an initial 48 hours of IV therapy for azithromycin and levetiracetam OR received an initial 24 hours of IV therapy for all other medications. 2. Clinically Stable   HR?100 Pulse (Heart Rate): 77   SBP ?90 BP: 172/50   RR ? 24 Resp Rate: 20   Temp < 100.4° F Temp: 97.7 °F (36.5 °C)   O2 ?90% O2 Sat (%): 95 %   3. Functional GI Tract  Please note that a No response means the patient is not a candidate for IV to PO conversion   No active NPO order (check order review activity) Yes   Taking enteral meds (PO, NG, GT, etc)   (check medication activity or order review for a tube) Yes   Able to tolerate enteral medications without risk of aspiration   (check progress notes) Yes   Non-tubed patients can swallow without difficulty  (check progress notes) Yes   Eating or tolerating feeds at goal rate   (check progress notes, order review activity, LDA flowsheet)  Diet =dental soft  Yes   NG access is available if patient is unconscious N/A   NG tube, if present,  is not on continuous suction   (check progress notes) NA   Continuous tube feedings can be interrupted for an extended period of time for the drug administration (e.g. Ciprofloxacin)   (check progress notes, order review) NA   No severe or persistent nausea or vomiting       (check progress notes, use of antiemetics) Yes   No malabsorption  syndromes   (e.g. gastrectomy, intestinal resection, bariatric surgery, uncontrolled diarrhea, short bowel syndrome, ileus, gastrointestinal obstruction)   (check progress notes) Yes   No active gastrointestinal bleeding     (check progress notes)   Yes   4.  Infection Specific Criteria (ABX only)  Please note that a No response means the patient is not a candidate for IV to PO conversion   WBC normal or normalizing (check labs) NA    WBC =  Lab Results   Component Value Date/Time    WBC 15.0 (H) 06/19/2020 03:27 AM      Neutropenia (ANC < 1500 cells/microL) NOT present  (check labs)   NA   No infection with high treatment failure rate  (e.g. meningitis, brain abscess,orbital cellulitis, other central nervous system infections, endophthalmitis, mediastinitis,  IV TMP/SMX treatment for PCP in AIDs, fungemia, ventriculitis, endocarditis, Pseudomonas pneumonia, intra-abdominal abscess, empyema, necrotizing soft tissue infections, osteomyelitis or post-obstructive pneumonia) (check indication for antibiotic, progress notes) NA   5. Miscellaneous Criteria    Please note that a No response means the patient is not a candidate for IV to PO conversion   Not on high doses of vasopressor medications        (check medication activity) Yes   Not actively seizing or risk of actively seizing        (check progress notes) Yes     Pharmacist Notes: Patient meets critieria for IV to PO conversion at this time. Will switch pantoprazole from 40mg IV daily to 40mg PO daily. Pharmacy will continue to monitor for the duration of therapy to ensure the patient continues to meet protocol criteria and the conversion remains appropriate.     Leslie Hays, [address]  6/19/2020

## 2020-06-19 NOTE — PROGRESS NOTES
Problem: Dysphagia (Adult)  Goal: *Acute Goals and Plan of Care (Insert Text)  Description: Patient will:  1. Tolerate PO trials with 0 s/s overt distress in 4/5 trials  2. Utilize compensatory swallow strategies/maneuvers (decrease bite/sip, size/rate, alt. liq/sol) with min cues in 4/5 trials  3. Perform oral-motor/laryngeal exercises to increase oropharyngeal swallow function with min cues  4. Complete an objective swallow study (i.e., MBSS) to assess swallow integrity, r/o aspiration, and determine of safest LRD, min A    Rec:     Puree diet with thin liquids per pt request  Pt cleared for soft solid diet from an SLP standpoint if requested by pt  Aspiration precautions  HOB >45 during po intake, remain >30 for 30-45 minutes after po   Small bites/sips; alternate liquid/solid with slow feeding rate   Oral care TID  Meds per pt preference  Frequent breaks as needed with PO          Outcome: Progressing Towards Goal    SPEECH LANGUAGE PATHOLOGY DYSPHAGIA TREATMENT    Patient: Brandon Bass (89 y.o. female)  Date: 6/19/2020  Diagnosis: Pneumonia [J18.9]   Fluid overload  Procedure(s) (LRB):  FLEXIBLE BRONCHOSCOPY with BAL/SLIM SCOPE (N/A) 8 Days Post-Op  Precautions: aspiration    PLOF: As per H&P      ASSESSMENT:  Pt was seen at bedside for follow up dysphagia management. Per RN: she has no coughing/choking events since diet initiation yesterday. This AM, she refused french toast and ground sausage on breakfast tray stating, \"That consistency is too hard for me to eat without my partials. \" She further stated that she preferred a puree diet at this time. Rec diet change to puree with thin liquids, aspiration precautions, oral care TID, and meds in puree. She is okay for a soft solid diet upgrade from an SLP standpoint if requested. ST will continue to follow.      Progression toward goals:  []         Improving appropriately and progressing toward goals  [x]         Improving slowly and progressing toward goals  []         Not making progress toward goals and plan of care will be adjusted     PLAN:  Recommendations and Planned Interventions: See above  Patient continues to benefit from skilled intervention to address the above impairments. Continue treatment per established plan of care. Discharge Recommendations:  [address], and To Be Determined     SUBJECTIVE:   Patient stated I need my foods softer. OBJECTIVE:   Cognitive and Communication Status:  Neurologic State: Alert  Orientation Level: Oriented X4  Cognition: Appropriate decision making, Appropriate safety awareness, Appropriate for age attention/concentration  Dysphagia Treatment:  Oral Assessment:  Oral Assessment  Labial: No impairment  Dentition: Limited, Natural(upper partials)  Oral Hygiene: adequate  Lingual: No impairment  Velum: No impairment  Mandible: No impairment  P.O. Trials:   Patient Position: 65 [facility]   Vocal quality prior to P.O.: Breathy   Consistency Presented: Thin liquid, Solid, Puree   How Presented: Self-fed/presented, Cup/sip, Spoon, Straw   Bolus Acceptance: No impairment   Bolus Formation/Control: Impaired   Type of Impairment: Delayed, Mastication   Propulsion: Delayed (# of seconds)   Oral Residue: None   Initiation of Swallow: Delayed (# of seconds)   Laryngeal Elevation: Decreased   Aspiration Signs/Symptoms: Infiltrate on chest xray   Pharyngeal Phase Characteristics: Suspected pharyngeal residue, Poor endurance   Effective Modifications: Small sips and bites, Alternate liquids/solids   Cues for Modifications:  Moderate       Oral Phase Severity: Mild-moderate   Pharyngeal Phase Severity : Mild-moderate    PAIN:  Start of Tx: 0  End of Tx: 0     After treatment:   []              Patient left in no apparent distress sitting up in chair  [x]              Patient left in no apparent distress in bed  [x]              Call bell left within reach  [x]              Nursing notified  [] Family present  []              Caregiver present  []              Bed alarm activated      COMMUNICATION/EDUCATION:   [x] Aspiration precautions; swallow safety; compensatory techniques  [x]        Patient/family able to participate in training and education     Thank you for this referral.    Sis Garcia M.S. CCC-SLP/L  Speech-Language Pathologist

## 2020-06-19 NOTE — PROGRESS NOTES
In Patient Progress note      Admit Date: 6/7/2020    Impression:     1) ESRD on HD MWF , plan for HD tomorrow   2) Ac respi failure , multifactorial , Asp PNA  ,AC on chr diastolic CHF  3) AC on chr alfredo CHF  4) PNA , Rapid covid -v e    5) bacteremia , coag neg staph from 6/7 likely contaminant per ID   Bld cx since have been negative   5) Anemia of CKD   6) sec hyperpara     Ramos in place, making some urine   Tolerated HD , UF ~ 3 lit , -12 lit since admission  CXR reviewed, worsening opacities on left   On BIPAP this AM      Plan:  1) HD today for volume and solute , UF ~ 3.5 lit goal  2) Lasix 80 mg daily   3) ok to d/c raoms cath  4) follow ID recs   6) continue epogen with HD   7) dose AB for GFR < 15    Please call with questions ,     Chel Mims MD FASN  Cell [phone]  Pager: [phone]     Subjective:      On BIPAP  Awake following commands    Current Facility-Administered Medications:     [START ON 6/20/2020] pantoprazole (PROTONIX) tablet 40 mg, 40 mg, Oral, ACB, Edawrd Keita MD    carvediloL (COREG) tablet 25 mg, 25 mg, Oral, BID WITH MEALS, Edward Keita MD    piperacillin-tazobactam (ZOSYN) 0.75 g in 0.9% sodium chloride 50 mL IVPB, 0.75 g, IntraVENous, ONCE, Haily Hui MD    albuterol-ipratropium (DUO-NEB) 2.5 MG-0.5 MG/3 ML, 3 mL, Nebulization, Q6H RT, Roque ALCALA MD, 3 mL at 06/19/20 1444    albuterol-ipratropium (DUO-NEB) 2.5 MG-0.5 MG/3 ML, 3 mL, Nebulization, Q4H PRN, Florencio Joe, PAYasminC    Piperacillin-tazobactam (ZOSYN) 0.75 gm Supplemental Dosing by Pharmacy, , Other, Rx Dosing/Monitoring, Thelma English MD    piperacillin-tazobactam (ZOSYN) 2.25 g in 0.9% sodium chloride (MBP/ADV) 50 mL MBP, 2.25 g, IntraVENous, Q8H, Haily Hui MD, Last Rate: 100 mL/hr at 06/19/20 1207, 2.25 g at 06/19/20 1207    acetaminophen (TYLENOL) tablet 325 mg, 325 mg, Oral, Q4H PRN, China MORENO NP, 325 mg at 06/16/20 1904    sodium chloride 3% hypertonic nebulizer soln, 4 mL, Nebulization, TID PRN, Monica Lassiter DO    lidocaine 4 % patch 1 Patch, 1 Patch, TransDERmal, Q24H, Tank Oviedo PA-C, 1 Patch at 06/19/20 0849    menthol-zinc oxide (CALMOSEPTINE) 0.44-20.6 % ointment, , Topical, Q6H PRN, Tank Oviedo PA-C    0.9% sodium chloride infusion 250 mL, 250 mL, IntraVENous, PRN, Heena Simeon PA-C    epoetin johnathon-epbx (RETACRIT) injection 10,000 Units, 10,000 Units, SubCUTAneous, Q MON, WED & FRI, Bichu, Brayan Abarca MD, 10,000 Units at 06/17/20 2030    sodium chloride (NS) flush 5-40 mL, 5-40 mL, IntraVENous, Q8H, Monica Lassiter DO, 10 mL at 06/19/20 0555    sodium chloride (NS) flush 5-40 mL, 5-40 mL, IntraVENous, PRN, Monica Lassiter DO    insulin lispro (HUMALOG) injection, , SubCUTAneous, Q6H, Monica Lassiter DO, 4 Units at 06/19/20 1209    hydrALAZINE (APRESOLINE) 20 mg/mL injection 20 mg, 20 mg, IntraVENous, Q6H PRN, Tank Oviedo PA-C, 20 mg at 06/19/20 0850    ondansetron (ZOFRAN) injection 4 mg, 4 mg, IntraVENous, Q6H PRN, Sav Marrufo MD, 4 mg at 06/10/20 0030    glucose chewable tablet 16 g, 4 Tab, Oral, PRN, Edmond Sleek, DO    glucagon (GLUCAGEN) injection 1 mg, 1 mg, IntraMUSCular, PRN, Edmond Sleek, DO    dextrose (D50W) injection syrg 12.5-25 g, 25-50 mL, IntraVENous, PRN, Edmond Sleek, DO, 25 g at 06/13/20 0041    furosemide (LASIX) injection 80 mg, 80 mg, IntraVENous, BID, Hadley Angulo MD, 80 mg at 06/19/20 0850    citalopram (CELEXA) tablet 20 mg, 20 mg, Oral, DAILY, Hadley Angulo MD, 20 mg at 06/19/20 0850    cyanocobalamin tablet 1,000 mcg, 1,000 mcg, Oral, BID, Hadley Angulo MD, 1,000 mcg at 06/19/20 0850    loratadine (CLARITIN) tablet 10 mg, 10 mg, Oral, DAILY, Hadley Angulo MD, 10 mg at 06/19/20 0850    aspirin chewable tablet 81 mg, 81 mg, Oral, DAILY, Hadley Angulo MD, 81 mg at 06/19/20 0850    [Held by provider] isosorbide mononitrate ER (IMDUR) tablet 30 mg, 30 mg, Oral, DAILY, Hadley Angulo MD, Stopped at 06/11/20 0900    polyethylene glycol (MIRALAX) packet 17 g, 17 g, Oral, DAILY, Hadley Angulo MD, Stopped at 06/15/20 0900    [Held by provider] rosuvastatin (CRESTOR) tablet 5 mg, 5 mg, Oral, QHS, Hadley nAgulo MD, Stopped at 06/11/20 2200    [Held by provider] gabapentin (NEURONTIN) capsule 100 mg, 100 mg, Oral, BID, Hadley Angulo MD, Stopped at 06/11/20 0900    ferrous sulfate tablet 325 mg, 325 mg, Oral, EVERY OTHER DAY, Hadley Angulo MD, 325 mg at 06/18/20 0547    mirtazapine (REMERON) tablet 15 mg, 15 mg, Oral, QHS, Hadley Angulo MD, 15 mg at 06/18/20 2058    docusate sodium (COLACE) capsule 100 mg, 100 mg, Oral, BID PRN, Hadley Collins MD    heparin (porcine) injection 5,000 Units, 5,000 Units, SubCUTAneous, Q8H, Hadley Angulo MD, 5,000 Units at 06/19/20 1207        Objective:     Visit Vitals  /65   Pulse 68   Temp 97.4 °F (36.3 °C) (Axillary)   Resp 19   Ht 5' 5\" (1.651 m)   Wt 98 kg (216 lb)   SpO2 98%   Breastfeeding No   BMI 35.94 kg/m²         Intake/Output Summary (Last 24 hours) at 6/19/2020 1540  Last data filed at 6/19/2020 1200  Gross per 24 hour   Intake 80 ml   Output 3550 ml   Net -3470 ml       Physical Exam:     On BIPAP  HENT mmm  RS AEBE coarse BS   CVS s1 s2 wnl no JVD  GI soft BS +  Ext 1+ LE edema     Data Review:    Recent Labs     06/19/20  0327   WBC 15.0*   RBC 3.77*   HCT 32.6*   MCV 86.5   MCH 27.9   MCHC 32.2   RDW 13.8     Recent Labs     06/19/20  0327 06/18/20  0209 06/17/20  0450   BUN 16 14 19*   CREA 2.31* 2.40* 3.12*   CA 9.4 9.1 8.2*   K 3.1* 3.9 3.1*    141 137    103 105   CO2 30 28 26   PHOS 0.8* 2.6 3.1   * 162* 148*       Kaz Myles MD

## 2020-06-19 NOTE — ROUTINE PROCESS
Bedside and Verbal shift change report given to [address] (oncoming nurse) by mitral regurgitation (offgoing nurse). Report included the following information SBAR, MAR and Recent Results.

## 2020-06-19 NOTE — PROGRESS NOTES
Infectious Disease progress Note        Reason: Gram-positive bloodstream infection, pneumonia    Current abx Prior abx   Piperacillin/tazobactam since 6/7   Vancomycin 6/7; 6/10-6/12     Assessment :  69 y.o. female  with multiple medical problems including diastolic CHF, diabetes, arthritis, acid reflux hiatal hernia, chronic hypoxic failure on home oxygen 2 L, hypertension, end-stage renal disease on dialysis Tuesday Thursday Saturday who presented to the emergency department via EMS on 6/8/20 with shortness of breath per report from [facility] and rehab. Patient presents with a highly complex clinical picture. Clinical presentation seems consistent with acute respiratory failure secondary to fluid overload, acute on chronic diastolic CHF. Single positive blood culture on 6/7 for coagulase-negative Staphylococcus likely contaminant. Negative blood culture 6/10    Acute on chronic respiratory failure requiring mechanical ventilation - intubated 6/11 due to aspiration of partial dentures. Removed. S/p bronchoscopy 6/11- findings noted. --extubated 6/15, concern for aspiration 6/16    Recommendations:  --WBC up a tick, s/p possible re-aspiration event. Continue piperacillin/tazobactam.  Tx for aspiration with a shorter course anticipated, with aspiration possible 6/16, will re-consider cessation over the weekend if her WBC improves. --F/u ICU team recommendations. --Follow-up nephrology recommendations  --If clinically decompensates recommend: blood cx x 2, resp cx, CDI testing, addition of vancomycin, and d/c of pip/tazo & addition of meropenem & oral vanc    Relayed information to pt. She denies questions today. Please call me if any further questions or concerns. Will continue to participate in the care of this patient.     HPI:  Pt reports that she is feeling better after her HD yesterday  Denies n/v/abd pain  Has FMS in place    Current Facility-Administered Medications   Medication Dose Route Frequency    potassium phosphate 30 mmol in 0.9% sodium chloride 250 mL infusion   IntraVENous ONCE    [START ON 6/20/2020] pantoprazole (PROTONIX) tablet 40 mg  40 mg Oral ACB    carvediloL (COREG) tablet 25 mg  25 mg Oral BID WITH MEALS    albuterol-ipratropium (DUO-NEB) 2.5 MG-0.5 MG/3 ML  3 mL Nebulization Q6H RT    albuterol-ipratropium (DUO-NEB) 2.5 MG-0.5 MG/3 ML  3 mL Nebulization Q4H PRN    Piperacillin-tazobactam (ZOSYN) 0.75 gm Supplemental Dosing by Pharmacy   Other Rx Dosing/Monitoring    piperacillin-tazobactam (ZOSYN) 2.25 g in 0.9% sodium chloride (MBP/ADV) 50 mL MBP  2.25 g IntraVENous Q8H    acetaminophen (TYLENOL) tablet 325 mg  325 mg Oral Q4H PRN    sodium chloride 3% hypertonic nebulizer soln  4 mL Nebulization TID PRN    lidocaine 4 % patch 1 Patch  1 Patch TransDERmal Q24H    menthol-zinc oxide (CALMOSEPTINE) 0.44-20.6 % ointment   Topical Q6H PRN    0.9% sodium chloride infusion 250 mL  250 mL IntraVENous PRN    epoetin johnathon-epbx (RETACRIT) injection 10,000 Units  10,000 Units SubCUTAneous Q MON, WED & FRI    sodium chloride (NS) flush 5-40 mL  5-40 mL IntraVENous Q8H    sodium chloride (NS) flush 5-40 mL  5-40 mL IntraVENous PRN    insulin lispro (HUMALOG) injection   SubCUTAneous Q6H    hydrALAZINE (APRESOLINE) 20 mg/mL injection 20 mg  20 mg IntraVENous Q6H PRN    ondansetron (ZOFRAN) injection 4 mg  4 mg IntraVENous Q6H PRN    glucose chewable tablet 16 g  4 Tab Oral PRN    glucagon (GLUCAGEN) injection 1 mg  1 mg IntraMUSCular PRN    dextrose (D50W) injection syrg 12.5-25 g  25-50 mL IntraVENous PRN    furosemide (LASIX) injection 80 mg  80 mg IntraVENous BID    citalopram (CELEXA) tablet 20 mg  20 mg Oral DAILY    cyanocobalamin tablet 1,000 mcg  1,000 mcg Oral BID    loratadine (CLARITIN) tablet 10 mg  10 mg Oral DAILY    aspirin chewable tablet 81 mg  81 mg Oral DAILY    [Held by provider] isosorbide mononitrate ER (IMDUR) tablet 30 mg  30 mg Oral DAILY    polyethylene glycol (MIRALAX) packet 17 g  17 g Oral DAILY    [Held by provider] rosuvastatin (CRESTOR) tablet 5 mg  5 mg Oral QHS    [Held by provider] gabapentin (NEURONTIN) capsule 100 mg  100 mg Oral BID    ferrous sulfate tablet 325 mg  325 mg Oral EVERY OTHER DAY    mirtazapine (REMERON) tablet 15 mg  15 mg Oral QHS    docusate sodium (COLACE) capsule 100 mg  100 mg Oral BID PRN    heparin (porcine) injection 5,000 Units  5,000 Units SubCUTAneous Q8H       Allergies: Augmentin [amoxicillin-pot clavulanate]; Clavulanic acid; and Levaquin [levofloxacin]    Temp (24hrs), Av °F (36.7 °C), Min:97.6 °F (36.4 °C), Max:98.9 °F (37.2 °C)    Visit Vitals  /50   Pulse 77   Temp 97.7 °F (36.5 °C)   Resp 20   Ht 5' 5\" (1.651 m)   Wt 98 kg (216 lb 0.8 oz)   SpO2 95%   Breastfeeding No   BMI 35.95 kg/m²       Physical Exam:    Constitutional: laying on bed, NAD, NC in place  HEENT: non icteric sclera, no oral lesions  Cardiovascular: Regular rhythm. Exam reveals no gallop and no friction rub. Pulmonary/Chest: CCTAB anteriorly  Abdominal: Soft. Bowel sounds are normal. NT, ND  Musculoskeletal: exhibits no tenderness. Trace edema of LE  Neurological: awake, alert x 4, non focal, generally weak  Skin: Skin is warm and dry.    Psychiatric: Pleasant, cooperative    Labs: Results:   Chemistry Recent Labs     20  0327 20  0209 20  0450   * 162* 148*    141 137   K 3.1* 3.9 3.1*    103 105   CO2 30 28 26   BUN 16 14 19*   CREA 2.31* 2.40* 3.12*   CA 9.4 9.1 8.2*   AGAP 7 10 6   BUCR 7* 6* 6*      CBC w/Diff Recent Labs     20  0327 20  0209 20  0606   WBC 15.0* 15.4* 12.0   RBC 3.77* 3.55* 3.19*   HGB 10.5* 9.8* 8.8*   HCT 32.6* 31.7* 28.6*    226 187   GRANS 87* 91* 86*   LYMPH 5* 3* 10*   EOS 0 0 0      Microbiology Recent Labs     20  1300   CULT No growth (<1,000 CFU/ML)          RADIOLOGY:  Reviewed, none new    Dr. Puma Rodriguez, Infectious Disease Specialist  [phone]  June 19, 2020  10:20 AM

## 2020-06-20 PROBLEM — J96.91 RESPIRATORY FAILURE WITH HYPOXIA AND HYPERCAPNIA (HCC): Status: ACTIVE | Noted: 2020-06-20

## 2020-06-20 PROBLEM — N18.6 ESRD (END STAGE RENAL DISEASE) (HCC): Status: ACTIVE | Noted: 2020-06-20

## 2020-06-20 PROBLEM — J96.92 RESPIRATORY FAILURE WITH HYPOXIA AND HYPERCAPNIA (HCC): Status: ACTIVE | Noted: 2020-06-20

## 2020-06-20 PROBLEM — R53.81 DEBILITY: Status: ACTIVE | Noted: 2020-06-20

## 2020-06-20 PROBLEM — F32.A DEPRESSION: Status: ACTIVE | Noted: 2020-06-20

## 2020-06-20 PROBLEM — T17.908A ASPIRATION OF FOREIGN BODY: Status: ACTIVE | Noted: 2020-06-20

## 2020-06-20 LAB
ANION GAP SERPL CALC-SCNC: 7 MMOL/L (ref 3–18)
BASOPHILS # BLD: 0 K/UL (ref 0–0.1)
BASOPHILS NFR BLD: 0 % (ref 0–2)
BUN SERPL-MCNC: 19 MG/DL (ref 7–18)
BUN/CREAT SERPL: 9 (ref 12–20)
CALCIUM SERPL-MCNC: 8.8 MG/DL (ref 8.5–10.1)
CHLORIDE SERPL-SCNC: 103 MMOL/L (ref 100–111)
CO2 SERPL-SCNC: 28 MMOL/L (ref 21–32)
CREAT SERPL-MCNC: 2.19 MG/DL (ref 0.6–1.3)
DIFFERENTIAL METHOD BLD: ABNORMAL
EOSINOPHIL # BLD: 0.1 K/UL (ref 0–0.4)
EOSINOPHIL NFR BLD: 0 % (ref 0–5)
ERYTHROCYTE [DISTWIDTH] IN BLOOD BY AUTOMATED COUNT: 14.2 % (ref 11.6–14.5)
GLUCOSE BLD STRIP.AUTO-MCNC: 132 MG/DL (ref 70–110)
GLUCOSE BLD STRIP.AUTO-MCNC: 153 MG/DL (ref 70–110)
GLUCOSE BLD STRIP.AUTO-MCNC: 194 MG/DL (ref 70–110)
GLUCOSE BLD STRIP.AUTO-MCNC: 244 MG/DL (ref 70–110)
GLUCOSE SERPL-MCNC: 136 MG/DL (ref 74–99)
HCT VFR BLD AUTO: 30.3 % (ref 35–45)
HGB BLD-MCNC: 9.7 G/DL (ref 12–16)
LYMPHOCYTES # BLD: 1.3 K/UL (ref 0.9–3.6)
LYMPHOCYTES NFR BLD: 9 % (ref 21–52)
MAGNESIUM SERPL-MCNC: 2.1 MG/DL (ref 1.6–2.6)
MCH RBC QN AUTO: 27.6 PG (ref 24–34)
MCHC RBC AUTO-ENTMCNC: 32 G/DL (ref 31–37)
MCV RBC AUTO: 86.1 FL (ref 74–97)
MONOCYTES # BLD: 1.4 K/UL (ref 0.05–1.2)
MONOCYTES NFR BLD: 10 % (ref 3–10)
NEUTS SEG # BLD: 11.6 K/UL (ref 1.8–8)
NEUTS SEG NFR BLD: 81 % (ref 40–73)
PHOSPHATE SERPL-MCNC: 1.6 MG/DL (ref 2.5–4.9)
PLATELET # BLD AUTO: 255 K/UL (ref 135–420)
PMV BLD AUTO: 11.2 FL (ref 9.2–11.8)
POTASSIUM SERPL-SCNC: 3.4 MMOL/L (ref 3.5–5.5)
RBC # BLD AUTO: 3.52 M/UL (ref 4.2–5.3)
SODIUM SERPL-SCNC: 138 MMOL/L (ref 136–145)
WBC # BLD AUTO: 14.3 K/UL (ref 4.6–13.2)

## 2020-06-20 PROCEDURE — 94668 MNPJ CHEST WALL SBSQ: CPT

## 2020-06-20 PROCEDURE — 80048 BASIC METABOLIC PNL TOTAL CA: CPT

## 2020-06-20 PROCEDURE — 94761 N-INVAS EAR/PLS OXIMETRY MLT: CPT

## 2020-06-20 PROCEDURE — 83735 ASSAY OF MAGNESIUM: CPT

## 2020-06-20 PROCEDURE — 77010033711 HC HIGH FLOW OXYGEN

## 2020-06-20 PROCEDURE — 36415 COLL VENOUS BLD VENIPUNCTURE: CPT

## 2020-06-20 PROCEDURE — 74011250636 HC RX REV CODE- 250/636: Performed by: INTERNAL MEDICINE

## 2020-06-20 PROCEDURE — 74011250637 HC RX REV CODE- 250/637: Performed by: INTERNAL MEDICINE

## 2020-06-20 PROCEDURE — 74011000250 HC RX REV CODE- 250: Performed by: INTERNAL MEDICINE

## 2020-06-20 PROCEDURE — 94640 AIRWAY INHALATION TREATMENT: CPT

## 2020-06-20 PROCEDURE — 74011000250 HC RX REV CODE- 250: Performed by: PHYSICIAN ASSISTANT

## 2020-06-20 PROCEDURE — 74011636637 HC RX REV CODE- 636/637: Performed by: INTERNAL MEDICINE

## 2020-06-20 PROCEDURE — 94762 N-INVAS EAR/PLS OXIMTRY CONT: CPT

## 2020-06-20 PROCEDURE — 74011250637 HC RX REV CODE- 250/637: Performed by: HOSPITALIST

## 2020-06-20 PROCEDURE — 74011250636 HC RX REV CODE- 250/636: Performed by: PHYSICIAN ASSISTANT

## 2020-06-20 PROCEDURE — 85025 COMPLETE CBC W/AUTO DIFF WBC: CPT

## 2020-06-20 PROCEDURE — 65660000004 HC RM CVT STEPDOWN

## 2020-06-20 PROCEDURE — 77010033678 HC OXYGEN DAILY

## 2020-06-20 PROCEDURE — 74011000258 HC RX REV CODE- 258: Performed by: INTERNAL MEDICINE

## 2020-06-20 PROCEDURE — 84100 ASSAY OF PHOSPHORUS: CPT

## 2020-06-20 PROCEDURE — 82962 GLUCOSE BLOOD TEST: CPT

## 2020-06-20 RX ORDER — MIRTAZAPINE 15 MG/1
7.5 TABLET, FILM COATED ORAL
Status: DISCONTINUED | OUTPATIENT
Start: 2020-06-20 | End: 2020-06-26 | Stop reason: HOSPADM

## 2020-06-20 RX ORDER — POLYETHYLENE GLYCOL 3350 17 G/17G
17 POWDER, FOR SOLUTION ORAL DAILY PRN
Status: DISCONTINUED | OUTPATIENT
Start: 2020-06-20 | End: 2020-06-26 | Stop reason: HOSPADM

## 2020-06-20 RX ORDER — VENLAFAXINE HYDROCHLORIDE 37.5 MG/1
37.5 CAPSULE, EXTENDED RELEASE ORAL
Status: DISCONTINUED | OUTPATIENT
Start: 2020-06-21 | End: 2020-06-26 | Stop reason: HOSPADM

## 2020-06-20 RX ORDER — VENLAFAXINE HYDROCHLORIDE 37.5 MG/1
50 CAPSULE, EXTENDED RELEASE ORAL ONCE
COMMUNITY

## 2020-06-20 RX ADMIN — IPRATROPIUM BROMIDE AND ALBUTEROL SULFATE 3 ML: .5; 3 SOLUTION RESPIRATORY (INHALATION) at 07:11

## 2020-06-20 RX ADMIN — HEPARIN SODIUM 5000 UNITS: 5000 INJECTION INTRAVENOUS; SUBCUTANEOUS at 22:14

## 2020-06-20 RX ADMIN — ROSUVASTATIN CALCIUM 5 MG: 10 TABLET, COATED ORAL at 22:14

## 2020-06-20 RX ADMIN — Medication 10 ML: at 05:58

## 2020-06-20 RX ADMIN — IPRATROPIUM BROMIDE AND ALBUTEROL SULFATE 3 ML: .5; 3 SOLUTION RESPIRATORY (INHALATION) at 20:45

## 2020-06-20 RX ADMIN — Medication 10 ML: at 14:00

## 2020-06-20 RX ADMIN — ASPIRIN 81 MG CHEWABLE TABLET 81 MG: 81 TABLET CHEWABLE at 11:01

## 2020-06-20 RX ADMIN — INSULIN LISPRO 4 UNITS: 100 INJECTION, SOLUTION INTRAVENOUS; SUBCUTANEOUS at 18:21

## 2020-06-20 RX ADMIN — SODIUM CHLORIDE: 900 INJECTION, SOLUTION INTRAVENOUS at 11:01

## 2020-06-20 RX ADMIN — INSULIN LISPRO 2 UNITS: 100 INJECTION, SOLUTION INTRAVENOUS; SUBCUTANEOUS at 00:05

## 2020-06-20 RX ADMIN — CYANOCOBALAMIN TAB 1000 MCG 1000 MCG: 1000 TAB at 10:58

## 2020-06-20 RX ADMIN — PANTOPRAZOLE SODIUM 40 MG: 40 TABLET, DELAYED RELEASE ORAL at 10:58

## 2020-06-20 RX ADMIN — IPRATROPIUM BROMIDE AND ALBUTEROL SULFATE 3 ML: .5; 3 SOLUTION RESPIRATORY (INHALATION) at 02:25

## 2020-06-20 RX ADMIN — Medication 325 MG: at 11:01

## 2020-06-20 RX ADMIN — HEPARIN SODIUM 5000 UNITS: 5000 INJECTION INTRAVENOUS; SUBCUTANEOUS at 05:12

## 2020-06-20 RX ADMIN — CARVEDILOL 25 MG: 25 TABLET, FILM COATED ORAL at 17:42

## 2020-06-20 RX ADMIN — FUROSEMIDE 80 MG: 10 INJECTION, SOLUTION INTRAMUSCULAR; INTRAVENOUS at 10:58

## 2020-06-20 RX ADMIN — LORATADINE 10 MG: 10 TABLET ORAL at 10:58

## 2020-06-20 RX ADMIN — CITALOPRAM HYDROBROMIDE 20 MG: 20 TABLET ORAL at 10:58

## 2020-06-20 RX ADMIN — PIPERACILLIN AND TAZOBACTAM 2.25 G: 2; .25 INJECTION, POWDER, LYOPHILIZED, FOR SOLUTION INTRAVENOUS at 22:14

## 2020-06-20 RX ADMIN — PIPERACILLIN AND TAZOBACTAM 2.25 G: 2; .25 INJECTION, POWDER, LYOPHILIZED, FOR SOLUTION INTRAVENOUS at 05:12

## 2020-06-20 RX ADMIN — FUROSEMIDE 80 MG: 10 INJECTION, SOLUTION INTRAMUSCULAR; INTRAVENOUS at 18:20

## 2020-06-20 RX ADMIN — HEPARIN SODIUM 5000 UNITS: 5000 INJECTION INTRAVENOUS; SUBCUTANEOUS at 12:14

## 2020-06-20 RX ADMIN — PIPERACILLIN AND TAZOBACTAM 2.25 G: 2; .25 INJECTION, POWDER, LYOPHILIZED, FOR SOLUTION INTRAVENOUS at 12:14

## 2020-06-20 RX ADMIN — INSULIN LISPRO 2 UNITS: 100 INJECTION, SOLUTION INTRAVENOUS; SUBCUTANEOUS at 05:57

## 2020-06-20 RX ADMIN — CARVEDILOL 25 MG: 25 TABLET, FILM COATED ORAL at 12:00

## 2020-06-20 RX ADMIN — MIRTAZAPINE 7.5 MG: 15 TABLET, FILM COATED ORAL at 22:15

## 2020-06-20 RX ADMIN — CYANOCOBALAMIN TAB 1000 MCG 1000 MCG: 1000 TAB at 18:21

## 2020-06-20 RX ADMIN — IPRATROPIUM BROMIDE AND ALBUTEROL SULFATE 3 ML: .5; 3 SOLUTION RESPIRATORY (INHALATION) at 13:41

## 2020-06-20 NOTE — PROGRESS NOTES
History & Physical    Patient: Skye New MRN: [id number]  CSN: [id number]    YOB: 1950  Age: 71 y.o. Sex: female      DOA: 6/7/2020  CC:    PCP: Jourdan Cohen MD       HPI:     Skye New is a 71 y.o. female w/ PMH of uterine cancer, CHF, ESRD, depression, deconditioning, DMII, CAD, HTN, HLD who presented to [facility] for acute respiratory distress with hypoxia requiring BVM. She had acute on chronic CHF and was started on IV lasix and bipap and admitted to the floor. There was concern for pneumonia and she was also started on abx, her covid test was (-), likely due to aspiration. 1/2 BC was (+) for staph which was felt to be a contaminant. On 6/11 a RRT was called for worsening resp failure, during intubation a denture was found obstructing her airway which was removed. Proceeded with intubation due to profound resp acidosis. A bronchoscopy was completed on following to r/o any other aspiration of FB. She was slow to wean from vent due hypoxia. On 6/16 she was extubated. She required bipap and high levels of HFNC following extubation for a few day but was able to wean solely to HFNC during the day with bipap at night. She has transitioned to a PO diet and is doing well.    06/20/20  She is a poor historian stating she was coming from home and could not remember if she was on HD prior to hospitalization. She states her breathing is much improved, denies CP, abdominal pain, n/v. Tolerating diet. Last BM yesterday. States she is sleeping a lot throughout the day. Last HD 6/18.        Past Medical History:   Diagnosis Date    Arthritis     Cancer [facility])     CHF (congestive heart failure) (HCC)     Diabetes ([facility]     Fracture of neck (HCC)     GERD (gastroesophageal reflux disease)     Goiter     Hiatal hernia     Hypertension     Uterine cancer ([facility] [address]        Past Surgical History:   Procedure Laterality Date    HX APPENDECTOMY      HX HYSTERECTOMY      HX KNEE REPLACEMENT Right     HX ORTHOPAEDIC      odontoid fracture repair with hardware placement.  HX PARTIAL THYROIDECTOMY      [place] INSJ TUNNELED CVC W/O SUBQ PORT/ AGE 5 YR/> Right 5/7/2020    INSERTION TUNNELED CENTRAL VENOUS CATHETER performed by Jose Kang MD at [facility]       History reviewed. No pertinent family history. Social History     Socioeconomic History    Marital status:      Spouse name: Not on file    Number of children: Not on file    Years of education: Not on file    Highest education level: Not on file   Tobacco Use    Smoking status: Never Smoker   Substance and Sexual Activity    Alcohol use: No    Drug use: No       Prior to Admission medications    Medication Sig Start Date End Date Taking? Authorizing Provider   venlafaxine-SR [facility] 37.5 mg capsule Take 50 mg by mouth once. Yes Provider, Historical   gabapentin (NEURONTIN) 100 mg capsule Take 1 Cap by mouth two (2) times a day. Max Daily Amount: 200 mg. 5/13/20  Yes Ruma Mckeon MD   carvediloL (COREG) 12.5 mg tablet Take 1 Tab by mouth two (2) times daily (with meals). 5/13/20  Yes Ruma Mckeon MD   docusate sodium (COLACE) 100 mg capsule Take 1 Cap by mouth two (2) times daily as needed for Constipation for up to 90 days. 5/13/20 8/11/20 Yes Ruma Mckeon MD   OXYGEN-AIR DELIVERY SYSTEMS Take 2 L by inhalation daily. Yes Provider, Historical   amLODIPine (NORVASC) 10 mg tablet Take 1 Tab by mouth daily. 4/28/20  Yes Jada Torres MD   polyethylene glycol (MIRALAX) 17 gram packet Take 1 Packet by mouth daily. 4/28/20  Yes Jada Torres MD   rosuvastatin (CRESTOR) 5 mg tablet Take 1 Tab by mouth nightly. 4/28/20  Yes Jada Torres MD   hydrALAZINE (APRESOLINE) 100 mg tablet Take 1 Tab by mouth three (3) times daily. 4/28/20  Yes Jada Torres MD   aspirin 81 mg chewable tablet Take 1 Tab by mouth daily. 4/28/20  Yes Malik Tee MD   esomeprazole (NEXIUM) 40 mg capsule Take 40 mg by mouth daily. Yes Other, MD Larry   cranberry extract (AZO CRANBERRY) 450 mg tab Take 2 Tabs by mouth daily. Yes Other, MD Larry   cholecalciferol (VITAMIN D3) 1,000 unit cap Take 5,000 Units by mouth daily. Yes Other, MD aLrry   cyanocobalamin (VITAMIN B-12) 1,000 mcg tablet Take 1,000 mcg by mouth two (2) times a day. Yes Other, MD Larry   Biotin 2,500 mcg cap Take 1 Tab by mouth two (2) times a day. Yes Other, MD Larry   vitamin a-vitamin c-vit e-min (OCUVITE) tablet Take 1 Tab by mouth daily. Yes Isabel, MD Larry   loratadine (CLARITIN) 10 mg tablet Take 10 mg by mouth daily. Yes Other, MD Larry   B.infantis-B.ani-B.long-B.bifi (PROBIOTIC 4X) 10-15 mg TbEC Take 1 Tab by mouth daily. Yes Other, MD Larry   mirtazapine (REMERON) 15 mg tablet Take 1 Tab by mouth nightly. 5/13/20   Timmojeremy Greenfield MD   isosorbide mononitrate ER (IMDUR) 30 mg tablet Take 1 Tab by mouth daily. 4/28/20   Malik Tee MD       Allergies   Allergen Reactions    Augmentin [Amoxicillin-Pot Clavulanate] Diarrhea    Clavulanic Acid Diarrhea    Levaquin [Levofloxacin] Other (comments)     Severe hypoglycemia                Physical Exam:      Visit Vitals  BP (!) 153/36   Pulse 76   Temp 98.2 °F (36.8 °C)   Resp 14   Ht 5' 5\" (1.651 m)   Wt 98 kg (216 lb)   SpO2 97%   Breastfeeding No   BMI 35.94 kg/m²       Physical Exam:  Physical Exam  Constitutional:       General: She is not in acute distress. Appearance: Normal appearance. She is not ill-appearing or diaphoretic. HENT:      Head: Normocephalic and atraumatic. Right Ear: External ear normal.      Left Ear: External ear normal.      Nose: Nose normal.      Mouth/Throat:      Mouth: Mucous membranes are moist.   Eyes:      General: No scleral icterus. Pupils: Pupils are equal, round, and reactive to light.       Comments: Left eye amblyopia    Neck: Musculoskeletal: Normal range of motion and neck supple. No neck rigidity. Cardiovascular:      Rate and Rhythm: Normal rate and regular rhythm. Heart sounds: No murmur. Pulmonary:      Effort: Pulmonary effort is normal. No respiratory distress. Breath sounds: No stridor. No wheezing, rhonchi or rales. Abdominal:      General: Abdomen is flat. Bowel sounds are normal. There is no distension. Palpations: Abdomen is soft. Tenderness: There is no abdominal tenderness. Musculoskeletal: Normal range of motion. General: No swelling. Skin:     General: Skin is warm and dry. Capillary Refill: Capillary refill takes less than 2 seconds. Findings: No rash. Neurological:      General: No focal deficit present. Mental Status: She is alert and oriented to person, place, and time. Psychiatric:      Comments: Flat affect, concentration intact. Lab/Data Review:  Labs: Results:       Chemistry Recent Labs     06/20/20 0239 06/19/20 0327 06/18/20  0209   * 136* 162*    138 141   K 3.4* 3.1* 3.9    101 103   CO2 28 30 28   BUN 19* 16 14   CREA 2.19* 2.31* 2.40*   CA 8.8 9.4 9.1   AGAP 7 7 10   BUCR 9* 7* 6*      CBC w/Diff Recent Labs     06/20/20 0239 06/19/20 0327 06/18/20  0209   WBC 14.3* 15.0* 15.4*   RBC 3.52* 3.77* 3.55*   HGB 9.7* 10.5* 9.8*   HCT 30.3* 32.6* 31.7*    236 226   GRANS 81* 87* 91*   LYMPH 9* 5* 3*   EOS 0 0 0      Coagulation No results for input(s): PTP, INR, APTT, INREXT, INREXT in the last 72 hours. Iron/Ferritin No results for input(s): IRON in the last 72 hours. No lab exists for component: TIBCCALC   BNP No results for input(s): BNPP in the last 72 hours. Cardiac Enzymes No results for input(s): CPK, CKND1, ANDER in the last 72 hours. No lab exists for component: CKRMB, TROIP   Liver Enzymes No results for input(s): TP, ALB, TBIL, AP in the last 72 hours.     No lab exists for component: SGOT, GPT, DBIL   Thyroid Studies Lab Results   Component Value Date/Time    TSH 1.36 06/16/2020 06:28 AM          All Micro Results     Procedure Component Value Units Date/Time    CULTURE, BLOOD [[id number]] Collected:  06/16/20 0628    Order Status:  Completed Specimen:  Blood Updated:  06/20/20 0641     Special Requests: NO SPECIAL REQUESTS        Culture result: NO GROWTH 4 DAYS       CULTURE, BLOOD [[id number]] Collected:  06/16/20 0625    Order Status:  Completed Specimen:  Blood Updated:  06/20/20 0641     Special Requests: NO SPECIAL REQUESTS        Culture result: NO GROWTH 4 DAYS       CULTURE, URINE [[id number]] Collected:  06/17/20 1300    Order Status:  Completed Specimen:  Cath Urine Updated:  06/18/20 1825     Special Requests: NO SPECIAL REQUESTS        Culture result: No growth (<1,000 CFU/ML)       CULTURE, BLOOD [[id number]] Collected:  06/11/20 1820    Order Status:  Completed Specimen:  Blood Updated:  06/17/20 0654     Special Requests: NO SPECIAL REQUESTS        Culture result: NO GROWTH 6 DAYS       CULTURE, BLOOD [[id number]] Collected:  06/11/20 1620    Order Status:  Completed Specimen:  Blood Updated:  06/17/20 0654     Special Requests: NO SPECIAL REQUESTS        Culture result: NO GROWTH 6 DAYS       CULTURE, BLOOD [[id number]] Collected:  06/10/20 1230    Order Status:  Completed Specimen:  Blood Updated:  06/16/20 0652     Special Requests: NO SPECIAL REQUESTS        Culture result: NO GROWTH 6 DAYS       CULTURE, BLOOD [[id number]] Collected:  06/10/20 1231    Order Status:  Completed Specimen:  Blood Updated:  06/16/20 0652     Special Requests: NO SPECIAL REQUESTS        Culture result: NO GROWTH 6 DAYS       CULTURE, RESPIRATORY/SPUTUM/BRONCH Gonzalez Mckusick STAIN [[id number]]  (Abnormal) Collected:  06/11/20 1920    Order Status:  Completed Specimen:  Bronchial lavage Updated:  06/14/20 1353     Special Requests: --        BRONCHIAL LAVAGE  RIGHT  LUNG       GRAM STAIN 2+ WBCS SEEN         NO ORGANISMS SEEN        Culture result:       SCANT NORMAL RESPIRATORY MARS            SCANT YEAST       CULTURE, BLOOD [[id number]] Collected:  06/07/20 2200    Order Status:  Completed Specimen:  Blood Updated:  06/13/20 0633     Special Requests: NO SPECIAL REQUESTS        Culture result: NO GROWTH 6 DAYS       CULTURE, BLOOD [[id number]]  (Abnormal) Collected:  06/07/20 2223    Order Status:  Completed Specimen:  Blood Updated:  06/11/20 1230     Special Requests: NO SPECIAL REQUESTS        GRAM STAIN       AEROBIC BOTTLE GRAM POSITIVE COCCI IN GROUPS                  SMEAR CALLED TO AND CORRECTLY REPEATED BY: Rosette Ng RN  4N 6/10/2020 0802 TO LAE           Culture result:       STAPHYLOCOCCUS SPECIES, COAGULASE NEGATIVE GROWING IN AEROBIC BOTTLE          CULTURE, RESPIRATORY/SPUTUM/BRONCH [employer] [[id number]] Collected:  06/11/20 0600    Order Status:  Canceled Specimen:  Endotracheal aspirate           Imaging Reviewed:  CXR Results  (Last 48 hours)    None            Assessment:   Principal Problem:    Fluid overload (6/10/2020)    Active Problems:    Acute on chronic diastolic congestive heart failure ([address] (4/23/2020)      CHF (congestive heart failure) ([address] (4/25/2020)      Pneumonia (6/8/2020)      ESRD (end stage renal disease) ([address] (6/20/2020)      Debility (6/20/2020)      Respiratory failure with hypoxia and hypercapnia ([address] (6/20/2020)      Depression (6/20/2020)      Aspiration of foreign body (6/20/2020)            Plan:     1. Hypoxic resp failure- multifactorial due to aspiration FB, asp pneumonia, and acute CHF exacerbation  · HFNC- wean as tolerated  · Cont' bipap at night as tolerated by patient   · Pulm will cont' to follow   2. Acute chf exacerbation  · HD as tolerated   · Cont' BB and lasix   · Improving fluid overload   3. RUL infiltrate  · likely aspiration pneumonia  · ID following, Cont' zosyn per their recs  4. ESRD  · nephro following   5. DMII  · Cont' SSI, BS well controlled  6. MDD  · Citalopram switched to  Venlafaxine per patient preference  · Cont' remeron for now, patient does not know about this medication. May need to consider d/c if cont' with excessive sleepiness  7. HTN, HLD  · PRN hydralazine, cont; crestor   8. Anemia of chronic disease  9.  Best Practice   AC- SQ   SUP- protonix oral    Home medications- reviewed   Full Code          Dayana Simmons PA-C  6/20/2020, 1:43 PM

## 2020-06-20 NOTE — PROGRESS NOTES
Renal Progress Note  Follow up of ESRD     Assessment/Plan:  1. ESRD. Dialyzed yesterday, continue mwf. 2. HTN. Stable, continue current regimen. 3. Anemia of ckd. Continue procrit. 4. Staph epi bacteriemia, likely contaminant per ID. Repeat bc ngtd. 5. Aspiration pneumonia, on abx. Subjective:  Patient complaints off: Feels better. Breathing is \"not too bad\". No chest pain, nausea or vomiting. Appetite is better.        Patient Active Problem List   Diagnosis Code    Acute on chronic diastolic congestive heart failure ([facility]) I50.33    Suspected COVID-19 virus infection Z20.828    Type 2 diabetes mellitus without complication, without long-term current use of insulin ([facility]) E11.9    Left anterior fascicular block I44.4    HTN (hypertension), benign I10    Coronary artery disease involving native coronary artery of native heart without angina pectoris I25.10    Chronic diastolic congestive heart failure ([facility]) I50.32    Bilateral lower extremity edema R60.0    BMI 35.0-35.9,adult Z68.35    CHF (congestive heart failure) ([facility]) I50.9    Sepsis ([facility]) A41.9    Respiratory failure ([facility] J96.90    SIRS (systemic inflammatory response syndrome) ([facility]) R65.10    Acute on chronic respiratory failure ([facility]) J96.20    Pneumonia J18.9    Fluid overload E87.70    ESRD (end stage [facility] disease) [facility] N18.6    Debility R53.81    Respiratory failure with hypoxia and hypercapnia ([facility]) J96.91, J96.92    Depression F32.9    Aspiration of foreign body T17.900A       Current Facility-Administered Medications   Medication Dose Route Frequency Provider Last Rate Last Dose    potassium phosphate 15 mmol in 0.9% sodium chloride 250 mL infusion   IntraVENous ONCE Nya Lilly PA-C        polyethylene glycol (MIRALAX) packet 17 g  17 g Oral DAILY PRN Lex Ball MD        [START ON 6/21/2020] venlafaxine-SR (EFFEXOR-XR) capsule 37.5 mg  37.5 mg Oral DAILY WITH BREAKFAST Maricruz Santos PAYasminC  pantoprazole (PROTONIX) tablet 40 mg  40 mg Oral ACB En Keita MD   40 mg at 06/20/20 1058    carvediloL (COREG) tablet 25 mg  25 mg Oral BID WITH MEALS En Keita MD   25 mg at 06/20/20 1200    albuterol-ipratropium (DUO-NEB) 2.5 MG-0.5 MG/3 ML  3 mL Nebulization Q6H RT Thelma English MD   3 mL at 06/20/20 1341    albuterol-ipratropium (DUO-NEB) 2.5 MG-0.5 MG/3 ML  3 mL Nebulization Q4H PRN Albert Nassar PA-C        Piperacillin-tazobactam (ZOSYN) 0.75 gm Supplemental Dosing by Pharmacy   Other Rx Dosing/Monitoring Thelma English MD        piperacillin-tazobactam (ZOSYN) 2.25 g in 0.9% sodium chloride (MBP/ADV) 50 mL MBP  2.25 g IntraVENous Q8H Haily Hui  mL/hr at 06/20/20 1214 2.25 g at 06/20/20 1214    acetaminophen (TYLENOL) tablet 325 mg  325 mg Oral Q4H PRN China MORENO NP   325 mg at 06/16/20 1904    sodium chloride 3% hypertonic nebulizer soln  4 mL Nebulization TID PRN Millicent Squibb, DO        lidocaine 4 % patch 1 Patch  1 Patch TransDERmal Q24H Albert Nassar PA-C   1 Patch at 06/20/20 1057    menthol-zinc oxide (CALMOSEPTINE) 0.44-20.6 % ointment   Topical Q6H PRN Albert Nassar PA-C        0.9% sodium chloride infusion 250 mL  250 mL IntraVENous PRN Albert Nassar PA-C        epoetin johnathon-epbx (RETACRIT) injection 10,000 Units  10,000 Units SubCUTAneous Q MON, WED & Jc Loco MD   10,000 Units at 06/19/20 2054    sodium chloride (NS) flush 5-40 mL  5-40 mL IntraVENous Q8H Monica Lassiter DO   10 mL at 06/20/20 0558    sodium chloride (NS) flush 5-40 mL  5-40 mL IntraVENous PRN Erica ALCALA DO        insulin lispro (HUMALOG) injection   SubCUTAneous Q6H Erica ALCALA DO   Stopped at 06/20/20 1212    hydrALAZINE (APRESOLINE) 20 mg/mL injection 20 mg  20 mg IntraVENous Q6H PRN Albert Nassar PA-C   20 mg at 06/19/20 0850    ondansetron (ZOFRAN) injection 4 mg  4 mg IntraVENous Q6H PRN Grant Russell MD 4 mg at 06/10/20 0030    glucose chewable tablet 16 g  4 Tab Oral PRN Nella Sloop, DO        glucagon (GLUCAGEN) injection 1 mg  1 mg IntraMUSCular PRN Nella Sloop, DO        dextrose (D50W) injection syrg 12.5-25 g  25-50 mL IntraVENous PRN Nella Sloop, DO   25 g at 06/13/20 0041    furosemide (LASIX) injection 80 mg  80 mg IntraVENous BID Hadley Angulo MD   80 mg at 06/20/20 1058    cyanocobalamin tablet 1,000 mcg  1,000 mcg Oral BID Hadley Angulo MD   1,000 mcg at 06/20/20 1058    loratadine (CLARITIN) tablet 10 mg  10 mg Oral DAILY Hadley Angulo MD   10 mg at 06/20/20 1058    aspirin chewable tablet 81 mg  81 mg Oral DAILY Hadley Angulo MD   81 mg at 06/20/20 1101    [Held by provider] isosorbide mononitrate ER (IMDUR) tablet 30 mg  30 mg Oral DAILY Hadley Angulo MD   Stopped at 06/11/20 0900    [Held by provider] rosuvastatin (CRESTOR) tablet 5 mg  5 mg Oral QHS Hadley Angulo MD   Stopped at 06/11/20 2200    [Held by provider] gabapentin (NEURONTIN) capsule 100 mg  100 mg Oral BID Hadley Angulo MD   Stopped at 06/11/20 0900    ferrous sulfate tablet 325 mg  325 mg Oral EVERY OTHER DAY Hadley Angulo MD   325 mg at 06/20/20 1101    mirtazapine (REMERON) tablet 15 mg  15 mg Oral QHS Hadley Angulo MD   15 mg at 06/19/20 2047    docusate sodium (COLACE) capsule 100 mg  100 mg Oral BID PRN Ania Blevins MD        heparin (porcine) injection 5,000 Units  5,000 Units SubCUTAneous Q8H Hadley Angulo MD   5,000 Units at 06/20/20 1214             Objective:  Vitals:    06/20/20 1200 06/20/20 1230 06/20/20 1245 06/20/20 1341   BP: 158/52 160/54 (!) 153/36    Pulse: 69 76 76    Resp: 21 20 14    Temp:       TempSrc:       SpO2: 97% 98% 97% 97%   Weight:       Height:         .     Intake/Output Summary (Last 24 hours) at 6/20/2020 1407  Last data filed at 6/20/2020 0626  Gross per 24 hour   Intake 360 ml   Output 3110 ml   Net -2750 ml Admission weight:Weight: 121.6 kg (268 lb) (06/08/20 1131)    Last Weight Metrics:  Weight Loss Metrics 6/19/2020 5/13/2020 4/30/2020 4/29/2020 4/26/2020 8/18/2016 6/2/2016   Today's Wt 216 lb 238 lb 5.1 oz - 252 lb 252 lb 4.8 oz 219 lb 228 lb   BMI 35.94 kg/m2 - 39.66 kg/m2 41.93 kg/m2 41.98 kg/m2 36.44 kg/m2 37.94 kg/m2            Physical Exam:     General: No acute distress. Neck: no jvd. LUNGS: diminished air entry at the bases, bl exp rhonchi. CVS EXM: S1, S2, no murmur, rub or gallop. Abdomen: Soft, Non Tender, non distended. Lower Extremities: trace Edema. Access: rt ij tdc.        Labs:    CBC w/Diff Recent Labs     06/20/20  0239 06/19/20  0327 06/18/20  0209   WBC 14.3* 15.0* 15.4*   RBC 3.52* 3.77* 3.55*   HGB 9.7* 10.5* 9.8*   HCT 30.3* 32.6* 31.7*    236 226   GRANS 81* 87* 91*   LYMPH 9* 5* 3*   EOS 0 0 0        Chemistry Recent Labs     06/20/20 0239 06/19/20  0327 06/18/20  0209   * 136* 162*    138 141   K 3.4* 3.1* 3.9    101 103   CO2 28 30 28   BUN 19* 16 14   CREA 2.19* 2.31* 2.40*   CA 8.8 9.4 9.1   AGAP 7 7 10   BUCR 9* 7* 6*   PHOS 1.6* 0.8* 2.6          Lab Results   Component Value Date/Time    Iron 42 (L) 04/30/2020 12:41 PM    TIBC 319 04/30/2020 12:41 PM    Iron % saturation 13 (L) 04/30/2020 12:41 PM    Ferritin 411 (H) 06/10/2020 03:04 AM      Lab Results   Component Value Date/Time    Calcium 8.8 06/20/2020 02:39 AM    Phosphorus 1.6 (L) 06/20/2020 02:39 AM          Minnie Lundy M.D  [facility]  Office [phone]  Pager [phone]

## 2020-06-20 NOTE — PROGRESS NOTES
Received SBAR from [address] Pt Aox4, SR on monitor, HFNC, FMS, skin intact, pt resting in bed/bed ion low position, wheels locked. 1500-Pt provided incontinence care, bed pad/gown and linen changed. 1735-TRANSFER - OUT REPORT:    Verbal report given to Broderick Olguin RN(name) on [employer]  being transferred to T SD(unit) for routine progression of care       Report consisted of patients Situation, Background, Assessment and   Recommendations(SBAR). Information from the following report(s) SBAR, Kardex, [facility] and Cardiac Rhythm SR was reviewed with the receiving nurse. Lines:   Peripheral IV 06/11/20 Left Forearm (Active)   Site Assessment Clean, dry, & intact 6/20/2020  9:30 AM   Phlebitis Assessment 0 6/20/2020  9:30 AM   Infiltration Assessment 0 6/20/2020  9:30 AM   Dressing Status Clean, dry, & intact 6/20/2020  9:30 AM   Dressing Type Tape;Transparent 6/20/2020  9:30 AM   Hub Color/Line Status Pink 6/20/2020  9:30 AM   Action Taken Open ports on tubing capped 6/20/2020  9:30 AM   Alcohol Cap Used Yes 6/20/2020  9:30 AM       Peripheral IV 06/13/20 Right; Outer Antecubital (Active)   Site Assessment Clean, dry, & intact 6/20/2020  9:30 AM   Phlebitis Assessment 0 6/20/2020  9:30 AM   Infiltration Assessment 0 6/20/2020  9:30 AM   Dressing Status Clean, dry, & intact 6/20/2020  9:30 AM   Dressing Type Transparent;Tape 6/20/2020  9:30 AM   Hub Color/Line Status Patent 6/20/2020  9:30 AM   Action Taken Open ports on tubing capped 6/20/2020  9:30 AM   Alcohol Cap Used Yes 6/20/2020  9:30 AM        Opportunity for questions and clarification was provided.       Patient transported with:  Respiratory Tech

## 2020-06-20 NOTE — PROGRESS NOTES
Patient refuses to use the BiPAP for the evening. Patient is currently on the high flow cannula and tolerating it well with no distress. No shortness of breath noted at this time. Will continue to monitor.

## 2020-06-21 ENCOUNTER — APPOINTMENT (OUTPATIENT)
Dept: GENERAL RADIOLOGY | Age: 70
DRG: 207 | End: 2020-06-21
Attending: INTERNAL MEDICINE
Payer: MEDICARE

## 2020-06-21 LAB
ALBUMIN SERPL-MCNC: 2.7 G/DL (ref 3.4–5)
ALBUMIN/GLOB SERPL: 0.8 {RATIO} (ref 0.8–1.7)
ALP SERPL-CCNC: 69 U/L (ref 45–117)
ALT SERPL-CCNC: 21 U/L (ref 13–56)
ANION GAP SERPL CALC-SCNC: 10 MMOL/L (ref 3–18)
AST SERPL-CCNC: 25 U/L (ref 10–38)
BASOPHILS # BLD: 0 K/UL (ref 0–0.1)
BASOPHILS NFR BLD: 0 % (ref 0–2)
BILIRUB DIRECT SERPL-MCNC: 0.1 MG/DL (ref 0–0.2)
BILIRUB SERPL-MCNC: 0.4 MG/DL (ref 0.2–1)
BUN SERPL-MCNC: 42 MG/DL (ref 7–18)
BUN/CREAT SERPL: 11 (ref 12–20)
CALCIUM SERPL-MCNC: 8.5 MG/DL (ref 8.5–10.1)
CHLORIDE SERPL-SCNC: 103 MMOL/L (ref 100–111)
CO2 SERPL-SCNC: 27 MMOL/L (ref 21–32)
CREAT SERPL-MCNC: 3.71 MG/DL (ref 0.6–1.3)
DIFFERENTIAL METHOD BLD: ABNORMAL
EOSINOPHIL # BLD: 0 K/UL (ref 0–0.4)
EOSINOPHIL NFR BLD: 0 % (ref 0–5)
ERYTHROCYTE [DISTWIDTH] IN BLOOD BY AUTOMATED COUNT: 14.8 % (ref 11.6–14.5)
GLOBULIN SER CALC-MCNC: 3.3 G/DL (ref 2–4)
GLUCOSE BLD STRIP.AUTO-MCNC: 160 MG/DL (ref 70–110)
GLUCOSE BLD STRIP.AUTO-MCNC: 166 MG/DL (ref 70–110)
GLUCOSE BLD STRIP.AUTO-MCNC: 171 MG/DL (ref 70–110)
GLUCOSE BLD STRIP.AUTO-MCNC: 191 MG/DL (ref 70–110)
GLUCOSE SERPL-MCNC: 180 MG/DL (ref 74–99)
HCT VFR BLD AUTO: 28.3 % (ref 35–45)
HGB BLD-MCNC: 8.9 G/DL (ref 12–16)
LYMPHOCYTES # BLD: 1.2 K/UL (ref 0.9–3.6)
LYMPHOCYTES NFR BLD: 11 % (ref 21–52)
MAGNESIUM SERPL-MCNC: 2 MG/DL (ref 1.6–2.6)
MCH RBC QN AUTO: 27.6 PG (ref 24–34)
MCHC RBC AUTO-ENTMCNC: 31.4 G/DL (ref 31–37)
MCV RBC AUTO: 87.9 FL (ref 74–97)
MONOCYTES # BLD: 1 K/UL (ref 0.05–1.2)
MONOCYTES NFR BLD: 9 % (ref 3–10)
NEUTS SEG # BLD: 8.7 K/UL (ref 1.8–8)
NEUTS SEG NFR BLD: 80 % (ref 40–73)
PHOSPHATE SERPL-MCNC: 2.4 MG/DL (ref 2.5–4.9)
PLATELET # BLD AUTO: 213 K/UL (ref 135–420)
PMV BLD AUTO: 11.4 FL (ref 9.2–11.8)
POTASSIUM SERPL-SCNC: 3.6 MMOL/L (ref 3.5–5.5)
PROCALCITONIN SERPL-MCNC: 0.41 NG/ML
PROT SERPL-MCNC: 6 G/DL (ref 6.4–8.2)
RBC # BLD AUTO: 3.22 M/UL (ref 4.2–5.3)
SODIUM SERPL-SCNC: 140 MMOL/L (ref 136–145)
WBC # BLD AUTO: 11 K/UL (ref 4.6–13.2)

## 2020-06-21 PROCEDURE — 74011250636 HC RX REV CODE- 250/636: Performed by: INTERNAL MEDICINE

## 2020-06-21 PROCEDURE — 74011250637 HC RX REV CODE- 250/637: Performed by: NURSE PRACTITIONER

## 2020-06-21 PROCEDURE — 80048 BASIC METABOLIC PNL TOTAL CA: CPT

## 2020-06-21 PROCEDURE — 74011250637 HC RX REV CODE- 250/637: Performed by: INTERNAL MEDICINE

## 2020-06-21 PROCEDURE — 94640 AIRWAY INHALATION TREATMENT: CPT

## 2020-06-21 PROCEDURE — 74011250637 HC RX REV CODE- 250/637: Performed by: HOSPITALIST

## 2020-06-21 PROCEDURE — 71045 X-RAY EXAM CHEST 1 VIEW: CPT

## 2020-06-21 PROCEDURE — P9047 ALBUMIN (HUMAN), 25%, 50ML: HCPCS | Performed by: INTERNAL MEDICINE

## 2020-06-21 PROCEDURE — 36415 COLL VENOUS BLD VENIPUNCTURE: CPT

## 2020-06-21 PROCEDURE — 84100 ASSAY OF PHOSPHORUS: CPT

## 2020-06-21 PROCEDURE — 80076 HEPATIC FUNCTION PANEL: CPT

## 2020-06-21 PROCEDURE — 94761 N-INVAS EAR/PLS OXIMETRY MLT: CPT

## 2020-06-21 PROCEDURE — 97162 PT EVAL MOD COMPLEX 30 MIN: CPT

## 2020-06-21 PROCEDURE — 74011000250 HC RX REV CODE- 250: Performed by: INTERNAL MEDICINE

## 2020-06-21 PROCEDURE — 74011636637 HC RX REV CODE- 636/637: Performed by: INTERNAL MEDICINE

## 2020-06-21 PROCEDURE — 65660000004 HC RM CVT STEPDOWN

## 2020-06-21 PROCEDURE — 83735 ASSAY OF MAGNESIUM: CPT

## 2020-06-21 PROCEDURE — 82962 GLUCOSE BLOOD TEST: CPT

## 2020-06-21 PROCEDURE — 74011250637 HC RX REV CODE- 250/637: Performed by: PHYSICIAN ASSISTANT

## 2020-06-21 PROCEDURE — 84145 PROCALCITONIN (PCT): CPT

## 2020-06-21 PROCEDURE — 74011000258 HC RX REV CODE- 258: Performed by: INTERNAL MEDICINE

## 2020-06-21 PROCEDURE — 74011000250 HC RX REV CODE- 250: Performed by: PHYSICIAN ASSISTANT

## 2020-06-21 PROCEDURE — 77010033711 HC HIGH FLOW OXYGEN

## 2020-06-21 PROCEDURE — 85025 COMPLETE CBC W/AUTO DIFF WBC: CPT

## 2020-06-21 PROCEDURE — 94668 MNPJ CHEST WALL SBSQ: CPT

## 2020-06-21 RX ORDER — ALBUMIN HUMAN 250 G/1000ML
25 SOLUTION INTRAVENOUS EVERY 6 HOURS
Status: DISCONTINUED | OUTPATIENT
Start: 2020-06-21 | End: 2020-06-22

## 2020-06-21 RX ADMIN — IPRATROPIUM BROMIDE AND ALBUTEROL SULFATE 3 ML: .5; 3 SOLUTION RESPIRATORY (INHALATION) at 03:02

## 2020-06-21 RX ADMIN — CYANOCOBALAMIN TAB 1000 MCG 1000 MCG: 1000 TAB at 17:22

## 2020-06-21 RX ADMIN — IPRATROPIUM BROMIDE AND ALBUTEROL SULFATE 3 ML: .5; 3 SOLUTION RESPIRATORY (INHALATION) at 20:26

## 2020-06-21 RX ADMIN — IPRATROPIUM BROMIDE AND ALBUTEROL SULFATE 3 ML: .5; 3 SOLUTION RESPIRATORY (INHALATION) at 08:49

## 2020-06-21 RX ADMIN — INSULIN LISPRO 2 UNITS: 100 INJECTION, SOLUTION INTRAVENOUS; SUBCUTANEOUS at 05:54

## 2020-06-21 RX ADMIN — ASPIRIN 81 MG CHEWABLE TABLET 81 MG: 81 TABLET CHEWABLE at 09:26

## 2020-06-21 RX ADMIN — ROSUVASTATIN CALCIUM: 10 TABLET, COATED ORAL at 22:13

## 2020-06-21 RX ADMIN — PIPERACILLIN AND TAZOBACTAM 2.25 G: 2; .25 INJECTION, POWDER, LYOPHILIZED, FOR SOLUTION INTRAVENOUS at 05:47

## 2020-06-21 RX ADMIN — PANTOPRAZOLE SODIUM 40 MG: 40 TABLET, DELAYED RELEASE ORAL at 09:26

## 2020-06-21 RX ADMIN — CYANOCOBALAMIN TAB 1000 MCG 1000 MCG: 1000 TAB at 09:26

## 2020-06-21 RX ADMIN — CARVEDILOL 25 MG: 25 TABLET, FILM COATED ORAL at 17:22

## 2020-06-21 RX ADMIN — HEPARIN SODIUM 5000 UNITS: 5000 INJECTION INTRAVENOUS; SUBCUTANEOUS at 22:12

## 2020-06-21 RX ADMIN — INSULIN LISPRO 2 UNITS: 100 INJECTION, SOLUTION INTRAVENOUS; SUBCUTANEOUS at 17:49

## 2020-06-21 RX ADMIN — FUROSEMIDE 80 MG: 10 INJECTION, SOLUTION INTRAMUSCULAR; INTRAVENOUS at 09:25

## 2020-06-21 RX ADMIN — HEPARIN SODIUM 5000 UNITS: 5000 INJECTION INTRAVENOUS; SUBCUTANEOUS at 05:47

## 2020-06-21 RX ADMIN — ACETAMINOPHEN 325 MG: 325 TABLET ORAL at 00:59

## 2020-06-21 RX ADMIN — Medication 10 ML: at 05:57

## 2020-06-21 RX ADMIN — FUROSEMIDE 80 MG: 10 INJECTION, SOLUTION INTRAMUSCULAR; INTRAVENOUS at 17:22

## 2020-06-21 RX ADMIN — Medication 10 ML: at 17:29

## 2020-06-21 RX ADMIN — INSULIN LISPRO 2 UNITS: 100 INJECTION, SOLUTION INTRAVENOUS; SUBCUTANEOUS at 01:03

## 2020-06-21 RX ADMIN — HEPARIN SODIUM 5000 UNITS: 5000 INJECTION INTRAVENOUS; SUBCUTANEOUS at 17:49

## 2020-06-21 RX ADMIN — Medication 10 ML: at 01:06

## 2020-06-21 RX ADMIN — Medication 10 ML: at 22:00

## 2020-06-21 RX ADMIN — IPRATROPIUM BROMIDE AND ALBUTEROL SULFATE 3 ML: .5; 3 SOLUTION RESPIRATORY (INHALATION) at 14:38

## 2020-06-21 RX ADMIN — MIRTAZAPINE 7.5 MG: 15 TABLET, FILM COATED ORAL at 22:13

## 2020-06-21 RX ADMIN — LORATADINE 10 MG: 10 TABLET ORAL at 09:26

## 2020-06-21 RX ADMIN — ALBUMIN (HUMAN) 25 G: 0.25 INJECTION, SOLUTION INTRAVENOUS at 17:23

## 2020-06-21 RX ADMIN — VENLAFAXINE HYDROCHLORIDE 37.5 MG: 37.5 CAPSULE, EXTENDED RELEASE ORAL at 09:25

## 2020-06-21 NOTE — PROGRESS NOTES
0745: Bedside and Verbal shift change report given by Tamera Lyons RN (offgoing nurse). Report included the following information SBAR, Kardex, Intake/Output, MAR, Recent Results and Cardiac Rhythm SB.     NAD throughout shift. Bedside and Verbal shift change report given to [address] [place] [address] (oncoming nurse) by Haily Lau RN (offgoing nurse).  Report included the following information SBAR, Kardex, Intake/Output, MAR, Recent Results and Cardiac Rhythm SB.

## 2020-06-21 NOTE — ROUTINE PROCESS
Bedside shift change report given to SIMA Ronquillo (oncoming nurse) by Andrew Doe RN (offgoing nurse). Report included the following information SBAR, Kardex, Intake/Output, MAR, Recent Results and Cardiac Rhythm Sinus Lourena Pals.

## 2020-06-21 NOTE — PROGRESS NOTES
[facility] Progress Note                                              I have reviewed the flowsheet and previous days notes. Events, vitals, medications and notes from last 24 hours reviewed. Care plan discussed with staff and on multidisciplinary rounds. Subjective:  6/20/2020   Pt is much improved. FIO2 requirements decreasing. Awake and alert, tolerated HD well. Patient Active Problem List   Diagnosis Code    Acute on chronic diastolic congestive heart failure ([facility]) I50.33    Suspected COVID-19 virus infection Z20.828    Type 2 diabetes mellitus without complication, without long-term current use of insulin ([facility]) E11.9    Left anterior fascicular block I44.4    HTN (hypertension), benign I10    Coronary artery disease involving native coronary artery of native heart without angina pectoris I25.10    Chronic diastolic congestive heart failure ([facility]) I50.32    Bilateral lower extremity edema R60.0    BMI 35.0-35.9,adult Z68.35    CHF (congestive heart failure) ([facility]) I50.9    Sepsis ([facility]) A41.9    Respiratory failure ([facility] [address] J96.90    SIRS (systemic inflammatory response syndrome) ([facility]) R65.10    Acute on chronic respiratory failure ([facility]) J96.20    Pneumonia J18.9    Fluid overload E87.70    ESRD (end stage renal disease) ([facility] [address] N18.6    Debility R53.81    Respiratory failure with hypoxia and hypercapnia ([facility]) J96.91, J96.92    Depression F32.9    Aspiration of foreign body T17.900A       Assessment:  · Acute on Chronic Hypoxic Respiratory Failure - Multifactorial. Required emergent intubation on 06/11/20, 2/2 aspiration event on the floor (aspiration of partial dentures, removed with forceps) in addition to PNA and CHF Exacerbation/fluid overload on CXR. Extubated to NC on 06/15 with intermittent BiPAP use  · Aspiration of foreign body (partial dentures) into the hypopharynxretrieved with forceps.   S/p bronc 6/11/2020some mucous plugs, small caliber airways noted but no foreign material.  CTA chest (06/13) (-) for PE and (-) retained FB. Probable additional episode of aspiration on 6/16 after extubation, prompted brief use of BiPAP. · RUL infiltrate/PNA  CXR (6/7/20), scant yeast on Resp Cx. · Acute on chronic HFpEF - Most recent Echo ( 04/25/20) EF 55-60%. · Fluid overload- severe edema  · Bacteremia vs contaminant - BxCx (06/07) (+)GPC in 1/2 bottles, repeat BxCx - NGTD  · ESRD - HD MWF  · Acute on Chronic Anemia - s/p 2u PRBCs this admission    Impression/Plan:  - Hypoxia continues to improve s/p fluid removal with HD  - Denies any respiratory symptoms  - Wean down O2 as tolerated, 50% on high flow  - CXR tomorrow  - Zosyn per ID  - HD and lasix per nephro  - Start PT/OT  - On diet    Okay to transfer to stepdown unit, our service will follow. OTHER:  Glycemic Control- glucose stabilizer per ICU protocol when on insulin drip. Maintain blood glucose 140-180. Replace electrolytes    Aspiration precautions. Sepsis bundle and protocol followed. Deescalate antibiotic when appropriate. PT/OT eval and treat. OOB/IS when appropriate. Quality Care: Stress ulcer prophylaxis, DVT prophylaxis, HOB elevated, Infection control all reviewed and addressed. Events and notes from last 24 hours reviewed. Care plan discussed with nursing. D/w patient and family above medical problems and answered all questions to their satisfaction. CC TIME: >35 min     Medication Reviewed:     Allergies   Allergen Reactions    Augmentin [Amoxicillin-Pot Clavulanate] Diarrhea    Clavulanic Acid Diarrhea    Levaquin [Levofloxacin] Other (comments)     Severe hypoglycemia        Past Medical History:   Diagnosis Date    Arthritis     Cancer [facility] [address]     CHF (congestive heart failure) (HCC)     Diabetes ([facility] [address]     Fracture of neck (HCC)     GERD (gastroesophageal reflux disease)     Goiter     Hiatal hernia     Hypertension     Uterine cancer ([facility] [address]       Past Surgical History:   Procedure Laterality Date    HX APPENDECTOMY      HX HYSTERECTOMY      HX KNEE REPLACEMENT Right     HX ORTHOPAEDIC      odontoid fracture repair with hardware placement.  HX PARTIAL THYROIDECTOMY      ID INSJ TUNNELED CVC W/O SUBQ PORT/ AGE 5 YR/> Right 5/7/2020    INSERTION TUNNELED CENTRAL VENOUS CATHETER performed by Veronica May MD at [facility] CATH LAB      Social History     Tobacco Use    Smoking status: Never Smoker   Substance Use Topics    Alcohol use: No      History reviewed. No pertinent family history. Prior to Admission medications    Medication Sig Start Date End Date Taking? Authorizing Provider   venlafaxine-SR [facility] 37.5 mg capsule Take 50 mg by mouth once. Yes Provider, Historical   gabapentin (NEURONTIN) 100 mg capsule Take 1 Cap by mouth two (2) times a day. Max Daily Amount: 200 mg. 5/13/20  Yes Meg Anderson MD   carvediloL (COREG) 12.5 mg tablet Take 1 Tab by mouth two (2) times daily (with meals). 5/13/20  Yes Meg Anderson MD   docusate sodium (COLACE) 100 mg capsule Take 1 Cap by mouth two (2) times daily as needed for Constipation for up to 90 days. 5/13/20 8/11/20 Yes Meg Anderson MD   OXYGEN-AIR DELIVERY SYSTEMS Take 2 L by inhalation daily. Yes Provider, Historical   amLODIPine (NORVASC) 10 mg tablet Take 1 Tab by mouth daily. 4/28/20  Yes Armstead Meigs, MD   polyethylene glycol (MIRALAX) 17 gram packet Take 1 Packet by mouth daily. 4/28/20  Yes Armstead Meigs, MD   rosuvastatin (CRESTOR) 5 mg tablet Take 1 Tab by mouth nightly. 4/28/20  Yes Armstead Meigs, MD   hydrALAZINE (APRESOLINE) 100 mg tablet Take 1 Tab by mouth three (3) times daily. 4/28/20  Yes Armstead Meigs, MD   aspirin 81 mg chewable tablet Take 1 Tab by mouth daily. 4/28/20  Yes Armstead Meigs, MD   esomeprazole (NEXIUM) 40 mg capsule Take 40 mg by mouth daily.    Yes Other, MD Larry   cranberry extract (AZO CRANBERRY) 450 mg tab Take 2 Tabs by mouth daily. Yes Other, MD Larry   cholecalciferol (VITAMIN D3) 1,000 unit cap Take 5,000 Units by mouth daily. Yes Isabel, MD Larry   cyanocobalamin (VITAMIN B-12) 1,000 mcg tablet Take 1,000 mcg by mouth two (2) times a day. Yes Other, MD Larry   Biotin 2,500 mcg cap Take 1 Tab by mouth two (2) times a day. Yes Other, MD Larry   vitamin a-vitamin c-vit e-min (OCUVITE) tablet Take 1 Tab by mouth daily. Yes Other, MD Larry   loratadine (CLARITIN) 10 mg tablet Take 10 mg by mouth daily. Yes Other, MD Larry   B.infantis-B.ani-B.long-B.bifi (PROBIOTIC 4X) 10-15 mg TbEC Take 1 Tab by mouth daily. Yes Other, MD Larry   mirtazapine (REMERON) 15 mg tablet Take 1 Tab by mouth nightly. 5/13/20   Adrienne Martinez MD   isosorbide mononitrate ER (IMDUR) 30 mg tablet Take 1 Tab by mouth daily.  4/28/20   Ynes Le MD     Current Facility-Administered Medications   Medication Dose Route Frequency    [START ON 6/21/2020] venlafaxine-SR (EFFEXOR-XR) capsule 37.5 mg  37.5 mg Oral DAILY WITH BREAKFAST    mirtazapine (REMERON) tablet 7.5 mg  7.5 mg Oral QHS    pantoprazole (PROTONIX) tablet 40 mg  40 mg Oral ACB    carvediloL (COREG) tablet 25 mg  25 mg Oral BID WITH MEALS    albuterol-ipratropium (DUO-NEB) 2.5 MG-0.5 MG/3 ML  3 mL Nebulization Q6H RT    Piperacillin-tazobactam (ZOSYN) 0.75 gm Supplemental Dosing by Pharmacy   Other Rx Dosing/Monitoring    piperacillin-tazobactam (ZOSYN) 2.25 g in 0.9% sodium chloride (MBP/ADV) 50 mL MBP  2.25 g IntraVENous Q8H    lidocaine 4 % patch 1 Patch  1 Patch TransDERmal Q24H    epoetin johnathon-epbx (RETACRIT) injection 10,000 Units  10,000 Units SubCUTAneous Q MON, WED & FRI    sodium chloride (NS) flush 5-40 mL  5-40 mL IntraVENous Q8H    insulin lispro (HUMALOG) injection   SubCUTAneous Q6H    furosemide (LASIX) injection 80 mg  80 mg IntraVENous BID    cyanocobalamin tablet 1,000 mcg  1,000 mcg Oral BID    loratadine (CLARITIN) tablet 10 mg  10 mg Oral DAILY    aspirin chewable tablet 81 mg  81 mg Oral DAILY    [Held by provider] isosorbide mononitrate ER (IMDUR) tablet 30 mg  30 mg Oral DAILY    rosuvastatin (CRESTOR) tablet 5 mg  5 mg Oral QHS    [Held by provider] gabapentin (NEURONTIN) capsule 100 mg  100 mg Oral BID    ferrous sulfate tablet 325 mg  325 mg Oral EVERY OTHER DAY    heparin (porcine) injection 5,000 Units  5,000 Units SubCUTAneous Q8H           Lines: All central lines examined by me. No signs of erythema, induration, discharge. Peripherally Inserted Central Catheter:     Central Venous Catheter:     Peripheral Intravenous Line:  Peripheral IV 06/11/20 Left Forearm (Active)   Site Assessment Clean, dry, & intact 6/20/2020  9:30 AM   Phlebitis Assessment 0 6/20/2020  9:30 AM   Infiltration Assessment 0 6/20/2020  9:30 AM   Dressing Status Clean, dry, & intact 6/20/2020  9:30 AM   Dressing Type Tape;Transparent 6/20/2020  9:30 AM   Hub Color/Line Status Pink 6/20/2020  9:30 AM   Action Taken Open ports on tubing capped 6/20/2020  9:30 AM   Alcohol Cap Used Yes 6/20/2020  9:30 AM       Peripheral IV 06/13/20 Right; Outer Antecubital (Active)   Site Assessment Clean, dry, & intact 6/20/2020  9:30 AM   Phlebitis Assessment 0 6/20/2020  9:30 AM   Infiltration Assessment 0 6/20/2020  9:30 AM   Dressing Status Clean, dry, & intact 6/20/2020  9:30 AM   Dressing Type Transparent;Tape 6/20/2020  9:30 AM   Hub Color/Line Status Patent 6/20/2020  9:30 AM   Action Taken Open ports on tubing capped 6/20/2020  9:30 AM   Alcohol Cap Used Yes 6/20/2020  9:30 AM     Arterial Line:     Hemodialysis Catheter:  Hemodialysis Access 01/10/20 (Active)   Central Line Being Utilized Yes 6/20/2020 12:00 AM   Criteria for Appropriate Use Hemodynamically unstable, requiring monitoring lines, vasopressors, or volume resuscitation 6/19/2020  8:00 PM   Date Accessed  06/17/20 6/17/2020  8:00 PM   Site Assessment Clean, dry, & intact 6/20/2020 12:00 AM   Date of Last Dressing Change 06/15/20 2020  8:00 PM   Dressing Status Clean, dry, & intact 2020  8:00 PM   Dressing Type Tape;Transparent 2020  8:00 PM   Proximal Hub Color/Line Status Capped 2020  8:00 PM   Distal Hub Color/Line Status Capped 2020  8:00 PM     Drain(s):     Airway:       Objective:  Vital Signs:    Visit Vitals  /56 (BP 1 Location: Left arm, BP Patient Position: At rest)   Pulse 61   Temp 98.5 °F (36.9 °C)   Resp 24   Ht 5' 5\" (1.651 m)   Wt 98 kg (216 lb)   SpO2 97%   Breastfeeding No   BMI 35.94 kg/m²      O2 Device: Heated, Hi flow nasal cannula  O2 Flow Rate (L/min): 30 l/min  Temp (24hrs), Av.4 °F (36.9 °C), Min:98.2 °F (36.8 °C), Max:98.6 °F (37 °C)      Intake/Output:   Last shift:      No intake/output data recorded. Last 3 shifts:  0701 -  1900  In: 360 [P.O.:240; I.V.:120]  Out: 3110       Intake/Output Summary (Last 24 hours) at 2020  Last data filed at 2020 9262  Gross per 24 hour   Intake 360 ml   Output 0 ml   Net 360 ml       Last 3 Recorded Weights in this Encounter    20 0730 20 0600 20 1225   Weight: 101.7 kg (224 lb 3.3 oz) 98 kg (216 lb 0.8 oz) 98 kg (216 lb)       Ventilator Settings:  Mode Rate Tidal Volume Pressure FiO2 PEEP  Assist control, VC+   400 ml  10 cm H2O 40 % 10 cm H20    Peak airway pressure: 22 cm H2O   Plateau pressure:    Tidal volume:   Minute ventilation: 13.6 l/min  SPO2     ARDS network Guidelines: Lung protective strategy and Plateau pressure goals less than or equal to 30    Physical Exam:     General/Neurology: Alert, Awake, Follows commands  Head:   Normocephalic, without obvious abnormality  Eye:   PERRL, EOM intact, no scleral icterus, no pallor  Oral:   Mucus membranes moist, on high flow  Neck:   Supple  Lung:   B/l air movement. No wheezing, crackles improved. Heart:   Regular rate & rhythm. S1 S2 present.    Abdomen/: Soft, non tender, BS +nt  Extremities:  Edema improving  Skin:   Dry, intact    Data:      Recent Results (from the past 24 hour(s))   GLUCOSE, POC    Collection Time: 06/20/20 12:02 AM   Result Value Ref Range    Glucose (POC) 194 (H) 70 - 110 mg/dL   MAGNESIUM    Collection Time: 06/20/20  2:39 AM   Result Value Ref Range    Magnesium 2.1 1.6 - 2.6 mg/dL   PHOSPHORUS    Collection Time: 06/20/20  2:39 AM   Result Value Ref Range    Phosphorus 1.6 (L) 2.5 - 4.9 MG/DL   CBC WITH AUTOMATED DIFF    Collection Time: 06/20/20  2:39 AM   Result Value Ref Range    WBC 14.3 (H) 4.6 - 13.2 K/uL    RBC 3.52 (L) 4.20 - 5.30 M/uL    HGB 9.7 (L) 12.0 - 16.0 g/dL    HCT 30.3 (L) 35.0 - 45.0 %    MCV 86.1 74.0 - 97.0 FL    MCH 27.6 24.0 - 34.0 PG    MCHC 32.0 31.0 - 37.0 g/dL    RDW 14.2 11.6 - 14.5 %    PLATELET 011 213 - 849 K/uL    MPV 11.2 9.2 - 11.8 FL    NEUTROPHILS 81 (H) 40 - 73 %    LYMPHOCYTES 9 (L) 21 - 52 %    MONOCYTES 10 3 - 10 %    EOSINOPHILS 0 0 - 5 %    BASOPHILS 0 0 - 2 %    ABS. NEUTROPHILS 11.6 (H) 1.8 - 8.0 K/UL    ABS. LYMPHOCYTES 1.3 0.9 - 3.6 K/UL    ABS. MONOCYTES 1.4 (H) 0.05 - 1.2 K/UL    ABS. EOSINOPHILS 0.1 0.0 - 0.4 K/UL    ABS.  BASOPHILS 0.0 0.0 - 0.1 K/UL    DF AUTOMATED     METABOLIC PANEL, BASIC    Collection Time: 06/20/20  2:39 AM   Result Value Ref Range    Sodium 138 136 - 145 mmol/L    Potassium 3.4 (L) 3.5 - 5.5 mmol/L    Chloride 103 100 - 111 mmol/L    CO2 28 21 - 32 mmol/L    Anion gap 7 3.0 - 18 mmol/L    Glucose 136 (H) 74 - 99 mg/dL    BUN 19 (H) 7.0 - 18 MG/DL    Creatinine 2.19 (H) 0.6 - 1.3 MG/DL    BUN/Creatinine ratio 9 (L) 12 - 20      GFR est AA 27 (L) >60 ml/min/1.73m2    GFR est non-AA 22 (L) >60 ml/min/1.73m2    Calcium 8.8 8.5 - 10.1 MG/DL   GLUCOSE, POC    Collection Time: 06/20/20  5:56 AM   Result Value Ref Range    Glucose (POC) 153 (H) 70 - 110 mg/dL   GLUCOSE, POC    Collection Time: 06/20/20 12:10 PM   Result Value Ref Range    Glucose (POC) 132 (H) 70 - 110 mg/dL   GLUCOSE, POC    Collection Time: 06/20/20 6:17 PM   Result Value Ref Range    Glucose (POC) 244 (H) 70 - 110 mg/dL         Chemistry   Recent Labs     06/20/20  0239 06/19/20  0327 06/18/20  0209   * 136* 162*    138 141   K 3.4* 3.1* 3.9    101 103   CO2 28 30 28   BUN 19* 16 14   CREA 2.19* 2.31* 2.40*   CA 8.8 9.4 9.1   MG 2.1 1.9 2.2   PHOS 1.6* 0.8* 2.6   AGAP 7 7 10   BUCR 9* 7* 6*       CBC w/Diff   Recent Labs     06/20/20  0239 06/19/20 0327 06/18/20  0209   WBC 14.3* 15.0* 15.4*   RBC 3.52* 3.77* 3.55*   HGB 9.7* 10.5* 9.8*   HCT 30.3* 32.6* 31.7*    236 226   GRANS 81* 87* 91*   LYMPH 9* 5* 3*   EOS 0 0 0       ABG  No results for input(s): PHI, PHI, POC2, PCO2I, PO2, PO2I, HCO3, HCO3I, FIO2, FIO2I in the last 72 hours. Micro   No results for input(s): SDES, CULT in the last 72 hours. No results for input(s): CULT in the last 72 hours. CT (Most Recent)    Results from Hospital Encounter encounter on 06/07/20   CTA CHEST W OR W WO CONT    Narrative CTA OF THE CHEST, WITH CORONAL AND SAGITTAL REFORMATTED MIP IMAGES, PULMONARY  EMBOLISM PROTOCOL    CPT CODE: 39798    INDICATION: Above. Acute on chronic respiratory failure requiring mechanical  ventilation. Recent pneumonia and fluid overload. Aspiration of foreign body. Acute respiratory failure with hypoxia and hypercapnia. Clinical concern for  pulmonary embolism. COMPARISON: No prior pulmonary CTA in PACS. Reference noncontrast enhanced chest  CT 6/11/2020, prior standard protocol contrast-enhanced chest CT 10/6/2007. TECHNIQUE: With IV administration of 100 ml Isovue-370, axial CT images through  the thorax were obtained using helical technique following a pulmonary embolism  protocol. In order more optimally to evaluate the pulmonary arterial tree in  multiplanar CT angiographic fashion, coronal and sagittal reformation maximum  intensity projection (MIP) images were also performed.     All CT scans at this facility are performed using dose optimization technique as  appropriate to the performed exam, to include automated exposure control,  adjustment of the mA and/or kV according to patient's size (Including  appropriate matching for site-specific examinations), or use of iterative  reconstruction technique. FINDINGS:    Mildly suboptimal contrast bolus. There is near iso enhancementisoenhancement of  the pulmonary veins, thoracic aorta, and pulmonary arteries. Image quality is  degraded by body habitus, hypoinflation, and arms at sides, which result in  streak/mottle/noise artifact, in addition to bibasilar consolidations. There is  no convincing evidence of pulmonary arterial filling defect in the main,  central, lobar, or larger enhanced segmental branches of the pulmonary arterial  tree indicative of acute pulmonary embolism. Assessment becomes limited  beginning at the segmental level. Endotracheal tube is present with tip cephalad to the garth. Moderate sized  bilateral pleural effusions, similar compared to prior. Bilateral adjacent  consolidations, slightly increased compared to the prior chest CT, likely  atelectasis, underlying airspace disease not excluded. Additional hazy bilateral airspace disease most conspicuous in the upper lobes. Additional multinodular airspace disease in the left upper lobe posteriorly. Couple of additional tiny chronic right-sided pulmonary nodules are again noted. No evidence of pneumothorax. There is some mosaic attenuation with geographic  variation in lung density best appreciated in the left upper lobe, similar to  prior, may reflect a component of air-trapping such as can be seen due to small  airways disease. Very small pericardial effusion. Multichamber cardiac enlargement. Atherosclerotic calcifications noted including in the coronary arteries. The  main pulmonary artery appears prominent in caliber, approximately 4.1 cm, which  can be seen with pulmonary hypertension.  The thoracic aorta enhances normally. Usual limitation of cardiac motion artifacts noted. Esophagus appears mildly patulous. No evidence of pathologic lymph node enlargement is seen. On review of bone windows, no acute fractures or destructive bone lesions are  seen. Cervical spine fusion hardware noted. Impression Impression:      Image quality is degraded by body habitus, hypoinflation, arms at sides, and  mildly suboptimal contrast bolus, as noted above. No evidence of acute pulmonary  embolism in the main, central, lobar, or larger enhanced segmental branches of  the pulmonary arterial tree. Assessment becomes limited beginning at the  segmental level. Moderate sized bilateral pleural effusions. Adjacent bibasilar consolidations,  slightly larger compared to prior, likely atelectasis, underlying airspace  disease not excluded. Hazy lung opacity bilaterally. Multinodular airspace disease in the left upper  lobe. Cardiomegaly. Additional nonacute findings as above. XR (Most Recent). CXR reviewed by me and compared with previous CXR   Results from Hospital Encounter encounter on 06/07/20   XR CHEST PORT    Narrative EXAM: AP portable chest.    Indications: Fluid overload    Time stamp: 1117 hours  Comparison: Recent prior    Findings: Moderate rotation to the left  Lines/Tubes/Devices:  Right IJ dialysis catheter remains unremarkable  LUNGS: Moderate hypoinflation. Allowing for differences in rotation, there is  some worsening of aeration. Increased extent of ill-defined opacities of the  perihilar areas. Persistence of left basilar opacification. Still with some  vascular indistinctness. No visible pneumothorax. MEDIASTINUM: Limited evaluation given rotation and adjacent parenchymal  findings. BONES/SOFT TISSUES: No acute findings      Impression IMPRESSION[de-identified]    1.  Worsened aeration, presumed alveolar edema. Preexistent atelectasis at least  at the left base.      Recommend upright PA and lateral chest x-ray when clinically feasible. High complexity decision making was performed during the evaluation of this patient at high risk for decompensation with multiple organ involvement     Above mentioned total time spent on reviewing the case/medical record/data/notes/EMR/patient examination/documentation/coordinating care with nurse/consultants, exclusive of procedures with complex decision making performed and > 50% time spent in face to face evaluation.     Edna Kimbrough MD  Critical Care Medicine

## 2020-06-21 NOTE — PROGRESS NOTES
PCCM Progress Note  Consulting: Bossman  Reason:  Acute hypoxic resp failure                                              I have reviewed the flowsheet and previous days notes. Events, vitals, medications and notes from last 24 hours reviewed. Care plan discussed with staff and on multidisciplinary rounds. Subjective:  6/21/2020   Pt seen and examined at bedside this afternoon. No acute events overnight. Pt remains on high flow nasal cannula, being weaned to an FiO2 of 40% with 30 L of flow      Int Hx:  Clara Escalona is a 71 y.o. female w/ PMH of uterine cancer, CHF, ESRD, depression, deconditioning, DMII, CAD, HTN, HLD who presented to [facility] via EMS from [facility] on 6/8 for acute respiratory distress with hypoxia requiring BVM. She had acute on chronic CHF and was started on IV lasix and bipap and admitted to the floor. There was concern for pneumonia and she was also started on abx, her covid test was (-), likely due to aspiration. 1/2 BC was (+) for staph which was felt to be a contaminant. On 6/11 a RRT was called for worsening resp failure, during intubation a denture was found obstructing her airway which was removed. Proceeded with intubation due to profound resp acidosis. A bronchoscopy was completed on following to r/o any other aspiration of FB. She was slow to wean from vent due hypoxia. On 6/16 she was extubated. She required bipap and high levels of HFNC following extubation for a few day but was able to wean solely to HFNC during the day with bipap at night. She has transitioned to a PO diet and is doing well.     Patient Active Problem List   Diagnosis Code    Acute on chronic diastolic congestive heart failure (HCC) I50.33    Suspected COVID-19 virus infection Z20.828    Type 2 diabetes mellitus without complication, without long-term current use of insulin (HCC) E11.9    Left anterior fascicular block I44.4    HTN (hypertension), benign I10    Coronary artery disease involving native coronary artery of native heart without angina pectoris I25.10    Chronic diastolic congestive heart failure ([facility]) I50.32    Bilateral lower extremity edema R60.0    BMI 35.0-35.9,adult Z68.35    CHF (congestive heart failure) ([facility]) I50.9    Sepsis ([facility]) A41.9    Respiratory failure ([facility]) J96.90    SIRS (systemic inflammatory response syndrome) ([facility]) R65.10    Acute on chronic respiratory failure ([facility]) J96.20    Pneumonia J18.9    Fluid overload E87.70    ESRD (end stage renal disease) ([facility]) N18.6    Debility R53.81    Respiratory failure with hypoxia and hypercapnia ([facility]) J96.91, J96.92    Depression F32.9    Aspiration of foreign body T17.900A       Assessment:  · Acute on Chronic Hypoxic Respiratory Failure - Multifactorial. Required emergent intubation on 06/11/20, 2/2 aspiration event on the floor (aspiration of partial dentures, removed with forceps) in addition to PNA and CHF Exacerbation/fluid overload on CXR. Extubated to NC on 06/15 with intermittent BiPAP use  · Aspiration of foreign body (partial dentures) into the hypopharynxretrieved with forceps. S/p bronc 6/11/2020some mucous plugs, small caliber airways noted but no foreign material.  CTA chest (06/13) (-) for PE and (-) retained FB. Probable additional episode of aspiration on 6/16 after extubation, prompted brief use of BiPAP. · RUL infiltrate/PNA  CXR (6/7/20), scant yeast on Resp Cx. · Acute on chronic HFpEF - Most recent Echo ( 04/25/20) EF 55-60%. · Fluid overload- severe edema  · Bacteremia vs contaminant - BxCx (06/07) (+)GPC in 1/2 bottles, repeat BxCx - NGTD  · ESRD - HD MWF  · Acute on Chronic Anemia - s/p 2u PRBCs this admission    Impression/Plan:  · Hypoxia continues to improve s/p fluid removal with HD -- advise serial dialysis to maintain a net negative fluid balance. Will defer to nephrology and primary service  Supplemental oxygen to maintain SpO2 >88% -- continue HFNC.   May use Bipap QHS and PRN  Please assess for home oxygen need prior to discharge  ABx for aspiration PNA per ID -- pt has received extensive course so far  Aggressive pulmonary toileting/bronchial hygiene  Frequent incentive spirometry  Strict aspiration precautions including elevating HOB >30deg  PT/OT, OOB, ambulate with assistance as tolerated  DVT ppx per primary service  Will follow        Medication Reviewed: Allergies   Allergen Reactions    Augmentin [Amoxicillin-Pot Clavulanate] Diarrhea    Clavulanic Acid Diarrhea    Levaquin [Levofloxacin] Other (comments)     Severe hypoglycemia        Past Medical History:   Diagnosis Date    Arthritis     [facility] ([facility] [address]     CHF (congestive heart failure) (HCC)     Diabetes ([facility] [address]     Fracture of neck (HCC)     GERD (gastroesophageal reflux disease)     Goiter     Hiatal hernia     Hypertension     Uterine cancer (HCC)       Past Surgical History:   Procedure Laterality Date    HX APPENDECTOMY      HX HYSTERECTOMY      HX KNEE REPLACEMENT Right     HX ORTHOPAEDIC      odontoid fracture repair with hardware placement.  HX PARTIAL THYROIDECTOMY      [place] INSJ TUNNELED CVC W/O SUBQ PORT/ AGE 5 YR/> Right 5/7/2020    INSERTION TUNNELED CENTRAL VENOUS CATHETER performed by Shoaib Dobson MD at [facility] CATH LAB      Social History     Tobacco Use    Smoking status: Never Smoker   Substance Use Topics    Alcohol use: No      History reviewed. No pertinent family history. Prior to Admission medications    Medication Sig Start Date End Date Taking? Authorizing Provider   venlafaxine-SR [facility] 37.5 mg capsule Take 50 mg by mouth once. Yes Provider, Historical   gabapentin (NEURONTIN) 100 mg capsule Take 1 Cap by mouth two (2) times a day. Max Daily Amount: 200 mg. 5/13/20  Yes Garrett Hashimoto, MD   carvediloL (COREG) 12.5 mg tablet Take 1 Tab by mouth two (2) times daily (with meals).  5/13/20  Yes Garrett Hashimoto, MD   docusate sodium (COLACE) 100 mg capsule Take 1 Cap by mouth two (2) times daily as needed for Constipation for up to 90 days. 5/13/20 8/11/20 Yes Nany Santoyo MD   OXYGEN-AIR DELIVERY SYSTEMS Take 2 L by inhalation daily. Yes Provider, Historical   amLODIPine (NORVASC) 10 mg tablet Take 1 Tab by mouth daily. 4/28/20  Yes Oniel Rothman MD   polyethylene glycol (MIRALAX) 17 gram packet Take 1 Packet by mouth daily. 4/28/20  Yes Oniel Rothman MD   rosuvastatin (CRESTOR) 5 mg tablet Take 1 Tab by mouth nightly. 4/28/20  Yes Oniel Rothman MD   hydrALAZINE (APRESOLINE) 100 mg tablet Take 1 Tab by mouth three (3) times daily. 4/28/20  Yes Oniel Rothman MD   aspirin 81 mg chewable tablet Take 1 Tab by mouth daily. 4/28/20  Yes Oniel Rothman MD   esomeprazole (NEXIUM) 40 mg capsule Take 40 mg by mouth daily. Yes Other, MD Larry   cranberry extract (AZO CRANBERRY) 450 mg tab Take 2 Tabs by mouth daily. Yes Isabel, MD Larry   cholecalciferol (VITAMIN D3) 1,000 unit cap Take 5,000 Units by mouth daily. Yes Isabel, MD Larry   cyanocobalamin (VITAMIN B-12) 1,000 mcg tablet Take 1,000 mcg by mouth two (2) times a day. Yes Isabel, MD Larry   Biotin 2,500 mcg cap Take 1 Tab by mouth two (2) times a day. Yes Isabel, MD Larry   vitamin a-vitamin c-vit e-min (OCUVITE) tablet Take 1 Tab by mouth daily. Yes Isabel, MD Larry   loratadine (CLARITIN) 10 mg tablet Take 10 mg by mouth daily. Yes Isabel, MD Larry   B.infantis-B.ani-B.long-B.bifi (PROBIOTIC 4X) 10-15 mg TbEC Take 1 Tab by mouth daily. Yes Isabel, MD Larry   mirtazapine (REMERON) 15 mg tablet Take 1 Tab by mouth nightly. 5/13/20   Nany Santoyo MD   isosorbide mononitrate ER (IMDUR) 30 mg tablet Take 1 Tab by mouth daily.  4/28/20   Oniel Rothman MD     Current Facility-Administered Medications   Medication Dose Route Frequency    albumin human 25% (BUMINATE) solution 25 g  25 g IntraVENous Q6H    venlafaxine-SR (EFFEXOR-XR) capsule 37.5 mg  37.5 mg Oral DAILY WITH BREAKFAST    mirtazapine (REMERON) tablet 7.5 mg  7.5 mg Oral QHS    pantoprazole (PROTONIX) tablet 40 mg  40 mg Oral ACB    carvediloL (COREG) tablet 25 mg  25 mg Oral BID WITH MEALS    albuterol-ipratropium (DUO-NEB) 2.5 MG-0.5 MG/3 ML  3 mL Nebulization Q6H RT    lidocaine 4 % patch 1 Patch  1 Patch TransDERmal Q24H    epoetin johnathon-epbx (RETACRIT) injection 10,000 Units  10,000 Units SubCUTAneous Q MON, WED & FRI    sodium chloride (NS) flush 5-40 mL  5-40 mL IntraVENous Q8H    insulin lispro (HUMALOG) injection   SubCUTAneous Q6H    furosemide (LASIX) injection 80 mg  80 mg IntraVENous BID    cyanocobalamin tablet 1,000 mcg  1,000 mcg Oral BID    loratadine (CLARITIN) tablet 10 mg  10 mg Oral DAILY    aspirin chewable tablet 81 mg  81 mg Oral DAILY    [Held by provider] isosorbide mononitrate ER (IMDUR) tablet 30 mg  30 mg Oral DAILY    rosuvastatin (CRESTOR) tablet 5 mg  5 mg Oral QHS    [Held by provider] gabapentin (NEURONTIN) capsule 100 mg  100 mg Oral BID    ferrous sulfate tablet 325 mg  325 mg Oral EVERY OTHER DAY    heparin (porcine) injection 5,000 Units  5,000 Units SubCUTAneous Q8H     A comprehensive ROS was obtained as stated in HPI, all others are negative       Peripheral Intravenous Line:  Peripheral IV 06/11/20 Left Forearm (Active)   Site Assessment Clean, dry, & intact 6/20/2020  9:30 AM   Phlebitis Assessment 0 6/20/2020  9:30 AM   Infiltration Assessment 0 6/20/2020  9:30 AM   Dressing Status Clean, dry, & intact 6/20/2020  9:30 AM   Dressing Type Tape;Transparent 6/20/2020  9:30 AM   Hub Color/Line Status Pink 6/20/2020  9:30 AM   Action Taken Open ports on tubing capped 6/20/2020  9:30 AM   Alcohol Cap Used Yes 6/20/2020  9:30 AM       Peripheral IV 06/13/20 Right; Outer Antecubital (Active)   Site Assessment Clean, dry, & intact 6/20/2020  9:30 AM   Phlebitis Assessment 0 6/20/2020  9:30 AM   Infiltration Assessment 0 6/20/2020  9:30 AM Dressing Status Clean, dry, & intact 2020  9:30 AM   Dressing Type Transparent;Tape 2020  9:30 AM   Hub Color/Line Status Patent 2020  9:30 AM   Action Taken Open ports on tubing capped 2020  9:30 AM   Alcohol Cap Used Yes 2020  9:30 AM     Arterial Line:     Hemodialysis Catheter:  Hemodialysis Access 01/10/20 (Active)   Central Line Being Utilized Yes 2020 12:00 AM   Criteria for Appropriate Use Hemodynamically unstable, requiring monitoring lines, vasopressors, or volume resuscitation 2020  8:00 PM   Date Accessed  20  8:00 PM   Site Assessment Clean, dry, & intact 2020 12:00 AM   Date of Last Dressing Change 06/15/20 2020  8:00 PM   Dressing Status Clean, dry, & intact 2020  8:00 PM   Dressing Type Tape;Transparent 2020  8:00 PM   Proximal Hub Color/Line Status Capped 2020  8:00 PM   Distal Hub Color/Line Status Capped 2020  8:00 PM     Drain(s):     Airway:       Objective:  Vital Signs:    Visit Vitals  /66   Pulse 64   Temp 98 °F (36.7 °C)   Resp 20   Ht 5' 5\" (1.651 m)   Wt 97.1 kg (214 lb)   SpO2 99%   Breastfeeding No   BMI 35.61 kg/m²      O2 Device: BIPAP, Heated, Hi flow nasal cannula(Vaportherm)  O2 Flow Rate (L/min): 30 l/min  Temp (24hrs), Av °F (36.7 °C), Min:96.8 °F (36 °C), Max:98.6 °F (37 °C)      Intake/Output:   Last shift:      No intake/output data recorded. Last 3 shifts:  0701 -  1900  In: 690 [P.O.:240;  I.V.:450]  Out: -       Intake/Output Summary (Last 24 hours) at 2020 1950  Last data filed at 2020 1625  Gross per 24 hour   Intake 690 ml   Output    Net 690 ml       Last 3 Recorded Weights in this Encounter    20 0600 20 1225 20 0556   Weight: 98 kg (216 lb 0.8 oz) 98 kg (216 lb) 97.1 kg (214 lb)       Physical Exam:   Vitals reviewed:  Gen[de-identified] Alert, Awake, comfortable, laying in bed inclined, wearing high flow nasal cannula, cooperative  Head: NC/AT  Eye:   PERRL, EOM intact, no scleral icterus, no pallor  Nose:   Nasal septum midline, no drainage, no epistaxis, and wearing high flow cannula  Oral:   Poor dentition, no oral lesions, mucous membranes moist, Mallamapti IV  Neck:   Supple, trachea midline, no cervical or supraclavicular adenopathy  Lung:   Decreased breath sounds in bilateral bases, poor effort, fine rales in bilateral bases, otherwise clear to auscultation, no wheezes or rhonchi  Heart:   Regular rate & rhythm. S1 S2 present. No murmurs/rubs/gallops  Abdomen/: Soft, non tender, BS +nt  Extremities:  Edema improving, no clubbing or cyanosis  Skin:   Dry, intact  Neuro:   Patient alert, moving extremities, sensation intact grossly    Data:      Recent Results (from the past 24 hour(s))   GLUCOSE, POC    Collection Time: 06/21/20  1:03 AM   Result Value Ref Range    Glucose (POC) 191 (H) 70 - 110 mg/dL   MAGNESIUM    Collection Time: 06/21/20  3:17 AM   Result Value Ref Range    Magnesium 2.0 1.6 - 2.6 mg/dL   PHOSPHORUS    Collection Time: 06/21/20  3:17 AM   Result Value Ref Range    Phosphorus 2.4 (L) 2.5 - 4.9 MG/DL   CBC WITH AUTOMATED DIFF    Collection Time: 06/21/20  3:17 AM   Result Value Ref Range    WBC 11.0 4.6 - 13.2 K/uL    RBC 3.22 (L) 4.20 - 5.30 M/uL    HGB 8.9 (L) 12.0 - 16.0 g/dL    HCT 28.3 (L) 35.0 - 45.0 %    MCV 87.9 74.0 - 97.0 FL    MCH 27.6 24.0 - 34.0 PG    MCHC 31.4 31.0 - 37.0 g/dL    RDW 14.8 (H) 11.6 - 14.5 %    PLATELET 426 540 - 057 K/uL    MPV 11.4 9.2 - 11.8 FL    NEUTROPHILS 80 (H) 40 - 73 %    LYMPHOCYTES 11 (L) 21 - 52 %    MONOCYTES 9 3 - 10 %    EOSINOPHILS 0 0 - 5 %    BASOPHILS 0 0 - 2 %    ABS. NEUTROPHILS 8.7 (H) 1.8 - 8.0 K/UL    ABS. LYMPHOCYTES 1.2 0.9 - 3.6 K/UL    ABS. MONOCYTES 1.0 0.05 - 1.2 K/UL    ABS. EOSINOPHILS 0.0 0.0 - 0.4 K/UL    ABS.  BASOPHILS 0.0 0.0 - 0.1 K/UL    DF AUTOMATED     METABOLIC PANEL, BASIC    Collection Time: 06/21/20  3:17 AM   Result Value Ref Range    Sodium 140 136 - 145 mmol/L    Potassium 3.6 3.5 - 5.5 mmol/L    Chloride 103 100 - 111 mmol/L    CO2 27 21 - 32 mmol/L    Anion gap 10 3.0 - 18 mmol/L    Glucose 180 (H) 74 - 99 mg/dL    BUN 42 (H) 7.0 - 18 MG/DL    Creatinine 3.71 (H) 0.6 - 1.3 MG/DL    BUN/Creatinine ratio 11 (L) 12 - 20      GFR est AA 15 (L) >60 ml/min/1.73m2    GFR est non-AA 12 (L) >60 ml/min/1.73m2    Calcium 8.5 8.5 - 10.1 MG/DL   GLUCOSE, POC    Collection Time: 06/21/20  5:53 AM   Result Value Ref Range    Glucose (POC) 166 (H) 70 - 110 mg/dL   GLUCOSE, POC    Collection Time: 06/21/20 11:14 AM   Result Value Ref Range    Glucose (POC) 160 (H) 70 - 110 mg/dL   PROCALCITONIN    Collection Time: 06/21/20 12:41 PM   Result Value Ref Range    Procalcitonin 0.41 ng/mL   HEPATIC FUNCTION PANEL    Collection Time: 06/21/20 12:41 PM   Result Value Ref Range    Protein, total 6.0 (L) 6.4 - 8.2 g/dL    Albumin 2.7 (L) 3.4 - 5.0 g/dL    Globulin 3.3 2.0 - 4.0 g/dL    A-G Ratio 0.8 0.8 - 1.7      Bilirubin, total 0.4 0.2 - 1.0 MG/DL    Bilirubin, direct 0.1 0.0 - 0.2 MG/DL    Alk.  phosphatase 69 45 - 117 U/L    AST (SGOT) 25 10 - 38 U/L    ALT (SGPT) 21 13 - 56 U/L   GLUCOSE, POC    Collection Time: 06/21/20  5:48 PM   Result Value Ref Range    Glucose (POC) 171 (H) 70 - 110 mg/dL         Chemistry   Recent Labs     06/21/20  1241 06/21/20  0317 06/20/20  0239 06/19/20  0327   GLU  --  180* 136* 136*   NA  --  140 138 138   K  --  3.6 3.4* 3.1*   CL  --  103 103 101   CO2  --  27 28 30   BUN  --  42* 19* 16   CREA  --  3.71* 2.19* 2.31*   CA  --  8.5 8.8 9.4   MG  --  2.0 2.1 1.9   PHOS  --  2.4* 1.6* 0.8*   AGAP  --  10 7 7   BUCR  --  11* 9* 7*   AP 69  --   --   --    TP 6.0*  --   --   --    ALB 2.7*  --   --   --    GLOB 3.3  --   --   --    AGRAT 0.8  --   --   --        CBC w/Diff   Recent Labs     06/21/20  0317 06/20/20  0239 06/19/20  0327   WBC 11.0 14.3* 15.0*   RBC 3.22* 3.52* 3.77*   HGB 8.9* 9.7* 10.5*   HCT 28.3* 30.3* 32.6*    255 236 GRANS 80* 81* 87*   LYMPH 11* 9* 5*   EOS 0 0 0       ABG  No results for input(s): PHI, PHI, POC2, PCO2I, PO2, PO2I, HCO3, HCO3I, FIO2, FIO2I in the last 72 hours. Micro   No results for input(s): SDES, CULT in the last 72 hours. No results for input(s): CULT in the last 72 hours. CT (Most Recent)    Results from Hospital Encounter encounter on 06/07/20   CTA CHEST W OR W WO CONT    Narrative CTA OF THE CHEST, WITH CORONAL AND SAGITTAL REFORMATTED MIP IMAGES, PULMONARY  EMBOLISM PROTOCOL    CPT CODE: 68636    INDICATION: Above. Acute on chronic respiratory failure requiring mechanical  ventilation. Recent pneumonia and fluid overload. Aspiration of foreign body. Acute respiratory failure with hypoxia and hypercapnia. Clinical concern for  pulmonary embolism. COMPARISON: No prior pulmonary CTA in PACS. Reference noncontrast enhanced chest  CT 6/11/2020, prior standard protocol contrast-enhanced chest CT 10/6/2007. TECHNIQUE: With IV administration of 100 ml [id number], axial CT images through  the thorax were obtained using helical technique following a pulmonary embolism  protocol. In order more optimally to evaluate the pulmonary arterial tree in  multiplanar CT angiographic fashion, coronal and sagittal reformation maximum  intensity projection (MIP) images were also performed. All CT scans at this facility are performed using dose optimization technique as  appropriate to the performed exam, to include automated exposure control,  adjustment of the mA and/or kV according to patient's size (Including  appropriate matching for site-specific examinations), or use of iterative  reconstruction technique. FINDINGS:    Mildly suboptimal contrast bolus. There is near iso enhancementisoenhancement of  the pulmonary veins, thoracic aorta, and pulmonary arteries.  Image quality is  degraded by body habitus, hypoinflation, and arms at sides, which result in  streak/mottle/noise artifact, in addition to bibasilar consolidations. There is  no convincing evidence of pulmonary arterial filling defect in the main,  central, lobar, or larger enhanced segmental branches of the pulmonary arterial  tree indicative of acute pulmonary embolism. Assessment becomes limited  beginning at the segmental level. Endotracheal tube is present with tip cephalad to the garth. Moderate sized  bilateral pleural effusions, similar compared to prior. Bilateral adjacent  consolidations, slightly increased compared to the prior chest CT, likely  atelectasis, underlying airspace disease not excluded. Additional hazy bilateral airspace disease most conspicuous in the upper lobes. Additional multinodular airspace disease in the left upper lobe posteriorly. Couple of additional tiny chronic right-sided pulmonary nodules are again noted. No evidence of pneumothorax. There is some mosaic attenuation with geographic  variation in lung density best appreciated in the left upper lobe, similar to  prior, may reflect a component of air-trapping such as can be seen due to small  airways disease. Very small pericardial effusion. Multichamber cardiac enlargement. Atherosclerotic calcifications noted including in the coronary arteries. The  main pulmonary artery appears prominent in caliber, approximately 4.1 cm, which  can be seen with pulmonary hypertension. The thoracic aorta enhances normally. Usual limitation of cardiac motion artifacts noted. Esophagus appears mildly patulous. No evidence of pathologic lymph node enlargement is seen. On review of bone windows, no acute fractures or destructive bone lesions are  seen. Cervical spine fusion hardware noted. Impression Impression:      Image quality is degraded by body habitus, hypoinflation, arms at sides, and  mildly suboptimal contrast bolus, as noted above.  No evidence of acute pulmonary  embolism in the main, central, lobar, or larger enhanced segmental branches of  the pulmonary arterial tree. Assessment becomes limited beginning at the  segmental level. Moderate sized bilateral pleural effusions. Adjacent bibasilar consolidations,  slightly larger compared to prior, likely atelectasis, underlying airspace  disease not excluded. Hazy lung opacity bilaterally. Multinodular airspace disease in the left upper  lobe. Cardiomegaly. Additional nonacute findings as above. XR (Most Recent). CXR reviewed by me and compared with previous CXR   Results from Hospital Encounter encounter on 06/07/20   XR CHEST SNGL V    Narrative EXAM: PORTABLE CHEST 0240 hours    CLINICAL HISTORY/INDICATION: respiratory failure , patient presented with acute  on chronic respiratory failure which should require mechanical ventilation,  episode of aspiration, patient is afebrile with normal white blood cell count         COMPARISON: Chest x-ray June 18 through June 14, 2020, May 6, 2020. TECHNIQUE: Single AP view    FINDINGS:     Right jugular double-lumen catheter terminates at the right atrium. Marked interval improvement in the bilateral multifocal infiltrates. Inability  to penetrate the enlarged left heart. Pulmonary vessels normal. Right  costophrenic angle is sharp. Stable cardiomegaly. .      Impression IMPRESSION:    Marked interval decrease in bilateral multifocal infiltrates. Inability to evaluate the left lung base as described above. Stable cardiomegaly         High complexity decision making was performed during the evaluation of this patient at high risk for decompensation with multiple organ involvement     Above mentioned total time spent on reviewing the case/medical record/data/notes/EMR/patient examination/documentation/coordinating care with nurse/consultants, exclusive of procedures with complex decision making performed and > 50% time spent in face to face evaluation.           Bob Trivedi MD/MPH     Pulmonary, [address] [facility]

## 2020-06-21 NOTE — PROGRESS NOTES
Renal Progress Note  Follow up of ESRD     Assessment/Plan:  1. ESRD. Continue dialysis mwf. 2. HTN. Stable, continue current regimen. 3. Anemia of ckd. Continue procrit. 4. Staph epi bacteriemia, likely contaminant per ID. Repeat bc ngtd. 5. Aspiration pneumonia, finished abx per ID. Subjective:  Patient complaints off: Feels better. Breathing is \"not too bad\". Out of the icu. No chest pain, nausea or vomiting. Appetite is better.        Patient Active Problem List   Diagnosis Code    Acute on chronic diastolic congestive heart failure ([facility]) I50.33    Suspected COVID-19 virus infection Z20.828    Type 2 diabetes mellitus without complication, without long-term current use of insulin ([facility]) E11.9    Left anterior fascicular block I44.4    HTN (hypertension), benign I10    Coronary artery disease involving native coronary artery of native heart without angina pectoris I25.10    Chronic diastolic congestive heart failure ([facility]) I50.32    Bilateral lower extremity edema R60.0    BMI 35.0-35.9,adult Z68.35    CHF (congestive heart failure) ([facility]) I50.9    Sepsis ([facility] [address] A41.9    Respiratory failure ([facility] [address] J96.90    SIRS (systemic inflammatory response syndrome) ([facility]) R65.10    Acute on chronic respiratory failure ([facility]) J96.20    Pneumonia J18.9    Fluid overload E87.70    ESRD (end stage renal disease) ([facility] N18.6    Debility R53.81    Respiratory failure with hypoxia and hypercapnia ([facility]) J96.91, J96.92    Depression F32.9    Aspiration of foreign body T17.900A       Current Facility-Administered Medications   Medication Dose Route Frequency Provider Last Rate Last Dose    polyethylene glycol (MIRALAX) packet 17 g  17 g Oral DAILY PRN Jorge L Keita MD        venlafaxine-SR [facility] capsule 37.5 mg  37.5 mg Oral DAILY WITH BREAKFAST Maricruz Santos PA-C   37.5 mg at 06/21/20 0925    mirtazapine (REMERON) tablet 7.5 mg  7.5 mg Oral QHS Tiffany Siddiqui MD   7.5 mg at 06/20/20 2215    pantoprazole (PROTONIX) tablet 40 mg  40 mg Oral ACB Scott Christie MD   40 mg at 06/21/20 0926    carvediloL (COREG) tablet 25 mg  25 mg Oral BID WITH MEALS Scott Christie MD   Stopped at 06/21/20 0926    albuterol-ipratropium (DUO-NEB) 2.5 MG-0.5 MG/3 ML  3 mL Nebulization Q6H RT Ping Mcgill MD   3 mL at 06/21/20 0849    albuterol-ipratropium (DUO-NEB) 2.5 MG-0.5 MG/3 ML  3 mL Nebulization Q4H PRN LIMA GordonC        acetaminophen (TYLENOL) tablet 325 mg  325 mg Oral Q4H PRN Mishel Copper A, NP   325 mg at 06/21/20 0059    sodium chloride 3% hypertonic nebulizer soln  4 mL Nebulization TID PRN John Yusuf DO        lidocaine 4 % patch 1 Patch  1 Patch TransDERmal Q24H LIMA GordonC   1 Patch at 06/21/20 4465    menthol-zinc oxide (CALMOSEPTINE) 0.44-20.6 % ointment   Topical Q6H PRN Juan Chávez PA-C        0.9% sodium chloride infusion 250 mL  250 mL IntraVENous PRN LIMA GordonC        epoetin johnathon-epbx (RETACRIT) injection 10,000 Units  10,000 Units SubCUTAneous Q MON, WED & Samantha Miranda MD   10,000 Units at 06/19/20 2054    sodium chloride (NS) flush 5-40 mL  5-40 mL IntraVENous Q8H Monica Lassiter DO   10 mL at 06/21/20 0557    sodium chloride (NS) flush 5-40 mL  5-40 mL IntraVENous PRN Rduy ALCALA, DO        insulin lispro (HUMALOG) injection   SubCUTAneous Q6H John Yusuf DO   2 Units at 06/21/20 0554    hydrALAZINE (APRESOLINE) 20 mg/mL injection 20 mg  20 mg IntraVENous Q6H PRN LIMA GordonC   20 mg at 06/19/20 0850    ondansetron (ZOFRAN) injection 4 mg  4 mg IntraVENous Q6H PRN Radha Connell MD   4 mg at 06/10/20 0030    glucose chewable tablet 16 g  4 Tab Oral PRN Marny Nine, DO        glucagon (GLUCAGEN) injection 1 mg  1 mg IntraMUSCular PRN Marny Nine, DO        dextrose (D50W) injection syrg 12.5-25 g  25-50 mL IntraVENous PRN Marny Nine, DO   25 g at 06/13/20 0041    furosemide (LASIX) injection 80 mg  80 mg IntraVENous BID Hadley Nuñez MD   80 mg at 06/21/20 3796    cyanocobalamin tablet 1,000 mcg  1,000 mcg Oral BID Brittney MORENO MD   1,000 mcg at 06/21/20 4846    loratadine (CLARITIN) tablet 10 mg  10 mg Oral DAILY Hadley Angulo MD   10 mg at 06/21/20 0866    aspirin chewable tablet 81 mg  81 mg Oral DAILY Hadley Angulo MD   81 mg at 06/21/20 0926    [Held by provider] isosorbide mononitrate ER (IMDUR) tablet 30 mg  30 mg Oral DAILY Hadley Angulo MD   Stopped at 06/11/20 0900    rosuvastatin (CRESTOR) tablet 5 mg  5 mg Oral QHS Hadley Angulo MD   5 mg at 06/20/20 2214    [Held by provider] gabapentin (NEURONTIN) capsule 100 mg  100 mg Oral BID Hadley Angulo MD   Stopped at 06/11/20 0900    ferrous sulfate tablet 325 mg  325 mg Oral EVERY OTHER DAY Hadley Angluo MD   325 mg at 06/20/20 1101    docusate sodium (COLACE) capsule 100 mg  100 mg Oral BID PRN Brittney MORENO MD        heparin (porcine) injection 5,000 Units  5,000 Units SubCUTAneous Q8H Hadley Angulo MD   5,000 Units at 06/21/20 0547             Objective:  Vitals:    06/21/20 0736 06/21/20 0850 06/21/20 0925 06/21/20 1112   BP: 145/63  128/72 152/74   Pulse: (!) 58  (!) 53 (!) 59   Resp: 20 19   Temp: 96.8 °F (36 °C)   98.1 °F (36.7 °C)   TempSrc:       SpO2: 97% 99%  93%   Weight:       Height:         . Intake/Output Summary (Last 24 hours) at 6/21/2020 1157  Last data filed at 6/21/2020 0556  Gross per 24 hour   Intake 450 ml   Output    Net 450 ml       Admission weight:Weight: 121.6 kg (268 lb) (06/08/20 1131)    Last Weight Metrics:  Weight Loss Metrics 6/21/2020 5/13/2020 4/30/2020 4/29/2020 4/26/2020 8/18/2016 6/2/2016   Today's Wt 214 lb 238 lb 5.1 oz - 252 lb 252 lb 4.8 oz 219 lb 228 lb   BMI 35.61 kg/m2 - 39.66 kg/m2 41.93 kg/m2 41.98 kg/m2 36.44 kg/m2 37.94 kg/m2            Physical Exam:     General: No acute distress.    Neck: no jvd.   LUNGS: diminished air entry at the bases, bl exp rhonchi. CVS EXM: S1, S2, no murmur, rub or gallop. Abdomen: Soft, Non Tender, non distended. Lower Extremities: trace Edema. Access: rt ij tdc.        Labs:    CBC w/Diff Recent Labs     06/21/20 0317 06/20/20 0239 06/19/20 0327   WBC 11.0 14.3* 15.0*   RBC 3.22* 3.52* 3.77*   HGB 8.9* 9.7* 10.5*   HCT 28.3* 30.3* 32.6*    255 236   GRANS 80* 81* 87*   LYMPH 11* 9* 5*   EOS 0 0 0        Chemistry Recent Labs     06/21/20 0317 06/20/20 0239 06/19/20 0327   * 136* 136*    138 138   K 3.6 3.4* 3.1*    103 101   CO2 27 28 30   BUN 42* 19* 16   CREA 3.71* 2.19* 2.31*   CA 8.5 8.8 9.4   AGAP 10 7 7   BUCR 11* 9* 7*   PHOS 2.4* 1.6* 0.8*          Lab Results   Component Value Date/Time    Iron 42 (L) 04/30/2020 12:41 PM    TIBC 319 04/30/2020 12:41 PM    Iron % saturation 13 (L) 04/30/2020 12:41 PM    Ferritin 411 (H) 06/10/2020 03:04 AM      Lab Results   Component Value Date/Time    Calcium 8.5 06/21/2020 03:17 AM    Phosphorus 2.4 (L) 06/21/2020 03:17 AM          Lisa Stiles M.D  [facility]  Office [phone]  Pager [phone]

## 2020-06-21 NOTE — PROGRESS NOTES
Infectious Disease progress Note        Reason: Gram-positive bloodstream infection, pneumonia    Current abx Prior abx   Piperacillin/tazobactam since 6/7   Vancomycin 6/7; 6/10-6/12     Assessment :  69 y.o. female  with multiple medical problems including diastolic CHF, diabetes, arthritis, acid reflux hiatal hernia, chronic hypoxic failure on home oxygen 2 L, hypertension, end-stage renal disease on dialysis Tuesday Thursday Saturday who presented to the emergency department via EMS on 6/8/20 with shortness of breath per report from [facility] and rehab. Patient presents with a highly complex clinical picture. Clinical presentation seems consistent with acute respiratory failure secondary to fluid overload, acute on chronic diastolic CHF. Single positive blood culture on 6/7 for coagulase-negative Staphylococcus likely contaminant. Negative blood culture 6/10    Acute on chronic respiratory failure requiring mechanical ventilation - intubated 6/11 due to aspiration of partial dentures. Removed. S/p bronchoscopy 6/11- findings noted. --extubated 6/15, concern for aspiration 6/16    Recommendations:  --Afebrile, no leukocytosis, improvement in exam and in CXR. Has completed prolonged course of abx & 3 days from last suspected aspiration event. D/c pip/tazo today. --Relayed information to pt. She denies questions today. Please call the service agan if any further questions or concerns. Will sign off today. HPI:  Pt reports that she is feeling ok today. Thinks her breathing is continuing to improve.   She denies n/v/ and she says she \"doesn't think\" she has diarrhea    Current Facility-Administered Medications   Medication Dose Route Frequency    polyethylene glycol (MIRALAX) packet 17 g  17 g Oral DAILY PRN    venlafaxine-SR (EFFEXOR-XR) capsule 37.5 mg  37.5 mg Oral DAILY WITH BREAKFAST    mirtazapine (REMERON) tablet 7.5 mg  7.5 mg Oral QHS    pantoprazole (PROTONIX) tablet 40 mg 40 mg Oral ACB    carvediloL (COREG) tablet 25 mg  25 mg Oral BID WITH MEALS    albuterol-ipratropium (DUO-NEB) 2.5 MG-0.5 MG/3 ML  3 mL Nebulization Q6H RT    albuterol-ipratropium (DUO-NEB) 2.5 MG-0.5 MG/3 ML  3 mL Nebulization Q4H PRN    Piperacillin-tazobactam (ZOSYN) 0.75 gm Supplemental Dosing by Pharmacy   Other Rx Dosing/Monitoring    piperacillin-tazobactam (ZOSYN) 2.25 g in 0.9% sodium chloride (MBP/ADV) 50 mL MBP  2.25 g IntraVENous Q8H    acetaminophen (TYLENOL) tablet 325 mg  325 mg Oral Q4H PRN    sodium chloride 3% hypertonic nebulizer soln  4 mL Nebulization TID PRN    lidocaine 4 % patch 1 Patch  1 Patch TransDERmal Q24H    menthol-zinc oxide (CALMOSEPTINE) 0.44-20.6 % ointment   Topical Q6H PRN    0.9% sodium chloride infusion 250 mL  250 mL IntraVENous PRN    epoetin johnathon-epbx (RETACRIT) injection 10,000 Units  10,000 Units SubCUTAneous Q MON, WED & FRI    sodium chloride (NS) flush 5-40 mL  5-40 mL IntraVENous Q8H    sodium chloride (NS) flush 5-40 mL  5-40 mL IntraVENous PRN    insulin lispro (HUMALOG) injection   SubCUTAneous Q6H    hydrALAZINE (APRESOLINE) 20 mg/mL injection 20 mg  20 mg IntraVENous Q6H PRN    ondansetron (ZOFRAN) injection 4 mg  4 mg IntraVENous Q6H PRN    glucose chewable tablet 16 g  4 Tab Oral PRN    glucagon (GLUCAGEN) injection 1 mg  1 mg IntraMUSCular PRN    dextrose (D50W) injection syrg 12.5-25 g  25-50 mL IntraVENous PRN    furosemide (LASIX) injection 80 mg  80 mg IntraVENous BID    cyanocobalamin tablet 1,000 mcg  1,000 mcg Oral BID    loratadine (CLARITIN) tablet 10 mg  10 mg Oral DAILY    aspirin chewable tablet 81 mg  81 mg Oral DAILY    [Held by provider] isosorbide mononitrate ER (IMDUR) tablet 30 mg  30 mg Oral DAILY    rosuvastatin (CRESTOR) tablet 5 mg  5 mg Oral QHS    [Held by provider] gabapentin (NEURONTIN) capsule 100 mg  100 mg Oral BID    ferrous sulfate tablet 325 mg  325 mg Oral EVERY OTHER DAY    docusate sodium (COLACE) capsule 100 mg  100 mg Oral BID PRN    heparin (porcine) injection 5,000 Units  5,000 Units SubCUTAneous Q8H       Allergies: Augmentin [amoxicillin-pot clavulanate]; Clavulanic acid; and Levaquin [levofloxacin]    Temp (24hrs), Av.2 °F (36.8 °C), Min:96.8 °F (36 °C), Max:98.6 °F (37 °C)    Visit Vitals  /63   Pulse (!) 58   Temp 96.8 °F (36 °C)   Resp 20   Ht 5' 5\" (1.651 m)   Wt 97.1 kg (214 lb)   SpO2 99%   Breastfeeding No   BMI 35.61 kg/m²       Physical Exam:    Constitutional: laying on bed, NAD, HFNC in place  HEENT: non icteric sclera, no oral lesions  Cardiovascular: Regular rhythm. Exam reveals no gallop and no friction rub. Pulmonary/Chest: CTAB anteriorly  Abdominal: Soft. Bowel sounds are normal. NT, ND  Musculoskeletal: exhibits no tenderness. Trace edema of LE  Neurological: awake, alert x 4, non focal, generally weak    Labs: Results:   Chemistry Recent Labs     20  0317 20  0239 20  0327   * 136* 136*    138 138   K 3.6 3.4* 3.1*    103 101   CO2 27 28 30   BUN 42* 19* 16   CREA 3.71* 2.19* 2.31*   CA 8.5 8.8 9.4   AGAP 10 7 7   BUCR 11* 9* 7*      CBC w/Diff Recent Labs     20  0317 20  0239 20  0327   WBC 11.0 14.3* 15.0*   RBC 3.22* 3.52* 3.77*   HGB 8.9* 9.7* 10.5*   HCT 28.3* 30.3* 32.6*    255 236   GRANS 80* 81* 87*   LYMPH 11* 9* 5*   EOS 0 0 0      Microbiology No results for input(s): CULT in the last 72 hours.        RADIOLOGY:  Reviewed, official read pending from Antonio Arechiga  appears improved     Dr. Governor Ahumada, Infectious Disease Specialist  2020  10:20 AM

## 2020-06-21 NOTE — PROGRESS NOTES
Hospitalist Progress Note    Patient: Yessica Thompson Age: 71 y.o. : 1950 MR#: [id number] SSN: [id number]  Date/Time: 2020 11:29 AM    DOA: 2020  PCP: Mello Ramos MD    Subjective:     She expressed that she wants to go home but she lives by herself. Currently, she was transferred out of ICU yesterday. Still on high flow oxygen and BIPAP at night. Finished her IV antibiotics today per ID. No fever. Resolved leukocytosis. No diarrhea. Breathing is better. She is on HD but still has urine output. She is on lasix 80mg IV BID.           ROS: No current fever/chills, no headache, no dizziness, +cough  No swallowing pain, No chest pain, no palpitation, ++ shortness of breath, no abd pain,  No diarrhea, no urinary complaint, no leg pain or swelling      Assessment/Plan:   1. Acute respiratory failure with hypoxia, hypercapnia, improved       Extubated 06/15/20  2. Aspiration of foreign body, s/p bronchoscopy   3. Aspiration pneumonia, finished IV antibiotics   4. Acute systolic heart failure, volume overload, new depressed EF since last echo  5. Previously reported CAD  6. ERSD on HD   7. Anemia of chronic disease   8. Hyperglycemia with DM2  9. Depression   10. Morbid obesity   11. Physical deconditioning   12. Leukocytosis resolved   13. Hypophosphatemia, hypokalemia   14. Positive blood culture, contamination   15. GERD   16.  Hypertension   17. Hyperlipidemia   19. Negative COVID-19 infection     Wean off oxygen as tolerates, continues BIPAP at night. RT to follow up   Continue dialysis for volume management, it seems she is also on lasix for diuresis.  Add Albumin   Aspiration precaution   Resume on her cardiac medications, will consult cardiology for her new decrease EF  Continue her home medications as tolerated  PT/OT for placement option   Follow up on final blood culture   PPI   ISS   ICS as tolerated     Full code     Additional Notes:    Time spent>30 minutes    Case discussed with:  [x]Patient  []Family  [x]Nursing  [x]Case Management  DVT Prophylaxis:  []Lovenox  [x]Hep SQ  []SCDs  []Coumadin   []On Heparin gtt    Signed By: Rogelio Tafoya MD     2020 11:29 AM              Objective:   VS:   Visit Vitals  /74   Pulse (!) 59   Temp 98.1 °F (36.7 °C)   Resp 19   Ht 5' 5\" (1.651 m)   Wt 97.1 kg (214 lb)   SpO2 93%   Breastfeeding No   BMI 35.61 kg/m²      Tmax/24hrs: Temp (24hrs), Av.1 °F (36.7 °C), Min:96.8 °F (36 °C), Max:98.6 °F (37 °C)      Intake/Output Summary (Last 24 hours) at 2020 1129  Last data filed at 2020 0556  Gross per 24 hour   Intake 450 ml   Output    Net 450 ml       Tele: sinus  General:  Cooperative, Not in acute distress, speaks in short sentence while in bed  HEENT: PERRL, EOMI, supple neck, no JVD, dry oral mucosa  Cardiovascular: S1S2 regular, no rub/gallop   Pulmonary: Air entry bilaterally, no wheezing, ++ crackle  GI:  Soft, non tender, non distended, +bs, no guarding   Extremities:  trace pedal edema, +distal pulses appreciated   Neuro: AOx2, moving all extremities     Additional:       Current Facility-Administered Medications   Medication Dose Route Frequency    polyethylene glycol (MIRALAX) packet 17 g  17 g Oral DAILY PRN    venlafaxine-SR (EFFEXOR-XR) capsule 37.5 mg  37.5 mg Oral DAILY WITH BREAKFAST    mirtazapine (REMERON) tablet 7.5 mg  7.5 mg Oral QHS    pantoprazole (PROTONIX) tablet 40 mg  40 mg Oral ACB    carvediloL (COREG) tablet 25 mg  25 mg Oral BID WITH MEALS    albuterol-ipratropium (DUO-NEB) 2.5 MG-0.5 MG/3 ML  3 mL Nebulization Q6H RT    albuterol-ipratropium (DUO-NEB) 2.5 MG-0.5 MG/3 ML  3 mL Nebulization Q4H PRN    acetaminophen (TYLENOL) tablet 325 mg  325 mg Oral Q4H PRN    sodium chloride 3% hypertonic nebulizer soln  4 mL Nebulization TID PRN    lidocaine 4 % patch 1 Patch  1 Patch TransDERmal Q24H    menthol-zinc oxide (CALMOSEPTINE) 0.44-20.6 % ointment   Topical Q6H PRN    0.9% sodium chloride infusion 250 mL  250 mL IntraVENous PRN    epoetin johnathon-epbx (RETACRIT) injection 10,000 Units  10,000 Units SubCUTAneous Q MON, WED & FRI    sodium chloride (NS) flush 5-40 mL  5-40 mL IntraVENous Q8H    sodium chloride (NS) flush 5-40 mL  5-40 mL IntraVENous PRN    insulin lispro (HUMALOG) injection   SubCUTAneous Q6H    hydrALAZINE (APRESOLINE) 20 mg/mL injection 20 mg  20 mg IntraVENous Q6H PRN    ondansetron (ZOFRAN) injection 4 mg  4 mg IntraVENous Q6H PRN    glucose chewable tablet 16 g  4 Tab Oral PRN    glucagon (GLUCAGEN) injection 1 mg  1 mg IntraMUSCular PRN    dextrose (D50W) injection syrg 12.5-25 g  25-50 mL IntraVENous PRN    furosemide (LASIX) injection 80 mg  80 mg IntraVENous BID    cyanocobalamin tablet 1,000 mcg  1,000 mcg Oral BID    loratadine (CLARITIN) tablet 10 mg  10 mg Oral DAILY    aspirin chewable tablet 81 mg  81 mg Oral DAILY    [Held by provider] isosorbide mononitrate ER (IMDUR) tablet 30 mg  30 mg Oral DAILY    rosuvastatin (CRESTOR) tablet 5 mg  5 mg Oral QHS    [Held by provider] gabapentin (NEURONTIN) capsule 100 mg  100 mg Oral BID    ferrous sulfate tablet 325 mg  325 mg Oral EVERY OTHER DAY    docusate sodium (COLACE) capsule 100 mg  100 mg Oral BID PRN    heparin (porcine) injection 5,000 Units  5,000 Units SubCUTAneous Q8H            Lab/Data Review:  Labs: Results:       Chemistry Recent Labs     06/21/20 0317 06/20/20 0239 06/19/20  0327   * 136* 136*    138 138   K 3.6 3.4* 3.1*    103 101   CO2 27 28 30   BUN 42* 19* 16   CREA 3.71* 2.19* 2.31*   BUCR 11* 9* 7*   AGAP 10 7 7   CA 8.5 8.8 9.4   PHOS 2.4* 1.6* 0.8*     No results for input(s): TBIL, ALT, ALKP, TP, ALB, GLOB, AGRAT in the last 72 hours.     No lab exists for component: SGOT   CBC w/Diff Recent Labs     06/21/20 0317 06/20/20  0239 06/19/20  0327   WBC 11.0 14.3* 15.0*   RBC 3.22* 3.52* 3.77*   HGB 8.9* 9.7* 10.5*   HCT 28.3* 30.3* 32.6*   MCV 87.9 86.1 86.5   MCH 27.6 27.6 27.9   MCHC 31.4 32.0 32.2   RDW 14.8* 14.2 13.8    255 236   GRANS 80* 81* 87*   LYMPH 11* 9* 5*   EOS 0 0 0      Coagulation No results for input(s): PTP, INR, APTT, INREXT in the last 72 hours.     Iron/Ferritin Lab Results   Component Value Date/Time    Iron 42 (L) 04/30/2020 12:41 PM    TIBC 319 04/30/2020 12:41 PM    Iron % saturation 13 (L) 04/30/2020 12:41 PM    Ferritin 411 (H) 06/10/2020 03:04 AM       BNP    Cardiac Enzymes Lab Results   Component Value Date/Time    CK 15 (L) 06/11/2020 03:40 AM    CK - MB <1.0 06/11/2020 03:40 AM    CK-MB Index  06/11/2020 03:40 AM     CALCULATION NOT PERFORMED WHEN RESULT IS BELOW LINEAR LIMIT    Troponin-I, QT <0.02 06/11/2020 03:40 AM        Lactic Acid    Thyroid Studies          All Micro Results     Procedure Component Value Units Date/Time    CULTURE, BLOOD [[id number]] Collected:  06/16/20 0628    Order Status:  Completed Specimen:  Blood Updated:  06/21/20 0626     Special Requests: NO SPECIAL REQUESTS        Culture result: NO GROWTH 5 DAYS       CULTURE, BLOOD [[id number]] Collected:  06/16/20 0625    Order Status:  Completed Specimen:  Blood Updated:  06/21/20 0626     Special Requests: NO SPECIAL REQUESTS        Culture result: NO GROWTH 5 DAYS       CULTURE, URINE [[id number]] Collected:  06/17/20 1300    Order Status:  Completed Specimen:  Cath Urine Updated:  06/18/20 1825     Special Requests: NO SPECIAL REQUESTS        Culture result: No growth (<1,000 CFU/ML)       CULTURE, BLOOD [[id number]] Collected:  06/11/20 1820    Order Status:  Completed Specimen:  Blood Updated:  06/17/20 0654     Special Requests: NO SPECIAL REQUESTS        Culture result: NO GROWTH 6 DAYS       CULTURE, BLOOD [[id number]] Collected:  06/11/20 1620    Order Status:  Completed Specimen:  Blood Updated:  06/17/20 0654     Special Requests: NO SPECIAL REQUESTS        Culture result: NO GROWTH 6 DAYS       CULTURE, BLOOD [[id number]] Collected:  06/10/20 Randall Stewart    Order Status:  Completed Specimen:  Blood Updated:  06/16/20 0652     Special Requests: NO SPECIAL REQUESTS        Culture result: NO GROWTH 6 DAYS       CULTURE, BLOOD [[id number]] Collected:  06/10/20 1231    Order Status:  Completed Specimen:  Blood Updated:  06/16/20 0652     Special Requests: NO SPECIAL REQUESTS        Culture result: NO GROWTH 6 DAYS       CULTURE, RESPIRATORY/SPUTUM/BRONCH Jyoti Cordial STAIN [[id number]]  (Abnormal) Collected:  06/11/20 1920    Order Status:  Completed Specimen:  Bronchial lavage Updated:  06/14/20 1353     Special Requests: --        BRONCHIAL LAVAGE  RIGHT  LUNG       GRAM STAIN 2+ WBCS SEEN         NO ORGANISMS SEEN        Culture result:       SCANT NORMAL RESPIRATORY MARS            SCANT YEAST       CULTURE, BLOOD [[id number]] Collected:  06/07/20 2200    Order Status:  Completed Specimen:  Blood Updated:  06/13/20 0633     Special Requests: NO SPECIAL REQUESTS        Culture result: NO GROWTH 6 DAYS       CULTURE, BLOOD [[id number]]  (Abnormal) Collected:  06/07/20 2223    Order Status:  Completed Specimen:  Blood Updated:  06/11/20 1230     Special Requests: NO SPECIAL REQUESTS        GRAM STAIN       AEROBIC BOTTLE GRAM POSITIVE COCCI IN GROUPS                  SMEAR CALLED TO AND CORRECTLY REPEATED BY: Roman Chester RN  4N 6/10/2020 0802 TO LAE           Culture result:       STAPHYLOCOCCUS SPECIES, COAGULASE NEGATIVE GROWING IN AEROBIC BOTTLE          CULTURE, RESPIRATORY/SPUTUM/BRONCH Kristina James [[id number]] Collected:  06/11/20 0600    Order Status:  Canceled Specimen:  Endotracheal aspirate             Images:    CT (Most Recent).       XRAY (Most Recent) XR Results (most recent):  Results from Hospital Encounter encounter on 06/07/20   XR CHEST SNGL V    Narrative EXAM: PORTABLE CHEST 0240 hours    CLINICAL HISTORY/INDICATION: respiratory failure , patient presented with acute  on chronic respiratory failure which should require mechanical ventilation,  episode of aspiration, patient is afebrile with normal white blood cell count         COMPARISON: Chest x-ray June 18 through June 14, 2020, May 6, 2020. TECHNIQUE: Single AP view    FINDINGS:     Right jugular double-lumen catheter terminates at the right atrium. Marked interval improvement in the bilateral multifocal infiltrates. Inability  to penetrate the enlarged left heart. Pulmonary vessels normal. Right  costophrenic angle is sharp. Stable cardiomegaly. .      Impression IMPRESSION:    Marked interval decrease in bilateral multifocal infiltrates. Inability to evaluate the left lung base as described above. Stable cardiomegaly          EKG No results found for this or any previous visit. 2D ECHO 06/07/20   ECHO ADULT FOLLOW-UP OR LIMITED 06/19/2020 6/19/2020    Narrative · Normal cavity size and wall thickness. Mild systolic function. Estimated   left ventricular ejection fraction is 45 - 50%. Visually measured ejection   fraction. No regional wall motion abnormality noted. Normal left   ventricular strain. · Mild mitral valve regurgitation is present. · Mild pulmonic valve regurgitation is present.         Signed by: Yesi Adam MD

## 2020-06-21 NOTE — PROGRESS NOTES
Problem: Mobility Impaired (Adult and Pediatric)  Goal: *Acute Goals and Plan of Care (Insert Text)  Description: Physical Therapy Goals  Initiated 6/21/2020 and to be accomplished within 7 day(s)  1. Patient will move from supine to sit and sit to supine  and roll side to side in bed with minimal assistance/contact guard assist.     2.  Patient will transfer from bed to chair and chair to bed with minimal assistance/contact guard assist using the least restrictive device. 3.  Patient will perform sit to stand with moderate assistance . 4. Patient will ambulate with moderate assistance  for 10 feet with the least restrictive device. To prepare pt for home mobility at next level of care    PLOF:  Pt is a poor historian. Per chart, she was a resident of [address] [place] [address] but is unable to recall if she was able to ambulate recently. Pt states she was not doing therapy at the LTC facility and would like to go home. Outcome: Progressing Towards Goal    PHYSICAL THERAPY EVALUATION    Patient: Alicja Porter (71 y.o. female)  Date: 6/21/2020  Primary Diagnosis: Pneumonia [J18.9]  Procedure(s) (LRB):  FLEXIBLE BRONCHOSCOPY with BAL/SLIM SCOPE (N/A) 10 Days Post-Op   Precautions:        PLOF: See goals section above    ASSESSMENT :  Based on the objective data described below, the patient presents with decreased B LE strength, decreased functional activity tolerance and inability to stand or ambulate following admission for pneumonia in setting of ESRD, CHF. Pt was cleared by nursing to work with therapy. Pt was received semi-reclined in bed, on HFNC, agreeable to participate in PT . Pt performed rolling bilaterally with max A and declined to attempt sitting on EOB, stating she would try tomorrow. Pt was assisted with repositioning at [facility] with max A. At conclusion of session, pt was left resting comfortably in chair, needs met, call bell in reach, nurse notified.        Patient will benefit from skilled intervention to address the above impairments. Patient's rehabilitation potential is considered to be Good  Factors which may influence rehabilitation potential include:   []         None noted  []         Mental ability/status  [x]         Medical condition  []         Home/family situation and support systems  []         Safety awareness  []         Pain tolerance/management  []         Other:      PLAN :  Recommendations and Planned Interventions:   [x]           Bed Mobility Training             [x]    Neuromuscular Re-Education  [x]           Transfer Training                   []    Orthotic/Prosthetic Training  [x]           Gait Training                          []    Modalities  [x]           Therapeutic Exercises           [x]    Edema Management/Control  [x]           Therapeutic Activities            []    Family Training/Education  [x]           Patient Education  []           Other (comment):    Frequency/Duration: Patient will be followed by physical therapy 1-2 times per day to address goals. Discharge Recommendations: Skilled Nursing Facility  Further Equipment Recommendations for Discharge: N/A     SUBJECTIVE:   Patient stated I just woke up.     OBJECTIVE DATA SUMMARY:     Past Medical History:   Diagnosis Date    Arthritis     [facility]     CH[facility]estive [facility] failure[facility]     Diabetes [facility]     Fracture of neck [facility]     GERD (gastroesophageal reflux disease)     Goiter     Hiatal hernia     Hypertension     Uterine cancer (HCC)      Past Surgical History:   Procedure Laterality Date    HX APPENDECTOMY      HX HYSTERECTOMY      HX KNEE REPLACEMENT Right     HX ORTHOPAEDIC      odontoid fracture repair with hardware placement.      HX PARTIAL THYROIDECTOMY      OK INSJ TUNNELED CVC W/O SUBQ PORT/ AGE 5 YR/> Right 5/7/2020    INSERTION TUNNELED CENTRAL VENOUS CATHETER performed by Jose Kang MD at [facility] CATH LAB     Barriers to Learning/Limitations: None  Compensate with: N/A  Home Situation:  Home Situation  Home Environment: Long term care  # Steps to Enter: 4  One/Two Story Residence: One story  Living Alone: Yes  Support Systems: None  Patient Expects to be Discharged to[de-identified] Unknown  Current DME Used/Available at Home: Walker, rolling, Wheelchair  Critical Behavior:  Neurologic State: Alert  Orientation Level: (poor historian)  Cognition: Follows commands     Psychosocial  Patient Behaviors: Calm; Cooperative     Strength:    Grossly 3/5 throughout B LEs  Functional Mobility:  Bed Mobility:  Rolling: Maximum assistance   Refused supine <> sit    Pain:  Pain level pre-treatment: 0/10   Pain level post-treatment: 0/10   Pain Intervention(s) : N/A      Activity Tolerance:   Poor  Please refer to the flowsheet for vital signs taken during this treatment. After treatment:   []         Patient left in no apparent distress sitting up in chair  [x]         Patient left in no apparent distress in bed  [x]         Call bell left within reach  [x]         Nursing notified  []         Caregiver present  [x]         Bed alarm activated  []         SCDs applied    COMMUNICATION/EDUCATION:   [x]         Role of Physical Therapy in the acute care setting. [x]         Fall prevention education was provided and the patient/caregiver indicated understanding. [x]         Patient/family have participated as able in goal setting and plan of care. [x]         Patient/family agree to work toward stated goals and plan of care. []         Patient understands intent and goals of therapy, but is neutral about his/her participation. []         Patient is unable to participate in goal setting/plan of care: ongoing with therapy staff.  []         Other:     Thank you for this referral.  Martine Dougherty, PT   Time Calculation: 15 mins      Eval Complexity: History: MEDIUM  Complexity : 1-2 comorbidities / personal factors will impact the outcome/ POC Exam:MEDIUM Complexity : 3 Standardized tests and measures addressing body structure, function, activity limitation and / or participation in recreation  Presentation: MEDIUM Complexity : Evolving with changing characteristics  Clinical Decision Making:Medium Complexity  Overall Complexity:MEDIUM

## 2020-06-22 LAB
ANION GAP SERPL CALC-SCNC: 10 MMOL/L (ref 3–18)
BACTERIA SPEC CULT: NORMAL
BACTERIA SPEC CULT: NORMAL
BNP SERPL-MCNC: ABNORMAL PG/ML (ref 0–900)
BUN SERPL-MCNC: 62 MG/DL (ref 7–18)
BUN/CREAT SERPL: 14 (ref 12–20)
CALCIUM SERPL-MCNC: 8.3 MG/DL (ref 8.5–10.1)
CHLORIDE SERPL-SCNC: 99 MMOL/L (ref 100–111)
CO2 SERPL-SCNC: 26 MMOL/L (ref 21–32)
CREAT SERPL-MCNC: 4.54 MG/DL (ref 0.6–1.3)
ERYTHROCYTE [DISTWIDTH] IN BLOOD BY AUTOMATED COUNT: 15.1 % (ref 11.6–14.5)
GLUCOSE BLD STRIP.AUTO-MCNC: 151 MG/DL (ref 70–110)
GLUCOSE BLD STRIP.AUTO-MCNC: 225 MG/DL (ref 70–110)
GLUCOSE BLD STRIP.AUTO-MCNC: 225 MG/DL (ref 70–110)
GLUCOSE SERPL-MCNC: 142 MG/DL (ref 74–99)
HCT VFR BLD AUTO: 27.6 % (ref 35–45)
HGB BLD-MCNC: 8.6 G/DL (ref 12–16)
MAGNESIUM SERPL-MCNC: 1.8 MG/DL (ref 1.6–2.6)
MCH RBC QN AUTO: 27.4 PG (ref 24–34)
MCHC RBC AUTO-ENTMCNC: 31.2 G/DL (ref 31–37)
MCV RBC AUTO: 87.9 FL (ref 74–97)
PHOSPHATE SERPL-MCNC: 3.3 MG/DL (ref 2.5–4.9)
PLATELET # BLD AUTO: 210 K/UL (ref 135–420)
PMV BLD AUTO: 11.6 FL (ref 9.2–11.8)
POTASSIUM SERPL-SCNC: 4.1 MMOL/L (ref 3.5–5.5)
RBC # BLD AUTO: 3.14 M/UL (ref 4.2–5.3)
SERVICE CMNT-IMP: NORMAL
SERVICE CMNT-IMP: NORMAL
SODIUM SERPL-SCNC: 135 MMOL/L (ref 136–145)
WBC # BLD AUTO: 10.5 K/UL (ref 4.6–13.2)

## 2020-06-22 PROCEDURE — 74011250637 HC RX REV CODE- 250/637: Performed by: PHYSICIAN ASSISTANT

## 2020-06-22 PROCEDURE — 94640 AIRWAY INHALATION TREATMENT: CPT

## 2020-06-22 PROCEDURE — 65660000004 HC RM CVT STEPDOWN

## 2020-06-22 PROCEDURE — 74011250636 HC RX REV CODE- 250/636: Performed by: INTERNAL MEDICINE

## 2020-06-22 PROCEDURE — 74011636637 HC RX REV CODE- 636/637: Performed by: INTERNAL MEDICINE

## 2020-06-22 PROCEDURE — 36415 COLL VENOUS BLD VENIPUNCTURE: CPT

## 2020-06-22 PROCEDURE — P9047 ALBUMIN (HUMAN), 25%, 50ML: HCPCS | Performed by: INTERNAL MEDICINE

## 2020-06-22 PROCEDURE — 90935 HEMODIALYSIS ONE EVALUATION: CPT

## 2020-06-22 PROCEDURE — 84100 ASSAY OF PHOSPHORUS: CPT

## 2020-06-22 PROCEDURE — 74011000250 HC RX REV CODE- 250: Performed by: INTERNAL MEDICINE

## 2020-06-22 PROCEDURE — 74011000250 HC RX REV CODE- 250: Performed by: PHYSICIAN ASSISTANT

## 2020-06-22 PROCEDURE — 94668 MNPJ CHEST WALL SBSQ: CPT

## 2020-06-22 PROCEDURE — 74011250637 HC RX REV CODE- 250/637: Performed by: INTERNAL MEDICINE

## 2020-06-22 PROCEDURE — 97166 OT EVAL MOD COMPLEX 45 MIN: CPT

## 2020-06-22 PROCEDURE — 80048 BASIC METABOLIC PNL TOTAL CA: CPT

## 2020-06-22 PROCEDURE — 83880 ASSAY OF NATRIURETIC PEPTIDE: CPT

## 2020-06-22 PROCEDURE — 74011250637 HC RX REV CODE- 250/637: Performed by: HOSPITALIST

## 2020-06-22 PROCEDURE — 82962 GLUCOSE BLOOD TEST: CPT

## 2020-06-22 PROCEDURE — 77010033711 HC HIGH FLOW OXYGEN

## 2020-06-22 PROCEDURE — 85027 COMPLETE CBC AUTOMATED: CPT

## 2020-06-22 PROCEDURE — 83735 ASSAY OF MAGNESIUM: CPT

## 2020-06-22 PROCEDURE — 94660 CPAP INITIATION&MGMT: CPT

## 2020-06-22 RX ORDER — LOSARTAN POTASSIUM 25 MG/1
25 TABLET ORAL DAILY
Status: DISCONTINUED | OUTPATIENT
Start: 2020-06-22 | End: 2020-06-26 | Stop reason: HOSPADM

## 2020-06-22 RX ORDER — HEPARIN SODIUM 1000 [USP'U]/ML
1000 INJECTION, SOLUTION INTRAVENOUS; SUBCUTANEOUS
Status: DISCONTINUED | OUTPATIENT
Start: 2020-06-22 | End: 2020-06-26 | Stop reason: HOSPADM

## 2020-06-22 RX ADMIN — IPRATROPIUM BROMIDE AND ALBUTEROL SULFATE 3 ML: .5; 3 SOLUTION RESPIRATORY (INHALATION) at 09:57

## 2020-06-22 RX ADMIN — ROSUVASTATIN CALCIUM 5 MG: 10 TABLET, COATED ORAL at 21:35

## 2020-06-22 RX ADMIN — CYANOCOBALAMIN TAB 1000 MCG 1000 MCG: 1000 TAB at 09:23

## 2020-06-22 RX ADMIN — INSULIN LISPRO 4 UNITS: 100 INJECTION, SOLUTION INTRAVENOUS; SUBCUTANEOUS at 12:52

## 2020-06-22 RX ADMIN — Medication 325 MG: at 09:23

## 2020-06-22 RX ADMIN — Medication 10 ML: at 06:00

## 2020-06-22 RX ADMIN — ALBUMIN (HUMAN) 25 G: 0.25 INJECTION, SOLUTION INTRAVENOUS at 05:02

## 2020-06-22 RX ADMIN — HEPARIN SODIUM 5000 UNITS: 5000 INJECTION INTRAVENOUS; SUBCUTANEOUS at 12:52

## 2020-06-22 RX ADMIN — INSULIN LISPRO 4 UNITS: 100 INJECTION, SOLUTION INTRAVENOUS; SUBCUTANEOUS at 00:34

## 2020-06-22 RX ADMIN — Medication 10 ML: at 21:38

## 2020-06-22 RX ADMIN — Medication 325 MG: at 04:21

## 2020-06-22 RX ADMIN — PANTOPRAZOLE SODIUM 40 MG: 40 TABLET, DELAYED RELEASE ORAL at 06:57

## 2020-06-22 RX ADMIN — INSULIN LISPRO 2 UNITS: 100 INJECTION, SOLUTION INTRAVENOUS; SUBCUTANEOUS at 05:17

## 2020-06-22 RX ADMIN — IPRATROPIUM BROMIDE AND ALBUTEROL SULFATE 3 ML: .5; 3 SOLUTION RESPIRATORY (INHALATION) at 01:12

## 2020-06-22 RX ADMIN — HEPARIN SODIUM 5000 UNITS: 5000 INJECTION INTRAVENOUS; SUBCUTANEOUS at 05:02

## 2020-06-22 RX ADMIN — ALBUMIN (HUMAN) 25 G: 0.25 INJECTION, SOLUTION INTRAVENOUS at 00:16

## 2020-06-22 RX ADMIN — HEPARIN SODIUM 5000 UNITS: 5000 INJECTION INTRAVENOUS; SUBCUTANEOUS at 21:36

## 2020-06-22 RX ADMIN — MIRTAZAPINE 7.5 MG: 15 TABLET, FILM COATED ORAL at 21:36

## 2020-06-22 RX ADMIN — EPOETIN ALFA-EPBX 10000 UNITS: 10000 INJECTION, SOLUTION INTRAVENOUS; SUBCUTANEOUS at 21:37

## 2020-06-22 RX ADMIN — Medication 10 ML: at 13:19

## 2020-06-22 RX ADMIN — LORATADINE 10 MG: 10 TABLET ORAL at 09:23

## 2020-06-22 RX ADMIN — VENLAFAXINE HYDROCHLORIDE 37.5 MG: 37.5 CAPSULE, EXTENDED RELEASE ORAL at 09:23

## 2020-06-22 RX ADMIN — ASPIRIN 81 MG CHEWABLE TABLET 81 MG: 81 TABLET CHEWABLE at 09:23

## 2020-06-22 RX ADMIN — FUROSEMIDE 80 MG: 10 INJECTION, SOLUTION INTRAMUSCULAR; INTRAVENOUS at 21:37

## 2020-06-22 NOTE — PROGRESS NOTES
Left message for DSS Ms. Wendi Mcdowell to see if a Medicaid application was ever completed.  Abelina Claude, MSW, [place]- [phone]

## 2020-06-22 NOTE — PROGRESS NOTES
Pt is A&O x4 and is in no acute distress or discomfort. She is incontinent to both bowel and bladder. ADL x 1 with assist. She is on O2 and SpO2 is 96%. Pt tolerated all medications w/o adverse effect. I.V. to right ac and wrist are patent and intact.

## 2020-06-22 NOTE — PROGRESS NOTES
Titrate to 5L NC     06/22/20 1226   Oxygen Therapy   O2 Sat (%) 95 %   Pulse via Oximetry 70 beats per minute   O2 Device Nasal cannula   O2 Flow Rate (L/min) 5 l/min

## 2020-06-22 NOTE — ROUTINE PROCESS
0730 - Bedside and Verbal shift change report given to Bigg Bradford (oncoming nurse) by Jose Luis Martin RN (offgoing nurse). Report included the following information SBAR, Kardex, Intake/Output, MAR, Recent Results, Med Rec Status and Cardiac Rhythm NSR . 1130 - OT at bedside with patient. 1306 - Performed hygiene and comfort care. Pt had x1 incontinent urine occurrence. Linens changed, pt moved up the bed. Scheduled meds administered. NAD, no complaints. Will continue to monitor. 1530 - Pt transported to Dialysis via bed. O2 NC at 5lpm.  
 
1930 - Bedside and Verbal shift change report given to SIMA Jaimes (oncoming nurse) by Yara Figueroa RN (offgoing nurse). Report included the following information SBAR, Kardex, Intake/Output, Recent Results, Med Rec Status and Cardiac Rhythm NSR .

## 2020-06-22 NOTE — PROGRESS NOTES
Problem: Nutrition Deficit  Goal: *Optimize nutritional status  Outcome: Progressing Towards Goal     Problem: Falls - Risk of  Goal: *Absence of Falls  Description: Document Thora Bare Fall Risk and appropriate interventions in the flowsheet. Outcome: Progressing Towards Goal  Note: Fall Risk Interventions:       Mentation Interventions: Adequate sleep, hydration, pain control    Medication Interventions: Bed/chair exit alarm, Patient to call before getting OOB    Elimination Interventions: Call light in reach    History of Falls Interventions: Bed/chair exit alarm, Vital signs minimum Q4HRs X 24 hrs (comment for end date)    Problem: Patient Education: Go to Patient Education Activity  Goal: Patient/Family Education  Outcome: Progressing Towards Goal     Problem: Pressure Injury - Risk of  Goal: *Prevention of pressure injury  Description: Document Toney Scale and appropriate interventions in the flowsheet.   Outcome: Progressing Towards Goal  Note: Pressure Injury Interventions:  Sensory Interventions: Assess changes in LOC, Minimize linen layers    Moisture Interventions: Absorbent underpads, Minimize layers    Activity Interventions: Increase time out of bed    Mobility Interventions: HOB 30 degrees or less    Nutrition Interventions: Document food/fluid/supplement intake    Friction and Shear Interventions: HOB 30 degrees or less, Minimize layers    Problem: Patient Education: Go to Patient Education Activity  Goal: Patient/Family Education  Outcome: Progressing Towards Goal     Problem: Ventilator Management  Goal: *Adequate oxygenation and ventilation  Outcome: Progressing Towards Goal  Goal: *Patient maintains clear airway/free of aspiration  Outcome: Progressing Towards Goal  Goal: *Absence of infection signs and symptoms  Outcome: Progressing Towards Goal  Goal: *Normal spontaneous ventilation  Outcome: Progressing Towards Goal     Problem: Patient Education: Go to Patient Education Activity  Goal: Patient/Family Education  Outcome: Progressing Towards Goal     Problem: Non-Violent Restraints  Goal: *Removal from restraints as soon as assessed to be safe  Outcome: Progressing Towards Goal  Goal: *No harm/injury to patient while restraints in use  Outcome: Progressing Towards Goal  Goal: *Patient's dignity will be maintained  Outcome: Progressing Towards Goal  Goal: *Patient Specific Goal (EDIT GOAL, INSERT TEXT)  Outcome: Progressing Towards Goal  Goal: Non-violent Restaints:Standard Interventions  Outcome: Progressing Towards Goal  Goal: Non-violent Restraints:Patient Interventions  Outcome: Progressing Towards Goal  Goal: Patient/Family Education  Outcome: Progressing Towards Goal     Problem: Injury - Risk of, Adverse Drug Event  Goal: *Absence of adverse drug events  Outcome: Progressing Towards Goal  Goal: *Absence of medication errors  Outcome: Progressing Towards Goal  Goal: *Knowledge of prescribed medications  Outcome: Progressing Towards Goal     Problem: Patient Education: Go to Patient Education Activity  Goal: Patient/Family Education  Outcome: Progressing Towards Goal     Problem: Non-Violent Restraints  Goal: *Removal from restraints as soon as assessed to be safe  Outcome: Progressing Towards Goal  Goal: *No harm/injury to patient while restraints in use  Outcome: Progressing Towards Goal  Goal: *Patient's dignity will be maintained  Outcome: Progressing Towards Goal  Goal: Non-violent Restaints:Standard Interventions  Outcome: Progressing Towards Goal  Goal: Non-violent Restraints:Patient Interventions  Outcome: Progressing Towards Goal  Goal: Patient/Family Education  Outcome: Progressing Towards Goal     Problem: Patient Education: Go to Patient Education Activity  Goal: Patient/Family Education  Outcome: Progressing Towards Goal     Problem: Patient Education: Go to Patient Education Activity  Goal: Patient/Family Education  Outcome: Progressing Towards Goal     Problem: Patient Education: Go to Patient Education Activity  Goal: Patient/Family Education  Outcome: Progressing Towards Goal

## 2020-06-22 NOTE — PROGRESS NOTES
Discharge planning:    Transfer to CVT, per chart plan is for patient to return to [facility] LTC, patient does NOT have Medicaid, confirmed through hotline. Spoke with Tona at [facility] who confirmed patient is there for short term, does not have medicaid for long term. Per chart review, patient has limited support and intent is for snf to ltc, contacted Beny Tristan in [facility] to see if a medicaid application had ever been completed, awaiting to hear back. Called [place] [facility], they do not have an application on file for patient. Spoke with daughter, Jeison Adam, whom stated that they intend for patient to be long term care at nursing home,  explained that a medicaid application would need to be completed for this. Daughter stated she would speak with her  and get back to . In the meantime, CM stated she would pass daughter's info on to [employer] worker to call. Patient will need BLS transport back. Patient will need authorization to return to SNF through [facility]. Patient is on dialysis MWF 5:50am [employer] transported by standing order through [facility]. Will rajendra to confirm chairtime and transport prior to d/c as patient has been in hospital >2 weeks. Confirmed with [facility] they were transporting patient to and from dialysis, will need to call [facility] or Vesna Kent to set up standing order when start date is known.       Need rule out COVID test (send out [facility]) within 48 hours of d/c.      Vernon Bhagat, MSDONAL  Case Management  [phone]

## 2020-06-22 NOTE — DIALYSIS
RICH        ACUTE HEMODIALYSIS FLOW SHEET      HEMODIALYSIS ORDERS: Physician: Jamil Rao     Dialyzer: revaclear   Duration: 3.5 hr  BFR: 350   DFR: 600   Dialysate:  Temp 36-37*C  K+   3   Ca+  2.5 Na 140 Bicarb 30   Weight:    Wt Readings from Last 1 Encounters:   06/22/20 96.1 kg (211 lb 14.4 oz)      UF Goal: 3500 ml Access TDC    Heparin []  Bolus      Units    [] Hourly       Units    [x]None    Catheter locking solution Heparin   Pre BP:   148/58    Pulse:     62       Respirations: 19  Temperature:   98. 4(A)   Labs: Pre        Post:        [x] N/A   Additional Orders(medications, blood products, hypotension management):       [x] N/A     [x] Rich Consent Verified     CATHETER ACCESS: []N/A   [x]Right   []Left   [x]IJ     []Fem   []Transhepatic   [] First use X-ray verified     [x]Permanent                [] Temporary   [x]No S/S infection  []Redness  []Drainage []Cultured []Swelling []Pain   [x]Medical Aseptic Prep Utilized   [x]Dressing Changed  [] Biopatch  Date:       []Clotted   [x]Patent   Flows: [x]Good  []Poor  []Reversed   If access problem,  notified: []Yes    []N/A  Date:                       GENERAL ASSESSMENT:      LUNGS:  Rate 19 SaO2%        [] N/A    [x] Clear  [] Coarse  [] Crackles  [] Wheezing        [x] Diminished     Location : []RLL   []LLL    []RUL  []KATIE     Cough: []Productive  []Dry  []N/A   Respirations:  [x]Easy  []Labored     Therapy:   []RA  [x]NC 5 l/min    Mask: []NRB []Venti       O2%                  []Ventilator  []Intubated  [] Trach  [] BiPaP     CARDIAC: [x]Regular      [] Irregular   [] Pericardial Rub  [] JVD        []  Monitored  [] Bedside  [] Remotely monitored [] N/A  Rhythm:      EDEMA: [] None  [x]Generalized  [] Pitting [] 1    [] 2    [] 3    [] 4                 [] Facial  [] Pedal  []  UE  [] LE     SKIN:   [x] Warm  [] Hot     [] Cold   [x] Dry     [] Pale   [] Diaphoretic                  [] Flushed  [] Jaundiced  [] Cyanotic  [] Rash  [] Weeping     LOC:    [x] Alert      [x]Oriented:    [x] Person     [x] Place  [x]Time               [] Confused  [] Lethargic  [] Medicated  [] Non-responsive     GI / ABDOMEN:  [] Flat    [] Distended    [x] Soft    [] Firm   [x]  Obese                             [] Diarrhea  [x] Bowel Sounds  [] Nausea  [] Vomiting       / URINE ASSESSMENT:[] Voiding   [x] Oliguria  [] Anuria   []  Lopez     [x] Incontinent    []  Incontinent Brief      []  Bathroom Privileges       PAIN: [x] 0 []1  []2   []3   []4   []5   []6   []7   []8   []9   []10              Scale 0-10  Action/Follow Up:      MOBILITY:  [] Amb    [] Amb/Assist    [x] Bed    [] Wheelchair  [] Stretcher      All Vitals and Treatment Details on Attached [address]: [facility]          Room # 2306/01      [] 1st Time Acute  [] Stat  [x] Routine  [] Urgent     [x] Acute Room  []  Bedside  [] ICU/CCU  [] ER   Isolation Precautions:   There are currently no Active Isolations      Special Considerations:         [] Blood Consent Verified [x]N/A     ALLERGIES:   Allergies   Allergen Reactions    Augmentin [Amoxicillin-Pot Clavulanate] Diarrhea    Clavulanic Acid Diarrhea    Levaquin [Levofloxacin] Other (comments)     Severe hypoglycemia                 Code Status:Full Code        Hepatitis Status:                   Lab Results   Component Value Date/Time    Hepatitis B surface Ag <0.10 06/09/2020 04:03 AM    Hepatitis B surface Ab <3.10 (L) 06/09/2020 04:03 AM                     Current Labs:   Lab Results   Component Value Date/Time    Sodium 135 (L) 06/22/2020 05:34 AM    Potassium 4.1 06/22/2020 05:34 AM    Chloride 99 (L) 06/22/2020 05:34 AM    CO2 26 06/22/2020 05:34 AM    Anion gap 10 06/22/2020 05:34 AM    Glucose 142 (H) 06/22/2020 05:34 AM    BUN 62 (H) 06/22/2020 05:34 AM    Creatinine 4.54 (H) 06/22/2020 05:34 AM    BUN/Creatinine ratio 14 06/22/2020 05:34 AM    GFR est AA 12 (L) 06/22/2020 05:34 AM    GFR est non-AA 10 (L) 06/22/2020 05:34 AM    Calcium 8.3 (L) 06/22/2020 05:34 AM      Lab Results   Component Value Date/Time    WBC 10.5 06/22/2020 05:34 AM    HGB 8.6 (L) 06/22/2020 05:34 AM    HCT 27.6 (L) 06/22/2020 05:34 AM    PLATELET 284 21/25/4950 05:34 AM    MCV 87.9 06/22/2020 05:34 AM                                                                                     DIET: DIET DYSPHAGIA PUREED (NDD1)   DIET NUTRITIONAL SUPPLEMENTS       PRIMARY NURSE REPORT: First initial/Last name/Title      Pre Dialysis: Claudell Gate, RN    Time: 1510      EDUCATION:    [x] Patient [] Other         Knowledge Basis: []None [x]Minimal [] Substantial   Barriers to learning  []N/A   [x] Access Care     [] S&S of infection     [] Fluid Management     []K+     [x]Procedural    []Albumin     [] Medications     [] Tx Options     [] Transplant     [] Diet     [x] Other   Teaching Tools:  [x] Explain  [] Demo  [] Handouts [] Video  Patient response:   [x] Verbalized understanding  [] Teach back  [] Return demonstration [x] Requires follow up   Inappropriate due to            [x] Time Out/Safety Check  [x]Extracorporeal Circuit Tested for integrity       RO/HEMODIALYSIS MACHINE SAFETY CHECKS  Before each treatment:     Machine Number:                   [facility]                                  [x] Unit Machine # 7 with centralized RO  Alarm Test:  Pass time 6196               [x] RO/Machine Log Complete      Temp    36             Dialysate: pH  7.4 Conductivity: Meter   14     HD Machine   13.9                  TCD: 14  Dialyzer Lot # [id number]            Blood Tubing Lot # [id number]          Saline Lot #  [id number]     CHLORINE TESTING-Before each treatment and every 4 hours    Total Chlorine: [x] less than 0.1 ppm  Time: 1300 4 Hr/2nd Check Time: 1700   (if greater than 0.1 ppm from Primary then every 30 minutes from Secondary)     TREATMENT INITIATION  with Dialysis Precautions:   [x] All Connections Secured                 [x] Saline Line Double Clamped   [x] Venous Parameters Set                  [x] Arterial Parameters Set    [x] Prime Given 250ml                          [x]Air Foam Detector Engaged      Treatment Initiation Note:  pt arrived via transport in bed, pt is A&O, pt on 5L O2NC, no S/S of distress, VSS. tx started with out complications. TDC no S/S of complications. During Treatment Notes:  5054: pt sleeping, No S/S of distress, face and access in view. 1830: pt sleeping, No S/S of distress, face and access in view. 1930: pt sleeping, No S/S of distress, face and access in view. Post Assessment:   Dialyzer Cleared: [] Good [x] Fair  [] Poor  Blood processed:  73.5 L  UF Removed  3500 Ml  POst BP:   127/51       Pulse: 56        Respirations: 20  Temperature: 97. 8(A) Lungs:     [x] Clear      [] Course         [] Crackles    [] Wheezing         [] Diminished    Cardiac:   [x] Regular   [] Irregular   [] Monitor  [] N/A      Rhythm:       Catheter:   Locking solution: Heparin 1:1000   Art. 1.6  Austyn. 1.6      Skin:   Pain:    [x] Warm  [x] Dry [] Diaphoretic    [] Flushed    [] Pale [] Cyanotic [x]0  []1  []2   []3  []4   []5   []6   []7   []8   []9   []10     Post Treatment Note:    Pt leaving via transport, pt tolerated the tx, pt states they are good, no S/S of distress.       POST TREATMENT PRIMARY NURSE HANDOFF REPORT:     First initial/Last name/Title         Post Dialysis: Estefany Hannah RN  Time:  2010     Abbreviations: AVG-arterial venous graft, AVF-arterial venous fistula, IJ-Internal Jugular, Subcl-Subclavian, Fem-Femoral, Tx-treatment, AP/HR-apical heart rate, DFR-dialysate flow rate, BFR-blood flow rate, AP-arterial pressure, -venous pressure, UF-ultrafiltrate, TMP-transmembrane pressure, Austyn-Venous, Art-Arterial, RO-Reverse Osmosis

## 2020-06-22 NOTE — CONSULTS
[facility] - Consult Note    Consultation request by Gabriella Dye MD for advice/opinion related to evaluating Pneumonia [J18.9]    Date of  Admission: 6/7/2020  9:50 PM   Primary Care Physician:  Prema Vizcarra MD     Assessment:     Patient Active Problem List   Diagnosis Code    Acute on chronic diastolic congestive heart failure (HCC) I50.33    Suspected COVID-19 virus infection Z20.828    Type 2 diabetes mellitus without complication, without long-term current use of insulin ([address] E11.9    Left anterior fascicular block I44.4    HTN (hypertension), benign I10    Coronary artery disease involving native coronary artery of native heart without angina pectoris I25.10    Chronic diastolic congestive heart failure ([facility]) I50.32    Bilateral lower extremity edema R60.0    BMI 35.0-35.9,adult Z68.35    CHF (congestive heart failure) ([facility]) I50.9    Sepsis ([address] A41.9    Respiratory failure ([address] J96.90    SIRS (systemic inflammatory response syndrome) ([facility]) R65.10    Acute on chronic respiratory failure ([facility]) J96.20    Pneumonia J18.9    Fluid overload E87.70    ESRD (end stage renal disease) ([facility] N18.6    Debility R53.81    Respiratory failure with hypoxia and hypercapnia ([facility]) J96.91, J96.92    Depression F32.9    Aspiration of foreign body T17.900A     -Acute on chronic respiratory failure. Was intubated and extubated on 6/15/2020 and slowly improving.   -Sepsis  -Acute on chronic HFpEF. EF 55-60% by echo 4/25/20 and now 45-50% on this admission. Still with some volume overload on exam.  -OFE on CKD.  Started on HD last admission. Patient uncertain if she continued dialysis while at home.   -Acute on chronic anemia.  -Hypertension. Elevated on admission.  -Diabetes mellitus. HgbA1c 6.1.  -Dyslipidemia. On statin.  -Coronary disease reportedly diffuse medically managed, nuclear stress 3/2018 with small area of ischemia with EF 53%  -Chronic atypical LBBB.   -COVID negative 4/30/2020.     Primary cardiologist Dr. Janessa Barbosa at [facility], now plans to follow up with Dr. Rio Dotson:     Independently seen and evaluated. Agree with below. There has been some change and overall EF since April. Images were not reviewed. Nevertheless, would not pursue any coronary evaluation at this time unless she develops symptoms of angina or evidence of ischemia. Increase activity as tolerated. Can consider outpatient coronary risk ratification. Will defer this to her primary cardiologist, Dr. Hubert Cordero.    -Her volume status appears to be slightly overloaded and volume management is per renal through dialysis. -Will continue on her ASA, BB, Lasix IV and statin. Appreciate renal assistance on med management. Discussed with Dr. Mary Burroughs and will start on ARB for improved BP.  -Echo with drop since earlier this year. She had a nuclear stress test 3/29/2018 that demonstrated small area of mild to moderate ischemia to the basal lateral wall. Will discuss with MD if any need to consider any other ischemic workup. Will likely need PT/OT. -Patient lives alone and may have challenges for maintaining her independence with recent illness. Discharge planning to address  -Will follow. History of Present Illness: This is a 71 y.o. female admitted for Pneumonia [J18.9]. Patient complains of:  Patient admitted for respiratory distress and was intubated for several days. She was extubated on 6/15/2020 and has been slowly improving. She is feeling some shortness of breath but denies any pain, fevers, abdominal pain or other symptoms.        Cardiac risk factors: sedentary life style, hypertension, age      Review of Symptoms:  Except as stated above include:  Constitutional:  negative  Respiratory:  negative  Cardiovascular:  negative  Gastrointestinal: negative  Genitourinary:  negative  Musculoskeletal:  Negative  Neurological:  Negative  Dermatological:  Negative  Endocrinological: Negative  Psychological:  Negative    Pertinent items are noted in HPI. Past Medical History:     Past Medical History:   Diagnosis Date    Arthritis     Cancer ([facility]     CHF (congestive heart failure) (HCC)     Diabetes ([facility]     Fracture of neck (HCC)     GERD (gastroesophageal reflux disease)     Goiter     Hiatal hernia     Hypertension     Uterine cancer ([facility]          Social History:     Social History     Socioeconomic History    Marital status:      Spouse name: Not on file    Number of children: Not on file    Years of education: Not on file    Highest education level: Not on file   Tobacco Use    Smoking status: Never Smoker   Substance and Sexual Activity    Alcohol use: No    Drug use: No        Family History:   History reviewed. No pertinent family history. Medications:      Allergies   Allergen Reactions    Augmentin [Amoxicillin-Pot Clavulanate] Diarrhea    Clavulanic Acid Diarrhea    Levaquin [Levofloxacin] Other (comments)     Severe hypoglycemia          Current Facility-Administered Medications   Medication Dose Route Frequency    albumin human 25% (BUMINATE) solution 25 g  25 g IntraVENous Q6H    polyethylene glycol (MIRALAX) packet 17 g  17 g Oral DAILY PRN    venlafaxine-SR (EFFEXOR-XR) capsule 37.5 mg  37.5 mg Oral DAILY WITH BREAKFAST    mirtazapine (REMERON) tablet 7.5 mg  7.5 mg Oral QHS    pantoprazole (PROTONIX) tablet 40 mg  40 mg Oral ACB    carvediloL (COREG) tablet 25 mg  25 mg Oral BID WITH MEALS    albuterol-ipratropium (DUO-NEB) 2.5 MG-0.5 MG/3 ML  3 mL Nebulization Q6H RT    albuterol-ipratropium (DUO-NEB) 2.5 MG-0.5 MG/3 ML  3 mL Nebulization Q4H PRN    acetaminophen (TYLENOL) tablet 325 mg  325 mg Oral Q4H PRN    sodium chloride 3% hypertonic nebulizer soln  4 mL Nebulization TID PRN    lidocaine 4 % patch 1 Patch  1 Patch TransDERmal Q24H    menthol-zinc oxide (CALMOSEPTINE) 0.44-20.6 % ointment   Topical Q6H PRN    0.9% sodium chloride infusion 250 mL  250 mL IntraVENous PRN    epoetin johnathon-epbx (RETACRIT) injection 10,000 Units  10,000 Units SubCUTAneous Q MON, WED & FRI    sodium chloride (NS) flush 5-40 mL  5-40 mL IntraVENous Q8H    sodium chloride (NS) flush 5-40 mL  5-40 mL IntraVENous PRN    insulin lispro (HUMALOG) injection   SubCUTAneous Q6H    hydrALAZINE (APRESOLINE) 20 mg/mL injection 20 mg  20 mg IntraVENous Q6H PRN    ondansetron (ZOFRAN) injection 4 mg  4 mg IntraVENous Q6H PRN    glucose chewable tablet 16 g  4 Tab Oral PRN    glucagon (GLUCAGEN) injection 1 mg  1 mg IntraMUSCular PRN    dextrose (D50W) injection syrg 12.5-25 g  25-50 mL IntraVENous PRN    furosemide (LASIX) injection 80 mg  80 mg IntraVENous BID    cyanocobalamin tablet 1,000 mcg  1,000 mcg Oral BID    loratadine (CLARITIN) tablet 10 mg  10 mg Oral DAILY    aspirin chewable tablet 81 mg  81 mg Oral DAILY    [Held by provider] isosorbide mononitrate ER (IMDUR) tablet 30 mg  30 mg Oral DAILY    rosuvastatin (CRESTOR) tablet 5 mg  5 mg Oral QHS    [Held by provider] gabapentin (NEURONTIN) capsule 100 mg  100 mg Oral BID    ferrous sulfate tablet 325 mg  325 mg Oral EVERY OTHER DAY    docusate sodium (COLACE) capsule 100 mg  100 mg Oral BID PRN    heparin (porcine) injection 5,000 Units  5,000 Units SubCUTAneous Q8H         Physical Exam:     Visit Vitals  /70   Pulse 64   Temp 98.2 °F (36.8 °C)   Resp 20   Ht 5' 5\" (1.651 m)   Wt 96.1 kg (211 lb 14.4 oz)   SpO2 92%   Breastfeeding No   BMI 35.26 kg/m²     BP Readings from Last 3 Encounters:   06/22/20 166/70   05/14/20 162/64   04/30/20 146/78     Pulse Readings from Last 3 Encounters:   06/22/20 64   05/14/20 60   04/30/20 70     Wt Readings from Last 3 Encounters:   06/22/20 96.1 kg (211 lb 14.4 oz)   05/13/20 108.1 kg (238 lb 5.1 oz)   04/29/20 114.3 kg (252 lb)       General:  alert, cooperative, no distress, appears stated age  Neck:  nontender, no carotid bruit, no JVD  Lungs: Crackles at bases without wheeze  Heart:  regular rate and rhythm, S1, S2 normal, no murmur, click, rub or gallop  Abdomen:  abdomen is soft without significant tenderness, masses, organomegaly or guarding  Extremities:  extremities normal, atraumatic, no cyanosis or edema  Skin: Warm and dry.  no hyperpigmentation, vitiligo, or suspicious lesions  Neuro: alert, oriented x3, affect appropriate, no focal neurological deficits, moves all extremities well, no involuntary movements  Psych: non focal     Data Review:     Recent Labs     06/22/20  0534 06/21/20  0317 06/20/20  0239   WBC 10.5 11.0 14.3*   HGB 8.6* 8.9* 9.7*   HCT 27.6* 28.3* 30.3*    213 255     Recent Labs     06/22/20  0534 06/21/20  1241 06/21/20  0317 06/20/20  0239   *  --  140 138   K 4.1  --  3.6 3.4*   CL 99*  --  103 103   CO2 26  --  27 28   *  --  180* 136*   BUN 62*  --  42* 19*   CREA 4.54*  --  3.71* 2.19*   CA 8.3*  --  8.5 8.8   MG 1.8  --  2.0 2.1   PHOS 3.3  --  2.4* 1.6*   ALB  --  2.7*  --   --    ALT  --  21  --   --        Results for orders placed or performed during the hospital encounter of 06/07/20   EKG, 12 LEAD, INITIAL   Result Value Ref Range    Ventricular Rate 74 BPM    Atrial Rate 74 BPM    P-R Interval 168 ms    QRS Duration 132 ms    Q-T Interval 440 ms    QTC Calculation (Bezet) 488 ms    Calculated P Axis 36 degrees    Calculated R Axis -36 degrees    Calculated T Axis 91 degrees    Diagnosis       Normal sinus rhythm  Left axis deviation  Left ventricular hypertrophy with QRS widening and repolarization abnormality  Nonspecific intraventricular block  Abnormal ECG  When compared with ECG of 07-JUN-2020 22:53,  No significant change was found  Confirmed by Wagner Orlando MD, --- ([id number]) on 6/11/2020 8:13:19 AM         All Cardiac Markers in the last 24 hours:  No results found for: CPK, CK, CKMMB, CKMB, RCK3, CKMBT, CKNDX, CKND1, ANDER, TROPT, TROIQ, MINH, TROPT, TNIPOC, BNP, BNPP    Last Lipid:    Lab Results   Component Value Date/Time    Cholesterol, total 182 04/24/2020 02:34 AM    HDL Cholesterol 41 04/24/2020 02:34 AM    LDL, calculated 112.8 (H) 04/24/2020 02:34 AM    Triglyceride 141 04/24/2020 02:34 AM    CHOL/HDL Ratio 4.4 04/24/2020 02:34 AM       Signed By: MICHAEL Aquino     June 22, 2020

## 2020-06-22 NOTE — PROGRESS NOTES
Renal Progress Note  Follow up of ESRD     Assessment/Plan:  1. ESRD. Continue dialysis mwf. 3 liters uf today if bp tolerates. 2. HTN. Stable, continue current regimen. 3. Anemia of ckd. Continue procrit. 4. Staph epi bacteriemia, likely contaminant per ID. Repeat bc ngtd. 5. Aspiration pneumonia, finished abx per ID. Subjective:  Patient complaints off: Feels better. Breathing is better. No chest pain, nausea or vomiting. Appetite is better.        Patient Active Problem List   Diagnosis Code    Acute on chronic diastolic congestive heart failure ([facility]) I50.33    Suspected COVID-19 virus infection Z20.828    Type 2 diabetes mellitus without complication, without long-term current use of insulin ([facility]) E11.9    Left anterior fascicular block I44.4    HTN (hypertension), benign I10    Coronary artery disease involving native coronary artery of native heart without angina pectoris I25.10    Chronic diastolic congestive heart failure ([facility]) I50.32    Bilateral lower extremity edema R60.0    BMI 35.0-35.9,adult Z68.35    CHF (congestive heart failure) ([facility]) I50.9    Sepsis ([facility] A41.9    Respiratory failure ([facility] J96.90    SIRS (systemic inflammatory response syndrome) ([facility]) R65.10    Acute on chronic respiratory failure ([facility]) J96.20    Pneumonia J18.9    Fluid overload E87.70    ESRD (end stage renal disease) ([facility] N18.6    Debility R53.81    Respiratory failure with hypoxia and hypercapnia ([facility]) J96.91, J96.92    Depression F32.9    Aspiration of foreign body T17.900A       Current Facility-Administered Medications   Medication Dose Route Frequency Provider Last Rate Last Dose    losartan (COZAAR) tablet 25 mg  25 mg Oral DAILY Serenity Wells   Stopped at 06/22/20 1100    albumin human 25% (BUMINATE) solution 25 g  25 g IntraVENous Q6H Dane Orellana MD 1 mL/hr at 06/22/20 0502 25 g at 06/22/20 0502    polyethylene glycol (MIRALAX) packet 17 g  17 g Oral DAILY PRN Triston Mast MD        venlafaxine-SR [facility]) capsule 37.5 mg  37.5 mg Oral DAILY WITH BREAKFAST Maricruz Santos PA-C   37.5 mg at 06/22/20 7050    mirtazapine (REMERON) tablet 7.5 mg  7.5 mg Oral QHS Carolyn Ramirez MD   7.5 mg at 06/21/20 2213    pantoprazole (PROTONIX) tablet 40 mg  40 mg Oral ACB Triston Mast MD   40 mg at 06/22/20 0657    carvediloL (COREG) tablet 25 mg  25 mg Oral BID WITH MEALS Triston Mast MD   Stopped at 06/22/20 9564    albuterol-ipratropium (DUO-NEB) 2.5 MG-0.5 MG/3 ML  3 mL Nebulization Q6H RT Yoselin Ruiz MD   3 mL at 06/22/20 0957    albuterol-ipratropium (DUO-NEB) 2.5 MG-0.5 MG/3 ML  3 mL Nebulization Q4H PRN Faith Correa PA-C        acetaminophen (TYLENOL) tablet 325 mg  325 mg Oral Q4H PRN Jayde MORENO, NP   325 mg at 06/21/20 0059    sodium chloride 3% hypertonic nebulizer soln  4 mL Nebulization TID PRN Lucas Soto DO        lidocaine 4 % patch 1 Patch  1 Patch TransDERmal Q24H Faith Correa PA-C   1 Patch at 06/22/20 9653    menthol-zinc oxide (CALMOSEPTINE) 0.44-20.6 % ointment   Topical Q6H PRN Faith Correa PA-C        0.9% sodium chloride infusion 250 mL  250 mL IntraVENous PRN Faith Correa PA-C        epoetin johnathon-epbx (RETACRIT) injection 10,000 Units  10,000 Units SubCUTAneous Q MON, WED & Dave Blackwell MD   10,000 Units at 06/19/20 2054    sodium chloride (NS) flush 5-40 mL  5-40 mL IntraVENous Q8H Monica Lassiter DO   10 mL at 06/22/20 0600    sodium chloride (NS) flush 5-40 mL  5-40 mL IntraVENous PRN Alonso ALCALA,         insulin lispro (HUMALOG) injection   SubCUTAneous Q6H Lucas Soto DO   2 Units at 06/22/20 4305    hydrALAZINE (APRESOLINE) 20 mg/mL injection 20 mg  20 mg IntraVENous Q6H PRN Faith Correa PA-C   20 mg at 06/19/20 0850    ondansetron (ZOFRAN) injection 4 mg  4 mg IntraVENous Q6H PRN Mago Camara MD   4 mg at 06/10/20 0030    glucose chewable tablet 16 g  4 Tab Oral PRN Velvet Pellet, DO        glucagon (GLUCAGEN) injection 1 mg  1 mg IntraMUSCular PRN Velvet Pellet, DO        dextrose (D50W) injection syrg 12.5-25 g  25-50 mL IntraVENous PRN Velvet Pellet, DO   25 g at 06/13/20 0041    furosemide (LASIX) injection 80 mg  80 mg IntraVENous BID Emilee Saavedra MD   Stopped at 06/22/20 3159    cyanocobalamin tablet 1,000 mcg  1,000 mcg Oral BID Dayton OMRENO MD   1,000 mcg at 06/22/20 2289    loratadine (CLARITIN) tablet 10 mg  10 mg Oral DAILY Hadley Angulo MD   10 mg at 06/22/20 6348    aspirin chewable tablet 81 mg  81 mg Oral DAILY Hadley Angulo MD   81 mg at 06/22/20 8175    [Held by provider] isosorbide mononitrate ER (IMDUR) tablet 30 mg  30 mg Oral DAILY Hadley Angulo MD   Stopped at 06/11/20 0900    rosuvastatin (CRESTOR) tablet 5 mg  5 mg Oral QHS Hadley Angulo MD        [Held by provider] gabapentin (NEURONTIN) capsule 100 mg  100 mg Oral BID Hadley Angulo MD   Stopped at 06/11/20 0900    ferrous sulfate tablet 325 mg  325 mg Oral EVERY OTHER DAY Hadley Angulo MD   325 mg at 06/22/20 4858    docusate sodium (COLACE) capsule 100 mg  100 mg Oral BID PRN Emilee Saavedra MD        heparin (porcine) injection 5,000 Units  5,000 Units SubCUTAneous Q8H Hadley Angulo MD   5,000 Units at 06/22/20 0502             Objective:  Vitals:    06/22/20 0727 06/22/20 0800 06/22/20 0959 06/22/20 1110   BP: 166/70   155/70   Pulse: 68 64  66   Resp: 20   19   Temp: 98.2 °F (36.8 °C)   97.9 °F (36.6 °C)   TempSrc:       SpO2: 92%  97% 94%   Weight:       Height:         .     Intake/Output Summary (Last 24 hours) at 6/22/2020 1204  Last data filed at 6/22/2020 9527  Gross per 24 hour   Intake 600 ml   Output    Net 600 ml       Admission weight:Weight: 121.6 kg (268 lb) (06/08/20 1131)    Last Weight Metrics:  Weight Loss Metrics 6/22/2020 5/13/2020 4/30/2020 4/29/2020 4/26/2020 8/18/2016 6/2/2016   Today's Wt 211 lb 14.4 oz 238 lb 5.1 oz - 252 lb 252 lb 4.8 oz 219 lb 228 lb   BMI 35.26 kg/m2 - 39.66 kg/m2 41.93 kg/m2 41.98 kg/m2 36.44 kg/m2 37.94 kg/m2            Physical Exam:     General: No acute distress. Neck: no jvd. LUNGS: diminished air entry at the bases, bl exp rhonchi. CVS EXM: S1, S2, no murmur, rub or gallop. Abdomen: Soft, Non Tender, non distended. Lower Extremities: trace Edema. Access: rt ij tdc.        Labs:    CBC w/Diff Recent Labs     06/22/20  0534 06/21/20  0317 06/20/20  0239   WBC 10.5 11.0 14.3*   RBC 3.14* 3.22* 3.52*   HGB 8.6* 8.9* 9.7*   HCT 27.6* 28.3* 30.3*    213 255   GRANS  --  80* 81*   LYMPH  --  11* 9*   EOS  --  0 0        Chemistry Recent Labs     06/22/20  0534 06/21/20  1241 06/21/20  0317 06/20/20  0239   *  --  180* 136*   *  --  140 138   K 4.1  --  3.6 3.4*   CL 99*  --  103 103   CO2 26  --  27 28   BUN 62*  --  42* 19*   CREA 4.54*  --  3.71* 2.19*   CA 8.3*  --  8.5 8.8   AGAP 10  --  10 7   BUCR 14  --  11* 9*   AP  --  69  --   --    TP  --  6.0*  --   --    ALB  --  2.7*  --   --    GLOB  --  3.3  --   --    AGRAT  --  0.8  --   --    PHOS 3.3  --  2.4* 1.6*          Lab Results   Component Value Date/Time    Iron 42 (L) 04/30/2020 12:41 PM    TIBC 319 04/30/2020 12:41 PM    Iron % saturation 13 (L) 04/30/2020 12:41 PM    Ferritin 411 (H) 06/10/2020 03:04 AM      Lab Results   Component Value Date/Time    Calcium 8.3 (L) 06/22/2020 05:34 AM    Phosphorus 3.3 06/22/2020 05:34 AM          Shanta Saleh M.D  [facility]  Office [phone]  Pager [phone]

## 2020-06-22 NOTE — PROGRESS NOTES
NUTRITION    Nursing Referral: [facility]  Nutrition Consult: Diet Education     RECOMMENDATIONS / PLAN:     - Decrease Nepro Shake to BID.  - Diet education provided today. - Monitor po intake and diet tolerance. - Continue RD inpatient monitoring and evaluation. NUTRITION INTERVENTIONS & DIAGNOSIS:     - Meals/snacks: modified composition   - Medical food supplement therapy: Nepro Shake, TID- modify  - Nutrition Education: renal & low-sodium diet provided 6/22    Nutrition Diagnosis: Increased nutrient needs protein related to increased expenditure as evidenced by ESRD on HD  Food and nutrition related knowledge deficit related to renal & low-sodium diet as evidenced by MD consult for education    ASSESSMENT:     6/22: Reports improving appetite and meal intake, consuming supplements around 2x per day. Now on HFNC. Diet education provided today per MD request, pt receptive. HD today. Pureed diet per pt request.    6/18: Started on diet after swallow evaluation this morning; plan for dialysis again today (3 L removed yesterday) and receiving diuretics with plan for MD to stop IV fluid- hypoglycemia resolved. BNP to be checked today. 6/17: Remains NPO on/off bipap overnight with plan for swallow evaluation prior to diet initiation, SLP re-consulted. HD planned today. 6/16: Started on trickle feeds yesterday then held, NGT clamped and removed with extubation by the afternoon. Remains NPO with concern for aspiration due to worsening chest xray today and may require re-intubation per MD. To remain NPO, diuresis and receiving dextrose infusion. 6/15: Remains intubated, OGT placed overnight/clamped and plan for dialysis today. Possible extubation today, dextrose infusion started for hypoglycemia- improved over the past 2 days. 6/12: Remains intubated s/p bronch yesterday and currently on SBT with plan for extubation today. HD today.  Swallow evaluation planned once extubated, recent BG WNL at 81-90 mg/dL and monitoring.   6/11: s/p RRT overnight for hypoxia, increased work of breakfast and copious secretions, noted to be chocking on her partial denture- intubated for airway protection and foreign body retrieved during procedure; plan for imaging today to confirm removal and possible extubation. No OG or NGT placed. 6/9: Admitted with pneumonia, CHF with hx of ESRD on HD. Diet started and noted pt eating meals in ED, intake unknown at this time. Wt gain noted PTA, question fluid status. Hx yesterday UF 3.5 L, 1200 mL FR recommended per Nephrology.     Nutritional intake adequate to meet patients estimated nutritional needs:  Unable to determine at this time    Diet: DIET NUTRITIONAL SUPPLEMENTS Breakfast, Lunch, Dinner; NEPRO  DIET DYSPHAGIA PUREED (NDD1)     Food Allergies: NKFA  Current Appetite: Fair- improving   Appetite/meal intake prior to admission: Unable to determine at this time  Feeding Limitations:  [x] Swallowing difficulty: SLP following    [x] Chewing difficulty: partial dentures- choking on partial denture lead to intubation 6/11/20    [] Other  Current Meal Intake:   Patient Vitals for the past 100 hrs:   % Diet Eaten   06/21/20 1625 100 %       Anuric/Oliguric    HD UF: 2.5 L (6/15), 3 L (6/17) , 3.1 L (6/19)  BM: 6/21, loose   Skin Integrity: WDL   Edema:   [] No     [x] Yes   Pertinent Medications: Reviewed: albumin, cyanocobalamin, docusate sodium prn, ferrous sulfate, furosemide, SSI, mirtazapine, pantoprazole, ondansetron prn, miralax, epoetin, rosuvastatin    Recent Labs     06/22/20  0534 06/21/20  1241 06/21/20  0317 06/20/20  0239   *  --  140 138   K 4.1  --  3.6 3.4*   CL 99*  --  103 103   CO2 26  --  27 28   *  --  180* 136*   BUN 62*  --  42* 19*   CREA 4.54*  --  3.71* 2.19*   CA 8.3*  --  8.5 8.8   MG 1.8  --  2.0 2.1   PHOS 3.3  --  2.4* 1.6*   ALB  --  2.7*  --   --    ALT  --  21  --   --        Intake/Output Summary (Last 24 hours) at 6/22/2020 1128  Last data filed at 6/21/2020 2100  Gross per 24 hour   Intake 360 ml   Output    Net 360 ml       Anthropometrics:  Ht Readings from Last 1 Encounters:   06/19/20 5' 5\" (1.651 m)     Last 3 Recorded Weights in this Encounter    06/19/20 1225 06/21/20 0556 06/22/20 0301   Weight: 98 kg (216 lb) 97.1 kg (214 lb) 96.1 kg (211 lb 14.4 oz)     Body mass index is 35.26 kg/m². Obese Class II    Weight History: weight gain noted PTA, question fluid status, ESRD on HD    Weight Metrics 6/22/2020 5/13/2020 4/30/2020 4/29/2020 4/26/2020 8/18/2016 6/2/2016   Weight 211 lb 14.4 oz 238 lb 5.1 oz - 252 lb 252 lb 4.8 oz 219 lb 228 lb   BMI 35.26 kg/m2 - 39.66 kg/m2 41.93 kg/m2 41.98 kg/m2 36.44 kg/m2 37.94 kg/m2        Admitting Diagnosis: Pneumonia [J18.9]  Pertinent PMHx: CHF, DM, GERD, HTN, uterine cancer, ESRD on HD    Education Needs:        [] None identified  [] Identified - Not appropriate at this time  [x]  Identified and addressed - refer to education log  Learning Limitations:   [x] None identified  [] Identified:   Cultural, Latter day & ethnic food preferences:  [x] None identified    [] Identified and addressed     ESTIMATED NUTRITION NEEDS:     Calories: 9503-8321 kcal (30-35 kcal/kg) based on  [] Actual  kg     [x] SBW 67 kg   Protein:  gm (1.2-1.5 gm/kg) based on  [] Actual BW      [x] SBW   Fluid: 750-1500 mL/day     MONITORING & EVALUATION:     Nutrition Goal(s):   - PO nutrition intake will meet >75% of patient estimated nutritional needs within the next 7 days. Outcome: Progressing towards goal    - Patient will increase knowledge of appropriate food choices on a renal, low sodium diet prior to discharge.   Outcome: New/Initial goal       Monitoring:   [x] Food and nutrient intake   [x] Food and nutrient administration  [x] Comparative standards   [x] Nutrition-focused physical findings   [x] Anthropometric Measurements   [x] Treatment/therapy   [x] Biochemical data, medical tests, and procedures        Previous Recommendations (for follow-up assessments only): Implemented      Discharge Planning: Nutritional discharge needs unknown at this time. Participated in care planning, discharge planning, & interdisciplinary rounds as appropriate.       Chacho Cormier RD  Pager: [phone]

## 2020-06-22 NOTE — PROGRESS NOTES
Problem: Self Care Deficits Care Plan (Adult)  Goal: *Acute Goals and Plan of Care (Insert Text)  Description: Occupational Therapy Goals  Initiated 6/22/2020 within 7 day(s). 1.  Patient will perform bed mobility in preparation for selfcare with moderate assistance . 2.  Patient will perform upper body dressing with supervision/set-up. 3.  Patient will perform grooming with modified independence. 4.  Patient will perform functional activity sitting EOB for 4-7 minutes with modified independence and F balance. 5.  Patient will perform toilet transfer with moderate assistance. 6.  Patient will participate in upper extremity therapeutic exercise/activities with independence for 8 minutes. 7.  Patient will utilize energy conservation techniques during functional activities with verbal cues. Prior Level of Function: Patient from [facility] and reports she was not performing self-care tasks or functional transfers at rehab. Patient seen 5/14/2020 at [facility] and per OT note was moderate assist for bed mobility, moderate assist for lower body dressing and moderate assist for functional transfers. Outcome: Progressing Towards Goal     OCCUPATIONAL THERAPY EVALUATION    Patient: Sarah Stage (2 y.o. female)  Date: 6/22/2020  Primary Diagnosis: Pneumonia [J18.9]  Procedure(s) (LRB):  FLEXIBLE BRONCHOSCOPY with BAL/SLIM SCOPE (N/A) 11 Days Post-Op   Precautions:  Aspiration, Skin      ASSESSMENT :  Patient cleared to participate in OT evaluation by RN. Upon entering the room, patient was supine in bed, alert, and agreeable to participate in OT evaluation at bed level only after minimal encouragement. Patient on Hi Flow this session with SPO2 reading at 93%. Patient educated on energy conservation techniques, pursed lip breathing, and self pacing during daily activities.  During the evaluation, the patient presented with limited BUE ROM and decreased BUE strength, however BUEs present to be still functional. Patient stand by assist for grooming and max assist for supine <> long sit. Based on the objective data described below, the patient presented with decreased energy, decreased strength, decreased independence, decreased functional balance, and decreased functional mobility, which impede pt's function with basic self-care/ADL tasks. Patient will benefit from skilled intervention to address the above impairments. Patient's rehabilitation potential is considered to be Fair  Factors which may influence rehabilitation potential include:   []             None noted  [x]             Mental ability/status  [x]             Medical condition  [x]             Home/family situation and support systems  [x]             Safety awareness  []             Pain tolerance/management  []             Other:      PLAN :  Recommendations and Planned Interventions:   [x]               Self Care Training                  [x]      Therapeutic Activities  [x]               Functional Mobility Training   []      Cognitive Retraining  [x]               Therapeutic Exercises           [x]      Endurance Activities  [x]               Balance Training                    [x]      Neuromuscular Re-Education  []               Visual/Perceptual Training     [x]      Home Safety Training  [x]               Patient Education                   [x]      Family Training/Education  []               Other (comment):    Frequency/Duration: Patient will be followed by occupational therapy 1-2 times per day/4-7 days per week to address goals.   Discharge Recommendations: Michele Day  Further Equipment Recommendations for Discharge: TBD at next level of care     SUBJECTIVE:   Patient stated Crissy Alejandran we do it next time, I'm tired    OBJECTIVE DATA SUMMARY:     Past Medical History:   Diagnosis Date    Arthritis     Cancer [facility]     CHF [facility]congestive heart failure) [facility]     Diabetes ([facility]     Fracture of neck ([facility]     GERD (gastroesophageal reflux disease)     Goiter     Hiatal hernia     Hypertension     Uterine cancer (HCC)      Past Surgical History:   Procedure Laterality Date    HX APPENDECTOMY      HX HYSTERECTOMY      HX KNEE REPLACEMENT Right     HX ORTHOPAEDIC      odontoid fracture repair with hardware placement.  HX PARTIAL THYROIDECTOMY      [place] INSJ TUNNELED CVC W/O SUBQ PORT/ AGE 5 YR/> Right 5/7/2020    INSERTION TUNNELED CENTRAL VENOUS CATHETER performed by Jose Kang MD at [address] [place] [address]     Barriers to Learning/Limitations: None  Compensate with: visual, verbal, tactile, kinesthetic cues/model    Home Situation:   Home Situation  Home Environment: Long term care  # Steps to Enter: 4  One/Two Story Residence: One story  Living Alone: Yes  Support Systems: None  Patient Expects to be Discharged to[de-identified] Unknown  Current DME Used/Available at Home: Walker, rolling, Wheelchair  [x]  Right hand dominant   []  Left hand dominant    Cognitive/Behavioral Status:  Neurologic State: Alert  Orientation Level: Oriented to person;Oriented to place;Oriented to situation  Cognition: Follows commands  Safety/Judgement: Fall prevention    Skin: Intact  Edema: None noted    Vision/Perceptual:    Acuity: Within Defined Limits    Corrective Lenses: Glasses    Coordination: BUE  Fine Motor Skills-Upper: Left Intact; Right Intact    Gross Motor Skills-Upper: Left Intact; Right Intact    Balance:  Sitting: Impaired  Sitting - Static: Poor (constant support)    Strength: BUE  Strength: Generally decreased, functional    Tone & Sensation: BUE  Tone: Normal  Sensation: Intact    Range of Motion: BUE  AROM: Generally decreased, functional    Functional Mobility and Transfers for ADLs:  Bed Mobility:   Supine to Sit: Maximum assistance(longsit)  Sit to Supine: Maximum assistance      ADL Assessment:   Feeding: Setup;Stand-by assistance    Oral Facial Hygiene/Grooming: Setup;Stand-by assistance    Bathing: Maximum assistance    Upper Body Dressing: Minimum assistance    Lower Body Dressing: Maximum assistance    Toileting: Maximum assistance       ADL Intervention:  Feeding  Feeding Assistance: Stand-by assistance(simulation)    Grooming  Grooming Assistance: Set-up; Stand-by assistance  Washing Face: Stand-by assistance  Brushing/Combing Hair: Stand-by assistance    Cognitive Retraining  Safety/Judgement: Fall prevention    Pain:  Pain level pre-treatment: 0/10   Pain level post-treatment: 0/10   Pain Intervention(s): Medication (see MAR); Response to intervention: Nurse notified, See doc flow    Activity Tolerance:   Poor    Please refer to the flowsheet for vital signs taken during this treatment. After treatment:   [] Patient left in no apparent distress sitting up in chair  [x] Patient left in no apparent distress in bed  [x] Call bell left within reach  [x] Nursing notified  [] Caregiver present  [] Bed alarm activated    COMMUNICATION/EDUCATION:   [x] Role of Occupational Therapy in the acute care setting  [x] Home safety education was provided and the patient/caregiver indicated understanding. [x] Patient/family have participated as able in goal setting and plan of care. [x] Patient/family agree to work toward stated goals and plan of care. [] Patient understands intent and goals of therapy, but is neutral about his/her participation. [] Patient is unable to participate in goal setting and plan of care. Thank you for this referral.  Micaela Del Real OTR/L  Time Calculation: 10 mins    Eval Complexity: History: MEDIUM Complexity : Expanded review of history including physical, cognitive and psychosocial  history ; Examination: MEDIUM Complexity : 3-5 performance deficits relating to physical, cognitive , or psychosocial skils that result in activity limitations and / or participation restrictions;    Decision Making:MEDIUM Complexity : Patient may present with comorbidities that affect occupational performnce.  Miniml to moderate modification of tasks or assistance (eg, physical or verbal ) with assesment(s) is necessary to enable patient to complete evaluation

## 2020-06-22 NOTE — PROGRESS NOTES
[facility]  Progress Note    Patient: Skye New Age: 71 y.o. : 1950 MR#: [id number] SSN: [id number]  Date/Time: 2020     Subjective:     Review of systems    No CP   NO NVD  No SOB  NO Cough     Assessment/Plan:     Patient with h/o ESRD admitted with ARF with hypoxia requiring intubation and vent support . 1 Acute respiratory failure : intubated and extubated   - Etiology includes -  Acute on chronic CHF , ? Pneumonia Aspiration  , FB - Denture in trach. Found during intubation , removed . - now on NC - doing better     2  Systolic HF   - Continue diuresis / HD / on ARB     3 ESRD on HD per renal     4 DM2   - On SSI     5 Pneumonia - aspiration - s/p antibiotic treatment     6 Anemia of chronic illness - on Procrit            Case discussed with:  [x]Patient  [] Family ( In room with patient )    []Nursing  []Case Management  DVT Prophylaxis:  []Lovenox  []Hep SQ  []SCDs  []Coumadin   []On Heparin gtt          Objective:   VS:   Visit Vitals  /70   Pulse 68   Temp 97.9 °F (36.6 °C)   Resp 19   Ht 5' 5\" (1.651 m)   Wt 96.1 kg (211 lb 14.4 oz)   SpO2 95%   Breastfeeding No   BMI 35.26 kg/m²      Tmax/24hrs: Temp (24hrs), Av.3 °F (36.8 °C), Min:97.9 °F (36.6 °C), Max:98.8 °F (37.1 °C)  IOBRIEF    Intake/Output Summary (Last 24 hours) at 2020 1355  Last data filed at 2020 8381  Gross per 24 hour   Intake 600 ml   Output    Net 600 ml       General:  Alert, cooperative, no acute distress   Cardiovascular: S1S2 - regular , No Murmur   Pulmonary: Equal expansion , No Use of accessory muscles , No Rales No Rhonchi    GI:  +BS in all four quadrants, soft, non-tender  Extremities:  No edema; 2+ dorsalis pedis pulses bilaterally  Neuro: Alert and oriented X 2.        Medications:   Current Facility-Administered Medications   Medication Dose Route Frequency    losartan (COZAAR) tablet 25 mg  25 mg Oral DAILY    polyethylene glycol (MIRALAX) packet 17 g  17 g Oral DAILY PRN    venlafaxine-SR (EFFEXOR-XR) capsule 37.5 mg  37.5 mg Oral DAILY WITH BREAKFAST    mirtazapine (REMERON) tablet 7.5 mg  7.5 mg Oral QHS    pantoprazole (PROTONIX) tablet 40 mg  40 mg Oral ACB    carvediloL (COREG) tablet 25 mg  25 mg Oral BID WITH MEALS    albuterol-ipratropium (DUO-NEB) 2.5 MG-0.5 MG/3 ML  3 mL Nebulization Q6H RT    albuterol-ipratropium (DUO-NEB) 2.5 MG-0.5 MG/3 ML  3 mL Nebulization Q4H PRN    acetaminophen (TYLENOL) tablet 325 mg  325 mg Oral Q4H PRN    sodium chloride 3% hypertonic nebulizer soln  4 mL Nebulization TID PRN    lidocaine 4 % patch 1 Patch  1 Patch TransDERmal Q24H    menthol-zinc oxide (CALMOSEPTINE) 0.44-20.6 % ointment   Topical Q6H PRN    0.9% sodium chloride infusion 250 mL  250 mL IntraVENous PRN    epoetin johnathon-epbx (RETACRIT) injection 10,000 Units  10,000 Units SubCUTAneous Q MON, WED & FRI    sodium chloride (NS) flush 5-40 mL  5-40 mL IntraVENous Q8H    sodium chloride (NS) flush 5-40 mL  5-40 mL IntraVENous PRN    insulin lispro (HUMALOG) injection   SubCUTAneous Q6H    hydrALAZINE (APRESOLINE) 20 mg/mL injection 20 mg  20 mg IntraVENous Q6H PRN    ondansetron (ZOFRAN) injection 4 mg  4 mg IntraVENous Q6H PRN    glucose chewable tablet 16 g  4 Tab Oral PRN    glucagon (GLUCAGEN) injection 1 mg  1 mg IntraMUSCular PRN    dextrose (D50W) injection syrg 12.5-25 g  25-50 mL IntraVENous PRN    furosemide (LASIX) injection 80 mg  80 mg IntraVENous BID    cyanocobalamin tablet 1,000 mcg  1,000 mcg Oral BID    loratadine (CLARITIN) tablet 10 mg  10 mg Oral DAILY    aspirin chewable tablet 81 mg  81 mg Oral DAILY    [Held by provider] isosorbide mononitrate ER (IMDUR) tablet 30 mg  30 mg Oral DAILY    rosuvastatin (CRESTOR) tablet 5 mg  5 mg Oral QHS    [Held by provider] gabapentin (NEURONTIN) capsule 100 mg  100 mg Oral BID    ferrous sulfate tablet 325 mg  325 mg Oral EVERY OTHER DAY    docusate sodium (COLACE) capsule 100 mg  100 mg Oral BID PRN    heparin (porcine) injection 5,000 Units  5,000 Units SubCUTAneous Q8H       Labs:    Recent Labs     06/22/20  0534 06/21/20  0317 06/20/20  0239   WBC 10.5 11.0 14.3*   HGB 8.6* 8.9* 9.7*   HCT 27.6* 28.3* 30.3*    213 255     Recent Labs     06/22/20  0534 06/21/20  1241 06/21/20  0317 06/20/20  0239   *  --  140 138   K 4.1  --  3.6 3.4*   CL 99*  --  103 103   CO2 26  --  27 28   *  --  180* 136*   BUN 62*  --  42* 19*   CREA 4.54*  --  3.71* 2.19*   CA 8.3*  --  8.5 8.8   MG 1.8  --  2.0 2.1   PHOS 3.3  --  2.4* 1.6*   ALB  --  2.7*  --   --    ALT  --  21  --   --          Disclaimer: Sections of this note are dictated utilizing voice recognition software, which may have resulted in some phonetic based errors in grammar and contents. Even though attempts were made to correct all the mistakes, some may have been missed, and remained in the body of the document. If questions arise, please contact our department.     Signed By: Joslyn Bailey MD     June 22, 2020

## 2020-06-22 NOTE — PROGRESS NOTES
PCCM Progress Note                                                I have reviewed the flowsheet and previous days notes. Events, vitals, medications and notes from last 24 hours reviewed. Subjective:    6/22/2020     Pt seen and examined at bedside. Pt remains on high flow nasal cannula, being weaned to an FiO2 of 40% with 30 L of flow  She is eating her breakfast and denies any difficulty swallowing. Still with some cough with congested sounding mucus which she swallows  Denies shortness of breath  Denies any chest pain  Chart review does not show any overnight events of concern    Int Hx:  Clarence Jimeenz is a 71 y.o. female w/ PMH of uterine cancer, CHF, ESRD, depression, deconditioning, DMII, CAD, HTN, HLD who presented to [facility] via EMS from [place] [facility] on 6/8 for acute respiratory distress with hypoxia requiring BVM. She had acute on chronic CHF and was started on IV lasix and bipap and admitted to the floor. There was concern for pneumonia and she was also started on abx, her covid test was (-), likely due to aspiration. 1/2 BC was (+) for staph which was felt to be a contaminant. On 6/11 a RRT was called for worsening resp failure, during intubation a denture was found obstructing her airway which was removed. Proceeded with intubation due to profound resp acidosis. A bronchoscopy was completed on following to r/o any other aspiration of FB. She was slow to wean from vent due hypoxia. On 6/16 she was extubated. She required bipap and high levels of HFNC following extubation for a few day but was able to wean solely to HFNC during the day with bipap at night. She has transitioned to a PO diet and is doing well.     Patient Active Problem List   Diagnosis Code    Acute on chronic diastolic congestive heart failure (HCC) I50.33    Suspected COVID-19 virus infection Z20.828    Type 2 diabetes mellitus without complication, without long-term current use of insulin (HCC) E11.9    Left anterior fascicular block I44.4    HTN (hypertension), benign I10    Coronary artery disease involving native coronary artery of native heart without angina pectoris I25.10    Chronic diastolic congestive heart failure ([facility]) I50.32    Bilateral lower extremity edema R60.0    BMI 35.0-35.9,adult Z68.35    CHF (congestive heart failure) ([facility]) I50.9    Sepsis ([facility]) A41.9    Respiratory failure ([facility]) J96.90    SIRS (systemic inflammatory response syndrome) ([facility]) R65.10    Acute on chronic respiratory failure ([facility]) J96.20    Pneumonia J18.9    Fluid overload E87.70    ESRD (end stage renal disease) ([facility]) N18.6    Debility R53.81    Respiratory failure with hypoxia and hypercapnia ([facility]) J96.91, J96.92    Depression F32.9    Aspiration of foreign body T17.900A       Medication Reviewed: Allergies   Allergen Reactions    Augmentin [Amoxicillin-Pot Clavulanate] Diarrhea    Clavulanic Acid Diarrhea    Levaquin [Levofloxacin] Other (comments)     Severe hypoglycemia        Past Medical History:   Diagnosis Date    Arthritis     Cancer ([facility]     CHF (congestive heart failure) ([facility])     Diabetes ([facility]     Fracture of neck ([facility])     GERD (gastroesophageal reflux disease)     Goiter     Hiatal hernia     Hypertension     Uterine cancer ([facility])       Past Surgical History:   Procedure Laterality Date    HX APPENDECTOMY      HX HYSTERECTOMY      HX KNEE REPLACEMENT Right     HX ORTHOPAEDIC      odontoid fracture repair with hardware placement.  HX PARTIAL THYROIDECTOMY      ID INSJ TUNNELED CVC W/O SUBQ PORT/ AGE 5 YR/> Right 5/7/2020    INSERTION TUNNELED CENTRAL VENOUS CATHETER performed by Ronen Hagen MD at [facility] CATH LAB      Social History     Tobacco Use    Smoking status: Never Smoker   Substance Use Topics    Alcohol use: No      History reviewed. No pertinent family history. Prior to Admission medications    Medication Sig Start Date End Date Taking?  Authorizing Provider venlafaxine-SR (EFFEXOR-XR) 37.5 mg capsule Take 50 mg by mouth once. Yes Provider, Historical   gabapentin (NEURONTIN) 100 mg capsule Take 1 Cap by mouth two (2) times a day. Max Daily Amount: 200 mg. 5/13/20  Yes Adrienne Martinez MD   carvediloL (COREG) 12.5 mg tablet Take 1 Tab by mouth two (2) times daily (with meals). 5/13/20  Yes Adrienne Martinez MD   docusate sodium (COLACE) 100 mg capsule Take 1 Cap by mouth two (2) times daily as needed for Constipation for up to 90 days. 5/13/20 8/11/20 Yes Adrienne Martinez MD   OXYGEN-AIR DELIVERY SYSTEMS Take 2 L by inhalation daily. Yes Provider, Historical   amLODIPine (NORVASC) 10 mg tablet Take 1 Tab by mouth daily. 4/28/20  Yes Ynes Le MD   polyethylene glycol (MIRALAX) 17 gram packet Take 1 Packet by mouth daily. 4/28/20  Yes Ynes Le MD   rosuvastatin (CRESTOR) 5 mg tablet Take 1 Tab by mouth nightly. 4/28/20  Yes Ynes Le MD   hydrALAZINE (APRESOLINE) 100 mg tablet Take 1 Tab by mouth three (3) times daily. 4/28/20  Yes Ynes Le MD   aspirin 81 mg chewable tablet Take 1 Tab by mouth daily. 4/28/20  Yes Ynes Le MD   esomeprazole (NEXIUM) 40 mg capsule Take 40 mg by mouth daily. Yes Isabel, MD Larry   cranberry extract (AZO CRANBERRY) 450 mg tab Take 2 Tabs by mouth daily. Yes Isbael, MD Larry   cholecalciferol (VITAMIN D3) 1,000 unit cap Take 5,000 Units by mouth daily. Yes Larry Hall MD   cyanocobalamin (VITAMIN B-12) 1,000 mcg tablet Take 1,000 mcg by mouth two (2) times a day. Yes Larry Hall MD   Biotin 2,500 mcg cap Take 1 Tab by mouth two (2) times a day. Yes Isabel, MD Larry   vitamin a-vitamin c-vit e-min (OCUVITE) tablet Take 1 Tab by mouth daily. Yes Larry Hall MD   loratadine (CLARITIN) 10 mg tablet Take 10 mg by mouth daily. Yes Larry Hall, MD   B.infantis-B.ani-B.long-B.bifi (PROBIOTIC 4X) 10-15 mg TbEC Take 1 Tab by mouth daily.    Yes Other, Larry MD   mirtazapine (REMERON) 15 mg tablet Take 1 Tab by mouth nightly. 20   Collin Ureña MD   isosorbide mononitrate ER (IMDUR) 30 mg tablet Take 1 Tab by mouth daily.  20   Millicent Hinson MD     Current Facility-Administered Medications   Medication Dose Route Frequency    albumin human 25% (BUMINATE) solution 25 g  25 g IntraVENous Q6H    venlafaxine-SR (EFFEXOR-XR) capsule 37.5 mg  37.5 mg Oral DAILY WITH BREAKFAST    mirtazapine (REMERON) tablet 7.5 mg  7.5 mg Oral QHS    pantoprazole (PROTONIX) tablet 40 mg  40 mg Oral ACB    carvediloL (COREG) tablet 25 mg  25 mg Oral BID WITH MEALS    albuterol-ipratropium (DUO-NEB) 2.5 MG-0.5 MG/3 ML  3 mL Nebulization Q6H RT    lidocaine 4 % patch 1 Patch  1 Patch TransDERmal Q24H    epoetin johnathon-epbx (RETACRIT) injection 10,000 Units  10,000 Units SubCUTAneous Q MON, WED & FRI    sodium chloride (NS) flush 5-40 mL  5-40 mL IntraVENous Q8H    insulin lispro (HUMALOG) injection   SubCUTAneous Q6H    furosemide (LASIX) injection 80 mg  80 mg IntraVENous BID    cyanocobalamin tablet 1,000 mcg  1,000 mcg Oral BID    loratadine (CLARITIN) tablet 10 mg  10 mg Oral DAILY    aspirin chewable tablet 81 mg  81 mg Oral DAILY    [Held by provider] isosorbide mononitrate ER (IMDUR) tablet 30 mg  30 mg Oral DAILY    rosuvastatin (CRESTOR) tablet 5 mg  5 mg Oral QHS    [Held by provider] gabapentin (NEURONTIN) capsule 100 mg  100 mg Oral BID    ferrous sulfate tablet 325 mg  325 mg Oral EVERY OTHER DAY    heparin (porcine) injection 5,000 Units  5,000 Units SubCUTAneous Q8H     Objective:  Vital Signs:    Visit Vitals  /70   Pulse 64   Temp 98.2 °F (36.8 °C)   Resp 20   Ht 5' 5\" (1.651 m)   Wt 96.1 kg (211 lb 14.4 oz)   SpO2 97%   Breastfeeding No   BMI 35.26 kg/m²      O2 Device: Hi flow nasal cannula  O2 Flow Rate (L/min): 30 l/min  Temp (24hrs), Av.3 °F (36.8 °C), Min:98 °F (36.7 °C), Max:98.8 °F (37.1 °C)      Intake/Output: Last shift:      No intake/output data recorded. Last 3 shifts: 06/20 1901 - 06/22 0700  In: 810 [P.O.:360; I.V.:450]  Out: -       Intake/Output Summary (Last 24 hours) at 6/22/2020 1022  Last data filed at 6/21/2020 2100  Gross per 24 hour   Intake 360 ml   Output    Net 360 ml       Last 3 Recorded Weights in this Encounter    06/19/20 1225 06/21/20 0556 06/22/20 0301   Weight: 98 kg (216 lb) 97.1 kg (214 lb) 96.1 kg (211 lb 14.4 oz)       Physical Exam:   Vitals reviewed:  Gen[de-identified] Alert, Awake, comfortable, laying in bed inclined, wearing high flow nasal cannula, cooperative  Head:   NC/AT  Eye:   PERRL, EOM intact, no scleral icterus, no pallor  Nose:   Nasal septum midline, no drainage, no epistaxis, and wearing high flow cannula  Oral:   Poor dentition, no oral lesions, mucous membranes moist, Mallamapti IV  Neck:   Supple, trachea midline, no cervical or supraclavicular adenopathy  Lung:   Decreased breath sounds in bilateral bases, poor effort, fine rales in bilateral bases, otherwise clear to auscultation, no wheezes or rhonchi  Heart:   Regular rate & rhythm. S1 S2 present. No murmurs/rubs/gallops  Abdomen/: Soft, non tender, BS +nt  Extremities:  Edema improving, no clubbing or cyanosis  Skin:   Dry, intact  Neuro:   Patient alert, moving extremities, sensation intact grossly    Data:      Recent Results (from the past 24 hour(s))   GLUCOSE, POC    Collection Time: 06/21/20 11:14 AM   Result Value Ref Range    Glucose (POC) 160 (H) 70 - 110 mg/dL   PROCALCITONIN    Collection Time: 06/21/20 12:41 PM   Result Value Ref Range    Procalcitonin 0.41 ng/mL   HEPATIC FUNCTION PANEL    Collection Time: 06/21/20 12:41 PM   Result Value Ref Range    Protein, total 6.0 (L) 6.4 - 8.2 g/dL    Albumin 2.7 (L) 3.4 - 5.0 g/dL    Globulin 3.3 2.0 - 4.0 g/dL    A-G Ratio 0.8 0.8 - 1.7      Bilirubin, total 0.4 0.2 - 1.0 MG/DL    Bilirubin, direct 0.1 0.0 - 0.2 MG/DL    Alk.  phosphatase 69 45 - 117 U/L    AST (SGOT) 25 10 - 38 U/L    ALT (SGPT) 21 13 - 56 U/L   GLUCOSE, POC    Collection Time: 06/21/20  5:48 PM   Result Value Ref Range    Glucose (POC) 171 (H) 70 - 110 mg/dL   GLUCOSE, POC    Collection Time: 06/22/20 12:32 AM   Result Value Ref Range    Glucose (POC) 225 (H) 70 - 110 mg/dL   GLUCOSE, POC    Collection Time: 06/22/20  5:15 AM   Result Value Ref Range    Glucose (POC) 151 (H) 70 - 110 mg/dL   CBC W/O DIFF    Collection Time: 06/22/20  5:34 AM   Result Value Ref Range    WBC 10.5 4.6 - 13.2 K/uL    RBC 3.14 (L) 4.20 - 5.30 M/uL    HGB 8.6 (L) 12.0 - 16.0 g/dL    HCT 27.6 (L) 35.0 - 45.0 %    MCV 87.9 74.0 - 97.0 FL    MCH 27.4 24.0 - 34.0 PG    MCHC 31.2 31.0 - 37.0 g/dL    RDW 15.1 (H) 11.6 - 14.5 %    PLATELET 439 286 - 259 K/uL    MPV 11.6 9.2 - 11.8 FL   MAGNESIUM    Collection Time: 06/22/20  5:34 AM   Result Value Ref Range    Magnesium 1.8 1.6 - 2.6 mg/dL   METABOLIC PANEL, BASIC    Collection Time: 06/22/20  5:34 AM   Result Value Ref Range    Sodium 135 (L) 136 - 145 mmol/L    Potassium 4.1 3.5 - 5.5 mmol/L    Chloride 99 (L) 100 - 111 mmol/L    CO2 26 21 - 32 mmol/L    Anion gap 10 3.0 - 18 mmol/L    Glucose 142 (H) 74 - 99 mg/dL    BUN 62 (H) 7.0 - 18 MG/DL    Creatinine 4.54 (H) 0.6 - 1.3 MG/DL    BUN/Creatinine ratio 14 12 - 20      GFR est AA 12 (L) >60 ml/min/1.73m2    GFR est non-AA 10 (L) >60 ml/min/1.73m2    Calcium 8.3 (L) 8.5 - 10.1 MG/DL   PHOSPHORUS    Collection Time: 06/22/20  5:34 AM   Result Value Ref Range    Phosphorus 3.3 2.5 - 4.9 MG/DL   NT-PRO BNP    Collection Time: 06/22/20  5:34 AM   Result Value Ref Range    NT pro-BNP 47,401 (H) 0 - 900 PG/ML         Chemistry   Recent Labs     06/22/20  0534 06/21/20  1241 06/21/20  0317 06/20/20  0239   *  --  180* 136*   *  --  140 138   K 4.1  --  3.6 3.4*   CL 99*  --  103 103   CO2 26  --  27 28   BUN 62*  --  42* 19*   CREA 4.54*  --  3.71* 2.19*   CA 8.3*  --  8.5 8.8   MG 1.8  --  2.0 2.1   PHOS 3.3  --  2.4* 1.6* AGAP 10  --  10 7   BUCR 14  --  11* 9*   AP  --  69  --   --    TP  --  6.0*  --   --    ALB  --  2.7*  --   --    GLOB  --  3.3  --   --    AGRAT  --  0.8  --   --        CBC w/Diff   Recent Labs     06/22/20  0534 06/21/20  0317 06/20/20  0239   WBC 10.5 11.0 14.3*   RBC 3.14* 3.22* 3.52*   HGB 8.6* 8.9* 9.7*   HCT 27.6* 28.3* 30.3*    213 255   GRANS  --  80* 81*   LYMPH  --  11* 9*   EOS  --  0 0       ABG  No results for input(s): PHI, PHI, POC2, PCO2I, PO2, PO2I, HCO3, HCO3I, FIO2, FIO2I in the last 72 hours. Micro   No results for input(s): SDES, CULT in the last 72 hours. No results for input(s): CULT in the last 72 hours. CT (Most Recent)    Results from Hospital Encounter encounter on 06/07/20   CTA CHEST W OR W WO CONT    Narrative CTA OF THE CHEST, WITH CORONAL AND SAGITTAL REFORMATTED MIP IMAGES, PULMONARY  EMBOLISM PROTOCOL    CPT CODE: 73861    INDICATION: Above. Acute on chronic respiratory failure requiring mechanical  ventilation. Recent pneumonia and fluid overload. Aspiration of foreign body. Acute respiratory failure with hypoxia and hypercapnia. Clinical concern for  pulmonary embolism. COMPARISON: No prior pulmonary CTA in PACS. Reference noncontrast enhanced chest  CT 6/11/2020, prior standard protocol contrast-enhanced chest CT 10/6/2007. TECHNIQUE: With IV administration of 100 ml Isovue-370, axial CT images through  the thorax were obtained using helical technique following a pulmonary embolism  protocol. In order more optimally to evaluate the pulmonary arterial tree in  multiplanar CT angiographic fashion, coronal and sagittal reformation maximum  intensity projection (MIP) images were also performed.     All CT scans at this facility are performed using dose optimization technique as  appropriate to the performed exam, to include automated exposure control,  adjustment of the mA and/or kV according to patient's size (Including  appropriate matching for site-specific examinations), or use of iterative  reconstruction technique. FINDINGS:    Mildly suboptimal contrast bolus. There is near iso enhancementisoenhancement of  the pulmonary veins, thoracic aorta, and pulmonary arteries. Image quality is  degraded by body habitus, hypoinflation, and arms at sides, which result in  streak/mottle/noise artifact, in addition to bibasilar consolidations. There is  no convincing evidence of pulmonary arterial filling defect in the main,  central, lobar, or larger enhanced segmental branches of the pulmonary arterial  tree indicative of acute pulmonary embolism. Assessment becomes limited  beginning at the segmental level. Endotracheal tube is present with tip cephalad to the garth. Moderate sized  bilateral pleural effusions, similar compared to prior. Bilateral adjacent  consolidations, slightly increased compared to the prior chest CT, likely  atelectasis, underlying airspace disease not excluded. Additional hazy bilateral airspace disease most conspicuous in the upper lobes. Additional multinodular airspace disease in the left upper lobe posteriorly. Couple of additional tiny chronic right-sided pulmonary nodules are again noted. No evidence of pneumothorax. There is some mosaic attenuation with geographic  variation in lung density best appreciated in the left upper lobe, similar to  prior, may reflect a component of air-trapping such as can be seen due to small  airways disease. Very small pericardial effusion. Multichamber cardiac enlargement. Atherosclerotic calcifications noted including in the coronary arteries. The  main pulmonary artery appears prominent in caliber, approximately 4.1 cm, which  can be seen with pulmonary hypertension. The thoracic aorta enhances normally. Usual limitation of cardiac motion artifacts noted. Esophagus appears mildly patulous. No evidence of pathologic lymph node enlargement is seen.     On review of bone windows, no acute fractures or destructive bone lesions are  seen. Cervical spine fusion hardware noted. Impression Impression:      Image quality is degraded by body habitus, hypoinflation, arms at sides, and  mildly suboptimal contrast bolus, as noted above. No evidence of acute pulmonary  embolism in the main, central, lobar, or larger enhanced segmental branches of  the pulmonary arterial tree. Assessment becomes limited beginning at the  segmental level. Moderate sized bilateral pleural effusions. Adjacent bibasilar consolidations,  slightly larger compared to prior, likely atelectasis, underlying airspace  disease not excluded. Hazy lung opacity bilaterally. Multinodular airspace disease in the left upper  lobe. Cardiomegaly. Additional nonacute findings as above. XR (Most Recent). CXR reviewed by me and compared with previous CXR   Results from Hospital Encounter encounter on 06/07/20   XR CHEST SNGL V    Narrative EXAM: PORTABLE CHEST 0240 hours    CLINICAL HISTORY/INDICATION: respiratory failure , patient presented with acute  on chronic respiratory failure which should require mechanical ventilation,  episode of aspiration, patient is afebrile with normal white blood cell count         COMPARISON: Chest x-ray June 18 through June 14, 2020, May 6, 2020. TECHNIQUE: Single AP view    FINDINGS:     Right jugular double-lumen catheter terminates at the right atrium. Marked interval improvement in the bilateral multifocal infiltrates. Inability  to penetrate the enlarged left heart. Pulmonary vessels normal. Right  costophrenic angle is sharp. Stable cardiomegaly. .      Impression IMPRESSION:    Marked interval decrease in bilateral multifocal infiltrates. Inability to evaluate the left lung base as described above.   Stable cardiomegaly       Assessment:  · Acute on Chronic Hypoxic Respiratory Failure - Multifactorial. Required emergent intubation on 06/11/20, 2/2 aspiration event on the floor (aspiration of partial dentures, removed with forceps) in addition to PNA and CHF Exacerbation/fluid overload on CXR. Extubated to NC on 06/15 with intermittent BiPAP use. Now on high flow nasal cannula 40% at 30 L flow  · Aspiration of foreign body (partial dentures) into the hypopharynxretrieved with forceps. S/p bronc 6/11/2020some mucous plugs, small caliber airways noted but no foreign material.  CTA chest (06/13) (-) for PE and (-) retained FB. Probable additional episode of aspiration on 6/16 after extubation  · RUL infiltrate/PNA  CXR (6/7/20), scant yeast on Resp Cx. · Acute on chronic HFpEF - Most recent Echo ( 04/25/20) EF 55-60%. · Fluid overload- severe edema  · Bacteremia vs contaminant - BxCx (06/07) (+)GPC in 1/2 bottles, repeat BxCx - NGTD  · ESRD - HD MWF  · Acute on Chronic Anemia - s/p 2u PRBCs this admission    Recommendations    Supplemental oxygen to maintain SpO2 >88% --will actively try to wean off HFNC. May use Bipap QHS and PRN  Bronchial hygiene-airway clearance measures  Please assess for home oxygen need prior to discharge  Frequent incentive spirometry  Strict aspiration precautions including elevating HOB >30deg  PT/OT, OOB, ambulate with assistance as tolerated  DVT ppx per primary service  Will follow      High complexity decision making was performed during the evaluation of this patient at high risk for decompensation with multiple organ involvement     Above mentioned total time spent on reviewing the case/medical record/data/notes/EMR/patient examination/documentation/coordinating care with nurse/consultants, exclusive of procedures with complex decision making performed and > 50% time spent in face to face evaluation.           Blair Cristobal MD, [facility]    Pulmonary, Critical Care Medicine  [facility]

## 2020-06-23 LAB
ANION GAP SERPL CALC-SCNC: 9 MMOL/L (ref 3–18)
BUN SERPL-MCNC: 41 MG/DL (ref 7–18)
BUN/CREAT SERPL: 12 (ref 12–20)
CALCIUM SERPL-MCNC: 9 MG/DL (ref 8.5–10.1)
CHLORIDE SERPL-SCNC: 101 MMOL/L (ref 100–111)
CO2 SERPL-SCNC: 25 MMOL/L (ref 21–32)
CREAT SERPL-MCNC: 3.41 MG/DL (ref 0.6–1.3)
ERYTHROCYTE [DISTWIDTH] IN BLOOD BY AUTOMATED COUNT: 15 % (ref 11.6–14.5)
GLUCOSE BLD STRIP.AUTO-MCNC: 185 MG/DL (ref 70–110)
GLUCOSE BLD STRIP.AUTO-MCNC: 206 MG/DL (ref 70–110)
GLUCOSE BLD STRIP.AUTO-MCNC: 216 MG/DL (ref 70–110)
GLUCOSE BLD STRIP.AUTO-MCNC: 236 MG/DL (ref 70–110)
GLUCOSE BLD STRIP.AUTO-MCNC: 248 MG/DL (ref 70–110)
GLUCOSE SERPL-MCNC: 235 MG/DL (ref 74–99)
HCT VFR BLD AUTO: 30.4 % (ref 35–45)
HGB BLD-MCNC: 9.4 G/DL (ref 12–16)
MAGNESIUM SERPL-MCNC: 1.8 MG/DL (ref 1.6–2.6)
MCH RBC QN AUTO: 27.7 PG (ref 24–34)
MCHC RBC AUTO-ENTMCNC: 30.9 G/DL (ref 31–37)
MCV RBC AUTO: 89.7 FL (ref 74–97)
PHOSPHATE SERPL-MCNC: 3.1 MG/DL (ref 2.5–4.9)
PLATELET # BLD AUTO: 226 K/UL (ref 135–420)
PMV BLD AUTO: 11.5 FL (ref 9.2–11.8)
POTASSIUM SERPL-SCNC: 3.9 MMOL/L (ref 3.5–5.5)
RBC # BLD AUTO: 3.39 M/UL (ref 4.2–5.3)
SODIUM SERPL-SCNC: 135 MMOL/L (ref 136–145)
WBC # BLD AUTO: 10.5 K/UL (ref 4.6–13.2)

## 2020-06-23 PROCEDURE — 94640 AIRWAY INHALATION TREATMENT: CPT

## 2020-06-23 PROCEDURE — 85027 COMPLETE CBC AUTOMATED: CPT

## 2020-06-23 PROCEDURE — 82962 GLUCOSE BLOOD TEST: CPT

## 2020-06-23 PROCEDURE — 74011250637 HC RX REV CODE- 250/637: Performed by: PHYSICIAN ASSISTANT

## 2020-06-23 PROCEDURE — 84100 ASSAY OF PHOSPHORUS: CPT

## 2020-06-23 PROCEDURE — 86769 SARS-COV-2 COVID-19 ANTIBODY: CPT

## 2020-06-23 PROCEDURE — 83735 ASSAY OF MAGNESIUM: CPT

## 2020-06-23 PROCEDURE — 74011250637 HC RX REV CODE- 250/637: Performed by: HOSPITALIST

## 2020-06-23 PROCEDURE — 74011000250 HC RX REV CODE- 250: Performed by: INTERNAL MEDICINE

## 2020-06-23 PROCEDURE — 74011250636 HC RX REV CODE- 250/636: Performed by: INTERNAL MEDICINE

## 2020-06-23 PROCEDURE — 74011250637 HC RX REV CODE- 250/637: Performed by: INTERNAL MEDICINE

## 2020-06-23 PROCEDURE — 65660000004 HC RM CVT STEPDOWN

## 2020-06-23 PROCEDURE — 94668 MNPJ CHEST WALL SBSQ: CPT

## 2020-06-23 PROCEDURE — 80048 BASIC METABOLIC PNL TOTAL CA: CPT

## 2020-06-23 PROCEDURE — 97110 THERAPEUTIC EXERCISES: CPT

## 2020-06-23 PROCEDURE — 97112 NEUROMUSCULAR REEDUCATION: CPT

## 2020-06-23 PROCEDURE — 94761 N-INVAS EAR/PLS OXIMETRY MLT: CPT

## 2020-06-23 PROCEDURE — 36415 COLL VENOUS BLD VENIPUNCTURE: CPT

## 2020-06-23 PROCEDURE — 74011636637 HC RX REV CODE- 636/637: Performed by: INTERNAL MEDICINE

## 2020-06-23 PROCEDURE — 77010033711 HC HIGH FLOW OXYGEN

## 2020-06-23 PROCEDURE — 74011000250 HC RX REV CODE- 250: Performed by: PHYSICIAN ASSISTANT

## 2020-06-23 RX ORDER — INSULIN GLARGINE 100 [IU]/ML
10 INJECTION, SOLUTION SUBCUTANEOUS DAILY
Status: DISCONTINUED | OUTPATIENT
Start: 2020-06-23 | End: 2020-06-25

## 2020-06-23 RX ORDER — INSULIN LISPRO 100 [IU]/ML
INJECTION, SOLUTION INTRAVENOUS; SUBCUTANEOUS
Status: DISCONTINUED | OUTPATIENT
Start: 2020-06-23 | End: 2020-06-26 | Stop reason: HOSPADM

## 2020-06-23 RX ADMIN — INSULIN LISPRO 6 UNITS: 100 INJECTION, SOLUTION INTRAVENOUS; SUBCUTANEOUS at 22:19

## 2020-06-23 RX ADMIN — HEPARIN SODIUM 5000 UNITS: 5000 INJECTION INTRAVENOUS; SUBCUTANEOUS at 21:41

## 2020-06-23 RX ADMIN — IPRATROPIUM BROMIDE AND ALBUTEROL SULFATE 3 ML: .5; 3 SOLUTION RESPIRATORY (INHALATION) at 19:53

## 2020-06-23 RX ADMIN — CARVEDILOL 25 MG: 25 TABLET, FILM COATED ORAL at 09:14

## 2020-06-23 RX ADMIN — FUROSEMIDE 80 MG: 10 INJECTION, SOLUTION INTRAMUSCULAR; INTRAVENOUS at 09:14

## 2020-06-23 RX ADMIN — Medication 20 ML: at 06:00

## 2020-06-23 RX ADMIN — INSULIN LISPRO 4 UNITS: 100 INJECTION, SOLUTION INTRAVENOUS; SUBCUTANEOUS at 05:34

## 2020-06-23 RX ADMIN — INSULIN LISPRO 4 UNITS: 100 INJECTION, SOLUTION INTRAVENOUS; SUBCUTANEOUS at 00:22

## 2020-06-23 RX ADMIN — IPRATROPIUM BROMIDE AND ALBUTEROL SULFATE 3 ML: .5; 3 SOLUTION RESPIRATORY (INHALATION) at 03:12

## 2020-06-23 RX ADMIN — LORATADINE 10 MG: 10 TABLET ORAL at 09:14

## 2020-06-23 RX ADMIN — Medication 10 ML: at 13:48

## 2020-06-23 RX ADMIN — CYANOCOBALAMIN TAB 1000 MCG 1000 MCG: 1000 TAB at 18:29

## 2020-06-23 RX ADMIN — IPRATROPIUM BROMIDE AND ALBUTEROL SULFATE 3 ML: .5; 3 SOLUTION RESPIRATORY (INHALATION) at 16:01

## 2020-06-23 RX ADMIN — HEPARIN SODIUM 5000 UNITS: 5000 INJECTION INTRAVENOUS; SUBCUTANEOUS at 13:47

## 2020-06-23 RX ADMIN — CYANOCOBALAMIN TAB 1000 MCG 1000 MCG: 1000 TAB at 09:13

## 2020-06-23 RX ADMIN — INSULIN LISPRO 3 UNITS: 100 INJECTION, SOLUTION INTRAVENOUS; SUBCUTANEOUS at 18:29

## 2020-06-23 RX ADMIN — INSULIN LISPRO 4 UNITS: 100 INJECTION, SOLUTION INTRAVENOUS; SUBCUTANEOUS at 12:39

## 2020-06-23 RX ADMIN — PANTOPRAZOLE SODIUM 40 MG: 40 TABLET, DELAYED RELEASE ORAL at 09:14

## 2020-06-23 RX ADMIN — MIRTAZAPINE 7.5 MG: 15 TABLET, FILM COATED ORAL at 21:41

## 2020-06-23 RX ADMIN — INSULIN GLARGINE 10 UNITS: 100 INJECTION, SOLUTION SUBCUTANEOUS at 13:48

## 2020-06-23 RX ADMIN — HEPARIN SODIUM 5000 UNITS: 5000 INJECTION INTRAVENOUS; SUBCUTANEOUS at 05:32

## 2020-06-23 RX ADMIN — LOSARTAN POTASSIUM 25 MG: 25 TABLET ORAL at 09:14

## 2020-06-23 RX ADMIN — ROSUVASTATIN CALCIUM 5 MG: 10 TABLET, COATED ORAL at 21:41

## 2020-06-23 RX ADMIN — FUROSEMIDE 80 MG: 10 INJECTION, SOLUTION INTRAMUSCULAR; INTRAVENOUS at 18:29

## 2020-06-23 RX ADMIN — ASPIRIN 81 MG CHEWABLE TABLET 81 MG: 81 TABLET CHEWABLE at 09:13

## 2020-06-23 RX ADMIN — IPRATROPIUM BROMIDE AND ALBUTEROL SULFATE 3 ML: .5; 3 SOLUTION RESPIRATORY (INHALATION) at 07:57

## 2020-06-23 RX ADMIN — VENLAFAXINE HYDROCHLORIDE 37.5 MG: 37.5 CAPSULE, EXTENDED RELEASE ORAL at 09:14

## 2020-06-23 NOTE — ROUTINE PROCESS
9691 - Bedside and Verbal shift change report given to Payal Ortiz RN (oncoming nurse) by Louis Dotson RN (offgoing nurse). Report included the following information SBAR, Kardex, Intake/Output, MAR, Recent Results, Med Rec Status and Cardiac Rhythm NSR . Shift Summary Uneventful shift, NAD, no complaints, vitals stable. Patient resting quietly in bed, hygiene and comfort care performed. Poor appetite, BS elevated. Primary RN contacted DM educator for ACHS orders and lantus for better BS control. [phone] - Report given to [facility], RN for resume of care.

## 2020-06-23 NOTE — ROUTINE PROCESS
1932 
Bedside turnover given to me by SIMA Ronquillo. Pt is sitting up in bed alert and watching tv. Denies pain and denies shortness of breath. Bed is in the lowest position with the wheels locked and call bell within reach. 2000

## 2020-06-23 NOTE — PROGRESS NOTES
1930: Assumed care of pt. Report received from [facility], [address]: Pt currently off the floor for dialysis. Will assess when pt returns     2105: Pt returned to CVT Stepdown from dialysis. Pt alert and oriented times four. Slow to respond which is baseline. Vital signs stable. No complaints of pain noted. Pt states she is hungry. Dinner still bedside. Pt repositioned to eat. 2200: Called to pt room. Pt wishes to be repositioned for sleep. Pt adjusted to comfort     2350: Pt resting in bed with eyes closed. Pt appears to be in NAD at this time.  Will continue to monitor

## 2020-06-23 NOTE — PROGRESS NOTES
[facility]  Progress Note    Patient: Brandon Bass Age: 71 y.o. : 1950 MR#: [id number] SSN: [id number]  Date/Time: 2020     Subjective:     Review of systems    No CP   NO NVD  No SOB  NO Cough     Patient is on NC 4 L - Not on high flow and manta inning her o2 sats     Assessment/Plan:     Patient with h/o ESRD admitted with ARF with hypoxia requiring intubation and vent support . 1 Acute respiratory failure : intubated and extubated   - Etiology includes -  Acute on chronic CHF , ? Pneumonia Aspiration  , FB - Denture in trach. Found during intubation , removed . - now on NC - doing better     2  Systolic HF - EF 45 - 50 %   - Continue diuresis / HD / on ARB     3 ESRD on HD per renal     4 DM2   - On SSI     5 Pneumonia - aspiration - s/p antibiotic treatment     6 Anemia of chronic illness - on Procrit            Case discussed with:  [x]Patient  [] Family ( In room with patient )    []Nursing  []Case Management  DVT Prophylaxis:  []Lovenox  []Hep SQ  []SCDs  []Coumadin   []On Heparin gtt          Objective:   VS:   Visit Vitals  /71   Pulse 61   Temp 97.5 °F (36.4 °C)   Resp 20   Ht 5' 5\" (1.651 m)   Wt 96.1 kg (211 lb 14.4 oz)   SpO2 100%   Breastfeeding No   BMI 35.26 kg/m²      Tmax/24hrs: Temp (24hrs), Av.8 °F (36.6 °C), Min:97.4 °F (36.3 °C), Max:98.4 °F (36.9 °C)  IOBRIEF    Intake/Output Summary (Last 24 hours) at 2020 1023  Last data filed at 2020 4097  Gross per 24 hour   Intake 240 ml   Output 3500 ml   Net -3260 ml       General:  Alert, cooperative, no acute distress   Cardiovascular: S1S2 - regular , No Murmur   Pulmonary: Equal expansion , No Use of accessory muscles , No Rales No Rhonchi    GI:  +BS in all four quadrants, soft, non-tender  Extremities:  No edema; 2+ dorsalis pedis pulses bilaterally  Neuro: Alert and oriented X 2.        Medications:   Current Facility-Administered Medications   Medication Dose Route Frequency    losartan (COZAAR) tablet 25 mg  25 mg Oral DAILY    heparin (porcine) 1,000 unit/mL injection 1,000 Units  1,000 Units IntraVENous DIALYSIS PRN    polyethylene glycol (MIRALAX) packet 17 g  17 g Oral DAILY PRN    venlafaxine-SR (EFFEXOR-XR) capsule 37.5 mg  37.5 mg Oral DAILY WITH BREAKFAST    mirtazapine (REMERON) tablet 7.5 mg  7.5 mg Oral QHS    pantoprazole (PROTONIX) tablet 40 mg  40 mg Oral ACB    carvediloL (COREG) tablet 25 mg  25 mg Oral BID WITH MEALS    albuterol-ipratropium (DUO-NEB) 2.5 MG-0.5 MG/3 ML  3 mL Nebulization Q6H RT    albuterol-ipratropium (DUO-NEB) 2.5 MG-0.5 MG/3 ML  3 mL Nebulization Q4H PRN    acetaminophen (TYLENOL) tablet 325 mg  325 mg Oral Q4H PRN    sodium chloride 3% hypertonic nebulizer soln  4 mL Nebulization TID PRN    lidocaine 4 % patch 1 Patch  1 Patch TransDERmal Q24H    menthol-zinc oxide (CALMOSEPTINE) 0.44-20.6 % ointment   Topical Q6H PRN    0.9% sodium chloride infusion 250 mL  250 mL IntraVENous PRN    epoetin johnathon-epbx (RETACRIT) injection 10,000 Units  10,000 Units SubCUTAneous Q MON, WED & FRI    sodium chloride (NS) flush 5-40 mL  5-40 mL IntraVENous Q8H    sodium chloride (NS) flush 5-40 mL  5-40 mL IntraVENous PRN    insulin lispro (HUMALOG) injection   SubCUTAneous Q6H    hydrALAZINE (APRESOLINE) 20 mg/mL injection 20 mg  20 mg IntraVENous Q6H PRN    ondansetron (ZOFRAN) injection 4 mg  4 mg IntraVENous Q6H PRN    glucose chewable tablet 16 g  4 Tab Oral PRN    glucagon (GLUCAGEN) injection 1 mg  1 mg IntraMUSCular PRN    dextrose (D50W) injection syrg 12.5-25 g  25-50 mL IntraVENous PRN    furosemide (LASIX) injection 80 mg  80 mg IntraVENous BID    cyanocobalamin tablet 1,000 mcg  1,000 mcg Oral BID    loratadine (CLARITIN) tablet 10 mg  10 mg Oral DAILY    aspirin chewable tablet 81 mg  81 mg Oral DAILY    [Held by provider] isosorbide mononitrate ER (IMDUR) tablet 30 mg  30 mg Oral DAILY    rosuvastatin (CRESTOR) tablet 5 mg  5 mg Oral QHS    [Held by provider] gabapentin (NEURONTIN) capsule 100 mg  100 mg Oral BID    ferrous sulfate tablet 325 mg  325 mg Oral EVERY OTHER DAY    docusate sodium (COLACE) capsule 100 mg  100 mg Oral BID PRN    heparin (porcine) injection 5,000 Units  5,000 Units SubCUTAneous Q8H       Labs:    Recent Labs     06/23/20  0542 06/22/20  0534 06/21/20  0317   WBC 10.5 10.5 11.0   HGB 9.4* 8.6* 8.9*   HCT 30.4* 27.6* 28.3*    210 213     Recent Labs     06/23/20  0542 06/22/20  0534 06/21/20  1241 06/21/20  0317   * 135*  --  140   K 3.9 4.1  --  3.6    99*  --  103   CO2 25 26  --  27   * 142*  --  180*   BUN 41* 62*  --  42*   CREA 3.41* 4.54*  --  3.71*   CA 9.0 8.3*  --  8.5   MG 1.8 1.8  --  2.0   PHOS 3.1 3.3  --  2.4*   ALB  --   --  2.7*  --    ALT  --   --  21  --          Disclaimer: Sections of this note are dictated utilizing voice recognition software, which may have resulted in some phonetic based errors in grammar and contents. Even though attempts were made to correct all the mistakes, some may have been missed, and remained in the body of the document. If questions arise, please contact our department.     Signed By: Kev Watts MD     June 23, 2020

## 2020-06-23 NOTE — PROGRESS NOTES
PCCM Progress Note                                                I have reviewed the flowsheet and previous days notes. Events, vitals, medications and notes from last 24 hours reviewed. Subjective:    6/23/2020     Pt seen and examined at bedside. She has been weaned off high flow and now on 4 L of nasal cannula  She denies any difficulty swallowing. Still with some cough -less productive  Denies shortness of breath  Denies any chest pain  Chart review does not show any overnight events of concern    Int Hx:  Duglas Hough is a 71 y.o. female w/ PMH of uterine cancer, CHF, ESRD, depression, deconditioning, DMII, CAD, HTN, HLD who presented to [facility] for acute respiratory distress with hypoxia requiring BVM. She had acute on chronic CHF and was started on IV lasix and bipap and admitted to the floor. There was concern for pneumonia and she was also started on abx, her covid test was (-), likely due to aspiration. 1/2 BC was (+) for staph which was felt to be a contaminant. On 6/11 a RRT was called for worsening resp failure, during intubation a denture was found obstructing her airway which was removed. Proceeded with intubation due to profound resp acidosis. A bronchoscopy was completed on following to r/o any other aspiration of FB. She was slow to wean from vent due hypoxia. On 6/16 she was extubated. She required bipap and high levels of HFNC following extubation for a few day but was able to wean solely to HFNC during the day with bipap at night. She has transitioned to a PO diet and is doing well.     Patient Active Problem List   Diagnosis Code    Acute on chronic diastolic congestive heart failure (HCC) I50.33    Suspected COVID-19 virus infection Z20.828    Type 2 diabetes mellitus without complication, without long-term current use of insulin (HCC) E11.9    Left anterior fascicular block I44.4    HTN (hypertension), benign I10    Coronary artery disease involving native coronary artery of native heart without angina pectoris I25.10    Chronic diastolic congestive heart failure ([facility]) I50.32    Bilateral lower extremity edema R60.0    BMI 35.0-35.9,adult Z68.35    CHF (congestive heart failure) ([facility]) I50.9    Sepsis ([facility]) A41.9    Respiratory failure ([facility]) J96.90    SIRS (systemic inflammatory response syndrome) ([facility]) R65.10    Acute on chronic respiratory failure ([facility]) J96.20    Pneumonia J18.9    Fluid overload E87.70    ESRD (end stage renal disease) ([facility]) N18.6    Debility R53.81    Respiratory failure with hypoxia and hypercapnia ([facility]) J96.91, J96.92    Depression F32.9    Aspiration of foreign body T17.900A       Medication Reviewed: Allergies   Allergen Reactions    Augmentin [Amoxicillin-Pot Clavulanate] Diarrhea    Clavulanic Acid Diarrhea    Levaquin [Levofloxacin] Other (comments)     Severe hypoglycemia        Past Medical History:   Diagnosis Date    Arthritis     Cancer [facility]     CHF (congestive heart failure) ([facility])     Diabetes ([facility]     Fracture of neck ([facility])     GERD (gastroesophageal reflux disease)     Goiter     Hiatal hernia     Hypertension     Uterine cancer ([facility])       Past Surgical History:   Procedure Laterality Date    HX APPENDECTOMY      HX HYSTERECTOMY      HX KNEE REPLACEMENT Right     HX ORTHOPAEDIC      odontoid fracture repair with hardware placement.  HX PARTIAL THYROIDECTOMY      W[place] INSJ TUNNELED CVC W/O SUBQ PORT/ AGE 5 YR/> Right 5/7/2020    INSERTION TUNNELED CENTRAL VENOUS CATHETER performed by Victoria Moreno MD at [facility] CATH LAB      Social History     Tobacco Use    Smoking status: Never Smoker   Substance Use Topics    Alcohol use: No      History reviewed. No pertinent family history. Prior to Admission medications    Medication Sig Start Date End Date Taking? Authorizing Provider   venlafaxine-SR [facility] 37.5 mg capsule Take 50 mg by mouth once.    Yes Provider, Historical   gabapentin (NEURONTIN) 100 mg capsule Take 1 Cap by mouth two (2) times a day. Max Daily Amount: 200 mg. 5/13/20  Yes Ligia Lovell MD   carvediloL (COREG) 12.5 mg tablet Take 1 Tab by mouth two (2) times daily (with meals). 5/13/20  Yes Ligia Lovell MD   docusate sodium (COLACE) 100 mg capsule Take 1 Cap by mouth two (2) times daily as needed for Constipation for up to 90 days. 5/13/20 8/11/20 Yes Ligia Lovell MD   OXYGEN-AIR DELIVERY SYSTEMS Take 2 L by inhalation daily. Yes Provider, Historical   amLODIPine (NORVASC) 10 mg tablet Take 1 Tab by mouth daily. 4/28/20  Yes Ant Alvarado MD   polyethylene glycol (MIRALAX) 17 gram packet Take 1 Packet by mouth daily. 4/28/20  Yes Ant Alvarado MD   rosuvastatin (CRESTOR) 5 mg tablet Take 1 Tab by mouth nightly. 4/28/20  Yes Ant Alvarado MD   hydrALAZINE (APRESOLINE) 100 mg tablet Take 1 Tab by mouth three (3) times daily. 4/28/20  Yes Ant Alvarado MD   aspirin 81 mg chewable tablet Take 1 Tab by mouth daily. 4/28/20  Yes Ant Alvarado MD   esomeprazole (NEXIUM) 40 mg capsule Take 40 mg by mouth daily. Yes Isabel, MD Larry   cranberry extract (AZO CRANBERRY) 450 mg tab Take 2 Tabs by mouth daily. Yes Isabel, MD Larry   cholecalciferol (VITAMIN D3) 1,000 unit cap Take 5,000 Units by mouth daily. Yes Isabel, MD Larry   cyanocobalamin (VITAMIN B-12) 1,000 mcg tablet Take 1,000 mcg by mouth two (2) times a day. Yes Isabel, MD Larry   Biotin 2,500 mcg cap Take 1 Tab by mouth two (2) times a day. Yes Isabel, MD Larry   vitamin a-vitamin c-vit e-min (OCUVITE) tablet Take 1 Tab by mouth daily. Yes Isabel, MD Larry   loratadine (CLARITIN) 10 mg tablet Take 10 mg by mouth daily. Yes Isabel, MD Larry   B.infantis-B.ani-B.long-B.bifi (PROBIOTIC 4X) 10-15 mg TbEC Take 1 Tab by mouth daily. Yes Other, MD Larry   mirtazapine (REMERON) 15 mg tablet Take 1 Tab by mouth nightly.  5/13/20 Nany Santoyo MD   isosorbide mononitrate ER (IMDUR) 30 mg tablet Take 1 Tab by mouth daily.  20   Oniel Rothman MD     Current Facility-Administered Medications   Medication Dose Route Frequency    losartan (COZAAR) tablet 25 mg  25 mg Oral DAILY    venlafaxine-SR (EFFEXOR-XR) capsule 37.5 mg  37.5 mg Oral DAILY WITH BREAKFAST    mirtazapine (REMERON) tablet 7.5 mg  7.5 mg Oral QHS    pantoprazole (PROTONIX) tablet 40 mg  40 mg Oral ACB    carvediloL (COREG) tablet 25 mg  25 mg Oral BID WITH MEALS    albuterol-ipratropium (DUO-NEB) 2.5 MG-0.5 MG/3 ML  3 mL Nebulization Q6H RT    lidocaine 4 % patch 1 Patch  1 Patch TransDERmal Q24H    epoetin johnathon-epbx (RETACRIT) injection 10,000 Units  10,000 Units SubCUTAneous Q MON, WED & FRI    sodium chloride (NS) flush 5-40 mL  5-40 mL IntraVENous Q8H    insulin lispro (HUMALOG) injection   SubCUTAneous Q6H    furosemide (LASIX) injection 80 mg  80 mg IntraVENous BID    cyanocobalamin tablet 1,000 mcg  1,000 mcg Oral BID    loratadine (CLARITIN) tablet 10 mg  10 mg Oral DAILY    aspirin chewable tablet 81 mg  81 mg Oral DAILY    [Held by provider] isosorbide mononitrate ER (IMDUR) tablet 30 mg  30 mg Oral DAILY    rosuvastatin (CRESTOR) tablet 5 mg  5 mg Oral QHS    [Held by provider] gabapentin (NEURONTIN) capsule 100 mg  100 mg Oral BID    ferrous sulfate tablet 325 mg  325 mg Oral EVERY OTHER DAY    heparin (porcine) injection 5,000 Units  5,000 Units SubCUTAneous Q8H     Objective:  Vital Signs:    Visit Vitals  /71   Pulse 61   Temp 97.5 °F (36.4 °C)   Resp 20   Ht 5' 5\" (1.651 m)   Wt 96.1 kg (211 lb 14.4 oz)   SpO2 100%   Breastfeeding No   BMI 35.26 kg/m²      O2 Device: Heated, Hi flow nasal cannula(Vaportherm)  O2 Flow Rate (L/min): 5 l/min  Temp (24hrs), Av.8 °F (36.6 °C), Min:97.4 °F (36.3 °C), Max:98.4 °F (36.9 °C)      Intake/Output:   Last shift:      701 - 1900  In: 240 [P.O.:240]  Out: - Last 3 shifts: 06/21 1901 - 06/23 0700  In: 360 [P.O.:360]  Out: 3500       Intake/Output Summary (Last 24 hours) at 6/23/2020 1059  Last data filed at 6/23/2020 3489  Gross per 24 hour   Intake 240 ml   Output 3500 ml   Net -3260 ml       Last 3 Recorded Weights in this Encounter    06/21/20 0556 06/22/20 0301 06/23/20 0402   Weight: 97.1 kg (214 lb) 96.1 kg (211 lb 14.4 oz) 96.1 kg (211 lb 14.4 oz)       Physical Exam:   Vitals reviewed:  Gen[de-identified] Alert, Awake, comfortable, laying in bed inclined, wearing high flow nasal cannula, cooperative  Head:   NC/AT  Eye:   PERRL, EOM intact, no scleral icterus, no pallor  Nose:   Nasal septum midline, no drainage, no epistaxis, and wearing high flow cannula  Oral:   Poor dentition, no oral lesions, mucous membranes moist, Mallamapti IV  Neck:   Supple, trachea midline, no cervical or supraclavicular adenopathy  Lung:   Decreased breath sounds in bilateral bases, poor effort, fine rales in bilateral bases, otherwise clear to auscultation, no wheezes or rhonchi  Heart:   Regular rate & rhythm. S1 S2 present.   No murmurs/rubs/gallops  Abdomen/: Soft, non tender, BS +nt  Extremities:  Edema improving, no clubbing or cyanosis  Skin:   Dry, intact  Neuro:   Patient alert, moving extremities, sensation intact grossly    Data:      Recent Results (from the past 24 hour(s))   GLUCOSE, POC    Collection Time: 06/22/20 11:13 AM   Result Value Ref Range    Glucose (POC) 225 (H) 70 - 110 mg/dL   GLUCOSE, POC    Collection Time: 06/23/20 12:20 AM   Result Value Ref Range    Glucose (POC) 216 (H) 70 - 110 mg/dL   GLUCOSE, POC    Collection Time: 06/23/20  5:33 AM   Result Value Ref Range    Glucose (POC) 248 (H) 70 - 110 mg/dL   CBC W/O DIFF    Collection Time: 06/23/20  5:42 AM   Result Value Ref Range    WBC 10.5 4.6 - 13.2 K/uL    RBC 3.39 (L) 4.20 - 5.30 M/uL    HGB 9.4 (L) 12.0 - 16.0 g/dL    HCT 30.4 (L) 35.0 - 45.0 %    MCV 89.7 74.0 - 97.0 FL    MCH 27.7 24.0 - 34.0 PG    MCHC 30.9 (L) 31.0 - 37.0 g/dL    RDW 15.0 (H) 11.6 - 14.5 %    PLATELET 268 112 - 006 K/uL    MPV 11.5 9.2 - 11.8 FL   MAGNESIUM    Collection Time: 06/23/20  5:42 AM   Result Value Ref Range    Magnesium 1.8 1.6 - 2.6 mg/dL   METABOLIC PANEL, BASIC    Collection Time: 06/23/20  5:42 AM   Result Value Ref Range    Sodium 135 (L) 136 - 145 mmol/L    Potassium 3.9 3.5 - 5.5 mmol/L    Chloride 101 100 - 111 mmol/L    CO2 25 21 - 32 mmol/L    Anion gap 9 3.0 - 18 mmol/L    Glucose 235 (H) 74 - 99 mg/dL    BUN 41 (H) 7.0 - 18 MG/DL    Creatinine 3.41 (H) 0.6 - 1.3 MG/DL    BUN/Creatinine ratio 12 12 - 20      GFR est AA 16 (L) >60 ml/min/1.73m2    GFR est non-AA 13 (L) >60 ml/min/1.73m2    Calcium 9.0 8.5 - 10.1 MG/DL   PHOSPHORUS    Collection Time: 06/23/20  5:42 AM   Result Value Ref Range    Phosphorus 3.1 2.5 - 4.9 MG/DL         Chemistry   Recent Labs     06/23/20  0542 06/22/20  0534 06/21/20  1241 06/21/20  0317   * 142*  --  180*   * 135*  --  140   K 3.9 4.1  --  3.6    99*  --  103   CO2 25 26  --  27   BUN 41* 62*  --  42*   CREA 3.41* 4.54*  --  3.71*   CA 9.0 8.3*  --  8.5   MG 1.8 1.8  --  2.0   PHOS 3.1 3.3  --  2.4*   AGAP 9 10  --  10   BUCR 12 14  --  11*   AP  --   --  69  --    TP  --   --  6.0*  --    ALB  --   --  2.7*  --    GLOB  --   --  3.3  --    AGRAT  --   --  0.8  --        CBC w/Diff   Recent Labs     06/23/20  0542 06/22/20  0534 06/21/20  0317   WBC 10.5 10.5 11.0   RBC 3.39* 3.14* 3.22*   HGB 9.4* 8.6* 8.9*   HCT 30.4* 27.6* 28.3*    210 213   GRANS  --   --  80*   LYMPH  --   --  11*   EOS  --   --  0       ABG  No results for input(s): PHI, PHI, POC2, PCO2I, PO2, PO2I, HCO3, HCO3I, FIO2, FIO2I in the last 72 hours. Micro   No results for input(s): SDES, CULT in the last 72 hours. No results for input(s): CULT in the last 72 hours.     CT (Most Recent)    Results from Hospital Encounter encounter on 06/07/20   CTA CHEST W OR W WO CONT    Narrative CTA OF THE CHEST, WITH CORONAL AND SAGITTAL REFORMATTED MIP IMAGES, PULMONARY  EMBOLISM PROTOCOL    CPT CODE: 96594    INDICATION: Above. Acute on chronic respiratory failure requiring mechanical  ventilation. Recent pneumonia and fluid overload. Aspiration of foreign body. Acute respiratory failure with hypoxia and hypercapnia. Clinical concern for  pulmonary embolism. COMPARISON: No prior pulmonary CTA in PACS. Reference noncontrast enhanced chest  CT 6/11/2020, prior standard protocol contrast-enhanced chest CT 10/6/2007. TECHNIQUE: With IV administration of 100 ml Isovue-370, axial CT images through  the thorax were obtained using helical technique following a pulmonary embolism  protocol. In order more optimally to evaluate the pulmonary arterial tree in  multiplanar CT angiographic fashion, coronal and sagittal reformation maximum  intensity projection (MIP) images were also performed. All CT scans at this facility are performed using dose optimization technique as  appropriate to the performed exam, to include automated exposure control,  adjustment of the mA and/or kV according to patient's size (Including  appropriate matching for site-specific examinations), or use of iterative  reconstruction technique. FINDINGS:    Mildly suboptimal contrast bolus. There is near iso enhancementisoenhancement of  the pulmonary veins, thoracic aorta, and pulmonary arteries. Image quality is  degraded by body habitus, hypoinflation, and arms at sides, which result in  streak/mottle/noise artifact, in addition to bibasilar consolidations. There is  no convincing evidence of pulmonary arterial filling defect in the main,  central, lobar, or larger enhanced segmental branches of the pulmonary arterial  tree indicative of acute pulmonary embolism. Assessment becomes limited  beginning at the segmental level. Endotracheal tube is present with tip cephalad to the garth.  Moderate sized  bilateral pleural effusions, similar compared to prior. Bilateral adjacent  consolidations, slightly increased compared to the prior chest CT, likely  atelectasis, underlying airspace disease not excluded. Additional hazy bilateral airspace disease most conspicuous in the upper lobes. Additional multinodular airspace disease in the left upper lobe posteriorly. Couple of additional tiny chronic right-sided pulmonary nodules are again noted. No evidence of pneumothorax. There is some mosaic attenuation with geographic  variation in lung density best appreciated in the left upper lobe, similar to  prior, may reflect a component of air-trapping such as can be seen due to small  airways disease. Very small pericardial effusion. Multichamber cardiac enlargement. Atherosclerotic calcifications noted including in the coronary arteries. The  main pulmonary artery appears prominent in caliber, approximately 4.1 cm, which  can be seen with pulmonary hypertension. The thoracic aorta enhances normally. Usual limitation of cardiac motion artifacts noted. Esophagus appears mildly patulous. No evidence of pathologic lymph node enlargement is seen. On review of bone windows, no acute fractures or destructive bone lesions are  seen. Cervical spine fusion hardware noted. Impression Impression:      Image quality is degraded by body habitus, hypoinflation, arms at sides, and  mildly suboptimal contrast bolus, as noted above. No evidence of acute pulmonary  embolism in the main, central, lobar, or larger enhanced segmental branches of  the pulmonary arterial tree. Assessment becomes limited beginning at the  segmental level. Moderate sized bilateral pleural effusions. Adjacent bibasilar consolidations,  slightly larger compared to prior, likely atelectasis, underlying airspace  disease not excluded. Hazy lung opacity bilaterally. Multinodular airspace disease in the left upper  lobe. Cardiomegaly.     Additional nonacute findings as above.         XR (Most Recent). CXR reviewed by me and compared with previous CXR   Results from Hospital Encounter encounter on 06/07/20   XR CHEST SNGL V    Narrative EXAM: PORTABLE CHEST 0240 hours    CLINICAL HISTORY/INDICATION: respiratory failure , patient presented with acute  on chronic respiratory failure which should require mechanical ventilation,  episode of aspiration, patient is afebrile with normal white blood cell count         COMPARISON: Chest x-ray June 18 through June 14, 2020, May 6, 2020. TECHNIQUE: Single AP view    FINDINGS:     Right jugular double-lumen catheter terminates at the right atrium. Marked interval improvement in the bilateral multifocal infiltrates. Inability  to penetrate the enlarged left heart. Pulmonary vessels normal. Right  costophrenic angle is sharp. Stable cardiomegaly. .      Impression IMPRESSION:    Marked interval decrease in bilateral multifocal infiltrates. Inability to evaluate the left lung base as described above. Stable cardiomegaly       Assessment:  · Acute on Chronic Hypoxic Respiratory Failure - Multifactorial. Required emergent intubation on 06/11/20, 2/2 aspiration event on the floor (aspiration of partial dentures, removed with forceps) in addition to PNA and CHF Exacerbation/fluid overload on CXR. Extubated to NC on 06/15 with intermittent BiPAP use-transition to high flow and now tolerating nasal cannula with adequate oxygen saturation  · Aspiration of foreign body (partial dentures) into the hypopharynxretrieved with forceps. S/p bronc 6/11/2020some mucous plugs, small caliber airways noted but no foreign material.  CTA chest (06/13) (-) for PE and (-) retained FB. Probable additional episode of aspiration on 6/16 after extubation  · RUL infiltrate/PNA  CXR (6/7/20), scant yeast on Resp Cx. · Acute on chronic HFpEF - Most recent Echo ( 04/25/20) EF 55-60%.   · Fluid overload- severe edema  · Bacteremia vs contaminant - BxCx (06/07) (+)GPC in 1/2 bottles, repeat BxCx - NGTD  · ESRD - HD MWF  · Acute on Chronic Anemia - s/p 2u PRBCs this admission    Recommendations    Supplemental oxygen to maintain SpO2 >88% wean O2 flow further  Bronchial hygiene-airway clearance measures to continue  Please assess for home oxygen need prior to discharge  Frequent incentive spirometry  Strict aspiration precautions including elevating HOB >30deg  PT/OT, OOB, ambulate with assistance as tolerated  DVT ppx per primary service  Will follow      High complexity decision making was performed during the evaluation of this patient at high risk for decompensation with multiple organ involvement     Above mentioned total time spent on reviewing the case/medical record/data/notes/EMR/patient examination/documentation/coordinating care with nurse/consultants, exclusive of procedures with complex decision making performed and > 50% time spent in face to face evaluation.           Neisha Barrientos MD, [facility]    Pulmonary, Critical Care Medicine  [facility]

## 2020-06-23 NOTE — PROGRESS NOTES
[facility] - Progress Note  Admit Date: 6/7/2020    Assessment:     Hospital Problems  Date Reviewed: 4/23/2020          Codes Class Noted POA    ESRD (end stage renal disease) ([facility] ICD-10-CM: N18.6  ICD-9-CM: 585.6  6/20/2020 Unknown        Debility ICD-10-CM: R53.81  ICD-9-CM: 799.3  6/20/2020 Unknown        Respiratory failure with hypoxia and hypercapnia ([facility] ICD-10-CM: J96.91, J96.92  ICD-9-CM: 518.81  6/20/2020 Unknown        Depression ICD-10-CM: F32.9  ICD-9-CM: 039  6/20/2020 Unknown        Aspiration of foreign body ICD-10-CM: T17.900A  ICD-9-CM: 933.1  6/20/2020 Unknown        * (Principal) Fluid overload ICD-10-CM: E87.70  ICD-9-CM: 276.69  6/10/2020 Unknown        Pneumonia ICD-10-CM: J18.9  ICD-9-CM: 087  6/8/2020 Unknown        CHF (congestive heart failure) (HCC) ICD-10-CM: I50.9  ICD-9-CM: 428.0  4/25/2020 Yes        Acute on chronic diastolic congestive heart failure ([facility] ICD-10-CM: I50.33  ICD-9-CM: 428.33, 428.0  4/23/2020 Yes              -Acute on chronic respiratory failure. Was intubated and extubated on 6/15/2020 and slowly improving.   -Sepsis  -Acute on chronic HFpEF. EF 55-60% by echo 4/25/20 and now 45-50% on this admission. Still with some volume overload on exam.  -OFE on CKD.  Started on HD last admission. Patient uncertain if she continued dialysis while at home.   -Acute on chronic anemia.  -Hypertension. Elevated on admission.  -Diabetes mellitus. HgbA1c 6.1.  -Dyslipidemia. On statin.  -Coronary disease reportedly diffuse medically managed, nuclear stress 3/2018 with small area of ischemia with EF 53%  -Chronic atypical LBBB. -COVID negative 4/30/2020.     Primary cardiologist Dr. Rayray Chung at [facility], now plans to follow up with Dr. Reynaldo Bragg:     Continue volume management per nephrology. Continued on ASA, statin. Continued on coreg and cozaar. No further cardiac work up planned at this time.     Subjective:     Remains on high flow oxygen Objective:      Patient Vitals for the past 8 hrs:   Temp Pulse Resp BP SpO2   06/23/20 0857     100 %   06/23/20 0800  61      06/23/20 0757     100 %   06/23/20 0728 97.5 °F (36.4 °C) 61 20 154/71 100 %   06/23/20 0402 97.8 °F (36.6 °C) (!) 59 20 (!) 149/91 98 %   06/23/20 0315     95 %   06/23/20 0313     95 %   06/23/20 0245     95 %         Patient Vitals for the past 96 hrs:   Weight   06/23/20 0402 96.1 kg (211 lb 14.4 oz)   06/22/20 0301 96.1 kg (211 lb 14.4 oz)   06/21/20 0556 97.1 kg (214 lb)   06/19/20 1225 98 kg (216 lb)                    Intake/Output Summary (Last 24 hours) at 6/23/2020 0946  Last data filed at 6/23/2020 1143  Gross per 24 hour   Intake 240 ml   Output 3500 ml   Net -3260 ml       Physical Exam:  General:  alert, cooperative, no distress, appears stated age  Neck:  no JVD  Lungs:  clear to auscultation bilaterally  Heart:  regular rate and rhythm  Abdomen:  abdomen is soft without significant tenderness, masses, organomegaly or guarding  Extremities:  extremities normal, atraumatic, no cyanosis or edema    Data Review:     Labs: Results:       Chemistry Recent Labs     06/23/20  0542 06/22/20  0534 06/21/20  1241 06/21/20  0317   * 142*  --  180*   * 135*  --  140   K 3.9 4.1  --  3.6    99*  --  103   CO2 25 26  --  27   BUN 41* 62*  --  42*   CREA 3.41* 4.54*  --  3.71*   CA 9.0 8.3*  --  8.5   MG 1.8 1.8  --  2.0   PHOS 3.1 3.3  --  2.4*   AGAP 9 10  --  10   BUCR 12 14  --  11*   AP  --   --  69  --    TP  --   --  6.0*  --    ALB  --   --  2.7*  --    GLOB  --   --  3.3  --    AGRAT  --   --  0.8  --       CBC w/Diff Recent Labs     06/23/20  0542 06/22/20  0534 06/21/20  0317   WBC 10.5 10.5 11.0   RBC 3.39* 3.14* 3.22*   HGB 9.4* 8.6* 8.9*   HCT 30.4* 27.6* 28.3*    210 213   GRANS  --   --  80*   LYMPH  --   --  11*   EOS  --   --  0      Cardiac Enzymes No results found for: CPK, CK, CKMMB, CKMB, RCK3, CKMBT, CKNDX, CKND1, ANDER, TROPT, TROIQ, MINH, TROPT, TNIPOC, BNP, BNPP   Coagulation No results for input(s): PTP, INR, APTT, INREXT in the last 72 hours.     Lipid Panel Lab Results   Component Value Date/Time    Cholesterol, total 182 04/24/2020 02:34 AM    HDL Cholesterol 41 04/24/2020 02:34 AM    LDL, calculated 112.8 (H) 04/24/2020 02:34 AM    VLDL, calculated 28.2 04/24/2020 02:34 AM    Triglyceride 141 04/24/2020 02:34 AM    CHOL/HDL Ratio 4.4 04/24/2020 02:34 AM      BNP No results found for: BNP, BNPP, XBNPT   Liver Enzymes Recent Labs     06/21/20  1241   TP 6.0*   ALB 2.7*   AP 69      Digoxin    Thyroid Studies Lab Results   Component Value Date/Time    TSH 1.36 06/16/2020 06:28 AM          Signed By: MICHAEL Douglas     June 23, 2020

## 2020-06-23 NOTE — PROGRESS NOTES
Problem: Mobility Impaired (Adult and Pediatric)  Goal: *Acute Goals and Plan of Care (Insert Text)  Description: Physical Therapy Goals  Initiated 6/21/2020 and to be accomplished within 7 day(s)  1. Patient will move from supine to sit and sit to supine  and roll side to side in bed with minimal assistance/contact guard assist.     2.  Patient will transfer from bed to chair and chair to bed with minimal assistance/contact guard assist using the least restrictive device. 3.  Patient will perform sit to stand with moderate assistance . 4. Patient will ambulate with moderate assistance  for 10 feet with the least restrictive device. To prepare pt for home mobility at next level of care    PLOF:  Pt is a poor historian. Per chart, she was a resident of [address] [place] [address] but is unable to recall if she was able to ambulate recently. Pt states she was not doing therapy at the LTC facility and would like to go home. Outcome: Progressing Towards Goal     PHYSICAL THERAPY TREATMENT    Patient: Clarence Jimenez (39 y.o. female)  Date: 6/23/2020  Diagnosis: Pneumonia [J18.9]   Fluid overload  Procedure(s) (LRB):  FLEXIBLE BRONCHOSCOPY with BAL/SLIM SCOPE (N/A) 12 Days Post-Op  Precautions: Aspiration, Skin, Fall      ASSESSMENT:  Patient is agreeable to participate in skilled PT session. She requires max A for supine to sit transfer. Patient tolerates sitting EOB for about 6 minutes with poor static balance. She has right lateral lean, able to self correct twice with verbal cues and facilitation of core, but otherwise requires constant trunk support. Max A for sit to supine. In bed with assistance, patient performs BLE there-ex to increase strength. Left LE weaker than right, required active assist with heel slides and short arc quad. Encouraged patient to perform there-ex 3x daily. At end of session, max A x2 to reposition towards [facility] for comfort and call bell within reach.    Progression toward goals:   [x]      Improving appropriately and progressing toward goals  []      Improving slowly and progressing toward goals  []      Not making progress toward goals and plan of care will be adjusted     PLAN:  Patient continues to benefit from skilled intervention to address the above impairments. Continue treatment per established plan of care. Discharge Recommendations:  Skilled Nursing Facility  Further Equipment Recommendations for Discharge:  defer to facility. SUBJECTIVE:   Patient stated I like to do trivia and read.     OBJECTIVE DATA SUMMARY:   Critical Behavior:  Neurologic State: Alert, Appropriate for age  Orientation Level: Oriented X4  Cognition: Appropriate decision making, Appropriate for age attention/concentration, Appropriate safety awareness  Safety/Judgement: Fall prevention  Functional Mobility Training:  Bed Mobility:     Supine to Sit: Maximum assistance  Sit to Supine: Maximum assistance  Scooting: Maximum assistance     Balance:  Sitting: Impaired; With support  Sitting - Static: Poor (constant support)  Sitting - Dynamic: Poor (constant support)     Neuro Re-Education:Worked on sitting balance EOB with facilitation of core strength. Therapeutic Exercises:         EXERCISE   Sets   Reps   Active Active Assist   Passive Self ROM   Comments   Ankle Pumps 1 20  [x] [] [] []    Quad Sets/Glut Sets    [] [] [] [] Hold for 5 secs   Hamstring Sets   [] [] [] []    Short Arc Quads 1 10 [x] [] [] []    Heel Slides 1 10 [x] [x] [] []    Straight Leg Raises   [] [] [] []    Hip Add 1 10 [x] [x] [] [] Hold for 5 secs, w/ pillow squeeze   Long Arc Quads   [] [] [] []    Seated Marching   [] [] [] []    Standing Marching   [] [] [] []       [] [] [] []        Pain:  Pain level pre-treatment: 0/10  Pain level post-treatment: 0/10   Pain Intervention(s): Medication (see MAR);  Rest, Ice, Repositioning   Response to intervention: Nurse notified, See doc flow    Activity Tolerance: Fair  Please refer to the flowsheet for vital signs taken during this treatment. After treatment:   [] Patient left in no apparent distress sitting up in chair  [x] Patient left in no apparent distress in bed  [x] Call bell left within reach  [] Nursing notified  [] Caregiver present  [] Bed alarm activated  [] SCDs applied      COMMUNICATION/EDUCATION:   [x]         Role of Physical Therapy in the acute care setting. [x]         Fall prevention education was provided and the patient/caregiver indicated understanding. [x]         Patient/family have participated as able in working toward goals and plan of care. [x]         Patient/family agree to work toward stated goals and plan of care. []         Patient understands intent and goals of therapy, but is neutral about his/her participation.   []         Patient is unable to participate in stated goals/plan of care: ongoing with therapy staff.  []         Other:        Howard Cowden, PT   Time Calculation: 26 mins

## 2020-06-23 NOTE — PROGRESS NOTES
1530: Bedside and Verbal shift change report given by Christophe Edwards (offgoing nurse). Report included the following information SBAR, Kardex, Intake/Output, MAR, Recent Results and Cardiac Rhythm SB.       1830: Pt in bed, eating dinner. Medications given. NAD. No c/o pain or discomfort. 1935:Bedside and Verbal shift change report given to [employer] (oncoming nurse).  Report included the following information SBAR, Kardex, Intake/Output, MAR, Recent Results and Cardiac Rhythm SB.

## 2020-06-23 NOTE — ROUTINE PROCESS
0200  Patient was bathed by SIMA Marks and myself. She is awake, denies pain and denies shortness of breath. 0500 heparin given and blood glucose assessed and coverage given for blood glucose result greater than 150 
0600 Pt was given a complete bath, all linens were changed and oral care assistance. Pt was repositioned with pillows. Bed in the lowest position with the wheels locked and call bell within reach. Will continue to assess, cardiac monitor in place  
 
0700 SBAR, MAR ed summary given to Chilango Cosby. Bed is in the lowest position with the wheels locked and call bell within reach on her bedside table. Cardiac respiratory monitor on and High Flow oxygen NC. Denies pain and denies shortness of breath.

## 2020-06-23 NOTE — PROGRESS NOTES
Renal Progress Note  Follow up of ESRD     Assessment/Plan:  1. ESRD. Continue dialysis mwf. 3.5  liters pulled yesterday. 2. HTN. Stable, continue current regimen. 3. Anemia of ckd. Continue procrit. 4. Staph epi bacteriemia, likely contaminant per ID. Repeat bc ngtd. 5. Aspiration pneumonia, finished abx per ID. Subjective:  Patient complaints off: Feels better but is still very weak. Breathing is better. No chest pain, nausea or vomiting. Appetite is better.        Patient Active Problem List   Diagnosis Code    Acute on chronic diastolic congestive heart failure ([facility]) I50.33    Suspected COVID-19 virus infection Z20.828    Type 2 diabetes mellitus without complication, without long-term current use of insulin ([facility]) E11.9    Left anterior fascicular block I44.4    HTN (hypertension), benign I10    Coronary artery disease involving native coronary artery of native heart without angina pectoris I25.10    Chronic diastolic congestive heart failure ([facility]) I50.32    Bilateral lower extremity edema R60.0    BMI 35.0-35.9,adult Z68.35    CHF (congestive heart failure) ([facility]) I50.9    Sepsis ([facility] A41.9    Respiratory failure ([facility] J96.90    SIRS (systemic inflammatory response syndrome) ([facility]) R65.10    Acute on chronic respiratory failure ([facility]) J96.20    Pneumonia J18.9    Fluid overload E87.70    ESRD (end stage renal disease) ([facility] N18.6    Debility R53.81    Respiratory failure with hypoxia and hypercapnia ([facility]) J96.91, J96.92    Depression F32.9    Aspiration of foreign body T17.900A       Current Facility-Administered Medications   Medication Dose Route Frequency Provider Last Rate Last Dose    insulin lispro (HUMALOG) injection   SubCUTAneous AC&HS Deyanira Alcazar MD        insulin glargine (LANTUS) injection 10 Units  10 Units SubCUTAneous DAILY Deyanira Alcazar MD   10 Units at 06/23/20 1348    losartan (COZAAR) tablet 25 mg  25 mg Oral DAILY [place], [place] 25 mg at 06/23/20 0914    heparin (porcine) 1,000 unit/mL injection 1,000 Units  1,000 Units IntraVENous DIALYSIS PRN Jean Mccormick MD        polyethylene glycol (MIRALAX) packet 17 g  17 g Oral DAILY PRN Deyanira Kaplan MD        venlafaxine-[facility] capsule 37.5 mg  37.5 mg Oral DAILY WITH BREAKFAST Maricruz Santos PA-C   37.5 mg at 06/23/20 0914    mirtazapine (REMERON) tablet 7.5 mg  7.5 mg Oral QHS Idania Walker MD   7.5 mg at 06/22/20 2136    pantoprazole (PROTONIX) tablet 40 mg  40 mg Oral ACB Deyanira Kaplan MD   40 mg at 06/23/20 0914    carvediloL (COREG) tablet 25 mg  25 mg Oral BID WITH MEALS Deyanira Kaplan MD   25 mg at 06/23/20 0914    albuterol-ipratropium (DUO-NEB) 2.5 MG-0.5 MG/3 ML  3 mL Nebulization Q6H RT Pat Canas MD   3 mL at 06/23/20 0757    albuterol-ipratropium (DUO-NEB) 2.5 MG-0.5 MG/3 ML  3 mL Nebulization Q4H PRN Carlos Dangelo PA-C        acetaminophen (TYLENOL) tablet 325 mg  325 mg Oral Q4H PRN Felicity MORENO, NP   325 mg at 06/21/20 0059    sodium chloride 3% hypertonic nebulizer soln  4 mL Nebulization TID PRN Lazaro Marshall DO        lidocaine 4 % patch 1 Patch  1 Patch TransDERmal Q24H Carlos Dangelo PA-C   1 Patch at 06/23/20 0914    menthol-zinc oxide (CALMOSEPTINE) 0.44-20.6 % ointment   Topical Q6H PRN Carlos Dangelo PA-C        0.9% sodium chloride infusion 250 mL  250 mL IntraVENous PRN Carlos Dangelo PA-C        epoetin johnathon-epbx (RETACRIT) injection 10,000 Units  10,000 Units SubCUTAneous Q MON, WED & Sandi Elder MD   10,000 Units at 06/22/20 2137    sodium chloride (NS) flush 5-40 mL  5-40 mL IntraVENous Q8H Monica Lassiter DO   10 mL at 06/23/20 1348    sodium chloride (NS) flush 5-40 mL  5-40 mL IntraVENous PRN Bianka ALCALA DO        hydrALAZINE (APRESOLINE) 20 mg/mL injection 20 mg  20 mg IntraVENous Q6H PRN Carlos Dangelo PA-C   20 mg at 06/19/20 0850    ondansetron (ZOFRAN) injection 4 mg  4 mg IntraVENous Q6H PRN Mateo Moore MD   4 mg at 06/10/20 0030    glucose chewable tablet 16 g  4 Tab Oral PRN Elsy Quill, DO        glucagon (GLUCAGEN) injection 1 mg  1 mg IntraMUSCular PRN Elsy Quill, DO        dextrose (D50W) injection syrg 12.5-25 g  25-50 mL IntraVENous PRN Elsy Quill, DO   25 g at 06/13/20 0041    furosemide (LASIX) injection 80 mg  80 mg IntraVENous BID Hadley Angulo MD   80 mg at 06/23/20 0914    cyanocobalamin tablet 1,000 mcg  1,000 mcg Oral BID Onofre MORENO MD   1,000 mcg at 06/23/20 0913    loratadine (CLARITIN) tablet 10 mg  10 mg Oral DAILY Hadley Angulo MD   10 mg at 06/23/20 8298    aspirin chewable tablet 81 mg  81 mg Oral DAILY Hadley Angulo MD   81 mg at 06/23/20 0913    [Held by provider] isosorbide mononitrate ER (IMDUR) tablet 30 mg  30 mg Oral DAILY Hadley Angulo MD   Stopped at 06/11/20 0900    rosuvastatin (CRESTOR) tablet 5 mg  5 mg Oral QHS Hadley Angulo MD   5 mg at 06/22/20 2135    [Held by provider] gabapentin (NEURONTIN) capsule 100 mg  100 mg Oral BID Onofre MORENO MD   Stopped at 06/11/20 0900    ferrous sulfate tablet 325 mg  325 mg Oral EVERY OTHER DAY Hadley Angulo MD   325 mg at 06/22/20 7341    docusate sodium (COLACE) capsule 100 mg  100 mg Oral BID PRN Jorge Lin MD        heparin (porcine) injection 5,000 Units  5,000 Units SubCUTAneous Q8H Hadley Angulo MD   5,000 Units at 06/23/20 1347             Objective:  Vitals:    06/23/20 0800 06/23/20 0857 06/23/20 1142 06/23/20 1200   BP:   147/58    Pulse: 61  (!) 59 64   Resp:   18    Temp:   97.4 °F (36.3 °C)    TempSrc:       SpO2:  100% 100%    Weight:       Height:         .     Intake/Output Summary (Last 24 hours) at 6/23/2020 1418  Last data filed at 6/23/2020 1356  Gross per 24 hour   Intake 480 ml   Output 3500 ml   Net -3020 ml       Admission weight:Weight: 121.6 kg (268 lb) (06/08/20 1131)    Last Weight Metrics:  Weight Loss Metrics 6/23/2020 5/13/2020 4/30/2020 4/29/2020 4/26/2020 8/18/2016 6/2/2016   Today's Wt 211 lb 14.4 oz 238 lb 5.1 oz - 252 lb 252 lb 4.8 oz 219 lb 228 lb   BMI 35.26 kg/m2 - 39.66 kg/m2 41.93 kg/m2 41.98 kg/m2 36.44 kg/m2 37.94 kg/m2            Physical Exam:     General: No acute distress. Neck: no jvd. LUNGS: diminished air entry at the bases, bl exp rhonchi. CVS EXM: S1, S2, no murmur, rub or gallop. Abdomen: Soft, Non Tender, non distended. Lower Extremities: trace Edema. Access: rt ij tdc.        Labs:    CBC w/Diff Recent Labs     06/23/20  0542 06/22/20  0534 06/21/20  0317   WBC 10.5 10.5 11.0   RBC 3.39* 3.14* 3.22*   HGB 9.4* 8.6* 8.9*   HCT 30.4* 27.6* 28.3*    210 213   GRANS  --   --  80*   LYMPH  --   --  11*   EOS  --   --  0        Chemistry Recent Labs     06/23/20  0542 06/22/20  0534 06/21/20  1241 06/21/20  0317   * 142*  --  180*   * 135*  --  140   K 3.9 4.1  --  3.6    99*  --  103   CO2 25 26  --  27   BUN 41* 62*  --  42*   CREA 3.41* 4.54*  --  3.71*   CA 9.0 8.3*  --  8.5   AGAP 9 10  --  10   BUCR 12 14  --  11*   AP  --   --  69  --    TP  --   --  6.0*  --    ALB  --   --  2.7*  --    GLOB  --   --  3.3  --    AGRAT  --   --  0.8  --    PHOS 3.1 3.3  --  2.4*          Lab Results   Component Value Date/Time    Iron 42 (L) 04/30/2020 12:41 PM    TIBC 319 04/30/2020 12:41 PM    Iron % saturation 13 (L) 04/30/2020 12:41 PM    Ferritin 411 (H) 06/10/2020 03:04 AM      Lab Results   Component Value Date/Time    Calcium 9.0 06/23/2020 05:42 AM    Phosphorus 3.1 06/23/2020 05:42 AM          Maria Luz Cantor M.D  [facility]  Office [phone]  Pager [phone]

## 2020-06-23 NOTE — PROGRESS NOTES
Spoke with Jazmin Lemus in [place], who will contact daughter for medicaid screening. COVID test for placement will need to be a send out [facility] test in which results come in within 24-48 hours of d/c. [facility] to LTC.   Will need to set up standing order with [facility] for dialysis transportation prior to d/c.       AIME Justice  Case Management  [phone]

## 2020-06-23 NOTE — DIABETES MGMT
Glycemic Control Plan of Care    Seen patient this afternoon. Noted that she just finished eating her lunch approximately 75% of pureed dysphagia diet. POC BG values on 06/22/2020: 225, 151, 225, 216. POC BG values on 06/23/2020 at time of review: 248, 236. Recommendation(s): discussed elevated BG values with Dr. Celestino Lynch and rec to add basal lantus insulin 10 units daily. Obtained order. Current hospital diabetes medications:  Basal lantus insulin 10 units daily, first dose ordered 06/23/2020. Correctional lispro insulin ACHS. Modified to very resistant dose. Dr. Celestino Lynch also ordered to include diabetic consistent carb in diet. Total daily dose insulin requirement previous day: 06/22/2020:  Lispro: 10 units    Assessment:  Patient is 71year old with PMH including type 2 diabetes mellitus, CHF, GERD, hypertension, chronic respiratory failure on home oxygen 2L, ESRD on hemodialysis, and uterine cancer - was admitted from [facility] and [address] [place] [address] home facility on 06/08/2020 with report of shortness of breath. Noted:  Acute respiratory failure. Aspiration of foreign body (partial dentures) into the hypopharynx  RUL infiltrate. Fluid overload, severe edema. ESRD on hemodialysis. T2DM. Most recent blood glucose values:        Current A1C:   Lab Results   Component Value Date/Time    Hemoglobin A1c 6.1 (H) 04/23/2020 08:26 AM     This lab test reflects the patient's estimated average blood glucose of 128 mg/dL over the past 3 months. Home diabetes medications: patient reported on 06/23/2020 that she was taking Jardiance 10 mg daily at home. Noted lantus insulin 4 units daily listed prior to this hospital admission. She presented from [facility] and [facility] facility on 06/08/2020. Diet: Dysphagia pureed; consistent carb 1800kcal.    Goals:  Blood glucose will be within target range of  mg/dL by 06/26/2020.     Education:  ___  Refer to Diabetes Education Record _X__  Education not indicated at this time    Jean Mccormick RN [facility]  Pager: [phone]

## 2020-06-24 LAB
ANION GAP SERPL CALC-SCNC: 8 MMOL/L (ref 3–18)
BUN SERPL-MCNC: 65 MG/DL (ref 7–18)
BUN/CREAT SERPL: 14 (ref 12–20)
CALCIUM SERPL-MCNC: 8.7 MG/DL (ref 8.5–10.1)
CHLORIDE SERPL-SCNC: 101 MMOL/L (ref 100–111)
CO2 SERPL-SCNC: 25 MMOL/L (ref 21–32)
CREAT SERPL-MCNC: 4.76 MG/DL (ref 0.6–1.3)
ERYTHROCYTE [DISTWIDTH] IN BLOOD BY AUTOMATED COUNT: 15.1 % (ref 11.6–14.5)
GLUCOSE BLD STRIP.AUTO-MCNC: 169 MG/DL (ref 70–110)
GLUCOSE BLD STRIP.AUTO-MCNC: 183 MG/DL (ref 70–110)
GLUCOSE BLD STRIP.AUTO-MCNC: 192 MG/DL (ref 70–110)
GLUCOSE BLD STRIP.AUTO-MCNC: 272 MG/DL (ref 70–110)
GLUCOSE SERPL-MCNC: 162 MG/DL (ref 74–99)
HCT VFR BLD AUTO: 28.2 % (ref 35–45)
HGB BLD-MCNC: 8.8 G/DL (ref 12–16)
MAGNESIUM SERPL-MCNC: 1.7 MG/DL (ref 1.6–2.6)
MCH RBC QN AUTO: 28.1 PG (ref 24–34)
MCHC RBC AUTO-ENTMCNC: 31.2 G/DL (ref 31–37)
MCV RBC AUTO: 90.1 FL (ref 74–97)
PHOSPHATE SERPL-MCNC: 4.4 MG/DL (ref 2.5–4.9)
PLATELET # BLD AUTO: 209 K/UL (ref 135–420)
PMV BLD AUTO: 11.6 FL (ref 9.2–11.8)
POTASSIUM SERPL-SCNC: 4.4 MMOL/L (ref 3.5–5.5)
RBC # BLD AUTO: 3.13 M/UL (ref 4.2–5.3)
SODIUM SERPL-SCNC: 134 MMOL/L (ref 136–145)
WBC # BLD AUTO: 11.8 K/UL (ref 4.6–13.2)

## 2020-06-24 PROCEDURE — 65660000004 HC RM CVT STEPDOWN

## 2020-06-24 PROCEDURE — 74011250637 HC RX REV CODE- 250/637: Performed by: PHYSICIAN ASSISTANT

## 2020-06-24 PROCEDURE — 74011250636 HC RX REV CODE- 250/636

## 2020-06-24 PROCEDURE — 74011000250 HC RX REV CODE- 250: Performed by: PHYSICIAN ASSISTANT

## 2020-06-24 PROCEDURE — 74011250637 HC RX REV CODE- 250/637: Performed by: INTERNAL MEDICINE

## 2020-06-24 PROCEDURE — 92526 ORAL FUNCTION THERAPY: CPT

## 2020-06-24 PROCEDURE — 74011250636 HC RX REV CODE- 250/636: Performed by: INTERNAL MEDICINE

## 2020-06-24 PROCEDURE — 94640 AIRWAY INHALATION TREATMENT: CPT

## 2020-06-24 PROCEDURE — 77010033678 HC OXYGEN DAILY

## 2020-06-24 PROCEDURE — 90935 HEMODIALYSIS ONE EVALUATION: CPT

## 2020-06-24 PROCEDURE — 84100 ASSAY OF PHOSPHORUS: CPT

## 2020-06-24 PROCEDURE — 74011250637 HC RX REV CODE- 250/637: Performed by: HOSPITALIST

## 2020-06-24 PROCEDURE — 74011250637 HC RX REV CODE- 250/637: Performed by: NURSE PRACTITIONER

## 2020-06-24 PROCEDURE — 80048 BASIC METABOLIC PNL TOTAL CA: CPT

## 2020-06-24 PROCEDURE — 86769 SARS-COV-2 COVID-19 ANTIBODY: CPT

## 2020-06-24 PROCEDURE — 74011000250 HC RX REV CODE- 250: Performed by: INTERNAL MEDICINE

## 2020-06-24 PROCEDURE — 74011636637 HC RX REV CODE- 636/637: Performed by: INTERNAL MEDICINE

## 2020-06-24 PROCEDURE — 85027 COMPLETE CBC AUTOMATED: CPT

## 2020-06-24 PROCEDURE — 82962 GLUCOSE BLOOD TEST: CPT

## 2020-06-24 PROCEDURE — P9047 ALBUMIN (HUMAN), 25%, 50ML: HCPCS

## 2020-06-24 PROCEDURE — 36415 COLL VENOUS BLD VENIPUNCTURE: CPT

## 2020-06-24 PROCEDURE — 94669 MECHANICAL CHEST WALL OSCILL: CPT

## 2020-06-24 PROCEDURE — 83735 ASSAY OF MAGNESIUM: CPT

## 2020-06-24 RX ORDER — ALBUMIN HUMAN 250 G/1000ML
SOLUTION INTRAVENOUS
Status: COMPLETED
Start: 2020-06-24 | End: 2020-06-24

## 2020-06-24 RX ORDER — ACETAMINOPHEN 325 MG/1
650 TABLET ORAL
Status: DISCONTINUED | OUTPATIENT
Start: 2020-06-24 | End: 2020-06-26 | Stop reason: HOSPADM

## 2020-06-24 RX ADMIN — Medication 325 MG: at 09:42

## 2020-06-24 RX ADMIN — ALBUMIN (HUMAN) 25 G: 0.25 INJECTION, SOLUTION INTRAVENOUS at 15:45

## 2020-06-24 RX ADMIN — Medication 10 ML: at 13:00

## 2020-06-24 RX ADMIN — CARVEDILOL 25 MG: 25 TABLET, FILM COATED ORAL at 09:42

## 2020-06-24 RX ADMIN — CARVEDILOL 25 MG: 25 TABLET, FILM COATED ORAL at 19:00

## 2020-06-24 RX ADMIN — HEPARIN SODIUM 1000 UNITS: 1000 INJECTION, SOLUTION INTRAVENOUS; SUBCUTANEOUS at 15:27

## 2020-06-24 RX ADMIN — INSULIN LISPRO 3 UNITS: 100 INJECTION, SOLUTION INTRAVENOUS; SUBCUTANEOUS at 09:43

## 2020-06-24 RX ADMIN — LOSARTAN POTASSIUM 25 MG: 25 TABLET ORAL at 09:42

## 2020-06-24 RX ADMIN — Medication 10 ML: at 04:33

## 2020-06-24 RX ADMIN — INSULIN LISPRO 3 UNITS: 100 INJECTION, SOLUTION INTRAVENOUS; SUBCUTANEOUS at 18:26

## 2020-06-24 RX ADMIN — Medication 10 ML: at 06:00

## 2020-06-24 RX ADMIN — ACETAMINOPHEN 325 MG: 325 TABLET ORAL at 15:21

## 2020-06-24 RX ADMIN — VENLAFAXINE HYDROCHLORIDE 37.5 MG: 37.5 CAPSULE, EXTENDED RELEASE ORAL at 09:42

## 2020-06-24 RX ADMIN — ASPIRIN 81 MG CHEWABLE TABLET 81 MG: 81 TABLET CHEWABLE at 09:41

## 2020-06-24 RX ADMIN — Medication 10 ML: at 21:40

## 2020-06-24 RX ADMIN — MIRTAZAPINE 7.5 MG: 15 TABLET, FILM COATED ORAL at 21:39

## 2020-06-24 RX ADMIN — INSULIN GLARGINE 10 UNITS: 100 INJECTION, SOLUTION SUBCUTANEOUS at 09:43

## 2020-06-24 RX ADMIN — IPRATROPIUM BROMIDE AND ALBUTEROL SULFATE 3 ML: .5; 3 SOLUTION RESPIRATORY (INHALATION) at 20:23

## 2020-06-24 RX ADMIN — ACETAMINOPHEN 325 MG: 325 TABLET ORAL at 14:11

## 2020-06-24 RX ADMIN — HEPARIN SODIUM 5000 UNITS: 5000 INJECTION INTRAVENOUS; SUBCUTANEOUS at 13:00

## 2020-06-24 RX ADMIN — EPOETIN ALFA-EPBX 10000 UNITS: 10000 INJECTION, SOLUTION INTRAVENOUS; SUBCUTANEOUS at 22:40

## 2020-06-24 RX ADMIN — INSULIN LISPRO 9 UNITS: 100 INJECTION, SOLUTION INTRAVENOUS; SUBCUTANEOUS at 21:31

## 2020-06-24 RX ADMIN — HEPARIN SODIUM 5000 UNITS: 5000 INJECTION INTRAVENOUS; SUBCUTANEOUS at 21:30

## 2020-06-24 RX ADMIN — CYANOCOBALAMIN TAB 1000 MCG 1000 MCG: 1000 TAB at 09:43

## 2020-06-24 RX ADMIN — HEPARIN SODIUM 5000 UNITS: 5000 INJECTION INTRAVENOUS; SUBCUTANEOUS at 04:32

## 2020-06-24 RX ADMIN — IPRATROPIUM BROMIDE AND ALBUTEROL SULFATE 3 ML: .5; 3 SOLUTION RESPIRATORY (INHALATION) at 07:59

## 2020-06-24 RX ADMIN — ROSUVASTATIN CALCIUM 5 MG: 10 TABLET, COATED ORAL at 21:39

## 2020-06-24 RX ADMIN — CYANOCOBALAMIN TAB 1000 MCG 1000 MCG: 1000 TAB at 18:26

## 2020-06-24 RX ADMIN — LORATADINE 10 MG: 10 TABLET ORAL at 09:42

## 2020-06-24 RX ADMIN — INSULIN LISPRO 3 UNITS: 100 INJECTION, SOLUTION INTRAVENOUS; SUBCUTANEOUS at 11:30

## 2020-06-24 RX ADMIN — PANTOPRAZOLE SODIUM 40 MG: 40 TABLET, DELAYED RELEASE ORAL at 09:42

## 2020-06-24 RX ADMIN — FUROSEMIDE 80 MG: 10 INJECTION, SOLUTION INTRAMUSCULAR; INTRAVENOUS at 09:49

## 2020-06-24 NOTE — PROGRESS NOTES
Renal Progress Note  Follow up of ESRD     Assessment/Plan:  1. ESRD. Continue dialysis mwf. 2.5   liters uf today, using albumin due to intradialytic hypotension. D/c iv lasix. 2. HTN. Stable, continue current regimen. 3. Anemia of ckd. Continue procrit. 4. Staph epi bacteriemia, likely contaminant per ID. Repeat bc ngtd. 5. Aspiration pneumonia, finished abx per ID. Subjective:  Seen on dialysis. Patient complaints off: Feels better but is still very weak. Breathing is better. No chest pain, nausea or vomiting. Appetite is better.        Patient Active Problem List   Diagnosis Code    Acute on chronic diastolic congestive heart failure ([facility]) I50.33    Suspected COVID-19 virus infection Z20.828    Type 2 diabetes mellitus without complication, without long-term current use of insulin ([facility]) E11.9    Left anterior fascicular block I44.4    HTN (hypertension), benign I10    Coronary artery disease involving native coronary artery of native heart without angina pectoris I25.10    Chronic diastolic congestive heart failure ([facility]) I50.32    Bilateral lower extremity edema R60.0    BMI 35.0-35.9,adult Z68.35    CHF (congestive heart failure) ([facility]) I50.9    Sepsis ([facility] A41.9    Respiratory failure ([address] J96.90    SIRS (systemic inflammatory response syndrome) ([facility]) R65.10    Acute on chronic respiratory failure ([facility]) J96.20    Pneumonia J18.9    Fluid overload E87.70    ESRD (end stage renal disease) ([facility] N18.6    Debility R53.81    Respiratory failure with hypoxia and hypercapnia ([facility]) J96.91, J96.92    Depression F32.9    Aspiration of foreign body T17.900A       Current Facility-Administered Medications   Medication Dose Route Frequency Provider Last Rate Last Dose    albumin human 25% (BUMINATE) 25 % solution             acetaminophen (TYLENOL) tablet 650 mg  650 mg Oral Q4H PRN Erika Miller MD   325 mg at 06/24/20 1521    insulin lispro (HUMALOG) injection SubCUTAneous AC&HS Millicent Hinson MD   3 Units at 06/24/20 1130    insulin glargine (LANTUS) injection 10 Units  10 Units SubCUTAneous DAILY Millicent Hinson MD   10 Units at 06/24/20 0943    losartan (COZAAR) tablet 25 mg  25 mg Oral DAILY [place], [place]   25 mg at 06/24/20 5200    heparin (porcine) 1,000 unit/mL injection 1,000 Units  1,000 Units IntraVENous DIALYSIS PRN Raad Dumas MD   1,000 Units at 06/24/20 1527    polyethylene glycol (MIRALAX) packet 17 g  17 g Oral DAILY PRN Federica Gomez MD        venlafaxine-SR [facility] capsule 37.5 mg  37.5 mg Oral DAILY WITH BREAKFAST Maricruz Santos PA-C   37.5 mg at 06/24/20 6613    mirtazapine (REMERON) tablet 7.5 mg  7.5 mg Oral QHS Gila Hagen MD   7.5 mg at 06/23/20 2141    pantoprazole (PROTONIX) tablet 40 mg  40 mg Oral ACB Federica Gomez MD   40 mg at 06/24/20 0942    carvediloL (COREG) tablet 25 mg  25 mg Oral BID WITH MEALS Federica Gomez MD   25 mg at 06/24/20 0942    albuterol-ipratropium (DUO-NEB) 2.5 MG-0.5 MG/3 ML  3 mL Nebulization Q6H RT Madina Pritchett MD   Stopped at 06/24/20 1400    albuterol-ipratropium (DUO-NEB) 2.5 MG-0.5 MG/3 ML  3 mL Nebulization Q4H PRN Kemal Garcia PA-C        sodium chloride 3% hypertonic nebulizer soln  4 mL Nebulization TID PRN Marietta Diss, DO        lidocaine 4 % patch 1 Patch  1 Patch TransDERmal Q24H Kemal Garcia PA-C   1 Patch at 06/24/20 0900    menthol-zinc oxide (CALMOSEPTINE) 0.44-20.6 % ointment   Topical Q6H PRN Kemal Garcia PA-C        epoetin johnathon-epbx (RETACRIT) injection 10,000 Units  10,000 Units SubCUTAneous Q MON, WED & Roberto Ohara MD   10,000 Units at 06/22/20 2137    sodium chloride (NS) flush 5-40 mL  5-40 mL IntraVENous Q8H Monica Lassiter DO   10 mL at 06/24/20 1300    sodium chloride (NS) flush 5-40 mL  5-40 mL IntraVENous PRN Reji ALCALA DO        hydrALAZINE (APRESOLINE) 20 mg/mL injection 20 mg  20 mg IntraVENous Q6H PRN Vandana Ulloa PA-C   20 mg at 06/19/20 0850    ondansetron (ZOFRAN) injection 4 mg  4 mg IntraVENous Q6H PRN Atif Taylor MD   4 mg at 06/10/20 0030    glucose chewable tablet 16 g  4 Tab Oral PRN Euel Benne, DO        glucagon (GLUCAGEN) injection 1 mg  1 mg IntraMUSCular PRN Euel Benne, DO        dextrose (D50W) injection syrg 12.5-25 g  25-50 mL IntraVENous PRN Eufly Beebe, DO   25 g at 06/13/20 0041    furosemide (LASIX) injection 80 mg  80 mg IntraVENous BID Hadley Angulo MD   80 mg at 06/24/20 0949    cyanocobalamin tablet 1,000 mcg  1,000 mcg Oral BID Maribell MORENO MD   1,000 mcg at 06/24/20 0943    loratadine (CLARITIN) tablet 10 mg  10 mg Oral DAILY Hadley Angulo MD   10 mg at 06/24/20 5507    aspirin chewable tablet 81 mg  81 mg Oral DAILY Hadley Angulo MD   81 mg at 06/24/20 0941    [Held by provider] isosorbide mononitrate ER (IMDUR) tablet 30 mg  30 mg Oral DAILY Hadley Angulo MD   Stopped at 06/11/20 0900    rosuvastatin (CRESTOR) tablet 5 mg  5 mg Oral QHS Hadley Angulo MD   5 mg at 06/23/20 2141    [Held by provider] gabapentin (NEURONTIN) capsule 100 mg  100 mg Oral BID Maribell OMRENO MD   Stopped at 06/11/20 0900    ferrous sulfate tablet 325 mg  325 mg Oral EVERY OTHER DAY Hadley Angulo MD   325 mg at 06/24/20 0942    docusate sodium (COLACE) capsule 100 mg  100 mg Oral BID PRN Makayla Gregory MD        heparin (porcine) injection 5,000 Units  5,000 Units SubCUTAneous Q8H Hadley Angulo MD   5,000 Units at 06/24/20 1300             Objective:  Vitals:    06/24/20 1515 06/24/20 1530 06/24/20 1600 06/24/20 1615   BP: 92/45 (!) 89/33 91/40 95/44   Pulse: 62 60 61 61   Resp:       Temp:       TempSrc:       SpO2:       Weight:       Height:         .     Intake/Output Summary (Last 24 hours) at 6/24/2020 1620  Last data filed at 6/24/2020 1324  Gross per 24 hour   Intake 713 ml   Output 0 ml   Net 713 ml       Admission weight:Weight: 121.6 kg (268 lb) (06/08/20 1131)    Last Weight Metrics:  Weight Loss Metrics 6/24/2020 5/13/2020 4/30/2020 4/29/2020 4/26/2020 8/18/2016 6/2/2016   Today's Wt 224 lb 238 lb 5.1 oz - 252 lb 252 lb 4.8 oz 219 lb 228 lb   BMI 37.28 kg/m2 - 39.66 kg/m2 41.93 kg/m2 41.98 kg/m2 36.44 kg/m2 37.94 kg/m2            Physical Exam:     General: No acute distress. Neck: no jvd. LUNGS: diminished air entry at the bases, bl exp rhonchi. CVS EXM: S1, S2, no murmur, rub or gallop. Abdomen: Soft, Non Tender, non distended. Lower Extremities: trace Edema. Access: rt ij tdc.        Labs:    CBC w/Diff Recent Labs     06/24/20  0608 06/23/20  0542 06/22/20  0534   WBC 11.8 10.5 10.5   RBC 3.13* 3.39* 3.14*   HGB 8.8* 9.4* 8.6*   HCT 28.2* 30.4* 27.6*    226 210        Chemistry Recent Labs     06/24/20  0608 06/23/20  0542 06/22/20  0534   * 235* 142*   * 135* 135*   K 4.4 3.9 4.1    101 99*   CO2 25 25 26   BUN 65* 41* 62*   CREA 4.76* 3.41* 4.54*   CA 8.7 9.0 8.3*   AGAP 8 9 10   BUCR 14 12 14   PHOS 4.4 3.1 3.3          Lab Results   Component Value Date/Time    Iron 42 (L) 04/30/2020 12:41 PM    TIBC 319 04/30/2020 12:41 PM    Iron % saturation 13 (L) 04/30/2020 12:41 PM    Ferritin 411 (H) 06/10/2020 03:04 AM      Lab Results   Component Value Date/Time    Calcium 8.7 06/24/2020 06:08 AM    Phosphorus 4.4 06/24/2020 06:08 AM          Dov Mi M.D  [facility]  Office [phone]  Pager [phone]

## 2020-06-24 NOTE — PROGRESS NOTES
Problem: Dysphagia (Adult)  Goal: *Acute Goals and Plan of Care (Insert Text)  Description: Patient will:  1. Tolerate PO trials with 0 s/s overt distress in 4/5 trials  2. Utilize compensatory swallow strategies/maneuvers (decrease bite/sip, size/rate, alt. liq/sol) with min cues in 4/5 trials  3. Perform oral-motor/laryngeal exercises to increase oropharyngeal swallow function with min cues  4. Complete an objective swallow study (i.e., MBSS) to assess swallow integrity, r/o aspiration, and determine of safest LRD, min A    Rec:     Soft solid diet with thin liquids   Meds per pt preference   Aspiration precautions  HOB >45 during po intake, remain >30 for 30-45 minutes after po   Small bites/sips; alternate liquid/solid with slow feeding rate   Oral care TID  Frequent breaks as needed with PO         Outcome: Progressing Towards Goal    SPEECH LANGUAGE PATHOLOGY DYSPHAGIA TREATMENT    Patient: Rudy Silva (29 y.o. female)  Date: 6/24/2020  Diagnosis: Pneumonia [J18.9]   Fluid overload  Procedure(s) (LRB):  FLEXIBLE BRONCHOSCOPY with BAL/SLIM SCOPE (N/A) 13 Days Post-Op  Precautions: Aspiration, Skin, Fall  PLOF: As per H&P      ASSESSMENT:  Pt seen for follow up dysphagia treatment. Pt reporting \"I don't have any top teeth but I'm sick of this soft stuff\". Able to manipulate and clear mech soft and solid presentations presentations with labored but thorough oral clearance. Tolerating thin liquids via consecutive swallows with no overt s/sx aspiration. Recommend advance diet to soft solids, thin liquids with use of the above mentioned compensatory strategies/aspiration precautions. Results/recommendations discussed with pt who verbalized understanding. Further d/w RN. Will follow x1-2 visits to ensure continued diet tolerance.        Progression toward goals:  [x]         Improving appropriately and progressing toward goals  []         Improving slowly and progressing toward goals  []         Not making progress toward goals and plan of care will be adjusted     PLAN:  Recommendations and Planned Interventions:  Patient continues to benefit from skilled intervention to address the above impairments. Continue treatment per established plan of care. Discharge Recommendations: To Be Determined     SUBJECTIVE:   Patient stated I don't have my top teeth. OBJECTIVE:   Cognitive and Communication Status:  Neurologic State: Alert  Orientation Level: Oriented X4  Cognition: Appropriate decision making, Appropriate for age attention/concentration, Appropriate safety awareness  Perception: Appears intact  Perseveration: No perseveration noted  Safety/Judgement: Fall prevention  Dysphagia Treatment:  Oral Assessment:  Oral Assessment  Labial: No impairment  Dentition: Limited, Natural(upper partials)  Oral Hygiene: adequate  Lingual: No impairment  Velum: No impairment  Mandible: No impairment  P.O. Trials:   Patient Position: [facility]   Vocal quality prior to P.O.: Breathy   Consistency Presented: Thin liquid, Solid, Puree   How Presented: Self-fed/presented, Cup/sip, Spoon, Straw   Bolus Acceptance: No impairment   Bolus Formation/Control: Impaired   Type of Impairment: Delayed, Mastication   Propulsion: Delayed (# of seconds)   Oral Residue: None   Initiation of Swallow: Delayed (# of seconds)   Laryngeal Elevation: Decreased   Aspiration Signs/Symptoms: Infiltrate on chest xray   Pharyngeal Phase Characteristics: Suspected pharyngeal residue, Poor endurance   Effective Modifications: Small sips and bites, Alternate liquids/solids   Cues for Modifications:  Moderate   Oral Phase Severity: Mild-moderate   Pharyngeal Phase Severity : No impairment      PAIN:  Start of Tx: not reported  End of Tx: not reported      After treatment:   []              Patient left in no apparent distress sitting up in chair  [x]              Patient left in no apparent distress in bed  [x]              Call bell left within reach  [x]              Nursing notified  []              Family present  []              Caregiver present  []              Bed alarm activated      COMMUNICATION/EDUCATION:   [x] Aspiration precautions; swallow safety; compensatory techniques  [x]        Patient/family able to participate in training and education   []  Patient unable to participate in training and education, education ongoing with staff   [] Patient understands goals and intent of therapy; neutral about participation     Mercedes Britton M.S., [address] [facility]  Speech-Language Pathologist

## 2020-06-24 NOTE — PROGRESS NOTES
Per [employer], daughter refused Medicaid application and stated she would need to speak with  first.  Daughter told this CM the same thing yesterday and has not followed up. Daughter intends for long term care at nursing home, however is not cooperating in applying for medicaid. Left message for daughter, Geena Lopez. Lengthy conversation with son-in-law, Radha Monk states patient has no other family, Nevinvy Godoy is ex-step-daughter to patient, Ana Fleming is Paola's . Patient has an estranged brother, no info on him, and sister in a nursing home in [place],  is dead and no children. They are the closest family patient has. Paola and Ana Fleming live in [place], [place]. Ana Fleming reports patient lives alone and patient's home is disgusting, patient is not able to care for self and they had to remove animal feces as well and remove pets from the home. CM discussed long term planning for patient, patient does not have medicaid but family intends for patient to have LTC. Ana Fleming stated he would try to work on gaining access to patient's finances, however they are not related and have no access. Ana Fleming and Tony Godoy will contact patient to gain access to bank account. 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 Ana Fleming states patient did have to file for bankruptcy 5 years ago. Ana Fleming is unsure about investments, savings, burial plot, life insurance, will discuss with patient. Discussed the possibility of patient not qualifying for Medicaid, in this case CM suggested that family look at selling home/car since patient will not be able to use anyways, to pay for long term care.  Ana Fleming also considering moving patient into their home in [place], [place], can convert living room into bedroom for patient, but understands all services would have to be transferred over, doctors, dialysis, transport, DME, home health, making home handicapped accessible. Patient does have rehab days at [facility] and rehab for rehab, but long term planning will need to be taking place. Jermaine Tay stated he will talk with wife and get working on it. [address] connections- partnerships with organizations and churches for donations, emailing CM regarding services available in [place], [place] for patient.       AIME Ballesteros  Case Management  [phone]

## 2020-06-24 NOTE — PROGRESS NOTES
conducted an initial consultation and Spiritual Assessment for Ranjit Bautista, who is a 71 y.o.,female. Patients Primary Language is: [employer]. According to the patients EMR Anabaptism Affiliation is: Gnosticist.     The reason the Patient came to the hospital is:   Patient Active Problem List    Diagnosis Date Noted    ESRD (end stage renal disease) ([address] 06/20/2020    Debility 06/20/2020    Respiratory failure with hypoxia and hypercapnia ([address] 06/20/2020    Depression 06/20/2020    Aspiration of foreign body 06/20/2020    Fluid overload 06/10/2020    Pneumonia 06/08/2020    Acute on chronic respiratory failure ([address] 05/09/2020    Sepsis ([address] 04/30/2020    Respiratory failure ([address] 04/30/2020    SIRS (systemic inflammatory response syndrome) ([address] 04/30/2020    CHF (congestive heart failure) ([address] 04/25/2020    Acute on chronic diastolic congestive heart failure ([address] 04/23/2020    Suspected COVID-19 virus infection 04/23/2020    Type 2 diabetes mellitus without complication, without long-term current use of insulin ([address] 07/06/2018    Left anterior fascicular block 07/06/2018    HTN (hypertension), benign 07/06/2018    Coronary artery disease involving native coronary artery of native heart without angina pectoris 07/06/2018    Chronic diastolic congestive heart failure ([address] 07/06/2018    Bilateral lower extremity edema 07/06/2018    BMI 35.0-35.9,adult 07/06/2018        The  provided the following Interventions:  Initiated a relationship of care and support. Explored issues of yaw, belief, spirituality and Confucianism/ritual needs while hospitalized. Listened empathically. Patient shared her concerns for her dialysis. She expressed that she prefers to have her dialysis before 1600 pm. Patient also expressed her need for a phone  to charge her S10 phone. Provided her with the availability of Zoom video calling but she did not express her desire for it. Provided chaplaincy education. Provided information about Spiritual Care Services. Offered prayer and assurance of continued prayers on patient's behalf. Chart reviewed. The following outcomes where achieved:  Patient shared limited information about both her medical narrative and spiritual journey/beliefs.  confirmed Patient's Shinto Affiliation with the [employer]. She said she [facility] to [facility] [place]. Patient processed feeling about current hospitalization. Patient expressed gratitude for 's visit. Assessment:  Patient does not have any Lutheran/cultural needs that will affect patients preferences in health care. There are no spiritual or Lutheran issues which require intervention at this time. Plan:  Chaplains will continue to follow and will provide pastoral care on an as needed/requested basis.  recommends bedside caregivers page  on duty if patient shows signs of acute spiritual or emotional distress.     [facility]   [phone]

## 2020-06-24 NOTE — DIABETES MGMT
Glycemic Control Plan of Care    Seen patient this evening in dialysis unit and ready for transfer back to her room. POC BG values on 06/23/2020: 216, 248, 236, 185, 206. Added lantus insulin 10 units daily. POC BG values on 06/24/2020 at time of review: 169, 192, 183. Recommendation(s): cont inpatient glycemic monitoring and current insulin orders: lantus daily and very resistant dose sliding scale insulin. Current hospital diabetes medications:  Basal lantus insulin 10 units daily, first dose ordered 06/23/2020. Correctional lispro insulin ACHS. Modified to very resistant dose. Dr. Rashad Barnes also ordered to include diabetic consistent carb in diet. Total daily dose insulin requirement previous day: 06/23/2020:  Lantus;  10 units  Lispro: 21 units  TDD insulin: 31 units    Assessment:  Patient is 71year old with PMH including type 2 diabetes mellitus, CHF, GERD, hypertension, chronic respiratory failure on home oxygen 2L, ESRD on hemodialysis, and uterine cancer - was admitted from [facility] and [address] [place] [address] home facility on 06/08/2020 with report of shortness of breath. Noted:  Acute respiratory failure. Aspiration of foreign body (partial dentures) into the hypopharynx  RUL infiltrate. Fluid overload, severe edema. ESRD on hemodialysis. T2DM. Most recent blood glucose values:        Current A1C:   Lab Results   Component Value Date/Time    Hemoglobin A1c 6.1 (H) 04/23/2020 08:26 AM     This lab test reflects the patient's estimated average blood glucose of 128 mg/dL over the past 3 months. Home diabetes medications: patient reported on 06/23/2020 that she was taking Jardiance 10 mg daily at home. Noted lantus insulin 4 units daily listed prior to this hospital admission. She presented from [facility] and [facility] facility on 06/08/2020. Diet: Diabetic consistent carb 1800kcal; nutr suppl: nepro with breakfast and dinner.     Goals:  Blood glucose will be within target range of  mg/dL by 06/27/2020.     Education:  ___  Refer to Diabetes Education Record             _X__  Education not indicated at this time    Frederick Mohr RN [facility]  Pager: [phone]

## 2020-06-24 NOTE — PROGRESS NOTES
[facility]  Progress Note    Patient: Brandon Bass Age: 71 y.o. : 1950 MR#: [id number] [id number]  Date/Time: 2020     Subjective:     Patient at HD   No CP  No NVD  No cough   No SOB     Still on higher o2     Assessment/Plan:     Patient with h/o ESRD admitted with ARF with hypoxia requiring intubation and vent support . 1 Acute respiratory failure : intubated and extubated   - Etiology includes -  Acute on chronic CHF , ? Pneumonia Aspiration  , FB - Denture in trach. Found during intubation , removed . - d/w nursing to decrease o2 to 3 l as long as patient 's o2 sat is above 59%     2  Systolic HF - EF 45 - 50 %   - Continue diuresis / HD / on ARB     3 ESRD on HD per renal     4 DM2   - On SSI     5 Pneumonia - aspiration - s/p antibiotic treatment     6 Anemia of chronic illness - on Procrit        Dispo :    - SNF - once COVID test are negative / also trying to titrate o2 requirement down     Case discussed with:  [x]Patient  [] Family ( In room with patient )    []Nursing  []Case Management  DVT Prophylaxis:  []Lovenox  []Hep SQ  []SCDs  []Coumadin   []On Heparin gtt          Objective:   VS:   Visit Vitals  /51   Pulse 66   Temp 98.5 °F (36.9 °C)   Resp 20   Ht 5' 5\" (1.651 m)   Wt 101.6 kg (224 lb)   SpO2 98%   Breastfeeding No   BMI 37.28 kg/m²      Tmax/24hrs: Temp (24hrs), Av.9 °F (37.2 °C), Min:98.3 °F (36.8 °C), Max:99.3 °F (37.4 °C)  IOBRIEF    Intake/Output Summary (Last 24 hours) at 2020 1511  Last data filed at 2020 1324  Gross per 24 hour   Intake 713 ml   Output 0 ml   Net 713 ml       General:  Alert, cooperative, no acute distress   Cardiovascular: S1S2 - regular , No Murmur   Pulmonary: Equal expansion , No Use of accessory muscles , No Rales No Rhonchi    GI:  +BS in all four quadrants, soft, non-tender  Extremities:  No edema; 2+ dorsalis pedis pulses bilaterally  Neuro: Alert and oriented X 2. Medications:   Current Facility-Administered Medications   Medication Dose Route Frequency    acetaminophen (TYLENOL) tablet 650 mg  650 mg Oral Q4H PRN    insulin lispro (HUMALOG) injection   SubCUTAneous AC&HS    insulin glargine (LANTUS) injection 10 Units  10 Units SubCUTAneous DAILY    losartan (COZAAR) tablet 25 mg  25 mg Oral DAILY    heparin (porcine) 1,000 unit/mL injection 1,000 Units  1,000 Units IntraVENous DIALYSIS PRN    polyethylene glycol (MIRALAX) packet 17 g  17 g Oral DAILY PRN    venlafaxine-SR (EFFEXOR-XR) capsule 37.5 mg  37.5 mg Oral DAILY WITH BREAKFAST    mirtazapine (REMERON) tablet 7.5 mg  7.5 mg Oral QHS    pantoprazole (PROTONIX) tablet 40 mg  40 mg Oral ACB    carvediloL (COREG) tablet 25 mg  25 mg Oral BID WITH MEALS    albuterol-ipratropium (DUO-NEB) 2.5 MG-0.5 MG/3 ML  3 mL Nebulization Q6H RT    albuterol-ipratropium (DUO-NEB) 2.5 MG-0.5 MG/3 ML  3 mL Nebulization Q4H PRN    sodium chloride 3% hypertonic nebulizer soln  4 mL Nebulization TID PRN    lidocaine 4 % patch 1 Patch  1 Patch TransDERmal Q24H    menthol-zinc oxide (CALMOSEPTINE) 0.44-20.6 % ointment   Topical Q6H PRN    epoetin johnathon-epbx (RETACRIT) injection 10,000 Units  10,000 Units SubCUTAneous Q MON, WED & FRI    sodium chloride (NS) flush 5-40 mL  5-40 mL IntraVENous Q8H    sodium chloride (NS) flush 5-40 mL  5-40 mL IntraVENous PRN    hydrALAZINE (APRESOLINE) 20 mg/mL injection 20 mg  20 mg IntraVENous Q6H PRN    ondansetron (ZOFRAN) injection 4 mg  4 mg IntraVENous Q6H PRN    glucose chewable tablet 16 g  4 Tab Oral PRN    glucagon (GLUCAGEN) injection 1 mg  1 mg IntraMUSCular PRN    dextrose (D50W) injection syrg 12.5-25 g  25-50 mL IntraVENous PRN    furosemide (LASIX) injection 80 mg  80 mg IntraVENous BID    cyanocobalamin tablet 1,000 mcg  1,000 mcg Oral BID    loratadine (CLARITIN) tablet 10 mg  10 mg Oral DAILY    aspirin chewable tablet 81 mg  81 mg Oral DAILY    [Held by provider] isosorbide mononitrate ER (IMDUR) tablet 30 mg  30 mg Oral DAILY    rosuvastatin (CRESTOR) tablet 5 mg  5 mg Oral QHS    [Held by provider] gabapentin (NEURONTIN) capsule 100 mg  100 mg Oral BID    ferrous sulfate tablet 325 mg  325 mg Oral EVERY OTHER DAY    docusate sodium (COLACE) capsule 100 mg  100 mg Oral BID PRN    heparin (porcine) injection 5,000 Units  5,000 Units SubCUTAneous Q8H       Labs:    Recent Labs     06/24/20  0608 06/23/20  0542 06/22/20  0534   WBC 11.8 10.5 10.5   HGB 8.8* 9.4* 8.6*   HCT 28.2* 30.4* 27.6*    226 210     Recent Labs     06/24/20  0608 06/23/20  0542 06/22/20  0534   * 135* 135*   K 4.4 3.9 4.1    101 99*   CO2 25 25 26   * 235* 142*   BUN 65* 41* 62*   CREA 4.76* 3.41* 4.54*   CA 8.7 9.0 8.3*   MG 1.7 1.8 1.8   PHOS 4.4 3.1 3.3         Disclaimer: Sections of this note are dictated utilizing voice recognition software, which may have resulted in some phonetic based errors in grammar and contents. Even though attempts were made to correct all the mistakes, some may have been missed, and remained in the body of the document. If questions arise, please contact our department.     Signed By: James Mata MD     June 24, 2020

## 2020-06-24 NOTE — PROGRESS NOTES
Problem: Nutrition Deficit  Goal: *Optimize nutritional status  Outcome: Progressing Towards Goal     Problem: Falls - Risk of  Goal: *Absence of Falls  Description: Document Tania Blood Fall Risk and appropriate interventions in the flowsheet. Outcome: Progressing Towards Goal  Note: Fall Risk Interventions:       Mentation Interventions: Adequate sleep, hydration, pain control, Bed/chair exit alarm, Door open when patient unattended, Toileting rounds, Update white board, Room close to nurse's station    Medication Interventions: Bed/chair exit alarm, Patient to call before getting OOB    Elimination Interventions: Bed/chair exit alarm, Call light in reach, Patient to call for help with toileting needs, Toileting schedule/hourly rounds, Urinal in reach    History of Falls Interventions: Bed/chair exit alarm, Door open when patient unattended, Consult care management for discharge planning         Problem: Patient Education: Go to Patient Education Activity  Goal: Patient/Family Education  Outcome: Progressing Towards Goal     Problem: Pressure Injury - Risk of  Goal: *Prevention of pressure injury  Description: Document Toney Scale and appropriate interventions in the flowsheet. Outcome: Progressing Towards Goal  Note: Pressure Injury Interventions:  Sensory Interventions: Assess changes in LOC, Assess need for specialty bed, Keep linens dry and wrinkle-free, Maintain/enhance activity level, Minimize linen layers, Pad between skin to skin, Pressure redistribution bed/mattress (bed type), Turn and reposition approx.  every two hours (pillows and wedges if needed)    Moisture Interventions: Absorbent underpads    Activity Interventions: Assess need for specialty bed, Increase time out of bed, Pressure redistribution bed/mattress(bed type), PT/OT evaluation    Mobility Interventions: Assess need for specialty bed, HOB 30 degrees or less, Pressure redistribution bed/mattress (bed type), PT/OT evaluation, Turn and reposition approx.  every two hours(pillow and wedges)    Nutrition Interventions: Document food/fluid/supplement intake, Offer support with meals,snacks and hydration    Friction and Shear Interventions: HOB 30 degrees or less, Lift team/patient mobility team, Minimize layers, Transferring/repositioning devices, Apply protective barrier, creams and emollients, Foam dressings/transparent film/skin sealants                Problem: Patient Education: Go to Patient Education Activity  Goal: Patient/Family Education  Outcome: Progressing Towards Goal     Problem: Ventilator Management  Goal: *Adequate oxygenation and ventilation  Outcome: Progressing Towards Goal  Goal: *Patient maintains clear airway/free of aspiration  Outcome: Progressing Towards Goal  Goal: *Absence of infection signs and symptoms  Outcome: Progressing Towards Goal  Goal: *Normal spontaneous ventilation  Outcome: Progressing Towards Goal     Problem: Patient Education: Go to Patient Education Activity  Goal: Patient/Family Education  Outcome: Progressing Towards Goal     Problem: Non-Violent Restraints  Goal: *Removal from restraints as soon as assessed to be safe  Outcome: Progressing Towards Goal  Goal: *No harm/injury to patient while restraints in use  Outcome: Progressing Towards Goal  Goal: *Patient's dignity will be maintained  Outcome: Progressing Towards Goal  Goal: *Patient Specific Goal (EDIT GOAL, INSERT TEXT)  Outcome: Progressing Towards Goal  Goal: Non-violent Restaints:Standard Interventions  Outcome: Progressing Towards Goal  Goal: Non-violent Restraints:Patient Interventions  Outcome: Progressing Towards Goal  Goal: Patient/Family Education  Outcome: Progressing Towards Goal     Problem: Non-Violent Restraints  Goal: *Removal from restraints as soon as assessed to be safe  Outcome: Progressing Towards Goal  Goal: *No harm/injury to patient while restraints in use  Outcome: Progressing Towards Goal  Goal: *Patient's dignity will be maintained  Outcome: Progressing Towards Goal  Goal: Non-violent Restaints:Standard Interventions  Outcome: Progressing Towards Goal  Goal: Non-violent Restraints:Patient Interventions  Outcome: Progressing Towards Goal  Goal: Patient/Family Education  Outcome: Progressing Towards Goal     Problem: Injury - Risk of, Adverse Drug Event  Goal: *Absence of adverse drug events  Outcome: Progressing Towards Goal  Goal: *Absence of medication errors  Outcome: Progressing Towards Goal  Goal: *Knowledge of prescribed medications  Outcome: Progressing Towards Goal     Problem: Patient Education: Go to Patient Education Activity  Goal: Patient/Family Education  Outcome: Progressing Towards Goal     Problem: Patient Education: Go to Patient Education Activity  Goal: Patient/Family Education  Outcome: Progressing Towards Goal     Problem: Patient Education: Go to Patient Education Activity  Goal: Patient/Family Education  Outcome: Progressing Towards Goal     Problem: Patient Education: Go to Patient Education Activity  Goal: Patient/Family Education  Outcome: Progressing Towards Goal

## 2020-06-24 NOTE — PROGRESS NOTES
1945 Pt repositioned following breathing treatment. Bed is locked and in lowest position. Call bell in reach. 2140 Pt received scheduled meds. She denied any pain. Bed linens changed and pt repositioned. Pt received heparin subQ.  2220 Pt received insulin coverage. In bed resting comfortably. Lights turned down. Bed locked and in lowest position. Call bell in reach. 0030 Pt is in bed sleeping with cardiac monitor on.

## 2020-06-24 NOTE — PROGRESS NOTES
PCCM Progress Note                                                I have reviewed the flowsheet and previous days notes. Events, vitals, medications and notes from last 24 hours reviewed. Subjective:    6/24/2020     HD today, some hypotension  Volume removal with HD and lasix per cardiology/renal   Continues [facility]- improving 02 requirements w/ volume remoal     Denies shortness of breath  Denies any chest pain  24 hr events reviewed      HPI   Clara Escalona is a 71 y.o. female w/ PMH of uterine cancer, CHF, ESRD, depression, deconditioning, DMII, CAD, HTN, HLD who presented to [facility] via EMS from [facility] on 6/8 for acute respiratory distress with hypoxia requiring BVM. She had acute on chronic CHF and was started on IV lasix and bipap and admitted to the floor. There was concern for pneumonia and she was also started on abx, her covid test was (-), likely due to aspiration. 1/2 BC was (+) for staph which was felt to be a contaminant. On 6/11 a RRT was called for worsening resp failure, during intubation a denture was found obstructing her airway which was removed. Proceeded with intubation due to profound resp acidosis. A bronchoscopy was completed on following to r/o any other aspiration of FB. She was slow to wean from vent due hypoxia. On 6/16 she was extubated. She required bipap and high levels of HFNC following extubation for a few day but was able to wean solely to HFNC during the day with bipap at night. She has transitioned to a PO diet and is doing well.     Patient Active Problem List   Diagnosis Code    Acute on chronic diastolic congestive heart failure (HCC) I50.33    Suspected COVID-19 virus infection Z20.828    Type 2 diabetes mellitus without complication, without long-term current use of insulin (HCC) E11.9    Left anterior fascicular block I44.4    HTN (hypertension), benign I10    Coronary artery disease involving native coronary artery of native heart without angina pectoris I25.10    Chronic diastolic congestive heart failure (HCC) I50.32    Bilateral lower extremity edema R60.0    BMI 35.0-35.9,adult Z68.35    CHF (congestive heart failure) ([facility]) I50.9    Sepsis ([facility]) A41.9    Respiratory failure ([facility]) J96.90    SIRS (systemic inflammatory response syndrome) ([facility]) R65.10    Acute on chronic respiratory failure ([facility]) J96.20    Pneumonia J18.9    Fluid overload E87.70    ESRD (end stage renal disease) ([facility]) N18.6    Debility R53.81    Respiratory failure with hypoxia and hypercapnia ([facility]) J96.91, J96.92    Depression F32.9    Aspiration of foreign body T17.900A       Medication Reviewed: Allergies   Allergen Reactions    Augmentin [Amoxicillin-Pot Clavulanate] Diarrhea    Clavulanic Acid Diarrhea    Levaquin [Levofloxacin] Other (comments)     Severe hypoglycemia        Past Medical History:   Diagnosis Date    Arthritis     [facility] [address]     CHF (congestive heart failure) ([facility])     Diabetes ([facility] [address]     Fracture of neck ([facility])     GERD (gastroesophageal reflux disease)     Goiter     Hiatal hernia     Hypertension     Uterine cancer ([facility])       Past Surgical History:   Procedure Laterality Date    HX APPENDECTOMY      HX HYSTERECTOMY      HX KNEE REPLACEMENT Right     HX ORTHOPAEDIC      odontoid fracture repair with hardware placement.  HX PARTIAL THYROIDECTOMY      CT INSJ TUNNELED CVC W/O SUBQ PORT/ AGE 5 YR/> Right 5/7/2020    INSERTION TUNNELED CENTRAL VENOUS CATHETER performed by Griselda Bourdon, MD at [facility] CATH LAB      Social History     Tobacco Use    Smoking status: Never Smoker   Substance Use Topics    Alcohol use: No      History reviewed. No pertinent family history. Prior to Admission medications    Medication Sig Start Date End Date Taking? Authorizing Provider   venlafaxine-SR [facility] 37.5 mg capsule Take 50 mg by mouth once.    Yes Provider, Historical   gabapentin (NEURONTIN) 100 mg capsule Take 1 Cap by mouth two (2) times a day. Max Daily Amount: 200 mg. 5/13/20  Yes Booker De Jesus MD   carvediloL (COREG) 12.5 mg tablet Take 1 Tab by mouth two (2) times daily (with meals). 5/13/20  Yes Booker De Jesus MD   docusate sodium (COLACE) 100 mg capsule Take 1 Cap by mouth two (2) times daily as needed for Constipation for up to 90 days. 5/13/20 8/11/20 Yes Booker De Jesus MD   OXYGEN-AIR DELIVERY SYSTEMS Take 2 L by inhalation daily. Yes Provider, Historical   amLODIPine (NORVASC) 10 mg tablet Take 1 Tab by mouth daily. 4/28/20  Yes Luis E Marx MD   polyethylene glycol (MIRALAX) 17 gram packet Take 1 Packet by mouth daily. 4/28/20  Yes Luis E Marx MD   rosuvastatin (CRESTOR) 5 mg tablet Take 1 Tab by mouth nightly. 4/28/20  Yes Luis E Marx MD   hydrALAZINE (APRESOLINE) 100 mg tablet Take 1 Tab by mouth three (3) times daily. 4/28/20  Yes Luis E Marx MD   aspirin 81 mg chewable tablet Take 1 Tab by mouth daily. 4/28/20  Yes Luis E Marx MD   esomeprazole (NEXIUM) 40 mg capsule Take 40 mg by mouth daily. Yes Isabel, MD Larry   cranberry extract (AZO CRANBERRY) 450 mg tab Take 2 Tabs by mouth daily. Yes Isabel, MD Larry   cholecalciferol (VITAMIN D3) 1,000 unit cap Take 5,000 Units by mouth daily. Yes Isabel, MD Larry   cyanocobalamin (VITAMIN B-12) 1,000 mcg tablet Take 1,000 mcg by mouth two (2) times a day. Yes Isabel, MD Larry   Biotin 2,500 mcg cap Take 1 Tab by mouth two (2) times a day. Yes Isabel, MD Larry   vitamin a-vitamin c-vit e-min (OCUVITE) tablet Take 1 Tab by mouth daily. Yes Larry Hall MD   loratadine (CLARITIN) 10 mg tablet Take 10 mg by mouth daily. Yes Isabel, MD Larry   B.infantis-B.ani-B.long-B.bifi (PROBIOTIC 4X) 10-15 mg TbEC Take 1 Tab by mouth daily. Yes Isabel, MD Larry   mirtazapine (REMERON) 15 mg tablet Take 1 Tab by mouth nightly.  5/13/20   Booker De Jesus MD   isosorbide mononitrate ER (IMDUR) 30 mg tablet Take 1 Tab by mouth daily.  20   Nader Wright MD     Current Facility-Administered Medications   Medication Dose Route Frequency    insulin lispro (HUMALOG) injection   SubCUTAneous AC&HS    insulin glargine (LANTUS) injection 10 Units  10 Units SubCUTAneous DAILY    losartan (COZAAR) tablet 25 mg  25 mg Oral DAILY    venlafaxine-SR (EFFEXOR-XR) capsule 37.5 mg  37.5 mg Oral DAILY WITH BREAKFAST    mirtazapine (REMERON) tablet 7.5 mg  7.5 mg Oral QHS    pantoprazole (PROTONIX) tablet 40 mg  40 mg Oral ACB    carvediloL (COREG) tablet 25 mg  25 mg Oral BID WITH MEALS    albuterol-ipratropium (DUO-NEB) 2.5 MG-0.5 MG/3 ML  3 mL Nebulization Q6H RT    lidocaine 4 % patch 1 Patch  1 Patch TransDERmal Q24H    epoetin johnathon-epbx (RETACRIT) injection 10,000 Units  10,000 Units SubCUTAneous Q MON, WED & FRI    sodium chloride (NS) flush 5-40 mL  5-40 mL IntraVENous Q8H    furosemide (LASIX) injection 80 mg  80 mg IntraVENous BID    cyanocobalamin tablet 1,000 mcg  1,000 mcg Oral BID    loratadine (CLARITIN) tablet 10 mg  10 mg Oral DAILY    aspirin chewable tablet 81 mg  81 mg Oral DAILY    [Held by provider] isosorbide mononitrate ER (IMDUR) tablet 30 mg  30 mg Oral DAILY    rosuvastatin (CRESTOR) tablet 5 mg  5 mg Oral QHS    [Held by provider] gabapentin (NEURONTIN) capsule 100 mg  100 mg Oral BID    ferrous sulfate tablet 325 mg  325 mg Oral EVERY OTHER DAY    heparin (porcine) injection 5,000 Units  5,000 Units SubCUTAneous Q8H     Objective:  Vital Signs:    Visit Vitals  /68   Pulse 66   Temp 98.5 °F (36.9 °C)   Resp 20   Ht 5' 5\" (1.651 m)   Wt 101.6 kg (224 lb)   SpO2 98%   Breastfeeding No   BMI 37.28 kg/m²      O2 Device: Nasal cannula  O2 Flow Rate (L/min): 5 l/min  Temp (24hrs), Av.9 °F (37.2 °C), Min:98.3 °F (36.8 °C), Max:99.3 °F (37.4 °C)      Intake/Output:   Last shift:      701 - 1900  In: 240 [P.O.:240]  Out: 0     Last 3 shifts: 1901 -  0700  In: 953 [P.O.:953]  Out: 3500       Intake/Output Summary (Last 24 hours) at 6/24/2020 1334  Last data filed at 6/24/2020 1324  Gross per 24 hour   Intake 953 ml   Output 0 ml   Net 953 ml       Last 3 Recorded Weights in this Encounter    06/22/20 0301 06/23/20 0402 06/24/20 0428   Weight: 96.1 kg (211 lb 14.4 oz) 96.1 kg (211 lb 14.4 oz) 101.6 kg (224 lb)       Physical Exam:   Vitals reviewed:  Gen[de-identified] Alert, Awake, comfortable, laying in bed inclined, wearing high flow nasal cannula, cooperative  Head:   NC/AT  Eye:   PERRL, EOM intact, no scleral icterus, no pallor  Nose:   Nasal septum midline, no drainage, no epistaxis, and wearing high flow cannula  Oral:   Poor dentition, no oral lesions, mucous membranes moist, Mallamapti IV  Neck:   Supple, trachea midline, no cervical or supraclavicular adenopathy  Lung:   Decreased breath sounds in bilateral bases, poor effort, fine rales in bilateral bases, otherwise clear to auscultation, no wheezes or rhonchi  Heart:   Regular rate & rhythm. S1 S2 present.   No murmurs/rubs/gallops  Abdomen/: Soft, non tender, BS +nt  Extremities:  Edema improving, no clubbing or cyanosis  Skin:   Dry, intact  Neuro:   Patient alert, moving extremities, sensation intact grossly    Data:      Recent Results (from the past 24 hour(s))   GLUCOSE, POC    Collection Time: 06/23/20  4:05 PM   Result Value Ref Range    Glucose (POC) 185 (H) 70 - 110 mg/dL   GLUCOSE, POC    Collection Time: 06/23/20 10:08 PM   Result Value Ref Range    Glucose (POC) 206 (H) 70 - 110 mg/dL   MAGNESIUM    Collection Time: 06/24/20  6:08 AM   Result Value Ref Range    Magnesium 1.7 1.6 - 2.6 mg/dL   PHOSPHORUS    Collection Time: 06/24/20  6:08 AM   Result Value Ref Range    Phosphorus 4.4 2.5 - 4.9 MG/DL   CBC W/O DIFF    Collection Time: 06/24/20  6:08 AM   Result Value Ref Range    WBC 11.8 4.6 - 13.2 K/uL    RBC 3.13 (L) 4.20 - 5.30 M/uL    HGB 8.8 (L) 12.0 - 16.0 g/dL    HCT 28.2 (L) 35.0 - 45.0 % MCV 90.1 74.0 - 97.0 FL    MCH 28.1 24.0 - 34.0 PG    MCHC 31.2 31.0 - 37.0 g/dL    RDW 15.1 (H) 11.6 - 14.5 %    PLATELET 910 624 - 740 K/uL    MPV 11.6 9.2 - 42.0 FL   METABOLIC PANEL, BASIC    Collection Time: 06/24/20  6:08 AM   Result Value Ref Range    Sodium 134 (L) 136 - 145 mmol/L    Potassium 4.4 3.5 - 5.5 mmol/L    Chloride 101 100 - 111 mmol/L    CO2 25 21 - 32 mmol/L    Anion gap 8 3.0 - 18 mmol/L    Glucose 162 (H) 74 - 99 mg/dL    BUN 65 (H) 7.0 - 18 MG/DL    Creatinine 4.76 (H) 0.6 - 1.3 MG/DL    BUN/Creatinine ratio 14 12 - 20      GFR est AA 11 (L) >60 ml/min/1.73m2    GFR est non-AA 9 (L) >60 ml/min/1.73m2    Calcium 8.7 8.5 - 10.1 MG/DL   GLUCOSE, POC    Collection Time: 06/24/20  7:30 AM   Result Value Ref Range    Glucose (POC) 169 (H) 70 - 110 mg/dL   GLUCOSE, POC    Collection Time: 06/24/20 11:44 AM   Result Value Ref Range    Glucose (POC) 192 (H) 70 - 110 mg/dL         Chemistry   Recent Labs     06/24/20  0608 06/23/20  0542 06/22/20  0534   * 235* 142*   * 135* 135*   K 4.4 3.9 4.1    101 99*   CO2 25 25 26   BUN 65* 41* 62*   CREA 4.76* 3.41* 4.54*   CA 8.7 9.0 8.3*   MG 1.7 1.8 1.8   PHOS 4.4 3.1 3.3   AGAP 8 9 10   BUCR 14 12 14       CBC w/Diff   Recent Labs     06/24/20  0608 06/23/20  0542 06/22/20  0534   WBC 11.8 10.5 10.5   RBC 3.13* 3.39* 3.14*   HGB 8.8* 9.4* 8.6*   HCT 28.2* 30.4* 27.6*    226 210       ABG  No results for input(s): PHI, PHI, POC2, PCO2I, PO2, PO2I, HCO3, HCO3I, FIO2, FIO2I in the last 72 hours. Micro   No results for input(s): SDES, CULT in the last 72 hours. No results for input(s): CULT in the last 72 hours. CT (Most Recent)    Results from Hospital Encounter encounter on 06/07/20   CTA CHEST W OR W WO CONT    Narrative CTA OF THE CHEST, WITH CORONAL AND SAGITTAL REFORMATTED MIP IMAGES, PULMONARY  EMBOLISM PROTOCOL    CPT CODE: 29214    INDICATION: Above.  Acute on chronic respiratory failure requiring mechanical  ventilation. Recent pneumonia and fluid overload. Aspiration of foreign body. Acute respiratory failure with hypoxia and hypercapnia. Clinical concern for  pulmonary embolism. COMPARISON: No prior pulmonary CTA in PACS. Reference noncontrast enhanced chest  CT 6/11/2020, prior standard protocol contrast-enhanced chest CT 10/6/2007. TECHNIQUE: With IV administration of 100 ml Isovue-370, axial CT images through  the thorax were obtained using helical technique following a pulmonary embolism  protocol. In order more optimally to evaluate the pulmonary arterial tree in  multiplanar CT angiographic fashion, coronal and sagittal reformation maximum  intensity projection (MIP) images were also performed. All CT scans at this facility are performed using dose optimization technique as  appropriate to the performed exam, to include automated exposure control,  adjustment of the mA and/or kV according to patient's size (Including  appropriate matching for site-specific examinations), or use of iterative  reconstruction technique. FINDINGS:    Mildly suboptimal contrast bolus. There is near iso enhancementisoenhancement of  the pulmonary veins, thoracic aorta, and pulmonary arteries. Image quality is  degraded by body habitus, hypoinflation, and arms at sides, which result in  streak/mottle/noise artifact, in addition to bibasilar consolidations. There is  no convincing evidence of pulmonary arterial filling defect in the main,  central, lobar, or larger enhanced segmental branches of the pulmonary arterial  tree indicative of acute pulmonary embolism. Assessment becomes limited  beginning at the segmental level. Endotracheal tube is present with tip cephalad to the garth. Moderate sized  bilateral pleural effusions, similar compared to prior. Bilateral adjacent  consolidations, slightly increased compared to the prior chest CT, likely  atelectasis, underlying airspace disease not excluded. Additional hazy bilateral airspace disease most conspicuous in the upper lobes. Additional multinodular airspace disease in the left upper lobe posteriorly. Couple of additional tiny chronic right-sided pulmonary nodules are again noted. No evidence of pneumothorax. There is some mosaic attenuation with geographic  variation in lung density best appreciated in the left upper lobe, similar to  prior, may reflect a component of air-trapping such as can be seen due to small  airways disease. Very small pericardial effusion. Multichamber cardiac enlargement. Atherosclerotic calcifications noted including in the coronary arteries. The  main pulmonary artery appears prominent in caliber, approximately 4.1 cm, which  can be seen with pulmonary hypertension. The thoracic aorta enhances normally. Usual limitation of cardiac motion artifacts noted. Esophagus appears mildly patulous. No evidence of pathologic lymph node enlargement is seen. On review of bone windows, no acute fractures or destructive bone lesions are  seen. Cervical spine fusion hardware noted. Impression Impression:      Image quality is degraded by body habitus, hypoinflation, arms at sides, and  mildly suboptimal contrast bolus, as noted above. No evidence of acute pulmonary  embolism in the main, central, lobar, or larger enhanced segmental branches of  the pulmonary arterial tree. Assessment becomes limited beginning at the  segmental level. Moderate sized bilateral pleural effusions. Adjacent bibasilar consolidations,  slightly larger compared to prior, likely atelectasis, underlying airspace  disease not excluded. Hazy lung opacity bilaterally. Multinodular airspace disease in the left upper  lobe. Cardiomegaly. Additional nonacute findings as above. XR (Most Recent).  CXR reviewed by me and compared with previous CXR   Results from [place] encounter on 06/07/20   XR CHEST SNGL V    Narrative EXAM: PORTABLE CHEST 0240 hours    CLINICAL HISTORY/INDICATION: respiratory failure , patient presented with acute  on chronic respiratory failure which should require mechanical ventilation,  episode of aspiration, patient is afebrile with normal white blood cell count         COMPARISON: Chest x-ray June 18 through June 14, 2020, May 6, 2020. TECHNIQUE: Single AP view    FINDINGS:     Right jugular double-lumen catheter terminates at the right atrium. Marked interval improvement in the bilateral multifocal infiltrates. Inability  to penetrate the enlarged left heart. Pulmonary vessels normal. Right  costophrenic angle is sharp. Stable cardiomegaly. .      Impression IMPRESSION:    Marked interval decrease in bilateral multifocal infiltrates. Inability to evaluate the left lung base as described above. Stable cardiomegaly       Assessment:  · Acute on Chronic Hypoxic Respiratory Failure - Multifactorial. Required emergent intubation on 06/11/20, 2/2 aspiration event on the floor (aspiration of partial dentures, removed with forceps) in addition to PNA and CHF Exacerbation/fluid overload on CXR. Extubated to NC on 06/15 with intermittent BiPAP use-transition to high flow and now tolerating nasal cannula with adequate oxygen saturation  · Aspiration of foreign body (partial dentures) into the hypopharynxretrieved with forceps. S/p bronc 6/11/2020some mucous plugs, small caliber airways noted but no foreign material.  CTA chest (06/13) (-) for PE and (-) retained FB. Probable additional episode of aspiration on 6/16 after extubation  · RUL infiltrate/PNA  CXR (6/7/20), scant yeast on Resp Cx. · Acute on chronic HFpEF - Most recent Echo ( 04/25/20) EF 55-60%.   · Fluid overload- severe edema  · Bacteremia vs contaminant - BxCx (06/07) (+)GPC in 1/2 bottles, repeat BxCx - NGTD  · ESRD - HD MWF  · Acute on Chronic Anemia - s/p 2u PRBCs this admission    Recommendations    Supplemental oxygen to maintain SpO2 >88% wean O2 flow further  Bronchial hygiene-airway clearance measures to continue  Please assess for home oxygen need prior to discharge  Frequent incentive spirometry  Strict aspiration precautions including elevating HOB >30deg  PT/OT, OOB, ambulate with assistance as tolerated  DVT ppx per primary service  Will follow      High complexity decision making was performed during the evaluation of this patient at high risk for decompensation with multiple organ involvement     Above mentioned total time spent on reviewing the case/medical record/data/notes/EMR/patient examination/documentation/coordinating care with nurse/consultants, exclusive of procedures with complex decision making performed and > 50% time spent in face to face evaluation.

## 2020-06-24 NOTE — PROGRESS NOTES
ACUTE HEMODIALYSIS FLOW SHEET    HEMODIALYSIS ORDERS: Physician: Dr. Jamil Rao     Dialyzer: Revaclear   Duration: 3.5 hr  BFR: 350   DFR: 800   Dialysate:  Temp 36.0   K+   3    Ca+  2.5   Na 140   Bicarb 35   Wt Readings from Last 1 Encounters:   06/24/20 101.6 kg (224 lb)    Patient Chart [x]   Unable to Obtain []  Dry weight/UF Goal: 3500 ml  Access RIJ CVC     Heparin []  Bolus    Units    [] Hourly    Units    [x]None      Catheter locking solution Heparin 1:1000   Pre BP:   129/50    Pulse:  68   Respirations: 18    Temperature:  97.9    Tx: NSS   ml/Bolus   [x] N/A   Labs: []  Pre  []  Post:   [x] N/A   Additional Orders(medications, blood products, hypotension management): [x] Yes   [] No     [x]  [facility] Consent Verified     CATHETER ACCESS:  []N/A   [x]Right   []Left   [x]IJ     []Fem   []Chest wall   [] First use X-ray verified     [x]Tunnel    [] Non Tunneled   [x]No S/S infection  []Redness  []Drainage []Cultured []Swelling []Pain   [x]Medical Aseptic Prep Utilized   []Dressing Changed  [] Biopatch  Date: 6/22/20   []Clotted   [x]Patent   Flows: []Good  [x]Poor  []Reversed   If access problem,  notified: []Yes    []N/A        GRAFT/FISTULA ACCESS:   [x]N/A     []Right     []Left     []UE     []LE   []AVG   []AVF     []Buttonhole    []Medical Aseptic Prep Utilized   []No S/S infection  []Redness  []Drainage []Cultured  [] Swelling  [] Pain  Bruit:   [] Strong    [] Weak       Thrill :   [] Strong    [] Weak     Needle Gauge: 15   Length: 1 inch   If access problem,  notified: []Yes     [x]N/A     Please describe access if present and not used: N/A       GENERAL ASSESSMENT:    LUNGS:   SaO2%      [] Clear  [x] Coarse  [] Crackles  [] Wheezing                                                [] Diminished     Location : []RLL   []LLL    []RUL  []KATIE   Cough: []Productive  []Dry  [x]N/A   Respirations:  [x]Easy  []Labored   Therapy:  []RA  [x]NC 4min    Mask: []NRB  [] Venti    O2% []Ventilator  []Intubated  [] Trach  [] BiPaP   CARDIAC: []Regular      [x] Irregular   [] Pericardial Rub  [] JVD          []  Monitored  [] Bedside  [] Remotely monitored     EDEMA: [] None  [x]Generalized  [] Pitting [] 1    [] 2    [] 3    [] 4                 [] Facial  [] Pedal  []  UE  [] LE   SKIN:   [x] Warm  [] Hot     [] Cold   [x] Dry     [] Pale   [] Diaphoretic                  [] Flushed  [] Jaundiced  [] Cyanotic  [] Rash  [] Weeping   LOC:    [x] Alert      [x]Oriented:    [x] Person     [x] Place  []Time               [] Confused  [] Lethargic  [] Medicated  [] Non-responsive   GI / ABDOMEN:                     [] Flat    [] Distended    [x] Soft    [] Firm   []  Obese                   [] Diarrhea  [x] Bowel Sounds  [] Nausea  [] Vomiting     / URINE ASSESSMENT:                   [] Voiding   [] Oliguria  [x] Anuria   []  Lopez                  [] Incontinent  []  Incontinent Brief []  Fecal Management System     PAIN:  [x] 0 []1  []2   []3   []4   []5   []6   []7   []8   []9   []10            Scale 0-10  Action/Follow Up:    MOBILITY:  [x] Bed    [] Stretcher      All Vitals and Treatment Details on Attached [address]: [facility]          Room # 7786/01   [] 1st Time Acute      [] Stat       [x] Routine      [] Urgent     [x] Acute Room  []  Bedside  [] ICU/CCU  [] ER   Isolation Precautions:  [x] Dialysis    There are currently no Active Isolations       ALLERGIES:     Allergies   Allergen Reactions    Augmentin [Amoxicillin-Pot Clavulanate] Diarrhea    Clavulanic Acid Diarrhea    Levaquin [Levofloxacin] Other (comments)     Severe hypoglycemia        Code Status:  Full Code     Hepatitis Status     Lab Results   Component Value Date/Time    Hepatitis B surface Ag <0.10 06/09/2020 04:03 AM    Hepatitis B surface Ab <3.10 (L) 06/09/2020 04:03 AM        Current Labs:      Lab Results   Component Value Date/Time    WBC 11.8 06/24/2020 06:08 AM    HGB 8.8 (L) 06/24/2020 06:08 AM HCT 28.2 (L) 06/24/2020 06:08 AM    PLATELET 614 92/80/9382 06:08 AM    MCV 90.1 06/24/2020 06:08 AM     Lab Results   Component Value Date/Time    Sodium 134 (L) 06/24/2020 06:08 AM    Potassium 4.4 06/24/2020 06:08 AM    Chloride 101 06/24/2020 06:08 AM    CO2 25 06/24/2020 06:08 AM    Anion gap 8 06/24/2020 06:08 AM    Glucose 162 (H) 06/24/2020 06:08 AM    BUN 65 (H) 06/24/2020 06:08 AM    Creatinine 4.76 (H) 06/24/2020 06:08 AM    BUN/Creatinine ratio 14 06/24/2020 06:08 AM    GFR est AA 11 (L) 06/24/2020 06:08 AM    GFR est non-AA 9 (L) 06/24/2020 06:08 AM    Calcium 8.7 06/24/2020 06:08 AM          DIET:  DIET NUTRITIONAL SUPPLEMENTS  DIET DIABETIC WITH OPTIONS      PRIMARY NURSE REPORT:   Pre Dialysis: DADA Blake RN     Time: 9348        EDUCATION:    [x] Patient [] Other           Knowledge Basis: []None [x]Minimal [] Substantial   Barriers to learning  [x]N/A   [] Access Care     [] S&S of infection  [] Fluid Management  [] K+   [x] Procedural    []Albumin   [] Medications   [] Tx Options   [] Transplant   [] Diet   [] Other   Teaching Tools:  [x] Explain  [] Demo  [] Handouts [] Video  Patient response: [x] Verbalized understanding  [] Teach back  [] Return demonstration   [] Requires follow up      [x]Time Out/Safety Check  [x] Extracorporeal Circuit Tested for integrity       RO/HEMODIALYSIS MACHINE SAFETY CHECKS  Before each treatment:     Machine Number:                   [facility]                                     [x] Unit Machine # 6 with centralized RO                                                                                                               Alarm Test:  Pass time 1310            [x] RO/Machine Log Complete    Machine Temp    36.0             Dialysate: pH  7.4    Conductivity: Meter 13.9     HD Machine  14.0      TCD: 13.9  Dialyzer Lot # [id number]     Blood Tubing Lot # [id number]     Saline Lot # [id number]     CHLORINE TESTING-Before each treatment and every 4 hours    Total Chlorine: [x] less than 0.1 ppm  Initial Time Check: 1300       4 Hr/2nd Check Time: 1700   (if greater than 0.1 ppm from Primary then every 30 minutes from Secondary)     TREATMENT INITIATION  with Dialysis Precautions:   [x] All Connections Secured              [x] Saline Line Double Clamped   [x] Venous Parameters Set               [x] Arterial Parameters Set    [x] Prime Given 250ml NSS              [x]Air Foam Detector Engaged      Treatment Initiation Note:  5059  Pt arrived to HD without any complaints. Pt A &O X 3, follows commands, no distress noted       During Treatment Notes:  26  RIJ CVC assessed no abnormalities noted, line patent with good flow. CVC accessed without any difficulty, pt tolerated well. Vascular access visible with arterial and venous line connections intact. 1500  Vascular access visible with arterial and venous line connections intact. 1600 Vascular access visible with arterial and venous line connections intact. 1700  Vascular access visible with arterial and venous line connections intact. Medication Dose Volume Route Time Jose Danielita Nurse, Title   Albumin 25g 100ml HD [address], RN     Post Assessment  Dialyzer Cleared:[] Good [x] Fair  [] Poor  Blood processed:  65.7 L  UF Removed:  2250 Ml  Post /41  Pulse  64 Resp  18   Temp 97.9    Post Tx Vascular Access: [x] N/A             CVC Catheter: [] N/A  Locking solution: Heparin 1:1000 U  Arterial port 1.6 ml   Venous port 1.6ml         Skin:[x] Warm  [x] Dry [] Diaphoretic               [] Flushed  [] Pale [] Cyanotic   Pain:  [x]0  []1 []2  []3 []4  []5  []6 []7 []8  []9  []10       Post Treatment Note:   5167  HD completed at this time, pt tolerated well. Dressing clean, dry and intact. POST TREATMENT PRIMARY NURSE HANDOFF REPORT:   Post Dialysis: DADA Cruz RN                Time:  3806       Abbreviations: AVG-arterial venous graft, AVF-arterial venous fistula, IJ-Internal Jugular, Subcl-Subclavian, Fem-Femoral, Tx-treatment, AP/HR-apical heart rate, DFR-dialysate flow rate, BFR-blood flow rate, AP-arterial pressure, -venous pressure, UF-ultrafiltrate, TMP-transmembrane pressure, Austyn-Venous, Art-Arterial, RO-Reverse Osmosis

## 2020-06-24 NOTE — PROGRESS NOTES
[facility] - Progress Note  Admit Date: 6/7/2020    Assessment:     Hospital Problems  Date Reviewed: 4/23/2020          Codes Class Noted POA    ESRD (end stage renal disease) ([facility] [address] ICD-10-CM: N18.6  ICD-9-CM: 585.6  6/20/2020 Unknown        Debility ICD-10-CM: R53.81  ICD-9-CM: 799.3  6/20/2020 Unknown        Respiratory failure with hypoxia and hypercapnia ([facility] [address] ICD-10-CM: J96.91, J96.92  ICD-9-CM: 518.81  6/20/2020 Unknown        Depression ICD-10-CM: F32.9  ICD-9-CM: 974  6/20/2020 Unknown        Aspiration of foreign body ICD-10-CM: T17.900A  ICD-9-CM: 933.1  6/20/2020 Unknown        * (Principal) Fluid overload ICD-10-CM: E87.70  ICD-9-CM: 276.69  6/10/2020 Unknown        Pneumonia ICD-10-CM: J18.9  ICD-9-CM: 959  6/8/2020 Unknown        CHF (congestive heart failure) (HCC) ICD-10-CM: I50.9  ICD-9-CM: 428.0  4/25/2020 Yes        Acute on chronic diastolic congestive heart failure (HCC) ICD-10-CM: I50.33  ICD-9-CM: 428.33, 428.0  4/23/2020 Yes              -Acute on chronic respiratory failure. Was intubated and extubated on 6/15/2020 and slowly improving.   -Sepsis  -Acute on chronic HFpEF. EF 55-60% by echo 4/25/20 and now 45-50% on this admission. Still with some volume overload on exam.  -OFE on CKD.  Started on HD last admission. Patient uncertain if she continued dialysis while at home.   -Acute on chronic anemia.  -Hypertension. Elevated on admission.  -Diabetes mellitus. HgbA1c 6.1.  -Dyslipidemia. On statin.  -Coronary disease reportedly diffuse medically managed, nuclear stress 3/2018 with small area of ischemia with EF 53%  -Chronic atypical LBBB. -COVID negative 4/30/2020.     Primary cardiologist Dr. Rain Pabon at [facility], now plans to follow up with Dr. Gertie Cockayne:     Continue volume management per nephrology, plan for HD today, continued on IV lasix, oxygen demand decreasing. Continued on ASA, statin, BB. Continuing conservative cardiac management.   No further cardiac work up planned. Subjective:     No new complaints. Objective:      Patient Vitals for the past 8 hrs:   Temp Pulse Resp BP SpO2   06/24/20 0949  70      06/24/20 0931    114/65    06/24/20 0726 99 °F (37.2 °C) 72 18 118/66 97 %   06/24/20 0428 99.1 °F (37.3 °C) 66 20 116/66 98 %         Patient Vitals for the past 96 hrs:   Weight   06/24/20 0428 101.6 kg (224 lb)   06/23/20 0402 96.1 kg (211 lb 14.4 oz)   06/22/20 0301 96.1 kg (211 lb 14.4 oz)   06/21/20 0556 97.1 kg (214 lb)                    Intake/Output Summary (Last 24 hours) at 6/24/2020 1059  Last data filed at 6/24/2020 0859  Gross per 24 hour   Intake 713 ml   Output    Net 713 ml       Physical Exam:  General:  alert, cooperative, no distress, appears stated age  Neck:  no JVD  Lungs:  clear to auscultation bilaterally  Heart:  regular rate and rhythm  Abdomen:  abdomen is soft without significant tenderness, masses, organomegaly or guarding  Extremities:  extremities normal, atraumatic, no cyanosis or edema    Data Review:     Labs: Results:       Chemistry Recent Labs     06/24/20  0608 06/23/20  0542 06/22/20  0534 06/21/20  1241   * 235* 142*  --    * 135* 135*  --    K 4.4 3.9 4.1  --     101 99*  --    CO2 25 25 26  --    BUN 65* 41* 62*  --    CREA 4.76* 3.41* 4.54*  --    CA 8.7 9.0 8.3*  --    MG 1.7 1.8 1.8  --    PHOS 4.4 3.1 3.3  --    AGAP 8 9 10  --    BUCR 14 12 14  --    AP  --   --   --  69   TP  --   --   --  6.0*   ALB  --   --   --  2.7*   GLOB  --   --   --  3.3   AGRAT  --   --   --  0.8      CBC w/Diff Recent Labs     06/24/20  0608 06/23/20  0542 06/22/20  0534   WBC 11.8 10.5 10.5   RBC 3.13* 3.39* 3.14*   HGB 8.8* 9.4* 8.6*   HCT 28.2* 30.4* 27.6*    226 210      Cardiac Enzymes No results found for: CPK, CK, CKMMB, CKMB, RCK3, CKMBT, CKNDX, CKND1, ANDER, TROPT, TROIQ, MINH, TROPT, TNIPOC, BNP, BNPP   Coagulation No results for input(s): PTP, INR, APTT, INREXT in the last 72 hours. Lipid Panel Lab Results   Component Value Date/Time    Cholesterol, total 182 04/24/2020 02:34 AM    HDL Cholesterol 41 04/24/2020 02:34 AM    LDL, calculated 112.8 (H) 04/24/2020 02:34 AM    VLDL, calculated 28.2 04/24/2020 02:34 AM    Triglyceride 141 04/24/2020 02:34 AM    CHOL/HDL Ratio 4.4 04/24/2020 02:34 AM      BNP No results found for: BNP, BNPP, XBNPT   Liver Enzymes Recent Labs     06/21/20  1241   TP 6.0*   ALB 2.7*   AP 69      Digoxin    Thyroid Studies Lab Results   Component Value Date/Time    TSH 1.36 06/16/2020 06:28 AM          Signed By: MICHAEL Hayes     June 24, 2020

## 2020-06-25 LAB
ANION GAP SERPL CALC-SCNC: 5 MMOL/L (ref 3–18)
BUN SERPL-MCNC: 39 MG/DL (ref 7–18)
BUN/CREAT SERPL: 12 (ref 12–20)
CALCIUM SERPL-MCNC: 9.1 MG/DL (ref 8.5–10.1)
CHLORIDE SERPL-SCNC: 99 MMOL/L (ref 100–111)
CO2 SERPL-SCNC: 29 MMOL/L (ref 21–32)
CREAT SERPL-MCNC: 3.15 MG/DL (ref 0.6–1.3)
ERYTHROCYTE [DISTWIDTH] IN BLOOD BY AUTOMATED COUNT: 15.1 % (ref 11.6–14.5)
EST. AVERAGE GLUCOSE BLD GHB EST-MCNC: 120 MG/DL
GLUCOSE BLD STRIP.AUTO-MCNC: 138 MG/DL (ref 70–110)
GLUCOSE BLD STRIP.AUTO-MCNC: 175 MG/DL (ref 70–110)
GLUCOSE BLD STRIP.AUTO-MCNC: 206 MG/DL (ref 70–110)
GLUCOSE BLD STRIP.AUTO-MCNC: 210 MG/DL (ref 70–110)
GLUCOSE SERPL-MCNC: 167 MG/DL (ref 74–99)
HBA1C MFR BLD: 5.8 % (ref 4.2–5.6)
HCT VFR BLD AUTO: 29.6 % (ref 35–45)
HGB BLD-MCNC: 9.1 G/DL (ref 12–16)
MCH RBC QN AUTO: 27.7 PG (ref 24–34)
MCHC RBC AUTO-ENTMCNC: 30.7 G/DL (ref 31–37)
MCV RBC AUTO: 90 FL (ref 74–97)
PLATELET # BLD AUTO: 204 K/UL (ref 135–420)
PMV BLD AUTO: 12.1 FL (ref 9.2–11.8)
POTASSIUM SERPL-SCNC: 4 MMOL/L (ref 3.5–5.5)
RBC # BLD AUTO: 3.29 M/UL (ref 4.2–5.3)
SARS-COV-2 ABS, NUCLEOCAPSID: NEGATIVE
SODIUM SERPL-SCNC: 133 MMOL/L (ref 136–145)
WBC # BLD AUTO: 11 K/UL (ref 4.6–13.2)

## 2020-06-25 PROCEDURE — 74011000250 HC RX REV CODE- 250: Performed by: PHYSICIAN ASSISTANT

## 2020-06-25 PROCEDURE — 74011636637 HC RX REV CODE- 636/637: Performed by: INTERNAL MEDICINE

## 2020-06-25 PROCEDURE — 74011250637 HC RX REV CODE- 250/637: Performed by: PHYSICIAN ASSISTANT

## 2020-06-25 PROCEDURE — 85027 COMPLETE CBC AUTOMATED: CPT

## 2020-06-25 PROCEDURE — 74011250637 HC RX REV CODE- 250/637: Performed by: HOSPITALIST

## 2020-06-25 PROCEDURE — 74011250637 HC RX REV CODE- 250/637: Performed by: INTERNAL MEDICINE

## 2020-06-25 PROCEDURE — 65660000004 HC RM CVT STEPDOWN

## 2020-06-25 PROCEDURE — 83036 HEMOGLOBIN GLYCOSYLATED A1C: CPT

## 2020-06-25 PROCEDURE — 97535 SELF CARE MNGMENT TRAINING: CPT

## 2020-06-25 PROCEDURE — 77010033678 HC OXYGEN DAILY

## 2020-06-25 PROCEDURE — 94640 AIRWAY INHALATION TREATMENT: CPT

## 2020-06-25 PROCEDURE — 82962 GLUCOSE BLOOD TEST: CPT

## 2020-06-25 PROCEDURE — 94668 MNPJ CHEST WALL SBSQ: CPT

## 2020-06-25 PROCEDURE — 36415 COLL VENOUS BLD VENIPUNCTURE: CPT

## 2020-06-25 PROCEDURE — 97530 THERAPEUTIC ACTIVITIES: CPT

## 2020-06-25 PROCEDURE — 74011250636 HC RX REV CODE- 250/636: Performed by: INTERNAL MEDICINE

## 2020-06-25 PROCEDURE — 74011000250 HC RX REV CODE- 250: Performed by: INTERNAL MEDICINE

## 2020-06-25 PROCEDURE — 80048 BASIC METABOLIC PNL TOTAL CA: CPT

## 2020-06-25 PROCEDURE — 94761 N-INVAS EAR/PLS OXIMETRY MLT: CPT

## 2020-06-25 PROCEDURE — 94762 N-INVAS EAR/PLS OXIMTRY CONT: CPT

## 2020-06-25 RX ORDER — INSULIN GLARGINE 100 [IU]/ML
12 INJECTION, SOLUTION SUBCUTANEOUS DAILY
Status: DISCONTINUED | OUTPATIENT
Start: 2020-06-26 | End: 2020-06-26 | Stop reason: HOSPADM

## 2020-06-25 RX ORDER — INSULIN GLARGINE 100 [IU]/ML
2 INJECTION, SOLUTION SUBCUTANEOUS ONCE
Status: COMPLETED | OUTPATIENT
Start: 2020-06-25 | End: 2020-06-25

## 2020-06-25 RX ADMIN — INSULIN GLARGINE 10 UNITS: 100 INJECTION, SOLUTION SUBCUTANEOUS at 08:47

## 2020-06-25 RX ADMIN — HEPARIN SODIUM 5000 UNITS: 5000 INJECTION INTRAVENOUS; SUBCUTANEOUS at 22:22

## 2020-06-25 RX ADMIN — IPRATROPIUM BROMIDE AND ALBUTEROL SULFATE 3 ML: .5; 3 SOLUTION RESPIRATORY (INHALATION) at 07:36

## 2020-06-25 RX ADMIN — Medication 10 ML: at 13:01

## 2020-06-25 RX ADMIN — INSULIN LISPRO 6 UNITS: 100 INJECTION, SOLUTION INTRAVENOUS; SUBCUTANEOUS at 13:00

## 2020-06-25 RX ADMIN — LORATADINE 10 MG: 10 TABLET ORAL at 08:47

## 2020-06-25 RX ADMIN — INSULIN LISPRO 3 UNITS: 100 INJECTION, SOLUTION INTRAVENOUS; SUBCUTANEOUS at 08:47

## 2020-06-25 RX ADMIN — CARVEDILOL 25 MG: 25 TABLET, FILM COATED ORAL at 17:45

## 2020-06-25 RX ADMIN — LOSARTAN POTASSIUM 25 MG: 25 TABLET ORAL at 08:47

## 2020-06-25 RX ADMIN — HEPARIN SODIUM 5000 UNITS: 5000 INJECTION INTRAVENOUS; SUBCUTANEOUS at 12:59

## 2020-06-25 RX ADMIN — CYANOCOBALAMIN TAB 1000 MCG 1000 MCG: 1000 TAB at 08:46

## 2020-06-25 RX ADMIN — VENLAFAXINE HYDROCHLORIDE 37.5 MG: 37.5 CAPSULE, EXTENDED RELEASE ORAL at 08:47

## 2020-06-25 RX ADMIN — CYANOCOBALAMIN TAB 1000 MCG 1000 MCG: 1000 TAB at 17:45

## 2020-06-25 RX ADMIN — MIRTAZAPINE 7.5 MG: 15 TABLET, FILM COATED ORAL at 22:21

## 2020-06-25 RX ADMIN — INSULIN LISPRO 6 UNITS: 100 INJECTION, SOLUTION INTRAVENOUS; SUBCUTANEOUS at 16:30

## 2020-06-25 RX ADMIN — PANTOPRAZOLE SODIUM 40 MG: 40 TABLET, DELAYED RELEASE ORAL at 08:47

## 2020-06-25 RX ADMIN — ASPIRIN 81 MG CHEWABLE TABLET 81 MG: 81 TABLET CHEWABLE at 08:47

## 2020-06-25 RX ADMIN — IPRATROPIUM BROMIDE AND ALBUTEROL SULFATE 3 ML: .5; 3 SOLUTION RESPIRATORY (INHALATION) at 20:51

## 2020-06-25 RX ADMIN — ROSUVASTATIN CALCIUM 5 MG: 10 TABLET, COATED ORAL at 22:19

## 2020-06-25 RX ADMIN — INSULIN GLARGINE 2 UNITS: 100 INJECTION, SOLUTION SUBCUTANEOUS at 16:00

## 2020-06-25 RX ADMIN — HEPARIN SODIUM 5000 UNITS: 5000 INJECTION INTRAVENOUS; SUBCUTANEOUS at 05:29

## 2020-06-25 RX ADMIN — CARVEDILOL 25 MG: 25 TABLET, FILM COATED ORAL at 08:47

## 2020-06-25 RX ADMIN — Medication 10 ML: at 22:00

## 2020-06-25 RX ADMIN — IPRATROPIUM BROMIDE AND ALBUTEROL SULFATE 3 ML: .5; 3 SOLUTION RESPIRATORY (INHALATION) at 14:05

## 2020-06-25 NOTE — ROUTINE PROCESS
1300 bedside turnover given to me by SIMA Ronquillo. Pt is in bed watching tv and finishing her lunch. She has a cardiac telemetry monitor on and is in stable condition, denies pain and denies shortness of breath.

## 2020-06-25 NOTE — PROGRESS NOTES
1900 bedside shift report given by SIMA William  2130 Pt is in bed comfortable and resting. Scheduled meds given. Pt was given a fresh cup of ice chips. Lights were dimmed. Bed is locked and in the lowest position. Call bell is in reach. 0000 Pt in bed sleeping on cardiac monitor. 0230 Pt in bed sleeping. Bed is locked and in the lowest position. Call bell in reach  0500 Scheduled meds given. Pt is in bed resting comfortably.

## 2020-06-25 NOTE — PROGRESS NOTES
Problem: Self Care Deficits Care Plan (Adult)  Goal: *Acute Goals and Plan of Care (Insert Text)  Description: Occupational Therapy Goals  Initiated 6/22/2020 within 7 day(s). 1.  Patient will perform bed mobility in preparation for selfcare with moderate assistance . 2.  Patient will perform upper body dressing with supervision/set-up. 3.  Patient will perform grooming with modified independence. 4.  Patient will perform functional activity sitting EOB for 4-7 minutes with modified independence and F balance. 5.  Patient will perform toilet transfer with moderate assistance. 6.  Patient will participate in upper extremity therapeutic exercise/activities with independence for 8 minutes. 7.  Patient will utilize energy conservation techniques during functional activities with verbal cues. Prior Level of Function: Patient from [facility] and reports she was not performing self-care tasks or functional transfers at rehab. Patient seen 5/14/2020 at [facility] and per OT note was moderate assist for bed mobility, moderate assist for lower body dressing and moderate assist for functional transfers. Outcome: Progressing Towards Goal   OCCUPATIONAL THERAPY TREATMENT    Patient: Tamra Chavez (26 y.o. female)  Date: 6/25/2020  Diagnosis: Pneumonia [J18.9]   Fluid overload  Procedure(s) (LRB):  FLEXIBLE BRONCHOSCOPY with BAL/SLIM SCOPE (N/A) 14 Days Post-Op  Precautions: Aspiration, Skin, Fall    Chart, occupational therapy assessment, plan of care, and goals were reviewed. ASSESSMENT:  Pt co-treated w/PT to maximize safety w/EOB/OOB activity. Pt requires max encouragement for minimal participation. Generalized weakness requires increase time w/bed mobility and Max Assist to maneuver to EOB. Pt requires max verbal/tactile cues for cervical extension and bilateral shoulder retraction for upright posture. Pt w/R lateral lean requiring max verbal tactile cues to maintain midline.  Pt educated on importance of hand placement w/functional transfer to standing in prep for functional transfer training to [facility]. Functional transfer to standing attempted 3x w/2 person assist. Pt able to clear buttocks on 3rd attempt. Pt declines OOB to chair. Complaining of fatigue, requesting return to supine. Pt requires 2 person assist w/return to supine. Pt left w/all items within reach. Progression toward goals:  []          Improving appropriately and progressing toward goals  [x]          Improving slowly and progressing toward goals  []          Not making progress toward goals and plan of care will be adjusted     PLAN:  Patient continues to benefit from skilled intervention to address the above impairments. Continue treatment per established plan of care. Discharge Recommendations:  Skilled Nursing Facility/LTC  Further Equipment Recommendations for Discharge:  TBD by next level of care     SUBJECTIVE:   Patient stated Seda Camarillo we just do this tomorrow? Ilan Leach    OBJECTIVE DATA SUMMARY:   Cognitive/Behavioral Status:  Neurologic State: Alert  Orientation Level: Oriented X4  Cognition: Decreased attention/concentration  Safety/Judgement: Fall prevention    Functional Mobility and Transfers for ADLs:   Bed Mobility:  Supine to Sit: Maximum assistance  Sit to Supine: Maximum assistance  Scooting: Maximum assistance   Transfers:  Sit to Stand: Maximum assistance;Assist x2  Balance:  Sitting: Impaired;High guard  Sitting - Static: Poor (constant support)  Sitting - Dynamic: Poor (constant support)    ADL Intervention:  Grooming  Position Performed: Seated edge of bed  Washing Face: Stand-by assistance    Pain:  Pain level pre-treatment: 7/10   Pain level post-treatment: 7/10  Pt c/o generalized pain     Activity Tolerance:    Poor    Please refer to the flowsheet for vital signs taken during this treatment.   After treatment:   []  Patient left in no apparent distress sitting up in chair  [x]  Patient left in no apparent distress in bed  [x]  Call bell left within reach  [x]  Nursing notified  []  Caregiver present  []  Bed alarm activated    COMMUNICATION/EDUCATION:   [] Role of Occupational Therapy in the acute care setting  [] Home safety education was provided and the patient/caregiver indicated understanding. [] Patient/family have participated as able in working towards goals and plan of care. [] Patient/family agree to work toward stated goals and plan of care. [x] Patient understands intent and goals of therapy, but is neutral about his/her participation. [] Patient is unable to participate in goal setting and plan of care.       Thank you for this referral.  ALEX Campbell  Time Calculation: 25 mins

## 2020-06-25 NOTE — PROGRESS NOTES
Send out COVID collected on 6/24/2020. [facility] can accept within 24-48 of obtaining these results. BLS    Family working on gathering patient's finances for either [employer] or selling home/car to pay for long term care.         AIME Ta  Case Management  [phone]

## 2020-06-25 NOTE — PROGRESS NOTES
[facility] - Progress Note  Admit Date: 6/7/2020    Assessment:     Hospital Problems  Date Reviewed: 4/23/2020          Codes Class Noted POA    ESRD (end stage renal disease) ([facility] [address] ICD-10-CM: N18.6  ICD-9-CM: 585.6  6/20/2020 Unknown        Debility ICD-10-CM: R53.81  ICD-9-CM: 799.3  6/20/2020 Unknown        Respiratory failure with hypoxia and hypercapnia ([facility] [address] ICD-10-CM: J96.91, J96.92  ICD-9-CM: 518.81  6/20/2020 Unknown        Depression ICD-10-CM: F32.9  ICD-9-CM: 246  6/20/2020 Unknown        Aspiration of foreign body ICD-10-CM: T17.900A  ICD-9-CM: 933.1  6/20/2020 Unknown        * (Principal) Fluid overload ICD-10-CM: E87.70  ICD-9-CM: 276.69  6/10/2020 Unknown        Pneumonia ICD-10-CM: J18.9  ICD-9-CM: 811  6/8/2020 Unknown        CHF (congestive heart failure) (HCC) ICD-10-CM: I50.9  ICD-9-CM: 428.0  4/25/2020 Yes        Acute on chronic diastolic congestive heart failure (HCC) ICD-10-CM: I50.33  ICD-9-CM: 428.33, 428.0  4/23/2020 Yes                       -Acute on chronic respiratory failure. Was intubated and extubated on 6/15/2020 and slowly improving.   -Sepsis, aspiration PNA. -Acute on chronic HFpEF. EF 55-60% by echo 4/25/20 and now 45-50% on this admission. -OFE on CKD.  Started on HD last admission. Patient uncertain if she continued dialysis while at home.   -Acute on chronic anemia.  -Hypertension. Elevated on admission. Low during HD.  -Diabetes mellitus. HgbA1c 6.1.  -Dyslipidemia. On statin.  -Coronary disease reportedly diffuse medically managed, nuclear stress 3/2018 with small area of ischemia with EF 53%  -Chronic atypical LBBB. -COVID negative 4/30/2020.     Primary cardiologist Dr. Brandt Cantor at [facility], now plans to follow up with Dr. Keila Lambert:     Continue volume management with HD per nephrology. Continue medical management from cardiac standpoint. Continued on ASA, statin. Continued on BB +/- Arb as BP allows during HD.   Increase activity as tolerates. No further cardiac work up at this time. Subjective:     No new complaints. Objective:      Patient Vitals for the past 8 hrs:   Temp Pulse Resp BP SpO2   06/25/20 0846  62  130/59    06/25/20 0738 97.4 °F (36.3 °C) 64 18 146/60 100 %   06/25/20 0412 99 °F (37.2 °C) 62 18 117/69 100 %         Patient Vitals for the past 96 hrs:   Weight   06/25/20 0442 94.6 kg (208 lb 9.6 oz)   06/24/20 0428 101.6 kg (224 lb)   06/23/20 0402 96.1 kg (211 lb 14.4 oz)   06/22/20 0301 96.1 kg (211 lb 14.4 oz)                    Intake/Output Summary (Last 24 hours) at 6/25/2020 4966  Last data filed at 6/24/2020 1733  Gross per 24 hour   Intake 240 ml   Output 1750 ml   Net -1510 ml       Physical Exam:  General:  alert, cooperative, no distress, appears stated age  Neck:  no JVD  Lungs:  clear to auscultation bilaterally  Heart:  regular rate and rhythm  Abdomen:  abdomen is soft without significant tenderness, masses, organomegaly or guarding  Extremities:  extremities normal, atraumatic, no cyanosis or edema    Data Review:     Labs: Results:       Chemistry Recent Labs     06/25/20  0601 06/24/20  0608 06/23/20  0542   * 162* 235*   * 134* 135*   K 4.0 4.4 3.9   CL 99* 101 101   CO2 29 25 25   BUN 39* 65* 41*   CREA 3.15* 4.76* 3.41*   CA 9.1 8.7 9.0   MG  --  1.7 1.8   PHOS  --  4.4 3.1   AGAP 5 8 9   BUCR 12 14 12      CBC w/Diff Recent Labs     06/25/20  0601 06/24/20  0608 06/23/20  0542   WBC 11.0 11.8 10.5   RBC 3.29* 3.13* 3.39*   HGB 9.1* 8.8* 9.4*   HCT 29.6* 28.2* 30.4*    209 226      Cardiac Enzymes No results found for: CPK, CK, CKMMB, CKMB, RCK3, CKMBT, CKNDX, CKND1, ANDER, TROPT, TROIQ, MINH, TROPT, TNIPOC, BNP, BNPP   Coagulation No results for input(s): PTP, INR, APTT, INREXT in the last 72 hours.     Lipid Panel Lab Results   Component Value Date/Time    Cholesterol, total 182 04/24/2020 02:34 AM    HDL Cholesterol 41 04/24/2020 02:34 AM    LDL, calculated 112.8 (H) 04/24/2020 02:34 AM    VLDL, calculated 28.2 04/24/2020 02:34 AM    Triglyceride 141 04/24/2020 02:34 AM    CHOL/HDL Ratio 4.4 04/24/2020 02:34 AM      BNP No results found for: BNP, BNPP, XBNPT   Liver Enzymes No results for input(s): TP, ALB, TBIL, AP in the last 72 hours.     No lab exists for component: SGOT, GPT, DBIL   Digoxin    Thyroid Studies Lab Results   Component Value Date/Time    TSH 1.36 06/16/2020 06:28 AM          Signed By: MICHAEL Gudino     June 25, 2020

## 2020-06-25 NOTE — PROGRESS NOTES
[facility] Hospitalist [facility]  Progress Note    Patient: Antionette Bardales Age: 71 y.o. : 1950 MR#: [id number] [id number]  Date/Time: 2020     Subjective:     Patient at HD   No CP  No NVD  No cough   No SOB         Assessment/Plan:     Patient with h/o ESRD admitted with ARF with hypoxia requiring intubation and vent support . 1 Acute respiratory failure : intubated and extubated   - Etiology includes -  Acute on chronic CHF , ? Pneumonia Aspiration  , FB - Denture in trach. Found during intubation , removed .    - d/w nursing to decrease o2 to 3 l as long as patient 's o2 sat is above 88%   - Now on 3 l NC - acceptable O2 sats     2  Systolic HF - EF 45 - 50 %   - Continue diuresis / HD / on ARB     3 ESRD on HD per renal     4 DM2   - On SSI     5 Pneumonia - aspiration - s/p antibiotic treatment     6 Anemia of chronic illness - on Procrit        Dispo :    - SNF - once COVID test are negative / also trying to titrate o2 requirement down     Case discussed with:  [x]Patient  [] Family ( In room with patient )    []Nursing  []Case Management  DVT Prophylaxis:  []Lovenox  []Hep SQ  []SCDs  []Coumadin   []On Heparin gtt          Objective:   VS:   Visit Vitals  /63   Pulse (!) 58   Temp 98.1 °F (36.7 °C)   Resp 20   Ht 5' 5\" (1.651 m)   Wt 94.6 kg (208 lb 9.6 oz)   SpO2 95%   Breastfeeding No   BMI 34.71 kg/m²      Tmax/24hrs: Temp (24hrs), Av.2 °F (36.8 °C), Min:97.3 °F (36.3 °C), Max:99.3 °F (37.4 °C)  IOBRIEF    Intake/Output Summary (Last 24 hours) at 2020 1622  Last data filed at 2020 1345  Gross per 24 hour   Intake 360 ml   Output 1750 ml   Net -1390 ml       General:  Alert, cooperative, no acute distress   Cardiovascular: S1S2 - regular , No Murmur   Pulmonary: Equal expansion , No Use of accessory muscles , No Rales No Rhonchi    GI:  +BS in all four quadrants, soft, non-tender  Extremities:  No edema; 2+ dorsalis pedis pulses bilaterally  Neuro: Alert and oriented X 2.        Medications:   Current Facility-Administered Medications   Medication Dose Route Frequency    [START ON 6/26/2020] insulin glargine (LANTUS) injection 12 Units  12 Units SubCUTAneous DAILY    insulin glargine (LANTUS) injection 2 Units  2 Units SubCUTAneous ONCE    acetaminophen (TYLENOL) tablet 650 mg  650 mg Oral Q4H PRN    insulin lispro (HUMALOG) injection   SubCUTAneous AC&HS    losartan (COZAAR) tablet 25 mg  25 mg Oral DAILY    heparin (porcine) 1,000 unit/mL injection 1,000 Units  1,000 Units IntraVENous DIALYSIS PRN    polyethylene glycol (MIRALAX) packet 17 g  17 g Oral DAILY PRN    venlafaxine-SR (EFFEXOR-XR) capsule 37.5 mg  37.5 mg Oral DAILY WITH BREAKFAST    mirtazapine (REMERON) tablet 7.5 mg  7.5 mg Oral QHS    pantoprazole (PROTONIX) tablet 40 mg  40 mg Oral ACB    carvediloL (COREG) tablet 25 mg  25 mg Oral BID WITH MEALS    albuterol-ipratropium (DUO-NEB) 2.5 MG-0.5 MG/3 ML  3 mL Nebulization Q6H RT    albuterol-ipratropium (DUO-NEB) 2.5 MG-0.5 MG/3 ML  3 mL Nebulization Q4H PRN    sodium chloride 3% hypertonic nebulizer soln  4 mL Nebulization TID PRN    lidocaine 4 % patch 1 Patch  1 Patch TransDERmal Q24H    menthol-zinc oxide (CALMOSEPTINE) 0.44-20.6 % ointment   Topical Q6H PRN    epoetin johnathon-epbx (RETACRIT) injection 10,000 Units  10,000 Units SubCUTAneous Q MON, WED & FRI    sodium chloride (NS) flush 5-40 mL  5-40 mL IntraVENous Q8H    sodium chloride (NS) flush 5-40 mL  5-40 mL IntraVENous PRN    hydrALAZINE (APRESOLINE) 20 mg/mL injection 20 mg  20 mg IntraVENous Q6H PRN    ondansetron (ZOFRAN) injection 4 mg  4 mg IntraVENous Q6H PRN    glucose chewable tablet 16 g  4 Tab Oral PRN    glucagon (GLUCAGEN) injection 1 mg  1 mg IntraMUSCular PRN    dextrose (D50W) injection syrg 12.5-25 g  25-50 mL IntraVENous PRN    cyanocobalamin tablet 1,000 mcg  1,000 mcg Oral BID    loratadine (CLARITIN) tablet 10 mg  10 mg Oral DAILY    aspirin chewable tablet 81 mg  81 mg Oral DAILY    [Held by provider] isosorbide mononitrate ER (IMDUR) tablet 30 mg  30 mg Oral DAILY    rosuvastatin (CRESTOR) tablet 5 mg  5 mg Oral QHS    [Held by provider] gabapentin (NEURONTIN) capsule 100 mg  100 mg Oral BID    ferrous sulfate tablet 325 mg  325 mg Oral EVERY OTHER DAY    docusate sodium (COLACE) capsule 100 mg  100 mg Oral BID PRN    heparin (porcine) injection 5,000 Units  5,000 Units SubCUTAneous Q8H       Labs:    Recent Labs     06/25/20  0601 06/24/20  0608 06/23/20  0542   WBC 11.0 11.8 10.5   HGB 9.1* 8.8* 9.4*   HCT 29.6* 28.2* 30.4*    209 226     Recent Labs     06/25/20  0601 06/24/20  0608 06/23/20  0542   * 134* 135*   K 4.0 4.4 3.9   CL 99* 101 101   CO2 29 25 25   * 162* 235*   BUN 39* 65* 41*   CREA 3.15* 4.76* 3.41*   CA 9.1 8.7 9.0   MG  --  1.7 1.8   PHOS  --  4.4 3.1         Disclaimer: Sections of this note are dictated utilizing voice recognition software, which may have resulted in some phonetic based errors in grammar and contents. Even though attempts were made to correct all the mistakes, some may have been missed, and remained in the body of the document. If questions arise, please contact our department.     Signed By: Domingo Bryant MD     June 25, 2020

## 2020-06-25 NOTE — PROGRESS NOTES
Problem: Nutrition Deficit  Goal: *Optimize nutritional status  Outcome: Progressing Towards Goal     Problem: Falls - Risk of  Goal: *Absence of Falls  Description: Document Corby Camejo Fall Risk and appropriate interventions in the flowsheet. Outcome: Progressing Towards Goal  Note: Fall Risk Interventions:       Mentation Interventions: Adequate sleep, hydration, pain control, Bed/chair exit alarm, Door open when patient unattended, Update white board    Medication Interventions: Bed/chair exit alarm    Elimination Interventions: Bed/chair exit alarm, Call light in reach    History of Falls Interventions: Bed/chair exit alarm, Door open when patient unattended         Problem: Patient Education: Go to Patient Education Activity  Goal: Patient/Family Education  Outcome: Progressing Towards Goal     Problem: Pressure Injury - Risk of  Goal: *Prevention of pressure injury  Description: Document Toney Scale and appropriate interventions in the flowsheet.   Outcome: Progressing Towards Goal  Note: Pressure Injury Interventions:  Sensory Interventions: Assess changes in LOC, Float heels, Keep linens dry and wrinkle-free, Maintain/enhance activity level, Minimize linen layers    Moisture Interventions: Absorbent underpads    Activity Interventions: Pressure redistribution bed/mattress(bed type), PT/OT evaluation    Mobility Interventions: Assess need for specialty bed, Float heels, HOB 30 degrees or less    Nutrition Interventions: Document food/fluid/supplement intake, Offer support with meals,snacks and hydration    Friction and Shear Interventions: Apply protective barrier, creams and emollients, Foam dressings/transparent film/skin sealants, HOB 30 degrees or less, Lift team/patient mobility team, Minimize layers                Problem: Patient Education: Go to Patient Education Activity  Goal: Patient/Family Education  Outcome: Progressing Towards Goal

## 2020-06-25 NOTE — DIABETES MGMT
Glycemic Control Plan of Care    Seen patient this afternoon and reported that she didn't like much of the food for breakfast this morning but she ate everything for lunch  with good appetite. Current Meal Intake:  Patient Vitals for the past 100 hrs:   % Diet Eaten   06/25/20 1345 100 %   06/25/20 0800 25 %   06/24/20 1600 0 %   06/24/20 1324 65 %   06/24/20 1207 0 %   06/24/20 0859 0 %   06/23/20 1356 65 %   06/23/20 0925 100 %   06/22/20 1600 0 %   06/22/20 1200 0 %   06/22/20 0922 80 %   06/21/20 1625 100 %       POC BG values on 06/24/2020: 169, 192, 183, 272    POC BG values on 06/25/2020 at time of review: 175, 206. Recommendation(s): increase basal lantus insulin dose from 10 to 12 units daily. Order obtained. Current hospital diabetes medications:  Basal lantus insulin 12 units daily starting 06/25/2020. Correctional lispro insulin ACHS. Very resistant dose. Total daily dose insulin requirement previous day: 06/24/2020:  Lantus;  10 units  Lispro: 18 units  TDD insulin: 28 units    Assessment:  Patient is 71year old with PMH including type 2 diabetes mellitus, CHF, GERD, hypertension, chronic respiratory failure on home oxygen 2L, ESRD on hemodialysis, and uterine cancer - was admitted from [facility] in [facility] home facility on 06/08/2020 with report of shortness of breath. Noted:  Acute respiratory failure. Aspiration of foreign body (partial dentures) into the hypopharynx  RUL infiltrate. Fluid overload, severe edema. ESRD on hemodialysis. T2DM. Most recent blood glucose values:        Current A1C:   Lab Results   Component Value Date/Time    Hemoglobin A1c 6.1 (H) 04/23/2020 08:26 AM     This lab test reflects the patient's estimated average blood glucose of 128 mg/dL over the past 3 months. Home diabetes medications: patient reported on 06/23/2020 that she was taking Jardiance 10 mg daily at home.  Noted lantus insulin 4 units daily listed prior to this hospital admission. She presented from [facility] and [address] [place] [address] facility on 06/08/2020. Diet: Diabetic consistent carb 1800kcal; nutr suppl: nepro with breakfast and dinner. Goals:  Blood glucose will be within target range of  mg/dL by 06/28/2020.     Education:  ___  Refer to Diabetes Education Record             _X__  Education not indicated at this time    Tegan Terry RN [facility]  Pager: [phone]

## 2020-06-25 NOTE — PROGRESS NOTES
[facility]. Pulmonary, Critical Care, and Sleep Medicine    Name: Linsey Gupta MRN: [id number]   : 1950 Hospital: [facility]   Date: 2020  Admission Date: 2020     Chart and notes reviewed. Data reviewed. I have evaluated all findings. [x]I have reviewed the flowsheet and previous days notes. Interval HPI:Elena Travis is a 71 y.o. female w/ PMH of uterine cancer, CHF, ESRD, depression, deconditioning, DMII, CAD, HTN, HLD who presented to [facility] via EMS from [facility] rehab on  for acute respiratory distress with hypoxia requiring BVM. She had acute on chronic CHF and was started on IV lasix and bipap and admitted to the floor. There was concern for pneumonia and she was also started on abx, her covid test was (-), likely due to aspiration. / BC was (+) for staph which was felt to be a contaminant. On  a RRT was called for worsening resp failure, during intubation a denture was found obstructing her airway which was removed. Proceeded with intubation due to profound resp acidosis. A bronchoscopy was completed on following to r/o any other aspiration of FB. She was slow to wean from vent due hypoxia. On  she was extubated. She required bipap and high levels of HFNC following extubation for a few days but was able to wean solely to HFNC during the day with bipap at night. She has transitioned to her baseline oxygen requirements and a PO diet and is doing well. Subjective 20  Hospital Day:19  Patient is doing well today. She is tolerating her meta nebs. She ate her breakfast w/o difficulty  Refused to particpate in IS stating \"maybe later\". She is coughing, however stating \"not coughing up that much\". Denies CP, SOB, abdominal pain, n/v/d.               ROS:A comprehensive review of systems was negative except for that written in the HPI. Events and notes from last 24 hours reviewed.  Care plan discussed on multidisciplinary rounds. Patient Active Problem List   Diagnosis Code    Acute on chronic diastolic congestive heart failure ([facility]) I50.33    Suspected COVID-19 virus infection Z20.828    Type 2 diabetes mellitus without complication, without long-term current use of insulin ([facility]) E11.9    Left anterior fascicular block I44.4    HTN (hypertension), benign I10    Coronary artery disease involving native coronary artery of native heart without angina pectoris I25.10    Chronic diastolic congestive heart failure ([facility]) I50.32    Bilateral lower extremity edema R60.0    BMI 35.0-35.9,adult Z68.35    CHF (congestive heart failure) ([facility]) I50.9    Sepsis ([facility]) A41.9    Respiratory failure ([facility] J96.90    SIRS (systemic inflammatory response syndrome) ([facility]) R65.10    Acute on chronic respiratory failure ([facility]) J96.20    Pneumonia J18.9    Fluid overload E87.70    ESRD (end stage renal disease) ([facility] N18.6    Debility R53.81    Respiratory failure with hypoxia and hypercapnia ([facility]) J96.91, J96.92    Depression F32.9    Aspiration of foreign body T17.900A       Vital Signs:  Visit Vitals  /59   Pulse 62   Temp 97.4 °F (36.3 °C)   Resp 18   Ht 5' 5\" (1.651 m)   Wt 94.6 kg (208 lb 9.6 oz)   SpO2 100% Comment: Simultaneous filing. User may not have seen previous data. Breastfeeding No   BMI 34.71 kg/m²       O2 Device: Nasal cannula   O2 Flow Rate (L/min): 3 l/min   Temp (24hrs), Av.3 °F (36.8 °C), Min:97.4 °F (36.3 °C), Max:99.3 °F (37.4 °C)       Intake/Output:   Last shift:      No intake/output data recorded.   Last 3 shifts:  1901 -  0700  In: 713 [P.O.:713]  Out: 1750     Intake/Output Summary (Last 24 hours) at 2020 0959  Last data filed at 2020 1733  Gross per 24 hour   Intake 240 ml   Output 1750 ml   Net -1510 ml        Ventilator Settings:  Ventilator Mode: Assist control, VC+  Respiratory Rate  Resp Rate Observed: 25  Back-Up Rate: 12  Insp Time (sec): 1 sec  I:E Ratio: 1;4  Ventilator Volumes  Vt Set (ml): 400 ml  Vt Exhaled (Machine Breath) (ml): 472 ml  Vt Spont (ml): 421 ml  Ve Observed (l/min): 13.6 l/min  Ventilator Pressures  Pressure Support (cm H2O): 10 cm H2O  PIP Observed (cm H2O): 22 cm H2O  Plateau Pressure (cm H2O): 18 cm H2O  MAP (cm H2O): 13  PEEP/VENT (cm H2O): 10 cm H20  Auto PEEP Observed (cm H2O): 0 cm H2O    Current Facility-Administered Medications   Medication Dose Route Frequency    insulin lispro (HUMALOG) injection   SubCUTAneous AC&HS    insulin glargine (LANTUS) injection 10 Units  10 Units SubCUTAneous DAILY    losartan (COZAAR) tablet 25 mg  25 mg Oral DAILY    venlafaxine-SR (EFFEXOR-XR) capsule 37.5 mg  37.5 mg Oral DAILY WITH BREAKFAST    mirtazapine (REMERON) tablet 7.5 mg  7.5 mg Oral QHS    pantoprazole (PROTONIX) tablet 40 mg  40 mg Oral ACB    carvediloL (COREG) tablet 25 mg  25 mg Oral BID WITH MEALS    albuterol-ipratropium (DUO-NEB) 2.5 MG-0.5 MG/3 ML  3 mL Nebulization Q6H RT    lidocaine 4 % patch 1 Patch  1 Patch TransDERmal Q24H    epoetin johnathon-epbx (RETACRIT) injection 10,000 Units  10,000 Units SubCUTAneous Q MON, WED & FRI    sodium chloride (NS) flush 5-40 mL  5-40 mL IntraVENous Q8H    cyanocobalamin tablet 1,000 mcg  1,000 mcg Oral BID    loratadine (CLARITIN) tablet 10 mg  10 mg Oral DAILY    aspirin chewable tablet 81 mg  81 mg Oral DAILY    [Held by provider] isosorbide mononitrate ER (IMDUR) tablet 30 mg  30 mg Oral DAILY    rosuvastatin (CRESTOR) tablet 5 mg  5 mg Oral QHS    [Held by provider] gabapentin (NEURONTIN) capsule 100 mg  100 mg Oral BID    ferrous sulfate tablet 325 mg  325 mg Oral EVERY OTHER DAY    heparin (porcine) injection 5,000 Units  5,000 Units SubCUTAneous Q8H         Telemetry: [x]Sinus []A-flutter []Paced    []A-fib []Multiple PVCs                  Physical Exam:      General: awake, alert, NAD  HEENT:  Anicteric sclerae; pink palpebral conjunctivae; mucosa moist  Resp:  Symmetrical chest expansion, no accessory muscle use; good airway entry; rales b/l bases  CV:  S1, S2 present; regular rate and rhythm  GI:  Abdomen soft, non-tender; (+) active bowel sounds  Extremities:  +2 pulses on all extremities; no edema/ cyanosis/ clubbing noted   Skin:  Warm; no rashes/ lesions noted, normal turgor/cap refill   Neurologic:  Non-focal, pleasant         DATA:  MAR reviewed and pertinent medications noted or modified as needed    Labs:  Recent Labs     06/25/20  0601 06/24/20  0608 06/23/20  0542   WBC 11.0 11.8 10.5   HGB 9.1* 8.8* 9.4*   HCT 29.6* 28.2* 30.4*    209 226     Recent Labs     06/25/20  0601 06/24/20  0608 06/23/20  0542   * 134* 135*   K 4.0 4.4 3.9   CL 99* 101 101   CO2 29 25 25   * 162* 235*   BUN 39* 65* 41*   CREA 3.15* 4.76* 3.41*   CA 9.1 8.7 9.0   MG  --  1.7 1.8   PHOS  --  4.4 3.1     No results for input(s): PH, PCO2, PO2, HCO3, FIO2 in the last 72 hours. No results for input(s): FIO2I, IFO2, HCO3I, IHCO3, HCOPOC, PCO2I, PCOPOC, IPHI, PHI, PHPOC, PO2I, PO2POC in the last 72 hours. No lab exists for component: IPOC2    Imaging:  [x]   I have personally reviewed the patients radiographs and reports  XR Results (most recent):  Results from Hospital Encounter encounter on 06/07/20   XR CHEST SNGL V    Narrative EXAM: PORTABLE CHEST 0240 hours    CLINICAL HISTORY/INDICATION: respiratory failure , patient presented with acute  on chronic respiratory failure which should require mechanical ventilation,  episode of aspiration, patient is afebrile with normal white blood cell count         COMPARISON: Chest x-ray June 18 through June 14, 2020, May 6, 2020. TECHNIQUE: Single AP view    FINDINGS:     Right jugular double-lumen catheter terminates at the right atrium. Marked interval improvement in the bilateral multifocal infiltrates. Inability  to penetrate the enlarged left heart. Pulmonary vessels normal. Right  costophrenic angle is sharp.  Stable cardiomegaly. .      Impression IMPRESSION:    Marked interval decrease in bilateral multifocal infiltrates. Inability to evaluate the left lung base as described above. Stable cardiomegaly       CT Results (most recent):  Results from Hospital Encounter encounter on 06/07/20   CTA CHEST W OR W WO CONT    Narrative CTA OF THE CHEST, WITH CORONAL AND SAGITTAL REFORMATTED MIP IMAGES, PULMONARY  EMBOLISM PROTOCOL    CPT CODE: 34847    INDICATION: Above. Acute on chronic respiratory failure requiring mechanical  ventilation. Recent pneumonia and fluid overload. Aspiration of foreign body. Acute respiratory failure with hypoxia and hypercapnia. Clinical concern for  pulmonary embolism. COMPARISON: No prior pulmonary CTA in PACS. Reference noncontrast enhanced chest  CT 6/11/2020, prior standard protocol contrast-enhanced chest CT 10/6/2007. TECHNIQUE: With IV administration of 100 ml Isovue-370, axial CT images through  the thorax were obtained using helical technique following a pulmonary embolism  protocol. In order more optimally to evaluate the pulmonary arterial tree in  multiplanar CT angiographic fashion, coronal and sagittal reformation maximum  intensity projection (MIP) images were also performed. All CT scans at this facility are performed using dose optimization technique as  appropriate to the performed exam, to include automated exposure control,  adjustment of the mA and/or kV according to patient's size (Including  appropriate matching for site-specific examinations), or use of iterative  reconstruction technique. FINDINGS:    Mildly suboptimal contrast bolus. There is near iso enhancementisoenhancement of  the pulmonary veins, thoracic aorta, and pulmonary arteries. Image quality is  degraded by body habitus, hypoinflation, and arms at sides, which result in  streak/mottle/noise artifact, in addition to bibasilar consolidations.   There is  no convincing evidence of pulmonary arterial filling defect in the main,  central, lobar, or larger enhanced segmental branches of the pulmonary arterial  tree indicative of acute pulmonary embolism. Assessment becomes limited  beginning at the segmental level. Endotracheal tube is present with tip cephalad to the garth. Moderate sized  bilateral pleural effusions, similar compared to prior. Bilateral adjacent  consolidations, slightly increased compared to the prior chest CT, likely  atelectasis, underlying airspace disease not excluded. Additional hazy bilateral airspace disease most conspicuous in the upper lobes. Additional multinodular airspace disease in the left upper lobe posteriorly. Couple of additional tiny chronic right-sided pulmonary nodules are again noted. No evidence of pneumothorax. There is some mosaic attenuation with geographic  variation in lung density best appreciated in the left upper lobe, similar to  prior, may reflect a component of air-trapping such as can be seen due to small  airways disease. Very small pericardial effusion. Multichamber cardiac enlargement. Atherosclerotic calcifications noted including in the coronary arteries. The  main pulmonary artery appears prominent in caliber, approximately 4.1 cm, which  can be seen with pulmonary hypertension. The thoracic aorta enhances normally. Usual limitation of cardiac motion artifacts noted. Esophagus appears mildly patulous. No evidence of pathologic lymph node enlargement is seen. On review of bone windows, no acute fractures or destructive bone lesions are  seen. Cervical spine fusion hardware noted. Impression Impression:      Image quality is degraded by body habitus, hypoinflation, arms at sides, and  mildly suboptimal contrast bolus, as noted above. No evidence of acute pulmonary  embolism in the main, central, lobar, or larger enhanced segmental branches of  the pulmonary arterial tree.  Assessment becomes limited beginning at the  segmental level.    Moderate sized bilateral pleural effusions. Adjacent bibasilar consolidations,  slightly larger compared to prior, likely atelectasis, underlying airspace  disease not excluded. Hazy lung opacity bilaterally. Multinodular airspace disease in the left upper  lobe. Cardiomegaly. Additional nonacute findings as above. IMPRESSION:   · Acute on Chronic Hypoxic Respiratory Failure - Multifactorial. Required emergent intubation on 06/11/20, 2/2 aspiration event on the floor (aspiration of partial dentures, removed with forceps) in addition to PNA and CHF Exacerbation/fluid overload on CXR. Extubated to NC on 06/15 with intermittent BiPAP use-transition to high flow and now tolerating nasal cannula with adequate oxygen saturation, on her baseline home oxygen requirement of 3L  · Aspiration of foreign body (partial dentures) into the hypopharynxretrieved with forceps.  S/p bronc 6/11/2020some mucous plugs, small caliber airways noted but no foreign material.  CTA chest (06/13) (-) for PE and (-) retained FB. Probable additional episode of aspiration on 6/16 after extubation  · RUL infiltrate/PNA  CXR (6/7/20), scant yeast on Resp Cx. · Acute on chronic HFpEF - Most recent Echo ( 04/25/20) EF 55-60%. - stable   · Fluid overload- severe edema  · Bacteremia vs contaminant - BxCx (06/07) (+)GPC in 1/2 bottles, repeat BxCx - NGTD  · ESRD - HD MWF  · Acute on Chronic Anemia - s/p 2u PRBCs this admission     Patient Active Problem List   Diagnosis Code    Acute on chronic diastolic congestive heart failure ([facility]) I50.33    Suspected COVID-19 virus infection Z20.828    Type 2 diabetes mellitus without complication, without long-term current use of insulin ([facility]) E11.9    Left anterior fascicular block I44.4    HTN (hypertension), benign I10    Coronary artery disease involving native coronary artery of native heart without angina pectoris I25.10    Chronic diastolic congestive heart failure ([facility] [address] I50.32    Bilateral lower extremity edema R60.0    BMI 35.0-35.9,adult Z68.35    CHF (congestive heart failure) ([facility]) I50.9    Sepsis ([facility]) A41.9    Respiratory failure ([facility]) J96.90    SIRS (systemic inflammatory response syndrome) ([facility]) R65.10    Acute on chronic respiratory failure ([facility]) J96.20    Pneumonia J18.9    Fluid overload E87.70    ESRD (end stage renal disease) ([facility]) N18.6    Debility R53.81    Respiratory failure with hypoxia and hypercapnia ([facility]) J96.91, J96.92    Depression F32.9    Aspiration of foreign body T17.900A        RECOMMENDATIONS:   · Supplemental oxygen to maintain SpO2 >88% on home requirement of 3L  · Bronchial hygiene-airway clearance measures to continue  · Please assess for home oxygen need prior to discharge  · Frequent incentive spirometry- encourage   · Strict aspiration precautions including elevating HOB >30deg  · PT/OT, OOB, ambulate with assistance as tolerated  · DVT ppx per primary service  · Doing well from a pulmonary standpoint  · Full Code           High complexity decision making was performed during this consultation and evaluation. [x]       Pt is at high risk for further organ failure and dysfunction.        Vero Rebolledo PA-C  06/25/20  Pulmonary, Critical Care Medicine  [facility]

## 2020-06-25 NOTE — PROGRESS NOTES
Renal Progress Note  Follow up of ESRD     Assessment/Plan:    1. ESRD. mwf   2. HTN. Stable, continue current regimen. 3. Anemia of ckd. Continue procrit. 4. Staph epi bacteriemia, likely contaminant per ID. Repeat bc ngtd.    5. Aspiration pneumonia, finished abx per ID.   6 mild hyponatremia     Volume status better  Stable O2 requirements   Awake and alert  Tolerated HD and UF yesterday   2-3 lit NC O2    Plan :  1) continue HD MWF  2) MAGNUS   3) continue present BP meds, hold prior to HD on HD days     Subjective:  Feels better  Appetite improved       Patient Active Problem List   Diagnosis Code    Acute on chronic diastolic congestive heart failure ([facility]) I50.33    Suspected COVID-19 virus infection Z20.828    Type 2 diabetes mellitus without complication, without long-term current use of insulin ([facility]) E11.9    Left anterior fascicular block I44.4    HTN (hypertension), benign I10    Coronary artery disease involving native coronary artery of native heart without angina pectoris I25.10    Chronic diastolic congestive heart failure ([facility]) I50.32    Bilateral lower extremity edema R60.0    BMI 35.0-35.9,adult Z68.35    CHF (congestive heart failure) ([facility]) I50.9    Sepsis ([facility]) A41.9    Respiratory failure ([facility]) J96.90    SIRS (systemic inflammatory response syndrome) ([facility]) R65.10    Acute on chronic respiratory failure ([facility]) J96.20    Pneumonia J18.9    Fluid overload E87.70    ESRD (end stage renal disease) ([facility]) N18.6    Debility R53.81    Respiratory failure with hypoxia and hypercapnia ([facility]) J96.91, J96.92    Depression F32.9    Aspiration of foreign body T17.900A       Current Facility-Administered Medications   Medication Dose Route Frequency Provider Last Rate Last Dose    acetaminophen (TYLENOL) tablet 650 mg  650 mg Oral Q4H PRN Elisabeth Guevara MD   325 mg at 06/24/20 1521    insulin lispro (HUMALOG) injection   SubCUTAneous AC&HS Elisabeth Guevara MD   3 Units at 06/25/20 1720    insulin glargine (LANTUS) injection 10 Units  10 Units SubCUTAneous DAILY Ivet Perez MD   10 Units at 06/25/20 0847    losartan (COZAAR) tablet 25 mg  25 mg Oral DAILY Aldair Spar, [address]   25 mg at 06/25/20 0847    heparin (porcine) 1,000 unit/mL injection 1,000 Units  1,000 Units IntraVENous DIALYSIS PRN Min Arnold MD   1,000 Units at 06/24/20 1527    polyethylene glycol (MIRALAX) packet 17 g  17 g Oral DAILY PRN Bonita Fraga MD        venlafaxine-SR [facility] capsule 37.5 mg  37.5 mg Oral DAILY WITH BREAKFAST Maricruz Santos PA-C   37.5 mg at 06/25/20 0847    mirtazapine (REMERON) tablet 7.5 mg  7.5 mg Oral QHS Scottie Edmondson MD   7.5 mg at 06/24/20 2139    pantoprazole (PROTONIX) tablet 40 mg  40 mg Oral ACB Bonita Fraag MD   40 mg at 06/25/20 0847    carvediloL (COREG) tablet 25 mg  25 mg Oral BID WITH MEALS Bonita Fraga MD   25 mg at 06/25/20 0847    albuterol-ipratropium (DUO-NEB) 2.5 MG-0.5 MG/3 ML  3 mL Nebulization Q6H RT Kanwal Garay MD   3 mL at 06/25/20 0736    albuterol-ipratropium (DUO-NEB) 2.5 MG-0.5 MG/3 ML  3 mL Nebulization Q4H PRN Tiffanylaureen Ace PA-C        sodium chloride 3% hypertonic nebulizer soln  4 mL Nebulization TID PRN Bismark Shin DO        lidocaine 4 % patch 1 Patch  1 Patch TransDERmal Q24H Tiffanyric Ace PA-C   1 Patch at 06/25/20 6498    menthol-zinc oxide (CALMOSEPTINE) 0.44-20.6 % ointment   Topical Q6H PRN Tiffanylaureen Ace PA-C        epoetin johnathon-epbx (RETACRIT) injection 10,000 Units  10,000 Units SubCUTAneous Q MON, WED & Reece Rosenthal MD   10,000 Units at 06/24/20 2240    sodium chloride (NS) flush 5-40 mL  5-40 mL IntraVENous Q8H Monica Lassiter DO   10 mL at 06/24/20 2140    sodium chloride (NS) flush 5-40 mL  5-40 mL IntraVENous PRN Marine ALCALA DO        hydrALAZINE (APRESOLINE) 20 mg/mL injection 20 mg  20 mg IntraVENous Q6H PRN Tiffany Ace PA-C   20 mg at 06/19/20 7113  ondansetron (ZOFRAN) injection 4 mg  4 mg IntraVENous Q6H PRN Amber Acuna MD   4 mg at 06/10/20 0030    glucose chewable tablet 16 g  4 Tab Oral PRN Jerre Shweta, DO        glucagon (GLUCAGEN) injection 1 mg  1 mg IntraMUSCular PRN Jerre Shweta, DO        dextrose (D50W) injection syrg 12.5-25 g  25-50 mL IntraVENous PRN Jerre Shweta, DO   25 g at 06/13/20 0041    cyanocobalamin tablet 1,000 mcg  1,000 mcg Oral BID Eva MORENO MD   1,000 mcg at 06/25/20 0846    loratadine (CLARITIN) tablet 10 mg  10 mg Oral DAILY Eva MORENO MD   10 mg at 06/25/20 0847    aspirin chewable tablet 81 mg  81 mg Oral DAILY Hadley Angulo MD   81 mg at 06/25/20 0847    [Held by provider] isosorbide mononitrate ER (IMDUR) tablet 30 mg  30 mg Oral DAILY Hadley Angulo MD   Stopped at 06/11/20 0900    rosuvastatin (CRESTOR) tablet 5 mg  5 mg Oral QHS Hadley Angulo MD   5 mg at 06/24/20 2139    [Held by provider] gabapentin (NEURONTIN) capsule 100 mg  100 mg Oral BID Eva MORENO MD   Stopped at 06/11/20 0900    ferrous sulfate tablet 325 mg  325 mg Oral EVERY OTHER DAY Eva MORENO MD   325 mg at 06/24/20 0942    docusate sodium (COLACE) capsule 100 mg  100 mg Oral BID PRN Donnice Siemens, MD        heparin (porcine) injection 5,000 Units  5,000 Units SubCUTAneous Q8H Hadley Angulo MD   5,000 Units at 06/25/20 0529             Objective:  Vitals:    06/25/20 0442 06/25/20 0738 06/25/20 0846 06/25/20 1133   BP:  146/60 130/59 110/66   Pulse:  64 62 (!) 56   Resp:  18  19   Temp:  97.4 °F (36.3 °C)  97.3 °F (36.3 °C)   TempSrc:       SpO2:  100%  100%   Weight: 94.6 kg (208 lb 9.6 oz)      Height:         .     Intake/Output Summary (Last 24 hours) at 6/25/2020 1242  Last data filed at 6/24/2020 1733  Gross per 24 hour   Intake 240 ml   Output 1750 ml   Net -1510 ml       Admission weight:Weight: 121.6 kg (268 lb) (06/08/20 1131)    Last Weight Metrics:  Weight Loss Metrics 6/25/2020 5/13/2020 4/30/2020 4/29/2020 4/26/2020 8/18/2016 6/2/2016   Today's Wt 208 lb 9.6 oz 238 lb 5.1 oz - 252 lb 252 lb 4.8 oz 219 lb 228 lb   BMI 34.71 kg/m2 - 39.66 kg/m2 41.93 kg/m2 41.98 kg/m2 36.44 kg/m2 37.94 kg/m2            Physical Exam:     General: No acute distress. Neck: no jvd. LUNGS: diminished air entry at the bases, bl exp rhonchi. CVS EXM: S1, S2, no murmur, rub or gallop. Abdomen: Soft, Non Tender, non distended. Lower Extremities: trace Edema. Access: rt ij tdc.        Labs:    CBC w/Diff Recent Labs     06/25/20  0601 06/24/20  0608 06/23/20  0542   WBC 11.0 11.8 10.5   RBC 3.29* 3.13* 3.39*   HGB 9.1* 8.8* 9.4*   HCT 29.6* 28.2* 30.4*    209 226        Chemistry Recent Labs     06/25/20  0601 06/24/20  0608 06/23/20  0542   * 162* 235*   * 134* 135*   K 4.0 4.4 3.9   CL 99* 101 101   CO2 29 25 25   BUN 39* 65* 41*   CREA 3.15* 4.76* 3.41*   CA 9.1 8.7 9.0   AGAP 5 8 9   BUCR 12 14 12   PHOS  --  4.4 3.1          Lab Results   Component Value Date/Time    Iron 42 (L) 04/30/2020 12:41 PM    TIBC 319 04/30/2020 12:41 PM    Iron % saturation 13 (L) 04/30/2020 12:41 PM    Ferritin 411 (H) 06/10/2020 03:04 AM      Lab Results   Component Value Date/Time    Calcium 9.1 06/25/2020 06:01 AM    Phosphorus 4.4 06/24/2020 06:08 AM

## 2020-06-25 NOTE — PROGRESS NOTES
Problem: Mobility Impaired (Adult and Pediatric)  Goal: *Acute Goals and Plan of Care (Insert Text)  Description: Physical Therapy Goals  Initiated 6/21/2020 and to be accomplished within 7 day(s)  1. Patient will move from supine to sit and sit to supine  and roll side to side in bed with minimal assistance/contact guard assist.     2.  Patient will transfer from bed to chair and chair to bed with minimal assistance/contact guard assist using the least restrictive device. 3.  Patient will perform sit to stand with moderate assistance . 4. Patient will ambulate with moderate assistance  for 10 feet with the least restrictive device. To prepare pt for home mobility at next level of care    PLOF:  Pt is a poor historian. Per chart, she was a resident of [address] [place] [address] but is unable to recall if she was able to ambulate recently. Pt states she was not doing therapy at the LTC facility and would like to go home. Outcome: Progressing Towards Goal     PHYSICAL THERAPY TREATMENT    Patient: Maurizio Duncan (89 y.o. female)  Date: 6/25/2020  Diagnosis: Pneumonia [J18.9]   Fluid overload  Procedure(s) (LRB):  FLEXIBLE BRONCHOSCOPY with BAL/SLIM SCOPE (N/A) 14 Days Post-Op  Precautions: Aspiration, Skin, Fall    ASSESSMENT:  Pt received in bed, first apprehensive to participate and then agreeable after encouragement, seen with OT to maximize pt safety and functional mobility. Pt reports L knee pain but is able to move them to help scoot to edge of bed however requires increased time and max A overall to be sitting EOB. Pt is noted to have R lateral lean with consistent cues for sitting up and orienting to midline, although not able to hold it. When attempting to stand, pt leaning forward on chair and required max A x 2 to attempt to stand with one successful attempt however only able to stand x 2-3 seconds before sitting back down.  Pt with decreased pushing through arms and legs to attempt despite maximal education. Pt returned to supine with max A and left with all needs met call bell within reach. Recommend SNF upon discharge to continue to progress towards PLOF. Progression toward goals:   []      Improving appropriately and progressing toward goals  [x]      Improving slowly and progressing toward goals  []      Not making progress toward goals and plan of care will be adjusted     PLAN:  Patient continues to benefit from skilled intervention to address the above impairments. Continue treatment per established plan of care. Discharge Recommendations:  Michele Day  Further Equipment Recommendations for Discharge:  TBD at next level of care     SUBJECTIVE:   Patient stated Zettie Lands we do this tomorrow?     OBJECTIVE DATA SUMMARY:   Critical Behavior:  Neurologic State: Alert  Orientation Level: Oriented X4  Cognition: Appropriate decision making, Appropriate for age attention/concentration, Appropriate safety awareness, Follows commands  Safety/Judgement: Fall prevention  Functional Mobility Training:  Bed Mobility:     Supine to Sit: Maximum assistance  Sit to Supine: Maximum assistance  Scooting: Maximum assistance         Transfers:  Sit to Stand: Maximum assistance;Assist x2  Stand to Sit: Maximum assistance    Balance:  Sitting: Impaired;High guard  Sitting - Static: Poor (constant support)  Sitting - Dynamic: Poor (constant support)     Pain:  Pain level pre-treatment: L knee pain   Pain level post-treatment: L knee pain   Pain Intervention(s): Medication (see MAR); Rest, Repositioning  Response to intervention: Nurse notified    Activity Tolerance:   Fair    Please refer to the flowsheet for vital signs taken during this treatment.   After treatment:   [] Patient left in no apparent distress sitting up in chair  [x] Patient left in no apparent distress in bed  [x] Call bell left within reach  [x] Nursing notified  [] Caregiver present  [] Bed alarm activated  [] SCDs applied      COMMUNICATION/EDUCATION:   [x]         Role of Physical Therapy in the acute care setting. [x]         Fall prevention education was provided and the patient/caregiver indicated understanding. [x]         Patient/family have participated as able in working toward goals and plan of care. [x]         Patient/family agree to work toward stated goals and plan of care. []         Patient understands intent and goals of therapy, but is neutral about his/her participation.   []         Patient is unable to participate in stated goals/plan of care: ongoing with therapy staff.  []         Other:        Malina Alan   Time Calculation: 25 mins

## 2020-06-25 NOTE — ROUTINE PROCESS
0715 bedside turnover given to SIMA Ronquillo. SBAR< MAR< ED summary and a chance to ask questions. Pt is in bed on the cardiac telemetry monitor in stable condition slept through the night, vitals all wnl. Bed is in the lowest position with the wheels locked and call bell within reach.

## 2020-06-26 VITALS
TEMPERATURE: 97.8 F | DIASTOLIC BLOOD PRESSURE: 69 MMHG | SYSTOLIC BLOOD PRESSURE: 123 MMHG | HEART RATE: 62 BPM | HEIGHT: 65 IN | OXYGEN SATURATION: 97 % | WEIGHT: 203 LBS | RESPIRATION RATE: 19 BRPM | BODY MASS INDEX: 33.82 KG/M2

## 2020-06-26 LAB
ANION GAP SERPL CALC-SCNC: 10 MMOL/L (ref 3–18)
BUN SERPL-MCNC: 53 MG/DL (ref 7–18)
BUN/CREAT SERPL: 12 (ref 12–20)
CALCIUM SERPL-MCNC: 9.1 MG/DL (ref 8.5–10.1)
CHLORIDE SERPL-SCNC: 99 MMOL/L (ref 100–111)
CO2 SERPL-SCNC: 25 MMOL/L (ref 21–32)
CREAT SERPL-MCNC: 4.3 MG/DL (ref 0.6–1.3)
ERYTHROCYTE [DISTWIDTH] IN BLOOD BY AUTOMATED COUNT: 15.2 % (ref 11.6–14.5)
GLUCOSE BLD STRIP.AUTO-MCNC: 140 MG/DL (ref 70–110)
GLUCOSE BLD STRIP.AUTO-MCNC: 150 MG/DL (ref 70–110)
GLUCOSE SERPL-MCNC: 143 MG/DL (ref 74–99)
HCT VFR BLD AUTO: 27.7 % (ref 35–45)
HGB BLD-MCNC: 8.6 G/DL (ref 12–16)
MCH RBC QN AUTO: 27.7 PG (ref 24–34)
MCHC RBC AUTO-ENTMCNC: 31 G/DL (ref 31–37)
MCV RBC AUTO: 89.1 FL (ref 74–97)
PLATELET # BLD AUTO: 202 K/UL (ref 135–420)
PMV BLD AUTO: 11.6 FL (ref 9.2–11.8)
POTASSIUM SERPL-SCNC: 4.1 MMOL/L (ref 3.5–5.5)
RBC # BLD AUTO: 3.11 M/UL (ref 4.2–5.3)
SARS-COV-2 ABS, NUCLEOCAPSID: NEGATIVE
SODIUM SERPL-SCNC: 134 MMOL/L (ref 136–145)
WBC # BLD AUTO: 10 K/UL (ref 4.6–13.2)

## 2020-06-26 PROCEDURE — 77010033678 HC OXYGEN DAILY

## 2020-06-26 PROCEDURE — 74011000250 HC RX REV CODE- 250: Performed by: PHYSICIAN ASSISTANT

## 2020-06-26 PROCEDURE — 85027 COMPLETE CBC AUTOMATED: CPT

## 2020-06-26 PROCEDURE — 82962 GLUCOSE BLOOD TEST: CPT

## 2020-06-26 PROCEDURE — 74011250636 HC RX REV CODE- 250/636: Performed by: INTERNAL MEDICINE

## 2020-06-26 PROCEDURE — 36415 COLL VENOUS BLD VENIPUNCTURE: CPT

## 2020-06-26 PROCEDURE — 74011000250 HC RX REV CODE- 250: Performed by: INTERNAL MEDICINE

## 2020-06-26 PROCEDURE — 74011636637 HC RX REV CODE- 636/637: Performed by: INTERNAL MEDICINE

## 2020-06-26 PROCEDURE — 80048 BASIC METABOLIC PNL TOTAL CA: CPT

## 2020-06-26 PROCEDURE — 94640 AIRWAY INHALATION TREATMENT: CPT

## 2020-06-26 PROCEDURE — 90935 HEMODIALYSIS ONE EVALUATION: CPT

## 2020-06-26 PROCEDURE — 77030038269 HC DRN EXT URIN PURWCK BARD -A

## 2020-06-26 PROCEDURE — 74011250637 HC RX REV CODE- 250/637: Performed by: INTERNAL MEDICINE

## 2020-06-26 PROCEDURE — 94762 N-INVAS EAR/PLS OXIMTRY CONT: CPT

## 2020-06-26 RX ORDER — GABAPENTIN 100 MG/1
100 CAPSULE ORAL 2 TIMES DAILY
Qty: 20 CAP | Refills: 0 | Status: SHIPPED | OUTPATIENT
Start: 2020-06-26

## 2020-06-26 RX ORDER — INSULIN GLARGINE 100 [IU]/ML
INJECTION, SOLUTION SUBCUTANEOUS
Qty: 1 VIAL | Refills: 0 | Status: SHIPPED
Start: 2020-06-26

## 2020-06-26 RX ORDER — INSULIN LISPRO 100 [IU]/ML
INJECTION, SOLUTION INTRAVENOUS; SUBCUTANEOUS
Qty: 1 VIAL | Refills: 0 | Status: SHIPPED
Start: 2020-06-26

## 2020-06-26 RX ORDER — LOSARTAN POTASSIUM 25 MG/1
25 TABLET ORAL DAILY
Qty: 10 TAB | Refills: 0 | Status: SHIPPED
Start: 2020-06-26

## 2020-06-26 RX ORDER — GABAPENTIN 100 MG/1
100 CAPSULE ORAL 2 TIMES DAILY
Qty: 20 CAP | Refills: 0 | Status: SHIPPED | OUTPATIENT
Start: 2020-06-26 | End: 2020-06-26 | Stop reason: SDUPTHER

## 2020-06-26 RX ADMIN — IPRATROPIUM BROMIDE AND ALBUTEROL SULFATE 3 ML: .5; 3 SOLUTION RESPIRATORY (INHALATION) at 07:24

## 2020-06-26 RX ADMIN — Medication 10 ML: at 06:00

## 2020-06-26 RX ADMIN — PANTOPRAZOLE SODIUM 40 MG: 40 TABLET, DELAYED RELEASE ORAL at 07:06

## 2020-06-26 RX ADMIN — INSULIN LISPRO 3 UNITS: 100 INJECTION, SOLUTION INTRAVENOUS; SUBCUTANEOUS at 16:30

## 2020-06-26 RX ADMIN — IPRATROPIUM BROMIDE AND ALBUTEROL SULFATE 3 ML: .5; 3 SOLUTION RESPIRATORY (INHALATION) at 02:29

## 2020-06-26 RX ADMIN — Medication 10 ML: at 05:43

## 2020-06-26 RX ADMIN — Medication 10 ML: at 16:12

## 2020-06-26 RX ADMIN — HEPARIN SODIUM 5000 UNITS: 5000 INJECTION INTRAVENOUS; SUBCUTANEOUS at 05:12

## 2020-06-26 RX ADMIN — INSULIN GLARGINE 12 UNITS: 100 INJECTION, SOLUTION SUBCUTANEOUS at 09:03

## 2020-06-26 NOTE — PROGRESS NOTES
TRANSFER - OUT REPORT:    Verbal report given to Meg GAO(name) on Sarah Stage  being transferred to (unit) for routine progression of care       Report consisted of patients Situation, Background, Assessment and   Recommendations(SBAR). Information from the following report(s) SBAR, Kardex, Intake/Output, MAR, Recent Results and Cardiac Rhythm NSR was reviewed with the receiving nurse. Lines:   Peripheral IV 06/11/20 Left Forearm (Active)   Site Assessment Clean, dry, & intact 6/25/2020  4:44 PM   Phlebitis Assessment 0 6/25/2020  4:44 PM   Infiltration Assessment 0 6/25/2020  4:44 PM   Dressing Status Clean, dry, & intact 6/25/2020  4:44 PM   Dressing Type Tape;Transparent 6/25/2020  4:44 PM   Hub Color/Line Status Patent;Pink 6/25/2020  4:44 PM   Action Taken Open ports on tubing capped 6/25/2020  4:44 PM   Alcohol Cap Used Yes 6/25/2020  4:44 PM       Peripheral IV 06/13/20 Right; Outer Antecubital (Active)   Site Assessment Clean, dry, & intact 6/25/2020  4:44 PM   Phlebitis Assessment 0 6/25/2020  4:44 PM   Infiltration Assessment 0 6/25/2020  4:44 PM   Dressing Status Clean, dry, & intact 6/25/2020  4:44 PM   Dressing Type Tape;Transparent 6/25/2020  4:44 PM   Hub Color/Line Status Patent;Pink 6/25/2020  4:44 PM   Action Taken Open ports on tubing capped 6/25/2020  4:44 PM   Alcohol Cap Used Yes 6/25/2020  4:44 PM        Opportunity for questions and clarification was provided.       Patient transported with:   O2 @ 2 liters

## 2020-06-26 NOTE — DISCHARGE SUMMARY
Discharge Summary     Patient ID:  Jung Wolf  [id number]  71 y.o.  1950  Body mass index is 33.78 kg/m². PCP on record: Edward Merrill MD    Admit date: 6/7/2020  Discharge date and time: 6/26/2020    Discharge Diagnoses:                                           1 Acute on chronic hypoxic respiratory failure     2 Acute on chronic HFpEF - EF 45- 50 %    3 DM2     4 ESRD on HD - Dr Jeronimo Moss     5 Bacteremia - ? Contamination     6 HTN     7 FB - Inhalation - denture     8 Aspiration Pneumonia     9 Anemia of chronic illness     10 COVID Negative       Consults: Cardiology, Pulmonary/Intensive care, ID and Nephrology          Hospital Course by problems:    Patient was admitted with Acute hypoxic respiratory failure , at ICU  intubated  Diagnosed with aspiration PND from aspirated FB ( partial denture ) which was removed successfully. Later extubated , treated for aspiration pneumonia , blood culture grew staph but it was contaminant. ID consulted and they suggested antibiotics and course was finished . Acute on chronic HF ( HFpEF ) EF 45% - cardiology consulted , fluid management done with help of HD     HD was continued at required interval     Now stable , asymptomatic , on 2 L NC O2 ( Home dose ) ,        Patient seen and examined by me on discharge day. Pertinent Findings:  Patient is Alert Awake and oriented   HEENT - NAD    RS - Clear , no rales no rhonchi   CVS - regular rhythm and rate acceptable    abd - benign, BS present , no Distension   EXT - no edema , no calf tenderness   Neuro - alert and awake , grossly motor and sensory intact       Significant Diagnostic Studies:  Results   CTA CHEST W OR W WO CONT (Accession [id number]) (Order [id number])   Allergies      Not Specified: Augmentin [Amoxicillin-pot Clavulanate];   Clavulanic Acid;  Levaquin [Levofloxacin]   Exam Information     Status Exam Begun  Exam Ended    Final [99] 6/13/2020 12:45 6/13/2020 13:04   Result Information     Status: Final result (Exam End: 6/13/2020 13:04) Provider Status: Open   Study Result     CTA OF THE CHEST, WITH CORONAL AND SAGITTAL REFORMATTED MIP IMAGES, PULMONARY  EMBOLISM PROTOCOL     CPT CODE: 72999     INDICATION: Above. Acute on chronic respiratory failure requiring mechanical  ventilation. Recent pneumonia and fluid overload. Aspiration of foreign body. Acute respiratory failure with hypoxia and hypercapnia. Clinical concern for  pulmonary embolism.     COMPARISON: No prior pulmonary CTA in PACS. Reference noncontrast enhanced chest  CT 6/11/2020, prior standard protocol contrast-enhanced chest CT 10/6/2007.     TECHNIQUE: With IV administration of 100 ml Isovue-370, axial CT images through  the thorax were obtained using helical technique following a pulmonary embolism  protocol. In order more optimally to evaluate the pulmonary arterial tree in  multiplanar CT angiographic fashion, coronal and sagittal reformation maximum  intensity projection (MIP) images were also performed.     All CT scans at this facility are performed using dose optimization technique as  appropriate to the performed exam, to include automated exposure control,  adjustment of the mA and/or kV according to patient's size (Including  appropriate matching for site-specific examinations), or use of iterative  reconstruction technique.     FINDINGS:     Mildly suboptimal contrast bolus. There is near iso enhancementisoenhancement of  the pulmonary veins, thoracic aorta, and pulmonary arteries. Image quality is  degraded by body habitus, hypoinflation, and arms at sides, which result in  streak/mottle/noise artifact, in addition to bibasilar consolidations. There is  no convincing evidence of pulmonary arterial filling defect in the main,  central, lobar, or larger enhanced segmental branches of the pulmonary arterial  tree indicative of acute pulmonary embolism.  Assessment becomes limited  beginning at the segmental level.     Endotracheal tube is present with tip cephalad to the garth. Moderate sized  bilateral pleural effusions, similar compared to prior. Bilateral adjacent  consolidations, slightly increased compared to the prior chest CT, likely  atelectasis, underlying airspace disease not excluded.      Additional hazy bilateral airspace disease most conspicuous in the upper lobes. Additional multinodular airspace disease in the left upper lobe posteriorly. Couple of additional tiny chronic right-sided pulmonary nodules are again noted. No evidence of pneumothorax. There is some mosaic attenuation with geographic  variation in lung density best appreciated in the left upper lobe, similar to  prior, may reflect a component of air-trapping such as can be seen due to small  airways disease.     Very small pericardial effusion. Multichamber cardiac enlargement. Atherosclerotic calcifications noted including in the coronary arteries. The  main pulmonary artery appears prominent in caliber, approximately 4.1 cm, which  can be seen with pulmonary hypertension. The thoracic aorta enhances normally. Usual limitation of cardiac motion artifacts noted.     Esophagus appears mildly patulous.     No evidence of pathologic lymph node enlargement is seen.     On review of bone windows, no acute fractures or destructive bone lesions are  seen. Cervical spine fusion hardware noted.      IMPRESSION  Impression:       Image quality is degraded by body habitus, hypoinflation, arms at sides, and  mildly suboptimal contrast bolus, as noted above. No evidence of acute pulmonary  embolism in the main, central, lobar, or larger enhanced segmental branches of  the pulmonary arterial tree. Assessment becomes limited beginning at the  segmental level.     Moderate sized bilateral pleural effusions.  Adjacent bibasilar consolidations,  slightly larger compared to prior, likely atelectasis, underlying airspace  disease not excluded.     Hazy lung opacity bilaterally. Multinodular airspace disease in the left upper  lobe.     Cardiomegaly.     Additional nonacute findings as above.      Imaging     CTA CHEST W OR W WO CONT (Order: [id number]) - 2020   Result History     CTA CHEST W OR W WO CONT (Order #[id number]) on 2020 - Order Result History Report          External Results Report   Signed by     Signed Date/Time  Phone Pager   Claudene Rose 2020 14:15 [phone]      +++++++++++++++++++++++++++++++++++++  Sarah Stage   ECHO ADULT LIMITED W DOPPLER & COLOR   Order# [id number]   Reading physician: Yancy Knox MD Ordering physician: Tia Pinto PA-C Study date: 20   Patient Information     Patient Name  Sarah Stage MRN  [id number] Sex  Female     1950 (75 y.o.)   Reason for Exam   Priority: Routine   fluid overload   Comments:    Vitals     Weight Height BSA (calculated - sq m) BP Pulse (Heart Rate)   98 kg (216 lb) 5' 5\" (1.651 m) 2.12 sq meters 161/49    Interpretation Summary     Result status: Final result   · Normal cavity size and wall thickness. Mild systolic function. Estimated left ventricular ejection fraction is 45 - 50%. Visually measured ejection fraction. No regional wall motion abnormality noted. Normal left ventricular strain. · Mild mitral valve regurgitation is present. · Mild pulmonic valve regurgitation is present. Comparison Study Information     Prior Study     There is a prior study available for comparison. Prior study date: 2020. As compared to the previous study, there are no significant changes. Echo Findings     Left Ventricle Normal cavity size and wall thickness. Normal septal motion. Mild systolic dysfunction. The estimated ejection fraction is 45 - 50%. Visually measured ejection fraction. LV wall motion is noted to be no regional wall motion abnormality. End-systolic volume is normal. Normal left ventricular strain.    Aortic Valve Trace aortic valve regurgitation. Mitral Valve Mild regurgitation. Pulmonic Valve Mild regurgitation. IVC/Hepatic Veins Normal structure. Normal central venous pressure (0-5 mmHg); IVC diameter is less than 21 mm and collapses more than 50% with respiration. Pericardium Insignificant pericardial effusion or fat. TTE procedure Findings     TTE Procedure Information Image quality: adequate. The view(s) performed were parasternal, apical and subcostal. Color flow Doppler was performed and pulse wave and/or continuous wave Doppler was performed. No contrast was given. Procedure Staff     Technologist/Clinician: Amparo Renee  Supporting Staff: None  Performing Physician/Midlevel: None     Exam Completion Date/Time: 6/19/20  2:42 PM                                 PACS Images      Show images for ECHO ADULT FOLLOW-UP OR LIMITED   Signed     Electronically signed by Jamie Kelly           Pertinent Lab Data:  Recent Labs     06/26/20  0555 06/25/20  0601 06/24/20  0608   WBC 10.0 11.0 11.8   HGB 8.6* 9.1* 8.8*   HCT 27.7* 29.6* 28.2*    204 209     Recent Labs     06/26/20  0555 06/25/20  0601 06/24/20  0608   * 133* 134*   K 4.1 4.0 4.4   CL 99* 99* 101   CO2 25 29 25   * 167* 162*   BUN 53* 39* 65*   CREA 4.30* 3.15* 4.76*   CA 9.1 9.1 8.7   MG  --   --  1.7   PHOS  --   --  4.4       DISCHARGE MEDICATIONS:   @  Current Discharge Medication List      START taking these medications    Details   insulin glargine (LANTUS) 100 unit/mL injection 12 units SQ daily  Qty: 1 Vial, Refills: 0      insulin lispro (HUMALOG) 100 unit/mL injection For blood glucose of less than 150 - no insulin , 151 - 199 = 3 units , 200 - 249 = 6 units , 250 - 299 = 9 units , 300 - 349 = 12 Units. If  blood glucose is <70 mg/dL. Drink orange Juice OR  Sugar containing Liquid and call medical doctor  Qty: 1 Vial, Refills: 0      losartan (COZAAR) 25 mg tablet Take 1 Tab by mouth daily.   Qty: 10 Tab, Refills: 0 CONTINUE these medications which have CHANGED    Details   gabapentin (NEURONTIN) 100 mg capsule Take 1 Cap by mouth two (2) times a day. Max Daily Amount: 200 mg. Qty: 20 Cap, Refills: 0    Associated Diagnoses: Type 2 diabetes mellitus without complication, without long-term current use of insulin (HCC)         CONTINUE these medications which have NOT CHANGED    Details   venlafaxine-SR (EFFEXOR-XR) 37.5 mg capsule Take 50 mg by mouth once. carvediloL (COREG) 12.5 mg tablet Take 1 Tab by mouth two (2) times daily (with meals). Qty: 30 Tab, Refills: 0      docusate sodium (COLACE) 100 mg capsule Take 1 Cap by mouth two (2) times daily as needed for Constipation for up to 90 days. Qty: 6 Cap, Refills: 0      OXYGEN-AIR DELIVERY SYSTEMS Take 2 L by inhalation daily. amLODIPine (NORVASC) 10 mg tablet Take 1 Tab by mouth daily. Qty: 30 Tab, Refills: 0      polyethylene glycol (MIRALAX) 17 gram packet Take 1 Packet by mouth daily. Qty: 10 Packet, Refills: 0      rosuvastatin (CRESTOR) 5 mg tablet Take 1 Tab by mouth nightly. Qty: 30 Tab, Refills: 0      aspirin 81 mg chewable tablet Take 1 Tab by mouth daily. Qty: 30 Tab, Refills: 0      esomeprazole (NEXIUM) 40 mg capsule Take 40 mg by mouth daily. cranberry extract (AZO CRANBERRY) 450 mg tab Take 2 Tabs by mouth daily. cholecalciferol (VITAMIN D3) 1,000 unit cap Take 5,000 Units by mouth daily. cyanocobalamin (VITAMIN B-12) 1,000 mcg tablet Take 1,000 mcg by mouth two (2) times a day. Biotin 2,500 mcg cap Take 1 Tab by mouth two (2) times a day. vitamin a-vitamin c-vit e-min (OCUVITE) tablet Take 1 Tab by mouth daily. loratadine (CLARITIN) 10 mg tablet Take 10 mg by mouth daily. B.infantis-B.ani-B.long-B.bifi (PROBIOTIC 4X) 10-15 mg TbEC Take 1 Tab by mouth daily. isosorbide mononitrate ER (IMDUR) 30 mg tablet Take 1 Tab by mouth daily.   Qty: 30 Tab, Refills: 0         STOP taking these medications hydrALAZINE (APRESOLINE) 100 mg tablet Comments:   Reason for Stopping:         mirtazapine (REMERON) 15 mg tablet Comments:   Reason for Stopping:         ferrous sulfate 325 mg (65 mg iron) tablet Comments:   Reason for Stopping:                 My Recommended Diet, Activity, Wound Care, and follow-up labs are listed in the patient's Discharge Insturctions which I have personally completed and reviewed. Disposition:     [x] Home with family     [] [facility] [place] PT/RN   [] SNF/NH   [] Inpatient Rehab/[facility]  Condition at Discharge:  Stable    Follow up with:   PCP : Lazaro Rutledge MD      Please follow-up tests/labs that are still pendin. None  2.    >30 minutes spent coordinating this discharge (review instructions/follow-up, prescriptions, preparing report for sign off)    Disclaimer: Sections of this note are dictated utilizing voice recognition software, which may have resulted in some phonetic based errors in grammar and contents. Even though attempts were made to correct all the mistakes, some may have been missed, and remained in the body of the document. If questions arise, please contact our department.     Signed:  Laurita Kaplan MD  2020  8:37 AM

## 2020-06-26 NOTE — PROGRESS NOTES
Dialysis:      MWF [facility] Airline Chairtime 5:30am  [facility]  5:15am starting Monday 6/29/2020, drop off back at facility at 10am.

## 2020-06-26 NOTE — PROGRESS NOTES
Speech Therapy Note:    Follow up attempted. Could not progress with ST intervention because patient:      []  Lethargic, unable to be alerted enough for safe PO intake  []  Refused participation  [x]  Off the unit  []  NPO for procedure  []  Other:     SLP will re-attempt treatment later this day or as schedule permits.     Gio Mojica M.S., [id number] [facility]  Speech Language Pathologist

## 2020-06-26 NOTE — DISCHARGE INSTRUCTIONS
Learning About COPD and How to Prevent Lung Infections  How do lung infections affect COPD? Lung infections like pneumonia and acute bronchitis are common causes of COPD flare-ups. And people who have COPD are more likely to get these lung infections, especially if they smoke. When you have COPD, it is important to know the symptoms of pneumonia and acute bronchitis and call your doctor if you have them. Symptoms include:  · A cough that brings up more mucus than usual.  · Fever. · Shortness of breath. What can you do to prevent these infections? Stay healthy   · Get a flu shot every year. · Get a pneumococcal vaccine shot. If you have had one before, ask your doctor whether you need another dose. Two different types of pneumococcal vaccines are recommended for people ages 72 and older. · If you must be around people with colds or the flu, wash your hands often. · Do not smoke. This is the most important step you can take to prevent more damage to your lungs. If you need help quitting, talk to your doctor about stop-smoking programs and medicines. These can increase your chances of quitting for good. · Avoid secondhand smoke, air pollution, and high altitudes. Also avoid cold, dry air and hot, humid air. Stay at home with your windows closed when air pollution is bad. Exercise and eat well   · If your doctor recommends it, get more exercise. Walking is a good choice. Bit by bit, increase the amount you walk every day. Try for at least 30 minutes on most days of the week. · Eat regular, well-balanced meals. Eating right keeps your energy levels up and helps your body fight infection. · Get plenty of rest and sleep. Follow-up care is a key part of your treatment and safety. Be sure to make and go to all appointments, and call your doctor if you are having problems. It's also a good idea to know your test results and keep a list of the medicines you take. Where can you learn more?   Go to http://mechelle-darline.info/  Enter T966 in the search box to learn more about \"Learning About COPD and How to Prevent Lung Infections. \"  Current as of: February 24, 2020               Content Version: 12.5  © 2006-2020 [employer]. Care instructions adapted under license by [employer] (which disclaims liability or warranty for this information). If you have questions about a medical condition or this instruction, always ask your healthcare professional. [address] any warranty or liability for your use of this information. Deciding About Stopping Dialysis  How can you decide about stopping dialysis? What should you know about stopping dialysis? Dialysis is a process that filters waste from your blood when your kidneys can no longer do the job. When you have kidney failure, you may have either hemodialysis or peritoneal dialysis. Most people die within weeks of stopping dialysis. If you decide to stop, health professionals can help you have the highest quality of life possible. These are people who give end-of-life care. This may be done through hospice care. Hospice care offers the chance to think about personal goals. It can relieve pain. And it can help take care of your emotional and spiritual needs. No matter what you decide, take the time to let others know your wishes about your care. You can use a legal document to make sure that you get the medical treatment you want. This is called an advance directive. What are dias points about this decision? · You may feel better on dialysis than you did before you started treatment. But you may have side effects, such as appetite changes. Or you may start to have other problems. If this is the case, you may feel that continuing treatment is too hard. · If dialysis lets you do the activities you enjoyed before, you may feel that it hasn't changed your daily life that much.  You may feel this way even if you can't do all of your old activities. Or you may feel that your quality of life on dialysis is not good. · Your diagnosis of kidney failure may force you to rethink your goals for your future. If you feel that your life has been rewarding and that you have met many goals, you may feel okay about stopping dialysis. But if you have goals you have not yet met, you may want to continue. · Most people die within weeks of stopping dialysis. If you choose to stop, you should be ready to put your personal, financial, and legal affairs in order. You may want to continue dialysis if you aren't ready to face these issues. · Clearly state your wishes to your family. If you decide to stop treatment, will your family understand your reasons? Do they support your decision to continue (or stop) treatment? Why might you choose to stop dialysis? If you have been getting regular dialysis, and if a kidney transplant is not an option for you, stopping dialysis may:  · Give you more time each day to spend with friends and family instead of going to treatments. · Allow you to eat and drink what you want in the time you have left. You may welcome this if your diet has been limited while you have been on dialysis. · Relieve worries about paying for dialysis, if you have been concerned. · Reduce problems that come with regular dialysis. These problems may include infection or clotting of the access. · Encourage you to talk with your loved ones about end-of-life goals and wishes. Why might you choose to stay on dialysis? Staying on dialysis may:  · Allow you to live longer and have more time with your family and friends. · Give you time to complete your goals and projects. · Help you feel better for as long as possible. You may feel better physically on dialysis than you did before dialysis. · Allow you to do your normal activities. · Give you more time to put your affairs in order.   Your decision  Thinking about the facts and your feelings can help you make a decision that is right for you. Be sure you understand the benefits and risks of your options, and think about what else you need to do before you make the decision. Where can you learn more? Go to http://mechelle-darline.info/  Enter H994 in the search box to learn more about \"Deciding About Stopping Dialysis. \"  Current as of: August 12, 2019               Content Version: 12.5  © 8687-3117 [employer]. Care instructions adapted under license by [employer] (which disclaims liability or warranty for this information). If you have questions about a medical condition or this instruction, always ask your healthcare professional. [address] any warranty or liability for your use of this information. Learning About COPD and How to Prevent Lung Infections  How do lung infections affect COPD? Lung infections like pneumonia and acute bronchitis are common causes of COPD flare-ups. And people who have COPD are more likely to get these lung infections, especially if they smoke. When you have COPD, it is important to know the symptoms of pneumonia and acute bronchitis and call your doctor if you have them. Symptoms include:  · A cough that brings up more mucus than usual.  · Fever. · Shortness of breath. What can you do to prevent these infections? Stay healthy   · Get a flu shot every year. · Get a pneumococcal vaccine shot. If you have had one before, ask your doctor whether you need another dose. Two different types of pneumococcal vaccines are recommended for people ages 72 and older. · If you must be around people with colds or the flu, wash your hands often. · Do not smoke. This is the most important step you can take to prevent more damage to your lungs. If you need help quitting, talk to your doctor about stop-smoking programs and medicines.  These can increase your chances of quitting for good. · Avoid secondhand smoke, air pollution, and high altitudes. Also avoid cold, dry air and hot, humid air. Stay at home with your windows closed when air pollution is bad. Exercise and eat well   · If your doctor recommends it, get more exercise. Walking is a good choice. Bit by bit, increase the amount you walk every day. Try for at least 30 minutes on most days of the week. · Eat regular, well-balanced meals. Eating right keeps your energy levels up and helps your body fight infection. · Get plenty of rest and sleep. Follow-up care is a key part of your treatment and safety. Be sure to make and go to all appointments, and call your doctor if you are having problems. It's also a good idea to know your test results and keep a list of the medicines you take. Where can you learn more? Go to http://mechelleavVentadarline.info/  Enter T633 in the search box to learn more about \"Learning About COPD and How to Prevent Lung Infections. \"  Current as of: February 24, 2020               Content Version: 12.5  © 2738-1277 [employer]. Care instructions adapted under license by [employer] (which disclaims liability or warranty for this information). If you have questions about a medical condition or this instruction, always ask your healthcare professional. [address] any warranty or liability for your use of this information. Learning About Foot and Toenail Care  Foot and toenail care: Overview  Checking your loved one's feet and keeping them clean and soft can help prevent cracks and infection in the skin. This is especially important for people who have diabetes. Keeping toenails trimmed--and polished if that's what the person likes--also helps the person feel well-groomed.   If the person you care for has diabetes or has foot problems, such as bad bunions and corns, think about taking them to see a podiatrist. This is a doctor who specializes in the care of the feet. Sometimes a podiatrist will come to the home if the person can't go out for visits. Try to take the person for salon pedicures if that is what they want. It's a chance to get out and see people and continue a favorite activity. You can do basic nail care at home. Usually all you need to do is keep the nails clean and at a safe length. How do you trim someone's toenails? Try to trim the person's nails every week. Or check the nails each week to see if they need to be trimmed. It's easiest to trim nails after the person has had a shower or foot bath. It makes the nails softer and easier to trim. Start by gathering your supplies. You will need toenail clippers and a nail file. You may also need nail polish and nail polish remover. To trim the nails:  1. Wash and dry your hands. You don't need to wear gloves. 2. Use nail polish remover to take off any polish. 3. Hold the person's foot and toe steady with one hand while you trim the nail with your other hand. Trim the nails straight across. Leave the nails a little longer at the corners so that the sharp ends don't cut into the skin. 4. Keep the nails no longer than the tip of the toes. 5. Let the nails dry if they are still damp and soft. 6. Use a nail file to gently smooth the edges of the nails, especially at the corners. They may be sharp after the nails are cut straight. 7. Apply nail polish, if the person wants it. If the person's nails are thick and discolored, it may be safest to have a podiatrist cut them. What else do you need to know? When you're caring for someone's nails, it is important to remember not to trim or cut the cuticles. A minor cut in a cuticle could lead to an infection. Wash the feet daily in the shower or bath or in a basin made for washing feet. It's extra important to wash the feet carefully if the person has diabetes. After washing the feet, dry gently.  Put lotion on the feet, especially on the heels. But don't put it between the toes. If the person doesn't have diabetes and you see signs of athlete's foot (such as dry, cracking, or itchy skin between the toes), you can try an over-the-counter medicine. These medicines can kill the fungus that causes athlete's foot. If the problem doesn't go away, talk to the person's doctor. Look every day for cuts or signs of infection, such as pain, swelling, redness, or warmth. If you see any of these signs--especially in someone who has diabetes--call the doctor. Where can you learn more? Go to http://mechelleMachine Talkerdarline.info/  Enter A726 in the search box to learn more about \"Learning About Foot and Toenail Care. \"  Current as of: December 9, 2019               Content Version: 12.5  © 5989-2526 [employer]. Care instructions adapted under license by [employer] (which disclaims liability or warranty for this information). If you have questions about a medical condition or this instruction, always ask your healthcare professional. [address] any warranty or liability for your use of this information. Learning About Saving Energy When You Have a Chronic Condition  Introduction    Everyday tasks can be tiring when you have COPD, heart failure, or another long-term (chronic) condition. You may feel at times that you've lost your ability to live your life. But learning to conserve, or save, your energy can help you be less tired. Conserving your energy means finding ways of doing daily activities with as little effort as possible. With some small changes in the way you do things, you can get your tasks done more easily. Some treatments are available that might help. Pulmonary rehabilitation can teach you ways to breathe easier. Cardiac rehabilitation can help make your heart stronger.  You also may want to see an occupational or physical therapist. The therapist can give you more tips on building strength and moving with less effort. What can you do to conserve your energy? Planning  · Make a list of what you have to do every day. Group the tasks by location. · Do all the chores in one part of your house around the same time. · Go out for errands or do chores at the time of day when you have the most energy. · Plan rest periods into your day. Getting things done  · Sit down as often as you can when you get dressed, do chores, or cook. · Use a cart with wheels to roll items, such as laundry, from one room to another. · Push or slide boxes or other large items instead of lifting them. Reaching and bending  · Put things you use the most on shelves that are at the level of your waist or shoulder. · Use long-handled grabbers or other tools to reach items on a high shelf or to  things off the floor. Use long-handled dusters when you clean the house. · Use a raised toilet seat to avoid bending too far to sit or stand up. Eating  · Eat several small meals instead of three larger meals. · If you get too tired to eat much, try to choose healthy foods that have more calories. Have a yogurt-and-fruit smoothie for breakfast. Put avocado on a sandwich. Or add cheese or peanut butter to snacks. · If you don't feel very hungry, try to eat first and drink water or other fluids later, after a meal. This can help keep you from losing weight. Sip small amounts of fluids if you need to drink while you eat. Having sex  · Choose the time of day when you have more energy. · A [id number] position for sex can be less tiring. Sometimes you may want to focus more on caressing. Watch closely for changes in your health, and be sure to contact your doctor if you have any problems. Where can you learn more? Go to http://mechelle-darline.info/  Enter H190 in the search box to learn more about \"Learning About Saving Energy When You Have a Chronic Condition. \"  Current as of: February 24, 2020               Content Version: 12.5  © 7470-3638 [employer]. Care instructions adapted under license by [employer] (which disclaims liability or warranty for this information). If you have questions about a medical condition or this instruction, always ask your healthcare professional. [address] any warranty or liability for your use of this information. Nutrition Tips for Diabetes: After Your Visit  Your Care Instructions  A healthy diet is important to manage diabetes. It helps you lose weight (if you need to) and keep it off. It gives you the nutrition and energy your body needs and helps prevent heart disease. But a diet for diabetes does not mean that you have to eat special foods. You can eat what your family eats, including occasional sweets and other favorites. But you do have to pay attention to how often you eat and how much you eat of certain foods. The right plan for you will give you meals that help you keep your blood sugar at healthy levels. Try to eat a variety of foods and to spread carbohydrate throughout the day. Carbohydrate raises blood sugar higher and more quickly than any other nutrient does. Carbohydrate is found in sugar, breads and cereals, fruit, starchy vegetables such as potatoes and corn, and milk and yogurt. You may want to work with a dietitian or diabetes educator to help you plan meals and snacks. A dietitian or diabetes educator also can help you lose weight if that is one of your goals. The following tips can help you enjoy your meals and stay healthy. Follow-up care is a key part of your treatment and safety. Be sure to make and go to all appointments, and call your doctor if you are having problems. Its also a good idea to know your test results and keep a list of the medicines you take. How can you care for yourself at home? · Learn which foods have carbohydrate and how much carbohydrate to eat.  A dietitian or diabetes educator can help you learn to keep track of how much carbohydrate you eat. · Spread carbohydrate throughout the day. Eat some carbohydrate at all meals, but do not eat too much at any one time. · Plan meals to include food from all the food groups. These are the food groups and some example portion sizes:  ¨ Grains: 1 slice of bread (1 ounce), ½ cup of cooked cereal, and 1/3 cup of cooked pasta or rice. These have about 15 grams of carbohydrate in a serving. Choose whole grains such as whole wheat bread or crackers, oatmeal, and brown rice more often than refined grains. ¨ Fruit: 1 small fresh fruit, such as an apple or orange; ½ of a banana; ½ cup of chopped, cooked, or canned fruit; ½ cup of fruit juice; 1 cup of melon or raspberries; and 2 tablespoons of dried fruit. These have about 15 grams of carbohydrate in a serving. ¨ Dairy: 1 cup of nonfat or low-fat milk and 2/3 cup of plain yogurt. These have about 15 grams of carbohydrate in a serving. ¨ Protein foods: Beef, chicken, turkey, fish, eggs, tofu, cheese, cottage cheese, and peanut butter. A serving size of meat is 3 ounces, which is about the size of a deck of cards. Examples of meat substitute serving sizes (equal to 1 ounce of meat) are 1/4 cup of cottage cheese, 1 egg, 1 tablespoon of peanut butter, and ½ cup of tofu. These have very little or no carbohydrate per serving. ¨ Vegetables: Starchy vegetables such as ½ cup of cooked dried beans, peas, potatoes, or corn have about 15 grams of carbohydrate. Nonstarchy vegetables have very little carbohydrate, such as 1 cup of raw leafy vegetables (such as spinach), ½ cup of other vegetables (cooked or chopped), and 3/4 cup of vegetable juice. · Use the plate format to plan meals. It is a good, quick way to make sure that you have a balanced meal. It also helps you spread carbohydrate throughout the day. You divide your plate by types of foods.  Put vegetables on half the plate, meat or meat substitutes on one-quarter of the plate, and a grain or starchy vegetable (such as brown rice or a potato) in the final quarter of the plate. To this you can add a small piece of fruit and 1 cup of milk or yogurt, depending on how much carbohydrate you are supposed to eat at a meal.  · Talk to your dietitian or diabetes educator about ways to add limited amounts of sweets into your meal plan. You can eat these foods now and then, as long as you include the amount of carbohydrate they have in your daily carbohydrate allowance. · If you drink alcohol, limit it to no more than 1 drink a day for women and 2 drinks a day for men. If you are pregnant, no amount of alcohol is known to be safe. · Protein, fat, and fiber do not raise blood sugar as much as carbohydrate does. If you eat a lot of these nutrients in a meal, your blood sugar will rise more slowly than it would otherwise. · Limit saturated fats, such as those from meat and dairy products. Try to replace it with monounsaturated fat, such as olive oil. This is a healthier choice because people who have diabetes are at higher-than-average risk of heart disease. But use a modest amount of olive oil. A tablespoon of olive oil has 14 grams of fat and 120 calories. · Exercise lowers blood sugar. If you take insulin by shots or pump, you can use less than you would if you were not exercising. Keep in mind that timing matters. If you exercise within 1 hour after a meal, your body may need less insulin for that meal than it would if you exercised 3 hours after the meal. Test your blood sugar to find out how exercise affects your need for insulin. · Exercise on most days of the week. Aim for at least 30 minutes. Exercise helps you stay at a healthy weight and helps your body use insulin. Walking is an easy way to get exercise. Gradually increase the amount you walk every day. You also may want to swim, bike, or do other activities.   When you eat out  · Learn to estimate the serving sizes of foods that have carbohydrate. If you measure food at home, it will be easier to estimate the amount in a serving of restaurant food. · If the meal you order has too much carbohydrate (such as potatoes, corn, or baked beans), ask to have a low-carbohydrate food instead. Ask for a salad or green vegetables. · If you use insulin, check your blood sugar before and after eating out to help you plan how much to eat in the future. · If you eat more carbohydrate at a meal than you had planned, take a walk or do other exercise. This will help lower your blood sugar. Where can you learn more? Go to [employer]  Enter Z911 in the search box to learn more about \"Nutrition Tips for Diabetes: After Your Visit. \"   © 3716-0783 [employer]. Care instructions adapted under license by [facility] (which disclaims liability or warranty for this information). This care instruction is for use with your licensed healthcare professional. If you have questions about a medical condition or this instruction, always ask your healthcare professional. [place] [address] any warranty or liability for your use of this information. Content Version: 23.5.915222; Current as of: June 4, 2014                 Depression Treatment: Care Instructions  Your Care Instructions     Depression is a condition that affects the way you feel, think, and act. It causes symptoms such as low energy, loss of interest in daily activities, and sadness or grouchiness that goes on for a long time. Depression is very common and affects men and women of all ages. Depression is a medical illness caused by changes in the natural chemicals in your brain. It is not a character flaw, and it does not mean that you are a bad or weak person. It does not mean that you are going crazy. It is important to know that depression can be treated. Medicines, counseling, and self-care can all help. Many people do not get help because they are embarrassed or think that they will get over the depression on their own. But some people do not get better without treatment. Follow-up care is a key part of your treatment and safety. Be sure to make and go to all appointments, and call your doctor if you are having problems. It's also a good idea to know your test results and keep a list of the medicines you take. How can you care for yourself at home? Learn about antidepressant medicines  Antidepressant medicines can improve or end the symptoms of depression. You may need to take the medicine for at least 6 months, and often longer. Keep taking your medicine even if you feel better. If you stop taking it too soon, your symptoms may come back or get worse. You may start to feel better within 1 to 3 weeks of taking antidepressant medicine. But it can take as many as 6 to 8 weeks to see more improvement. Talk to your doctor if you have problems with your medicine or if you do not notice any improvement after 3 weeks. Antidepressants can make you feel tired, dizzy, or nervous. Some people have dry mouth, constipation, headaches, sexual problems, an upset stomach, or diarrhea. Many of these side effects are mild and go away on their own after you take the medicine for a few weeks. Some may last longer. Talk to your doctor if side effects bother you too much. You might be able to try a different medicine. If you are pregnant or breastfeeding, talk to your doctor about what medicines you can take. Learn about counseling  In many cases, counseling can work as well as medicines to treat mild to moderate depression. Counseling is done by licensed mental health providers, such as psychologists, social workers, and some types of nurses. It can be done in one-on-one sessions or in a group setting. Many people find group sessions helpful. Cognitive-behavioral therapy is a type of counseling.  In this treatment therapy, you learn how to see and change unhelpful thinking styles that may be adding to your depression. Counseling and medicines often work well when used together. To manage depression  · Be physically active. Getting 30 minutes of exercise each day is good for your body and your mind. Begin slowly if it is hard for you to get started. If you already exercise, keep it up. · Plan something pleasant for yourself every day. Include activities that you have enjoyed in the past.  · Get enough sleep. Talk to your doctor if you have problems sleeping. · Eat a balanced diet. If you do not feel hungry, eat small snacks rather than large meals. · Do not drink alcohol, use illegal drugs, or take medicines that your doctor has not prescribed for you. They may interfere with your treatment. · Spend time with family and friends. It may help to speak openly about your depression with people you trust.  · Take your medicines exactly as prescribed. Call your doctor if you think you are having a problem with your medicine. · Do not make major life decisions while you are depressed. Depression may change the way you think. You will be able to make better decisions after you feel better. · Think positively. Challenge negative thoughts with statements such as \"I am hopeful\"; \"Things will get better\"; and \"I can ask for the help I need. \" Write down these statements and read them often, even if you don't believe them yet. · Be patient with yourself. It took time for your depression to develop, and it will take time for your symptoms to improve. Do not take on too much or be too hard on yourself. · Learn all you can about depression from written and online materials. · Check out behavioral health classes to learn more about dealing with depression. · Keep the numbers for these national suicide hotlines: 1-763-466-TALK (5-545.224.8542) and 7-325-DOKIRUT (6-489.142.4587).  If you or someone you know talks about suicide or feeling hopeless, get help right away. When should you call for help? [id number] anytime you think you may need emergency care. For example, call if:  · You feel you cannot stop from hurting yourself or someone else. Call your doctor now or seek immediate medical care if:  · You hear voices. · You feel much more depressed. Watch closely for changes in your health, and be sure to contact your doctor if:  · You are having problems with your depression medicine. · You are not getting better as expected. Where can you learn more? Go to http://mechelle-darline.info/  Enter G693 in the search box to learn more about \"Depression Treatment: Care Instructions. \"  Current as of: January 31, 2020               Content Version: 12.5  © 7567-8100 Healthwise, Incorporated. Care instructions adapted under license by [employer] (which disclaims liability or warranty for this information). If you have questions about a medical condition or this instruction, always ask your healthcare professional. [place] [address] any warranty or liability for your use of this information. Learning About Diabetes Food Guidelines  Your Care Instructions     Meal planning is important to manage diabetes. It helps keep your blood sugar at a target level (which you set with your doctor). You don't have to eat special foods. You can eat what your family eats, including sweets once in a while. But you do have to pay attention to how often you eat and how much you eat of certain foods. You may want to work with a dietitian or a certified diabetes educator (CDE) to help you plan meals and snacks. A dietitian or CDE can also help you lose weight if that is one of your goals. What should you know about eating carbs? Managing the amount of carbohydrate (carbs) you eat is an important part of healthy meals when you have diabetes. Carbohydrate is found in many foods. · Learn which foods have carbs.  And learn the amounts of carbs in different foods. ? Bread, cereal, pasta, and rice have about 15 grams of carbs in a serving. A serving is 1 slice of bread (1 ounce), ½ cup of cooked cereal, or 1/3 cup of cooked pasta or rice. ? Fruits have 15 grams of carbs in a serving. A serving is 1 small fresh fruit, such as an apple or orange; ½ of a banana; ½ cup of cooked or canned fruit; ½ cup of fruit juice; 1 cup of melon or raspberries; or 2 tablespoons of dried fruit. ? Milk and no-sugar-added yogurt have 15 grams of carbs in a serving. A serving is 1 cup of milk or 2/3 cup of no-sugar-added yogurt. ? Starchy vegetables have 15 grams of carbs in a serving. A serving is ½ cup of mashed potatoes or sweet potato; 1 cup winter squash; ½ of a small baked potato; ½ cup of cooked beans; or ½ cup cooked corn or green peas. · Learn how much carbs to eat each day and at each meal. A dietitian or CDE can teach you how to keep track of the amount of carbs you eat. This is called carbohydrate counting. · If you are not sure how to count carbohydrate grams, use the Plate Method to plan meals. It is a good, quick way to make sure that you have a balanced meal. It also helps you spread carbs throughout the day. ? Divide your plate by types of foods. Put non-starchy vegetables on half the plate, meat or other protein food on one-quarter of the plate, and a grain or starchy vegetable in the final quarter of the plate. To this you can add a small piece of fruit and 1 cup of milk or yogurt, depending on how many carbs you are supposed to eat at a meal.  · Try to eat about the same amount of carbs at each meal. Do not \"save up\" your daily allowance of carbs to eat at one meal.  · Proteins have very little or no carbs per serving. Examples of proteins are beef, chicken, turkey, fish, eggs, tofu, cheese, cottage cheese, and peanut butter. A serving size of meat is 3 ounces, which is about the size of a deck of cards.  Examples of meat substitute serving sizes (equal to 1 ounce of meat) are 1/4 cup of cottage cheese, 1 egg, 1 tablespoon of peanut butter, and ½ cup of tofu. How can you eat out and still eat healthy? · Learn to estimate the serving sizes of foods that have carbohydrate. If you measure food at home, it will be easier to estimate the amount in a serving of restaurant food. · If the meal you order has too much carbohydrate (such as potatoes, corn, or baked beans), ask to have a low-carbohydrate food instead. Ask for a salad or green vegetables. · If you use insulin, check your blood sugar before and after eating out to help you plan how much to eat in the future. · If you eat more carbohydrate at a meal than you had planned, take a walk or do other exercise. This will help lower your blood sugar. What else should you know? · Limit saturated fat, such as the fat from meat and dairy products. This is a healthy choice because people who have diabetes are at higher risk of heart disease. So choose lean cuts of meat and nonfat or low-fat dairy products. Use olive or canola oil instead of butter or shortening when cooking. · Don't skip meals. Your blood sugar may drop too low if you skip meals and take insulin or certain medicines for diabetes. · Check with your doctor before you drink alcohol. Alcohol can cause your blood sugar to drop too low. Alcohol can also cause a bad reaction if you take certain diabetes medicines. Follow-up care is a key part of your treatment and safety. Be sure to make and go to all appointments, and call your doctor if you are having problems. It's also a good idea to know your test results and keep a list of the medicines you take. Where can you learn more? Go to http://mechelle-darline.info/  Enter I147 in the search box to learn more about \"Learning About Diabetes Food Guidelines. \"  Current as of: December 20, 2019               Content Version: 12.5  © 8201-6950 Healthwise, Incorporated. Care instructions adapted under license by [employer] (which disclaims liability or warranty for this information). If you have questions about a medical condition or this instruction, always ask your healthcare professional. [address] any warranty or liability for your use of this information. Diabetes Blood Sugar Emergencies: Your Action Plan  How can you prevent a blood sugar emergency? An important part of living with diabetes is keeping your blood sugar in your target range. You'll need to know what to do if it's too high or too low. Managing your blood sugar levels helps you avoid emergencies. This care sheet will teach you about the signs of high and low blood sugar. It will help you make an action plan with your doctor for when these signs occur. Low blood sugar is more likely to happen if you take certain medicines for diabetes. It can also happen if you skip a meal, drink alcohol, or exercise more than usual.  You may get high blood sugar if you eat differently than you normally do. One example is eating more carbohydrate than usual. Having a cold, the flu, or other sudden illness can also cause high blood sugar levels. Levels can also rise if you miss a dose of medicine. Any change in how you take your medicine may affect your blood sugar level. So it's important to work with your doctor before you make any changes. Check your blood sugar  Work with your doctor to fill in the blank spaces below that apply to you. Track your levels, know your target range, and write down ways you can get your blood sugar back in your target range. A log book can help you track your levels. Take the book to all of your medical appointments. · Check your blood sugar _____ times a day, at these times:________________________________________________.   (For example: Before meals, at bedtime, before exercise, during exercise, other.)  · Your blood sugar target range before a meal is ___________________. Your blood sugar target range after a meal is _______________________. · Do this--___________________________________________________--to get your blood sugar back within your safe range if your blood sugar results are _________________________________________. (For example: Less than 70 or above 250 mg/dL.)  Call your doctor when your blood sugar results are ___________________________________. (For example: Less than 70 or above 250 mg/dL.)  What are the symptoms of low and high blood sugar? Common symptoms of low blood sugar are sweating and feeling shaky, weak, hungry, or confused. Symptoms can start quickly. Common symptoms of high blood sugar are feeling very thirsty or very hungry. You may also pass urine more often than usual. You may have blurry vision and may lose weight without trying. But some people may have high or low blood sugar without having any symptoms. That's a good reason to check your blood sugar on a regular schedule. What should you do if you have symptoms? Work with your doctor to fill in the blank spaces below that apply to you. Low blood sugar  If you have symptoms of low blood sugar, check your blood sugar. If it's below _____ ( for example, below 70), eat or drink a quick-sugar food that has about 15 grams of carbohydrate. Your goal is to get your level back to your safe range. Check your blood sugar again 15 minutes later. If it's still not in your target range, take another 15 grams of carbohydrate and check your blood sugar again in 15 minutes. Repeat this until you reach your target. Then go back to your regular testing schedule. Children usually need less than 15 grams of carbohydrate. Check with your doctor or diabetes educator for the amount that is right for your child. When you have low blood sugar, it's best to stop or reduce any physical activity until your blood sugar is back in your target range and is stable.  If you must stay active, eat or drink 30 grams of carbohydrate. Then check your blood sugar again in 15 minutes. If it's not in your target range, take another 30 grams of carbohydrates. Check your blood sugar again in 15 minutes. Keep doing this until you reach your target. You can then go back to your regular testing schedule. If your symptoms or blood sugar levels are getting worse or have not improved after 15 minutes, seek medical care right away. Here are some examples of quick-sugar foods with 15 grams of carbohydrate:  · 3 or 4 glucose tablets  · 1 tablespoon (3 teaspoons) table sugar  · ½ cup to ¾ cup (4 to 6 ounces) of fruit juice or regular (not diet) soda  · Hard candy (such as 6 Life Savers)  High blood sugar  If you have symptoms of high blood sugar, check your blood sugar. Your goal is to get your level back to your target range. If it's above ______ ( for example, above 250), follow these steps:  · If you missed a dose of your diabetes medicine, take it now. Take only the amount of medicine that you have been prescribed. Do not take more or less medicine. · Give yourself insulin if your doctor has prescribed it for high blood sugar. · Test for ketones, if the doctor told you to do so. If the results of the ketone test show a moderate-to-large amount of ketones, call the doctor for advice. · Wait 30 minutes after you take the extra insulin or the missed medicine. Check your blood sugar again. If your symptoms or blood sugar levels are getting worse or have not improved after taking these steps, seek medical care right away. Follow-up care is a key part of your treatment and safety. Be sure to make and go to all appointments, and call your doctor if you are having problems. It's also a good idea to know your test results and keep a list of the medicines you take. Where can you learn more?   Go to http://mechelle-darline.info/  Enter G300 in the search box to learn more about \"Diabetes Blood Sugar Emergencies: Your Action Plan. \"  Current as of: December 20, 2019               Content Version: 12.5  © 0134-9638 Healthwise, Incorporated. Care instructions adapted under license by [employer] (which disclaims liability or warranty for this information). If you have questions about a medical condition or this instruction, always ask your healthcare professional. [address] any warranty or liability for your use of this information. Diabetes Foot Health: Care Instructions  Your Care Instructions     When you have diabetes, your feet need extra care and attention. Diabetes can damage the nerve endings and blood vessels in your feet, making you less likely to notice when your feet are injured. Diabetes also limits your body's ability to fight infection and get blood to areas that need it. If you get a minor foot injury, it could become an ulcer or a serious infection. With good foot care, you can prevent most of these problems. Caring for your feet can be quick and easy. Most of the care can be done when you are bathing or getting ready for bed. Follow-up care is a key part of your treatment and safety. Be sure to make and go to all appointments, and call your doctor if you are having problems. It's also a good idea to know your test results and keep a list of the medicines you take. How can you care for yourself at home? · Keep your blood sugar close to normal by watching what and how much you eat, monitoring blood sugar, taking medicines if prescribed, and getting regular exercise. · Do not smoke. Smoking affects blood flow and can make foot problems worse. If you need help quitting, talk to your doctor about stop-smoking programs and medicines. These can increase your chances of quitting for good. · Eat a diet that is low in fats. High fat intake can cause fat to build up in your blood vessels and decrease blood flow.   · Inspect your feet daily for blisters, cuts, cracks, or sores. If you cannot see well, use a mirror or have someone help you. · Take care of your feet:  ? Wash your feet every day. Use warm (not hot) water. Check the water temperature with your wrists or other part of your body, not your feet. ? Dry your feet well. Pat them dry. Do not rub the skin on your feet too hard. Dry well between your toes. If the skin on your feet stays moist, bacteria or a fungus can grow, which can lead to infection. ? Keep your skin soft. Use moisturizing skin cream to keep the skin on your feet soft and prevent calluses and cracks. But do not put the cream between your toes, and stop using any cream that causes a rash. ? Clean underneath your toenails carefully. Do not use a sharp object to clean underneath your toenails. Use the blunt end of a nail file or other rounded tool. ? Trim and file your toenails straight across to prevent ingrown toenails. Use a nail clipper, not scissors. Use an emery board to smooth the edges. · Change socks daily. Socks without seams are best, because seams often rub the feet. You can find socks for people with diabetes from specialty catalogs. · Look inside your shoes every day for things like gravel or torn linings, which could cause blisters or sores. · Buy shoes that fit well:  ? Look for shoes that have plenty of space around the toes. This helps prevent bunions and blisters. ? Try on shoes while wearing the kind of socks you will usually wear with the shoes. ? Avoid plastic shoes. They may rub your feet and cause blisters. Good shoes should be made of materials that are flexible and breathable, such as leather or cloth. ? Break in new shoes slowly by wearing them for no more than an hour a day for several days. Take extra time to check your feet for red areas, blisters, or other problems after you wear new shoes. · Do not go barefoot. Do not wear sandals, and do not wear shoes with very thin soles. Thin soles are easy to puncture. They also do not protect your feet from hot pavement or cold weather. · Have your doctor check your feet during each visit. If you have a foot problem, see your doctor. Do not try to treat an early foot problem at home. Home remedies or treatments that you can buy without a prescription (such as corn removers) can be harmful. · Always get early treatment for foot problems. A minor irritation can lead to a major problem if not properly cared for early. When should you call for help? Call your doctor now or seek immediate medical care if:  · You have a foot sore, an ulcer or break in the skin that is not healing after 4 days, bleeding corns or calluses, or an ingrown toenail. · You have blue or black areas, which can mean bruising or blood flow problems. · You have peeling skin or tiny blisters between your toes or cracking or oozing of the skin. · You have a fever for more than 24 hours and a foot sore. · You have new numbness or tingling in your feet that does not go away after you move your feet or change positions. · You have unexplained or unusual swelling of the foot or ankle. Watch closely for changes in your health, and be sure to contact your doctor if:  · You cannot do proper foot care. Where can you learn more? Go to http://mechelle-darline.info/  Enter A739 in the search box to learn more about \"Diabetes Foot Health: Care Instructions. \"  Current as of: December 20, 2019               Content Version: 12.5  © 2420-5265 [employer]. Care instructions adapted under license by [employer] (which disclaims liability or warranty for this information). If you have questions about a medical condition or this instruction, always ask your healthcare professional. [place] [address] any warranty or liability for your use of this information.          Diabetes and Preventing Falls: Care Instructions  Your Care Instructions     If you are an older adult who has diabetes, you may have a higher risk of falling. Complications of diabetes--such as nerve damage, foot problems, and reduced vision--may increase your risk of a fall. Some of your medicines also may add to your risk. By making your home safer, you can lower your risk of falling. Doing things to prevent diabetes complications may also help to lower your risk. You can make your home safer with a few simple measures. Follow-up care is a key part of your treatment and safety. Be sure to make and go to all appointments, and call your doctor if you are having problems. It's also a good idea to know your test results and keep a list of the medicines you take. How can you care for yourself at home? Taking care of yourself  · Keep your blood sugar at a target level (which you set with your doctor). · Exercise regularly to improve your strength, muscle tone, and balance. Walk if you can. Swimming may be a good choice if you cannot walk easily. · Have your vision checked as often as your doctor recommends. It is usually once a year or more often if you have eye problems. · Know the side effects of the medicines you take. Ask your doctor or pharmacist whether the medicines you take can affect your balance. Sleeping pills or sedatives can affect your balance. · Limit the amount of alcohol you drink. Alcohol can impair your balance and other senses. · Have your doctor check your feet during each visit. If you have a foot problem, see your doctor. Preventing falls at home  · Remove raised doorway thresholds, throw rugs, and clutter. Repair loose carpet or raised areas in the floor. · Move furniture and electrical cords to keep them out of walking paths. · Use nonskid floor wax, and wipe up spills right away, especially on ceramic tile floors. · If you use a walker or cane, put rubber tips on it. If you use crutches, clean the bottoms of them regularly with an abrasive pad, such as steel wool.   · Keep your house well lit, especially stairways, porches, and outside walkways. Use night-lights in areas such as hallways and bathrooms. Add extra light switches or use remote switches (such as switches that go on or off when you clap your hands) to make it easier to turn lights on if you have to get up during the night. · Install sturdy handrails on stairways. Put grab bars near your shower, bathtub, and toilet. · Store household items on low shelves so that you do not have to climb or reach high. Or use a reaching device that you can get at a medical supply store. If you have to climb for something, use a step stool with handrails, or ask someone to get it for you. · Keep a cordless phone and a flashlight with new batteries by your bed. If possible, put a phone in each of the main rooms of your house, or carry a cell phone in case you fall and cannot reach a phone. Or you can wear a device around your neck or wrist. You push a button that sends a signal for help. · Wear low-heeled shoes that fit well and give your feet good support. Use footwear with nonskid soles. Check the heels and soles of your shoes for wear. Repair or replace worn heels or soles. · Do not wear socks without shoes on wood floors. · Walk on the grass when the sidewalks are slippery. If you live in an area that gets snow and ice in the winter, sprinkle salt on slippery steps and sidewalks. Where can you learn more? Go to http://mechelle-darline.info/  Enter X601 in the search box to learn more about \"Diabetes and Preventing Falls: Care Instructions. \"  Current as of: August 7, 2019               Content Version: 12.5  © 2308-3022 Healthwise, Incorporated. Care instructions adapted under license by [employer] (which disclaims liability or warranty for this information).  If you have questions about a medical condition or this instruction, always ask your healthcare professional. [address] any warranty or liability for your use of this information. Fatigue: Care Instructions  Your Care Instructions     Fatigue is a feeling of tiredness, exhaustion, or lack of energy. You may feel fatigue because of too much or not enough activity. It can also come from stress, lack of sleep, boredom, and poor diet. Many medical problems, such as viral infections, can cause fatigue. Emotional problems, especially depression, are often the cause of fatigue. Fatigue is most often a symptom of another problem. Treatment for fatigue depends on the cause. For example, if you have fatigue because you have a certain health problem, treating this problem also treats your fatigue. If depression or anxiety is the cause, treatment may help. Follow-up care is a key part of your treatment and safety. Be sure to make and go to all appointments, and call your doctor if you are having problems. It's also a good idea to know your test results and keep a list of the medicines you take. How can you care for yourself at home? · Get regular exercise. But don't overdo it. Go back and forth between rest and exercise. · Get plenty of rest.  · Eat a healthy diet. Do not skip meals, especially breakfast.  · Reduce your use of caffeine, tobacco, and alcohol. Caffeine is most often found in coffee, tea, cola drinks, and chocolate. · Limit medicines that can cause fatigue. This includes tranquilizers and cold and allergy medicines. When should you call for help? Watch closely for changes in your health, and be sure to contact your doctor if:  · You have new symptoms such as fever or a rash. · Your fatigue gets worse. · You have been feeling down, depressed, or hopeless. Or you may have lost interest in things that you usually enjoy. · You are not getting better as expected. Where can you learn more?   Go to http://mechelle-darline.info/  Enter [id number] in the search box to learn more about \"Fatigue: Care Instructions. \"  Current as of: June 26, 2019               Content Version: 12.5  © 2422-4981 [employer]. Care instructions adapted under license by [employer] (which disclaims liability or warranty for this information). If you have questions about a medical condition or this instruction, always ask your healthcare professional. [address] any warranty or liability for your use of this information. Fluid Restriction: Care Instructions  Your Care Instructions     A buildup of fluid in the body can cause low sodium levels in the blood. It may also cause symptoms such as swelling and pain. Your doctor may suggest that you limit liquids, including foods that contain a lot of liquid. Limiting liquids is called fluid restriction. Keeping track of the amount of fluids you take in may help you feel better. Your doctor will tell you how much fluid you can have in a day. Follow-up care is a key part of your treatment and safety. Be sure to make and go to all appointments, and call your doctor if you are having problems. It's also a good idea to know your test results and keep a list of the medicines you take. How can you care for yourself at home? · Find a way of tracking the fluids you take in that works for you. Here are two methods you can try:  ? Write down how much you drink throughout the day. ? Keep a container filled with the amount of liquid allowed for the day. As you drink liquids during the day, such as a 6-ounce cup of coffee, pour that same amount out of the container. When the container is empty, you've had your liquid for the day. · Count any foods that will melt (such as ice cream, gelatin, or flavored ice treats) or liquid foods (such as soup) as part of your fluids for the day. Also count the liquid in canned fruits and vegetables as part of your daily intake, or drain them well before serving. · Space your liquids throughout the day.  Then you won't be tempted to drink more than the amount your doctor recommends. · To relieve thirst without taking in extra water, try chewing gum, sucking on hard candy (sugarless if you have diabetes), or rinsing your mouth with water and spitting it out. Where can you learn more? Go to http://mechelle-darline.info/  Enter O796 in the search box to learn more about \"Fluid Restriction: Care Instructions. \"  Current as of: December 16, 2019               Content Version: 12.5  © 7240-5967 [employer]. Care instructions adapted under license by [employer] (which disclaims liability or warranty for this information). If you have questions about a medical condition or this instruction, always ask your healthcare professional. [address] any warranty or liability for your use of this information. Heart Failure: Care Instructions  Your Care Instructions     Heart failure occurs when your heart does not pump as much blood as the body needs. Failure does not mean that the heart has stopped pumping but rather that it is not pumping as well as it should. Over time, this causes fluid buildup in your lungs and other parts of your body. Fluid buildup can cause shortness of breath, fatigue, swollen ankles, and other problems. By taking medicines regularly, reducing sodium (salt) in your diet, checking your weight every day, and making lifestyle changes, you can feel better and live longer. Follow-up care is a key part of your treatment and safety. Be sure to make and go to all appointments, and call your doctor if you are having problems. It's also a good idea to know your test results and keep a list of the medicines you take. How can you care for yourself at home? Medicines  · Be safe with medicines. Take your medicines exactly as prescribed. Call your doctor if you think you are having a problem with your medicine.   · Do not take any vitamins, over-the-counter medicine, or herbal products without talking to your doctor first. Do not take ibuprofen (Advil or Motrin) and naproxen (Aleve) without talking to your doctor first. They could make your heart failure worse. · You may take some of the following medicine. ? Angiotensin-converting enzyme inhibitors (ACEIs) or angiotensin II receptor blockers (ARBs) reduce the heart's workload, lower blood pressure, and reduce swelling. Taking an ACEI or ARB may lower your chance of needing to be hospitalized. ? Beta-blockers can slow heart rate, decrease blood pressure, and improve your condition. Taking a beta-blocker may lower your chance of needing to be hospitalized. ? Diuretics, also called water pills, reduce swelling. You will get more details on the specific medicines your doctor prescribes. Diet  · Your doctor may suggest that you limit sodium. Your doctor can tell you how much sodium is right for you. An example is less than 3,000 mg a day. This includes all the salt you eat in cooking or in packaged foods. People get most of their sodium from processed foods. Fast food and restaurant meals also tend to be very high in sodium. · Ask your doctor how much liquid you can drink each day. You may have to limit liquids. Weight  · Weigh yourself without clothing at the same time each day. Record your weight. Call your doctor if you have a sudden weight gain, such as more than 2 to 3 pounds in a day or 5 pounds in a week. (Your doctor may suggest a different range of weight gain.) A sudden weight gain may mean that your heart failure is getting worse. Activity level  · Start light exercise (if your doctor says it is okay). Even if you can only do a small amount, exercise will help you get stronger, have more energy, and manage your weight and your stress. Walking is an easy way to get exercise. Start out by walking a little more than you did before. Bit by bit, increase the amount you walk.   · When you exercise, watch for signs that your heart is working too hard. You are pushing yourself too hard if you cannot talk while you are exercising. If you become short of breath or dizzy or have chest pain, stop, sit down, and rest.  · If you feel \"wiped out\" the day after you exercise, walk slower or for a shorter distance until you can work up to a better pace. · Get enough rest at night. Sleeping with 1 or 2 pillows under your upper body and head may help you breathe easier. Lifestyle changes  · Do not smoke. Smoking can make a heart condition worse. If you need help quitting, talk to your doctor about stop-smoking programs and medicines. These can increase your chances of quitting for good. Quitting smoking may be the most important step you can take to protect your heart. · Limit alcohol to 2 drinks a day for men and 1 drink a day for women. Too much alcohol can cause health problems. · Avoid getting sick from colds and the flu. Get a pneumococcal vaccine shot. If you have had one before, ask your doctor whether you need another dose. Get a flu shot each year. If you must be around people with colds or the flu, wash your hands often. When should you call for help? Call 911 if you have symptoms of sudden heart failure such as:  · You have severe trouble breathing. · You cough up pink, foamy mucus. · You have a new irregular or rapid heartbeat. Call your doctor now or seek immediate medical care if:  · You have new or increased shortness of breath. · You are dizzy or lightheaded, or you feel like you may faint. · You have sudden weight gain, such as more than 2 to 3 pounds in a day or 5 pounds in a week. (Your doctor may suggest a different range of weight gain.)  · You have increased swelling in your legs, ankles, or feet. · You are suddenly so tired or weak that you cannot do your usual activities.   Watch closely for changes in your health, and be sure to contact your doctor if you develop new symptoms. Where can you learn more? Go to http://mechelle-darline.info/  Enter C449 in the search box to learn more about \"Heart Failure: Care Instructions. \"  Current as of: December 16, 2019               Content Version: 12.5  © 7486-4122 [employer]. Care instructions adapted under license by [employer] (which disclaims liability or warranty for this information). If you have questions about a medical condition or this instruction, always ask your healthcare professional. [address] any warranty or liability for your use of this information. Learning About Heart Failure  What is heart failure? Heart failure means that your heart muscle doesn't pump as much blood as your body needs. Failure doesn't mean that your heart has stopped. It means that your heart isn't pumping as well as it should. Because your heart cannot pump well, your body tries to make up for it. To do this:  · Your body holds on to salt and water. This increases the amount of blood in your bloodstream.  · Your heart beats faster. · Your heart might get bigger. Your body has an amazing ability to make up for heart failure. It may do such a good job that you don't know you have a disease. But at some point, your heart and body will no longer be able to keep up. Then fluid starts to build up in your lungs and other parts of your body. This fluid buildup is called congestion. It's why some doctors call the disease congestive heart failure. What can you expect when you have heart failure? Heart failure is a lifelong (chronic) disease. Treatment may be able to slow the disease and help you feel better. But heart failure tends to get worse over time. Despite this, there are many steps you can take to feel better and stay healthy longer. Early on, your symptoms may not be too bad.  As heart failure gets worse, symptoms typically get worse, and you may need to limit your activities. Heart failure can also get worse suddenly. If this happens, you need emergency care. Then, after treatment, your symptoms may go back to being stable (which means they stay the same) for a long time. Heart failure can lead to other health problems, such as heart rhythm problems. Over time, your treatment options may change, especially as your symptoms get worse. You may want to think about what kind of care you want at the end of your life. What are the symptoms? Symptoms of heart failure start to happen when your heart can't pump enough blood to the rest of your body. In the early stages of heart failure, you may:  · Feel tired easily. · Be short of breath when you exert yourself. · Feel like your heart is pounding or racing (palpitations). · Feel weak or dizzy. As heart failure gets worse, fluid starts to build up in your lungs and other parts of your body. This may cause you to:  · Feel short of breath even at rest.  · Have swelling (edema), especially in your legs, ankles, and feet. · Gain weight. This may happen over just a day or two, or more slowly. · Cough or wheeze, especially when you lie down. · Feel bloated or sick to your stomach. How is heart failure treated? Heart failure is treated with medicines and steps you take to make lifestyle changes and check your symptoms. Treatment can slow the disease and help you feel better and live longer. · You'll probably take several medicines. · You'll take steps to care for yourself at home. Loreta Adams watch for changes in your symptoms. You may need to make lifestyle changes, such as limiting sodium, getting regular exercise, not smoking, and eating healthy foods. · You might attend cardiac rehabilitation (rehab) to get education and support that help you make lifestyle changes and stay as healthy as possible. · You may get a heart device. A pacemaker helps your heart pump blood. An ICD can stop abnormal heart rhythms.   · As heart failure gets worse, palliative care can help improve your quality of life. You can do advance care planning to decide what kind of care you want at the end of your life. How can you care for yourself? There are many steps you can take to feel better and live longer. These steps are an important part of treatment. They can help you stay active and enjoy life. Take your medicine the right way. Avoid medicines that can make your symptoms worse. Check your weight and symptoms every day. Know what to do if your symptoms get worse. Limit sodium. This helps keep fluid from building up. It may help you feel better. Be active. Exercise regularly, but don't exercise too hard. Be heart-healthy. Eat healthy foods, stay at a healthy weight, limit alcohol, and don't smoke. Stay as healthy as possible. Manage other health problems such as diabetes and high blood pressure. Avoid colds and flu, get help for depression and anxiety, and manage stress. If you think you may have a problem with alcohol or drug use, talk to your doctor. Follow-up care is a key part of your treatment and safety. Be sure to make and go to all appointments, and call your doctor if you are having problems. It's also a good idea to know your test results and keep a list of the medicines you take. Where can you learn more? Go to http://mechelle-darline.info/  Enter [id number] in the search box to learn more about \"Learning About Heart Failure. \"  Current as of: December 16, 2019               Content Version: 12.5  © 7101-5448 [employer]. Care instructions adapted under license by [employer] (which disclaims liability or warranty for this information). If you have questions about a medical condition or this instruction, always ask your healthcare professional. [place] [address] any warranty or liability for your use of this information.          Limiting Sodium With Heart Failure: Care Instructions  Your Care Instructions     Sodium causes your body to hold on to extra water. This may cause your heart failure symptoms to get worse. Limiting sodium may help you feel better. People get most of their sodium from processed foods. Fast food and restaurant meals also tend to be very high in sodium. Your doctor may suggest that you limit sodium. Your doctor can tell you how much sodium is right for you. An example is less than 3,000 mg a day. This includes all the salt you eat in cooked or packaged foods. Follow-up care is a key part of your treatment and safety. Be sure to make and go to all appointments, and call your doctor if you are having problems. It's also a good idea to know your test results and keep a list of the medicines you take. How can you care for yourself at home? Read food labels  · Read food labels on cans and food packages. The labels tell you how much sodium is in each serving. Make sure that you look at the serving size. If you eat more than the serving size, you have eaten more sodium than is listed for one serving. · Food labels also tell you the Percent Daily Value for sodium. Choose products with low Percent Daily Values for sodium. · Be aware that sodium can come in forms other than salt, including monosodium glutamate (MSG), sodium citrate, and sodium bicarbonate (baking soda). MSG is often added to Asian food. You can sometimes ask for food without MSG or salt. Buy low-sodium foods  · Buy foods that are labeled \"unsalted\" (no salt added), \"sodium-free\" (less than 5 mg of sodium per serving), or \"low-sodium\" (less than 140 mg of sodium per serving). A food labeled \"light sodium\" has less than half of the full-sodium version of that food. Foods labeled \"reduced-sodium\" may still have too much sodium. · Buy fresh vegetables or plain, frozen vegetables. Buy low-sodium versions of canned vegetables, soups, and other canned goods.   Prepare low-sodium meals  · Use less salt each day when cooking. Reducing salt in this way will help you adjust to the taste. Do not add salt after cooking. Take the salt shaker off the table. · Flavor your food with garlic, lemon juice, onion, vinegar, herbs, and spices instead of salt. Do not use soy sauce, steak sauce, onion salt, garlic salt, mustard, or ketchup on your food. · Make your own salad dressings, sauces, and ketchup without adding salt. · Use less salt (or none) when recipes call for it. You can often use half the salt a recipe calls for without losing flavor. Other dishes like rice, pasta, and grains do not need added salt. · Rinse canned vegetables. This removes some--but not all--of the salt. · Avoid water that has a naturally high sodium content or that has been treated with water softeners, which add sodium. Call your local water company to find out the sodium content of your water supply. If you buy bottled water, read the label and choose a sodium-free brand. Avoid high-sodium foods, such as:  · Smoked, cured, salted, and canned meat, fish, and poultry. · Ham, palaciso, hot dogs, and luncheon meats. · Regular, hard, and processed cheese and regular peanut butter. · Crackers with salted tops. · Frozen prepared meals. · Canned and dried soups, broths, and bouillon, unless labeled sodium-free or low-sodium. · Canned vegetables, unless labeled sodium-free or low-sodium. · Salted snack foods such as chips and pretzels. · [employer] fries, pizza, tacos, and other fast foods. · Pickles, olives, ketchup, and other condiments, especially soy sauce, unless labeled sodium-free or low-sodium. If you cannot cook for yourself  · Have family members or friends help you, or have someone cook low-sodium meals. · Check with your local senior nutrition program to find out where meals are served and whether they offer a low-sodium option.  You can often find these programs through your local health department or hospital.  · Have meals delivered to your home. Most [facility] have a Meals on [employer] [place]. These programs provide one hot meal a day for older adults, delivered to their homes. Ask whether these meals are low-sodium. Let them know that you are on a low-sodium diet. Where can you learn more? Go to http://mechelle-darline.info/  Enter A166 in the search box to learn more about \"Limiting Sodium With Heart Failure: Care Instructions. \"  Current as of: December 16, 2019               Content Version: 12.5  © 1964-4778 [email or web address]. Care instructions adapted under license by [employer] (which disclaims liability or warranty for this information). If you have questions about a medical condition or this instruction, always ask your healthcare professional. [address] any warranty or liability for your use of this information. Avoiding Triggers With Heart Failure: Care Instructions  Your Care Instructions     Triggers are anything that make your heart failure flare up. A flare-up is also called \"sudden heart failure\" or \"acute heart failure. \" When you have a flare-up, fluid builds up in your lungs, and you have problems breathing. You might need to go to the hospital. By watching for changes in your condition and avoiding triggers, you can prevent heart failure flare-ups. Follow-up care is a key part of your treatment and safety. Be sure to make and go to all appointments, and call your doctor if you are having problems. It's also a good idea to know your test results and keep a list of the medicines you take. How can you care for yourself at home? Watch for changes in your weight and condition  · Weigh yourself without clothing at the same time each day. Record your weight. Call your doctor if you have sudden weight gain, such as more than 2 to 3 pounds in a day or 5 pounds in a week.  (Your doctor may suggest a different range of weight gain.) A sudden weight gain may mean that your heart failure is getting worse. · Keep a daily record of your symptoms. Write down any changes in how you feel, such as new shortness of breath, cough, or problems eating. Also record if your ankles are more swollen than usual and if you feel more tired than usual. Note anything that you ate or did that could have triggered these changes. Limit sodium  Sodium causes your body to hold on to extra water. This may cause your heart failure symptoms to get worse. People get most of their sodium from processed foods. Fast food and restaurant meals also tend to be very high in sodium. · Your doctor may suggest that you limit sodium. Your doctor can tell you how much sodium is right for you. This includes limiting sodium in cooked and packaged foods. · Read food labels on cans and food packages. They tell you how much sodium you get in one serving. Check the serving size. If you eat more than one serving, you are getting more sodium. · Be aware that sodium can come in forms other than salt, including monosodium glutamate (MSG), sodium citrate, and sodium bicarbonate (baking soda). MSG is often added to Asian food. You can sometimes ask for food without MSG or salt. · Slowly reducing salt will help you adjust to the taste. Take the salt shaker off the table. · Flavor your food with garlic, lemon juice, onion, vinegar, herbs, and spices instead of salt. Do not use soy sauce, steak sauce, onion salt, garlic salt, mustard, or ketchup on your food, unless it is labeled \"low-sodium\" or \"low-salt. \"  · Make your own salad dressings, sauces, and ketchup without adding salt. · Use fresh or frozen ingredients, instead of canned ones, whenever you can. Choose low-sodium canned goods. · Eat less processed food and food from restaurants, including fast food. Exercise as directed  Moderate, regular exercise is very good for your heart. It improves your blood flow and helps control your weight.  But too much exercise can stress your heart and cause a heart failure flare-up. · Check with your doctor before you start an exercise program.  · Walking is an easy way to get exercise. Start out slowly. Gradually increase the length and pace of your walk. Swimming, riding a bike, and using a treadmill are also good forms of exercise. · When you exercise, watch for signs that your heart is working too hard. You are pushing yourself too hard if you cannot talk while you are exercising. If you become short of breath or dizzy or have chest pain, stop, sit down, and rest.  · Do not exercise when you do not feel well. Take medicines correctly  · Take your medicines exactly as prescribed. Call your doctor if you think you are having a problem with your medicine. · Make a list of all the medicines you take. Include those prescribed to you by other doctors and any over-the-counter medicines, vitamins, or supplements you take. Take this list with you when you go to any doctor. · Take your medicines at the same time every day. It may help you to post a list of all the medicines you take every day and what time of day you take them. · Make taking your medicine as simple as you can. Plan times to take your medicines when you are doing other things, such as eating a meal or getting ready for bed. This will make it easier to remember to take your medicines. · Get organized. Use helpful tools, such as daily or weekly pill containers. When should you call for help? Call 911 if you have symptoms of sudden heart failure such as:  · You have severe trouble breathing. · You cough up pink, foamy mucus. · You have a new irregular or rapid heartbeat. Call your doctor now or seek immediate medical care if:  · You have new or increased shortness of breath. · You are dizzy or lightheaded, or you feel like you may faint. · You have sudden weight gain, such as more than 2 to 3 pounds in a day or 5 pounds in a week.  (Your doctor may suggest a different range of weight gain.)  · You have increased swelling in your legs, ankles, or feet. · You are suddenly so tired or weak that you cannot do your usual activities. Watch closely for changes in your health, and be sure to contact your doctor if you develop new symptoms. Where can you learn more? Go to http://mechelle-darline.info/  Enter V089 in the search box to learn more about \"Avoiding Triggers With Heart Failure: Care Instructions. \"  Current as of: December 16, 2019               Content Version: 12.5  © 6334-3134 [employer]. Care instructions adapted under license by [employer] (which disclaims liability or warranty for this information). If you have questions about a medical condition or this instruction, always ask your healthcare professional. [address] any warranty or liability for your use of this information. Hemodialysis Vascular Access: Care Instructions  Overview  Hemodialysis, or dialysis, is the use of a machine to remove wastes from your blood. You need it if your kidneys are not able to remove wastes on their own. A dialysis access is the place in your arm, or sometimes in your leg, where a doctor creates a blood vessel that carries a large flow of blood. Your doctor creates an access during a minor surgery. You need to take care of your access to keep it working and to prevent infection. When you have dialysis, two needles are placed in this blood vessel and are connected to the dialysis machine. Your blood flows out of one needle and into the machine to be cleaned. Then your cleaned blood flows back into your body through the other needle. Sometimes a doctor makes a short-term access through a tube, called a catheter, placed in your neck, upper chest, or groin. Follow-up care is a key part of your treatment and safety. Be sure to make and go to all appointments, and call your doctor if you are having problems.  It's also a good idea to know your test results and keep a list of the medicines you take. How can you care for yourself at home? · After your doctor creates an access, keep it dry for at least 2 days. · Squeeze a soft ball or other object as instructed after the access is placed. This will help blood flow through the access and help prevent blood clots. · After you have dialysis, check to see if the access bleeds or swells. Let your doctor know if your arm bleeds or swells. · Do not lift anything heavy with the arm that has the access. · Do not bump your arm. · Don't wear tight clothing or jewelry over the access. · Don't sleep with your access arm under your body. · Have blood drawn or blood pressure taken from your other arm. · Keep the access clean and dry. · Don't put cream or lotion on or near the access. When should you call for help? Call your doctor now or seek immediate medical care if:  · You have signs of infection, such as:  ? Increased pain, swelling, warmth, or redness around the access. ? Red streaks leading from the access. ? Pus draining from the access. ? A fever. · You do not feel a pulse in your access. Watch closely for changes in your health, and be sure to contact your doctor if:  · You do not get better as expected. Where can you learn more? Go to http://mechelle-darline.info/  Enter L169 in the search box to learn more about \"Hemodialysis Vascular Access: Care Instructions. \"  Current as of: August 12, 2019               Content Version: 12.5  © 9948-9213 [employer]. Care instructions adapted under license by [employer] (which disclaims liability or warranty for this information). If you have questions about a medical condition or this instruction, always ask your healthcare professional. [address] any warranty or liability for your use of this information.     Patient Education        Learning About Diabetes Food Guidelines  Your Care Instructions     Meal planning is important to manage diabetes. It helps keep your blood sugar at a target level (which you set with your doctor). You don't have to eat special foods. You can eat what your family eats, including sweets once in a while. But you do have to pay attention to how often you eat and how much you eat of certain foods. You may want to work with a dietitian or a certified diabetes educator (CDE) to help you plan meals and snacks. A dietitian or CDE can also help you lose weight if that is one of your goals. What should you know about eating carbs? Managing the amount of carbohydrate (carbs) you eat is an important part of healthy meals when you have diabetes. Carbohydrate is found in many foods. · Learn which foods have carbs. And learn the amounts of carbs in different foods. ? Bread, cereal, pasta, and rice have about 15 grams of carbs in a serving. A serving is 1 slice of bread (1 ounce), ½ cup of cooked cereal, or 1/3 cup of cooked pasta or rice. ? Fruits have 15 grams of carbs in a serving. A serving is 1 small fresh fruit, such as an apple or orange; ½ of a banana; ½ cup of cooked or canned fruit; ½ cup of fruit juice; 1 cup of melon or raspberries; or 2 tablespoons of dried fruit. ? Milk and no-sugar-added yogurt have 15 grams of carbs in a serving. A serving is 1 cup of milk or 2/3 cup of no-sugar-added yogurt. ? Starchy vegetables have 15 grams of carbs in a serving. A serving is ½ cup of mashed potatoes or sweet potato; 1 cup winter squash; ½ of a small baked potato; ½ cup of cooked beans; or ½ cup cooked corn or green peas. · Learn how much carbs to eat each day and at each meal. A dietitian or CDE can teach you how to keep track of the amount of carbs you eat. This is called carbohydrate counting. · If you are not sure how to count carbohydrate grams, use the Plate Method to plan meals.  It is a good, quick way to make sure that you have a balanced meal. It also helps you spread carbs throughout the day. ? Divide your plate by types of foods. Put non-starchy vegetables on half the plate, meat or other protein food on one-quarter of the plate, and a grain or starchy vegetable in the final quarter of the plate. To this you can add a small piece of fruit and 1 cup of milk or yogurt, depending on how many carbs you are supposed to eat at a meal.  · Try to eat about the same amount of carbs at each meal. Do not \"save up\" your daily allowance of carbs to eat at one meal.  · Proteins have very little or no carbs per serving. Examples of proteins are beef, chicken, turkey, fish, eggs, tofu, cheese, cottage cheese, and peanut butter. A serving size of meat is 3 ounces, which is about the size of a deck of cards. Examples of meat substitute serving sizes (equal to 1 ounce of meat) are 1/4 cup of cottage cheese, 1 egg, 1 tablespoon of peanut butter, and ½ cup of tofu. How can you eat out and still eat healthy? · Learn to estimate the serving sizes of foods that have carbohydrate. If you measure food at home, it will be easier to estimate the amount in a serving of restaurant food. · If the meal you order has too much carbohydrate (such as potatoes, corn, or baked beans), ask to have a low-carbohydrate food instead. Ask for a salad or green vegetables. · If you use insulin, check your blood sugar before and after eating out to help you plan how much to eat in the future. · If you eat more carbohydrate at a meal than you had planned, take a walk or do other exercise. This will help lower your blood sugar. What else should you know? · Limit saturated fat, such as the fat from meat and dairy products. This is a healthy choice because people who have diabetes are at higher risk of heart disease. So choose lean cuts of meat and nonfat or low-fat dairy products. Use olive or canola oil instead of butter or shortening when cooking. · Don't skip meals.  Your blood sugar may drop too low if you skip meals and take insulin or certain medicines for diabetes. · Check with your doctor before you drink alcohol. Alcohol can cause your blood sugar to drop too low. Alcohol can also cause a bad reaction if you take certain diabetes medicines. Follow-up care is a key part of your treatment and safety. Be sure to make and go to all appointments, and call your doctor if you are having problems. It's also a good idea to know your test results and keep a list of the medicines you take. Where can you learn more? Go to http://mechelle-darline.info/  Enter I147 in the search box to learn more about \"Learning About Diabetes Food Guidelines. \"  Current as of: December 20, 2019               Content Version: 12.5  © 8735-2666 [employer]. Care instructions adapted under license by [employer] (which disclaims liability or warranty for this information). If you have questions about a medical condition or this instruction, always ask your healthcare professional. [place] [address] any warranty or liability for your use of this information. Patient Education        Low Sodium Diet (2,000 Milligram): Care Instructions  Your Care Instructions     Too much sodium causes your body to hold on to extra water. This can raise your blood pressure and force your heart and kidneys to work harder. In very serious cases, this could cause you to be put in the hospital. It might even be life-threatening. By limiting sodium, you will feel better and lower your risk of serious problems. The most common source of sodium is salt. People get most of the salt in their diet from canned, prepared, and packaged foods. Fast food and restaurant meals also are very high in sodium. Your doctor will probably limit your sodium to less than 2,000 milligrams (mg) a day.  This limit counts all the sodium in prepared and packaged foods and any salt you add to your food. Follow-up care is a key part of your treatment and safety. Be sure to make and go to all appointments, and call your doctor if you are having problems. It's also a good idea to know your test results and keep a list of the medicines you take. How can you care for yourself at home? Read food labels  · Read labels on cans and food packages. The labels tell you how much sodium is in each serving. Make sure that you look at the serving size. If you eat more than the serving size, you have eaten more sodium. · Food labels also tell you the Percent Daily Value for sodium. Choose products with low Percent Daily Values for sodium. · Be aware that sodium can come in forms other than salt, including monosodium glutamate (MSG), sodium citrate, and sodium bicarbonate (baking soda). MSG is often added to Asian food. When you eat out, you can sometimes ask for food without MSG or added salt. Buy low-sodium foods  · Buy foods that are labeled \"unsalted\" (no salt added), \"sodium-free\" (less than 5 mg of sodium per serving), or \"low-sodium\" (less than 140 mg of sodium per serving). Foods labeled \"reduced-sodium\" and \"light sodium\" may still have too much sodium. Be sure to read the label to see how much sodium you are getting. · Buy fresh vegetables, or frozen vegetables without added sauces. Buy low-sodium versions of canned vegetables, soups, and other canned goods. Prepare low-sodium meals  · Cut back on the amount of salt you use in cooking. This will help you adjust to the taste. Do not add salt after cooking. One teaspoon of salt has about 2,300 mg of sodium. · Take the salt shaker off the table. · Flavor your food with garlic, lemon juice, onion, vinegar, herbs, and spices. Do not use soy sauce, lite soy sauce, steak sauce, onion salt, garlic salt, celery salt, mustard, or ketchup on your food. · Use low-sodium salad dressings, sauces, and ketchup.  Or make your own salad dressings and sauces without adding salt. · Use less salt (or none) when recipes call for it. You can often use half the salt a recipe calls for without losing flavor. Other foods such as rice, pasta, and grains do not need added salt. · Rinse canned vegetables, and cook them in fresh water. This removes some--but not all--of the salt. · Avoid water that is naturally high in sodium or that has been treated with water softeners, which add sodium. Call your local water company to find out the sodium content of your water supply. If you buy bottled water, read the label and choose a sodium-free brand. Avoid high-sodium foods  · Avoid eating:  ? Smoked, cured, salted, and canned meat, fish, and poultry. ? Ham, palacios, hot dogs, and luncheon meats. ? Regular, hard, and processed cheese and regular peanut butter. ? Crackers with salted tops, and other salted snack foods such as pretzels, chips, and salted popcorn. ? Frozen prepared meals, unless labeled low-sodium. ? Canned and dried soups, broths, and bouillon, unless labeled sodium-free or low-sodium. ? Canned vegetables, unless labeled sodium-free or low-sodium. ? [employer]ita fries, pizza, tacos, and other fast foods. ? Pickles, olives, ketchup, and other condiments, especially soy sauce, unless labeled sodium-free or low-sodium. Where can you learn more? Go to http://mechelle-darline.info/  Enter V843 in the search box to learn more about \"Low Sodium Diet (2,000 Milligram): Care Instructions. \"  Current as of: August 22, 2019               Content Version: 12.5  © 3421-6958 [employer]wise[employer] Inc[employer]. Care instructions adapted under license by [employer] (which disclaims liability or warranty for this information). If you have questions about a medical condition or this instruction, always ask your healthcare professional. [address] any warranty or liability for your use of this information.

## 2020-06-26 NOTE — PROGRESS NOTES
Renal Progress Note  Follow up of ESRD     Assessment/Plan:    1. ESRD. mwf   2. HTN. Stable, continue current regimen. 3. Anemia of ckd. Continue procrit. 4. Staph epi bacteriemia, likely contaminant per ID. Repeat bc ngtd.    5. Aspiration pneumonia, finished abx per ID.   6 mild hyponatremia     Volume status better  Stable O2 requirements   Awake and alert  2-3 lit NC O2  Feels better  Still not able to do much PT     Plan :  1) continue HD MWF, HD today for volume and solute   2) MAGNUS with HD   3) continue present BP meds, hold prior to HD on HD days   4) add protein shakes to diet   5) fluid restriction 1000 ml/day   6) will likely need home health on discharge     Please call with questions    Serena Ellis MD FASN  Cell [phone]  Pager: [phone]    Subjective:  Feels better  Appetite improved       Patient Active Problem List   Diagnosis Code    Acute on chronic diastolic congestive heart failure (HCC) I50.33    Suspected COVID-19 virus infection Z20.828    Type 2 diabetes mellitus without complication, without long-term current use of insulin ([address] E11.9    Left anterior fascicular block I44.4    HTN (hypertension), benign I10    Coronary artery disease involving native coronary artery of native heart without angina pectoris I25.10    Chronic diastolic congestive heart failure ([facility]) I50.32    Bilateral lower extremity edema R60.0    BMI 35.0-35.9,adult Z68.35    CHF (congestive heart failure) ([facility]) I50.9    Sepsis ([address] A41.9    Respiratory failure ([address] J96.90    SIRS (systemic inflammatory response syndrome) ([facility]) R65.10    Acute on chronic respiratory failure ([facility]) J96.20    Pneumonia J18.9    Fluid overload E87.70    ESRD (end stage renal disease) ([address] N18.6    Debility R53.81    Respiratory failure with hypoxia and hypercapnia ([facility]) J96.91, J96.92    Depression F32.9    Aspiration of foreign body T17.900A       Current Facility-Administered Medications   Medication Dose Route Frequency Provider Last Rate Last Dose    insulin glargine (LANTUS) injection 12 Units  12 Units SubCUTAneous DAILY Sherrine Baumgarten, MD   12 Units at 06/26/20 0903    acetaminophen (TYLENOL) tablet 650 mg  650 mg Oral Q4H PRN Sherrine Baumgarten, MD   325 mg at 06/24/20 1521    insulin lispro (HUMALOG) injection   SubCUTAneous AC&HS Sherrine Baumgarten, MD   Stopped at 06/25/20 2238    losartan (COZAAR) tablet 25 mg  25 mg Oral DAILY Serenity Powell   Stopped at 06/26/20 0900    heparin (porcine) 1,000 unit/mL injection 1,000 Units  1,000 Units IntraVENous DIALYSIS PRN Dena Rodriguez MD   1,000 Units at 06/24/20 1527    polyethylene glycol (MIRALAX) packet 17 g  17 g Oral DAILY PRN Cindy Healy MD        venlafaxine-[facility]) capsule 37.5 mg  37.5 mg Oral DAILY WITH BREAKFAST Maricruz Santos PA-C   Stopped at 06/26/20 0800    mirtazapine (REMERON) tablet 7.5 mg  7.5 mg Oral QHS Sara Singh MD   7.5 mg at 06/25/20 2221    pantoprazole (PROTONIX) tablet 40 mg  40 mg Oral ACB Cindy Healy MD   40 mg at 06/26/20 0706    carvediloL (COREG) tablet 25 mg  25 mg Oral BID WITH MEALS Cindy Healy MD   Stopped at 06/26/20 0800    albuterol-ipratropium (DUO-NEB) 2.5 MG-0.5 MG/3 ML  3 mL Nebulization Q6H RT Mariama Tapia MD   3 mL at 06/26/20 0724    albuterol-ipratropium (DUO-NEB) 2.5 MG-0.5 MG/3 ML  3 mL Nebulization Q4H PRN Galindo Lemon PA-C        sodium chloride 3% hypertonic nebulizer soln  4 mL Nebulization TID PRN Patsy Godoy DO        lidocaine 4 % patch 1 Patch  1 Patch TransDERmal Q24H Galindo Lemon PA-C   1 Patch at 06/26/20 3989    menthol-zinc oxide (CALMOSEPTINE) 0.44-20.6 % ointment   Topical Q6H PRN Galindo Lemon PA-C        epoetin johnathon-epbx (RETACRIT) injection 10,000 Units  10,000 Units SubCUTAneous Q MON, WED & Neil Leslie MD   10,000 Units at 06/24/20 2240    sodium chloride (NS) flush 5-40 mL  5-40 mL IntraVENous Q8H Colletta Cough, DO   10 mL at 06/26/20 0600    sodium chloride (NS) flush 5-40 mL  5-40 mL IntraVENous PRN Glenoobdulia Signs M, DO   10 mL at 06/26/20 0543    hydrALAZINE (APRESOLINE) 20 mg/mL injection 20 mg  20 mg IntraVENous Q6H PRN Stevo Alexandra PA-C   20 mg at 06/19/20 0850    ondansetron (ZOFRAN) injection 4 mg  4 mg IntraVENous Q6H PRN Madhuri Dunn MD   4 mg at 06/10/20 0030    glucose chewable tablet 16 g  4 Tab Oral PRN Abdiaziz Cartwrightig, DO        glucagon (GLUCAGEN) injection 1 mg  1 mg IntraMUSCular PRN Abdiaziz Cartwrightig, DO        dextrose (D50W) injection syrg 12.5-25 g  25-50 mL IntraVENous PRN Abdiaziz Ngo, DO   25 g at 06/13/20 0041    cyanocobalamin tablet 1,000 mcg  1,000 mcg Oral BID Jefferson Wagner MD   Stopped at 06/26/20 0900    loratadine (CLARITIN) tablet 10 mg  10 mg Oral DAILY Jefferson Wagner MD   Stopped at 06/26/20 0900    aspirin chewable tablet 81 mg  81 mg Oral DAILY Hadley Angulo MD   Stopped at 06/26/20 0900    [Held by provider] isosorbide mononitrate ER (IMDUR) tablet 30 mg  30 mg Oral DAILY Hadley Angulo MD   Stopped at 06/11/20 0900    rosuvastatin (CRESTOR) tablet 5 mg  5 mg Oral QHS Hadley Angulo MD   5 mg at 06/25/20 2219    [Held by provider] gabapentin (NEURONTIN) capsule 100 mg  100 mg Oral BID Paul MORENO MD   Stopped at 06/11/20 0900    ferrous sulfate tablet 325 mg  325 mg Oral EVERY OTHER DAY Paul MORENO MD   Stopped at 06/26/20 0800    docusate sodium (COLACE) capsule 100 mg  100 mg Oral BID PRN Jefferson Wagner MD        heparin (porcine) injection 5,000 Units  5,000 Units SubCUTAneous Q8H Hadley Angulo MD   5,000 Units at 06/26/20 0512             Objective:  Vitals:    06/26/20 0400 06/26/20 0725 06/26/20 0745 06/26/20 1008   BP: 150/63  149/53 138/66   Pulse: 65  66 68   Resp: 17  18 18   Temp: 98.3 °F (36.8 °C)  97.7 °F (36.5 °C) 97.6 °F (36.4 °C)   TempSrc:       SpO2: 96% 99% 98% 98%   Weight: 92.1 kg (203 lb)      Height:         . Intake/Output Summary (Last 24 hours) at 6/26/2020 1012  Last data filed at 6/26/2020 0919  Gross per 24 hour   Intake 1080 ml   Output 0 ml   Net 1080 ml       Admission weight:Weight: 121.6 kg (268 lb) (06/08/20 1131)    Last Weight Metrics:  Weight Loss Metrics 6/26/2020 5/13/2020 4/30/2020 4/29/2020 4/26/2020 8/18/2016 6/2/2016   Today's Wt 203 lb 238 lb 5.1 oz - 252 lb 252 lb 4.8 oz 219 lb 228 lb   BMI 33.78 kg/m2 - 39.66 kg/m2 41.93 kg/m2 41.98 kg/m2 36.44 kg/m2 37.94 kg/m2            Physical Exam:     General: No acute distress. Neck: no jvd. LUNGS: diminished air entry at the bases, bl exp rhonchi. CVS EXM: S1, S2, no murmur, rub or gallop. Abdomen: Soft, Non Tender, non distended. Lower Extremities: trace Edema. Access: rt ij tdc.      Labs:    CBC w/Diff Recent Labs     06/26/20  0555 06/25/20  0601 06/24/20  0608   WBC 10.0 11.0 11.8   RBC 3.11* 3.29* 3.13*   HGB 8.6* 9.1* 8.8*   HCT 27.7* 29.6* 28.2*    204 209        Chemistry Recent Labs     06/26/20  0555 06/25/20  0601 06/24/20  0608   * 167* 162*   * 133* 134*   K 4.1 4.0 4.4   CL 99* 99* 101   CO2 25 29 25   BUN 53* 39* 65*   CREA 4.30* 3.15* 4.76*   CA 9.1 9.1 8.7   AGAP 10 5 8   BUCR 12 12 14   PHOS  --   --  4.4          Lab Results   Component Value Date/Time    Iron 42 (L) 04/30/2020 12:41 PM    TIBC 319 04/30/2020 12:41 PM    Iron % saturation 13 (L) 04/30/2020 12:41 PM    Ferritin 411 (H) 06/10/2020 03:04 AM      Lab Results   Component Value Date/Time    Calcium 9.1 06/26/2020 05:55 AM    Phosphorus 4.4 06/24/2020 06:08 AM

## 2020-06-26 NOTE — ROUTINE PROCESS
Bedside shift change report given to Pablito FUNES RN (oncoming nurse) by David James RN (offgoing nurse). Report included the following information SBAR, Kardex, Intake/Output, MAR and Cardiac Rhythm NSR.

## 2020-06-26 NOTE — DIABETES MGMT
Glycemic Control Plan of Care    Seen patient this afternoon and noted plan for discharge/transfer to [facility] and [address] [place] [address] facility this afternoon. POC BG values on 06/25/2020: 175, 206,210,138. Increased basal lantus insulin dose from 10 to 12 units daily and cont on very resistant dose. POC BG values on 06/26/2020 at time of review:  140. Current hospital diabetes medications:  Basal lantus insulin 12 units daily  Correctional lispro insulin ACHS. Very resistant dose. Total daily dose insulin requirement previous day: 06/25/2020:  Lantus: 12 units  Lispro: 15 units  TDD insulin: 37 units    Assessment:  Patient is 71year old with PMH including type 2 diabetes mellitus, CHF, GERD, hypertension, chronic respiratory failure on home oxygen 2L, ESRD on hemodialysis, and uterine cancer - was admitted from [facility] and [address] [place] [address] facility on 06/08/2020 with report of shortness of breath. Noted:  Acute respiratory failure. Aspiration of foreign body (partial dentures) into the hypopharynx  RUL infiltrate. Fluid overload, severe edema. ESRD on hemodialysis. T2DM. Most recent blood glucose values:        Current A1C:   Lab Results   Component Value Date/Time    Hemoglobin A1c 5.8 (H) 06/25/2020 06:01 AM     This lab test reflects the patient's estimated average blood glucose of 128 mg/dL over the past 3 months. Home diabetes medications: patient reported on 06/23/2020 that she was taking Jardiance 10 mg daily at home. Noted lantus insulin 4 units daily listed prior to this hospital admission. She presented from [facility] and [address] [place] [address] on 06/08/2020. Diet: Diabetic consistent carb 1800kcal; nutr suppl: nepro with breakfast and dinner. Goals:  Blood glucose will be within target range of  mg/dL by 06/28/2020.     Education:  ___  Refer to Diabetes Education Record             _X__  Education not indicated at this time    Pravin Shellie Ramirez RN [facility]  Pager: [phone]

## 2020-06-26 NOTE — PROGRESS NOTES
SARS-COV-2 AB, TOTAL [[id number]] (Order [id number])   Lab   Date: 6/23/2020 Department: [place] [address] Cv Stepdown Released By/Authorizing: Betsey Simental MD (auto-released)   Component Value Flag Ref Range Units Status   SARS-CoV-2 Ab, Total Negative   Negative   Final   Comment:   (NOTE)   This sample does not contain detectable SARS-CoV-2 antibodies. This   negative result does not rule out SARS-CoV-2 infection. Correlation   with epidemiologic risk factors and other clinical and laboratory   findings is recommended. Serologic results should not be used as   the sole basis to diagnose or exclude recent SARS-CoV-2 infection. This test has not been FDA cleared or approved. This test has   been authorized by FDA under an Emergency Use Authorization   (EUA). This test is only authorized for the duration of the   declaration that circumstances exist justifying the authorization   of emergency use of in vitro diagnostics for detection and/or   diagnosis of COVID-19 under Section 564(b)(1) of the Act, 21   [place] 968BOC-9(T)(9), unless the authorization is terminated or   revoked sooner. This test has been authorized only for detecting   the presence of antibodies against SARS-CoV-2, not for any other   viruses or pathogens.    Performed At: [address] [id number]   Adam Miller MD [id number]    Lab and Collection     SARS-COV-2 AB, TOTAL (Order: [id number]) - 6/23/2020   Result History     SARS-COV-2 AB, TOTAL (Order #[id number]) on 6/25/2020 - Order Result History Report   6/25/2020  8:37 AM - [place], Lab In [facility]     Specimen Information     Serum        Collected: 6/23/2020 2037       Final Report Date/time     Reported date Reported time   Jun 25, 2020 08:37 EDT

## 2020-06-26 NOTE — PROGRESS NOTES
ACUTE HEMODIALYSIS FLOW SHEET    HEMODIALYSIS ORDERS: Physician: Dr. Almeida Bio: Iris   Duration: 3.5 hr  BFR: 350   DFR: 800   Dialysate:  Temp 36.0   K+   3    Ca+  2.5   Na 140   Bicarb 35   Wt Readings from Last 1 Encounters:   06/26/20 92.1 kg (203 lb)    Patient Chart [x]   Unable to Obtain []  Dry weight/UF Goal: 3500 ml  Access RIJ     Heparin []  Bolus    Units    [] Hourly    Units    [x]None      Catheter locking solution Heparin 1:1000   Pre BP:   153/62    Pulse:  69   Respirations: 18    Temperature:  98.4    Tx: NSS   ml/Bolus   [x] N/A   Labs: []  Pre  []  Post:   [x] N/A   Additional Orders(medications, blood products, hypotension management): [] Yes   [x] No     [x]  [facility] Consent Verified     CATHETER ACCESS:  []N/A   [x]Right   []Left   [x]IJ     []Fem   []Chest wall   [] First use X-ray verified     [x]Tunnel    [] Non Tunneled   [x]No S/S infection  []Redness  []Drainage []Cultured []Swelling []Pain   [x]Medical Aseptic Prep Utilized   []Dressing Changed  [] Biopatch  Date: 6/22/20   []Clotted   [x]Patent   Flows: [x]Good  []Poor  []Reversed   If access problem,  notified: []Yes    [x]N/A        GRAFT/FISTULA ACCESS:   [x]N/A     []Right     []Left     []UE     []LE   []AVG   []AVF     []Buttonhole    []Medical Aseptic Prep Utilized   []No S/S infection  []Redness  []Drainage []Cultured  [] Swelling  [] Pain  Bruit:   [] Strong    [] Weak       Thrill :   [] Strong    [] Weak     Needle Gauge: 15   Length: 1 inch   If access problem,  notified: []Yes     [x]N/A     Please describe access if present and not used: N/A       GENERAL ASSESSMENT:    LUNGS:   SaO2%      [] Clear  [x] Coarse  [] Crackles  [] Wheezing                                                [] Diminished     Location : []RLL   []LLL    []RUL  []KATIE   Cough: []Productive  []Dry  [x]N/A   Respirations:  [x]Easy  []Labored   Therapy:  []RA  [x]NC 2/min    Mask: []NRB  [] Venti    O2% []Ventilator  []Intubated  [] Trach  [] BiPaP   CARDIAC: []Regular      [x] Irregular   [] Pericardial Rub  [] JVD          []  Monitored  [] Bedside  [] Remotely monitored     EDEMA: [] None  [x]Generalized  [] Pitting [] 1    [] 2    [] 3    [] 4                 [] Facial  [] Pedal  []  UE  [] LE   SKIN:   [x] Warm  [] Hot     [] Cold   [x] Dry     [] Pale   [] Diaphoretic                  [] Flushed  [] Jaundiced  [] Cyanotic  [] Rash  [] Weeping   LOC:    [x] Alert      [x]Oriented:    [x] Person     [x] Place  [x]Time               [] Confused  [] Lethargic  [] Medicated  [] Non-responsive   GI / ABDOMEN:                     [] Flat    [] Distended    [x] Soft    [] Firm   []  Obese                   [] Diarrhea  [x] Bowel Sounds  [] Nausea  [] Vomiting     / URINE ASSESSMENT:                   [] Voiding   [] Oliguria  [x] Anuria   []  Lopez                  [] Incontinent  []  Incontinent Brief []  Fecal Management System     PAIN:  [x] 0 []1  []2   []3   []4   []5   []6   []7   []8   []9   []10            Scale 0-10  Action/Follow Up:    MOBILITY:  [x] Bed    [] Stretcher      All Vitals and Treatment Details on Attached [address]: [facility]          Room # 420/01   [] 1st Time Acute      [] Stat       [x] Routine      [] Urgent     [x] Acute Room  []  Bedside  [] ICU/CCU  [] ER   Isolation Precautions:  [x] Dialysis    There are currently no Active Isolations       ALLERGIES:     Allergies   Allergen Reactions    Augmentin [Amoxicillin-Pot Clavulanate] Diarrhea    Clavulanic Acid Diarrhea    Levaquin [Levofloxacin] Other (comments)     Severe hypoglycemia        Code Status:  Full Code     Hepatitis Status     Lab Results   Component Value Date/Time    Hepatitis B surface Ag <0.10 06/09/2020 04:03 AM    Hepatitis B surface Ab <3.10 (L) 06/09/2020 04:03 AM        Current Labs:      Lab Results   Component Value Date/Time    WBC 10.0 06/26/2020 05:55 AM    HGB 8.6 (L) 06/26/2020 05:55 AM HCT 27.7 (L) 06/26/2020 05:55 AM    PLATELET 845 00/26/1181 05:55 AM    MCV 89.1 06/26/2020 05:55 AM     Lab Results   Component Value Date/Time    Sodium 134 (L) 06/26/2020 05:55 AM    Potassium 4.1 06/26/2020 05:55 AM    Chloride 99 (L) 06/26/2020 05:55 AM    CO2 25 06/26/2020 05:55 AM    Anion gap 10 06/26/2020 05:55 AM    Glucose 143 (H) 06/26/2020 05:55 AM    BUN 53 (H) 06/26/2020 05:55 AM    Creatinine 4.30 (H) 06/26/2020 05:55 AM    BUN/Creatinine ratio 12 06/26/2020 05:55 AM    GFR est AA 12 (L) 06/26/2020 05:55 AM    GFR est non-AA 10 (L) 06/26/2020 05:55 AM    Calcium 9.1 06/26/2020 05:55 AM          DIET:  DIET NUTRITIONAL SUPPLEMENTS  DIET DIABETIC WITH OPTIONS      PRIMARY NURSE REPORT:   Pre Dialysis: PAVEL Simmons RN     Time: 1988        EDUCATION:    [x] Patient [] Other           Knowledge Basis: []None [x]Minimal [] Substantial   Barriers to learning  [x]N/A   [] Access Care     [] S&S of infection  [] Fluid Management  [] K+   [x] Procedural    []Albumin   [] Medications   [] Tx Options   [] Transplant   [] Diet   [] Other   Teaching Tools:  [x] Explain  [] Demo  [] Handouts [] Video  Patient response: [x] Verbalized understanding  [] Teach back  [] Return demonstration   [] Requires follow up      [x]Time Out/Safety Check  [x] Extracorporeal Circuit Tested for integrity       RO/HEMODIALYSIS MACHINE SAFETY CHECKS  Before each treatment:     Machine Number:                   [facility]                                    [x] Unit Machine # 7 with centralized RO                                                                                                         Alarm Test:  Pass time 8511            [x] RO/Machine Log Complete    Machine Temp    36.0             Dialysate: pH  7.4    Conductivity: Meter 14.0     HD Machine  14.0      TCD: 13.9  Dialyzer Lot # [id number]     Blood Tubing Lot # [id number]     Saline Lot # [id number]     CHLORINE TESTING-Before each treatment and every 4 hours Total Chlorine: [x] less than 0.1 ppm  Initial Time Check: 0900       4 Hr/2nd Check Time: 1300   (if greater than 0.1 ppm from Primary then every 30 minutes from Secondary)     TREATMENT INITIATION  with Dialysis Precautions:   [x] All Connections Secured              [x] Saline Line Double Clamped   [x] Venous Parameters Set               [x] Arterial Parameters Set    [x] Prime Given 250ml NSS              [x]Air Foam Detector Engaged      Treatment Initiation Note:  9558 Pt arrived to HD without any complaints. Pt A &O X 3, follows commands, no distress noted       During Treatment Notes:  1054  CVC assessed no abnormalities noted, line patent with good flow. CVC accessed without any difficulty, pt tolerated well. Vascular access visible with arterial and venous line connections intact. 1200  Vascular access visible with arterial and venous line connections intact. 1300  Vascular access visible with arterial and venous line connections intact. Medication Dose Volume Route Time [facility] Nurse, Title   none     Harriet Arboleda RN     Post Assessment  Dialyzer Cleared:[] Good [x] Fair  [] Poor  Blood processed:  61.8 L  UF Removed:  3400 Ml  Post /50  Pulse  70 Resp  20   Temp 97.7    Post Tx Vascular Access: [x] N/A            CVC Catheter: [] N/A  Locking solution: Heparin 1:1000 U  Arterial port 1.6 ml   Venous port 1.6ml         Skin:[x] Warm  [x] Dry [] Diaphoretic               [] Flushed  [] Pale [] Cyanotic   Pain:  [x]0  []1 []2  []3 []4  []5  []6 []7 []8  []9  []10       Post Treatment Note:   2228  HD completed at this time, pt tolerated well. Dressing clean, dry and intact. POST TREATMENT PRIMARY NURSE HANDOFF REPORT:   Post Dialysis: PAVEL Simmons RN                Time:  6522       Abbreviations: AVG-arterial venous graft, AVF-arterial venous fistula, IJ-Internal Jugular, Subcl-Subclavian, Fem-Femoral, Tx-treatment, AP/HR-apical heart rate, DFR-dialysate flow rate, BFR-blood flow rate, AP-arterial pressure, -venous pressure, UF-ultrafiltrate, TMP-transmembrane pressure, Austyn-Venous, Art-Arterial, RO-Reverse Osmosis

## 2020-06-26 NOTE — PROGRESS NOTES
Patient unable to understand and/or complete the \"Important Message from [address]". Reviewed document with patient's representative Carry at phone number [phone] telephonically and addressed questions. A copy of the IMM letter left at bedside with patient. Original, with verbal signature noted, placed in patient's chart.

## 2020-06-26 NOTE — PROGRESS NOTES
Transition of Care Plan to SNF/Rehab    SNF/Rehab Transition:  Patient has been accepted to [facility] and rehab and meets criteria for admission. Patient will  be transported by [facility] and expected to leave at 6:30pm.    Communication to Patient/Family:  Met with patient and left several message for daughter, and spoke with son-in-law, and they are agreeable to the transition plan. Communication to SNF/Rehab:  Bedside RN, has been notified of the transition plan to the facility and to call report (phone number [phone]). Discharge information has been updated on the AVS. And communicated to facility via [facility], or CC link. SNF/Rehab Transition:    PCP/Specialist: Davide Nye Please include all hard scripts for controlled substances, med rec and dc summary, and AVS in packet.      Reviewed and confirmed with facility representative, Tona  at [facility] health and rehab they can manage the patient care needs for the following:     Jr Furnace with (X) only those applicable:    Medication:  [x]  Medications will be available at the facility  []  IV Antibiotics   [x]  Controlled Substance - hard copy to be sent with patient   []  Weekly Labs    Documents:  [x] Hard RX  Number sent 1  [] MAR  [] Kardex  [] AVS  [] Wound care note  [x]Transfer Summary/Discharge Summary    Equipment:  []  CPAP/BiPAP  []  Wound Vacuum  []  Lopez or Urinary Device  []  PICC/Central Line  []  Nebulizer  []  Ventilator    Treatment:  []Isolation (for MRSA, VRE, etc.)  []Surgical Drain Management  []Tracheostomy Care  []Dressing Changes  [x]Dialysis with transportation and chair time 5:50am [facility] [employer]  Mode of tranpsort [facility] standing order  []PEG Care   [x]Oxygen  []Daily Weights for Heart Failure    Dietary:  []Any diet limitations  []Tube Feedings   []Total Parenteral Management (TPN)    Eligible for Medicaid Long Term Services and Supports  Yes:  [] Eligible for medical assistance or will become eligible within 180 days and LTSS completed. [x] Provider/Patient and/or support system has requested screening.  [] LTSS copy provided to patient or responsible party, .  [] LTSS unavailable at discharge will send once processed to SNF provider.  [] LTSS  unavailable at discharged mailed to patient  [] LTSS performed by outside agency  on  with tracking number   No:   [] Private pay and is not financially eligible for Medicaid within the next 180 days. [] Reside out-of-state. [] A residents of a state owned/operated facility that is licensed  by [facility] or [facility]  [] Enrollment in [facility] hospice services  [] [address]  [] Patient /Family declines to have screening completed or provide financial information for screening       Patients family is in the middle of going through patient's finances in order to either apply for medicaid or liquidate assets to pay for long term care if needed either at facility or at home. LTSS completed and facility has a copy of this.    Financial Resources:  Medicaid    [] Initiated and application pending   [] Full coverage   See above   Advanced Care Plan:  []Surrogate Decision Maker of Care  []POA  [x]Communicated Code Status/ sent FULL  Step-murray Paola patrick,is closest kin , [phone]   Other      AIME Wild  Case Management  [phone]

## 2020-06-26 NOTE — ROUTINE PROCESS
Pt is alert and denies pain at this time. She remains on 3L nasal cannula with SpO2 at 97%. I.V. access to left and right ac are intact and patent. She is incontinent to both bowel and bladder.

## 2020-06-26 NOTE — PROGRESS NOTES
Pt's nurse Robe Peña aware transport will  pt at 6:30pm to [address] [facility].         DUANE CruzN RN  Care Management  Pager: [phone]

## 2020-06-26 NOTE — PROGRESS NOTES
Pt just transferred to 4S from CVT stepdown. Discussed with AARON Youssef. Pt is being discharged to [address] [place] [address] today after dialysis.         DUANE HirschN RN  Care Management  Pager: [phone]

## 2020-06-26 NOTE — PROGRESS NOTES
Send out Jennifer collected on 6/23/2020, results came back on 6/25/2020 negative, patient can transition back to [facility] and rehab today after dialysis. Set for 10am dialysis. [facility] has a bed for patient, obtaining authorization from [facility] Medicare. Transport tentatively set up for 6:30pm with [facility]. Spoke with Minnie Benitez with [employer] regarding re-setting up standing order for Medicare stretcher transport to and from dialysis, MWF  5:15am return 9:50am [employer] (confirmed patient's chairtime and return on Monday with [facility]). New standing order form faxed in, transport will resume on Monday from SNF to dialysis round trip. Left message for daughter/(step-daughter) Carry Long to inform of room change and discharge today.            AIME Barragan  Case Management  [phone]

## 2020-06-26 NOTE — PROGRESS NOTES
[facility] - Progress Note  Admit Date: 6/7/2020    Assessment:     Hospital Problems  Date Reviewed: 4/23/2020          Codes Class Noted POA    ESRD (end stage renal disease) ([facility] [address] ICD-10-CM: N18.6  ICD-9-CM: 585.6  6/20/2020 Unknown        Debility ICD-10-CM: R53.81  ICD-9-CM: 799.3  6/20/2020 Unknown        Respiratory failure with hypoxia and hypercapnia ([facility] [address] ICD-10-CM: J96.91, J96.92  ICD-9-CM: 518.81  6/20/2020 Unknown        Depression ICD-10-CM: F32.9  ICD-9-CM: 326  6/20/2020 Unknown        Aspiration of foreign body ICD-10-CM: T17.900A  ICD-9-CM: 933.1  6/20/2020 Unknown        * (Principal) Fluid overload ICD-10-CM: E87.70  ICD-9-CM: 276.69  6/10/2020 Unknown        Pneumonia ICD-10-CM: J18.9  ICD-9-CM: 058  6/8/2020 Unknown        CHF (congestive heart failure) (HCC) ICD-10-CM: I50.9  ICD-9-CM: 428.0  4/25/2020 Yes        Acute on chronic diastolic congestive heart failure (HCC) ICD-10-CM: I50.33  ICD-9-CM: 428.33, 428.0  4/23/2020 Yes               -Acute on chronic respiratory failure. Was intubated and extubated on 6/15/2020 and slowly improving.   -Sepsis, aspiration PNA. -Acute on chronic HFpEF. EF 55-60% by echo 4/25/20 and now 45-50% on this admission. -OFE on CKD.  Started on HD last admission. Patient uncertain if she continued dialysis while at home.   -Acute on chronic anemia.  -Hypertension. Elevated on admission. Low during HD.  -Diabetes mellitus. HgbA1c 6.1.  -Dyslipidemia. On statin.  -Coronary disease reportedly diffuse medically managed, nuclear stress 3/2018 with small area of ischemia with EF 53%  -Chronic atypical LBBB. -COVID negative 4/30/2020.     Primary cardiologist Dr. Ignacia Mendosa at [facility], now plans to follow up with Dr. Shefali Del Rosario:     Independently seen and evaluated. Agree with below. No new cardiac recommendations at this time. Outpatient follow-up can be arranged once discharged from rehab. Plan for discharge to rehab today.   Continue volume management per nephrology. Continue medical management from cardiac standpoint. Continued on ASA, statin. Continued on coreg. Continued on cozaar. No further cardiac work up. Subjective:     No new complaints. Objective:      Patient Vitals for the past 8 hrs:   Temp Pulse Resp BP SpO2   06/26/20 1008 97.6 °F (36.4 °C) 68 18 138/66 98 %   06/26/20 0745 97.7 °F (36.5 °C) 66 18 149/53 98 %   06/26/20 0725     99 %   06/26/20 0400 98.3 °F (36.8 °C) 65 17 150/63 96 %         Patient Vitals for the past 96 hrs:   Weight   06/26/20 0400 92.1 kg (203 lb)   06/25/20 0442 94.6 kg (208 lb 9.6 oz)   06/24/20 0428 101.6 kg (224 lb)   06/23/20 0402 96.1 kg (211 lb 14.4 oz)                    Intake/Output Summary (Last 24 hours) at 6/26/2020 1143  Last data filed at 6/26/2020 0919  Gross per 24 hour   Intake 1080 ml   Output 0 ml   Net 1080 ml       Physical Exam:  General:  alert, cooperative, no distress, appears stated age  Neck:  no JVD  Lungs:  clear to auscultation bilaterally  Heart:  regular rate and rhythm  Abdomen:  abdomen is soft without significant tenderness, masses, organomegaly or guarding  Extremities:  extremities normal, atraumatic, no cyanosis or edema    Data Review:     Labs: Results:       Chemistry Recent Labs     06/26/20  0555 06/25/20  0601 06/24/20  0608   * 167* 162*   * 133* 134*   K 4.1 4.0 4.4   CL 99* 99* 101   CO2 25 29 25   BUN 53* 39* 65*   CREA 4.30* 3.15* 4.76*   CA 9.1 9.1 8.7   MG  --   --  1.7   PHOS  --   --  4.4   AGAP 10 5 8   BUCR 12 12 14      CBC w/Diff Recent Labs     06/26/20  0555 06/25/20  0601 06/24/20  0608   WBC 10.0 11.0 11.8   RBC 3.11* 3.29* 3.13*   HGB 8.6* 9.1* 8.8*   HCT 27.7* 29.6* 28.2*    204 209      Cardiac Enzymes No results found for: CPK, CK, CKMMB, CKMB, RCK3, CKMBT, CKNDX, CKND1, ANDER, TROPT, TROIQ, MINH, TROPT, TNIPOC, BNP, BNPP   Coagulation No results for input(s): PTP, INR, APTT, INREXT in the last 72 hours. Lipid Panel Lab Results   Component Value Date/Time    Cholesterol, total 182 04/24/2020 02:34 AM    HDL Cholesterol 41 04/24/2020 02:34 AM    LDL, calculated 112.8 (H) 04/24/2020 02:34 AM    VLDL, calculated 28.2 04/24/2020 02:34 AM    Triglyceride 141 04/24/2020 02:34 AM    CHOL/HDL Ratio 4.4 04/24/2020 02:34 AM      BNP No results found for: BNP, BNPP, XBNPT   Liver Enzymes No results for input(s): TP, ALB, TBIL, AP in the last 72 hours.     No lab exists for component: SGOT, GPT, DBIL   Digoxin    Thyroid Studies Lab Results   Component Value Date/Time    TSH 1.36 06/16/2020 06:28 AM          Signed By: MICHAEL Camacho     June 26, 2020

## 2020-06-30 ENCOUNTER — HOME CARE VISIT (OUTPATIENT)
Dept: HOME HEALTH SERVICES | Facility: HOME HEALTH | Age: 70
End: 2020-06-30

## 2020-07-06 ENCOUNTER — PATIENT OUTREACH (OUTPATIENT)
Dept: CASE MANAGEMENT | Age: 70
End: 2020-07-06

## 2020-07-06 NOTE — PROGRESS NOTES
Community Care Team Documentation for Patient in [facility]     Patient discharged from [facility]  to [facility], on 5/14/20. She was readmitted to [facility] on 6/7/20 and discharged back to [address] [place] [address] on 6/26/20. Hospital Discharge diagnosis per Dr. Avery Petit:       1. Acute on chronic hypoxic respiratory failure    2. Acute on chronic HFpEF - EF 45- 50 %   3. DM2    4. ESRD on HD - Dr Mildred Willingham    5. Bacteremia - ? Contamination   6. HTN   7. FB - Inhalation - denture    8. Aspiration Pneumonia    9. Anemia of chronic illness    10. COVID Negative       SNF Attending Provider:  Juan Juarez    Anticipated discharge date from SNF: n/a, pt will transition to LTC at facility. PCP : Ira Lynne MD    [facility] Team rounds completed, updates provided by facility. Brief Summary of Care:    Patient receiving PT/OT. Minimal progress. Dispo:  Family is working on [facility] -She will transition to LTC once Medicaid approved. RRAT:  Medium Risk            17       Total Score        4 IP Visits Last 12 Months (1-3=4, 4=9, >4=11)    13 Charlson Comorbidity Score (Age + Comorbid Conditions)        Criteria that do not apply:    Has Seen PCP in Last 6 Months (Yes=3, No=0)    . Living with Significant Other. Assisted Living. LTAC. SNF. or   Rehab    Patient Length of Stay (>5 days = 3)    Pt.  Coverage (Medicare=5 , Medicaid, or Self-Pay=4)        Active Ambulatory Problems     Diagnosis Date Noted    Acute on chronic diastolic congestive heart failure ([address] 04/23/2020    Suspected COVID-19 virus infection 04/23/2020    Type 2 diabetes mellitus without complication, without long-term current use of insulin ([address] 07/06/2018    Left anterior fascicular block 07/06/2018    HTN (hypertension), benign 07/06/2018    Coronary artery disease involving native coronary artery of native heart without angina pectoris 07/06/2018    Chronic diastolic congestive heart failure ([address] 07/06/2018    Bilateral lower extremity edema 07/06/2018    BMI 35.0-35.9,adult 07/06/2018    CHF (congestive heart failure) ([address] 04/25/2020    Sepsis ([facility] 04/30/2020    Respiratory failure ([facility] 04/30/2020    SIRS (systemic inflammatory response syndrome) ([facility] 04/30/2020    Acute on chronic respiratory failure ([facility] 05/09/2020    Pneumonia 06/08/2020    Fluid overload 06/10/2020    ESRD (end stage renal disease) ([facility] 06/20/2020    Debility 06/20/2020    Respiratory failure with hypoxia and hypercapnia ([facility] 06/20/2020    Depression 06/20/2020    Aspiration of foreign body 06/20/2020     Resolved Ambulatory Problems     Diagnosis Date Noted    No Resolved Ambulatory Problems     Past Medical History:   Diagnosis Date    Arthritis     Cancer ([facility]     Diabetes ([facility]     Fracture of neck ([facility]     GERD (gastroesophageal reflux disease)     Goiter     Hiatal hernia     Hypertension     Uterine cancer ([address]

## 2020-07-09 ENCOUNTER — PATIENT OUTREACH (OUTPATIENT)
Dept: CASE MANAGEMENT | Age: 70
End: 2020-07-09

## 2020-07-09 NOTE — PROGRESS NOTES
Community Care Team Documentation for Patient in [facility]     Patient discharged from [facility]  to [facility], on 5/14/20. She was readmitted to [facility] on 6/7/20 and discharged back to [address] [place] [address] on 6/26/20. Hospital Discharge diagnosis per Dr. Ginger Calix:       1. Acute on chronic hypoxic respiratory failure    2. Acute on chronic HFpEF - EF 45- 50 %   3. DM2    4. ESRD on HD - Dr Hero Hernandez    5. Bacteremia - ? Contamination   6. HTN   7. FB - Inhalation - denture    8. Aspiration Pneumonia    9. Anemia of chronic illness    10. COVID Negative       SNF Attending Provider:  Morgan Russell    Anticipated discharge date from SNF: LCD 7/10     PCP : Blaine Dickens MD    [facility] Team rounds completed, updates provided by facility. Brief Summary of Care:    Patient receiving PT/OT. Minimal progress. Dispo:  Family has now decided to take her home with them to [facility].  LCD is 7/10/20  HH will be ordered once she is settled in [place]. RRAT:  Medium Risk            17       Total Score        4 IP Visits Last 12 Months (1-3=4, 4=9, >4=11)    13 Charlson Comorbidity Score (Age + Comorbid Conditions)        Criteria that do not apply:    Has Seen PCP in Last 6 Months (Yes=3, No=0)    . Living with Significant Other. Assisted Living. LTAC. SNF. or   Rehab    Patient Length of Stay (>5 days = 3)    Pt.  Coverage (Medicare=5 , Medicaid, or Self-Pay=4)        Active Ambulatory Problems     Diagnosis Date Noted    Acute on chronic diastolic congestive heart failure ([address] 04/23/2020    Suspected COVID-19 virus infection 04/23/2020    Type 2 diabetes mellitus without complication, without long-term current use of insulin ([address] 07/06/2018    Left anterior fascicular block 07/06/2018    HTN (hypertension), benign 07/06/2018    Coronary artery disease involving native coronary artery of native heart without angina pectoris 07/06/2018    Chronic diastolic congestive heart failure ([facility] 07/06/2018    Bilateral lower extremity edema 07/06/2018    BMI 35.0-35.9,adult 07/06/2018    CHF (congestive heart failure) ([facility] 04/25/2020    Sepsis ([facility] 04/30/2020    Respiratory failure ([facility] 04/30/2020    SIRS (systemic inflammatory response syndrome) ([facility] 04/30/2020    Acute on chronic respiratory failure ([facility] 05/09/2020    Pneumonia 06/08/2020    Fluid overload 06/10/2020    ESRD (end stage renal disease) ([facility] 06/20/2020    Debility 06/20/2020    Respiratory failure with hypoxia and hypercapnia ([facility] 06/20/2020    Depression 06/20/2020    Aspiration of foreign body 06/20/2020     Resolved Ambulatory Problems     Diagnosis Date Noted    No Resolved Ambulatory Problems     Past Medical History:   Diagnosis Date    Arthritis     Cancer ([facility]     Diabetes ([facility]     Fracture of neck ([facility]     GERD (gastroesophageal reflux disease)     Goiter     Hiatal hernia     Hypertension     Uterine cancer ([address]

## 2020-07-15 ENCOUNTER — TELEPHONE (OUTPATIENT)
Dept: VASCULAR SURGERY | Age: 70
End: 2020-07-15

## 2020-07-15 NOTE — TELEPHONE ENCOUNTER
Patient has been called several times since may.  A tickler has been put into the system for patient to call office back

## 2020-07-15 NOTE — TELEPHONE ENCOUNTER
----- Message from Que Chaudhry MD sent at 5/10/2020  3:59 PM EDT -----  She will need vein mapping and scheduling for permanent access

## 2020-07-24 ENCOUNTER — HOME HEALTH ADMISSION (OUTPATIENT)
Dept: HOME HEALTH SERVICES | Facility: HOME HEALTH | Age: 70
End: 2020-07-24

## 2020-07-24 ENCOUNTER — HOSPITAL ENCOUNTER (EMERGENCY)
Age: 70
Discharge: HOME | End: 2020-07-25
Attending: EMERGENCY MEDICINE
Payer: MEDICARE

## 2020-07-24 VITALS
BODY MASS INDEX: 33.66 KG/M2 | SYSTOLIC BLOOD PRESSURE: 138 MMHG | HEIGHT: 65 IN | OXYGEN SATURATION: 98 % | HEART RATE: 69 BPM | TEMPERATURE: 97.9 F | RESPIRATION RATE: 18 BRPM | DIASTOLIC BLOOD PRESSURE: 61 MMHG | WEIGHT: 202 LBS

## 2020-07-24 DIAGNOSIS — Z00.00 WELL ADULT HEALTH CHECK: Primary | ICD-10-CM

## 2020-07-24 PROCEDURE — 99284 EMERGENCY DEPT VISIT MOD MDM: CPT

## 2020-07-24 NOTE — DISCHARGE INSTRUCTIONS
Patient Education        Well Visit, Over 72: Care Instructions  Your Care Instructions     Physical exams can help you stay healthy. Your doctor has checked your overall health and may have suggested ways to take good care of yourself. He or she also may have recommended tests. At home, you can help prevent illness with healthy eating, regular exercise, and other steps. Follow-up care is a key part of your treatment and safety. Be sure to make and go to all appointments, and call your doctor if you are having problems. It's also a good idea to know your test results and keep a list of the medicines you take. How can you care for yourself at home? · Reach and stay at a healthy weight. This will lower your risk for many problems, such as obesity, diabetes, heart disease, and high blood pressure. · Get at least 30 minutes of exercise on most days of the week. Walking is a good choice. You also may want to do other activities, such as running, swimming, cycling, or playing tennis or team sports. · Do not smoke. Smoking can make health problems worse. If you need help quitting, talk to your doctor about stop-smoking programs and medicines. These can increase your chances of quitting for good. · Protect your skin from too much sun. When you're outdoors from 10 a.m. to 4 p.m., stay in the shade or cover up with clothing and a hat with a wide brim. Wear sunglasses that block UV rays. Even when it's cloudy, put broad-spectrum sunscreen (SPF 30 or higher) on any exposed skin. · See a dentist one or two times a year for checkups and to have your teeth cleaned. · Wear a seat belt in the car. Follow your doctor's advice about when to have certain tests. These tests can spot problems early. For men and women  · Cholesterol. Your doctor will tell you how often to have this done based on your overall health and other things that can increase your risk for heart attack and stroke. · Blood pressure.  Have your blood pressure checked during a routine doctor visit. Your doctor will tell you how often to check your blood pressure based on your age, your blood pressure results, and other factors. · Diabetes. Ask your doctor whether you should have tests for diabetes. · Vision. Experts recommend that you have yearly exams for glaucoma and other age-related eye problems. · Hearing. Tell your doctor if you notice any change in your hearing. You can have tests to find out how well you hear. · Colon cancer tests. Keep having colon cancer tests as your doctor recommends. You can have one of several types of tests. · Heart attack and stroke risk. At least every 4 to 6 years, you should have your risk for heart attack and stroke assessed. Your doctor uses factors such as your age, blood pressure, cholesterol, and whether you smoke or have diabetes to show what your risk for a heart attack or stroke is over the next 10 years. · Osteoporosis. Talk to your doctor about whether you should have a bone density test to find out whether you have thinning bones. Ask your doctor if you need to take a calcium plus vitamin D supplement. You may be able to get enough calcium and vitamin D through your diet. For women  · Pap test and pelvic exam. You may no longer need a Pap test. Talk with your doctor about whether to stop or continue to have Pap tests. · Breast exam and mammogram. Ask how often you should have a mammogram, which is an X-ray of your breasts. A mammogram can spot breast cancer before it can be felt and when it is easiest to treat. · Thyroid disease. Talk to your doctor about whether to have your thyroid checked as part of a regular physical exam. Women have an increased chance of a thyroid problem. For men  · Prostate exam. Talk to your doctor about whether you should have a blood test (called a PSA test) for prostate cancer.  Experts recommend that you discuss the benefits and risks of the test with your doctor before you decide whether to have this test. Some experts say that men ages 79 and older no longer need testing. · Abdominal aortic aneurysm. Ask your doctor whether you should have a test to check for an aneurysm. You may need a test if you ever smoked or if your parent, brother, sister, or child has had an aneurysm. When should you call for help? Watch closely for changes in your health, and be sure to contact your doctor if you have any problems or symptoms that concern you. Where can you learn more? Go to http://mechelle-darline.info/  Enter [id number] in the search box to learn more about \"Well Visit, Over 65: Care Instructions. \"  Current as of: August 22, 2019               Content Version: 12.5  © 0115-8246 Healthwise, Incorporated. Care instructions adapted under license by [employer] (which disclaims liability or warranty for this information). If you have questions about a medical condition or this instruction, always ask your healthcare professional. [place] [address] any warranty or liability for your use of this information.

## 2020-07-24 NOTE — ED NOTES
Pt resting and stable at this time. Report was taken from Dr. Mya Hebert on plan of care. Pt has no medical complaint. Pt arrived after being discharged from rehab and when she returned home found that her house had been condemned. Family is planning on taking patient to [place] to live with them at some point tomorrow. Pt was talking with family on her cell alannah. No issues or needs at this time. Pt is positioned by nurse's station.

## 2020-07-24 NOTE — ED PROVIDER NOTES
EMERGENCY DEPARTMENT HISTORY AND PHYSICAL EXAM    2:41 PM      Date: 7/24/2020  Patient Name: Remigio Scott    History of Presenting Illness     Chief Complaint   Patient presents with    Other         History Provided By: Patient    Additional History (Context): Remigio Scott is a 79 y.o. female with Past medical history of end-stage renal disease, GERD, arthritis who presents with complaint of not being able to get into her home because it has been condemned. Patient states that she was saddend to find this out. Patient states that she was at [facility] and was to go home today. She states she has been in rehab for quite some time and went to get her dialysis today and completed it. Gets dialysis Monday Wednesday Friday. She was then transported to her home to find that it has been condemned. No chest pain, shortness of breath, cough, fever, depression, and no complaint otherwise. Her nephrologist is Dr. Bridgett Severe    PCP: Evangelist Murrieta MD        Past History     Past Medical History:  Past Medical History:   Diagnosis Date    Arthritis     Cancer [facility])     CHF (congestive heart failure) ([address]     Diabetes [facility]     Fracture of neck ([facility]     GERD (gastroesophageal reflux disease)     Goiter     Hiatal hernia     Hypertension     Uterine cancer ([facility]        Past Surgical History:  Past Surgical History:   Procedure Laterality Date    HX APPENDECTOMY      HX HYSTERECTOMY      HX KNEE REPLACEMENT Right     HX ORTHOPAEDIC      odontoid fracture repair with hardware placement.  HX PARTIAL THYROIDECTOMY      WA INSJ TUNNELED CVC W/O SUBQ PORT/ AGE 5 YR/> Right 5/7/2020    INSERTION TUNNELED CENTRAL VENOUS CATHETER performed by Slade Bridges MD at [address] [place] [address]       Family History:  No family history on file. Social History:  Social History     Tobacco Use    Smoking status: Never Smoker   Substance Use Topics    Alcohol use: No    Drug use:  No Allergies: Allergies   Allergen Reactions    Augmentin [Amoxicillin-Pot Clavulanate] Diarrhea    Clavulanic Acid Diarrhea    Levaquin [Levofloxacin] Other (comments)     Severe hypoglycemia           Review of Systems       Review of Systems   Constitutional: Negative for chills and fever. HENT: Negative for congestion, rhinorrhea, sore throat and trouble swallowing. Eyes: Negative for visual disturbance. Respiratory: Negative for cough, shortness of breath and wheezing. Cardiovascular: Negative for chest pain. Gastrointestinal: Negative for abdominal pain, nausea and vomiting. Endocrine: Negative for polyuria. Genitourinary: Negative for dysuria. Musculoskeletal: Negative for arthralgias and neck stiffness. Skin: Negative for pallor and rash. Neurological: Negative for dizziness, weakness, numbness and headaches. Hematological: Does not bruise/bleed easily. Psychiatric/Behavioral: Negative for confusion and dysphoric mood. All other systems reviewed and are negative. Physical Exam     Visit Vitals  /61 (BP 1 Location: Left arm, BP Patient Position: Supine)   Pulse 69   Temp 97.9 °F (36.6 °C)   Resp 18   Ht 5' 5\" (1.651 m)   Wt 91.6 kg (202 lb)   SpO2 98%   BMI 33.61 kg/m²         Physical Exam  Vitals signs and nursing note reviewed. Constitutional:       General: She is not in acute distress. Appearance: She is well-developed. She is not ill-appearing, toxic-appearing or diaphoretic. HENT:      Head: Normocephalic and atraumatic. Eyes:      General: No scleral icterus. Conjunctiva/sclera: Conjunctivae normal.      Pupils: Pupils are equal, round, and reactive to light. Neck:      Musculoskeletal: Normal range of motion and neck supple. Cardiovascular:      Rate and Rhythm: Normal rate. Pulses: Normal pulses. Heart sounds: No gallop.        Comments: Capillary refill < 3 seconds  Pulmonary:      Effort: Pulmonary effort is normal. No respiratory distress. Breath sounds: Normal breath sounds. No wheezing. Comments: Right-sided chest wall dialysis catheter looks good and clean  Abdominal:      General: Bowel sounds are normal. There is no distension. Palpations: Abdomen is soft. Tenderness: There is no abdominal tenderness. Musculoskeletal: Normal range of motion. General: No tenderness. Right lower leg: No edema. Left lower leg: No edema. Skin:     General: Skin is warm and dry. Coloration: Skin is not jaundiced or pale. Neurological:      General: No focal deficit present. Mental Status: She is alert and oriented to person, place, and time. Cranial Nerves: No cranial nerve deficit. Motor: No weakness. Psychiatric:         Thought Content: Thought content normal.           Diagnostic Study Results     Labs -  No results found for this or any previous visit (from the past 12 hour(s)). Radiologic Studies -   No orders to display         Medical Decision Making   I am the first provider for this patient. I reviewed the vital signs, available nursing notes, past medical history, past surgical history, family history and social history. Vital Signs-Reviewed the patient's vital signs. Records Reviewed: Nursing Notes and Old Medical Records (Time of Review: 2:41 PM)    Provider Notes (Medical Decision Making): We will consult case management    MDM    Medications - No data to display        ED Course: Progress Notes, Reevaluation, and Consults:  Patient has been seen by case management    4:18 PM  RN Snehal Caballero notified me that APS is here interviewing patient and may be able to send her to [address] [place] [address]. APS will get back to us with a plan. Collette, , states that he has had no response yet from [address] [place] [address]. She states patient will likely be here in the ED over the weekend until can determine where she can go.     Reassessed patient and she is in no distress, no complaint. Resting comfortably. 10:20 PM : Pt care transferred to  Dr. Deondre Alvarez provider. History of patient complaint(s), available diagnostic reports and current treatment plan has been discussed thoroughly. Bedside rounding on patient occured : yes . Intended disposition of patient : TBD  Pending diagnostics reports and/or labs (please list): Case management is working on placement          Diagnosis     Clinical Impression:   1. Well adult health check        Disposition: Pending    Follow-up Information    None          Patient's Medications   Start Taking    No medications on file   Continue Taking    AMLODIPINE (NORVASC) 10 MG TABLET    Take 1 Tab by mouth daily. ASPIRIN 81 MG CHEWABLE TABLET    Take 1 Tab by mouth daily. B.INFANTIS-B. ANI-B. LONG-B.BIFI (PROBIOTIC 4X) 10-15 MG TBEC    Take 1 Tab by mouth daily. BIOTIN 2,500 MCG CAP    Take 1 Tab by mouth two (2) times a day. CARVEDILOL (COREG) 12.5 MG TABLET    Take 1 Tab by mouth two (2) times daily (with meals). CHOLECALCIFEROL (VITAMIN D3) 1,000 UNIT CAP    Take 5,000 Units by mouth daily. CRANBERRY EXTRACT (AZO CRANBERRY) 450 MG TAB    Take 2 Tabs by mouth daily. CYANOCOBALAMIN (VITAMIN B-12) 1,000 MCG TABLET    Take 1,000 mcg by mouth two (2) times a day. DOCUSATE SODIUM (COLACE) 100 MG CAPSULE    Take 1 Cap by mouth two (2) times daily as needed for Constipation for up to 90 days. ESOMEPRAZOLE (NEXIUM) 40 MG CAPSULE    Take 40 mg by mouth daily. GABAPENTIN (NEURONTIN) 100 MG CAPSULE    Take 1 Cap by mouth two (2) times a day. Max Daily Amount: 200 mg. INSULIN GLARGINE (LANTUS) 100 UNIT/ML INJECTION    12 units SQ daily    INSULIN LISPRO (HUMALOG) 100 UNIT/ML INJECTION    For blood glucose of less than 150 - no insulin , 151 - 199 = 3 units , 200 - 249 = 6 units , 250 - 299 = 9 units , 300 - 349 = 12 Units. If  blood glucose is <70 mg/dL. Drink orange Juice OR  Sugar containing Liquid and call medical doctor    ISOSORBIDE MONONITRATE ER (IMDUR) 30 MG TABLET    Take 1 Tab by mouth daily. LORATADINE (CLARITIN) 10 MG TABLET    Take 10 mg by mouth daily. LOSARTAN (COZAAR) 25 MG TABLET    Take 1 Tab by mouth daily. OXYGEN-AIR DELIVERY SYSTEMS    Take 2 L by inhalation daily. POLYETHYLENE GLYCOL (MIRALAX) 17 GRAM PACKET    Take 1 Packet by mouth daily. ROSUVASTATIN (CRESTOR) 5 MG TABLET    Take 1 Tab by mouth nightly. VENLAFAXINE-SR (EFFEXOR-XR) 37.5 MG CAPSULE    Take 50 mg by mouth once. VITAMIN A-VITAMIN C-VIT E-MIN (OCUVITE) TABLET    Take 1 Tab by mouth daily. These Medications have changed    No medications on file   Stop Taking    No medications on file         DO Aida Mckeon medical dictation software was used for portions of this report. Unintended transcription errors may occur. My signature above authenticates this document and my orders, the final    diagnosis (es), discharge prescription (s), and instructions in the Epic    record.

## 2020-07-24 NOTE — ED NOTES
Adult protective services supervisor came in, [facility] has a bed for her, they just need to verify income. Dr. Lowell Gómez is aware.

## 2020-07-24 NOTE — PROGRESS NOTES
Patient was discharged from [facility] and rehab this AM.  Patient stated that the Patient went to Dialysis and was transported home by [employer]. EMS called APS and police and the home was condemned. Called Step Daughter [phone] who lives in [address]. Patient unable to live with step daughter due to distance and Patient is on dialysis MWF at [facility]. Called [facility] at [address], no answer. Called Admissions at [facility] no answer. Called AARON Peraza on call this weekend to give update on patient.     Brien Mims, RN BSN  Care Manager  [phone]

## 2020-07-25 NOTE — ED TRIAGE NOTES
Patient brought in by [address] [place] [address],[address]. They state that patient was discharged from [facility] after dialysis was complete medical transport took her to her home residence. Medical transport called the police and the patients house was immediately condemned. Patient was brought to the ED since she has no place else to go.

## 2020-07-26 ENCOUNTER — HOME CARE VISIT (OUTPATIENT)
Dept: SCHEDULING | Facility: HOME HEALTH | Age: 70
End: 2020-07-26

## 2021-03-27 NOTE — PROGRESS NOTES
Infectious Disease progress Note        Reason: Gram-positive bloodstream infection, pneumonia    Current abx Prior abx   Piperacillin/tazobactam since 6/7   Vancomycin 6/7; 6/10-6/12     Assessment :  69 y.o. female  with multiple medical problems including diastolic CHF, diabetes, arthritis, acid reflux hiatal hernia, chronic hypoxic failure on home oxygen 2 L, hypertension, end-stage renal disease on dialysis Tuesday Thursday Saturday who presented to the emergency department via EMS on 6/8/20 with shortness of breath per report from [facility] and rehab. Patient presents with a highly complex clinical picture. Clinical presentation seems consistent with acute respiratory failure secondary to fluid overload, acute on chronic diastolic CHF. Single positive blood culture on 6/7 for coagulase-negative Staphylococcus likely contaminant. Negative blood culture 6/10    Acute on chronic respiratory failure requiring mechanical ventilation - intubated 6/11 due to aspiration of partial dentures. Removed. S/p bronchoscopy 6/11- findings noted. --extubated 6/15, concern for aspiration 6/16    Recommendations:  --WBC up a tick, s/p possible re-aspiration event. Continue piperacillin/tazobactam.  Tx for aspiration with a shorter course anticipated, with aspiration possible 6/16, will re-consider cessation on 6/19. --Monitor blood cx from 6/16 still negative  --Await result of urine cx and address abx as needed  --F/u ICU team recommendations. --Follow-up nephrology recommendations  --If clinically decompensates recommend: blood cx x 2, resp cx, CDI testing, addition of vancomycin, and d/c of pip/tazo & addition of meropenem & oral vanc    Relayed information to pt. She denies questions today. Please call me if any further questions or concerns. Will continue to participate in the care of this patient.     HPI:  Pt reports that she is feeling \"ok\" today  Denies n/v/abd pain  Has some loose stools x 2/day per her.   Denies cough    Current Facility-Administered Medications   Medication Dose Route Frequency    albuterol-ipratropium (DUO-NEB) 2.5 MG-0.5 MG/3 ML  3 mL Nebulization Q6H RT    midodrine (PROAMATINE) tablet 10 mg  10 mg Oral TID WITH MEALS    Piperacillin-tazobactam (ZOSYN) 0.75 gm Supplemental Dosing by Pharmacy   Other Rx Dosing/Monitoring    piperacillin-tazobactam (ZOSYN) 2.25 g in 0.9% sodium chloride (MBP/ADV) 50 mL MBP  2.25 g IntraVENous Q8H    acetaminophen (TYLENOL) tablet 325 mg  325 mg Oral Q4H PRN    sodium chloride 3% hypertonic nebulizer soln  4 mL Nebulization TID PRN    lidocaine 4 % patch 1 Patch  1 Patch TransDERmal Q24H    menthol-zinc oxide (CALMOSEPTINE) 0.44-20.6 % ointment   Topical Q6H PRN    dextrose 10% infusion   IntraVENous CONTINUOUS    0.9% sodium chloride infusion 250 mL  250 mL IntraVENous PRN    epoetin johnathon-epbx (RETACRIT) injection 10,000 Units  10,000 Units SubCUTAneous Q MON, WED & FRI    midazolam (VERSED) injection 1-2 mg  1-2 mg IntraVENous Q2H PRN    pantoprazole (PROTONIX) 40 mg in 0.9% sodium chloride 10 mL injection  40 mg IntraVENous DAILY    albuterol-ipratropium (DUO-NEB) 2.5 MG-0.5 MG/3 ML  3 mL Nebulization Q6H PRN    chlorhexidine (PERIDEX) 0.12 % mouthwash 15 mL  15 mL Oral Q12H    sodium chloride (NS) flush 5-40 mL  5-40 mL IntraVENous Q8H    sodium chloride (NS) flush 5-40 mL  5-40 mL IntraVENous PRN    insulin lispro (HUMALOG) injection   SubCUTAneous Q6H    hydrALAZINE (APRESOLINE) 20 mg/mL injection 20 mg  20 mg IntraVENous Q6H PRN    ondansetron (ZOFRAN) injection 4 mg  4 mg IntraVENous Q6H PRN    glucose chewable tablet 16 g  4 Tab Oral PRN    glucagon (GLUCAGEN) injection 1 mg  1 mg IntraMUSCular PRN    dextrose (D50W) injection syrg 12.5-25 g  25-50 mL IntraVENous PRN    furosemide (LASIX) injection 80 mg  80 mg IntraVENous BID    citalopram (CELEXA) tablet 20 mg  20 mg Oral DAILY    cyanocobalamin tablet 1,000 mcg 1,000 mcg Oral BID    loratadine (CLARITIN) tablet 10 mg  10 mg Oral DAILY    aspirin chewable tablet 81 mg  81 mg Oral DAILY    [Held by provider] isosorbide mononitrate ER (IMDUR) tablet 30 mg  30 mg Oral DAILY    polyethylene glycol (MIRALAX) packet 17 g  17 g Oral DAILY    [Held by provider] rosuvastatin (CRESTOR) tablet 5 mg  5 mg Oral QHS    [Held by provider] gabapentin (NEURONTIN) capsule 100 mg  100 mg Oral BID    [Held by provider] carvediloL (COREG) tablet 12.5 mg  12.5 mg Oral BID WITH MEALS    ferrous sulfate tablet 325 mg  325 mg Oral EVERY OTHER DAY    mirtazapine (REMERON) tablet 15 mg  15 mg Oral QHS    docusate sodium (COLACE) capsule 100 mg  100 mg Oral BID PRN    heparin (porcine) injection 5,000 Units  5,000 Units SubCUTAneous Q8H       Allergies: Augmentin [amoxicillin-pot clavulanate]; Clavulanic acid; and Levaquin [levofloxacin]    Temp (24hrs), Av.9 °F (36.6 °C), Min:97.1 °F (36.2 °C), Max:98.7 °F (37.1 °C)    Visit Vitals  /56   Pulse 84   Temp 98.6 °F (37 °C)   Resp 22   Ht 5' 5\" (1.651 m)   Wt 98 kg (216 lb 0.8 oz)   SpO2 97%   Breastfeeding No   BMI 35.95 kg/m²       Physical Exam:    Constitutional: laying on bed, NAD, HFNC in place  HEENT: non icteric sclera, no oral lesions  Cardiovascular: Regular rhythm. Exam reveals no gallop and no friction rub. Pulmonary/Chest: Coarse b/s b/l anteriorly with decreased on the Left  Abdominal: Soft. Bowel sounds are normal. NT, ND  Musculoskeletal: exhibits no tenderness. Trace edema of LE  Neurological: awake, alert x 4, non focal, generally weak  No facial asymmetry  Skin: Skin is warm and dry.    Psychiatric: Pleasant, cooperative    Labs: Results:   Chemistry Recent Labs     20  0209 20  0450 20  0524   * 148* 146*    137 139   K 3.9 3.1* 3.5    105 102   CO2 28 26 28   BUN 14 19* 13   CREA 2.40* 3.12* 2.14*   CA 9.1 8.2* 8.6   AGAP 10 6 9   BUCR 6* 6* 6*      CBC w/Diff Recent Labs 06/18/20  0209 06/17/20  0606 06/16/20  0524   WBC 15.4* 12.0 9.3   RBC 3.55* 3.19* 3.15*   HGB 9.8* 8.8* 8.6*   HCT 31.7* 28.6* 28.4*    187 186   GRANS 91* 86* 90*   LYMPH 3* 10* 7*   EOS 0 0 0      Microbiology Recent Labs     06/16/20  0628 06/16/20  0625   CULT NO GROWTH 2 DAYS NO GROWTH 2 DAYS          RADIOLOGY:  Reviewed, none new    Dr. Susan Huynh, Infectious Disease Specialist  [phone]  June 18, 2020  10:20 AM Patient fell from standing while he's walking his dog fell face down with LOC not on blood thinner

## 2025-01-15 NOTE — PROGRESS NOTES
7382: Bedside and Verbal shift change report given to Nataliia RN (oncoming nurse) by Primitivo Lundberg RN  (offgoing nurse). Report included the following information SBAR, Kardex, Intake/Output, MAR, Recent Results and Cardiac Rhythm NSR.     1945: Bedside and Verbal shift change report given to SIMA Dunham (oncoming nurse) by Karishma Clemente RN (offgoing nurse). Report included the following information SBAR, Kardex, Intake/Output, MAR, Recent Results and Cardiac Rhythm NSR. Yes

## (undated) DEVICE — 1860S HEALTH CARE RESPIRATOR N95 120EA/C: Brand: 3M™

## (undated) DEVICE — SINGLE USE SUCTION VALVE MAJ-209: Brand: SINGLE USE SUCTION VALVE (STERILE)

## (undated) DEVICE — MEDI-VAC SUCTION HIGH CAPACITY: Brand: CARDINAL HEALTH

## (undated) DEVICE — SLIDE MICRO PLAIN PRECLEAND --

## (undated) DEVICE — APPLICATOR BNDG 1MM ADH PREMIERPRO EXOFIN

## (undated) DEVICE — SINGLE USE BIOPSY VALVE MAJ-210: Brand: SINGLE USE BIOPSY VALVE (STERILE)

## (undated) DEVICE — REAG CYTO FIX 4OZ PMP SPRY --

## (undated) DEVICE — CATHETER SUCT TR FL TIP 14FR W/ O CTRL

## (undated) DEVICE — BLADE, TONGUE, 6", STERILE: Brand: MEDLINE

## (undated) DEVICE — BASIN EMESIS 500CC ROSE 250/CS 60/PLT: Brand: MEDEGEN MEDICAL PRODUCTS, LLC

## (undated) DEVICE — SUTURE PROL SZ 2-0 L36IN NONABSORBABLE BLU V-7 L26MM 1/2 8977H

## (undated) DEVICE — DRSG PATCH ANTIMIC 1INX7.0MM -- CONVERT TO ITEM 356054

## (undated) DEVICE — SYR 10ML SLIP TIP 1/5ML GRAD --

## (undated) DEVICE — SUTURE MCRYL SZ 4-0 L18IN ABSRB UD L19MM PS-2 3/8 CIR PRIM Y496G

## (undated) DEVICE — BE 105-8 BRONCHOSCOPE SWIVEL - 15MM ID/22MM OD (PATIENT PORT) X15MM OD (EQUIPMENT PORT). REUSABLE.  FITS COMPONENTS OF ADULT VENTILATOR CIRCUITS.  MOLDED OF POLYETHERIMIDE. INCLUDES TWO SILICONE RUBBER CAPS; ONE CAP ALLOWS FOR THE USE OF A SUCTIONING CATHETER WHILE THE OTHER CAP ALLOWS FOR THE USE OF A FIBER-OPTIC BRONCHOSCOPE WITHOUT SIGNIFICANT LOSS OF PEEP.: Brand: BE 105-8 BRONCHOSCOPE SWIVEL

## (undated) DEVICE — PACK PROCEDURE SURG VASC CATH 161 MMC LF

## (undated) DEVICE — ANGIOGRAPHY KIT CUST VASC

## (undated) DEVICE — 1860 HEALTH CARE N95 MASK, 20EACH/BOX  6 BX/C: Brand: 3M™

## (undated) DEVICE — BITE BLOCK ENDOSCP UNIV AD 6 TO 9.4 MM

## (undated) DEVICE — MASK SURG REG ORNG LEV 3 SFTY SEAL 4 LAYR SFT INNR LINING

## (undated) DEVICE — AIRLIFE™ ADULT OXYGEN MASK VINYL, UNDER-THE-CHIN STYLE, MEDIUM CONCENTRATION MASK WITH 7 FEET (2.1 M) CRUSH-RESISTANT OXYGEN TUBING: Brand: AIRLIFE™

## (undated) DEVICE — AIRLIFE™ NASAL OXYGEN CANNULA CURVED, FLARED TIP WITH 14 FOOT (4.3 M) CRUSH-RESISTANT TUBING, OVER-THE-EAR STYLE: Brand: AIRLIFE™

## (undated) DEVICE — CONTAINER PREFIL FRMLN 40ML --

## (undated) DEVICE — COVER US PRB W15XL120CM W/ GEL RUBBERBAND TAPE STRP FLD GEN

## (undated) DEVICE — DRAPE TWL SURG 16X26IN BLU ORB04] ALLCARE INC]

## (undated) DEVICE — WANG TRANSBRONCHIAL HISTOLOGY NEEDLE FOR CENTRAL REGION, 1.9 MM X 130 CM: Brand: WANG

## (undated) DEVICE — GOWN ISOL IMPERV UNIV, DISP, OPEN BACK, BLUE --